# Patient Record
Sex: FEMALE | Race: WHITE | NOT HISPANIC OR LATINO | Employment: OTHER | URBAN - METROPOLITAN AREA
[De-identification: names, ages, dates, MRNs, and addresses within clinical notes are randomized per-mention and may not be internally consistent; named-entity substitution may affect disease eponyms.]

---

## 2017-02-17 ENCOUNTER — PATIENT MESSAGE (OUTPATIENT)
Dept: FAMILY MEDICINE | Facility: CLINIC | Age: 67
End: 2017-02-17

## 2017-02-18 ENCOUNTER — TELEPHONE (OUTPATIENT)
Dept: FAMILY MEDICINE | Facility: CLINIC | Age: 67
End: 2017-02-18

## 2017-02-18 RX ORDER — SPIRONOLACTONE 50 MG/1
50 TABLET, FILM COATED ORAL 2 TIMES DAILY
Qty: 180 TABLET | Refills: 3 | Status: SHIPPED | OUTPATIENT
Start: 2017-02-18 | End: 2017-04-05 | Stop reason: SDUPTHER

## 2017-04-05 ENCOUNTER — OFFICE VISIT (OUTPATIENT)
Dept: FAMILY MEDICINE | Facility: CLINIC | Age: 67
End: 2017-04-05
Payer: MEDICARE

## 2017-04-05 VITALS
HEART RATE: 56 BPM | SYSTOLIC BLOOD PRESSURE: 126 MMHG | WEIGHT: 211.44 LBS | OXYGEN SATURATION: 96 % | HEIGHT: 65 IN | TEMPERATURE: 98 F | BODY MASS INDEX: 35.23 KG/M2 | DIASTOLIC BLOOD PRESSURE: 80 MMHG

## 2017-04-05 DIAGNOSIS — I47.10 PSVT (PAROXYSMAL SUPRAVENTRICULAR TACHYCARDIA): ICD-10-CM

## 2017-04-05 DIAGNOSIS — Q72.33: ICD-10-CM

## 2017-04-05 DIAGNOSIS — G89.29 CHRONIC BILATERAL THORACIC BACK PAIN: ICD-10-CM

## 2017-04-05 DIAGNOSIS — F41.9 ANXIETY: ICD-10-CM

## 2017-04-05 DIAGNOSIS — M41.9 SCOLIOSIS, UNSPECIFIED SCOLIOSIS TYPE, UNSPECIFIED SPINAL REGION: ICD-10-CM

## 2017-04-05 DIAGNOSIS — M54.6 CHRONIC BILATERAL THORACIC BACK PAIN: ICD-10-CM

## 2017-04-05 DIAGNOSIS — E03.4 HYPOTHYROIDISM DUE TO ACQUIRED ATROPHY OF THYROID: ICD-10-CM

## 2017-04-05 DIAGNOSIS — G47.00 INSOMNIA, UNSPECIFIED TYPE: ICD-10-CM

## 2017-04-05 DIAGNOSIS — Z00.00 WELLNESS EXAMINATION: Primary | ICD-10-CM

## 2017-04-05 PROCEDURE — 99499 UNLISTED E&M SERVICE: CPT | Mod: S$GLB,,, | Performed by: FAMILY MEDICINE

## 2017-04-05 PROCEDURE — 1157F ADVNC CARE PLAN IN RCRD: CPT | Mod: S$GLB,,, | Performed by: FAMILY MEDICINE

## 2017-04-05 PROCEDURE — 1160F RVW MEDS BY RX/DR IN RCRD: CPT | Mod: S$GLB,,, | Performed by: FAMILY MEDICINE

## 2017-04-05 PROCEDURE — 99213 OFFICE O/P EST LOW 20 MIN: CPT | Mod: S$GLB,,, | Performed by: FAMILY MEDICINE

## 2017-04-05 PROCEDURE — 1159F MED LIST DOCD IN RCRD: CPT | Mod: S$GLB,,, | Performed by: FAMILY MEDICINE

## 2017-04-05 PROCEDURE — 1126F AMNT PAIN NOTED NONE PRSNT: CPT | Mod: S$GLB,,, | Performed by: FAMILY MEDICINE

## 2017-04-05 RX ORDER — ATENOLOL 50 MG/1
75 TABLET ORAL DAILY
Qty: 135 TABLET | Refills: 3 | Status: SHIPPED | OUTPATIENT
Start: 2017-04-05 | End: 2017-05-23 | Stop reason: SDUPTHER

## 2017-04-05 RX ORDER — ALPRAZOLAM 0.25 MG/1
0.25 TABLET ORAL DAILY
Qty: 90 TABLET | Refills: 1 | Status: SHIPPED | OUTPATIENT
Start: 2017-04-05 | End: 2018-03-16 | Stop reason: SDUPTHER

## 2017-04-05 RX ORDER — LEVOTHYROXINE SODIUM 150 UG/1
150 TABLET ORAL DAILY
Qty: 90 TABLET | Refills: 3 | Status: SHIPPED | OUTPATIENT
Start: 2017-04-05 | End: 2017-05-23 | Stop reason: SDUPTHER

## 2017-04-05 RX ORDER — SPIRONOLACTONE 50 MG/1
50 TABLET, FILM COATED ORAL 2 TIMES DAILY
Qty: 180 TABLET | Refills: 3 | Status: SHIPPED | OUTPATIENT
Start: 2017-04-05 | End: 2017-12-28 | Stop reason: SDUPTHER

## 2017-04-05 RX ORDER — ESTRADIOL 2 MG/1
2 TABLET ORAL DAILY
Qty: 90 TABLET | Refills: 3 | Status: SHIPPED | OUTPATIENT
Start: 2017-04-05 | End: 2018-05-02

## 2017-04-05 RX ORDER — ZOLPIDEM TARTRATE 10 MG/1
10 TABLET ORAL NIGHTLY PRN
Qty: 30 TABLET | Refills: 2 | Status: SHIPPED | OUTPATIENT
Start: 2017-04-05 | End: 2018-03-16 | Stop reason: SDUPTHER

## 2017-04-05 RX ORDER — GABAPENTIN 300 MG/1
300 CAPSULE ORAL 2 TIMES DAILY
Qty: 180 CAPSULE | Refills: 3 | Status: SHIPPED | OUTPATIENT
Start: 2017-04-05 | End: 2017-05-23 | Stop reason: SDUPTHER

## 2017-04-05 RX ORDER — THYROID 120 MG/1
1 TABLET ORAL DAILY
Qty: 90 TABLET | Refills: 3 | Status: SHIPPED | OUTPATIENT
Start: 2017-04-05 | End: 2017-05-23 | Stop reason: SDUPTHER

## 2017-04-05 RX ORDER — SERTRALINE HYDROCHLORIDE 100 MG/1
150 TABLET, FILM COATED ORAL DAILY
Qty: 135 TABLET | Refills: 3 | Status: SHIPPED | OUTPATIENT
Start: 2017-04-05 | End: 2017-05-23 | Stop reason: SDUPTHER

## 2017-04-05 RX ORDER — AMITRIPTYLINE HYDROCHLORIDE 10 MG/1
10 TABLET, FILM COATED ORAL NIGHTLY PRN
Qty: 90 TABLET | Refills: 3 | Status: SHIPPED | OUTPATIENT
Start: 2017-04-05 | End: 2017-05-23 | Stop reason: SDUPTHER

## 2017-04-05 NOTE — MR AVS SNAPSHOT
[address] [address]  Phone: [phone]  Fax: [phone]                  Talia Dillon   2017 8:20 AM   Office Visit    Description:  Female : 1950   Provider:  James aVllecillo MD   Department:  [facility]           Reason for Visit     Annual Exam     Medication Refill           Diagnoses this Visit        Comments    Wellness examination    -  Primary     PSVT (paroxysmal supraventricular tachycardia)         Anxiety         Scoliosis, unspecified scoliosis type, unspecified spinal region         Congenital absence of foot, bilateral         Hypothyroidism due to acquired atrophy of thyroid         Insomnia, unspecified type         Chronic bilateral thoracic back pain                To Do List           Future Appointments        Provider Department Dept Phone    4/10/2017 3:00 PM [facility] XR2 [facility] [phone]    2017 3:00 PM [facility] BMD1 [facility] [phone]      Goals (5 Years of Data)     None       These Medications        Disp Refills Start End    alprazolam (XANAX) 0.25 MG tablet 90 tablet 1 2017     Take 1 tablet (0.25 mg total) by mouth once daily. - Oral    Pharmacy: [facility]/pharmacy #[id number]  [place] - [address] AT [facility] Ph #: [phone]       amitriptyline (ELAVIL) 10 MG tablet 90 tablet 3 2017     Take 1 tablet (10 mg total) by mouth nightly as needed. - Oral    Pharmacy: [facility]/pharmacy #[id number] [place] - [address] AT [facility] Ph #: [phone]       atenolol (TENORMIN) 50 MG tablet 135 tablet 3 2017     Take 1.5 tablets (75 mg total) by mouth once daily. - Oral    Pharmacy: [facility]/pharmacy #[id number]  [place] - [address] AT [facility] Ph #: [phone]       Notes to Pharmacy: **Patient requests 90 days supply**    estradiol (ESTRACE) 2 MG tablet 90  tablet 3 4/5/2017     Take 1 tablet (2 mg total) by mouth once daily. - Oral    Pharmacy: [facility]/pharmacy #[facility] [place] - [address] [facility] #: [phone]       gabapentin (NEURONTIN) 300 MG capsule 180 capsule 3 4/5/2017 4/5/2018    Take 1 capsule (300 mg total) by mouth 2 (two) times daily. - Oral    Pharmacy: [facility]/pharmacy #[facility] [place] [address] AT [facility] Ph #: [phone]       levothyroxine (SYNTHROID) 150 MCG tablet 90 tablet 3 4/5/2017     Take 1 tablet (150 mcg total) by mouth once daily. - Oral    Pharmacy: [facility]/pharmacy #[facility] [place] [address] AT [facility] Ph #: [phone]       Notes to Pharmacy: **Patient requests 90 days supply**    sertraline (ZOLOFT) 100 MG tablet 135 tablet 3 4/5/2017     Take 1.5 tablets (150 mg total) by mouth once daily. - Oral    Pharmacy: [facility]/pharmacy #[facility] [place] - [address] [place] [address] AT [facility] Ph #: [phone]       Notes to Pharmacy: **Patient requests 90 days supply**    spironolactone (ALDACTONE) 50 MG tablet 180 tablet 3 4/5/2017     Take 1 tablet (50 mg total) by mouth 2 (two) times daily. - Oral    Pharmacy: [facility]/pharmacy #[facility] [place] - [address] [facility] Ph #: [phone]       Notes to Pharmacy: **Patient requests 90 days supply**    thyroid, pork, (ARMOUR THYROID) 120 mg Tab 90 tablet 3 4/5/2017     Take 1 tablet by mouth once daily. - Oral    Pharmacy: [facility]/pharmacy #[facility] [place] - [address] AT [facility] Ph #: [phone]       Notes to Pharmacy: **Patient requests 90 days supply**    zolpidem (AMBIEN) 10 mg Tab 30 tablet 2 4/5/2017     Take 1 tablet (10 mg total) by mouth nightly as needed. - Oral    Pharmacy: [facility]/pharmacy #[id number] - [place] - [address] AT [facility] Ph #:  [phone]         [facility]     [facility] Nurse Care [facility] - 24/7 Assistance  Unless otherwise directed by your provider, please contact [facility], our nurse care line that is available for 24/7 assistance.     Registered nurses in the [facility] provide: appointment scheduling, clinical advisement, health education, and other advisory services.  Call: [phone] (toll free)               Medications           Message regarding Medications     Verify the changes and/or additions to your medication regime listed below are the same as discussed with your clinician today.  If any of these changes or additions are incorrect, please notify your healthcare provider.        STOP taking these medications     terbinafine HCl (LAMISIL) 250 mg tablet Take 1 tablet (250 mg total) by mouth once daily.           Verify that the below list of medications is an accurate representation of the medications you are currently taking.  If none reported, the list may be blank. If incorrect, please contact your healthcare provider. Carry this list with you in case of emergency.           Current Medications     alprazolam (XANAX) 0.25 MG tablet Take 1 tablet (0.25 mg total) by mouth once daily.    amitriptyline (ELAVIL) 10 MG tablet Take 1 tablet (10 mg total) by mouth nightly as needed.    atenolol (TENORMIN) 50 MG tablet Take 1.5 tablets (75 mg total) by mouth once daily.    estradiol (ESTRACE) 2 MG tablet Take 1 tablet (2 mg total) by mouth once daily.    gabapentin (NEURONTIN) 300 MG capsule Take 1 capsule (300 mg total) by mouth 2 (two) times daily.    levothyroxine (SYNTHROID) 150 MCG tablet Take 1 tablet (150 mcg total) by mouth once daily.    sertraline (ZOLOFT) 100 MG tablet Take 1.5 tablets (150 mg total) by mouth once daily.    spironolactone (ALDACTONE) 50 MG tablet Take 1 tablet (50 mg total) by mouth 2 (two) times daily.    thyroid, pork, (ARMOUR THYROID) 120 mg Tab Take 1 tablet by mouth  "once daily.    zolpidem (AMBIEN) 10 mg Tab Take 1 tablet (10 mg total) by mouth nightly as needed.           Clinical Reference Information           Your Vitals Were     BP Pulse Temp Height Weight SpO2    126/80 56 98.3 °F (36.8 °C) (Oral) 5' 5" (1.651 m) 95.9 kg (211 lb 6.7 oz) 96%    BMI                35.18 kg/m2          Blood Pressure          Most Recent Value    BP  126/80      Allergies as of 4/5/2017     Dexamethasone      Immunizations Administered on Date of Encounter - 4/5/2017     None      Orders Placed During Today's Visit     Future Labs/Procedures Expected by Expires    CBC auto differential  4/5/2017 6/4/2018    Comprehensive metabolic panel  4/5/2017 6/4/2018    DXA Bone Density Spine And Hip_Axial Skeleton  4/5/2017 4/5/2018    Hepatitis C antibody  4/5/2017 6/4/2018    X-Ray Thoracic Spine AP Lateral  4/5/2017 4/5/2018    Lipid panel  As directed 4/5/2018    TSH  As directed 4/5/2018      Smoking Cessation     If you would like to quit smoking:   You may be eligible for free services if you are a [place] resident and started smoking cigarettes before September 1, 1988.  Call the Smoking Cessation [employer] ([facility]) toll free at [phone] or [phone].   Call 1-800-QUIT-NOW if you do not meet the above criteria.   Contact us via email: [email or web address]   View our website for more information: [email or web address]        Language Assistance Services     ATTENTION: Language assistance services are available, free of charge. Please call [phone].      ATENCIÓN: Si angeles meraz, tiene a stokes disposición servicios gratuitos de asistencia lingüística. Llame al [phone].     CHÚ Ý: N?u b?n nói Ti?ng Vi?t, có các d?ch v? h? tr? ngôn ng? mi?n phí dành cho b?n. G?i s? [phone].         [facility] complies with applicable Federal civil rights laws and does not discriminate on the basis of race, color, national origin, age, disability, or sex.        "

## 2017-04-05 NOTE — PROGRESS NOTES
Subjective:      Patient ID: Talia Dillon is a 66 y.o. female.    Chief Complaint: Annual Exam and Medication Refill    HPI Comments: Check  Up, rx, midthoracic back pain both sides for 3 months; no trauma; naprosyn helps;sleeps ok; voids ok;     Medication Refill       Review of Systems   Constitutional: Negative.    HENT: Negative.    Respiratory: Negative.    Cardiovascular: Negative.    Gastrointestinal: Negative.    Endocrine: Negative.    Genitourinary: Negative.    Musculoskeletal: Positive for back pain.   Psychiatric/Behavioral: Negative.    All other systems reviewed and are negative.    Objective:     Physical Exam   Constitutional: She is oriented to person, place, and time. She appears well-developed and well-nourished.   HENT:   Head: Normocephalic.   Eyes: Conjunctivae and EOM are normal. Pupils are equal, round, and reactive to light.   Neck: Normal range of motion. Neck supple.   Cardiovascular: Normal rate, regular rhythm and normal heart sounds.    Pulmonary/Chest: Effort normal and breath sounds normal.   Musculoskeletal: Normal range of motion.   Neurological: She is alert and oriented to person, place, and time. She has normal reflexes.   Skin: Skin is warm and dry.   Psychiatric: She has a normal mood and affect. Her behavior is normal. Judgment and thought content normal.   Nursing note and vitals reviewed.    Assessment:     1. Wellness examination    2. PSVT (paroxysmal supraventricular tachycardia)    3. Anxiety    4. Scoliosis, unspecified scoliosis type, unspecified spinal region    5. Congenital absence of foot, bilateral    6. Hypothyroidism due to acquired atrophy of thyroid    7. Insomnia, unspecified type    8. Chronic bilateral thoracic back pain      Plan:     New Prescriptions    No medications on file     Discontinued Medications    TERBINAFINE HCL (LAMISIL) 250 MG TABLET    Take 1 tablet (250 mg total) by mouth once daily.     Modified Medications    Modified Medication  Previous Medication    ALPRAZOLAM (XANAX) 0.25 MG TABLET alprazolam (XANAX) 0.25 MG tablet       Take 1 tablet (0.25 mg total) by mouth once daily.    Take 1 tablet (0.25 mg total) by mouth once daily.    AMITRIPTYLINE (ELAVIL) 10 MG TABLET amitriptyline (ELAVIL) 10 MG tablet       Take 1 tablet (10 mg total) by mouth nightly as needed.    Take 1 tablet (10 mg total) by mouth nightly as needed.    ATENOLOL (TENORMIN) 50 MG TABLET atenolol (TENORMIN) 50 MG tablet       Take 1.5 tablets (75 mg total) by mouth once daily.    Take 1.5 tablets (75 mg total) by mouth once daily.    ESTRADIOL (ESTRACE) 2 MG TABLET estradiol (ESTRACE) 2 MG tablet       Take 1 tablet (2 mg total) by mouth once daily.    Take 1 tablet (2 mg total) by mouth once daily.    GABAPENTIN (NEURONTIN) 300 MG CAPSULE gabapentin (NEURONTIN) 300 MG capsule       Take 1 capsule (300 mg total) by mouth 2 (two) times daily.    Take 1 capsule (300 mg total) by mouth 2 (two) times daily.    LEVOTHYROXINE (SYNTHROID) 150 MCG TABLET levothyroxine (SYNTHROID) 150 MCG tablet       Take 1 tablet (150 mcg total) by mouth once daily.    Take 1 tablet (150 mcg total) by mouth once daily.    SERTRALINE (ZOLOFT) 100 MG TABLET sertraline (ZOLOFT) 100 MG tablet       Take 1.5 tablets (150 mg total) by mouth once daily.    Take 1.5 tablets (150 mg total) by mouth once daily.    SPIRONOLACTONE (ALDACTONE) 50 MG TABLET spironolactone (ALDACTONE) 50 MG tablet       Take 1 tablet (50 mg total) by mouth 2 (two) times daily.    Take 1 tablet (50 mg total) by mouth 2 (two) times daily.    THYROID, PORK, (ARMOUR THYROID) 120 MG TAB thyroid, pork, (ARMOUR THYROID) 120 mg Tab       Take 1 tablet by mouth once daily.    Take 1 tablet by mouth once daily.    ZOLPIDEM (AMBIEN) 10 MG TAB zolpidem (AMBIEN) 10 mg Tab       Take 1 tablet (10 mg total) by mouth nightly as needed.    Take 1 tablet (10 mg total) by mouth nightly as needed.       Wellness examination  -     CBC auto  differential; Future; Expected date: 4/5/17  -     Comprehensive metabolic panel; Future; Expected date: 4/5/17  -     Lipid panel; Future  -     TSH; Future  -     Hepatitis C antibody; Future; Expected date: 4/5/17  -     DXA Bone Density Spine And Hip_Axial Skeleton; Future; Expected date: 4/5/17    PSVT (paroxysmal supraventricular tachycardia)  -     CBC auto differential; Future; Expected date: 4/5/17  -     Comprehensive metabolic panel; Future; Expected date: 4/5/17  -     Lipid panel; Future  -     TSH; Future  -     Hepatitis C antibody; Future; Expected date: 4/5/17  -     DXA Bone Density Spine And Hip_Axial Skeleton; Future; Expected date: 4/5/17    Anxiety  -     alprazolam (XANAX) 0.25 MG tablet; Take 1 tablet (0.25 mg total) by mouth once daily.  Dispense: 90 tablet; Refill: 1  -     estradiol (ESTRACE) 2 MG tablet; Take 1 tablet (2 mg total) by mouth once daily.  Dispense: 90 tablet; Refill: 3  -     zolpidem (AMBIEN) 10 mg Tab; Take 1 tablet (10 mg total) by mouth nightly as needed.  Dispense: 30 tablet; Refill: 2  -     CBC auto differential; Future; Expected date: 4/5/17  -     Comprehensive metabolic panel; Future; Expected date: 4/5/17  -     Lipid panel; Future  -     TSH; Future  -     Hepatitis C antibody; Future; Expected date: 4/5/17  -     DXA Bone Density Spine And Hip_Axial Skeleton; Future; Expected date: 4/5/17    Scoliosis, unspecified scoliosis type, unspecified spinal region  -     CBC auto differential; Future; Expected date: 4/5/17  -     Comprehensive metabolic panel; Future; Expected date: 4/5/17  -     Lipid panel; Future  -     TSH; Future  -     Hepatitis C antibody; Future; Expected date: 4/5/17  -     DXA Bone Density Spine And Hip_Axial Skeleton; Future; Expected date: 4/5/17    Congenital absence of foot, bilateral  -     CBC auto differential; Future; Expected date: 4/5/17  -     Comprehensive metabolic panel; Future; Expected date: 4/5/17  -     Lipid panel; Future  -      TSH; Future  -     Hepatitis C antibody; Future; Expected date: 4/5/17  -     DXA Bone Density Spine And Hip_Axial Skeleton; Future; Expected date: 4/5/17    Hypothyroidism due to acquired atrophy of thyroid  -     CBC auto differential; Future; Expected date: 4/5/17  -     Comprehensive metabolic panel; Future; Expected date: 4/5/17  -     Lipid panel; Future  -     TSH; Future  -     Hepatitis C antibody; Future; Expected date: 4/5/17  -     DXA Bone Density Spine And Hip_Axial Skeleton; Future; Expected date: 4/5/17    Insomnia, unspecified type  -     CBC auto differential; Future; Expected date: 4/5/17  -     Comprehensive metabolic panel; Future; Expected date: 4/5/17  -     Lipid panel; Future  -     TSH; Future  -     Hepatitis C antibody; Future; Expected date: 4/5/17  -     DXA Bone Density Spine And Hip_Axial Skeleton; Future; Expected date: 4/5/17    Chronic bilateral thoracic back pain  -     X-Ray Thoracic Spine AP Lateral; Future; Expected date: 4/5/17  -     CBC auto differential; Future; Expected date: 4/5/17  -     Comprehensive metabolic panel; Future; Expected date: 4/5/17  -     Lipid panel; Future  -     TSH; Future  -     Hepatitis C antibody; Future; Expected date: 4/5/17  -     DXA Bone Density Spine And Hip_Axial Skeleton; Future; Expected date: 4/5/17    Other orders  -     amitriptyline (ELAVIL) 10 MG tablet; Take 1 tablet (10 mg total) by mouth nightly as needed.  Dispense: 90 tablet; Refill: 3  -     atenolol (TENORMIN) 50 MG tablet; Take 1.5 tablets (75 mg total) by mouth once daily.  Dispense: 135 tablet; Refill: 3  -     gabapentin (NEURONTIN) 300 MG capsule; Take 1 capsule (300 mg total) by mouth 2 (two) times daily.  Dispense: 180 capsule; Refill: 3  -     levothyroxine (SYNTHROID) 150 MCG tablet; Take 1 tablet (150 mcg total) by mouth once daily.  Dispense: 90 tablet; Refill: 3  -     sertraline (ZOLOFT) 100 MG tablet; Take 1.5 tablets (150 mg total) by mouth once daily.   Dispense: 135 tablet; Refill: 3  -     spironolactone (ALDACTONE) 50 MG tablet; Take 1 tablet (50 mg total) by mouth 2 (two) times daily.  Dispense: 180 tablet; Refill: 3  -     thyroid, pork, (ARMOUR THYROID) 120 mg Tab; Take 1 tablet by mouth once daily.  Dispense: 90 tablet; Refill: 3

## 2017-04-10 ENCOUNTER — HOSPITAL ENCOUNTER (OUTPATIENT)
Dept: RADIOLOGY | Facility: HOSPITAL | Age: 67
Discharge: HOME | End: 2017-04-10
Attending: FAMILY MEDICINE
Payer: MEDICARE

## 2017-04-10 DIAGNOSIS — M54.6 CHRONIC BILATERAL THORACIC BACK PAIN: ICD-10-CM

## 2017-04-10 DIAGNOSIS — G89.29 CHRONIC BILATERAL THORACIC BACK PAIN: ICD-10-CM

## 2017-04-10 PROCEDURE — 72070 X-RAY EXAM THORAC SPINE 2VWS: CPT | Mod: 26,,, | Performed by: RADIOLOGY

## 2017-04-10 PROCEDURE — 72070 X-RAY EXAM THORAC SPINE 2VWS: CPT | Mod: TC,PO

## 2017-05-08 ENCOUNTER — APPOINTMENT (OUTPATIENT)
Dept: RADIOLOGY | Facility: CLINIC | Age: 67
End: 2017-05-08
Attending: FAMILY MEDICINE
Payer: MEDICARE

## 2017-05-08 DIAGNOSIS — G89.29 CHRONIC BILATERAL THORACIC BACK PAIN: ICD-10-CM

## 2017-05-08 DIAGNOSIS — Z00.00 WELLNESS EXAMINATION: ICD-10-CM

## 2017-05-08 DIAGNOSIS — G47.00 INSOMNIA, UNSPECIFIED TYPE: ICD-10-CM

## 2017-05-08 DIAGNOSIS — I47.10 PSVT (PAROXYSMAL SUPRAVENTRICULAR TACHYCARDIA): ICD-10-CM

## 2017-05-08 DIAGNOSIS — M41.9 SCOLIOSIS, UNSPECIFIED SCOLIOSIS TYPE, UNSPECIFIED SPINAL REGION: ICD-10-CM

## 2017-05-08 DIAGNOSIS — M54.6 CHRONIC BILATERAL THORACIC BACK PAIN: ICD-10-CM

## 2017-05-08 DIAGNOSIS — Q72.33: ICD-10-CM

## 2017-05-08 DIAGNOSIS — E03.4 HYPOTHYROIDISM DUE TO ACQUIRED ATROPHY OF THYROID: ICD-10-CM

## 2017-05-08 DIAGNOSIS — F41.9 ANXIETY: ICD-10-CM

## 2017-05-08 PROCEDURE — 77080 DXA BONE DENSITY AXIAL: CPT | Mod: 26,,, | Performed by: INTERNAL MEDICINE

## 2017-05-08 PROCEDURE — 77080 DXA BONE DENSITY AXIAL: CPT | Mod: TC,PO

## 2017-05-23 RX ORDER — ATENOLOL 50 MG/1
TABLET ORAL
Qty: 135 TABLET | Refills: 3 | Status: SHIPPED | OUTPATIENT
Start: 2017-05-23 | End: 2018-03-16 | Stop reason: SDUPTHER

## 2017-05-23 RX ORDER — GABAPENTIN 300 MG/1
CAPSULE ORAL
Qty: 180 CAPSULE | Refills: 3 | Status: SHIPPED | OUTPATIENT
Start: 2017-05-23 | End: 2017-12-28 | Stop reason: SDUPTHER

## 2017-05-23 RX ORDER — SERTRALINE HYDROCHLORIDE 100 MG/1
TABLET, FILM COATED ORAL
Qty: 135 TABLET | Refills: 3 | Status: SHIPPED | OUTPATIENT
Start: 2017-05-23 | End: 2018-03-16 | Stop reason: SDUPTHER

## 2017-05-23 RX ORDER — AMITRIPTYLINE HYDROCHLORIDE 10 MG/1
TABLET, FILM COATED ORAL
Qty: 90 TABLET | Refills: 3 | Status: SHIPPED | OUTPATIENT
Start: 2017-05-23 | End: 2017-08-09

## 2017-05-23 RX ORDER — LEVOTHYROXINE SODIUM 150 UG/1
TABLET ORAL
Qty: 90 TABLET | Refills: 3 | Status: SHIPPED | OUTPATIENT
Start: 2017-05-23 | End: 2017-12-31 | Stop reason: SDUPTHER

## 2017-05-23 RX ORDER — THYROID, PORCINE 120 MG/1
TABLET ORAL
Qty: 90 TABLET | Refills: 3 | Status: SHIPPED | OUTPATIENT
Start: 2017-05-23 | End: 2018-03-16 | Stop reason: SDUPTHER

## 2017-05-26 ENCOUNTER — TELEPHONE (OUTPATIENT)
Dept: FAMILY MEDICINE | Facility: CLINIC | Age: 67
End: 2017-05-26

## 2017-05-26 NOTE — TELEPHONE ENCOUNTER
----- Message from James Vallecillo MD sent at 5/20/2017  7:40 AM CDT -----  CALL PT TESTS ARE NORMAL

## 2017-06-07 ENCOUNTER — TELEPHONE (OUTPATIENT)
Dept: FAMILY MEDICINE | Facility: CLINIC | Age: 67
End: 2017-06-07

## 2017-06-07 DIAGNOSIS — E03.4 HYPOTHYROIDISM DUE TO ACQUIRED ATROPHY OF THYROID: Primary | ICD-10-CM

## 2017-06-07 DIAGNOSIS — R60.0 LOCALIZED EDEMA: ICD-10-CM

## 2017-06-07 LAB
ALBUMIN SERPL-MCNC: 4.1 G/DL (ref 3.6–5.1)
ALBUMIN/GLOB SERPL: 1.4 (CALC) (ref 1–2.5)
ALP SERPL-CCNC: 71 U/L (ref 33–130)
ALT SERPL-CCNC: 10 U/L (ref 6–29)
AST SERPL-CCNC: 15 U/L (ref 10–35)
BASOPHILS # BLD AUTO: 25 CELLS/UL (ref 0–200)
BASOPHILS NFR BLD AUTO: 0.3 %
BILIRUB SERPL-MCNC: 0.5 MG/DL (ref 0.2–1.2)
BUN SERPL-MCNC: 30 MG/DL (ref 7–25)
BUN/CREAT SERPL: 18 (CALC) (ref 6–22)
CALCIUM SERPL-MCNC: 9.2 MG/DL (ref 8.6–10.4)
CHLORIDE SERPL-SCNC: 105 MMOL/L (ref 98–110)
CHOLEST SERPL-MCNC: 180 MG/DL (ref 125–200)
CHOLEST/HDLC SERPL: 4.4 (CALC)
CO2 SERPL-SCNC: 26 MMOL/L (ref 20–31)
CREAT SERPL-MCNC: 1.63 MG/DL (ref 0.5–0.99)
EOSINOPHIL # BLD AUTO: 191 CELLS/UL (ref 15–500)
EOSINOPHIL NFR BLD AUTO: 2.3 %
ERYTHROCYTE [DISTWIDTH] IN BLOOD BY AUTOMATED COUNT: 13.6 % (ref 11–15)
GFR SERPL CREATININE-BSD FRML MDRD: 32 ML/MIN/1.73M2
GLOBULIN SER CALC-MCNC: 2.9 G/DL (CALC) (ref 1.9–3.7)
GLUCOSE SERPL-MCNC: 98 MG/DL (ref 65–99)
HCT VFR BLD AUTO: 38.8 % (ref 35–45)
HCV AB S/CO SERPL IA: 0.14
HCV AB SERPL QL IA: NORMAL
HDLC SERPL-MCNC: 41 MG/DL
HGB BLD-MCNC: 12.9 G/DL (ref 11.7–15.5)
LDLC SERPL CALC-MCNC: 111 MG/DL (CALC)
LYMPHOCYTES # BLD AUTO: 1311 CELLS/UL (ref 850–3900)
LYMPHOCYTES NFR BLD AUTO: 15.8 %
MCH RBC QN AUTO: 28.7 PG (ref 27–33)
MCHC RBC AUTO-ENTMCNC: 33.3 G/DL (ref 32–36)
MCV RBC AUTO: 86.2 FL (ref 80–100)
MONOCYTES # BLD AUTO: 473 CELLS/UL (ref 200–950)
MONOCYTES NFR BLD AUTO: 5.7 %
NEUTROPHILS # BLD AUTO: 6300 CELLS/UL (ref 1500–7800)
NEUTROPHILS NFR BLD AUTO: 75.9 %
NONHDLC SERPL-MCNC: 139 MG/DL (CALC)
PLATELET # BLD AUTO: 133 THOUSAND/UL (ref 140–400)
PMV BLD REES-ECKER: 11.8 FL (ref 7.5–12.5)
POTASSIUM SERPL-SCNC: 4.7 MMOL/L (ref 3.5–5.3)
PROT SERPL-MCNC: 7 G/DL (ref 6.1–8.1)
RBC # BLD AUTO: 4.5 MILLION/UL (ref 3.8–5.1)
SODIUM SERPL-SCNC: 139 MMOL/L (ref 135–146)
TRIGL SERPL-MCNC: 138 MG/DL
TSH SERPL-ACNC: 0.02 MIU/L (ref 0.4–4.5)
WBC # BLD AUTO: 8.3 THOUSAND/UL (ref 3.8–10.8)

## 2017-08-01 ENCOUNTER — TELEPHONE (OUTPATIENT)
Dept: FAMILY MEDICINE | Facility: CLINIC | Age: 67
End: 2017-08-01

## 2017-08-01 LAB
BUN SERPL-MCNC: 33 MG/DL (ref 7–25)
BUN/CREAT SERPL: 19 (CALC) (ref 6–22)
CALCIUM SERPL-MCNC: 9.2 MG/DL (ref 8.6–10.4)
CHLORIDE SERPL-SCNC: 102 MMOL/L (ref 98–110)
CO2 SERPL-SCNC: 29 MMOL/L (ref 20–31)
CREAT SERPL-MCNC: 1.7 MG/DL (ref 0.5–0.99)
GFR SERPL CREATININE-BSD FRML MDRD: 31 ML/MIN/1.73M2
GLUCOSE SERPL-MCNC: 90 MG/DL (ref 65–99)
POTASSIUM SERPL-SCNC: 4.3 MMOL/L (ref 3.5–5.3)
SODIUM SERPL-SCNC: 139 MMOL/L (ref 135–146)
T3 SERPL-MCNC: 77 NG/DL (ref 76–181)
T4 SERPL-MCNC: 7.1 MCG/DL (ref 4.5–12)

## 2017-08-01 NOTE — TELEPHONE ENCOUNTER
----- Message from James Vallecillo MD sent at 8/1/2017  6:50 AM CDT -----  Call to RTC WITH medicines for edema, diuretics and arthritis; kidney function remains elevated

## 2017-08-09 ENCOUNTER — OFFICE VISIT (OUTPATIENT)
Dept: FAMILY MEDICINE | Facility: CLINIC | Age: 67
End: 2017-08-09
Payer: MEDICARE

## 2017-08-09 VITALS
HEART RATE: 73 BPM | TEMPERATURE: 98 F | DIASTOLIC BLOOD PRESSURE: 88 MMHG | WEIGHT: 218.06 LBS | HEIGHT: 65 IN | SYSTOLIC BLOOD PRESSURE: 130 MMHG | OXYGEN SATURATION: 98 % | BODY MASS INDEX: 36.33 KG/M2

## 2017-08-09 DIAGNOSIS — R60.9 EDEMA, UNSPECIFIED TYPE: ICD-10-CM

## 2017-08-09 DIAGNOSIS — M77.8 SHOULDER TENDONITIS, LEFT: ICD-10-CM

## 2017-08-09 DIAGNOSIS — R79.89 CREATININE ELEVATION: ICD-10-CM

## 2017-08-09 DIAGNOSIS — M79.606 LEG PAIN, ANTERIOR, UNSPECIFIED LATERALITY: ICD-10-CM

## 2017-08-09 DIAGNOSIS — Q66.90: Primary | ICD-10-CM

## 2017-08-09 LAB
BACTERIA #/AREA URNS AUTO: NORMAL /HPF
BILIRUB UR QL STRIP: NEGATIVE
CLARITY UR REFRACT.AUTO: CLEAR
COLOR UR AUTO: YELLOW
GLUCOSE UR QL STRIP: NEGATIVE
HGB UR QL STRIP: NEGATIVE
HYALINE CASTS UR QL AUTO: 1 /LPF
KETONES UR QL STRIP: NEGATIVE
LEUKOCYTE ESTERASE UR QL STRIP: NEGATIVE
MICROSCOPIC COMMENT: NORMAL
NITRITE UR QL STRIP: NEGATIVE
PH UR STRIP: 5 [PH] (ref 5–8)
PROT UR QL STRIP: ABNORMAL
RBC #/AREA URNS AUTO: 1 /HPF (ref 0–4)
SP GR UR STRIP: 1.01 (ref 1–1.03)
SQUAMOUS #/AREA URNS AUTO: 0 /HPF
URN SPEC COLLECT METH UR: ABNORMAL
UROBILINOGEN UR STRIP-ACNC: NEGATIVE EU/DL
WBC #/AREA URNS AUTO: 1 /HPF (ref 0–5)

## 2017-08-09 PROCEDURE — 1125F AMNT PAIN NOTED PAIN PRSNT: CPT | Mod: S$GLB,,, | Performed by: FAMILY MEDICINE

## 2017-08-09 PROCEDURE — 1159F MED LIST DOCD IN RCRD: CPT | Mod: S$GLB,,, | Performed by: FAMILY MEDICINE

## 2017-08-09 PROCEDURE — 99213 OFFICE O/P EST LOW 20 MIN: CPT | Mod: 25,S$GLB,, | Performed by: FAMILY MEDICINE

## 2017-08-09 PROCEDURE — 20610 DRAIN/INJ JOINT/BURSA W/O US: CPT | Mod: LT,S$GLB,, | Performed by: FAMILY MEDICINE

## 2017-08-09 PROCEDURE — 3008F BODY MASS INDEX DOCD: CPT | Mod: S$GLB,,, | Performed by: FAMILY MEDICINE

## 2017-08-09 PROCEDURE — 81001 URINALYSIS AUTO W/SCOPE: CPT

## 2017-08-09 RX ORDER — AMITRIPTYLINE HYDROCHLORIDE 10 MG/1
10 TABLET, FILM COATED ORAL NIGHTLY PRN
Qty: 90 TABLET | Refills: 3 | Status: SHIPPED | OUTPATIENT
Start: 2017-08-09 | End: 2018-09-07 | Stop reason: SDUPTHER

## 2017-08-09 NOTE — PROCEDURES
Large Joint Aspiration/Injection  Date/Time: 8/9/2017 2:41 PM  Performed by: LUIZ MONTGOMERY  Authorized by: LUIZ MONTGOMERY     Consent Done?:  Yes (Verbal)  Indications:  Pain  Procedure site marked: Yes    Timeout: Prior to procedure the correct patient, procedure, and site was verified      Location:  Shoulder  Site:  L subacromial bursa  Prep: Patient was prepped and draped in usual sterile fashion    Needle size:  22 G  Approach:  Lateral  Medications:  10 mg triamcinolone acetonide 10 mg/mL  Aspirate amount (ml):  0  Patient tolerance:  Patient tolerated the procedure well with no immediate complications

## 2017-08-09 NOTE — PROGRESS NOTES
Subjective:      Patient ID: Talia Dillon is a 66 y.o. female.    Chief Complaint: Follow-up    Follow up abnormal labs, elevated creatinine, decreased spironolcatone to one a day from two; she takes this for generalized edema; always; c/o always sweats too much, all of her life.  Also, low TSH; ankles swelled after decreasing diuretic it, then went down; still swell on occasion; breathing ok; elavil helps, even though it doesn't help with sweating; left shoulder pain;       Review of Systems   Constitutional: Negative.    HENT: Negative.    Respiratory: Negative.    Cardiovascular: Negative.    Gastrointestinal: Negative.    Endocrine: Negative.    Genitourinary: Negative.    Musculoskeletal: Negative.    Psychiatric/Behavioral: Negative.    All other systems reviewed and are negative.    Objective:     Physical Exam   Constitutional: She is oriented to person, place, and time. She appears well-developed and well-nourished.   HENT:   Head: Normocephalic.   Eyes: Conjunctivae and EOM are normal. Pupils are equal, round, and reactive to light.   Neck: Normal range of motion. Neck supple.   Cardiovascular: Normal rate, regular rhythm and normal heart sounds.    Pulmonary/Chest: Effort normal and breath sounds normal.   Musculoskeletal: She exhibits tenderness. She exhibits no edema or deformity.   Neurological: She is alert and oriented to person, place, and time. She has normal reflexes.   Skin: Skin is warm and dry.   Psychiatric: She has a normal mood and affect. Her behavior is normal. Judgment and thought content normal.   Nursing note and vitals reviewed.    Assessment:     1. Congenital deformities of feet    2. Leg pain, anterior, unspecified laterality    3. Shoulder tendonitis, left    4. Creatinine elevation    5. Edema, unspecified type      Plan:     New Prescriptions    No medications on file     Discontinued Medications    No medications on file     Modified Medications    Modified Medication Previous  Medication    AMITRIPTYLINE (ELAVIL) 10 MG TABLET amitriptyline (ELAVIL) 10 MG tablet       Take 1 tablet (10 mg total) by mouth nightly as needed.    TAKE 1 TABLET BY MOUTH NIGHTLY AS NEEDED       Congenital deformities of feet  -     Cancel: Urinalysis; Future  -     US Retroperitoneal Complete (Kidney and; Future; Expected date: 08/09/2017  -     Basic metabolic panel; Future; Expected date: 11/09/2017  -     Urinalysis    Leg pain, anterior, unspecified laterality  -     Cancel: Urinalysis; Future  -     US Retroperitoneal Complete (Kidney and; Future; Expected date: 08/09/2017  -     Basic metabolic panel; Future; Expected date: 11/09/2017  -     Urinalysis    Shoulder tendonitis, left  -     Basic metabolic panel; Future; Expected date: 11/09/2017  -     Large Joint Aspiration/Injection  -     triamcinolone acetonide injection 10 mg; Inject 10 mg into the articular space.  -     Urinalysis    Creatinine elevation  -     Urinalysis    Edema, unspecified type  -     Urinalysis    Other orders  -     amitriptyline (ELAVIL) 10 MG tablet; Take 1 tablet (10 mg total) by mouth nightly as needed.  Dispense: 90 tablet; Refill: 3  -     Urinalysis Microscopic      Bmp 3 months; avoid aleve, if possible; u/a , renal ultrasound to check kidney with william creatinine

## 2017-08-10 ENCOUNTER — TELEPHONE (OUTPATIENT)
Dept: FAMILY MEDICINE | Facility: CLINIC | Age: 67
End: 2017-08-10

## 2017-08-10 DIAGNOSIS — R80.9 PROTEINURIA, UNSPECIFIED TYPE: Primary | ICD-10-CM

## 2017-08-11 ENCOUNTER — TELEPHONE (OUTPATIENT)
Dept: FAMILY MEDICINE | Facility: CLINIC | Age: 67
End: 2017-08-11

## 2017-08-11 NOTE — TELEPHONE ENCOUNTER
----- Message from Kerry Zambrano sent at 8/11/2017  2:24 PM CDT -----  Patient returned Theresa's phone call. Patient available now for returned call

## 2017-08-11 NOTE — TELEPHONE ENCOUNTER
Pt notified, states it will be a couple weeks before she can get back because she's in the process of moving.

## 2017-08-11 NOTE — TELEPHONE ENCOUNTER
----- Message from James Vallecillo MD sent at 8/10/2017  7:22 AM CDT -----  Protein in urine; needs 24 hour urine for protein

## 2017-08-22 ENCOUNTER — TELEPHONE (OUTPATIENT)
Dept: RADIOLOGY | Facility: HOSPITAL | Age: 67
End: 2017-08-22

## 2017-08-23 ENCOUNTER — HOSPITAL ENCOUNTER (OUTPATIENT)
Dept: RADIOLOGY | Facility: HOSPITAL | Age: 67
Discharge: HOME | End: 2017-08-23
Attending: FAMILY MEDICINE
Payer: MEDICARE

## 2017-08-23 ENCOUNTER — TELEPHONE (OUTPATIENT)
Dept: FAMILY MEDICINE | Facility: CLINIC | Age: 67
End: 2017-08-23

## 2017-08-23 DIAGNOSIS — Q66.90: ICD-10-CM

## 2017-08-23 DIAGNOSIS — M79.606 LEG PAIN, ANTERIOR, UNSPECIFIED LATERALITY: ICD-10-CM

## 2017-08-23 PROCEDURE — 76770 US EXAM ABDO BACK WALL COMP: CPT | Mod: TC,PO

## 2017-08-23 PROCEDURE — 76770 US EXAM ABDO BACK WALL COMP: CPT | Mod: 26,,, | Performed by: RADIOLOGY

## 2017-08-23 NOTE — TELEPHONE ENCOUNTER
----- Message from Kerry Zambrano sent at 8/23/2017 10:51 AM CDT -----  Please would like returned call from nurse to explain & do the 24 hour urine

## 2017-08-23 NOTE — TELEPHONE ENCOUNTER
----- Message from James Vallecillo MD sent at 8/23/2017  1:38 PM CDT -----  CALL PT TESTS ARE NORMAL

## 2017-09-15 ENCOUNTER — CLINICAL SUPPORT (OUTPATIENT)
Dept: FAMILY MEDICINE | Facility: CLINIC | Age: 67
End: 2017-09-15
Payer: MEDICARE

## 2017-09-15 DIAGNOSIS — R80.9 PROTEINURIA, UNSPECIFIED TYPE: ICD-10-CM

## 2017-09-15 LAB
PROT 24H UR-MRATE: 230 MG/SPEC
PROT UR-MCNC: 27 MG/DL
URINE COLLECTION DURATION: 24 HR
URINE VOLUME: 850 ML

## 2017-09-15 PROCEDURE — 84156 ASSAY OF PROTEIN URINE: CPT

## 2017-11-23 ENCOUNTER — PATIENT MESSAGE (OUTPATIENT)
Dept: FAMILY MEDICINE | Facility: CLINIC | Age: 67
End: 2017-11-23

## 2017-12-28 ENCOUNTER — OFFICE VISIT (OUTPATIENT)
Dept: FAMILY MEDICINE | Facility: CLINIC | Age: 67
End: 2017-12-28
Payer: MEDICARE

## 2017-12-28 VITALS
BODY MASS INDEX: 35.99 KG/M2 | OXYGEN SATURATION: 97 % | SYSTOLIC BLOOD PRESSURE: 130 MMHG | TEMPERATURE: 98 F | HEART RATE: 65 BPM | WEIGHT: 216.25 LBS | DIASTOLIC BLOOD PRESSURE: 80 MMHG

## 2017-12-28 DIAGNOSIS — R79.89 AZOTEMIA: Primary | ICD-10-CM

## 2017-12-28 DIAGNOSIS — E03.4 HYPOTHYROIDISM DUE TO ACQUIRED ATROPHY OF THYROID: ICD-10-CM

## 2017-12-28 DIAGNOSIS — M54.12 CERVICAL NEURALGIA: ICD-10-CM

## 2017-12-28 PROCEDURE — 99499 UNLISTED E&M SERVICE: CPT | Mod: S$GLB,,, | Performed by: FAMILY MEDICINE

## 2017-12-28 PROCEDURE — 99213 OFFICE O/P EST LOW 20 MIN: CPT | Mod: S$GLB,,, | Performed by: FAMILY MEDICINE

## 2017-12-28 PROCEDURE — G0008 ADMIN INFLUENZA VIRUS VAC: HCPCS | Mod: S$GLB,,, | Performed by: FAMILY MEDICINE

## 2017-12-28 PROCEDURE — 90662 IIV NO PRSV INCREASED AG IM: CPT | Mod: S$GLB,,, | Performed by: FAMILY MEDICINE

## 2017-12-28 RX ORDER — METHYLPREDNISOLONE 4 MG/1
TABLET ORAL
Qty: 1 PACKAGE | Refills: 0 | Status: SHIPPED | OUTPATIENT
Start: 2017-12-28 | End: 2017-12-28

## 2017-12-28 RX ORDER — SPIRONOLACTONE 50 MG/1
50 TABLET, FILM COATED ORAL DAILY
Qty: 90 TABLET | Refills: 3
Start: 2017-12-28 | End: 2018-04-23 | Stop reason: SINTOL

## 2017-12-28 RX ORDER — PREDNISONE 20 MG/1
20 TABLET ORAL DAILY
Qty: 10 TABLET | Refills: 0 | Status: SHIPPED | OUTPATIENT
Start: 2017-12-28 | End: 2018-03-06

## 2017-12-28 RX ORDER — GABAPENTIN 300 MG/1
600 CAPSULE ORAL 2 TIMES DAILY
Qty: 180 CAPSULE | Refills: 3 | Status: SHIPPED | OUTPATIENT
Start: 2017-12-28 | End: 2018-05-31 | Stop reason: SDUPTHER

## 2017-12-28 NOTE — PROGRESS NOTES
Subjective:      Patient ID: Talia Dillon is a 67 y.o. female.    Chief Complaint: Shoulder Pain (left shoulder pain since August ); Neck Pain; and Flu Vaccine    Follow up; still in pain shoulder, neck back, had protein in urine, too much thryoid meds, injection no help in shoulder; on less spironoolactone; had creatinine 1.7; will recheck BMP now on only one a day' TSH low repeatedly; on 2 diff replacements; will repeat TSH, if still low, will decrease dose of one of them;     Lower T spine pain, left shoulder pain, rqdiates down back left arm, neck hurts and stiff; no h x neck surgery; taking no Rx;       Review of Systems   Constitutional: Negative.  Negative for activity change and unexpected weight change.   HENT: Negative.  Negative for hearing loss, rhinorrhea and trouble swallowing.    Eyes: Negative for discharge and visual disturbance.   Respiratory: Positive for wheezing. Negative for chest tightness.    Cardiovascular: Negative.  Negative for chest pain and palpitations.   Gastrointestinal: Negative.  Negative for blood in stool, constipation, diarrhea and vomiting.   Endocrine: Negative.  Negative for polydipsia and polyuria.   Genitourinary: Negative.  Negative for difficulty urinating, dysuria, hematuria and menstrual problem.   Musculoskeletal: Positive for arthralgias and neck pain.   Neurological: Negative for weakness and headaches.   Psychiatric/Behavioral: Negative.  Negative for confusion and dysphoric mood.   All other systems reviewed and are negative.    Objective:     Physical Exam   Constitutional: She is oriented to person, place, and time. She appears well-developed and well-nourished.   HENT:   Head: Normocephalic.   Eyes: Conjunctivae and EOM are normal. Pupils are equal, round, and reactive to light.   Neck: Normal range of motion. Neck supple.   Cardiovascular: Normal rate, regular rhythm and normal heart sounds.    Pulmonary/Chest: Effort normal and breath sounds normal.    Musculoskeletal: Normal range of motion.   Neurological: She is alert and oriented to person, place, and time. She has normal reflexes.   Skin: Skin is warm and dry.   Psychiatric: She has a normal mood and affect. Her behavior is normal. Judgment and thought content normal.   Nursing note and vitals reviewed.    Assessment:     1. Azotemia    2. Hypothyroidism due to acquired atrophy of thyroid    3. Cervical neuralgia      Plan:     New Prescriptions    PREDNISONE (DELTASONE) 20 MG TABLET    Take 1 tablet (20 mg total) by mouth once daily. For arthritis pain     Discontinued Medications    No medications on file     Modified Medications    Modified Medication Previous Medication    GABAPENTIN (NEURONTIN) 300 MG CAPSULE gabapentin (NEURONTIN) 300 MG capsule       Take 2 capsules (600 mg total) by mouth 2 (two) times daily.    TAKE ONE CAPSULE BY MOUTH TWICE A DAY    SPIRONOLACTONE (ALDACTONE) 50 MG TABLET spironolactone (ALDACTONE) 50 MG tablet       Take 1 tablet (50 mg total) by mouth once daily.    Take 1 tablet (50 mg total) by mouth 2 (two) times daily.       Azotemia  -     Basic metabolic panel; Future; Expected date: 12/28/2017  -     MRI Cervical Spine Without Contrast; Future; Expected date: 12/28/2017    Hypothyroidism due to acquired atrophy of thyroid  -     TSH; Future  -     MRI Cervical Spine Without Contrast; Future; Expected date: 12/28/2017    Cervical neuralgia    Other orders  -     spironolactone (ALDACTONE) 50 MG tablet; Take 1 tablet (50 mg total) by mouth once daily.  Dispense: 90 tablet; Refill: 3  -     gabapentin (NEURONTIN) 300 MG capsule; Take 2 capsules (600 mg total) by mouth 2 (two) times daily.  Dispense: 180 capsule; Refill: 3  -     Discontinue: methylPREDNISolone (MEDROL DOSEPACK) 4 mg tablet; use as directed  Dispense: 1 Package; Refill: 0  -     predniSONE (DELTASONE) 20 MG tablet; Take 1 tablet (20 mg total) by mouth once daily. For arthritis pain  Dispense: 10 tablet;  Refill: 0  -     Influenza - High Dose (65+) (PF) (IM)

## 2017-12-29 ENCOUNTER — HOSPITAL ENCOUNTER (OUTPATIENT)
Dept: RADIOLOGY | Facility: HOSPITAL | Age: 67
Discharge: HOME | End: 2017-12-29
Attending: FAMILY MEDICINE
Payer: MEDICARE

## 2017-12-29 DIAGNOSIS — E03.4 HYPOTHYROIDISM DUE TO ACQUIRED ATROPHY OF THYROID: ICD-10-CM

## 2017-12-29 DIAGNOSIS — R79.89 AZOTEMIA: ICD-10-CM

## 2017-12-29 PROCEDURE — 72141 MRI NECK SPINE W/O DYE: CPT | Mod: TC,PO

## 2017-12-30 NOTE — PROGRESS NOTES
Patient, Talia Dillon (MRN #[id number]), presented with a recent Platelet count less than 150 K/uL consistent with the definition of thrombocytopenia (ICD10 - D69.6).    Platelets   Date Value Ref Range Status   06/06/2017 133 (L) 140 - 400 Thousand/uL Final     The patient's thrombocytopenia was monitored, evaluated, addressed and/or treated. This addendum to the medical record is made on 12/30/2017.

## 2017-12-31 ENCOUNTER — TELEPHONE (OUTPATIENT)
Dept: FAMILY MEDICINE | Facility: CLINIC | Age: 67
End: 2017-12-31

## 2017-12-31 RX ORDER — LEVOTHYROXINE SODIUM 150 UG/1
150 TABLET ORAL EVERY OTHER DAY
Qty: 45 TABLET | Refills: 3
Start: 2017-12-31 | End: 2018-09-07

## 2018-01-03 ENCOUNTER — TELEPHONE (OUTPATIENT)
Dept: FAMILY MEDICINE | Facility: CLINIC | Age: 68
End: 2018-01-03

## 2018-01-03 DIAGNOSIS — E03.4 HYPOTHYROIDISM DUE TO ACQUIRED ATROPHY OF THYROID: Primary | ICD-10-CM

## 2018-01-03 NOTE — TELEPHONE ENCOUNTER
----- Message from James Vallecillo MD sent at 12/31/2017  8:31 AM CST -----  Too much thyroid medicine; decrease levothyroxin to every other day and repeat TSH 2 months

## 2018-01-03 NOTE — TELEPHONE ENCOUNTER
----- Message from James Vallecillo MD sent at 12/31/2017  8:33 AM CST -----  Neck has arthritis, worn out discs and pinched nerves

## 2018-02-23 ENCOUNTER — TELEPHONE (OUTPATIENT)
Dept: FAMILY MEDICINE | Facility: CLINIC | Age: 68
End: 2018-02-23

## 2018-03-01 ENCOUNTER — LAB VISIT (OUTPATIENT)
Dept: LAB | Facility: HOSPITAL | Age: 68
End: 2018-03-01
Attending: FAMILY MEDICINE
Payer: MEDICARE

## 2018-03-01 DIAGNOSIS — E03.4 HYPOTHYROIDISM DUE TO ACQUIRED ATROPHY OF THYROID: ICD-10-CM

## 2018-03-01 LAB — TSH SERPL DL<=0.005 MIU/L-ACNC: 0.69 UIU/ML

## 2018-03-01 PROCEDURE — 84443 ASSAY THYROID STIM HORMONE: CPT | Mod: PO

## 2018-03-01 PROCEDURE — 36415 COLL VENOUS BLD VENIPUNCTURE: CPT | Mod: PO

## 2018-03-06 ENCOUNTER — OFFICE VISIT (OUTPATIENT)
Dept: FAMILY MEDICINE | Facility: CLINIC | Age: 68
End: 2018-03-06
Payer: MEDICARE

## 2018-03-06 ENCOUNTER — LAB VISIT (OUTPATIENT)
Dept: LAB | Facility: HOSPITAL | Age: 68
End: 2018-03-06
Attending: FAMILY MEDICINE
Payer: MEDICARE

## 2018-03-06 VITALS
SYSTOLIC BLOOD PRESSURE: 122 MMHG | DIASTOLIC BLOOD PRESSURE: 84 MMHG | WEIGHT: 222.19 LBS | BODY MASS INDEX: 37.02 KG/M2 | HEIGHT: 65 IN | TEMPERATURE: 98 F | OXYGEN SATURATION: 98 % | HEART RATE: 72 BPM

## 2018-03-06 DIAGNOSIS — R70.0 ELEVATED SED RATE: ICD-10-CM

## 2018-03-06 DIAGNOSIS — E03.4 HYPOTHYROIDISM DUE TO ACQUIRED ATROPHY OF THYROID: ICD-10-CM

## 2018-03-06 DIAGNOSIS — Z86.79 HISTORY OF PSVT (PAROXYSMAL SUPRAVENTRICULAR TACHYCARDIA): ICD-10-CM

## 2018-03-06 DIAGNOSIS — Q89.9 CONGENITAL BIRTH DEFECT: Primary | ICD-10-CM

## 2018-03-06 DIAGNOSIS — R79.89 CREATININE ELEVATION: ICD-10-CM

## 2018-03-06 LAB
ALBUMIN SERPL BCP-MCNC: 4.3 G/DL
ALP SERPL-CCNC: 86 U/L
ALT SERPL W/O P-5'-P-CCNC: 23 U/L
ANION GAP SERPL CALC-SCNC: 12 MMOL/L
AST SERPL-CCNC: 21 U/L
BASOPHILS # BLD AUTO: 0.02 K/UL
BASOPHILS NFR BLD: 0.2 %
BILIRUB SERPL-MCNC: 0.3 MG/DL
BUN SERPL-MCNC: 33 MG/DL
CALCIUM SERPL-MCNC: 9.5 MG/DL
CHLORIDE SERPL-SCNC: 104 MMOL/L
CO2 SERPL-SCNC: 29 MMOL/L
CREAT SERPL-MCNC: 1.81 MG/DL
CRP SERPL-MCNC: 1.41 MG/DL
DIFFERENTIAL METHOD: ABNORMAL
EOSINOPHIL # BLD AUTO: 0.3 K/UL
EOSINOPHIL NFR BLD: 3.4 %
ERYTHROCYTE [DISTWIDTH] IN BLOOD BY AUTOMATED COUNT: 13.9 %
ERYTHROCYTE [SEDIMENTATION RATE] IN BLOOD BY WESTERGREN METHOD: 38 MM/HR
EST. GFR  (AFRICAN AMERICAN): 32.6 ML/MIN/1.73 M^2
EST. GFR  (NON AFRICAN AMERICAN): 28.3 ML/MIN/1.73 M^2
GLUCOSE SERPL-MCNC: 107 MG/DL
HCT VFR BLD AUTO: 41.3 %
HGB BLD-MCNC: 13.3 G/DL
LYMPHOCYTES # BLD AUTO: 1.8 K/UL
LYMPHOCYTES NFR BLD: 19.2 %
MCH RBC QN AUTO: 29.5 PG
MCHC RBC AUTO-ENTMCNC: 32.2 G/DL
MCV RBC AUTO: 92 FL
MONOCYTES # BLD AUTO: 0.7 K/UL
MONOCYTES NFR BLD: 7.4 %
NEUTROPHILS # BLD AUTO: 6.3 K/UL
NEUTROPHILS NFR BLD: 69.4 %
PLATELET # BLD AUTO: 149 K/UL
PMV BLD AUTO: 13 FL
POTASSIUM SERPL-SCNC: 4.5 MMOL/L
PROT SERPL-MCNC: 7.7 G/DL
RBC # BLD AUTO: 4.51 M/UL
RHEUMATOID FACT SERPL-ACNC: <10 IU/ML
SODIUM SERPL-SCNC: 145 MMOL/L
WBC # BLD AUTO: 9.1 K/UL

## 2018-03-06 PROCEDURE — 86038 ANTINUCLEAR ANTIBODIES: CPT | Mod: PO

## 2018-03-06 PROCEDURE — 85652 RBC SED RATE AUTOMATED: CPT

## 2018-03-06 PROCEDURE — 86140 C-REACTIVE PROTEIN: CPT | Mod: PO

## 2018-03-06 PROCEDURE — 80053 COMPREHEN METABOLIC PANEL: CPT | Mod: PO

## 2018-03-06 PROCEDURE — 99214 OFFICE O/P EST MOD 30 MIN: CPT | Mod: S$GLB,,, | Performed by: FAMILY MEDICINE

## 2018-03-06 PROCEDURE — 99499 UNLISTED E&M SERVICE: CPT | Mod: S$GLB,,, | Performed by: FAMILY MEDICINE

## 2018-03-06 PROCEDURE — 86431 RHEUMATOID FACTOR QUANT: CPT | Mod: PO

## 2018-03-06 PROCEDURE — 85025 COMPLETE CBC W/AUTO DIFF WBC: CPT | Mod: PO

## 2018-03-06 PROCEDURE — 36415 COLL VENOUS BLD VENIPUNCTURE: CPT | Mod: PO

## 2018-03-06 NOTE — PROGRESS NOTES
Subjective:      Patient ID: Talia Dillon is a 67 y.o. female.    Chief Complaint: Results; Arthritis; and Follow-up (Blood work will be done on 3/1/2018.   Discuss with doctor management of thyroid medication ( taking synthroid every other day instead of every day ).   Also, reduced fluid pill from 2 a day to  1 a day.   Have a swollen gland under my armpit.   Ankles swell very easily and almost on a daily basis.  Need medication to manage pain from  neck and back.  After  Cat Scan in December, I have been taking  neuronton 2 in am and 2 in pm.  I had a booster shot at [facility] for pneumonia last week.  My arm swelled up a)    Had labs done; creat still a little high; still on spironolactone for swollen ankles, face, body; william sed rate 2 years ago; shoulders, neck hurts, back hurts; had ultrasound of kidney; had MRI of c spine recently abnd L spine 2 years ago; if sed rate  Still high, treat shoudlers for polymyalgia rheumatica; if sed rate normal, to rheumatologist for all joint pain and shouldbn't be on NSAID due to william creat; needs shoe orthotics for congenital foot abnormality      Arthritis       Review of Systems   Constitutional: Positive for unexpected weight change.   HENT: Negative.    Respiratory: Negative.    Cardiovascular: Positive for leg swelling.   Gastrointestinal: Negative.    Endocrine: Negative.    Genitourinary: Negative.    Musculoskeletal: Positive for arthralgias, arthritis, back pain, gait problem and neck pain.   Psychiatric/Behavioral: Negative.    All other systems reviewed and are negative.    Objective:     Physical Exam   Constitutional: She is oriented to person, place, and time. She appears well-developed and well-nourished.   HENT:   Head: Normocephalic.   Eyes: Conjunctivae and EOM are normal. Pupils are equal, round, and reactive to light.   Neck: Normal range of motion. Neck supple.   Cardiovascular: Normal rate, regular rhythm and normal heart sounds.    Pulmonary/Chest:  Effort normal and breath sounds normal.   Musculoskeletal: Normal range of motion. She exhibits deformity.   See attached photos   Neurological: She is alert and oriented to person, place, and time. She has normal reflexes.   Skin: Skin is warm and dry.   Psychiatric: She has a normal mood and affect. Her behavior is normal. Judgment and thought content normal.   Nursing note and vitals reviewed.    Assessment:     1. Congenital birth defect    2. Hypothyroidism due to acquired atrophy of thyroid    3. Creatinine elevation    4. Elevated sed rate      Plan:     New Prescriptions    No medications on file     Discontinued Medications    PREDNISONE (DELTASONE) 20 MG TABLET    Take 1 tablet (20 mg total) by mouth once daily. For arthritis pain     Modified Medications    No medications on file       Congenital birth defect  -     Orthotic fitting; Future    Hypothyroidism due to acquired atrophy of thyroid  -     Ambulatory referral to Endocrinology    Creatinine elevation  -     Ambulatory referral to Nephrology  -     CBC auto differential; Future; Expected date: 03/06/2018  -     Comprehensive metabolic panel; Future; Expected date: 03/06/2018    Elevated sed rate  -     Sedimentation rate, manual; Future  -     C-reactive protein; Future; Expected date: 03/06/2018  -     BUFFY; Future; Expected date: 03/06/2018  -     Rheumatoid factor; Future; Expected date: 03/06/2018  -     CBC auto differential; Future; Expected date: 03/06/2018  -     Comprehensive metabolic panel; Future; Expected date: 03/06/2018

## 2018-03-07 ENCOUNTER — TELEPHONE (OUTPATIENT)
Dept: FAMILY MEDICINE | Facility: CLINIC | Age: 68
End: 2018-03-07

## 2018-03-07 DIAGNOSIS — R70.0 ELEVATED SED RATE: Primary | ICD-10-CM

## 2018-03-07 LAB — ANA SER QL IF: NORMAL

## 2018-03-07 RX ORDER — PREDNISONE 5 MG/1
5 TABLET ORAL DAILY
Qty: 14 TABLET | Refills: 0 | Status: SHIPPED | OUTPATIENT
Start: 2018-03-07 | End: 2018-03-07

## 2018-03-07 NOTE — TELEPHONE ENCOUNTER
-  Patient was advised the message form Dr Vallecillo     ---- Message from James Vallecillo MD sent at 3/7/2018  6:21 AM CST -----  Sed rate slightly high; not as bad as before; crp sl high; giving her 2 week trial of prednisone 5 mg one a dya for her shoulders; she is to give feedback after 2 weeks to me about her shoulders; meds sent to pharm; call pt

## 2018-03-07 NOTE — TELEPHONE ENCOUNTER
I advised pt not to take the prednisone  I also gave her the # to rheumatology   She will call to self schedule

## 2018-03-07 NOTE — TELEPHONE ENCOUNTER
Never mind, I cancelled prednisone, she is allergic to dexamethasone, a cousine, so do not go get this medicine.    Needs to see rheumatologist; referral placed; call pt

## 2018-03-11 PROBLEM — I47.10 PSVT (PAROXYSMAL SUPRAVENTRICULAR TACHYCARDIA): Status: RESOLVED | Noted: 2017-04-05 | Resolved: 2018-03-11

## 2018-03-11 PROBLEM — Z86.79 HISTORY OF PSVT (PAROXYSMAL SUPRAVENTRICULAR TACHYCARDIA): Status: ACTIVE | Noted: 2018-03-11

## 2018-03-12 NOTE — PROGRESS NOTES
Patient, Talia Dillon (MRN #[id number]), presented with a recent Platelet count less than 150 K/uL consistent with the definition of thrombocytopenia (ICD10 - D69.6).    Platelets   Date Value Ref Range Status   03/06/2018 149 (L) 150 - 350 K/uL Final     The patient's thrombocytopenia was monitored, evaluated, addressed and/or treated. This addendum to the medical record is made on 03/11/2018.

## 2018-03-12 NOTE — PROGRESS NOTES
Patient, Talia Dillon (MRN #[id number]), presented with a recent Estimated Glumerular Filtration Rate (EGFR) between 15 and 29 consistent with the definition of chronic kidney disease stage 4 (ICD10 - N18.4).    eGFR if non    Date Value Ref Range Status   03/06/2018 28.3 (A) >60 mL/min/1.73 m^2 Final     Comment:     Calculation used to obtain the estimated glomerular filtration  rate (eGFR) is the CKD-EPI equation.          The patient's chronic kidney disease stage 4 was monitored, evaluated, addressed and/or treated. This addendum to the medical record is made on 03/11/2018.

## 2018-03-16 ENCOUNTER — PATIENT MESSAGE (OUTPATIENT)
Dept: FAMILY MEDICINE | Facility: CLINIC | Age: 68
End: 2018-03-16

## 2018-03-16 DIAGNOSIS — F41.9 ANXIETY: ICD-10-CM

## 2018-03-18 RX ORDER — THYROID 120 MG/1
1 TABLET ORAL DAILY
Qty: 90 TABLET | Refills: 1 | Status: SHIPPED | OUTPATIENT
Start: 2018-03-18 | End: 2018-05-02

## 2018-03-18 RX ORDER — ALPRAZOLAM 0.25 MG/1
0.25 TABLET ORAL DAILY
Qty: 90 TABLET | Refills: 1 | Status: SHIPPED | OUTPATIENT
Start: 2018-03-18 | End: 2018-09-07 | Stop reason: SDUPTHER

## 2018-03-18 RX ORDER — ATENOLOL 50 MG/1
TABLET ORAL
Qty: 135 TABLET | Refills: 1 | Status: SHIPPED | OUTPATIENT
Start: 2018-03-18 | End: 2018-09-07 | Stop reason: SDUPTHER

## 2018-03-18 RX ORDER — ZOLPIDEM TARTRATE 10 MG/1
10 TABLET ORAL NIGHTLY PRN
Qty: 30 TABLET | Refills: 2 | Status: SHIPPED | OUTPATIENT
Start: 2018-03-18 | End: 2018-09-07 | Stop reason: SDUPTHER

## 2018-03-18 RX ORDER — SERTRALINE HYDROCHLORIDE 100 MG/1
150 TABLET, FILM COATED ORAL DAILY
Qty: 135 TABLET | Refills: 1 | Status: SHIPPED | OUTPATIENT
Start: 2018-03-18 | End: 2018-09-07

## 2018-04-02 ENCOUNTER — HOSPITAL ENCOUNTER (EMERGENCY)
Facility: HOSPITAL | Age: 68
Discharge: HOME | End: 2018-04-02
Attending: FAMILY MEDICINE
Payer: MEDICARE

## 2018-04-02 VITALS
BODY MASS INDEX: 37.56 KG/M2 | WEIGHT: 220 LBS | HEIGHT: 64 IN | SYSTOLIC BLOOD PRESSURE: 121 MMHG | HEART RATE: 67 BPM | OXYGEN SATURATION: 97 % | RESPIRATION RATE: 18 BRPM | DIASTOLIC BLOOD PRESSURE: 65 MMHG | TEMPERATURE: 98 F

## 2018-04-02 DIAGNOSIS — K52.9 GASTROENTERITIS: Primary | ICD-10-CM

## 2018-04-02 LAB
ALBUMIN SERPL BCP-MCNC: 4.3 G/DL
ALP SERPL-CCNC: 67 U/L
ALT SERPL W/O P-5'-P-CCNC: 19 U/L
ANION GAP SERPL CALC-SCNC: 14 MMOL/L
AST SERPL-CCNC: 20 U/L
BACTERIA #/AREA URNS AUTO: ABNORMAL /HPF
BASOPHILS # BLD AUTO: 0.03 K/UL
BASOPHILS NFR BLD: 0.3 %
BILIRUB SERPL-MCNC: 0.5 MG/DL
BILIRUB UR QL STRIP: NEGATIVE
BUN SERPL-MCNC: 28 MG/DL
CALCIUM SERPL-MCNC: 9.1 MG/DL
CHLORIDE SERPL-SCNC: 104 MMOL/L
CLARITY UR REFRACT.AUTO: CLEAR
CO2 SERPL-SCNC: 24 MMOL/L
COLOR UR AUTO: YELLOW
CREAT SERPL-MCNC: 1.95 MG/DL
DIFFERENTIAL METHOD: ABNORMAL
EOSINOPHIL # BLD AUTO: 0.2 K/UL
EOSINOPHIL NFR BLD: 2.2 %
ERYTHROCYTE [DISTWIDTH] IN BLOOD BY AUTOMATED COUNT: 13.8 %
EST. GFR  (AFRICAN AMERICAN): 29.8 ML/MIN/1.73 M^2
EST. GFR  (NON AFRICAN AMERICAN): 25.9 ML/MIN/1.73 M^2
GLUCOSE SERPL-MCNC: 84 MG/DL
GLUCOSE UR QL STRIP: NEGATIVE
HCT VFR BLD AUTO: 42.6 %
HGB BLD-MCNC: 13.9 G/DL
HGB UR QL STRIP: NEGATIVE
HYALINE CASTS UR QL AUTO: 2 /LPF
KETONES UR QL STRIP: NEGATIVE
LEUKOCYTE ESTERASE UR QL STRIP: NEGATIVE
LIPASE SERPL-CCNC: 117 U/L
LYMPHOCYTES # BLD AUTO: 1.7 K/UL
LYMPHOCYTES NFR BLD: 16.9 %
MCH RBC QN AUTO: 29.4 PG
MCHC RBC AUTO-ENTMCNC: 32.6 G/DL
MCV RBC AUTO: 90 FL
MICROSCOPIC COMMENT: ABNORMAL
MONOCYTES # BLD AUTO: 0.9 K/UL
MONOCYTES NFR BLD: 8.9 %
NEUTROPHILS # BLD AUTO: 7.1 K/UL
NEUTROPHILS NFR BLD: 71.3 %
NITRITE UR QL STRIP: NEGATIVE
PH UR STRIP: 5 [PH] (ref 5–8)
PLATELET # BLD AUTO: 172 K/UL
PMV BLD AUTO: 12.1 FL
POTASSIUM SERPL-SCNC: 3.8 MMOL/L
PROT SERPL-MCNC: 7.7 G/DL
PROT UR QL STRIP: ABNORMAL
RBC # BLD AUTO: 4.72 M/UL
RBC #/AREA URNS AUTO: 3 /HPF (ref 0–4)
SODIUM SERPL-SCNC: 142 MMOL/L
SP GR UR STRIP: 1.01 (ref 1–1.03)
SQUAMOUS #/AREA URNS AUTO: ABNORMAL /HPF
URN SPEC COLLECT METH UR: ABNORMAL
UROBILINOGEN UR STRIP-ACNC: NEGATIVE EU/DL
WBC # BLD AUTO: 9.99 K/UL
WBC #/AREA URNS AUTO: 3 /HPF (ref 0–5)

## 2018-04-02 PROCEDURE — 81000 URINALYSIS NONAUTO W/SCOPE: CPT

## 2018-04-02 PROCEDURE — 80053 COMPREHEN METABOLIC PANEL: CPT

## 2018-04-02 PROCEDURE — 25000003 PHARM REV CODE 250: Performed by: FAMILY MEDICINE

## 2018-04-02 PROCEDURE — 99283 EMERGENCY DEPT VISIT LOW MDM: CPT | Mod: 25

## 2018-04-02 PROCEDURE — 85025 COMPLETE CBC W/AUTO DIFF WBC: CPT

## 2018-04-02 PROCEDURE — 96360 HYDRATION IV INFUSION INIT: CPT

## 2018-04-02 PROCEDURE — 83690 ASSAY OF LIPASE: CPT

## 2018-04-02 RX ADMIN — SODIUM CHLORIDE 1000 ML: 0.9 INJECTION, SOLUTION INTRAVENOUS at 08:04

## 2018-04-03 NOTE — ED NOTES
Patient provided D/C instructions; dietary recommendations proved and follow up care recommend with PCP in one day if symptoms do not improve.  Patient states if there is any medications she care have before she goes home.  Patient explained that no ABx will assist her treatment and needs to increase PO fluid intake.  Patient acknowledge D/C teachings; ready for D/C home.

## 2018-04-03 NOTE — ED PROVIDER NOTES
Encounter Date: 4/2/2018       History     Chief Complaint   Patient presents with    Diarrhea     Diarrrhea x 1 week; denies N/V or abd pain; denies recent antibiotic usage; reports OTC meds ineffective     67-year-old female patient comes in with main complaint of diarrhea with no abdominal pain otherwise patient has no nausea no vomiting does appear to be mildly dehydrated.  Patient otherwise states that some anxiety but on the first case ever since patient has not had any resolution of her diarrhea even with over-the-counter Imodium and Pepto-Bismol.          Review of patient's allergies indicates:   Allergen Reactions    Dexamethasone Rash     Past Medical History:   Diagnosis Date    Allergy     Anemia     Anxiety     Cancer     skin    Cataract     Depression     Edema     Hypothyroidism     PSVT (paroxysmal supraventricular tachycardia)     Thyroid disease      Past Surgical History:   Procedure Laterality Date    ADENOIDECTOMY      APPENDECTOMY      COLONOSCOPY  2009    repeat in 10 years     EYE SURGERY      HYSTERECTOMY      TONSILLECTOMY       Family History   Problem Relation Age of Onset    Stroke Mother     Stroke Father     Diabetes Father     Sleep apnea Daughter     Mental illness Daughter      Social History   Substance Use Topics    Smoking status: Current Some Day Smoker     Packs/day: 1.00     Years: 50.00     Types: Cigarettes     Last attempt to quit: 11/22/2010    Smokeless tobacco: Never Used    Alcohol use Yes     Review of Systems   Constitutional: Negative for fever.   HENT: Negative for sore throat.    Respiratory: Negative for shortness of breath.    Cardiovascular: Negative for chest pain.   Gastrointestinal: Positive for diarrhea. Negative for nausea.   Genitourinary: Negative for dysuria.   Musculoskeletal: Negative for back pain.   Skin: Negative for rash.   Neurological: Negative for weakness.   Hematological: Does not bruise/bleed easily.   All other  systems reviewed and are negative.      Physical Exam     Initial Vitals [04/02/18 1919]   BP Pulse Resp Temp SpO2   121/65 67 18 98.4 °F (36.9 °C) 97 %      MAP       83.67         Physical Exam    Nursing note and vitals reviewed.  Constitutional: She appears well-developed.   HENT:   Head: Normocephalic and atraumatic.   Right Ear: External ear normal.   Left Ear: External ear normal.   Nose: Nose normal.   Mouth/Throat: Oropharynx is clear and moist.   Eyes: Conjunctivae and EOM are normal. Pupils are equal, round, and reactive to light. Right eye exhibits no discharge. Left eye exhibits no discharge.   Neck: Normal range of motion. Neck supple. No tracheal deviation present.   Cardiovascular: Normal rate, regular rhythm and normal heart sounds.   No murmur heard.  Pulmonary/Chest: Breath sounds normal. No respiratory distress. She has no wheezes.   Abdominal: Soft. Bowel sounds are normal.   Neurological: She is alert and oriented to person, place, and time.   Skin: Skin is warm and dry.         ED Course   Procedures  Labs Reviewed   CBC W/ AUTO DIFFERENTIAL - Abnormal; Notable for the following:        Result Value    Lymph% 16.9 (*)     All other components within normal limits   COMPREHENSIVE METABOLIC PANEL - Abnormal; Notable for the following:     BUN, Bld 28 (*)     Creatinine 1.95 (*)     eGFR if  29.8 (*)     eGFR if non  25.9 (*)     All other components within normal limits   URINALYSIS - Abnormal; Notable for the following:     Protein, UA 1+ (*)     All other components within normal limits   LIPASE   URINALYSIS MICROSCOPIC             Medical Decision Making:   Initial Assessment:   Patient in moderate distress and no other complains    Differential Diagnosis:   Appendicitis , constipation, diverticulitis, colitis, gastroenteritis                        Clinical Impression:   The encounter diagnosis was Gastroenteritis.                           Edd Carney,  MD  04/02/18 [id number]

## 2018-04-11 ENCOUNTER — OFFICE VISIT (OUTPATIENT)
Dept: FAMILY MEDICINE | Facility: CLINIC | Age: 68
End: 2018-04-11
Payer: MEDICARE

## 2018-04-11 VITALS
TEMPERATURE: 98 F | OXYGEN SATURATION: 98 % | HEART RATE: 87 BPM | HEIGHT: 64 IN | WEIGHT: 219.38 LBS | BODY MASS INDEX: 37.45 KG/M2 | DIASTOLIC BLOOD PRESSURE: 80 MMHG | SYSTOLIC BLOOD PRESSURE: 122 MMHG

## 2018-04-11 DIAGNOSIS — Q66.90: ICD-10-CM

## 2018-04-11 DIAGNOSIS — R19.7 DIARRHEA, UNSPECIFIED TYPE: Primary | ICD-10-CM

## 2018-04-11 PROCEDURE — 99213 OFFICE O/P EST LOW 20 MIN: CPT | Mod: S$GLB,,, | Performed by: FAMILY MEDICINE

## 2018-04-11 RX ORDER — METRONIDAZOLE 500 MG/1
TABLET ORAL
COMMUNITY
Start: 2018-04-07 | End: 2018-05-07

## 2018-04-11 RX ORDER — CIPROFLOXACIN 250 MG/1
TABLET, FILM COATED ORAL
COMMUNITY
Start: 2018-04-07 | End: 2018-05-02

## 2018-04-11 NOTE — PROGRESS NOTES
Subjective:      Patient ID: Talia Dillon is a 67 y.o. female.    Chief Complaint: Follow-up ([facility] Diarrhea for sixteen days  still having diarrhea but not daily)    Was at [facility] overnight Aprl with UTI, diarrhea 16 days and dehydration; went home cipro and flagyl, still taking; still has a little diarrhea; drinking fluids, voiding, no fever; told to stop spironolactone, ambien and multivitamin; diarrhea is better than when went to ER; no blood; no fever; ate sandwich yesterday      Review of Systems   Constitutional: Negative.    HENT: Negative.    Respiratory: Negative.    Cardiovascular: Negative.    Gastrointestinal: Negative.    Endocrine: Negative.    Genitourinary: Negative.    Musculoskeletal: Negative.         Feet deformity since birth; legs get tired and swell   Psychiatric/Behavioral: Negative.    All other systems reviewed and are negative.    Objective:     Physical Exam   Constitutional: She is oriented to person, place, and time. She appears well-developed and well-nourished.   HENT:   Head: Normocephalic.   Eyes: Conjunctivae and EOM are normal. Pupils are equal, round, and reactive to light.   Neck: Normal range of motion. Neck supple.   Cardiovascular: Normal rate, regular rhythm and normal heart sounds.    Pulmonary/Chest: Effort normal and breath sounds normal.   Musculoskeletal: Normal range of motion.   Neurological: She is alert and oriented to person, place, and time. She has normal reflexes.   Skin: Skin is warm and dry.   Psychiatric: She has a normal mood and affect. Her behavior is normal. Judgment and thought content normal.   Nursing note and vitals reviewed.    Assessment:     1. Diarrhea, unspecified type    2. Congenital deformities of feet      Plan:     New Prescriptions    CYANOCOBALAMIN (VITAMIN B-12) 1,000 MCG/ML INJECTION    Inject 1 mL (1,000 mcg total) into the muscle every 30 days.     Discontinued Medications    SPIRONOLACTONE (ALDACTONE) 50 MG TABLET     Take 1 tablet (50 mg total) by mouth once daily.     Modified Medications    No medications on file       Diarrhea, unspecified type  -     CBC auto differential; Future; Expected date: 04/11/2018  -     Comprehensive metabolic panel; Future; Expected date: 04/11/2018    Congenital deformities of feet  -     Ambulatory referral to Podiatry    get culture results from [facility];

## 2018-04-18 ENCOUNTER — TELEPHONE (OUTPATIENT)
Dept: FAMILY MEDICINE | Facility: CLINIC | Age: 68
End: 2018-04-18

## 2018-04-18 RX ORDER — CYANOCOBALAMIN 1000 UG/ML
1000 INJECTION, SOLUTION INTRAMUSCULAR; SUBCUTANEOUS
Qty: 10 ML | Refills: 0 | Status: SHIPPED | OUTPATIENT
Start: 2018-04-18 | End: 2018-05-31

## 2018-04-18 NOTE — TELEPHONE ENCOUNTER
----- Message from Kerry Zambrano sent at 4/18/2018  3:03 PM CDT -----  Patient had diarrhea for 16 days. Says she needs to get back her stamina. Patient wants to start back on gettng B12.    [facility]

## 2018-04-19 ENCOUNTER — LAB VISIT (OUTPATIENT)
Dept: LAB | Facility: HOSPITAL | Age: 68
End: 2018-04-19
Attending: FAMILY MEDICINE
Payer: MEDICARE

## 2018-04-19 DIAGNOSIS — R19.7 DIARRHEA, UNSPECIFIED TYPE: ICD-10-CM

## 2018-04-19 LAB
ALBUMIN SERPL BCP-MCNC: 4.1 G/DL
ALP SERPL-CCNC: 79 U/L
ALT SERPL W/O P-5'-P-CCNC: 31 U/L
ANION GAP SERPL CALC-SCNC: 11 MMOL/L
AST SERPL-CCNC: 33 U/L
BASOPHILS # BLD AUTO: 0.03 K/UL
BASOPHILS NFR BLD: 0.3 %
BILIRUB SERPL-MCNC: 0.5 MG/DL
BUN SERPL-MCNC: 29 MG/DL
CALCIUM SERPL-MCNC: 9.2 MG/DL
CHLORIDE SERPL-SCNC: 101 MMOL/L
CO2 SERPL-SCNC: 32 MMOL/L
CREAT SERPL-MCNC: 2.39 MG/DL
DIFFERENTIAL METHOD: ABNORMAL
EOSINOPHIL # BLD AUTO: 0.4 K/UL
EOSINOPHIL NFR BLD: 3.6 %
ERYTHROCYTE [DISTWIDTH] IN BLOOD BY AUTOMATED COUNT: 13.9 %
EST. GFR  (AFRICAN AMERICAN): 23.3 ML/MIN/1.73 M^2
EST. GFR  (NON AFRICAN AMERICAN): 20.2 ML/MIN/1.73 M^2
GLUCOSE SERPL-MCNC: 94 MG/DL
HCT VFR BLD AUTO: 42 %
HGB BLD-MCNC: 13.6 G/DL
LYMPHOCYTES # BLD AUTO: 2 K/UL
LYMPHOCYTES NFR BLD: 19.8 %
MCH RBC QN AUTO: 29.7 PG
MCHC RBC AUTO-ENTMCNC: 32.4 G/DL
MCV RBC AUTO: 92 FL
MONOCYTES # BLD AUTO: 0.7 K/UL
MONOCYTES NFR BLD: 6.7 %
NEUTROPHILS # BLD AUTO: 7.2 K/UL
NEUTROPHILS NFR BLD: 69.4 %
PLATELET # BLD AUTO: 168 K/UL
PMV BLD AUTO: 13 FL
POTASSIUM SERPL-SCNC: 4.9 MMOL/L
PROT SERPL-MCNC: 7.4 G/DL
RBC # BLD AUTO: 4.58 M/UL
SODIUM SERPL-SCNC: 144 MMOL/L
WBC # BLD AUTO: 10.3 K/UL

## 2018-04-19 PROCEDURE — 80053 COMPREHEN METABOLIC PANEL: CPT | Mod: PO

## 2018-04-19 PROCEDURE — 36415 COLL VENOUS BLD VENIPUNCTURE: CPT | Mod: PO

## 2018-04-19 PROCEDURE — 85025 COMPLETE CBC W/AUTO DIFF WBC: CPT | Mod: PO

## 2018-04-20 ENCOUNTER — CLINICAL SUPPORT (OUTPATIENT)
Dept: FAMILY MEDICINE | Facility: CLINIC | Age: 68
End: 2018-04-20
Payer: MEDICARE

## 2018-04-20 DIAGNOSIS — D64.9 ANEMIA, UNSPECIFIED TYPE: Primary | ICD-10-CM

## 2018-04-20 PROCEDURE — 96372 THER/PROPH/DIAG INJ SC/IM: CPT | Mod: S$GLB,,, | Performed by: FAMILY MEDICINE

## 2018-04-20 RX ORDER — CYANOCOBALAMIN 1000 UG/ML
1000 INJECTION, SOLUTION INTRAMUSCULAR; SUBCUTANEOUS
Status: COMPLETED | OUTPATIENT
Start: 2018-04-20 | End: 2018-04-20

## 2018-04-20 RX ADMIN — CYANOCOBALAMIN 1000 MCG: 1000 INJECTION, SOLUTION INTRAMUSCULAR; SUBCUTANEOUS at 11:04

## 2018-04-23 ENCOUNTER — TELEPHONE (OUTPATIENT)
Dept: FAMILY MEDICINE | Facility: CLINIC | Age: 68
End: 2018-04-23

## 2018-04-23 DIAGNOSIS — R79.89 AZOTEMIA: Primary | ICD-10-CM

## 2018-04-23 NOTE — TELEPHONE ENCOUNTER
----- Message from James Vallecillo MD sent at 4/23/2018  5:52 AM CDT -----  Kidney function worse; stop spironolactone, and see neprologist; referral placed; help pt get appt

## 2018-04-23 NOTE — TELEPHONE ENCOUNTER
Left detailed message advising patient lab results are available via [facility]; also advised that Dr. Vallecillo placed a referral to see a nephrologist and referral will be faxed. Patient advised to contact office to discuss

## 2018-05-02 ENCOUNTER — TELEPHONE (OUTPATIENT)
Dept: NEPHROLOGY | Facility: CLINIC | Age: 68
End: 2018-05-02

## 2018-05-02 ENCOUNTER — LAB VISIT (OUTPATIENT)
Dept: LAB | Facility: HOSPITAL | Age: 68
End: 2018-05-02
Attending: HOSPITALIST
Payer: MEDICARE

## 2018-05-02 ENCOUNTER — OFFICE VISIT (OUTPATIENT)
Dept: NEPHROLOGY | Facility: CLINIC | Age: 68
End: 2018-05-02
Payer: MEDICARE

## 2018-05-02 VITALS
SYSTOLIC BLOOD PRESSURE: 130 MMHG | DIASTOLIC BLOOD PRESSURE: 68 MMHG | HEART RATE: 66 BPM | OXYGEN SATURATION: 99 % | WEIGHT: 216.69 LBS | HEIGHT: 64 IN | BODY MASS INDEX: 36.99 KG/M2

## 2018-05-02 DIAGNOSIS — N18.30 CKD (CHRONIC KIDNEY DISEASE) STAGE 3, GFR 30-59 ML/MIN: Chronic | ICD-10-CM

## 2018-05-02 DIAGNOSIS — N17.9 AKI (ACUTE KIDNEY INJURY): ICD-10-CM

## 2018-05-02 DIAGNOSIS — N17.9 AKI (ACUTE KIDNEY INJURY): Primary | ICD-10-CM

## 2018-05-02 LAB
ALBUMIN SERPL BCP-MCNC: 4.2 G/DL
ANION GAP SERPL CALC-SCNC: 15 MMOL/L
BUN SERPL-MCNC: 22 MG/DL
CALCIUM SERPL-MCNC: 9.2 MG/DL
CHLORIDE SERPL-SCNC: 104 MMOL/L
CO2 SERPL-SCNC: 26 MMOL/L
CREAT SERPL-MCNC: 1.85 MG/DL
EST. GFR  (AFRICAN AMERICAN): 31.8 ML/MIN/1.73 M^2
EST. GFR  (NON AFRICAN AMERICAN): 27.6 ML/MIN/1.73 M^2
GLUCOSE SERPL-MCNC: 123 MG/DL
PHOSPHATE SERPL-MCNC: 2.6 MG/DL
POTASSIUM SERPL-SCNC: 3.7 MMOL/L
SODIUM SERPL-SCNC: 145 MMOL/L

## 2018-05-02 PROCEDURE — 80069 RENAL FUNCTION PANEL: CPT | Mod: PO

## 2018-05-02 PROCEDURE — 99204 OFFICE O/P NEW MOD 45 MIN: CPT | Mod: GC,S$GLB,, | Performed by: HOSPITALIST

## 2018-05-02 PROCEDURE — 99999 PR PBB SHADOW E&M-EST. PATIENT-LVL III: CPT | Mod: PBBFAC,GC,, | Performed by: HOSPITALIST

## 2018-05-02 PROCEDURE — 36415 COLL VENOUS BLD VENIPUNCTURE: CPT | Mod: PO

## 2018-05-02 NOTE — PROGRESS NOTES
Subjective:       Patient ID: Talia Dillon 67 y.o. White female who presents for new evaluation of Acute Renal Failure      HPI  Talia Dillon is a 67 y.o. White female with a PMHx relevant for Hypothyroidism, PSVT currently on atenolol, anxiety, arthralgias multiple joints schedule to see Rheumatologist, tobacco abuse and morbid obesity she present to Nephrology consulted for TINA. She was referred by her PCP James Vallecillo MD after a rapid rise in sCr suspected to be secondary to volume depletion from diarrhea. She has a sCr of 1.5 mg/dL back in 2015 and during 2017 she had a sCr of 1.5-1.7 mg/dL s baseline. She was recently admitted to [facility] secondary to Diarrhea, dehydration and UTI as per Hospital Records. Records from her admission reflect sCr of 1.98 mg/dL then upon f/u with her PCP her sCr had increase to 2.39 mg/dL. She was dc with Flagyl and Cipro course and Cipro was stopped per PCP upon follow up post D/C. UCx efelcted pansensitve E.Coli. She was on Spironolactone for LE swelling when admitted for her diarrhea and UTI. It was stopped 4/16/2018 after visit with her PCP. She continues to have diarrhea on and off she tells me she had 3 BM yesterday all watery non visible blood but Hospital records reflect occult blood positive. She tries to keep hydrated but she thinks she is not keeping up with fluid losses from her diarrrhea. She endorses taking Naproxen (aleve) for a long period of time but she stop taking it early this year for chronic neck and joint pains per PCP recommendation. Diarrhea has been ongoing for about one month, it stopped for one week post D/C but has return in less severity.     Review of Systems    Review of Systems   Constitutional: Positive for fatigue. Negative for appetite change, fever and unexpected weight change.   HENT: Negative for facial swelling, hearing loss and tinnitus.    Eyes: Negative for visual disturbance.   Respiratory: Negative for chest tightness and  shortness of breath.    Cardiovascular: Negative for chest pain and leg swelling.   Gastrointestinal: Positive for diarrhea. Negative for abdominal distention, abdominal pain and nausea.   Genitourinary: Negative for difficulty urinating, dysuria and flank pain.   Musculoskeletal: Negative for arthralgias and myalgias.   Skin: Negative for color change.   Neurological: Positive for weakness. Negative for dizziness, syncope, light-headedness and headaches.   Psychiatric/Behavioral: Negative for behavioral problems and confusion.       Objective:      Physical Exam    Physical Exam   Constitutional: She is oriented to person, place, and time. She appears well-developed and well-nourished. No distress.   HENT:   Head: Normocephalic and atraumatic.   Eyes: Conjunctivae are normal. Pupils are equal, round, and reactive to light.   Neck: Normal range of motion. Neck supple.   Cardiovascular: Normal rate, regular rhythm and intact distal pulses.  Exam reveals no gallop and no friction rub.    No murmur heard.  Pulmonary/Chest: Effort normal and breath sounds normal. She has no wheezes. She has no rales.   Abdominal: Soft. Bowel sounds are normal. She exhibits no distension. There is no tenderness.   Musculoskeletal: Normal range of motion. She exhibits no edema or deformity.   Neurological: She is alert and oriented to person, place, and time. She has normal reflexes.   Skin: Skin is warm and dry. She is not diaphoretic.   Psychiatric: She has a normal mood and affect. Her behavior is normal.       Assessment:        ICD-10-CM ICD-9-CM   1. TINA (acute kidney injury) N17.9 584.9   2. CKD (chronic kidney disease) stage 3, GFR 30-59 ml/min N18.3 585.3        Plan:   1. TINA on CKD stage 3: Suspect this is a pre-renal component in light of volume depletion from diarrhea and concomitant use of diuretic (spironolactone). Her sCr has improved to 1.85 mg/dL today. CKD is unclear could be potentially prolong use of NSAID's.. No  significant of Proteinuria by UPCr. She has no Dx of HTN and her BP per our records have been SBP < 130's but she takes Atenolol for PSVT which can be controlling her BP. US renal shows some evidence of chronic Kidney disease. She has a PMHx for significant Tobacco abuse and she is morbidly obese which are risk factors for CKD.   · Encourage hydration  · Agree to stop diuretics   · Low salt Diet   · Avoid all NSAID's  · If Diarrhea continues would recommend GI consult  · Consider Sleep study     Follow up in Clinic 6-8 weeks with labs with RFP, UA, UPCR, CBC  Forrest Amos MD  Nephrology Fellow   [phone]

## 2018-05-04 ENCOUNTER — PATIENT MESSAGE (OUTPATIENT)
Dept: NEPHROLOGY | Facility: CLINIC | Age: 68
End: 2018-05-04

## 2018-05-04 NOTE — PATIENT INSTRUCTIONS
Low-Salt Diet  This diet removes foods that are high in salt. It also limits the amount of salt you use when cooking. It is most often used for people with high blood pressure, edema (fluid retention), and kidney, liver, or heart disease.  Table salt contains the mineral sodium. Your body needs sodium to work normally. But too much sodium can make your health problems worse. Your healthcare provider is recommending a low-salt (also called low-sodium) diet for you. Your total daily allowance of salt is 1,500 to 2,300 milligrams (mg). It is less than 1 teaspoon of table salt. This means you can have only about 500 to 700 mg of sodium at each meal. People with certain health problems should limit salt intake to the lower end of the recommended range.    When you cook, dont add much salt. If you can cook without using salt, even better. Dont add salt to your food at the table.  When shopping, read food labels. Salt is often called sodium on the label. Choose foods that are salt-free, low salt, or very low salt. Note that foods with reduced salt may not lower your salt intake enough.    Beans, potatoes, and pasta  Ok: Dry beans, split peas, lentils, potatoes, rice, macaroni, pasta, spaghetti without added salt  Avoid: Potato chips, tortilla chips, and similar products  Breads and cereals  Ok: Low-sodium breads, rolls, cereals, and cakes; low-salt crackers, matzo crackers  Avoid: Salted crackers, pretzels, popcorn, Nepali toast, pancakes, muffins  Dairy  Ok: Milk, chocolate milk, hot chocolate mix, low-salt cheeses, and yogurt  Avoid: Processed cheese and cheese spreads; Roquefort, Camembert, and cottage cheese; buttermilk, instant breakfast drink  Desserts  Ok: Ice cream, frozen yogurt, juice bars, gelatin, cookies and pies, sugar, honey, jelly, hard candy  Avoid: Most pies, cakes and cookies prepared or processed with salt; instant pudding  Drinks  Ok: Tea, coffee, fizzy (carbonated) drinks, juices  Avoid: Flavored  coffees, electrolyte replacement drinks, sports drinks  Meats  Ok: All fresh meat, fish, poultry, low-salt tuna, eggs, egg substitute  Avoid: Smoked, pickled, brine-cured, or salted meats and fish. This includes kurtz, chipped beef, corned beef, hot dogs, deli meats, ham, kosher meats, salt pork, sausage, canned tuna, salted codfish, smoked salmon, herring, sardines, or anchovies.  Seasonings and spices  Ok: Most seasonings are okay. Good substitutes for salt include: fresh herb blends, hot sauce, lemon, garlic, christianson, vinegar, dry mustard, parsley, cilantro, horseradish, tomato paste, regular margarine, mayonnaise, unsalted butter, cream cheese, vegetable oil, cream, low-salt salad dressing and gravy.  Avoid: Regular ketchup, relishes, pickles, soy sauce, teriyaki sauce, Worcestershire sauce, BBQ sauce, tartar sauce, meat tenderizer, chili sauce, regular gravy, regular salad dressing, salted butter  Soups  Ok: Low-salt soups and broths made with allowed foods  Avoid: Bouillon cubes, soups with smoked or salted meats, regular soup and broth  Vegetables  Ok: Most vegetables are okay; also low-salt tomato and vegetable juices  Avoid: Sauerkraut and other brine-soaked vegetables; pickles and other pickled vegetables; tomato juice, olives  Date Last Reviewed: 8/1/2016 © 2000-2017 [employer]. [address] [place] [address]. All rights reserved. This information is not intended as a substitute for professional medical care. Always follow your healthcare professional's instructions.        Chronic Kidney Disease (CKD)     The role of the kidneys is to remove waste products and extra water from the blood.  When the kidneys do not work as they should, waste products begin to build up in the blood. This is called chronic kidney disease (CKD). CKD means that you have kidney damage or a decrease in kidney function lasting at least 3 months. CKD allows extra water, waste, and toxins to build up in the  body. This can eventually become life-threatening. You might need dialysis or a kidney transplant to stay alive. This most severe form is called end stage renal disease.  Diabetes is the leading causes of chronic renal failure. Other causes include high blood pressure, hardening of the arteries (atherosclerosis), lupus, inflammation of the blood vessels (vasculitis), and past viral or bacterial infections. Certain over-the-counter pain medicines can cause renal failure when taken often over a long period of time. These include aspirin, ibuprofen, and related anti-inflammatory medicines called NSAIDs (nonsteroidal anti-inflammatory drugs).  Home care  The following guidelines will help you care for yourself at home:  · If you have diabetes, talk with your healthcare provider about keeping your blood sugar under control. Ask if you need to make and changes to your diet, lifestyle, or medicines.  · If you have high blood pressure:  ¨ Take prescribed medicine to lower your blood pressure to the recommended goal of less than 130/80.  ¨ Start a regular exercise program that you enjoy. Check with your healthcare provider to be sure your planned exercise program is right for you.  ¨ Eat less salt (sodium). Your healthcare provider can tell you how much salt per day is safe for you.  · If you are overweight, talk with your healthcare provider about a weight loss plan.  · If you smoke, you must quit. Smoking makes kidney disease worse. Talk with your healthcare provider about ways to help you quit.  For more information, visit the following links:  ¨ www.smokefree.gov/sites/default/files/pdf/clearing-the-air-accessible.pdf  ¨ www.smokefree.gov  ¨ www.cancer.org/healthy/stayawayfromtobacco/guidetoquittingsmoking/  · Most people with CKD need to follow a special diet.  Be sure you understand yours. In general, you will need to limit protein, salt, potassium, and phosphorus. You also need to limit how much fluid you  drink.   · CKD is a risk factor for heart disease. Talk with your healthcare provider about any other risk factors you might have and what you can do to lessen them.  · Talk with your healthcare provider about any medicines you are taking to find out if they need to be reduced or stopped.  · Don't use the following over-the-counter medicines, or consult your healthcare provider before using:  ¨ Aspirin and NSAIDs such as ibuprofen or naproxen. Using acetaminophen for fever or pain is OK.  ¨ Laxatives and antacids containing magnesium or aluminum  ¨ Fleet or phospho soda enemas containing phosphorus  ¨ Certain stomach acid-blocking medicine such as cimetidine or ranitidine   ¨ Decongestants containing pseudoephedrine   ¨ Herbal supplements  Follow-up care  Follow up with your healthcare provider, or as advised. Contact one of the following for more information:  · American Association of Kidney Patients 797-297-8566 www.aakp.org  · National Kidney Foundation 900-339-8038 www.kidney.org  · American Kidney Fund 376-186-9403 www.kidneyfund.org  · National Kidney Disease Education Program 866-4KIDNEY www.nkdep.nih.gov  If an X-ray, ECG (cardiogram), or other diagnostic test was taken, you will be told of any new findings that may affect your care.  Call 911  Call 911 if you have any of the following:  · Severe weakness, dizziness, fainting, drowsiness, or confusion  · Chest pain or shortness of breath  · Heart beating fast, slow, or irregularly  When to seek medical advice  Call your healthcare provider right away if any of these occur:  · Nausea or vomiting  · Fever of 100.4°F (38°C) or higher, or as directed by your healthcare provider  · Unexpected weight gain or swelling in the legs, ankles, or around the eyes  · Decrease or absent urine output  Date Last Reviewed: 9/1/2016 © 2000-2017 [employer]. [address] [place] [address], [place] [address]. All rights reserved. This information is not intended as a  substitute for professional medical care. Always follow your healthcare professional's instructions.    Avoid Aleve, Motrin, Ibuprofen and other products containing NSAIDs (non-steroidal anti-inflammatories).    Encourage hydration.

## 2018-05-07 ENCOUNTER — OFFICE VISIT (OUTPATIENT)
Dept: PODIATRY | Facility: CLINIC | Age: 68
End: 2018-05-07
Payer: MEDICARE

## 2018-05-07 ENCOUNTER — PATIENT MESSAGE (OUTPATIENT)
Dept: NEPHROLOGY | Facility: CLINIC | Age: 68
End: 2018-05-07

## 2018-05-07 ENCOUNTER — TELEPHONE (OUTPATIENT)
Dept: FAMILY MEDICINE | Facility: CLINIC | Age: 68
End: 2018-05-07

## 2018-05-07 ENCOUNTER — PATIENT MESSAGE (OUTPATIENT)
Dept: FAMILY MEDICINE | Facility: CLINIC | Age: 68
End: 2018-05-07

## 2018-05-07 VITALS
SYSTOLIC BLOOD PRESSURE: 129 MMHG | HEIGHT: 64 IN | BODY MASS INDEX: 36.88 KG/M2 | HEART RATE: 72 BPM | DIASTOLIC BLOOD PRESSURE: 62 MMHG | WEIGHT: 216 LBS

## 2018-05-07 DIAGNOSIS — M79.671 FOOT PAIN, BILATERAL: ICD-10-CM

## 2018-05-07 DIAGNOSIS — Q66.90 CONGENITAL TOE DEFORMITY: Primary | ICD-10-CM

## 2018-05-07 DIAGNOSIS — M79.672 FOOT PAIN, BILATERAL: ICD-10-CM

## 2018-05-07 DIAGNOSIS — R19.7 DIARRHEA, UNSPECIFIED TYPE: Primary | ICD-10-CM

## 2018-05-07 DIAGNOSIS — Q66.70 CONGENITAL CAVUS FOOT: ICD-10-CM

## 2018-05-07 DIAGNOSIS — M62.469 GASTROCNEMIUS EQUINUS, UNSPECIFIED LATERALITY: ICD-10-CM

## 2018-05-07 PROCEDURE — 99999 PR PBB SHADOW E&M-EST. PATIENT-LVL III: CPT | Mod: PBBFAC,,, | Performed by: PODIATRIST

## 2018-05-07 PROCEDURE — 99203 OFFICE O/P NEW LOW 30 MIN: CPT | Mod: S$GLB,,, | Performed by: PODIATRIST

## 2018-05-07 NOTE — LETTER
May 9, 2018      James Vallecillo MD  [address] [facility] [address]           [facility]  [address] [facility] [address]  Phone: [phone]          Patient: Talia Dillon   MR Number: [id number]   YOB: 1950   Date of Visit: 5/7/2018       Dear Dr. James Vallecillo:    Thank you for referring Talia Dillon to me for evaluation. Attached you will find relevant portions of my assessment and plan of care.    If you have questions, please do not hesitate to call me. I look forward to following Talia Dillon along with you.    Sincerely,    Jermaine [place], JOSE ALBERTO    Enclosure  CC:  No Recipients    If you would like to receive this communication electronically, please contact [email or web address] or [phone] to request more information on [employer] Link access.    For providers and/or their staff who would like to refer a patient to [facility], please contact us through our one-stop-shop provider referral line, [facility], at [phone].    If you feel you have received this communication in error or would no longer like to receive these types of communications, please e-mail [email or web address]

## 2018-05-09 NOTE — PROGRESS NOTES
"Subjective:      Patient ID: Talia Dillon is a 67 y.o. female.    Chief Complaint: Ankle Pain (bilateral) and Foot Pain (bilateral)    Talia is a 67 y.o. female who presents to the podiatry clinic  with complaint of  bilateral foot pain. Onset of the symptoms was several years ago. Precipitating event: none known. Current symptoms include: ability to bear weight, but with some pain and worsening symptoms after a period of activity. Aggravating factors: any weight bearing. Symptoms have waxed and waned. Patient has had prior foot problems. Evaluation to date: none. Treatment to date: changing her shoes. Patients rates pain 0/10 on pain scale. 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     Vitals:    05/07/18 1411   BP: 129/62   Pulse: 72   Weight: 98 kg (216 lb)   Height: 5' 4" (1.626 m)   PainSc: 0-No pain      Past Medical History:   Diagnosis Date    Allergy     Anemia     Anxiety     Cancer     skin    Cataract     Depression     Edema     Hypothyroidism     PSVT (paroxysmal supraventricular tachycardia)     Thyroid disease        Past Surgical History:   Procedure Laterality Date    ADENOIDECTOMY      APPENDECTOMY      COLONOSCOPY  2009    repeat in 10 years     EYE SURGERY      HYSTERECTOMY      TONSILLECTOMY         Family History   Problem Relation Age of Onset    Stroke Mother     Stroke Father     Diabetes Father     Sleep apnea Daughter     Mental illness Daughter        Social History     Social History    Marital status: Single     Spouse name: N/A    Number of children: N/A    Years of education: N/A     Social History Main Topics    Smoking status: Current Some Day Smoker     Packs/day: 1.00     Years: 50.00     Types: Cigarettes     Last attempt to quit: 11/22/2010    Smokeless tobacco: Never Used    Alcohol use Yes    Drug use: Unknown    Sexual activity: Not on file     Other Topics Concern    Not on file     Social History Narrative    No " narrative on file       Current Outpatient Prescriptions   Medication Sig Dispense Refill    ALPRAZolam (XANAX) 0.25 MG tablet Take 1 tablet (0.25 mg total) by mouth once daily. 90 tablet 1    amitriptyline (ELAVIL) 10 MG tablet Take 1 tablet (10 mg total) by mouth nightly as needed. 90 tablet 3    atenolol (TENORMIN) 50 MG tablet TAKE 1 AND 1/2 TALBLET BY MOUTH EVERY  tablet 1    cyanocobalamin (VITAMIN B-12) 1,000 mcg/mL injection Inject 1 mL (1,000 mcg total) into the muscle every 30 days. 10 mL 0    gabapentin (NEURONTIN) 300 MG capsule Take 2 capsules (600 mg total) by mouth 2 (two) times daily. 180 capsule 3    levothyroxine (SYNTHROID) 150 MCG tablet Take 1 tablet (150 mcg total) by mouth every other day. 45 tablet 3    sertraline (ZOLOFT) 100 MG tablet Take 1.5 tablets (150 mg total) by mouth once daily. 135 tablet 1    zolpidem (AMBIEN) 10 mg Tab Take 1 tablet (10 mg total) by mouth nightly as needed. 30 tablet 2     No current facility-administered medications for this visit.        Review of patient's allergies indicates:   Allergen Reactions    Dexamethasone Rash         Review of Systems   Constitution: Negative for chills, fever, weakness and malaise/fatigue.   Cardiovascular: Negative for chest pain, claudication and leg swelling.   Respiratory: Negative for cough and shortness of breath.    Skin: Negative for color change, itching, poor wound healing and rash.   Musculoskeletal: Positive for back pain. Negative for joint pain, muscle cramps and muscle weakness.   Gastrointestinal: Negative for nausea and vomiting.   Neurological: Negative for numbness and paresthesias.   Psychiatric/Behavioral: Negative for altered mental status.           Objective:      Physical Exam   Constitutional: She is oriented to person, place, and time. She appears well-developed and well-nourished. No distress.   Cardiovascular: Intact distal pulses.    Pulses:       Dorsalis pedis pulses are 2+ on the  "right side, and 2+ on the left side.        Posterior tibial pulses are 2+ on the right side, and 2+ on the left side.   CFT< 3 secs all toes bilateral foot, skin temp warm bilateral foot, diminished digital hair growth bilateral foot, no lower extremity edema bilateral.     Musculoskeletal:   Rigid cavus foot deformity bilateral foot.    + equinus that reduces with knee bent bilateral.    Congenitally short and small toes 1-5 bilateral foot.    No pain with palpation, ROM or MMT bilateral foot.   Neurological: She is alert and oriented to person, place, and time. She has normal strength. No sensory deficit. Gait normal.   - Tinel's sign bilateral hindfoot and ankle regions.   Skin: Skin is warm, dry and intact. Capillary refill takes less than 2 seconds. No ecchymosis and no rash noted. She is not diaphoretic. No cyanosis or erythema. No pallor. Nails show no clubbing.   No open lesions or macerations bilateral lower extremity.    Skin turgor and elasticity WNLs bilateral lower extremity               Assessment:       Encounter Diagnoses   Name Primary?    Congenital toe deformity Yes    Congenital cavus foot     Foot pain, bilateral     Gastrocnemius equinus, unspecified laterality          Plan:       Talia was seen today for ankle pain and foot pain.    Diagnoses and all orders for this visit:    Congenital toe deformity    Congenital cavus foot    Foot pain, bilateral    Gastrocnemius equinus, unspecified laterality      I counseled the patient on her conditions, their implications and medical management.    Discussed importance of wearing shoes with inherent arch support and avoiding flat shoes recommend 1/2-1" heeled shoes.    Referred to [employer] to look at [employer] and [employer] shoes.    Consider topical or systemic NSAID therapy if pain persists.    RTC prn.    .         "

## 2018-05-10 ENCOUNTER — TELEPHONE (OUTPATIENT)
Dept: GASTROENTEROLOGY | Facility: CLINIC | Age: 68
End: 2018-05-10

## 2018-05-10 ENCOUNTER — INITIAL CONSULT (OUTPATIENT)
Dept: GASTROENTEROLOGY | Facility: CLINIC | Age: 68
End: 2018-05-10
Payer: MEDICARE

## 2018-05-10 VITALS
WEIGHT: 218 LBS | BODY MASS INDEX: 37.22 KG/M2 | SYSTOLIC BLOOD PRESSURE: 136 MMHG | DIASTOLIC BLOOD PRESSURE: 72 MMHG | HEART RATE: 63 BPM | HEIGHT: 64 IN

## 2018-05-10 DIAGNOSIS — N18.30 CKD (CHRONIC KIDNEY DISEASE) STAGE 3, GFR 30-59 ML/MIN: ICD-10-CM

## 2018-05-10 DIAGNOSIS — K59.1 FUNCTIONAL DIARRHEA: Primary | ICD-10-CM

## 2018-05-10 PROCEDURE — 99204 OFFICE O/P NEW MOD 45 MIN: CPT | Mod: S$GLB,,, | Performed by: INTERNAL MEDICINE

## 2018-05-10 PROCEDURE — 99999 PR PBB SHADOW E&M-EST. PATIENT-LVL III: CPT | Mod: PBBFAC,,, | Performed by: INTERNAL MEDICINE

## 2018-05-10 NOTE — PROGRESS NOTES
Subjective:      Patient ID: Talia Dillon is a 67 y.o. female.    Chief Complaint: Diarrhea    HPI:   Patient 59-year-old female referred for GI evaluation.  Has a 6 week history of diarrhea.  Seen in emergency room April 2  Follow-up in [place] emergency room prompted an overnight admission for rehydration.  Stool studies reportedly negative at that time  She was empirically treated with Cipro and Flagyl  An increase in her creatinine was noted  Patient's has little in the way of GI complaints otherwise.  No abdominal pain, rectal bleeding.  No vomiting.  Diarrhea is watery and typically about 6 times per day.  At times diarrhea prompts incontinence.  No prior similar illness.  Last colonoscopy greater than 10 years ago.  Prior appendectomy.  Hysterectomy.  Smokes cigarettes.  Nondrinker.  Family history negative for GI neoplasm.      Review of patient's allergies indicates:   Allergen Reactions    Dexamethasone Rash     Past Medical History:   Diagnosis Date    Allergy     Anemia     Anxiety     Cancer     skin    Cataract     Depression     Edema     Hypothyroidism     PSVT (paroxysmal supraventricular tachycardia)     Thyroid disease      Past Surgical History:   Procedure Laterality Date    ADENOIDECTOMY      APPENDECTOMY      COLONOSCOPY  2009    repeat in 10 years     EYE SURGERY      HYSTERECTOMY      TONSILLECTOMY       Family History   Problem Relation Age of Onset    Stroke Mother     Stroke Father     Diabetes Father     Sleep apnea Daughter     Mental illness Daughter      Social History     Social History    Marital status: Single     Spouse name: N/A    Number of children: N/A    Years of education: N/A     Occupational History    Not on file.     Social History Main Topics    Smoking status: Current Some Day Smoker     Packs/day: 1.00     Years: 50.00     Types: Cigarettes     Last attempt to quit: 11/22/2010    Smokeless tobacco: Never Used    Alcohol use Yes    Drug  "use: Unknown    Sexual activity: Not on file     Other Topics Concern    Not on file     Social History Narrative    No narrative on file       Review of Systems:  Constitutional: Negative for appetite change.   HENT: Negative for trouble swallowing.   Eyes: Negative for photophobia.   Respiratory: Negative for cough and shortness of breath.   Cardiovascular: Negative for palpitations.   Gastrointestinal: See HPI for details.  Genitourinary: Negative for frequency and hematuria.   Skin: Negative for rash.   Neurological: Negative for weakness and headaches.   Hematological: Negative.   Psychiatric/Behavioral: Negative for suicidal ideas and behavioral problems.     Objective:     /72 (BP Location: Right arm, Patient Position: Sitting)   Pulse 63   Ht 5' 4" (1.626 m)   Wt 98.9 kg (218 lb)   BMI 37.42 kg/m²     Physical Exam:  Alert no distress.  Eyes: Pupils are equal, round, and reactive to light.   Neck: Supple. No mass  Cardiovascular: Regular rhythm . No murmur   Pulmonary/Chest: Lungs clear   Abdominal: Soft. No mass palpated. Nontender, no guarding. Positive bowel sounds   Musculoskeletal: No deformity. No edema.   Psychiatric: Alert and oriented    Assessment:     1. Functional diarrhea    2. CKD (chronic kidney disease) stage 3, GFR 30-59 ml/min      Plan:     Talia was seen today for diarrhea.    Diagnoses and all orders for this visit:    Functional diarrhea    CKD (chronic kidney disease) stage 3, GFR 30-59 ml/min      Impression  Clinical picture suggestive of microscopic colitis    Plan: Colonoscopy with random biopsies  In the interim Pepto-Bismol when necessary or Imodium sparingly    Patient requests procedure be performed locally.  She has diarrhea prompted fecal incontinence.    "

## 2018-05-10 NOTE — LETTER
May 10, 2018      James Vallecillo MD  [address] [facility] [address]           [facility]  [address], [address],  [facility] [address]  Phone: [phone]  Fax: [phone]          Patient: Talia Dillon   MR Number: [id number]   YOB: 1950   Date of Visit: 5/10/2018       Dear Dr. James Vallecillo:    Thank you for referring Talia Dillon to me for evaluation. Attached you will find relevant portions of my assessment and plan of care.    If you have questions, please do not hesitate to call me. I look forward to following Talia Dillon along with you.    Sincerely,    Grabiel Link Jr., MD    Enclosure  CC:  No Recipients    If you would like to receive this communication electronically, please contact [email or web address] or [phone] to request more information on [employer] Link access.    For providers and/or their staff who would like to refer a patient to [facility], please contact us through our one-stop-shop provider referral line, [facility], at [phone].    If you feel you have received this communication in error or would no longer like to receive these types of communications, please e-mail [email or web address]

## 2018-05-10 NOTE — TELEPHONE ENCOUNTER
Patient is scheduled for an Colonoscopy at \[facility]\"" on 5/24/18,prep instructions was given and explained at office visit. Paperwork was faxed over.

## 2018-05-10 NOTE — PATIENT INSTRUCTIONS
Colonoscopy     A camera attached to a flexible tube with a viewing lens is used to take video pictures.     Colonoscopy is a test to view the inside of your lower digestive tract (colon and rectum). Sometimes it can show the last part of the small intestine (ileum). During the test, small pieces of tissue may be removed for testing. This is called a biopsy. Small growths, such as polyps, may also be removed.   Why is colonoscopy done?  The test is done to help look for colon cancer. And it can help find the source of abdominal pain, bleeding, and changes in bowel habits. It may be needed once a year, depending on factors such as your:  · Age  · Health history  · Family health history  · Symptoms  · Results from any prior colonoscopy  Risks and possible complications  These include:  · Bleeding               · A puncture or tear in the colon   · Risks of anesthesia  · A cancer lesion not being seen  Getting ready   To prepare for the test:  · Talk with your healthcare provider about the risks of the test (see below). Also ask your healthcare provider about alternatives to the test.  · Tell your healthcare provider about any medicines you take. Also tell him or her about any health conditions you may have.  · Make sure your rectum and colon are empty for the test. Follow the diet and bowel prep instructions exactly. If you dont, the test may need to be rescheduled.  · Plan for a friend or family member to drive you home after the test.     Colonoscopy provides an inside view of the entire colon.     You may discuss the results with your doctor right away or at a future visit.  During the test   The test is usually done in the hospital on an outpatient basis. This means you go home the same day. The procedure takes about 30 minutes. During that time:  · You are given relaxing (sedating) medicine through an IV line. You may be drowsy, or fully asleep.  · The healthcare provider will first give you a physical exam to  check for anal and rectal problems.  · Then the anus is lubricated and the scope inserted.  · If you are awake, you may have a feeling similar to needing to have a bowel movement. You may also feel pressure as air is pumped into the colon. Its OK to pass gas during the procedure.  · Biopsy, polyp removal, or other treatments may be done during the test.  After the test   You may have gas right after the test. It can help to try to pass it to help prevent later bloating. Your healthcare provider may discuss the results with you right away. Or you may need to schedule a follow-up visit to talk about the results. After the test, you can go back to your normal eating and other activities. You may be tired from the sedation and need to rest for a few hours.  Date Last Reviewed: 11/1/2016 © 2000-2017 "[employer]. [address] [place] [address]. All rights reserved. This information is not intended as a substitute for professional medical care. Always follow your healthcare professional's instructions.        Treating Diarrhea    Diarrhea happens when you have loose, watery, or frequent bowel movements. It is a common problem with many causes. Most cases of diarrhea clear up on their own. But certain cases may need treatment. Be sure to see your healthcare provider if your symptoms do not improve within a few days.  Getting relief  Treatment of diarrhea depends on its cause. Diarrhea caused by bacterial or parasite infection is often treated with antibiotics. Diarrhea caused by other factors, such as a stomach virus, often improves with simple home treatment. The tips below may also help relieve your symptoms.  · Drink plenty of fluids. This helps prevent too much fluid loss (dehydration). Water, clear soups, and electrolyte solutions are good choices. Avoid alcohol, coffee, tea, and milk. These can irritate your intestines and make symptoms worse.  · Suck on ice chips if drinking makes you  queasy.  · Return to your normal diet slowly. You may want to eat bland foods at first, such as rice and toast. Also, you may need to avoid certain foods for a while, such as dairy products. These can make symptoms worse. Ask your healthcare provider if there are any other foods you should avoid.  · If you were prescribed antibiotics, take them as directed.  · Do not take anti-diarrhea medicines without asking your healthcare provider first.  Call your healthcare provider   Call your healthcare provider if you have any of the following:   · A fever of 100.4°F (38.0°C) or higher, or as directed by your healthcare provider  · Severe pain  · Worsening diarrhea or diarrhea for more than 2 days  · Bloody vomit or stool  · Signs of dehydration (dizziness, dry mouth and tongue, rapid pulse, dark urine)  Date Last Reviewed: 7/1/2016  © 8880-8433 [employer]. [address] [place] [address], [place] [address]. All rights reserved. This information is not intended as a substitute for professional medical care. Always follow your healthcare professional's instructions.

## 2018-05-12 NOTE — PROGRESS NOTES
ATTENDING PHYSICIAN ATTESTATION  I have personally interviewed and examined the patient. I thoroughly reviewed the demographic, clinical, laboratorial and imaging information available in medical records. I agree with the assessment and recommendations provided by the subspecialty resident. Dr. Amos was under my supervision.

## 2018-05-24 ENCOUNTER — TELEPHONE (OUTPATIENT)
Dept: GASTROENTEROLOGY | Facility: CLINIC | Age: 68
End: 2018-05-24

## 2018-05-24 NOTE — TELEPHONE ENCOUNTER
Patient had Colonoscopy on 5/24/18 at [facility], due to repeat in 10 years for screening purposes. Patient is aware to continue present medications and return to primary care physician. Gigi Lopez report was scan in under media.

## 2018-05-31 ENCOUNTER — OFFICE VISIT (OUTPATIENT)
Dept: RHEUMATOLOGY | Facility: CLINIC | Age: 68
End: 2018-05-31
Payer: MEDICARE

## 2018-05-31 VITALS
WEIGHT: 218.63 LBS | BODY MASS INDEX: 37.33 KG/M2 | HEIGHT: 64 IN | SYSTOLIC BLOOD PRESSURE: 125 MMHG | DIASTOLIC BLOOD PRESSURE: 73 MMHG | HEART RATE: 45 BPM

## 2018-05-31 DIAGNOSIS — M47.812 OSTEOARTHRITIS OF CERVICAL SPINE, UNSPECIFIED SPINAL OSTEOARTHRITIS COMPLICATION STATUS: ICD-10-CM

## 2018-05-31 DIAGNOSIS — F41.9 ANXIETY: ICD-10-CM

## 2018-05-31 DIAGNOSIS — M47.816 OSTEOARTHRITIS OF LUMBAR SPINE, UNSPECIFIED SPINAL OSTEOARTHRITIS COMPLICATION STATUS: Primary | ICD-10-CM

## 2018-05-31 PROCEDURE — 99499 UNLISTED E&M SERVICE: CPT | Mod: S$GLB,,, | Performed by: INTERNAL MEDICINE

## 2018-05-31 PROCEDURE — 99999 PR PBB SHADOW E&M-EST. PATIENT-LVL III: CPT | Mod: PBBFAC,,, | Performed by: INTERNAL MEDICINE

## 2018-05-31 PROCEDURE — 99204 OFFICE O/P NEW MOD 45 MIN: CPT | Mod: S$GLB,,, | Performed by: INTERNAL MEDICINE

## 2018-05-31 RX ORDER — GABAPENTIN 300 MG/1
600 CAPSULE ORAL 4 TIMES DAILY
Qty: 240 CAPSULE | Refills: 3 | Status: SHIPPED | OUTPATIENT
Start: 2018-05-31 | End: 2018-05-31

## 2018-05-31 RX ORDER — BACLOFEN 10 MG/1
5 TABLET ORAL 3 TIMES DAILY
Qty: 45 TABLET | Refills: 3 | Status: SHIPPED | OUTPATIENT
Start: 2018-05-31 | End: 2018-05-31 | Stop reason: SDUPTHER

## 2018-05-31 RX ORDER — BACLOFEN 10 MG/1
5 TABLET ORAL 2 TIMES DAILY
Qty: 15 TABLET | Refills: 3 | Status: SHIPPED | OUTPATIENT
Start: 2018-05-31 | End: 2018-07-23

## 2018-05-31 RX ORDER — GABAPENTIN 400 MG/1
400 CAPSULE ORAL 2 TIMES DAILY
Qty: 60 CAPSULE | Refills: 3 | Status: SHIPPED | OUTPATIENT
Start: 2018-05-31 | End: 2018-09-07 | Stop reason: SDUPTHER

## 2018-05-31 ASSESSMENT — ROUTINE ASSESSMENT OF PATIENT INDEX DATA (RAPID3)
WHEN YOU AWAKENED IN THE MORNING OVER THE LAST WEEK, PLEASE INDICATE THE AMOUNT OF TIME IT TAKES UNTIL YOU ARE AS LIMBER AS YOU WILL BE FOR THE DAY: 3 HOURS
PAIN SCORE: 4
MDHAQ FUNCTION SCORE: .4
PATIENT GLOBAL ASSESSMENT SCORE: 4
FATIGUE SCORE: 4
AM STIFFNESS SCORE: 1, YES
TOTAL RAPID3 SCORE: 3.11
PSYCHOLOGICAL DISTRESS SCORE: 3.3

## 2018-05-31 NOTE — LETTER
May 31, 2018      James Vallecillo MD  [address] [address]           [facility] - Rheumatology  [address]  [place] [address]  Phone: [phone]  Fax: [phone]          Patient: Talia Dillon   MR Number: [id number]   YOB: 1950   Date of Visit: 5/31/2018       Dear Dr. James Vallecillo:    Thank you for referring Talia Dillon to me for evaluation. Attached you will find relevant portions of my assessment and plan of care.    If you have questions, please do not hesitate to call me. I look forward to following Talia Dillon along with you.    Sincerely,    Nirmal Monroy MD    Enclosure  CC:  No Recipients    If you would like to receive this communication electronically, please contact [email or web address] or [phone] to request more information on [employer] access.    For providers and/or their staff who would like to refer a patient to [facility], please contact us through our one-stop-shop provider referral line, [facility] [place], at [phone].    If you feel you have received this communication in error or would no longer like to receive these types of communications, please e-mail [email or web address]

## 2018-05-31 NOTE — PROGRESS NOTES
Rapid3 Question Responses and Scores 5/30/2018   MDHAQ Score 0.4   Psychologic Score 3.3   Pain Score 4   When you awakened in the morning OVER THE LAST WEEK, did you feel stiff? Yes   If Yes, please indicate the number of hours until you are as limber as you will be for the day 3   Fatigue Score 4   Global Health Score 4   RAPID3 Score 3.11

## 2018-05-31 NOTE — PROGRESS NOTES
Chief Complaint   Patient presents with    Disease Management       Patient was referred by     History of presenting illness    67 year old white female comes in with abnormal labs     ESR 38 but ESR 2 years ago : 50  CRP 1.41    BUFFY,RF negative    Diarrhea for 2 months  Was in the hospital  W/u not revealing  Diarrhea resolved   She took antibiotics but she doesn't know if that helped her  She had no triggers,no travel,no sick contacts,no new drugs    Joints     -January : pain only in the left shoulder  Left arm and hand would go numb  Laying on the left side will hurt   Right shoulder aches  No limitations moving shoulders  But she has pain raising the arms    EMS shoulders,neck and upper back for 3 hours     MRI C spine done   Reversal of the normal cervical lordosis with disc space narrowing seen most significantly at the C4-C5, C5-C6 and C6-C7 levels.  Bilateral uncovertebral joint disease at the C4-C5 level with moderate to severe neural foraminal narrowing greater on right.  Broad-based disc bulge with asymmetric disc protrusion paramedian to the left side at the C5-C6 level with decrease CSF spaces anterior and posterior cord, uncovertebral joint disease and moderate to severe bilateral bony neural foraminal narrowing.  Disc space narrowing at the C6-C7 level with broad based disc bulg, e uncovertebral joint disease and moderate bilateral bony neural foraminal narrowings.  No signal abnormality in the cord at any level.    Both hands,both legs go numb a lot  Neurology appointment pending  Feet and ankles retain water by the end of the day  Pain in both legs,burning in the legs    She is on gabapentin 600 mg bid     She has pain across the midback  She has scoliosis since she was a child     MRI LS spine     Disk space narrowing at the L2-L3 level with degenerative changes of the vertebral endplates.  Bilateral degenerative changes of the facets with facet hypertrophy.  Disk space narrowing at the  L3-L4 level with bilateral degenerative changes of the   facets.  Bilateral bony neural foraminal narrowing greater on the right side with possible right-sided L3 nerve root impingement.  Disk space narrowing at the L4-L5 level with circumferential disk bulge , bilateral degenerative changes of the facets,   bony neural foraminal narrowing on the left side and possible left-sided L4 nerve root impingement.  Bilateral degenerative changes at the L5-S1 level with desiccation of disk and disk bulge.    T spine xray : spondylosis    She has done physical therapies     She also has depression,she takes zoloft,she takes xanax for anxiety  She takes amitryptilline 10 mg for sweating   She takes ambien 10 mg for sleep    Past history : scoliosis,anxiety,hypothyroidism,insomnia,CKD,HTN,PSVT    Family history : none she is aware of     Social history : smoker   No alcohol use  No drug use    Review of Systems   Constitutional: Negative for activity change, appetite change, chills, diaphoresis, fatigue, fever and unexpected weight change.   HENT: Negative for congestion, dental problem, drooling, ear discharge, ear pain, facial swelling, hearing loss, mouth sores, nosebleeds, postnasal drip, rhinorrhea, sinus pain, sinus pressure, sneezing, sore throat, tinnitus, trouble swallowing and voice change.    Eyes: Negative for photophobia, pain, discharge, redness, itching and visual disturbance.   Respiratory: Negative for apnea, cough, choking, chest tightness, shortness of breath, wheezing and stridor.    Cardiovascular: Negative for chest pain, palpitations and leg swelling.   Gastrointestinal: Negative for abdominal distention, abdominal pain, anal bleeding, blood in stool, constipation, diarrhea, nausea, rectal pain and vomiting.   Endocrine: Negative for cold intolerance, heat intolerance, polydipsia, polyphagia and polyuria.   Genitourinary: Negative for decreased urine volume, difficulty urinating, dysuria, enuresis, flank  pain, frequency, genital sores, hematuria and urgency.   Musculoskeletal: Positive for arthralgias. Negative for back pain, gait problem, joint swelling, myalgias, neck pain and neck stiffness.   Skin: Negative for color change, pallor, rash and wound.   Allergic/Immunologic: Negative for environmental allergies, food allergies and immunocompromised state.   Neurological: Positive for headaches. Negative for dizziness, tremors, seizures, syncope, facial asymmetry, speech difficulty, weakness, light-headedness and numbness.   Hematological: Negative for adenopathy. Does not bruise/bleed easily.   Psychiatric/Behavioral: Negative for agitation, behavioral problems, confusion, decreased concentration, dysphoric mood, hallucinations, self-injury, sleep disturbance and suicidal ideas. The patient is not nervous/anxious and is not hyperactive.      Physical Exam     LEZAMA-28 tender joint count: 0  LEZAMA-28 swollen joint count: 0    She has good ROM in the shoulders    Tender C spine/LS spine    Physical Exam   Constitutional: She is oriented to person, place, and time and well-developed, well-nourished, and in no distress. No distress.   HENT:   Head: Normocephalic.   Mouth/Throat: Oropharynx is clear and moist.   Eyes: Conjunctivae are normal. Pupils are equal, round, and reactive to light. Right eye exhibits no discharge. Left eye exhibits no discharge. No scleral icterus.   Neck: Normal range of motion. No thyromegaly present.   Cardiovascular: Normal rate, regular rhythm, normal heart sounds and intact distal pulses.    Pulmonary/Chest: Effort normal and breath sounds normal. No stridor.   Abdominal: Soft. Bowel sounds are normal.   Lymphadenopathy:     She has no cervical adenopathy.   Neurological: She is alert and oriented to person, place, and time.   Skin: Skin is warm. No rash noted. She is not diaphoretic.     Psychiatric: Affect and judgment normal.   Musculoskeletal: Normal range of motion.         Assessment      67 year old white female with scoliosis,anxiety,hypothyroidism,insomnia,CKD,HTN,PSVT     is here for evaluation of her elevated inflammatory markers  She also reports pain in her neck,shoulders,upper back and lower back    She mentions neck,upper back and shoulder pain,the pain in the shoulders is an ache and she is not sure if it originates in the neck vs shoulders  On exam,her shoulder ROM is excellent  I would think given the C spine changes,her main concern in the C spine  She does have b/l arm and hand numbness and hand pain  Joint exam in the hands and wrists are unremarkable  I suspect she has neuropathy due to C spine issues    Similarly she reports LS spine issues,prior MRI does show degenerative arthritis  She mentions burning and numbness in the LE  This might be due to LS spine issues    During the period she had high ESR,she had a bad diarrhea which just resolved with antibiotics  She has high ESR for 2 years now  I donot think this is PMR    1. Osteoarthritis of lumbar spine, unspecified spinal osteoarthritis complication status    2. Paresthesias/numbness    3. Osteoarthritis of cervical spine, unspecified spinal osteoarthritis complication status    4. Anxiety        Reviewed imaging    New problem     Plan    Will MRI the LS spine    EMG/NCS b/l LE and UE    Dose adjustment the gabapentin,she cant take > 700 to 800 mg daily  Changed it to 400 mg bid    Baclofen 5 mg bid only    Home PT  Lose weight    Referral to neurosurgery      Talia was seen today for disease management.    Diagnoses and all orders for this visit:    Osteoarthritis of lumbar spine, unspecified spinal osteoarthritis complication status  -     MRI Lumbar Spine Without Contrast; Future  -     EMG W/ ULTRASOUND AND NERVE CONDUCTION TEST 4 Extremities; Future  -     Ambulatory referral to Neurosurgery    Paresthesias/numbness  -     EMG W/ ULTRASOUND AND NERVE CONDUCTION TEST 4 Extremities; Future  -     Ambulatory referral to  Neurosurgery    Osteoarthritis of cervical spine, unspecified spinal osteoarthritis complication status  -     EMG W/ ULTRASOUND AND NERVE CONDUCTION TEST 4 Extremities; Future  -     Ambulatory referral to Neurosurgery    Anxiety    Other orders  -     Discontinue: gabapentin (NEURONTIN) 300 MG capsule; Take 2 capsules (600 mg total) by mouth 4 (four) times daily.  -     Discontinue: baclofen (LIORESAL) 10 MG tablet; Take 0.5 tablets (5 mg total) by mouth 3 (three) times daily.  -     gabapentin (NEURONTIN) 400 MG capsule; Take 1 capsule (400 mg total) by mouth 2 (two) times daily.  -     baclofen (LIORESAL) 10 MG tablet; Take 0.5 tablets (5 mg total) by mouth 2 (two) times daily.        D/c and rtn prn

## 2018-06-04 ENCOUNTER — LAB VISIT (OUTPATIENT)
Dept: LAB | Facility: HOSPITAL | Age: 68
End: 2018-06-04
Attending: HOSPITALIST
Payer: MEDICARE

## 2018-06-04 DIAGNOSIS — N17.9 AKI (ACUTE KIDNEY INJURY): ICD-10-CM

## 2018-06-04 LAB
ALBUMIN SERPL BCP-MCNC: 4.3 G/DL
ANION GAP SERPL CALC-SCNC: 13 MMOL/L
BASOPHILS # BLD AUTO: 0.02 K/UL
BASOPHILS NFR BLD: 0.3 %
BUN SERPL-MCNC: 27 MG/DL
CALCIUM SERPL-MCNC: 9.5 MG/DL
CHLORIDE SERPL-SCNC: 102 MMOL/L
CO2 SERPL-SCNC: 29 MMOL/L
CREAT SERPL-MCNC: 1.77 MG/DL
DIFFERENTIAL METHOD: ABNORMAL
EOSINOPHIL # BLD AUTO: 0.2 K/UL
EOSINOPHIL NFR BLD: 2.7 %
ERYTHROCYTE [DISTWIDTH] IN BLOOD BY AUTOMATED COUNT: 13.9 %
EST. GFR  (AFRICAN AMERICAN): 33.5 ML/MIN/1.73 M^2
EST. GFR  (NON AFRICAN AMERICAN): 29.1 ML/MIN/1.73 M^2
GLUCOSE SERPL-MCNC: 92 MG/DL
HCT VFR BLD AUTO: 43.6 %
HGB BLD-MCNC: 14.1 G/DL
LYMPHOCYTES # BLD AUTO: 1.7 K/UL
LYMPHOCYTES NFR BLD: 21.8 %
MCH RBC QN AUTO: 29.4 PG
MCHC RBC AUTO-ENTMCNC: 32.3 G/DL
MCV RBC AUTO: 91 FL
MONOCYTES # BLD AUTO: 0.5 K/UL
MONOCYTES NFR BLD: 7 %
NEUTROPHILS # BLD AUTO: 5.2 K/UL
NEUTROPHILS NFR BLD: 67.9 %
PHOSPHATE SERPL-MCNC: 3 MG/DL
PLATELET # BLD AUTO: 144 K/UL
PMV BLD AUTO: 13.4 FL
POTASSIUM SERPL-SCNC: 4.5 MMOL/L
RBC # BLD AUTO: 4.79 M/UL
SODIUM SERPL-SCNC: 144 MMOL/L
WBC # BLD AUTO: 7.66 K/UL

## 2018-06-04 PROCEDURE — 85025 COMPLETE CBC W/AUTO DIFF WBC: CPT | Mod: PO

## 2018-06-04 PROCEDURE — 80069 RENAL FUNCTION PANEL: CPT | Mod: PO

## 2018-06-04 PROCEDURE — 36415 COLL VENOUS BLD VENIPUNCTURE: CPT | Mod: PO

## 2018-06-07 ENCOUNTER — HOSPITAL ENCOUNTER (OUTPATIENT)
Dept: RADIOLOGY | Facility: HOSPITAL | Age: 68
Discharge: HOME | End: 2018-06-07
Attending: INTERNAL MEDICINE
Payer: MEDICARE

## 2018-06-07 DIAGNOSIS — M47.816 OSTEOARTHRITIS OF LUMBAR SPINE, UNSPECIFIED SPINAL OSTEOARTHRITIS COMPLICATION STATUS: ICD-10-CM

## 2018-06-07 PROCEDURE — 72148 MRI LUMBAR SPINE W/O DYE: CPT | Mod: TC,PO

## 2018-06-13 ENCOUNTER — OFFICE VISIT (OUTPATIENT)
Dept: NEPHROLOGY | Facility: CLINIC | Age: 68
End: 2018-06-13
Payer: MEDICARE

## 2018-06-13 VITALS
HEART RATE: 49 BPM | SYSTOLIC BLOOD PRESSURE: 100 MMHG | DIASTOLIC BLOOD PRESSURE: 72 MMHG | OXYGEN SATURATION: 98 % | WEIGHT: 216.25 LBS | HEIGHT: 64 IN | BODY MASS INDEX: 36.92 KG/M2

## 2018-06-13 DIAGNOSIS — N17.9 AKI (ACUTE KIDNEY INJURY): Primary | ICD-10-CM

## 2018-06-13 DIAGNOSIS — N18.30 CKD (CHRONIC KIDNEY DISEASE) STAGE 3, GFR 30-59 ML/MIN: Chronic | ICD-10-CM

## 2018-06-13 DIAGNOSIS — Z72.0 TOBACCO ABUSE: ICD-10-CM

## 2018-06-13 PROCEDURE — 99213 OFFICE O/P EST LOW 20 MIN: CPT | Mod: GC,S$GLB,, | Performed by: HOSPITALIST

## 2018-06-13 PROCEDURE — 99999 PR PBB SHADOW E&M-EST. PATIENT-LVL III: CPT | Mod: PBBFAC,GC,, | Performed by: HOSPITALIST

## 2018-06-13 NOTE — PROGRESS NOTES
Subjective:       Patient ID: Talia Dillon 67 y.o. White female who presents for new evaluation of Chronic Renal Failure and Acute Renal Failure    Interval Hx:   Ms Talia Dillon is a here for follow up for his TINA and CKD stage 3. She feels well and denies an diarrhea. sCr has improved from peak 2.39 mg/dL to 1.77 mg/dL. She has stopped taking NSAIDs for pain. She saw Rheumatology and was started on Baclofen recenlty ~ 1 week ago. She was told she needed to see a spine specialist fro her back problems. She still smoking but has made her mind to quit and she will call the smoking cessation. sCr has improved from 2.93  To 1.77 mg/dL which is close to her baseline  Of 1.5 - 1.7 mg/dL. Her pulse is 49 today in clinic but this is abnormal for her despite she taking Atenolol for her SVT which she attributes to starting baclofen. Fatigue and weakness has improved. She tells me she drinks about 6 bottles of the 16 oz of water a day.      HPI  Talia Dillon is a 67 y.o. White female with a PMHx relevant for Hypothyroidism, PSVT currently on atenolol, anxiety, arthralgias multiple joints schedule to see Rheumatologist, tobacco abuse and morbid obesity she present to Nephrology consulted for TINA. She was referred by her PCP James Vallecillo MD after a rapid rise in sCr suspected to be secondary to volume depletion from diarrhea. She has a sCr of 1.5 mg/dL back in 2015 and during 2017 she had a sCr of 1.5-1.7 mg/dL s baseline. She was recently admitted to [facility] secondary to Diarrhea, dehydration and UTI as per Hospital Records. Records from her admission reflect sCr of 1.98 mg/dL then upon f/u with her PCP her sCr had increase to 2.39 mg/dL. She was dc with Flagyl and Cipro course and Cipro was stopped per PCP upon follow up post D/C. UCx efelcted pansensitve E.Coli. She was on Spironolactone for LE swelling when admitted for her diarrhea and UTI. It was stopped 4/16/2018 after visit with her PCP. She continues to  have diarrhea on and off she tells me she had 3 BM yesterday all watery non visible blood but Hospital records reflect occult blood positive. She tries to keep hydrated but she thinks she is not keeping up with fluid losses from her diarrrhea. She endorses taking Naproxen (aleve) for a long period of time but she stop taking it early this year for chronic neck and joint pains per PCP recommendation. Diarrhea has been ongoing for about one month, it stopped for one week post D/C but has return in less severity.     Review of Systems    Review of Systems   Constitutional: Positive for fatigue. Negative for appetite change, fever and unexpected weight change.   HENT: Negative for facial swelling, hearing loss and tinnitus.    Eyes: Negative for visual disturbance.   Respiratory: Negative for chest tightness and shortness of breath.    Cardiovascular: Negative for chest pain and leg swelling.   Gastrointestinal: Negative for abdominal distention, abdominal pain, diarrhea and nausea.   Genitourinary: Negative for difficulty urinating, dysuria and flank pain.   Musculoskeletal: Negative for arthralgias and myalgias.   Skin: Negative for color change.   Neurological: Positive for weakness. Negative for dizziness, syncope, light-headedness and headaches.   Psychiatric/Behavioral: Negative for behavioral problems and confusion.       Objective:      Physical Exam    Physical Exam   Constitutional: She is oriented to person, place, and time. She appears well-developed and well-nourished. No distress.   HENT:   Head: Normocephalic and atraumatic.   Eyes: Conjunctivae are normal. Pupils are equal, round, and reactive to light.   Neck: Normal range of motion. Neck supple.   Cardiovascular: Normal rate, regular rhythm and intact distal pulses.  Exam reveals no gallop and no friction rub.    No murmur heard.  Pulmonary/Chest: Effort normal and breath sounds normal. She has no wheezes. She has no rales.   Abdominal: Soft. Bowel  sounds are normal. She exhibits no distension. There is no tenderness.   Musculoskeletal: Normal range of motion. She exhibits no edema or deformity.   Neurological: She is alert and oriented to person, place, and time. She has normal reflexes.   Skin: Skin is warm and dry. She is not diaphoretic.   Psychiatric: She has a normal mood and affect. Her behavior is normal.       Assessment:        ICD-10-CM ICD-9-CM   1. TINA (acute kidney injury) N17.9 584.9   2. CKD (chronic kidney disease) stage 3, GFR 30-59 ml/min N18.3 585.3        Plan:   1. CKD stage 3b: TINA on CKD stage 3:  Much improved; Suspected this was a pre-renal component in light of volume depletion from diarrhea and concomitant use of diuretic (spironolactone). Her Diarrhea has resolved but occasionally flares up, she was educated on proper hydration. Her sCr has improved to 1.77 mg/dL. CKD is unclear but suspect prolong use of NSAID's, Tobacco abuse. She has no Dx of HTN and her BP per our records have been SBP < 130's but she takes Atenolol for PSVT which can be controlling her BP. US renal shows some evidence of chronic Kidney disease. She has a PMHx for significant Tobacco abuse and she is morbidly obese which are risk factors for CKD.  Lab Results   Component Value Date    CREATININE 1.77 (H) 06/04/2018   · Encourage hydration  · Off diuretics for now and BP well control  · Low salt Diet   · Avoid all NSAID's  · If Diarrhea continues would recommend GI consult and encourage appropriate hydration  · Consider Sleep study   · Amb referral for tobacco cessation clinic  · BP very good in clinic    Protein Creatinine Ratios:  Prot/Creat Ratio, Ur   Date Value Ref Range Status   06/04/2018 0.69 (H) 0.00 - 0.20 Final   05/02/2018 0.29 (H) 0.00 - 0.20 Final   Last UPCr had mild elevation     Acid-Base: ar goal  Lab Results   Component Value Date     06/04/2018    K 4.5 06/04/2018    CO2 29 06/04/2018     2. Elevated BP: Blood pressures at goal  Unclear  dx of HTN as she is on Atenolol but BP at goal  Juan today with recent Rx of Baclofen advise to stop Baclofen in combination with Atenolol could be causing her Bradycardia.     3. Renal osteodystrophy: last PTH   Lab Results   Component Value Date    CALCIUM 9.5 06/04/2018    PHOS 3.0 06/04/2018   1. Will check for PTH and Vit D    4. Lipid management: not on Statin  Lab Results   Component Value Date    LDLCALC 111 06/06/2017   1. as per PCP        Follow up in Clinic 4 months with labs with RFP, UA, UPCR, CBC  Forrest Amos MD  Nephrology Fellow   [phone]

## 2018-06-13 NOTE — PATIENT INSTRUCTIONS
Low-Salt Diet  This diet removes foods that are high in salt. It also limits the amount of salt you use when cooking. It is most often used for people with high blood pressure, edema (fluid retention), and kidney, liver, or heart disease.  Table salt contains the mineral sodium. Your body needs sodium to work normally. But too much sodium can make your health problems worse. Your healthcare provider is recommending a low-salt (also called low-sodium) diet for you. Your total daily allowance of salt is 1,500 to 2,300 milligrams (mg). It is less than 1 teaspoon of table salt. This means you can have only about 500 to 700 mg of sodium at each meal. People with certain health problems should limit salt intake to the lower end of the recommended range.    When you cook, dont add much salt. If you can cook without using salt, even better. Dont add salt to your food at the table.  When shopping, read food labels. Salt is often called sodium on the label. Choose foods that are salt-free, low salt, or very low salt. Note that foods with reduced salt may not lower your salt intake enough.    Beans, potatoes, and pasta  Ok: Dry beans, split peas, lentils, potatoes, rice, macaroni, pasta, spaghetti without added salt  Avoid: Potato chips, tortilla chips, and similar products  Breads and cereals  Ok: Low-sodium breads, rolls, cereals, and cakes; low-salt crackers, matzo crackers  Avoid: Salted crackers, pretzels, popcorn, Romanian toast, pancakes, muffins  Dairy  Ok: Milk, chocolate milk, hot chocolate mix, low-salt cheeses, and yogurt  Avoid: Processed cheese and cheese spreads; Roquefort, Camembert, and cottage cheese; buttermilk, instant breakfast drink  Desserts  Ok: Ice cream, frozen yogurt, juice bars, gelatin, cookies and pies, sugar, honey, jelly, hard candy  Avoid: Most pies, cakes and cookies prepared or processed with salt; instant pudding  Drinks  Ok: Tea, coffee, fizzy (carbonated) drinks, juices  Avoid: Flavored  coffees, electrolyte replacement drinks, sports drinks  Meats  Ok: All fresh meat, fish, poultry, low-salt tuna, eggs, egg substitute  Avoid: Smoked, pickled, brine-cured, or salted meats and fish. This includes kurtz, chipped beef, corned beef, hot dogs, deli meats, ham, kosher meats, salt pork, sausage, canned tuna, salted codfish, smoked salmon, herring, sardines, or anchovies.  Seasonings and spices  Ok: Most seasonings are okay. Good substitutes for salt include: fresh herb blends, hot sauce, lemon, garlic, christianson, vinegar, dry mustard, parsley, cilantro, horseradish, tomato paste, regular margarine, mayonnaise, unsalted butter, cream cheese, vegetable oil, cream, low-salt salad dressing and gravy.  Avoid: Regular ketchup, relishes, pickles, soy sauce, teriyaki sauce, Worcestershire sauce, BBQ sauce, tartar sauce, meat tenderizer, chili sauce, regular gravy, regular salad dressing, salted butter  Soups  Ok: Low-salt soups and broths made with allowed foods  Avoid: Bouillon cubes, soups with smoked or salted meats, regular soup and broth  Vegetables  Ok: Most vegetables are okay; also low-salt tomato and vegetable juices  Avoid: Sauerkraut and other brine-soaked vegetables; pickles and other pickled vegetables; tomato juice, olives  Date Last Reviewed: 8/1/2016 © 2000-2017 [employer]. [address] [place] [id number]. All rights reserved. This information is not intended as a substitute for professional medical care. Always follow your healthcare professional's instructions.        Chronic Kidney Disease (CKD)     The role of the kidneys is to remove waste products and extra water from the blood.  When the kidneys do not work as they should, waste products begin to build up in the blood. This is called chronic kidney disease (CKD). CKD means that you have kidney damage or a decrease in kidney function lasting at least 3 months. CKD allows extra water, waste, and toxins to build up in the  body. This can eventually become life-threatening. You might need dialysis or a kidney transplant to stay alive. This most severe form is called end stage renal disease.  Diabetes is the leading causes of chronic renal failure. Other causes include high blood pressure, hardening of the arteries (atherosclerosis), lupus, inflammation of the blood vessels (vasculitis), and past viral or bacterial infections. Certain over-the-counter pain medicines can cause renal failure when taken often over a long period of time. These include aspirin, ibuprofen, and related anti-inflammatory medicines called NSAIDs (nonsteroidal anti-inflammatory drugs).  Home care  The following guidelines will help you care for yourself at home:  · If you have diabetes, talk with your healthcare provider about keeping your blood sugar under control. Ask if you need to make and changes to your diet, lifestyle, or medicines.  · If you have high blood pressure:  ¨ Take prescribed medicine to lower your blood pressure to the recommended goal of less than 130/80.  ¨ Start a regular exercise program that you enjoy. Check with your healthcare provider to be sure your planned exercise program is right for you.  ¨ Eat less salt (sodium). Your healthcare provider can tell you how much salt per day is safe for you.  · If you are overweight, talk with your healthcare provider about a weight loss plan.  · If you smoke, you must quit. Smoking makes kidney disease worse. Talk with your healthcare provider about ways to help you quit.  For more information, visit the following links:  ¨ www.smokefree.gov/sites/default/files/pdf/clearing-the-air-accessible.pdf  ¨ www.smokefree.gov  ¨ www.cancer.org/healthy/stayawayfromtobacco/guidetoquittingsmoking/  · Most people with CKD need to follow a special diet.  Be sure you understand yours. In general, you will need to limit protein, salt, potassium, and phosphorus. You also need to limit how much fluid you  drink.   · CKD is a risk factor for heart disease. Talk with your healthcare provider about any other risk factors you might have and what you can do to lessen them.  · Talk with your healthcare provider about any medicines you are taking to find out if they need to be reduced or stopped.  · Don't use the following over-the-counter medicines, or consult your healthcare provider before using:  ¨ Aspirin and NSAIDs such as ibuprofen or naproxen. Using acetaminophen for fever or pain is OK.  ¨ Laxatives and antacids containing magnesium or aluminum  ¨ Fleet or phospho soda enemas containing phosphorus  ¨ Certain stomach acid-blocking medicine such as cimetidine or ranitidine   ¨ Decongestants containing pseudoephedrine   ¨ Herbal supplements  Follow-up care  Follow up with your healthcare provider, or as advised. Contact one of the following for more information:  · American Association of Kidney Patients 883-744-9179 www.aakp.org  · National Kidney Foundation 542-838-5800 www.kidney.org  · American Kidney Fund 350-774-7094 www.kidneyfund.org  · National Kidney Disease Education Program 866-4KIDNEY www.nkdep.nih.gov  If an X-ray, ECG (cardiogram), or other diagnostic test was taken, you will be told of any new findings that may affect your care.  Call 911  Call 911 if you have any of the following:  · Severe weakness, dizziness, fainting, drowsiness, or confusion  · Chest pain or shortness of breath  · Heart beating fast, slow, or irregularly  When to seek medical advice  Call your healthcare provider right away if any of these occur:  · Nausea or vomiting  · Fever of 100.4°F (38°C) or higher, or as directed by your healthcare provider  · Unexpected weight gain or swelling in the legs, ankles, or around the eyes  · Decrease or absent urine output  Date Last Reviewed: 9/1/2016 © 2000-2017 [employer]. [address] [place] [address], [place] [address]. All rights reserved. This information is not intended as a  substitute for professional medical care. Always follow your healthcare professional's instructions.    Avoid Aleve, Motrin, Ibuprofen and other products containing NSAIDs (non-steroidal anti-inflammatories).

## 2018-06-21 NOTE — PROGRESS NOTES
[facility]    The note from the fellow/resident was reviewed. I have personally interviewed and examined the patient. There were no additional findings with regards to the history or physical exam.    I agree with the assessment and plan of Dr. Amos.

## 2018-06-25 ENCOUNTER — OFFICE VISIT (OUTPATIENT)
Dept: NEUROSURGERY | Facility: CLINIC | Age: 68
End: 2018-06-25
Payer: MEDICARE

## 2018-06-25 ENCOUNTER — PROCEDURE VISIT (OUTPATIENT)
Dept: NEUROLOGY | Facility: CLINIC | Age: 68
End: 2018-06-25
Payer: MEDICARE

## 2018-06-25 VITALS
DIASTOLIC BLOOD PRESSURE: 95 MMHG | SYSTOLIC BLOOD PRESSURE: 161 MMHG | BODY MASS INDEX: 37.56 KG/M2 | HEART RATE: 56 BPM | WEIGHT: 218.81 LBS | TEMPERATURE: 98 F

## 2018-06-25 DIAGNOSIS — Q89.9 CONGENITAL BIRTH DEFECT: ICD-10-CM

## 2018-06-25 DIAGNOSIS — M48.061 FORAMINAL STENOSIS OF LUMBAR REGION: ICD-10-CM

## 2018-06-25 DIAGNOSIS — M47.812 OSTEOARTHRITIS OF CERVICAL SPINE, UNSPECIFIED SPINAL OSTEOARTHRITIS COMPLICATION STATUS: ICD-10-CM

## 2018-06-25 DIAGNOSIS — M47.816 OSTEOARTHRITIS OF LUMBAR SPINE, UNSPECIFIED SPINAL OSTEOARTHRITIS COMPLICATION STATUS: ICD-10-CM

## 2018-06-25 DIAGNOSIS — M41.9 SCOLIOSIS, UNSPECIFIED SCOLIOSIS TYPE, UNSPECIFIED SPINAL REGION: Primary | ICD-10-CM

## 2018-06-25 DIAGNOSIS — M48.02 CERVICAL SPINAL STENOSIS: ICD-10-CM

## 2018-06-25 DIAGNOSIS — M54.12 CERVICAL NEURALGIA: ICD-10-CM

## 2018-06-25 DIAGNOSIS — Z72.0 TOBACCO ABUSE: ICD-10-CM

## 2018-06-25 PROCEDURE — 95886 MUSC TEST DONE W/N TEST COMP: CPT | Mod: S$GLB,,, | Performed by: PSYCHIATRY & NEUROLOGY

## 2018-06-25 PROCEDURE — 99999 PR PBB SHADOW E&M-EST. PATIENT-LVL III: CPT | Mod: PBBFAC,,, | Performed by: NEUROLOGICAL SURGERY

## 2018-06-25 PROCEDURE — 99204 OFFICE O/P NEW MOD 45 MIN: CPT | Mod: S$GLB,,, | Performed by: NEUROLOGICAL SURGERY

## 2018-06-25 PROCEDURE — 95913 NRV CNDJ TEST 13/> STUDIES: CPT | Mod: S$GLB,,, | Performed by: PSYCHIATRY & NEUROLOGY

## 2018-06-25 NOTE — PROGRESS NOTES
History & Physical    I, Nick Maya, attest that this documentation has been prepared under the direction and in the presence of Fransico Gallagher MD.    06/26/2018    No chief complaint on file.      History of Present Illness:  Talia Dillon is a 67 y.o. patient who was referred to me by Dr. Nirmal Monroy MD for evaluation of her  Cervical and lumbar spine. Patient complains of neck and bilateral shoulder pain, left shoulder numbness, bilateral hand numbness, back pain and bilateral leg pain, bilateral foot numbness. Patient's greatest complaint is her back and leg pain.     Patient states  That her back pain started  At age 9, when she  Was diagnosed of lumbar scoliosis.  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 She has done physical therapy throughout her life for chronic back and bilateral leg pain.  Patient reports that about 8-10 months ago, she started to have numbness in her hands and feet bilaterally as well as her left shoulder pain.     Patient reports her back symptoms as aching pain to her lower back, associated with aching thigh pain anteriorly, and pins and needles sensation to her feet bilaterally, in a stocking like distribution. She denies any pain to her calves. Patient rates her back pain as 8/10 in intensity and her leg pain as 7/10 in intensity. Her back pain is constant, worsens at night and with walking and standing for a long time. Pain improves when she lays down. She denies any bowel/bladder changes. Past treatments include physical therapy, chiropractor. Patient denies history of lumbar ESIs or lumbar surgery.     Patient reports her neck pain as aching pain, associated with bilateral arm pain. She reports numbness in her bilateral hands in all fingers in a glove like distribution. Patient states that this numbness is new within the last 10 months. She rates her neck pain as 5/10 in intensity and her arm pain as 6/10 in intensity.  She states that her neck pain is constant, worsens with standing (denies exacerbation with neck movement), and improves with laying down. She reports worst neck pain at the end of the day. Associated signs and symptoms are negative for dropping objects from her hands, changes in handwriting,  Or loss of fine motor skills.  Patient denies any falls within the last 6 months. Past treatments include physical therapy. Patient jimbo any previous cervical ESIs or neck surgery.     Patient mentions recently diagnosis of CKD stage III, from which she was told to control her diet and to quit smoking (which she claims to have completely discontinue 9 days ago). At this point, patient is taking Gabapentin 400 mg BID.    Review of patient's allergies indicates:   Allergen Reactions    Dexamethasone Rash       Current Outpatient Prescriptions   Medication Sig Dispense Refill    ALPRAZolam (XANAX) 0.25 MG tablet Take 1 tablet (0.25 mg total) by mouth once daily. 90 tablet 1    amitriptyline (ELAVIL) 10 MG tablet Take 1 tablet (10 mg total) by mouth nightly as needed. 90 tablet 3    atenolol (TENORMIN) 50 MG tablet TAKE 1 AND 1/2 TALBLET BY MOUTH EVERY  tablet 1    baclofen (LIORESAL) 10 MG tablet Take 0.5 tablets (5 mg total) by mouth 2 (two) times daily. 15 tablet 3    gabapentin (NEURONTIN) 400 MG capsule Take 1 capsule (400 mg total) by mouth 2 (two) times daily. 60 capsule 3    levothyroxine (SYNTHROID) 150 MCG tablet Take 1 tablet (150 mcg total) by mouth every other day. (Patient taking differently: Take 150 mcg by mouth once daily. ) 45 tablet 3    sertraline (ZOLOFT) 100 MG tablet Take 1.5 tablets (150 mg total) by mouth once daily. 135 tablet 1    zolpidem (AMBIEN) 10 mg Tab Take 1 tablet (10 mg total) by mouth nightly as needed. 30 tablet 2     No current facility-administered medications for this visit.        Past Medical History:   Diagnosis Date    Allergy     Anemia     Anxiety     Cancer      skin    Cataract     Depression     Edema     Hypothyroidism     PSVT (paroxysmal supraventricular tachycardia)     Thyroid disease        Past Surgical History:   Procedure Laterality Date    ADENOIDECTOMY      APPENDECTOMY      COLONOSCOPY  2009    repeat in 10 years     EYE SURGERY      HYSTERECTOMY      TONSILLECTOMY         Family History   Problem Relation Age of Onset    Stroke Mother     Stroke Father     Diabetes Father     Sleep apnea Daughter     Mental illness Daughter        Social History   Substance Use Topics    Smoking status: Current Some Day Smoker     Packs/day: 1.00     Years: 50.00     Types: Cigarettes     Last attempt to quit: 11/22/2010    Smokeless tobacco: Former User    Alcohol use Yes        Review of Systems:  Review of Systems   Constitutional: Negative for activity change.   HENT: Negative for congestion.    Eyes: Negative for discharge.   Respiratory: Negative for apnea.    Cardiovascular: Negative for chest pain.   Gastrointestinal: Negative for abdominal distention.   Endocrine: Negative for cold intolerance.   Genitourinary: Negative for difficulty urinating.   Musculoskeletal: Positive for back pain and myalgias. Negative for arthralgias.   Neurological: Positive for numbness. Negative for dizziness, weakness and headaches.   Psychiatric/Behavioral: Negative for agitation.       Vital Signs (Most Recent)  Temp: 98 °F (36.7 °C) (06/25/18 0857)  Pulse: (!) 56 (06/25/18 0857)  BP: (!) 161/95 (06/25/18 0857)     99.2 kg (218 lb 12.8 oz)       Physical Exam:  Physical Exam:    Constitutional: She appears well-developed.     Eyes: Pupils are equal, round, and reactive to light. EOM are normal. Right eye exhibits no discharge. Left eye exhibits no discharge.     Abdominal: Soft.     Skin: Skin displays no rash on trunk and no rash on extremities.     Psych/Behavior: She is alert. She is oriented to person, place, and time.     Musculoskeletal:        Neck: There is  no tenderness.        Back: Range of motion is full.        Right Upper Extremities: Range of motion is full. Muscle strength is 5/5. Tone is normal.        Left Upper Extremities: Range of motion is full. Muscle strength is 5/5. Tone is normal.       Right Lower Extremities: Range of motion is full. Muscle strength is 5/5. Tone is normal.        Left Lower Extremities: Range of motion is full. Muscle strength is 5/5. Tone is normal.     Neurological:        Coordination: She has abnormal tandem walking coordination. She has a normal Romberg Test.        Sensory: There is no sensory deficit in the trunk. There is no sensory deficit in the extremities.        DTRs: DTRs are DTRS NORMAL AND SYMMETRICnormal and symmetric. Tricep reflexes are 4+ on the right side and 4+ on the left side. Bicep reflexes are 4+ on the right side and 4+ on the left side. Brachioradialis reflexes are 4+ on the right side and 4+ on the left side. Patellar reflexes are 4+ on the right side and 4+ on the left side. Achilles reflexes are 4+ on the right side and 4+ on the left side. She displays no Babinski's sign on the right side. She displays no Babinski's sign on the left side.        Cranial nerves: Cranial nerve(s) II, III, IV, V, VI, VII, VIII, IX, X, XI and XII are intact.     Abnormal tandem gait.   Positive bilateral Roblero's.   2 beats of clonus bilaterally.   Negative Babinski's bilaterally.    negative clonus,   Bilaterally.     negative straight leg raising test bilaterally.    Negative SI joint pain bilaterally.    Laboratory  CBC: Reviewed  CMP: Reviewed    Diagnostic Results:  MRI: Reviewed   MRI Lumbar Spine Without Contrast, dated 6/07/2018: Reviewed   Personally, and discussed the findings with the patient..   No acute findings.    Multilevel degenerative disc disease change.  Multiple levels of posterior disc bulging.  Multiple levels of bone foraminal narrowing, as above, similar to before.  No developing acute focal  herniations.    All CT scans at this facility use dose modulation, iterative reconstruction, and/or weight based dosing when appropriate to reduce radiation dose to as low as reasonably achievable.    MRI C spine: 12/29/2017.  reviewed Personally, and discussed the findings with the patient..    Reversal of the normal cervical lordosis with disc space narrowing seen most significantly at the C4-C5, C5-C6 and C6-C7 levels.  Bilateral uncovertebral joint disease at the C4-C5 level with moderate to severe neural foraminal narrowing greater on right.  Broad-based disc bulge with asymmetric disc protrusion paramedian to the left side at the C5-C6 level with decrease CSF spaces anterior and posterior cord, uncovertebral joint disease and moderate to severe bilateral bony neural foraminal narrowing.  Disc space narrowing at the C6-C7 level with broad based disc bulg, e uncovertebral joint disease and moderate bilateral bony neural foraminal narrowings.  No signal abnormality in the cord at any level.      ASSESSMENT/PLAN:       ICD-10-CM ICD-9-CM   1. Scoliosis, unspecified scoliosis type, unspecified spinal region M41.9 737.30   2. Congenital birth defect Q89.9 759.9   3. Cervical spinal stenosis M48.02 723.0   4. Foraminal stenosis of lumbar region M99.83 724.02   5. Cervical neuralgia M54.12 723.4   6. Tobacco abuse Z72.0 305.1       PLAN:    1. Congenital lumbar scoliosis with chronic low back pain and recent bilaterally stalking/globe distribution of neuropathic pain, suggesting polyneuropathy. Patient has stage III renal disease, which could be causing the polyneuropathy.     2. Scoliosis with left-sided L4-5, L5-S1 severe foraminal stenosis. Patient does not present any symptoms of acute  Lumbar radiculopathy. We will continue to manage her conservatively and refer her for physical therapy (pool therapy). Will refer her to the pain clinic at [facility]. I do not think that the patient will benefit from any acute  neurosurgical interventions,  since most of her pain is neuropathic in nature, but we will also get baseline scoliosis films.     3. Cervical spondylosis without myelopathy with multilevel herniated discs (C4-5, C5-6, C6-7, worst at C5-6 without any signs of acute radiculopathy or myelopathy). I have explained the natural history of cervical stenosis, the stepwise decline in neurological function, progression to myelopathy over time, and the role of surgery to prevent further neurological decline but not to improve the already lost neurological functions.  Patient understands this natural history, and is aware of signs and symptoms of progression, including worsening gait and balance, loss of bb function, weakness, loss of fine motor skills.       At this point, I will recommend her to continue conservative management with physical therapy and management in the Pain Clinic. Because of her kidney condition, patient cannot get steroid  injections. I think that the pain clinic will be able to manage her pain with oral medications.     4. I spent 35 minutes with the patient, 50% of time in counseling.     5.  I have advised against smoking.  Patient has quit after her diagnosis of kidney failure.       Diagnoses and all orders for this visit:    Scoliosis, unspecified scoliosis type, unspecified spinal region    Congenital birth defect    Cervical spinal stenosis    Foraminal stenosis of lumbar region    Cervical neuralgia    Tobacco abuse      I, Dr. Fransico Gallagher, personally performed the services described in this documentation. All medical record entries made by the scribe were at my direction and in my presence.  I have reviewed the chart and agree that the record reflects my personal performance and is accurate and complete. Fransico Gallagher MD.  5:24 PM 06/26/2018

## 2018-06-25 NOTE — LETTER
June 26, 2018      Nirmal Monroy MD  [address] [facility] [address]           [facility] - Neurosurgery 7th Fl  [address]  [place] [address]  Phone: [phone]          Patient: Talia Dillon   MR Number: [id number]   YOB: 1950   Date of Visit: 6/25/2018       Dear Dr. Nirmal Monroy:    Thank you for referring Talia Dillon to me for evaluation. Attached you will find relevant portions of my assessment and plan of care.    If you have questions, please do not hesitate to call me. I look forward to following Talia Dillon along with you.    Sincerely,    Fransico Gallagher MD    Enclosure  CC:  No Recipients    If you would like to receive this communication electronically, please contact [email or web address] or [phone] to request more information on [facility] access.    For providers and/or their staff who would like to refer a patient to [facility], please contact us through our one-stop-shop provider referral line, [facility] [place] operated by [facility], at [phone].    If you feel you have received this communication in error or would no longer like to receive these types of communications, please e-mail [email or web address]

## 2018-06-26 ENCOUNTER — TELEPHONE (OUTPATIENT)
Dept: NEUROSURGERY | Facility: CLINIC | Age: 68
End: 2018-06-26

## 2018-06-26 DIAGNOSIS — M47.812 OSTEOARTHRITIS OF CERVICAL SPINE, UNSPECIFIED SPINAL OSTEOARTHRITIS COMPLICATION STATUS: ICD-10-CM

## 2018-06-26 DIAGNOSIS — Q67.5 CONGENITAL SCOLIOSIS: Primary | ICD-10-CM

## 2018-06-26 PROBLEM — M48.02 CERVICAL SPINAL STENOSIS: Status: ACTIVE | Noted: 2018-06-26

## 2018-06-26 PROBLEM — M48.061 FORAMINAL STENOSIS OF LUMBAR REGION: Status: ACTIVE | Noted: 2018-06-26

## 2018-06-27 ENCOUNTER — CLINICAL SUPPORT (OUTPATIENT)
Dept: SMOKING CESSATION | Facility: CLINIC | Age: 68
End: 2018-06-27
Payer: COMMERCIAL

## 2018-06-27 DIAGNOSIS — F17.200 NICOTINE DEPENDENCE: Primary | ICD-10-CM

## 2018-06-27 PROCEDURE — 99999 PR PBB SHADOW E&M-EST. PATIENT-LVL I: CPT | Mod: PBBFAC,,,

## 2018-06-27 PROCEDURE — 99404 PREV MED CNSL INDIV APPRX 60: CPT | Mod: S$GLB,,, | Performed by: GENERAL PRACTICE

## 2018-06-27 RX ORDER — NICOTINE 7MG/24HR
1 PATCH, TRANSDERMAL 24 HOURS TRANSDERMAL DAILY
Qty: 21 PATCH | Refills: 0 | Status: SHIPPED | OUTPATIENT
Start: 2018-06-27 | End: 2018-09-07

## 2018-06-27 RX ORDER — DM/P-EPHED/ACETAMINOPH/DOXYLAM 30-7.5/3
2 LIQUID (ML) ORAL
Qty: 81 LOZENGE | Refills: 0 | Status: SHIPPED | OUTPATIENT
Start: 2018-06-27 | End: 2018-09-07

## 2018-06-27 NOTE — Clinical Note
Patient seen today as a new intake for tobacco cessation. Patient states that she smoked 1 ppd, but stopped 2 weeks ago. Patient started on 7 mg nicotine patch QD and 2 mg nicotine lozenge as needed in place of cigarettes.

## 2018-07-05 ENCOUNTER — CLINICAL SUPPORT (OUTPATIENT)
Dept: SMOKING CESSATION | Facility: CLINIC | Age: 68
End: 2018-07-05
Payer: COMMERCIAL

## 2018-07-05 DIAGNOSIS — F17.200 NICOTINE DEPENDENCE: Primary | ICD-10-CM

## 2018-07-05 PROCEDURE — 90853 GROUP PSYCHOTHERAPY: CPT | Mod: S$GLB,,, | Performed by: GENERAL PRACTICE

## 2018-07-05 PROCEDURE — 99999 PR PBB SHADOW E&M-EST. PATIENT-LVL I: CPT | Mod: PBBFAC,,,

## 2018-07-05 NOTE — Clinical Note
Patient states that she stopped smoking June 1, 2018 before starting program, but smoked 4-5 cigarettes one day last week when she was at a friends house.Patient ordered on 7 mg nicotine patch QD and 2 mg nicotine lozenge as needed in place of cigarettes, but has not picked it up from the pharmacy yet. CO= 4 ppm.

## 2018-07-06 NOTE — PROGRESS NOTES
Site: [facility]  Date:  7/5/2018  Clinical Status of Patient: Outpatient   Length of Service and Code: 90 minutes - 15994   Number in Attendance: 4  Group Activities/Focus of Group:  Orientation, client introductions, completion of TCRS (Tobacco Cessation Rating Scale) learned addiction model, cues/triggers, personal reasons for quitting, medications, goals, quit date    Target symptoms:  withdrawal and medication side effects             The following were rated moderate (3) to severe (4) on TCRS:       Moderate 3: Insomnia; anxious, nervous (Patient has history of and is being treated by a physician)     Severe 4:   Angry, irritable, frustrated; Depressed mood, sad (Patient states because of recent news about her health)  Patient's Response to Intervention: Patient states that she stopped smoking June 1, 2018 before starting program, but smoked 4-5 cigarettes one day last week when she was at a friends house.Patient ordered on 7 mg nicotine patch QD and 2 mg nicotine lozenge as needed in place of cigarettes, but has not picked it up from the pharmacy yet. CO= 4 ppm.    Progress Toward Goals and Other Mental Status Changes: Patient denies any behavioral or mental status changes.       Diagnosis: Z72.0  Plan: The patient will continue with group therapy sessions and medication regimen prescribed with management by physician or Cessation Clinic Provider. Patient will inform Smoking Clinic Cessation Counselor of symptoms as rated high on TCRS.    Return to Clinic: 2 weeks

## 2018-07-09 ENCOUNTER — HOSPITAL ENCOUNTER (OUTPATIENT)
Dept: RADIOLOGY | Facility: HOSPITAL | Age: 68
Discharge: HOME | End: 2018-07-09
Attending: NEUROLOGICAL SURGERY
Payer: MEDICARE

## 2018-07-09 DIAGNOSIS — M47.812 OSTEOARTHRITIS OF CERVICAL SPINE, UNSPECIFIED SPINAL OSTEOARTHRITIS COMPLICATION STATUS: ICD-10-CM

## 2018-07-09 DIAGNOSIS — Q67.5 CONGENITAL SCOLIOSIS: ICD-10-CM

## 2018-07-09 PROCEDURE — 72082 X-RAY EXAM ENTIRE SPI 2/3 VW: CPT | Mod: TC,FY,PO

## 2018-07-11 ENCOUNTER — CLINICAL SUPPORT (OUTPATIENT)
Dept: REHABILITATION | Facility: HOSPITAL | Age: 68
End: 2018-07-11
Payer: MEDICARE

## 2018-07-11 DIAGNOSIS — M53.86 DECREASED RANGE OF MOTION OF LUMBAR SPINE: ICD-10-CM

## 2018-07-11 DIAGNOSIS — R26.89 DECREASED FUNCTIONAL MOBILITY: ICD-10-CM

## 2018-07-11 DIAGNOSIS — R29.898 WEAKNESS OF BOTH UPPER EXTREMITIES: ICD-10-CM

## 2018-07-11 DIAGNOSIS — M62.9 HAMSTRING TIGHTNESS OF BOTH LOWER EXTREMITIES: ICD-10-CM

## 2018-07-11 PROCEDURE — G8981 BODY POS CURRENT STATUS: HCPCS | Mod: CL,PO

## 2018-07-11 PROCEDURE — 97163 PT EVAL HIGH COMPLEX 45 MIN: CPT | Mod: PO

## 2018-07-11 PROCEDURE — G8982 BODY POS GOAL STATUS: HCPCS | Mod: CK,PO

## 2018-07-11 NOTE — PATIENT INSTRUCTIONS
Cervical Towel for Sleeping Posture    Place a rolled up towel inside of pillowcase to maintain neck support at night.    Use a larger roll if side sleeping.        Seated Posture with Lumbar Roll    Place the lumbar roll as shown in the picture on the left.  Slide your bottom to the back of the chair and sit up straight so the roll provides support in the natural curve of your lower back.  Keep your shoulders back and head in a neutral position.  Maintain this position at all time when sitting.        TENNIS BALL OS RELEASE    Lying on your back take the rolled up towel and place behind your neck, then place the tennis balls at the base of your skull. Set a timer for no more than 5 minutes.    A. rest on tennis balls   B. retract neck into the tennis balls and tilt head forward and backward as if nodding your head yes (while doing this motion you may turn your head left and right slowly to release the entire occipital ridge)

## 2018-07-11 NOTE — PLAN OF CARE
[facility]  Physical Therapy Initial Evaluation    Name: Talia Dillon  Clinic Number: [id number]    Therapy Diagnosis:   Encounter Diagnoses   Name Primary?    Decreased range of motion of lumbar spine     Weakness of both upper extremities     Hamstring tightness of both lower extremities     Decreased functional mobility      Physician: Fransico Gallagher,*    Physician Orders: PT Eval and Treat   Medical Diagnosis from Referral: Congenital scoliosis, Osteoarthritis of cervical spine, unspecified spinal osteoarthritis complication status  Evaluation Date: 7/11/2018  Authorization Period Expiration: 06/26/2019  Plan of Care Expiration: 07/1/2018 to 09/11/2018  Visit # / Visits authorized: 1/ 1    Time In: 3:00  Time Out: 4:00  Total Billable Time: 60 minutes    Precautions: Standard    Subjective   Date of onset: chronic  History of current condition - Talia reports: having trouble with neck and back since birth. She has had therapy multiple times in the past but does not do her HEP on a regular basis. Currently she has difficulty with prolonged walking as she gets very tight and a throbbing pain in her back and legs. She denies any falls at this time but feels unsteady when she is walking. She also has increased pain with doing her usual household duties, performing activities above shoulder level, and difficulty getting comfortable to sleep on her side. She denies any difficulty with driving or vehicle transfers. She has a dull headachy feeling all the time.        Past Medical History:   Diagnosis Date    Allergy     Anemia     Anxiety     Cancer     skin    Cataract     Depression     Edema     Hypothyroidism     PSVT (paroxysmal supraventricular tachycardia)     Thyroid disease      Talia Dillon  has a past surgical history that includes Appendectomy; Tonsillectomy; Adenoidectomy; Colonoscopy (2009); Eye surgery; and Hysterectomy.    Talia has a current medication  list which includes the following prescription(s): alprazolam, amitriptyline, atenolol, baclofen, gabapentin, levothyroxine, nicotine, nicotine polacrilex, sertraline, and zolpidem.    Review of patient's allergies indicates:   Allergen Reactions    Dexamethasone Rash        Imaging, X-ray: 07/09/18   EXAMINATION:  XR SCOLIOSIS COMPLETE    CLINICAL HISTORY:  Congenital deformity of spine.  Chronic back pain.    COMPARISON:  None    FINDINGS:  There is scoliosis of the thoracolumbar spine, which is convex to the left with the apex centered at the L1 level.  The Sequeira angle measures approximately 26.2°.  All visualized thoracic and lumbar vertebral bodies normal in height.  Low-to-moderate grade degenerative changes scattered throughout the mid and inferior thoracic spine with small scattered marginal osteophytes.  Severe degenerative change of the lumbar spine identified at the L2-3, L3-4 and L4-5 levels with large marginal osteophytes.  Paravertebral soft tissues are normal.   Impression       1. Thoracolumbar scoliosis as detailed above.         MRI Lumbar Spine 06/07/18  EXAMINATION:  MRI LUMBAR SPINE WITHOUT CONTRAST    CLINICAL HISTORY:  Spondylosis without myelopathy or radiculopathy, lumbar regionchronic degenerative arthritis of the lumbar spine;    TECHNIQUE:  Non-contrast axial images of the lumbar spine were obtained.  Multiplanar reconstruction images were obtained.    COMPARISON:  No comparison studies are available.    FINDINGS:  Levoscoliosis of the spine, similar to before.  No abnormal bone marrow signal changes are evident to indicate acute fracture or bone marrow replacement process.  Sub endplate marrow signal changes about the L4-5 disc space, consistent with degenerative type change, mostly similar to before.    Visualized spinal cord and conus medullaris appears unremarkable.  No abnormal intramedullary spinal cord signal change.  No intradural, extramedullary masses are identified.    T11-12:  Mild posterior disc bulging, greater to the right of midline, similar to before.    T12-L1:  Mild loss of disc signal indicating some degenerative change.  No significant disc bulge or focal herniation.  Mild loss of disc space height.  No detrimental changes.    L1-2:  Degenerative disc disease change.  Loss of disc signal.  No significant disc bulge or focal herniation.  Mild posterior disc bulging, similar to before.    L2-3: Degenerative disc disease.  Narrowing of the disc space.  Loss of normal disc signal.  Diffuse posterior mild disc bulging, similar to before.  Facet arthropathy changes, appearing greater on the right.    L3-4:  Degenerative disc disease.  Significant narrowing of the disc space.  Posterior marginal osteophytes.  Diffuse posterior disc bulging.  Bilateral foraminal narrowing right greater than left.  Facet arthropathy changes right greater than left.  Mild narrowing of the thecal sac as a result of the chronic findings, similar to before.    L4-5:  Degenerative disc disease.  Significant narrowing of the disc space.  Posterior osteophytes.  Diffuse posterior disc bulging, appearing greater to the left of midline.  Indication 0 some bilateral facet arthropathy change.  Bilateral bone foraminal narrowing, greater on the left.  Findings appear similar to before.    L5-S1:  Degenerative disc disease.  No significant disc bulge or focal herniation.  Mild posterior disc bulging present.  Facet arthropathy changes left greater than right.   Impression       No acute findings.    Multilevel degenerative disc disease change.  Multiple levels of posterior disc bulging.  Multiple levels of bone foraminal narrowing, as above, similar to before.  No developing acute focal herniations     Prior Therapy: PT previously last time 6-10 years  Social History: single story home, 5 steps into the home with 1 hand rail, lives alone  Occupation: retired  Prior Level of Function: independent  Current Level of  Function: independent    Pain:  Current 5/10, worst 8/10, best 0/10   Location: L>R back , neck  and B LE and UE  Description: Aching, Dull, Burning, Numb, Sharp and movement, uncomfortableness  Aggravating Factors: Sitting, Standing, Bending, Walking and Lifting  Easing Factors: pain medication, lying down, hot bath and rest    Pts goals: to get my body more in shape, loosen my muscles, walk better, move better, rest better    Objective       Observation: Patient is a 67 year old female, who presents to the clinic in no apparent distress.    Posture: forward head and shoulders, decreased lumbar lordosis in standing    Cervical Range of Motion:    Degrees Pain   Flexion 50      Extension 40 Strain in neck   Right Rotation 70      Left Rotation 70      Right Side Bending 30    Left Side Bending 30 uncomfortable UT left      Shoulder Range of Motion:   Shoulder Left Right   Flexion 175 170   Abduction 175 180   ER 80 85   IR 90 90     Strength:  Cervical MMT   Flexion 3/5   Extension 3/5   Right Side Bend 3+/5   Left Side Bend 3+/5     Upper Extremity Strength  (R) UE  (L) UE    Shoulder flexion: 3/5 Shoulder flexion: 3/5   Shoulder Abduction: 5/5 Shoulder abduction: 5/5   Shoulder ER 3+/5 Shoulder ER 4-/5   Shoulder IR 5/5 Shoulder IR 5/5   Elbow flexion: 5/5 Elbow flexion: 5/5   Elbow extension: 5/5 Elbow extension: 5/5   Lower Trap 3-/5 Lower Trap 3-/5   Middle Trap 3-/5 Middle Trap 3-/5   Rhomboids 3-/5 Rhomboids 3-/5         Special Tests:  Distraction No change   Compression No change   Spurlings negative   Sharp-Dandy Negative   VA test negative   Lateral Flexion Alar Ligament negative     Joint Mobility: Transverse glides WNL,        Lumbar Range of Motion:    Degrees Pain   Flexion 55           Extension 5   Across LS junction      Left Side Bending 20         Right Side Bending 10 Pain L side             Lower Extremity Strength  Right LE  Left LE    Knee extension: 5/5 Knee extension: 5/5   Knee flexion:  5/5 Knee flexion: 5/5   Hip flexion: 4/5 Hip flexion: 4/5   Hip extension:  NT Hip extension: NT   Hip abduction: 3+/5 Hip abduction: 4-/5   Ankle dorsiflexion: 5/5 Ankle dorsiflexion: 5/5   Ankle plantarflexion: 5/5 Ankle plantarflexion: 5/5     Special Tests:  -Repeated Flexion: no change  -Repeated Ext: no change    DTR:   Right Left Comment   Patellar (L3-4) 1+ 1+    Achilles (S1) 1+ 1+        Neuro Dynamic Testing:    Sciatic nerve:      SLR: R = 55 degrees     L = 65 degrees       Femoral Nerve:    Femoral nerve test: negative      Joint Mobility: lumbar PIVM limited on L     Palpation: TTP over B UT, B lumbar paraspinals up to T10, L QL    Sensation: light touch intact    Flexibility:   Popliteal Angle: R = 40 degrees ; L = 30 degrees   Tracy's test: R = positive ; L = positive   B pec major tightness    CMS Impairment/Limitation/Restriction for FOTO Neck Survey    Therapist reviewed FOTO scores for Talia Dillon on 7/11/2018.   FOTO documents entered into EPIC - see Media section.    Limitation Score: 47%  Category: Body Position    Current : CK = at least 40% but < 60% impaired, limited or restricted  Goal: CJ = at least 20% but < 40% impaired, limited or restricted  Discharge: N/A at this time    CMS Impairment/Limitation/Restriction for FOTO Lumbar Spine Survey    Therapist reviewed FOTO scores for Talia Dillon on 7/11/2018.   FOTO documents entered into EPIC - see Media section.    Limitation Score: 60%  Category: Body Position    Current : CL = least 60% but < 80% impaired, limited or restricted  Goal: CK = at least 40% but < 60% impaired, limited or restricted  Discharge: N/A at this time         TREATMENT   Treatment Time In: 3:50  Treatment Time Out: 4:00  Total Treatment time separate from Evaluation: 10 minutes    Home Exercises and Patient Education Provided    Education provided re: sitting posture with lumbar roll, cervical roll for sleeping posture, and occipital release with tennis balls.  Compliance with HEP to maintain the gains made during her therapy sessions.     Written Home Exercises Provided: sitting and sleeping posture, tennis balls for occipital release.  Exercises were reviewed and Talia was able to demonstrate them prior to the end of the session.   Pt received a written copy of exercises to perform at home. Talia demonstrated good  understanding of the education provided.     See EMR under patient instructions for exercises given.   Assessment   Talia is a 67 y.o. female referred to outpatient Physical Therapy with a medical diagnosis of congenital scoliosis, osteoarthritis of cervical spine, unspecified spinal osteoarthritis complication status. Pt presents with decreased AROM of her lumbar spine, pain at end range of her cervical spine, hamstring and hip tightness, and pec tightness that limits her ability to use correct posture and body mechanics with ADLs. Her B UE and LE weakness also limit her ability to perform her usual ADLs, stand or ambulate for a prolonged period of time. Her current score on FOTO Neck places her in the 40%<60% impaired, limited, or restricted category. Her current score on FOTO Lumbar Spine places her in the 60%<80% impaired, limited, or restricted category.     Pt prognosis is Fair.   Pt will benefit from skilled outpatient Physical Therapy to address the deficits stated above and in the chart below, provide pt/family education, and to maximize pt's level of independence.     Plan of care discussed with patient: Yes  Pt's spiritual, cultural and educational needs considered and patient is agreeable to the plan of care and goals as stated below:     Anticipated Barriers for therapy: patient admitted non compliance with her previous HEP.    Medical Necessity is demonstrated by the following  History  Co-morbidities and personal factors that may impact the plan of care Co-morbidities:   anxiety and chronic low back pain, chronic neck pain    Personal Factors:    no deficits     high   Examination  Body Structures and Functions, activity limitations and participation restrictions that may impact the plan of care Body Regions:   neck  back  lower extremities  upper extremities    Body Systems:    ROM  strength  flexibility    Participation Restrictions:   Difficulty with prolonged standing, prolonged walking, performing usual household duties, reaching overhead, sleeping comfortably    Activity limitations:   Learning and applying knowledge  no deficits    General Tasks and Commands  no deficits    Communication  no deficits    Mobility  lifting and carrying objects  walking    Self care  no deficits    Domestic Life  shopping  doing house work (cleaning house, washing dishes, laundry)    Interactions/Relationships  no deficits    Life Areas  no deficits    Community and Social Life  no deficits         high   Clinical Presentation evolving clinical presentation with changing clinical characteristics high   Decision Making/ Complexity Score: high     Goals:  Short Term Goals: 4 weeks   1. This patient will be independent with a basic HEP.  2. This patient will have lumbar AROM WNL and pain free in order to use correct body mechanics with ADLs.  3. This patient will increase B UE and LE strength by 1 grade in order to be able to grocery shop with no increase in symptoms.   4. This patient will have a pain rating of 4/10 at worst with ADLs.   5. Patient able to score greater than or equal to 60 on the FOTO Neck placing patient in 40%<60% impaired, limited, or restricted category demonstrating overall decreased neck pain with functional activities  6. Patient able to score greater than or equal to 50 on the FOTO Lumbar Spine placing patient in 40%<60% impaired, limited, or restricted category demonstrating overall decreased low back pain with functional activities.   Long Term Goals: 8 weeks   1. This patient will be independent with an updated HEP.  2. This patient will  increase B SLR by 20 degrees in order to use correct body mechanics with lifting light objects from the floor with no increase in symptoms.  3. This patient will increase B UE and LE strength to 5/5 in order to be able to perform her usual household duties with no increase in symptoms.   4. This patient will have a pain rating of 2/10 at worst with ADLs.   5. Patient able to score greater than or equal to 70 on the FOTO Neck placing patient in 20%<40% impaired, limited, or restricted category demonstrating overall decreased neck pain with functional activities  6. Patient able to score greater than or equal to 60 on the FOTO Lumbar Spine placing patient in 40%<60% impaired, limited, or restricted category demonstrating overall decreased low back pain with functional activities.     Plan   Plan of care Certification: 7/11/2018 to 09/11/2018.    Outpatient Physical Therapy 2 times weekly for 8 weeks to include the following interventions: Manual Therapy, Moist Heat/ Ice, Neuromuscular Re-ed, Patient Education, Therapeutic Exercise and IASTM. Dry needling with manual therapy techniques to decrease pain, inflammation and swelling, increase circulation and promote healing process      Angeline Aparicio, PT

## 2018-07-12 ENCOUNTER — CLINICAL SUPPORT (OUTPATIENT)
Dept: REHABILITATION | Facility: HOSPITAL | Age: 68
End: 2018-07-12
Payer: MEDICARE

## 2018-07-12 DIAGNOSIS — R29.898 WEAKNESS OF BOTH UPPER EXTREMITIES: ICD-10-CM

## 2018-07-12 DIAGNOSIS — M62.9 HAMSTRING TIGHTNESS OF BOTH LOWER EXTREMITIES: ICD-10-CM

## 2018-07-12 DIAGNOSIS — M53.86 DECREASED RANGE OF MOTION OF LUMBAR SPINE: ICD-10-CM

## 2018-07-12 PROCEDURE — 97110 THERAPEUTIC EXERCISES: CPT | Mod: PO

## 2018-07-12 NOTE — PROGRESS NOTES
"  Physical Therapy Daily Treatment Note     Name: Talia Dillon  Clinic Number: [id number]    Therapy Diagnosis:   Encounter Diagnoses   Name Primary?    Decreased range of motion of lumbar spine     Weakness of both upper extremities     Hamstring tightness of both lower extremities      Physician: Fransico Gallagher,*    Visit Date: 7/12/2018  Physician Orders: PT Eval and Treat   Medical Diagnosis from Referral: Congenital scoliosis, Osteoarthritis of cervical spine, unspecified spinal osteoarthritis complication status  Evaluation Date: 7/11/2018  Authorization Period Expiration: 06/26/2019  Plan of Care Expiration: 07/1/2018 to 09/11/2018  Visit # / Visits authorized: 1/ 12    Time In: 2:00  Time Out: 2:45  TIMED  Procedure Min.   TE 45                     UNTIMED  Procedure Min.             Total Billable Time: 45 minutes    Precautions: Standard and Fall    Subjective     Pt reports: Doing alright this afternoon. She was compliant with home exercise program.  Response to previous treatment: No increase in symptoms after the evaluation.   Functional change: N/A    Pain: 4/10  Location: bilateral back  and neck      Objective     Talia received therapeutic exercises to develop strength, ROM, flexibility, posture and core stabilization for 45 minutes including:    Date 07/12/18   Visit 2/   Gcode 2/10  08/11/18   FOTO 2/5   Cap Visit  Cap Total 90.96   FTF 08/12/18   POC exp 09/12/18           MHP --   MT --   UT Str. 5 x 5"   LV Str. 5 x 5"   Pec Str. --   Chin Tuck 1 x 10   HS stretch 10 x 10"   Piriformis stretch 10 x 10"   TAs 1 x 10   LTR 1 x 10   Marching 1 x 10           Lats --   Rows 1 x 10 RTB   Horizontal Abd --   Scaption --   Wall Slides --   Initials DG     Home Exercises Provided and Patient Education Provided     Education provided:   - Compliance with HEP    Written Home Exercises Provided: yes.  Exercises were reviewed and Talia was able to demonstrate them prior to the end of the session.  " Talia demonstrated good  understanding of the education provided.     See EMR under Patient Instructions for exercises provided 7/12/2018.    Assessment     Patient required frequent cues to keep her exercises in a pain free range and to maintain her core engagement.   Talia is progressing well towards her goals.   Pt prognosis is Fair.     Pt will continue to benefit from skilled outpatient physical therapy to address the deficits listed in the problem list box on initial evaluation, provide pt/family education and to maximize pt's level of independence in the home and community environment.     Pt's spiritual, cultural and educational needs considered and pt agreeable to plan of care and goals.    Anticipated barriers to physical therapy: patient's previous non-compliance with HEP    Goals:   Short Term Goals: 4 weeks   1. This patient will be independent with a basic HEP.  2. This patient will have lumbar AROM WNL and pain free in order to use correct body mechanics with ADLs.  3. This patient will increase B UE and LE strength by 1 grade in order to be able to grocery shop with no increase in symptoms.   4. This patient will have a pain rating of 4/10 at worst with ADLs.   5. Patient able to score greater than or equal to 60 on the FOTO Neck placing patient in 40%<60% impaired, limited, or restricted category demonstrating overall decreased neck pain with functional activities  6. Patient able to score greater than or equal to 50 on the FOTO Lumbar Spine placing patient in 40%<60% impaired, limited, or restricted category demonstrating overall decreased low back pain with functional activities.   Long Term Goals: 8 weeks   1. This patient will be independent with an updated HEP.  2. This patient will increase B SLR by 20 degrees in order to use correct body mechanics with lifting light objects from the floor with no increase in symptoms.  3. This patient will increase B UE and LE strength to 5/5 in order to be  able to perform her usual household duties with no increase in symptoms.   4. This patient will have a pain rating of 2/10 at worst with ADLs.   5. Patient able to score greater than or equal to 70 on the FOTO Neck placing patient in 20%<40% impaired, limited, or restricted category demonstrating overall decreased neck pain with functional activities  6. Patient able to score greater than or equal to 60 on the FOTO Lumbar Spine placing patient in 40%<60% impaired, limited, or restricted category demonstrating overall decreased low back pain with functional activities.     Plan     Continue with the plan of care and progress as the patient tolerates.     Angeline Aparicio, PT

## 2018-07-15 PROBLEM — M62.9 HAMSTRING TIGHTNESS OF BOTH LOWER EXTREMITIES: Status: ACTIVE | Noted: 2018-07-15

## 2018-07-15 PROBLEM — M53.86 DECREASED RANGE OF MOTION OF LUMBAR SPINE: Status: ACTIVE | Noted: 2018-07-15

## 2018-07-15 PROBLEM — R26.89 DECREASED FUNCTIONAL MOBILITY: Status: ACTIVE | Noted: 2018-07-15

## 2018-07-15 PROBLEM — R29.898 WEAKNESS OF BOTH UPPER EXTREMITIES: Status: ACTIVE | Noted: 2018-07-15

## 2018-07-17 ENCOUNTER — CLINICAL SUPPORT (OUTPATIENT)
Dept: REHABILITATION | Facility: HOSPITAL | Age: 68
End: 2018-07-17
Payer: MEDICARE

## 2018-07-17 DIAGNOSIS — M62.9 HAMSTRING TIGHTNESS OF BOTH LOWER EXTREMITIES: ICD-10-CM

## 2018-07-17 DIAGNOSIS — M53.86 DECREASED RANGE OF MOTION OF LUMBAR SPINE: ICD-10-CM

## 2018-07-17 DIAGNOSIS — R29.898 WEAKNESS OF BOTH UPPER EXTREMITIES: ICD-10-CM

## 2018-07-17 PROCEDURE — 97110 THERAPEUTIC EXERCISES: CPT | Mod: PO

## 2018-07-17 NOTE — PROGRESS NOTES
"  Physical Therapy Daily Treatment Note     Name: Talia Dillon  Clinic Number: [id number]    Therapy Diagnosis:   Encounter Diagnoses   Name Primary?    Decreased range of motion of lumbar spine     Weakness of both upper extremities     Hamstring tightness of both lower extremities      Physician: Fransico Gallagher,*    Visit Date: 7/17/2018  Physician Orders: PT Eval and Treat   Medical Diagnosis from Referral: Congenital scoliosis, Osteoarthritis of cervical spine, unspecified spinal osteoarthritis complication status  Evaluation Date: 7/11/2018  Authorization Period Expiration: 06/26/2019  Plan of Care Expiration: 07/1/2018 to 09/11/2018  Visit # / Visits authorized: 2/ 12    Time In: 2:14  Time Out: 3:00  TIMED  Procedure Min.   TE 46                     UNTIMED  Procedure Min.             Total Billable Time: 46 minutes    Precautions: Standard and Fall    Subjective     Pt reports: She was compliant with home exercise program.  Patient reports she is sore and achy all over from doing the exercises.  Patient reports most of her pain today is in her low back and left leg.  Response to previous treatment: No increase in symptoms after the evaluation.   Functional change: N/A    Pain: 5/10  Location: bilateral back  and neck      Objective     Talia received therapeutic exercises to develop strength, ROM, flexibility, posture and core stabilization for 46 minutes including:    Date 07/17/18 07/12/18   Visit 3/ 2/   Gcode 3/10  08/11/18 2/10  08/11/18   FOTO 3/5 2/5   Cap Visit  Cap Total 90.96  264.80 90.96  173.84   FTF 08/12/18 08/12/18   POC exp 09/12/18 09/12/18        MHP -- --   MT -- --   Seated:     UT Str. 5 x 5" 5 x 5"   LV Str. 5 x 5" 5 x 5"   Cervical SB 1 x 10    Chin Tuck 1 x 10 1 x 10   Scapula retractions  10 x 3"    Table:     HS stretch 10 x 10" 10 x 10"   Piriformis stretch 10 x 10" 10 x 10"   TAs 10 x 5" 1 x 10   LTR 1 x 15 1 x 10   Marching 1 x 15 1 x 10   SLR 1 x 10    Hip abduction 1 " "x 10    Standing:     Pec Str. 5 x 5" --   Lats -- --   Rows 2 x 10 RTB 1 x 10 RTB   Horizontal Abd -- --   Scaption -- --   Wall Slides -- --        Initials GWA 1/6 DG     Home Exercises Provided and Patient Education Provided     Education provided:   - Compliance with HEP    Written Home Exercises Provided: yes.  Exercises were reviewed and Talia was able to demonstrate them prior to the end of the session.  Talia demonstrated good  understanding of the education provided.     See EMR under Patient Instructions for exercises provided 07/17/18    Assessment     Patient required frequent cues to keep her exercises in a pain free range and to maintain her core engagement.  Patient completed her therapy along with new exercises added and today's progressions with no increase in symptoms prior to leaving the clinic.   Talia is progressing well towards her goals.   Pt prognosis is Fair.     Pt will continue to benefit from skilled outpatient physical therapy to address the deficits listed in the problem list box on initial evaluation, provide pt/family education and to maximize pt's level of independence in the home and community environment.     Pt's spiritual, cultural and educational needs considered and pt agreeable to plan of care and goals.    Anticipated barriers to physical therapy: patient's previous non-compliance with HEP    Goals:   Short Term Goals: 4 weeks   1. This patient will be independent with a basic HEP.  2. This patient will have lumbar AROM WNL and pain free in order to use correct body mechanics with ADLs.  3. This patient will increase B UE and LE strength by 1 grade in order to be able to grocery shop with no increase in symptoms.   4. This patient will have a pain rating of 4/10 at worst with ADLs.   5. Patient able to score greater than or equal to 60 on the FOTO Neck placing patient in 40%<60% impaired, limited, or restricted category demonstrating overall decreased neck pain with " functional activities  6. Patient able to score greater than or equal to 50 on the FOTO Lumbar Spine placing patient in 40%<60% impaired, limited, or restricted category demonstrating overall decreased low back pain with functional activities.     Long Term Goals: 8 weeks   1. This patient will be independent with an updated HEP.  2. This patient will increase B SLR by 20 degrees in order to use correct body mechanics with lifting light objects from the floor with no increase in symptoms.  3. This patient will increase B UE and LE strength to 5/5 in order to be able to perform her usual household duties with no increase in symptoms.   4. This patient will have a pain rating of 2/10 at worst with ADLs.   5. Patient able to score greater than or equal to 70 on the FOTO Neck placing patient in 20%<40% impaired, limited, or restricted category demonstrating overall decreased neck pain with functional activities  6. Patient able to score greater than or equal to 60 on the FOTO Lumbar Spine placing patient in 40%<60% impaired, limited, or restricted category demonstrating overall decreased low back pain with functional activities.     Plan     Continue with the plan of care and progress as the patient tolerates.     Santo Denson, PTA

## 2018-07-23 ENCOUNTER — OFFICE VISIT (OUTPATIENT)
Dept: PAIN MEDICINE | Facility: CLINIC | Age: 68
End: 2018-07-23
Attending: ANESTHESIOLOGY
Payer: MEDICARE

## 2018-07-23 VITALS
BODY MASS INDEX: 37.63 KG/M2 | HEART RATE: 57 BPM | TEMPERATURE: 98 F | HEIGHT: 64 IN | DIASTOLIC BLOOD PRESSURE: 70 MMHG | WEIGHT: 220.44 LBS | SYSTOLIC BLOOD PRESSURE: 132 MMHG | RESPIRATION RATE: 18 BRPM

## 2018-07-23 DIAGNOSIS — G89.4 CHRONIC PAIN DISORDER: ICD-10-CM

## 2018-07-23 DIAGNOSIS — M41.26 OTHER IDIOPATHIC SCOLIOSIS, LUMBAR REGION: Primary | ICD-10-CM

## 2018-07-23 DIAGNOSIS — M54.16 LUMBAR RADICULOPATHY: ICD-10-CM

## 2018-07-23 DIAGNOSIS — G60.9 IDIOPATHIC PERIPHERAL NEUROPATHY: ICD-10-CM

## 2018-07-23 DIAGNOSIS — M51.36 DDD (DEGENERATIVE DISC DISEASE), LUMBAR: ICD-10-CM

## 2018-07-23 PROCEDURE — 99204 OFFICE O/P NEW MOD 45 MIN: CPT | Mod: GC,S$GLB,, | Performed by: ANESTHESIOLOGY

## 2018-07-23 PROCEDURE — 99999 PR PBB SHADOW E&M-EST. PATIENT-LVL III: CPT | Mod: PBBFAC,,, | Performed by: ANESTHESIOLOGY

## 2018-07-23 PROCEDURE — 99499 UNLISTED E&M SERVICE: CPT | Mod: S$GLB,,, | Performed by: ANESTHESIOLOGY

## 2018-07-23 NOTE — LETTER
July 24, 2018      Fransico Gallagher MD  [address]  [facility] [address]           [facility]  [address]  [facility] [address]  Phone: [phone]  Fax: [phone]          Patient: Talia Dillon   MR Number: [id number]   YOB: 1950   Date of Visit: 7/23/2018       Dear Dr. Fransico Gallagher:    Thank you for referring Talia Dillon to me for evaluation. Attached you will find relevant portions of my assessment and plan of care.    If you have questions, please do not hesitate to call me. I look forward to following Talia Dillon along with you.    Sincerely,    Julieth Christopher MD    Enclosure  CC:  No Recipients    If you would like to receive this communication electronically, please contact [email or web address] or [phone] to request more information on [employer] Link access.    For providers and/or their staff who would like to refer a patient to [facility], please contact us through our one-stop-shop provider referral line, [facility], at [phone].    If you feel you have received this communication in error or would no longer like to receive these types of communications, please e-mail [email or web address]

## 2018-07-23 NOTE — PATIENT INSTRUCTIONS
Recommend restarting amitriptyline 10 mg at bedtime.  This is for pain.  It may make you sleepy, so do not drive until you know how it will affect you.    I have also provided you with a topical compounded cream to use as needed.  You may use this up to 4-5 times daily on your hands and feet.     Continue taking gabapentin 800 mg daily

## 2018-07-24 ENCOUNTER — CLINICAL SUPPORT (OUTPATIENT)
Dept: REHABILITATION | Facility: HOSPITAL | Age: 68
End: 2018-07-24
Payer: MEDICARE

## 2018-07-24 DIAGNOSIS — R29.898 WEAKNESS OF BOTH UPPER EXTREMITIES: ICD-10-CM

## 2018-07-24 DIAGNOSIS — M62.9 HAMSTRING TIGHTNESS OF BOTH LOWER EXTREMITIES: ICD-10-CM

## 2018-07-24 DIAGNOSIS — M53.86 DECREASED RANGE OF MOTION OF LUMBAR SPINE: ICD-10-CM

## 2018-07-24 PROCEDURE — 97110 THERAPEUTIC EXERCISES: CPT | Mod: PO

## 2018-07-24 NOTE — PROGRESS NOTES
"  Physical Therapy Daily Treatment Note     Name: Talia Dillon  Clinic Number: [id number]    Therapy Diagnosis:   Encounter Diagnoses   Name Primary?    Decreased range of motion of lumbar spine     Weakness of both upper extremities     Hamstring tightness of both lower extremities      Physician: Fransico Gallagher,*    Visit Date: 7/24/2018  Physician Orders: PT Eval and Treat   Medical Diagnosis from Referral: Congenital scoliosis, Osteoarthritis of cervical spine, unspecified spinal osteoarthritis complication status  Evaluation Date: 7/11/2018  Authorization Period Expiration: 06/26/2019  Plan of Care Expiration: 07/1/2018 to 09/11/2018  Visit # / Visits authorized: 4/ 12    Time In: 2:00  Time Out: 2:45  TIMED  Procedure Min.   TE 25   TE sup 20 NC                 UNTIMED  Procedure Min.             Total Billable Time: 25 minutes    Precautions: Standard and Fall    Subjective     Pt reports: She was compliant with home exercise program.  Patient reports she continues with pain and stiffness in her low back and left leg.  Response to previous treatment: No increase in symptoms after the evaluation.   Functional change: N/A    Pain: 8/10  Location: bilateral back  and neck      Objective     Talia received therapeutic exercises to develop strength, ROM, flexibility, posture and core stabilization for 25 minutes including:    Date 07/24/18 07/17/18 07/12/18   Visit  Ins. Auth. Ends 4/12  09/17/18 3/ 2/   Gcode 4/10  08/11/18 3/10  08/11/18 2/10  08/11/18   FOTO 4/5 3/5 2/5   Cap Visit  Cap Total 60.64  325.44 90.96  264.80 90.96  173.84   FTF 08/12/18 08/12/18 08/12/18   POC exp 09/12/18 09/12/18 09/12/18         MHP -- -- --   MT -- -- --   Seated:      UT Str. 5 x 5" 5 x 5" 5 x 5"   LV Str. 5 x 5" 5 x 5" 5 x 5"   Cervical SB 1 x 10 1 x 10    Chin Tuck 1 x 10 1 x 10 1 x 10   Scapula retractions  15 x 3" 10 x 3"    Table:      HS stretch 10 x 10" 10 x 10" 10 x 10"   Piriformis stretch 10 x 10" 10 x 10" 10 " "x 10"   TAs 10 x 5" 10 x 5" 1 x 10   LTR 2 x 10 1 x 15 1 x 10   Marching 2 x 10 1 x 15 1 x 10   SLR 2 x 10 1 x 10    Hip abduction 2 x 10 1 x 10    Standing:      Pec Str. 5 x 5" 5 x 5" --   Lats  -- --   Rows 2 x 10 RTB 2 x 10 RTB 1 x 10 RTB   Horizontal Abd -- -- --   Scaption -- -- --   Wall Slides -- -- --         Initials GWA 2/6 GWA 1/6 DG     Home Exercises Provided and Patient Education Provided     Education provided:   - Compliance with HEP    Written Home Exercises Provided: yes.  Exercises were reviewed and Talia was able to demonstrate them prior to the end of the session.  Talia demonstrated good  understanding of the education provided.     See EMR under Patient Instructions for exercises provided 07/17/18    Assessment     Patient required frequent cues to keep her exercises in a pain free range and to maintain her core engagement.  Patient completed her therapy along with today's progressions with no increase in symptoms prior to leaving the clinic.   Talia is progressing well towards her goals.   Pt prognosis is Fair.     Pt will continue to benefit from skilled outpatient physical therapy to address the deficits listed in the problem list box on initial evaluation, provide pt/family education and to maximize pt's level of independence in the home and community environment.     Pt's spiritual, cultural and educational needs considered and pt agreeable to plan of care and goals.    Anticipated barriers to physical therapy: patient's previous non-compliance with HEP    Goals:   Short Term Goals: 4 weeks   1. This patient will be independent with a basic HEP.  2. This patient will have lumbar AROM WNL and pain free in order to use correct body mechanics with ADLs.  3. This patient will increase B UE and LE strength by 1 grade in order to be able to grocery shop with no increase in symptoms.   4. This patient will have a pain rating of 4/10 at worst with ADLs.   5. Patient able to score greater than or " equal to 60 on the FOTO Neck placing patient in 40%<60% impaired, limited, or restricted category demonstrating overall decreased neck pain with functional activities  6. Patient able to score greater than or equal to 50 on the FOTO Lumbar Spine placing patient in 40%<60% impaired, limited, or restricted category demonstrating overall decreased low back pain with functional activities.     Long Term Goals: 8 weeks   1. This patient will be independent with an updated HEP.  2. This patient will increase B SLR by 20 degrees in order to use correct body mechanics with lifting light objects from the floor with no increase in symptoms.  3. This patient will increase B UE and LE strength to 5/5 in order to be able to perform her usual household duties with no increase in symptoms.   4. This patient will have a pain rating of 2/10 at worst with ADLs.   5. Patient able to score greater than or equal to 70 on the FOTO Neck placing patient in 20%<40% impaired, limited, or restricted category demonstrating overall decreased neck pain with functional activities  6. Patient able to score greater than or equal to 60 on the FOTO Lumbar Spine placing patient in 40%<60% impaired, limited, or restricted category demonstrating overall decreased low back pain with functional activities.     Plan     Continue with the plan of care and progress as the patient tolerates.     Santo Denson, PTA

## 2018-07-25 ENCOUNTER — TELEPHONE (OUTPATIENT)
Dept: SMOKING CESSATION | Facility: CLINIC | Age: 68
End: 2018-07-25

## 2018-07-26 ENCOUNTER — CLINICAL SUPPORT (OUTPATIENT)
Dept: REHABILITATION | Facility: HOSPITAL | Age: 68
End: 2018-07-26
Payer: MEDICARE

## 2018-07-26 DIAGNOSIS — R29.898 WEAKNESS OF BOTH UPPER EXTREMITIES: ICD-10-CM

## 2018-07-26 DIAGNOSIS — M53.86 DECREASED RANGE OF MOTION OF LUMBAR SPINE: ICD-10-CM

## 2018-07-26 DIAGNOSIS — M62.9 HAMSTRING TIGHTNESS OF BOTH LOWER EXTREMITIES: ICD-10-CM

## 2018-07-26 PROCEDURE — 97110 THERAPEUTIC EXERCISES: CPT | Mod: PO

## 2018-07-26 NOTE — PROGRESS NOTES
"  Physical Therapy Daily Treatment Note     Name: Talia Dillon  Clinic Number: [id number]    Therapy Diagnosis:   Encounter Diagnoses   Name Primary?    Decreased range of motion of lumbar spine     Weakness of both upper extremities     Hamstring tightness of both lower extremities      Physician: Fransico Gallagher,*    Visit Date: 7/26/2018  Physician Orders: PT Eval and Treat   Medical Diagnosis from Referral: Congenital scoliosis, Osteoarthritis of cervical spine, unspecified spinal osteoarthritis complication status  Evaluation Date: 7/11/2018  Authorization Period Expiration: 06/26/2019  Plan of Care Expiration: 07/1/2018 to 09/11/2018  Visit # / Visits authorized: 5/ 12    Time In: 2:00  Time Out: 2:55  TIMED  Procedure Min.   TE 25   TE sup 30 NC                 UNTIMED  Procedure Min.             Total Billable Time: 25 minutes    Precautions: Standard and Fall    Subjective     Pt reports: She was compliant with home exercise program.  Patient reports her pain is a little less today.  Response to previous treatment: No increase in symptoms after the evaluation.   Functional change: N/A    Pain: 5/10  Location: bilateral back  and neck      Objective     Talia received therapeutic exercises to develop strength, ROM, flexibility, posture and core stabilization for 25 minutes including:    Date 07/26/18 07/24/18 07/17/18 07/12/18   Visit  Ins. Auth. Ends 5/12  09/17/18 4/12  09/17/18 3/ 2/   Gcode 5/10  08/11/18 4/10  08/11/18 3/10  08/11/18 2/10  08/11/18   FOTO 5/5 4/5 3/5 2/5   Cap Visit  Cap Total 60.64  386.08 60.64  325.44 90.96  264.80 90.96  173.84   FTF 08/12/18 08/12/18 08/12/18 08/12/18   POC exp 09/12/18 09/12/18 09/12/18 09/12/18          MHP -- -- -- --   MT -- -- -- --   Seated:       UT Str. 5 x 5" 5 x 5" 5 x 5" 5 x 5"   LV Str. 5 x 5" 5 x 5" 5 x 5" 5 x 5"   Cervical SB 1 x 15 1 x 10 1 x 10    Chin Tuck 1 x 15 1 x 10 1 x 10 1 x 10   Scapula retractions  15 x 3" 15 x 3" 10 x 3"    Table:  " "     HS stretch 10 x 10" 10 x 10" 10 x 10" 10 x 10"   Piriformis stretch 10 x 10" 10 x 10" 10 x 10" 10 x 10"   TAs 10 x 5" 10 x 5" 10 x 5" 1 x 10   LTR 2 x 10 2 x 10 1 x 15 1 x 10   Marching 2 x 10 2 x 10 1 x 15 1 x 10   SLR 2 x 10 2 x 10 1 x 10    Hip abduction 2 x 10 2 x 10 1 x 10    Standing:       Pec Str. 5 x 5" 5 x 5" 5 x 5" --   Lats --  -- --   Rows 2 x 10 RTB 2 x 10 RTB 2 x 10 RTB 1 x 10 RTB   Horizontal Abd -- -- -- --   Scaption -- -- -- --   Wall Slides -- -- -- --          Initials GWA 3/6 GWA 2/6 GWA 1/6 DG       Functional limitation reporting disability percentage was obtained through use of FOTO neck Survey indicating 53% disability     Home Exercises Provided and Patient Education Provided     Education provided:   - Compliance with HEP    Written Home Exercises Provided: yes.  Exercises were reviewed and Talia was able to demonstrate them prior to the end of the session.  Talia demonstrated good  understanding of the education provided.     See EMR under Patient Instructions for exercises provided 07/17/18    Assessment     Patient required frequent cues to keep her exercises in a pain free range and to maintain her core engagement.  Patient completed her therapy along with today's progressions with no increase in symptoms prior to leaving the clinic.   Talia is progressing well towards her goals.   Pt prognosis is Fair.     Pt will continue to benefit from skilled outpatient physical therapy to address the deficits listed in the problem list box on initial evaluation, provide pt/family education and to maximize pt's level of independence in the home and community environment.     Pt's spiritual, cultural and educational needs considered and pt agreeable to plan of care and goals.    Anticipated barriers to physical therapy: patient's previous non-compliance with HEP    Goals:   Short Term Goals: 4 weeks   1. This patient will be independent with a basic HEP.  2. This patient will have lumbar AROM " WNL and pain free in order to use correct body mechanics with ADLs.  3. This patient will increase B UE and LE strength by 1 grade in order to be able to grocery shop with no increase in symptoms.   4. This patient will have a pain rating of 4/10 at worst with ADLs.   5. Patient able to score greater than or equal to 60 on the FOTO Neck placing patient in 40%<60% impaired, limited, or restricted category demonstrating overall decreased neck pain with functional activities  6. Patient able to score greater than or equal to 50 on the FOTO Lumbar Spine placing patient in 40%<60% impaired, limited, or restricted category demonstrating overall decreased low back pain with functional activities.     Long Term Goals: 8 weeks   1. This patient will be independent with an updated HEP.  2. This patient will increase B SLR by 20 degrees in order to use correct body mechanics with lifting light objects from the floor with no increase in symptoms.  3. This patient will increase B UE and LE strength to 5/5 in order to be able to perform her usual household duties with no increase in symptoms.   4. This patient will have a pain rating of 2/10 at worst with ADLs.   5. Patient able to score greater than or equal to 70 on the FOTO Neck placing patient in 20%<40% impaired, limited, or restricted category demonstrating overall decreased neck pain with functional activities  6. Patient able to score greater than or equal to 60 on the FOTO Lumbar Spine placing patient in 40%<60% impaired, limited, or restricted category demonstrating overall decreased low back pain with functional activities.     Plan     Continue with the plan of care and progress as the patient tolerates.     Santo Denson, PTA

## 2018-07-31 ENCOUNTER — CLINICAL SUPPORT (OUTPATIENT)
Dept: REHABILITATION | Facility: HOSPITAL | Age: 68
End: 2018-07-31
Payer: MEDICARE

## 2018-07-31 DIAGNOSIS — M53.86 DECREASED RANGE OF MOTION OF LUMBAR SPINE: ICD-10-CM

## 2018-07-31 DIAGNOSIS — R29.898 WEAKNESS OF BOTH UPPER EXTREMITIES: ICD-10-CM

## 2018-07-31 DIAGNOSIS — M62.9 HAMSTRING TIGHTNESS OF BOTH LOWER EXTREMITIES: ICD-10-CM

## 2018-07-31 PROCEDURE — 97110 THERAPEUTIC EXERCISES: CPT | Mod: PO

## 2018-07-31 NOTE — PROGRESS NOTES
"  Physical Therapy Daily Treatment Note     Name: Talia Dillon  Clinic Number: [id number]    Therapy Diagnosis:   Encounter Diagnoses   Name Primary?    Decreased range of motion of lumbar spine     Weakness of both upper extremities     Hamstring tightness of both lower extremities      Physician: Fransico Gallagher,*    Visit Date: 7/31/2018  Physician Orders: PT Eval and Treat   Medical Diagnosis from Referral: Congenital scoliosis, Osteoarthritis of cervical spine, unspecified spinal osteoarthritis complication status  Evaluation Date: 7/11/2018  Authorization Period Expiration: 06/26/2019  Plan of Care Expiration: 07/1/2018 to 09/11/2018  Visit # / Visits authorized: 6/ 12    Time In: 2:00  Time Out: 2:40  TIMED  Procedure Min.   TE 40                     UNTIMED  Procedure Min.             Total Billable Time: 40 minutes    Precautions: Standard and Fall    Subjective     Pt reports: She was compliant with home exercise program.  Patient reports her pain in her back, legs and neck is a little higher today.  Response to previous treatment: No increase in symptoms after the evaluation.   Functional change: N/A    Pain: 6/10  Location: bilateral back  and neck      Objective     Talia received therapeutic exercises to develop strength, ROM, flexibility, posture and core stabilization for 40 minutes including:    Date 07/31/18 07/26/18 07/24/18 07/17/18 07/12/18   Visit  Ins. Auth. Ends 6/12  09/17/18 5/12  09/17/18 4/12  09/17/18 3/ 2/   Gcode 6/10  08/11/18 5/10  08/11/18 4/10  08/11/18 3/10  08/11/18 2/10  08/11/18   FOTO -- 5/5 4/5 3/5 2/5   Cap Visit  Cap Total 90.96  477.04 60.64  386.08 60.64  325.44 90.96  264.80 90.96  173.84   FTF 08/12/18 08/12/18 08/12/18 08/12/18 08/12/18   POC exp 09/12/18 09/12/18 09/12/18 09/12/18 09/12/18           MHP -- -- -- -- --   MT -- -- -- -- --   Seated:        UT Str. 5 x 5" 5 x 5" 5 x 5" 5 x 5" 5 x 5"   LV Str. 5 x 5" 5 x 5" 5 x 5" 5 x 5" 5 x 5"   Cervical SB 1 x " "15 1 x 15 1 x 10 1 x 10    Chin Tuck 1 x 15 1 x 15 1 x 10 1 x 10 1 x 10   Scapula retractions  15 x 3" 15 x 3" 15 x 3" 10 x 3"    Table:        HS stretch 10 x 10" 10 x 10" 10 x 10" 10 x 10" 10 x 10"   Piriformis stretch 10 x 10" 10 x 10" 10 x 10" 10 x 10" 10 x 10"   TAs 10 x 5" 10 x 5" 10 x 5" 10 x 5" 1 x 10   LTR 2 x 10 2 x 10 2 x 10 1 x 15 1 x 10   Marching 2 x 10 2 x 10 2 x 10 1 x 15 1 x 10   SLR 3 x 10 2 x 10 2 x 10 1 x 10    Hip abduction 2 x 10 2 x 10 2 x 10 1 x 10    Standing:        Pec Str. 5 x 5" 5 x 5" 5 x 5" 5 x 5" --   Lats -- --  -- --   Rows 3 x 10 RTB 2 x 10 RTB 2 x 10 RTB 2 x 10 RTB 1 x 10 RTB   Horizontal Abd 1 x 10 YTB -- -- -- --   Scaption -- -- -- -- --   Wall Slides -- -- -- -- --           Initials GWA 4/6 GWA 3/6 GWA 2/6 GWA 1/6 DG         Home Exercises Provided and Patient Education Provided     Education provided:   - Compliance with HEP    Written Home Exercises Provided: yes.  Exercises were reviewed and Talia was able to demonstrate them prior to the end of the session.  Talia demonstrated good  understanding of the education provided.     See EMR under Patient Instructions for exercises provided 07/17/18    Assessment     Patient required frequent cues to keep her exercises in a pain free range and to maintain her core engagement.  Patient performed above exercise program with verbal cueing for proper technique and tolerated new exercise added along with today's progressions with no increase in symptoms prior to leaving the clinic.   Talia is progressing well towards her goals.   Pt prognosis is Fair.     Pt will continue to benefit from skilled outpatient physical therapy to address the deficits listed in the problem list box on initial evaluation, provide pt/family education and to maximize pt's level of independence in the home and community environment.     Pt's spiritual, cultural and educational needs considered and pt agreeable to plan of care and goals.    Anticipated " barriers to physical therapy: patient's previous non-compliance with HEP    Goals:   Short Term Goals: 4 weeks   1. This patient will be independent with a basic HEP.  2. This patient will have lumbar AROM WNL and pain free in order to use correct body mechanics with ADLs.  3. This patient will increase B UE and LE strength by 1 grade in order to be able to grocery shop with no increase in symptoms.   4. This patient will have a pain rating of 4/10 at worst with ADLs.   5. Patient able to score greater than or equal to 60 on the FOTO Neck placing patient in 40%<60% impaired, limited, or restricted category demonstrating overall decreased neck pain with functional activities  6. Patient able to score greater than or equal to 50 on the FOTO Lumbar Spine placing patient in 40%<60% impaired, limited, or restricted category demonstrating overall decreased low back pain with functional activities.     Long Term Goals: 8 weeks   1. This patient will be independent with an updated HEP.  2. This patient will increase B SLR by 20 degrees in order to use correct body mechanics with lifting light objects from the floor with no increase in symptoms.  3. This patient will increase B UE and LE strength to 5/5 in order to be able to perform her usual household duties with no increase in symptoms.   4. This patient will have a pain rating of 2/10 at worst with ADLs.   5. Patient able to score greater than or equal to 70 on the FOTO Neck placing patient in 20%<40% impaired, limited, or restricted category demonstrating overall decreased neck pain with functional activities  6. Patient able to score greater than or equal to 60 on the FOTO Lumbar Spine placing patient in 40%<60% impaired, limited, or restricted category demonstrating overall decreased low back pain with functional activities.     Plan     Continue with the plan of care and progress as the patient tolerates.     Santo Denson, PTA

## 2018-08-02 ENCOUNTER — CLINICAL SUPPORT (OUTPATIENT)
Dept: SMOKING CESSATION | Facility: CLINIC | Age: 68
End: 2018-08-02
Payer: COMMERCIAL

## 2018-08-02 DIAGNOSIS — F17.200 NICOTINE DEPENDENCE: Primary | ICD-10-CM

## 2018-08-02 PROCEDURE — 99999 PR PBB SHADOW E&M-EST. PATIENT-LVL I: CPT | Mod: PBBFAC,,,

## 2018-08-02 PROCEDURE — 99407 BEHAV CHNG SMOKING > 10 MIN: CPT | Mod: S$GLB,,, | Performed by: GENERAL PRACTICE

## 2018-08-03 ENCOUNTER — SURGERY (OUTPATIENT)
Age: 68
End: 2018-08-03

## 2018-08-03 ENCOUNTER — HOSPITAL ENCOUNTER (OUTPATIENT)
Facility: OTHER | Age: 68
Discharge: HOME | End: 2018-08-03
Attending: ANESTHESIOLOGY | Admitting: ANESTHESIOLOGY
Payer: MEDICARE

## 2018-08-03 VITALS
TEMPERATURE: 98 F | BODY MASS INDEX: 37.56 KG/M2 | SYSTOLIC BLOOD PRESSURE: 154 MMHG | DIASTOLIC BLOOD PRESSURE: 67 MMHG | RESPIRATION RATE: 18 BRPM | WEIGHT: 220 LBS | HEIGHT: 64 IN | OXYGEN SATURATION: 98 % | HEART RATE: 61 BPM

## 2018-08-03 DIAGNOSIS — M51.36 DDD (DEGENERATIVE DISC DISEASE), LUMBAR: Primary | ICD-10-CM

## 2018-08-03 PROBLEM — M51.369 DDD (DEGENERATIVE DISC DISEASE), LUMBAR: Status: ACTIVE | Noted: 2018-08-03

## 2018-08-03 PROCEDURE — 25500020 PHARM REV CODE 255: Performed by: ANESTHESIOLOGY

## 2018-08-03 PROCEDURE — 62323 NJX INTERLAMINAR LMBR/SAC: CPT | Performed by: ANESTHESIOLOGY

## 2018-08-03 PROCEDURE — 25000003 PHARM REV CODE 250: Performed by: ANESTHESIOLOGY

## 2018-08-03 PROCEDURE — 63600175 PHARM REV CODE 636 W HCPCS: Performed by: ANESTHESIOLOGY

## 2018-08-03 PROCEDURE — 62323 NJX INTERLAMINAR LMBR/SAC: CPT | Mod: ,,, | Performed by: ANESTHESIOLOGY

## 2018-08-03 PROCEDURE — 62322 NJX INTERLAMINAR LMBR/SAC: CPT | Performed by: ANESTHESIOLOGY

## 2018-08-03 RX ORDER — METHYLPREDNISOLONE ACETATE 80 MG/ML
INJECTION, SUSPENSION INTRA-ARTICULAR; INTRALESIONAL; INTRAMUSCULAR; SOFT TISSUE
Status: DISCONTINUED | OUTPATIENT
Start: 2018-08-03 | End: 2018-08-03 | Stop reason: HOSPADM

## 2018-08-03 RX ORDER — BUPIVACAINE HYDROCHLORIDE 2.5 MG/ML
INJECTION, SOLUTION EPIDURAL; INFILTRATION; INTRACAUDAL
Status: DISCONTINUED | OUTPATIENT
Start: 2018-08-03 | End: 2018-08-03 | Stop reason: HOSPADM

## 2018-08-03 RX ORDER — ALPRAZOLAM 0.5 MG/1
0.5 TABLET, ORALLY DISINTEGRATING ORAL ONCE
Status: COMPLETED | OUTPATIENT
Start: 2018-08-03 | End: 2018-08-03

## 2018-08-03 RX ADMIN — IOHEXOL 3 ML: 300 INJECTION, SOLUTION INTRAVENOUS at 02:08

## 2018-08-03 RX ADMIN — SODIUM BICARBONATE 5 ML: 0.2 INJECTION, SOLUTION INTRAVENOUS at 02:08

## 2018-08-03 RX ADMIN — ALPRAZOLAM 0.5 MG: 0.5 TABLET, ORALLY DISINTEGRATING ORAL at 01:08

## 2018-08-03 RX ADMIN — METHYLPREDNISOLONE ACETATE 80 MG: 80 INJECTION, SUSPENSION INTRA-ARTICULAR; INTRALESIONAL; INTRAMUSCULAR; SOFT TISSUE at 02:08

## 2018-08-03 RX ADMIN — BUPIVACAINE HYDROCHLORIDE 10 ML: 2.5 INJECTION, SOLUTION EPIDURAL; INFILTRATION; INTRACAUDAL; PERINEURAL at 02:08

## 2018-08-03 NOTE — DISCHARGE INSTRUCTIONS
Thank you for allowing us to care for you today. You may receive a survey about the care we provided. Your feedback is valuable and helps us provide excellent care throughout the community.     Home Care Instructions for Pain Management:    1. DIET:   You may resume your normal diet today.   2. BATHING:   You may shower with luke warm water. No tub baths or anything that will soak injection sites under water for the next 24 hours.  3. DRESSING:   You may remove your bandage today.   4. ACTIVITY LEVEL:   You may resume your normal activities 24 hrs after your procedure. Nothing strenuous today.  5. MEDICATIONS:   You may resume your normal medications today. To restart blood thinners, ask your doctor.  6. DRIVING    If you have received any sedatives by mouth today, you may not drive for 12 hours.    If you have received any sedation through your IV, you may not drive for 24 hrs.   7. SPECIAL INSTRUCTIONS:   No heat to the injection site for 24 hrs including, hot bath or shower, heating pad, moist heat, or hot tubs.    Use ice pack to injection site for any pain or discomfort.  Apply ice packs for 20 minute intervals as needed.    IF you have diabetes, be sure to monitor your blood sugar more closely. IF your injection contained steroids your blood sugar levels may become higher than normal.    If you are still having pain upon discharge:  Your pain may improve over the next 48 hours. The anesthetic (numbing medication) works immediately to 48 hours. IF your injection contained a steroid (anti-inflammatory medication), it takes approximately 3 days to start feeling relief and 7-10 days to see your greatest results from the medication. It is possible you may need subsequent injections. This would be discussed at your follow up appointment with pain management or your referring doctor.      PLEASE CALL YOUR DOCTOR IF:  1. Redness or swelling around the injection site.  2. Fever of 101 degrees or more  3. Drainage  (pus) from the injection site.  4. For any continuous bleeding (some dried blood over the incision is normal.)    FOR EMERGENCIES:   If any unusual problems or difficulties occur during clinic hours, call [phone] or [phone].

## 2018-08-03 NOTE — PLAN OF CARE
"Assisted to stand at bedside; legs buckled, patient c/o being dizzy.  Assisted back to sitting position; /67 P61, 98% sat.  After sitting upright, dizziness resolved.  Requesting to go to bathroom.  Ambulated with max asst to bathroom  "My legs feel funny".  After voiding, patient states "I can't feel my bottom".  Ambulated back to room; instructed not to get up and encouraged to move feet.   made aware.  Will continue to monitor.  "

## 2018-08-03 NOTE — OP NOTE
Date of Procedure: 08/03/2018    Procedure: L4/5 Interlaminar Epidural Steroid Injection    Referring Provider: None    Pre-op diagnosis: Lumbar radiculopathy [M54.16]  DDD (degenerative disc disease), lumbar [M51.36]    Post-op diagnosis: Lumbar radiculopathy [M54.16]  DDD (degenerative disc disease), lumbar [M51.36]    Physician: Dr. Julieth Christopher     Assistant: Dr. Stone    Anesthestia: local    EBL: None    Specimens: None    All medications, allergies, and relevant histories were reviewed. No recent antibiotics or infections.  A time-out was taken to verify the correct patient, procedure, laterality, and appropriate medications/allergies.    Fluoroscopically-Guided, Contrast-Controlled Lumbar Interlaminar Epidural Steroid Injection:     Following denial of allergy and review of potential side effects and complications including but not necessarily limited to infection, allergic reaction, local tissue breakdown, nerve injury, paresis, paralysis, spinal cord injury, and seizure, the patient indicated they understood and agreed to proceed.     Patient was placed in prone position. Lumbar area was prepped and draped in sterile manner. Local Xylocaine 1% was given subcutaneously at the level L4/5. An 18-gauge Tuohy needle was introduced atraumatically in the interspinous space and advanced up to the epidural space using fluoroscopic guidance in the AP position. Loss of resistance to air was used to identify the epidural space using fluoroscopic guidance in the lateral position. Following negative aspiration for blood and CSF and confirming the absence of paresthesias, injection of approximately 1 cc of Omnipaque 300 demonstrated excellent epidural spread without vascular or intrathecal uptake. At this point, 2cc of 0.25% marcaine with 80 mg of Depo-Medrol was injected without complication. The needle was flushed with 1% lidocaine and withdrawn.     Patient tolerated the procedure well. She did experience some  transient dizziness and loss of balance that resolved with hydration.  VSS.  Neuro exam intact.     The patient was followed post procedure and discharged under their own power in excellent condition in the company of a responsible adult.     Future Management:   If helpful, can repeat as needed.    Follow up with my clinic in 3 weeks or sooner if needed    I certify that I provided the above services.  I was present for the entire procedure, which was performed by myself with the assistance of the resident physician.  There were no parts of the procedure that were performed not by myself or without my direct supervision.

## 2018-08-07 ENCOUNTER — CLINICAL SUPPORT (OUTPATIENT)
Dept: REHABILITATION | Facility: HOSPITAL | Age: 68
End: 2018-08-07
Payer: MEDICARE

## 2018-08-07 DIAGNOSIS — M62.9 HAMSTRING TIGHTNESS OF BOTH LOWER EXTREMITIES: ICD-10-CM

## 2018-08-07 DIAGNOSIS — M53.86 DECREASED RANGE OF MOTION OF LUMBAR SPINE: ICD-10-CM

## 2018-08-07 DIAGNOSIS — R29.898 WEAKNESS OF BOTH UPPER EXTREMITIES: ICD-10-CM

## 2018-08-07 PROCEDURE — 97110 THERAPEUTIC EXERCISES: CPT | Mod: PO

## 2018-08-07 NOTE — PROGRESS NOTES
Physical Therapy Daily Treatment Note     Name: Talia Dillon  Clinic Number: [id number]    Therapy Diagnosis:   Encounter Diagnoses   Name Primary?    Decreased range of motion of lumbar spine     Weakness of both upper extremities     Hamstring tightness of both lower extremities      Physician: Fransico Gallagher,*    Visit Date: 8/7/2018  Physician Orders: PT Eval and Treat   Medical Diagnosis from Referral: Congenital scoliosis, Osteoarthritis of cervical spine, unspecified spinal osteoarthritis complication status  Evaluation Date: 7/11/2018  Authorization Period Expiration: 06/26/2019  Plan of Care Expiration: 07/1/2018 to 09/11/2018  Visit # / Visits authorized: 7/ 12    Time In: 2:10  Time Out: 2:50  TIMED  Procedure Min.   TE 40                     UNTIMED  Procedure Min.             Total Billable Time: 40 minutes    Precautions: Standard and Fall    Subjective     Pt reports: She was compliant with home exercise program.  Patient reports she had a back injection on Friday and her pain in her back pain is a little higher today.  Response to previous treatment: No increase in symptoms after the evaluation.   Functional change: N/A    Pain: 7/10  Location: bilateral back  and neck      Objective     Talia received therapeutic exercises to develop strength, ROM, flexibility, posture and core stabilization for 40 minutes including:    Date 08/07/18 07/31/18 07/26/18 07/24/18 07/17/18 07/12/18   Visit  Ins. Auth. Ends 7/12 09/17/18 6/12 09/17/18 5/12 09/17/18 4/12 09/17/18 3/ 2/   Gcode 7/10  08/11/18 6/10  08/11/18 5/10  08/11/18 4/10  08/11/18 3/10  08/11/18 2/10  08/11/18   FOTO -- -- 5/5 4/5 3/5 2/5   Cap Visit  Cap Total 90.96  568.00 90.96  477.04 60.64  386.08 60.64  325.44 90.96  264.80 90.96  173.84   FTF 08/12/18 08/12/18 08/12/18 08/12/18 08/12/18 08/12/18   POC exp 09/12/18 09/12/18 09/12/18 09/12/18 09/12/18 09/12/18            [facility] -- -- -- -- -- --   [place] -- -- -- -- -- --   Seated:        "  UT Str. 5 x 5" 5 x 5" 5 x 5" 5 x 5" 5 x 5" 5 x 5"   LV Str. 5 x 5" 5 x 5" 5 x 5" 5 x 5" 5 x 5" 5 x 5"   Cervical SB 1 x 15 1 x 15 1 x 15 1 x 10 1 x 10    Chin Tuck 1 x 15 1 x 15 1 x 15 1 x 10 1 x 10 1 x 10   Scapula retractions  15 x 3" 15 x 3" 15 x 3" 15 x 3" 10 x 3"    Table:         HS stretch 10 x 10" 10 x 10" 10 x 10" 10 x 10" 10 x 10" 10 x 10"   Piriformis stretch w/strap 10 x 10" 10 x 10" 10 x 10" 10 x 10" 10 x 10" 10 x 10"   TAs 10 x 5" 10 x 5" 10 x 5" 10 x 5" 10 x 5" 1 x 10   LTR 2 x 10 2 x 10 2 x 10 2 x 10 1 x 15 1 x 10   Marching 2 x 10 2 x 10 2 x 10 2 x 10 1 x 15 1 x 10   SLR 3 x 10 3 x 10 2 x 10 2 x 10 1 x 10    Hip abduction 3 x 10 2 x 10 2 x 10 2 x 10 1 x 10    Standing:         Pec Str. 5 x 5" 5 x 5" 5 x 5" 5 x 5" 5 x 5" --   Lats -- -- --  -- --   Rows 3 x 10 RTB 3 x 10 RTB 2 x 10 RTB 2 x 10 RTB 2 x 10 RTB 1 x 10 RTB   Horizontal Abd  1 x 10 YTB -- -- -- --   Scaption -- -- -- -- -- --   Wall Slides -- -- -- -- -- --            Initials GWA 5/6 GWA 4/6 GWA 3/6 GWA 2/6 GWA 1/6 DG       Home Exercises Provided and Patient Education Provided     Education provided:   - Compliance with HEP    Written Home Exercises Provided: yes.  Exercises were reviewed and Talia was able to demonstrate them prior to the end of the session.  Talia demonstrated good  understanding of the education provided.     See EMR under Patient Instructions for exercises provided 07/17/18    Assessment     Patient performed above exercise program with verbal cueing for proper technique, cues to keep her exercises in a pain free range, cues to maintain her core engagement and tolerated today's progressions with no increase in symptoms prior to leaving the clinic.   Talia is progressing well towards her goals.   Pt prognosis is Fair.     Pt will continue to benefit from skilled outpatient physical therapy to address the deficits listed in the problem list box on initial evaluation, provide pt/family education and to maximize pt's " level of independence in the home and community environment.     Pt's spiritual, cultural and educational needs considered and pt agreeable to plan of care and goals.    Anticipated barriers to physical therapy: patient's previous non-compliance with HEP    Goals:   Short Term Goals: 4 weeks   1. This patient will be independent with a basic HEP.  2. This patient will have lumbar AROM WNL and pain free in order to use correct body mechanics with ADLs.  3. This patient will increase B UE and LE strength by 1 grade in order to be able to grocery shop with no increase in symptoms.   4. This patient will have a pain rating of 4/10 at worst with ADLs.   5. Patient able to score greater than or equal to 60 on the FOTO Neck placing patient in 40%<60% impaired, limited, or restricted category demonstrating overall decreased neck pain with functional activities  6. Patient able to score greater than or equal to 50 on the FOTO Lumbar Spine placing patient in 40%<60% impaired, limited, or restricted category demonstrating overall decreased low back pain with functional activities.     Long Term Goals: 8 weeks   1. This patient will be independent with an updated HEP.  2. This patient will increase B SLR by 20 degrees in order to use correct body mechanics with lifting light objects from the floor with no increase in symptoms.  3. This patient will increase B UE and LE strength to 5/5 in order to be able to perform her usual household duties with no increase in symptoms.   4. This patient will have a pain rating of 2/10 at worst with ADLs.   5. Patient able to score greater than or equal to 70 on the FOTO Neck placing patient in 20%<40% impaired, limited, or restricted category demonstrating overall decreased neck pain with functional activities  6. Patient able to score greater than or equal to 60 on the FOTO Lumbar Spine placing patient in 40%<60% impaired, limited, or restricted category demonstrating overall decreased low  back pain with functional activities.     Plan     Continue with the plan of care and progress as the patient tolerates.     Santo Denson, PTA

## 2018-08-09 ENCOUNTER — DOCUMENTATION ONLY (OUTPATIENT)
Dept: REHABILITATION | Facility: HOSPITAL | Age: 68
End: 2018-08-09

## 2018-08-09 ENCOUNTER — CLINICAL SUPPORT (OUTPATIENT)
Dept: REHABILITATION | Facility: HOSPITAL | Age: 68
End: 2018-08-09
Payer: MEDICARE

## 2018-08-09 DIAGNOSIS — R29.898 WEAKNESS OF BOTH UPPER EXTREMITIES: ICD-10-CM

## 2018-08-09 DIAGNOSIS — M53.86 DECREASED RANGE OF MOTION OF LUMBAR SPINE: ICD-10-CM

## 2018-08-09 DIAGNOSIS — M62.9 HAMSTRING TIGHTNESS OF BOTH LOWER EXTREMITIES: ICD-10-CM

## 2018-08-09 PROCEDURE — 97110 THERAPEUTIC EXERCISES: CPT | Mod: PO

## 2018-08-09 NOTE — PROGRESS NOTES
Face to Face PTA Conference performed with Santo Denson, PTA regarding patient's current status, overall progress, and plan of care    Face to Face PTA Conference performed with Angeline Aparicio, PT regarding patient's current status, overall progress, and plan of care    Santo Denson  8/10/2018

## 2018-08-09 NOTE — PROGRESS NOTES
Physical Therapy Daily Treatment Note     Name: Talia Dillon  Clinic Number: [id number]    Therapy Diagnosis:   Encounter Diagnoses   Name Primary?    Decreased range of motion of lumbar spine     Weakness of both upper extremities     Hamstring tightness of both lower extremities      Physician: Fransico Gallagher,*    Visit Date: 8/9/2018  Physician Orders: PT Eval and Treat   Medical Diagnosis from Referral: Congenital scoliosis, Osteoarthritis of cervical spine, unspecified spinal osteoarthritis complication status  Evaluation Date: 7/11/2018  Authorization Period Expiration: 06/26/2019  Plan of Care Expiration: 07/1/2018 to 09/11/2018  Visit # / Visits authorized: 8/ 12    Time In: 2:10  Time Out: 2:57  TIMED  Procedure Min.   TE 47                     UNTIMED  Procedure Min.             Total Billable Time: 47 minutes    Precautions: Standard and Fall    Subjective     Pt reports: She was compliant with home exercise program.  Patient reports overall she feels a bit better, but still as stiff as a board.   Response to previous treatment: No increase in symptoms after the evaluation.   Functional change: N/A    Pain: 7/10  Location: bilateral back  and neck      Objective     Talia received therapeutic exercises to develop strength, ROM, flexibility, posture and core stabilization for 47 minutes including:    Date 08/09/18 08/07/18 07/31/18 07/26/18 07/24/18 07/17/18 07/12/18   Visit  Ins. Auth. Ends 8/12 09/17/18 7/12 09/17/18 6/12 09/17/18 5/12 09/17/18 4/12 09/17/18 3/ 2/   Gcode 8/10  08/11/18 7/10  08/11/18 6/10  08/11/18 5/10  08/11/18 4/10  08/11/18 3/10  08/11/18 2/10  08/11/18   FOTO -- -- -- 5/5 4/5 3/5 2/5   Cap Visit  Cap Total 90.96  658.96 90.96  568.00 90.96  477.04 60.64  386.08 60.64  325.44 90.96  264.80 90.96  173.84   FTF 09/09/18 08/12/18 08/12/18 08/12/18 08/12/18 08/12/18 08/12/18   POC exp 09/12/18 09/12/18 09/12/18 09/12/18 09/12/18 09/12/18 09/12/18             [facility] -- -- --  "-- -- -- --   [place] -- -- -- -- -- -- --   Seated:          UT Str. 5 x 5" 5 x 5" 5 x 5" 5 x 5" 5 x 5" 5 x 5" 5 x 5"   LV Str. 5 x 5" 5 x 5" 5 x 5" 5 x 5" 5 x 5" 5 x 5" 5 x 5"   Cervical SB 1 x 15 1 x 15 1 x 15 1 x 15 1 x 10 1 x 10    Chin Tuck 1 x 15 1 x 15 1 x 15 1 x 15 1 x 10 1 x 10 1 x 10   Scapula retractions  15 x 3" 15 x 3" 15 x 3" 15 x 3" 15 x 3" 10 x 3"    Table:          HS stretch 10 x 10" 10 x 10" 10 x 10" 10 x 10" 10 x 10" 10 x 10" 10 x 10"   Piriformis stretch w/strap 10 x 10" 10 x 10" 10 x 10" 10 x 10" 10 x 10" 10 x 10" 10 x 10"   TAs 10 x 5" 10 x 5" 10 x 5" 10 x 5" 10 x 5" 10 x 5" 1 x 10   LTR 2 x 10 2 x 10 2 x 10 2 x 10 2 x 10 1 x 15 1 x 10   Marching 1 x 25 2 x 10 2 x 10 2 x 10 2 x 10 1 x 15 1 x 10   SLR 2 x 10 x 1# 3 x 10 3 x 10 2 x 10 2 x 10 1 x 10    Hip abduction 3 x 10 3 x 10 2 x 10 2 x 10 2 x 10 1 x 10    Standing:          Pec Str. 5 x 5" 5 x 5" 5 x 5" 5 x 5" 5 x 5" 5 x 5" --   Lats  -- -- --  -- --   Rows 3 x 10 RTB 3 x 10 RTB 3 x 10 RTB 2 x 10 RTB 2 x 10 RTB 2 x 10 RTB 1 x 10 RTB   Horizontal Abd --  1 x 10 YTB -- -- -- --   Scaption -- -- -- -- -- -- --   Wall Slides -- -- -- -- -- -- --             Initials DG GWA 5/6 GWA 4/6 GWA 3/6 GWA 2/6 GWA 1/6 DG       Home Exercises Provided and Patient Education Provided     Education provided:   - Compliance with HEP    Written Home Exercises Provided: yes.  Exercises were reviewed and Talia was able to demonstrate them prior to the end of the session.  Talia demonstrated good  understanding of the education provided.     See EMR under Patient Instructions for exercises provided 07/17/18    Assessment     Patient required occasional cues to maintain her core engagement with all exercises and to keep her exercises in a pain free range. She performed today's progressions with no increase in symptoms prior to leaving the clinic.   Talia is progressing well towards her goals.   Pt prognosis is Fair.     Pt will continue to benefit from skilled " outpatient physical therapy to address the deficits listed in the problem list box on initial evaluation, provide pt/family education and to maximize pt's level of independence in the home and community environment.     Pt's spiritual, cultural and educational needs considered and pt agreeable to plan of care and goals.    Anticipated barriers to physical therapy: patient's previous non-compliance with HEP    Goals:   Short Term Goals: 4 weeks   1. This patient will be independent with a basic HEP.  2. This patient will have lumbar AROM WNL and pain free in order to use correct body mechanics with ADLs.  3. This patient will increase B UE and LE strength by 1 grade in order to be able to grocery shop with no increase in symptoms.   4. This patient will have a pain rating of 4/10 at worst with ADLs.   5. Patient able to score greater than or equal to 60 on the FOTO Neck placing patient in 40%<60% impaired, limited, or restricted category demonstrating overall decreased neck pain with functional activities  6. Patient able to score greater than or equal to 50 on the FOTO Lumbar Spine placing patient in 40%<60% impaired, limited, or restricted category demonstrating overall decreased low back pain with functional activities.     Long Term Goals: 8 weeks   1. This patient will be independent with an updated HEP.  2. This patient will increase B SLR by 20 degrees in order to use correct body mechanics with lifting light objects from the floor with no increase in symptoms.  3. This patient will increase B UE and LE strength to 5/5 in order to be able to perform her usual household duties with no increase in symptoms.   4. This patient will have a pain rating of 2/10 at worst with ADLs.   5. Patient able to score greater than or equal to 70 on the FOTO Neck placing patient in 20%<40% impaired, limited, or restricted category demonstrating overall decreased neck pain with functional activities  6. Patient able to score  greater than or equal to 60 on the FOTO Lumbar Spine placing patient in 40%<60% impaired, limited, or restricted category demonstrating overall decreased low back pain with functional activities.     Plan     Continue with the plan of care and progress as the patient tolerates.     Angeline Aparicio, PT

## 2018-08-14 ENCOUNTER — CLINICAL SUPPORT (OUTPATIENT)
Dept: REHABILITATION | Facility: HOSPITAL | Age: 68
End: 2018-08-14
Payer: MEDICARE

## 2018-08-14 DIAGNOSIS — R29.898 WEAKNESS OF BOTH UPPER EXTREMITIES: ICD-10-CM

## 2018-08-14 DIAGNOSIS — M62.9 HAMSTRING TIGHTNESS OF BOTH LOWER EXTREMITIES: ICD-10-CM

## 2018-08-14 DIAGNOSIS — M53.86 DECREASED RANGE OF MOTION OF LUMBAR SPINE: ICD-10-CM

## 2018-08-14 PROCEDURE — 97110 THERAPEUTIC EXERCISES: CPT | Mod: PO

## 2018-08-14 PROCEDURE — G8979 MOBILITY GOAL STATUS: HCPCS | Mod: CJ,PO

## 2018-08-14 PROCEDURE — G8978 MOBILITY CURRENT STATUS: HCPCS | Mod: CK,PO

## 2018-08-14 NOTE — PROGRESS NOTES
Physical Therapy Daily Treatment Note     Name: Talia Dillon  Clinic Number: [id number]    Therapy Diagnosis:   Encounter Diagnoses   Name Primary?    Decreased range of motion of lumbar spine     Weakness of both upper extremities     Hamstring tightness of both lower extremities      Physician: Fransico Gallagher,*    Visit Date: 8/14/2018  Physician Orders: PT Eval and Treat   Medical Diagnosis from Referral: Congenital scoliosis, Osteoarthritis of cervical spine, unspecified spinal osteoarthritis complication status  Evaluation Date: 7/11/2018  Authorization Period Expiration: 06/26/2019  Plan of Care Expiration: 07/1/2018 to 09/11/2018  Visit # / Visits authorized: 9/ 12    Time In: 2:00  Time Out: 2:55   TIMED  Procedure Min.   TE 40   TE sup 15 NC                 UNTIMED  Procedure Min.             Total Billable Time: 40 minutes    Precautions: Standard and Fall    Subjective     Pt reports: She was compliant with home exercise program.  Patient reports overall she feels a bit better, but still as stiff as a board.   Response to previous treatment: No increase in symptoms after the evaluation.   Functional change: N/A    Pain: 7/10  Location: bilateral back  and neck      Objective     Talia received therapeutic exercises to develop strength, ROM, flexibility, posture and core stabilization for 40 minutes 1:1 with PTA and supervised by PTA x 15 minutes including:    Date 08/14/18 08/09/18 08/07/18 07/31/18 07/26/18 07/24/18 07/17/18 07/12/18   Visit  Ins. Auth. Ends 9/12 09/17/18 8/12 09/17/18 7/12 09/17/18 6/12 09/17/18 5/12 09/17/18 4/12 09/17/18 3/ 2/   Gcode 9/10  09/14/18 8/10  08/11/18 7/10  08/11/18 6/10  08/11/18 5/10  08/11/18 4/10  08/11/18 3/10  08/11/18 2/10  08/11/18   FOTO -- -- -- -- 5/5 4/5 3/5 2/5   Cap Visit  Cap Total 90.96  749.92 90.96  658.96 90.96  568.00 90.96  477.04 60.64  386.08 60.64  325.44 90.96  264.80 90.96  173.84   FTF 09/09/18 09/09/18 08/12/18 08/12/18  "08/12/18 08/12/18 08/12/18 08/12/18   POC exp 09/12/18 09/12/18 09/12/18 09/12/18 09/12/18 09/12/18 09/12/18 09/12/18              [facility] -- -- -- -- -- -- -- --   MT -- -- -- -- -- -- -- --   Seated:           UT Str. 5 x 5" 5 x 5" 5 x 5" 5 x 5" 5 x 5" 5 x 5" 5 x 5" 5 x 5"   LV Str. 5 x 5" 5 x 5" 5 x 5" 5 x 5" 5 x 5" 5 x 5" 5 x 5" 5 x 5"   Cervical SB 1 x 15 1 x 15 1 x 15 1 x 15 1 x 15 1 x 10 1 x 10    Chin Tuck 1 x 15 1 x 15 1 x 15 1 x 15 1 x 15 1 x 10 1 x 10 1 x 10   Scapula retractions  15 x 3" 15 x 3" 15 x 3" 15 x 3" 15 x 3" 15 x 3" 10 x 3"    Table:           HS stretch 10 x 10" 10 x 10" 10 x 10" 10 x 10" 10 x 10" 10 x 10" 10 x 10" 10 x 10"   Piriformis stretch w/strap 10 x 10" 10 x 10" 10 x 10" 10 x 10" 10 x 10" 10 x 10" 10 x 10" 10 x 10"   TAs 10 x 5" 10 x 5" 10 x 5" 10 x 5" 10 x 5" 10 x 5" 10 x 5" 1 x 10   LTR 2 x 10 2 x 10 2 x 10 2 x 10 2 x 10 2 x 10 1 x 15 1 x 10   Marching 1 x 25 1 x 25 2 x 10 2 x 10 2 x 10 2 x 10 1 x 15 1 x 10   SLR 3 x 10 x 1# 2 x 10 x 1# 3 x 10 3 x 10 2 x 10 2 x 10 1 x 10    Hip abduction 2 x 10 x 1# 3 x 10 3 x 10 2 x 10 2 x 10 2 x 10 1 x 10    Standing:           Pec Str. 5 x 5" 5 x 5" 5 x 5" 5 x 5" 5 x 5" 5 x 5" 5 x 5" --   Rows 3 x 10 RTB 3 x 10 RTB 3 x 10 RTB 3 x 10 RTB 2 x 10 RTB 2 x 10 RTB 2 x 10 RTB 1 x 10 RTB   ER 1 x 10 RTB          Horizontal Abd -- --  1 x 10 YTB -- -- -- --   Scaption -- -- -- -- -- -- -- --   Wall Slides -- -- -- -- -- -- -- --              Initials GWA 1/6 DG GWA 5/6 GWA 4/6 GWA 3/6 GWA 2/6 GWA 1/6 DG       Home Exercises Provided and Patient Education Provided     Education provided:   - Compliance with HEP    Written Home Exercises Provided: yes.  Exercises were reviewed and Talia was able to demonstrate them prior to the end of the session.  Talia demonstrated good  understanding of the education provided.     See EMR under Patient Instructions for exercises provided 07/17/18    Assessment     Patient required occasional cues to maintain her core " engagement with all exercises and to keep her exercises in a pain free range.  Patient completed her therapy along with new exercise added and today's progressions with no increase in symptoms prior to leaving the clinic.   Talia is progressing well towards her goals.   Pt prognosis is Fair.     Pt will continue to benefit from skilled outpatient physical therapy to address the deficits listed in the problem list box on initial evaluation, provide pt/family education and to maximize pt's level of independence in the home and community environment.     Pt's spiritual, cultural and educational needs considered and pt agreeable to plan of care and goals.    Anticipated barriers to physical therapy: patient's previous non-compliance with HEP    Goals:   Short Term Goals: 4 weeks   1. This patient will be independent with a basic HEP.  2. This patient will have lumbar AROM WNL and pain free in order to use correct body mechanics with ADLs.  3. This patient will increase B UE and LE strength by 1 grade in order to be able to grocery shop with no increase in symptoms.   4. This patient will have a pain rating of 4/10 at worst with ADLs.   5. Patient able to score greater than or equal to 60 on the FOTO Neck placing patient in 40%<60% impaired, limited, or restricted category demonstrating overall decreased neck pain with functional activities  6. Patient able to score greater than or equal to 50 on the FOTO Lumbar Spine placing patient in 40%<60% impaired, limited, or restricted category demonstrating overall decreased low back pain with functional activities.     Long Term Goals: 8 weeks   1. This patient will be independent with an updated HEP.  2. This patient will increase B SLR by 20 degrees in order to use correct body mechanics with lifting light objects from the floor with no increase in symptoms.  3. This patient will increase B UE and LE strength to 5/5 in order to be able to perform her usual household duties  with no increase in symptoms.   4. This patient will have a pain rating of 2/10 at worst with ADLs.   5. Patient able to score greater than or equal to 70 on the FOTO Neck placing patient in 20%<40% impaired, limited, or restricted category demonstrating overall decreased neck pain with functional activities  6. Patient able to score greater than or equal to 60 on the FOTO Lumbar Spine placing patient in 40%<60% impaired, limited, or restricted category demonstrating overall decreased low back pain with functional activities.     Plan     Continue with the plan of care and progress as the patient tolerates.     Santo Denson, PTA

## 2018-08-15 ENCOUNTER — CLINICAL SUPPORT (OUTPATIENT)
Dept: SMOKING CESSATION | Facility: CLINIC | Age: 68
End: 2018-08-15
Payer: COMMERCIAL

## 2018-08-15 DIAGNOSIS — F17.200 NICOTINE DEPENDENCE: Primary | ICD-10-CM

## 2018-08-15 PROCEDURE — 99999 PR PBB SHADOW E&M-EST. PATIENT-LVL I: CPT | Mod: PBBFAC,,,

## 2018-08-15 PROCEDURE — 90853 GROUP PSYCHOTHERAPY: CPT | Mod: S$GLB,,, | Performed by: GENERAL PRACTICE

## 2018-08-15 NOTE — Clinical Note
Patient states that she remains tobacco free and still has not used any NRT's. Patient states has been tobacco free since 6/1/18, however; has been struggling with urges to smoke. Patient states that she is thinking about smoking a cigarette everyday. Advised patient to start using destractions, remember her triggers, and think about the reasons she should not smoke.

## 2018-08-15 NOTE — PROGRESS NOTES
Site: [facility]  Date:  8/15/2018  Clinical Status of Patient: Outpatient   Length of Service and Code: 60 minutes - 40019   Number in Attendance: 3  Group Activities/Focus of Group: Completion of TCRS (Tobacco Cessation Rating Scale) learned addiction model, cues/triggers, personal reasons for quitting, medications, goals.    Target symptoms:  withdrawal and medication side effects             The following were rated moderate (3) to severe (4) on TCRS:       Moderate 3: Increased Appetite; Anxious, Nervous; Back Pain      Severe 4:  Desire or crave tobacco  Patient's Response to Intervention: Patient states that she remains tobacco free and still has not used any NRT's. Patient states has been tobacco free since 6/1/18, however; has been struggling with urges to smoke. Patient states that she is thinking about smoking a cigarette everyday. Advised patient to start using destractions, remember her triggers, and think about the reasons she should not smoke. CO= 2 ppm.     Progress Toward Goals and Other Mental Status Changes: Patient denies any behavioral or mental status changes.       Diagnosis: Z72.0  Plan: The patient will continue with group therapy sessions and medication regimen prescribed with management by physician or Cessation Clinic Provider. Patient will inform Smoking Clinic Cessation Counselor of symptoms as rated high on TCRS.    Return to Clinic: 2 weeks

## 2018-08-15 NOTE — PROGRESS NOTES
Physical Therapy Daily Treatment Note     Name: Talia Dillon  Clinic Number: [id number]    Therapy Diagnosis:   Encounter Diagnoses   Name Primary?    Decreased range of motion of lumbar spine     Weakness of both upper extremities     Hamstring tightness of both lower extremities      Physician: Fransico Gallagher,*    Visit Date: 8/14/2018  Physician Orders: PT Eval and Treat   Medical Diagnosis from Referral: Congenital scoliosis, Osteoarthritis of cervical spine, unspecified spinal osteoarthritis complication status  Evaluation Date: 7/11/2018  Authorization Period Expiration: 06/26/2019  Plan of Care Expiration: 07/1/2018 to 09/11/2018  Visit # / Visits authorized: 8/ 12    Time In: 2:10  Time Out: 2:57  TIMED  Procedure Min.   TE 47                     UNTIMED  Procedure Min.             Total Billable Time: 47 minutes    Precautions: Standard and Fall    Subjective     Pt reports: She was compliant with home exercise program.  Patient reports overall she feels a bit better, but still as stiff as a board.   Response to previous treatment: No increase in symptoms after the evaluation.   Functional change: N/A    Pain: 7/10  Location: bilateral back  and neck      Objective     Cervical Range of Motion:     Degrees Pain   Flexion 50        Extension 40    Right Rotation 70        Left Rotation 70        Right Side Bending 35     Left Side Bending 35        Upper Extremity Strength  (R) UE   (L) UE     Shoulder flexion: 4/5 Shoulder flexion: 4/5   Shoulder Abduction: 5/5 Shoulder abduction: 5/5   Shoulder ER 4+/5 Shoulder ER 4+/5   Shoulder IR 5/5 Shoulder IR 5/5   Elbow flexion: 5/5 Elbow flexion: 5/5   Elbow extension: 5/5 Elbow extension: 5/5   Lower Trap 3+/5 Lower Trap 3+/5   Middle Trap 3+/5 Middle Trap 3+/5   Rhomboids 3+/5 Rhomboids 3+/5     Lumbar Range of Motion:     Degrees Pain   Flexion 65              Extension 10          Left Side Bending 20           Right Side Bending 15           Lower  Extremity Strength  Right LE   Left LE     Knee extension: 5/5 Knee extension: 5/5   Knee flexion: 5/5 Knee flexion: 5/5   Hip flexion: 5/5 Hip flexion: 5/5   Hip extension:  NT Hip extension: NT   Hip abduction: 4+/5 Hip abduction: 4+/5   Ankle dorsiflexion: 5/5 Ankle dorsiflexion: 5/5   Ankle plantarflexion: 5/5 Ankle plantarflexion: 5/5     SLR 65 R, 55 L    See daily progress note by Santo Denson PTA for additional objective measures.     Home Exercises Provided and Patient Education Provided     Education provided:   - Compliance with HEP    Written Home Exercises Provided: yes.  Exercises were reviewed and Talia was able to demonstrate them prior to the end of the session.  Talia demonstrated good  understanding of the education provided.     See EMR under Patient Instructions for exercises provided 07/17/18    Assessment     Patient is able to demonstrate an increase in pain free ROM of both her cervical and lumbar spine. Her B LE and UE strength has increased but she continues to have complaints of pain with ADLs. Base on her improved ROM and strength her current G code for her lumbar and cervical spine is CK, 40%<60% impaired, limited, or restricted category. See daily progress note by Santo Denson PTA for additional assessments for today.  Talia is progressing well towards her goals.   Pt prognosis is Fair.     Pt will continue to benefit from skilled outpatient physical therapy to address the deficits listed in the problem list box on initial evaluation, provide pt/family education and to maximize pt's level of independence in the home and community environment.     Pt's spiritual, cultural and educational needs considered and pt agreeable to plan of care and goals.    Anticipated barriers to physical therapy: patient's previous non-compliance with HEP    Goals:   Short Term Goals: 4 weeks   1. This patient will be independent with a basic HEP. PARTIALLY MET  2. This patient will have lumbar  AROM WNL and pain free in order to use correct body mechanics with ADLs. MET  3. This patient will increase B UE and LE strength by 1 grade in order to be able to grocery shop with no increase in symptoms. MET  4. This patient will have a pain rating of 4/10 at worst with ADLs. NOT MET  5. Patient able to score greater than or equal to 60 on the FOTO Neck placing patient in 40%<60% impaired, limited, or restricted category demonstrating overall decreased neck pain with functional activities  6. Patient able to score greater than or equal to 50 on the FOTO Lumbar Spine placing patient in 40%<60% impaired, limited, or restricted category demonstrating overall decreased low back pain with functional activities.     Long Term Goals: 8 weeks   1. This patient will be independent with an updated HEP.  2. This patient will increase B SLR by 20 degrees in order to use correct body mechanics with lifting light objects from the floor with no increase in symptoms.  3. This patient will increase B UE and LE strength to 5/5 in order to be able to perform her usual household duties with no increase in symptoms.   4. This patient will have a pain rating of 2/10 at worst with ADLs.   5. Patient able to score greater than or equal to 70 on the FOTO Neck placing patient in 20%<40% impaired, limited, or restricted category demonstrating overall decreased neck pain with functional activities  6. Patient able to score greater than or equal to 60 on the FOTO Lumbar Spine placing patient in 40%<60% impaired, limited, or restricted category demonstrating overall decreased low back pain with functional activities.     Plan     Continue with the plan of care and progress as the patient tolerates.     Angeline Aparicio, PT

## 2018-08-16 ENCOUNTER — CLINICAL SUPPORT (OUTPATIENT)
Dept: SMOKING CESSATION | Facility: CLINIC | Age: 68
End: 2018-08-16

## 2018-08-16 DIAGNOSIS — F17.200 NICOTINE DEPENDENCE: Primary | ICD-10-CM

## 2018-08-16 PROCEDURE — 90853 GROUP PSYCHOTHERAPY: CPT | Mod: S$GLB,,, | Performed by: GENERAL PRACTICE

## 2018-08-16 PROCEDURE — 99999 PR PBB SHADOW E&M-EST. PATIENT-LVL I: CPT | Mod: PBBFAC,,,

## 2018-08-21 ENCOUNTER — CLINICAL SUPPORT (OUTPATIENT)
Dept: REHABILITATION | Facility: HOSPITAL | Age: 68
End: 2018-08-21
Payer: MEDICARE

## 2018-08-21 DIAGNOSIS — M62.9 HAMSTRING TIGHTNESS OF BOTH LOWER EXTREMITIES: ICD-10-CM

## 2018-08-21 DIAGNOSIS — R29.898 WEAKNESS OF BOTH UPPER EXTREMITIES: ICD-10-CM

## 2018-08-21 DIAGNOSIS — M53.86 DECREASED RANGE OF MOTION OF LUMBAR SPINE: ICD-10-CM

## 2018-08-21 PROCEDURE — 97110 THERAPEUTIC EXERCISES: CPT | Mod: PO

## 2018-08-21 NOTE — PROGRESS NOTES
"  Physical Therapy Daily Treatment Note     Name: Talia Dillon  Clinic Number: [id number]    Therapy Diagnosis:   Encounter Diagnoses   Name Primary?    Decreased range of motion of lumbar spine     Weakness of both upper extremities     Hamstring tightness of both lower extremities      Physician: Fransico Gallagher,*    Visit Date: 8/21/2018  Physician Orders: PT Eval and Treat   Medical Diagnosis from Referral: Congenital scoliosis, Osteoarthritis of cervical spine, unspecified spinal osteoarthritis complication status  Evaluation Date: 7/11/2018  Authorization Period Expiration: 06/26/2019  Plan of Care Expiration: 07/1/2018 to 09/11/2018  Visit # / Visits authorized: 10/ 12    Time In: 2:00  Time Out: 2:50  TIMED  Procedure Min.   TE 50                     UNTIMED  Procedure Min.             Total Billable Time: 50 minutes    Precautions: Standard and Fall    Subjective     Pt reports: She was compliant with home exercise program.  Patient reports having a lot of muscle cramps in her right calf, occasionally her left.  Patient reports she "feels stiff as a board".  Response to previous treatment: No increase in symptoms after the evaluation.   Functional change: N/A    Pain: 7/10  Location: bilateral back  and neck      Objective     Talia received therapeutic exercises to develop strength, ROM, flexibility, posture and core stabilization for 50 minutes 1:1 with PTA including:    Date 08/21/18 08/14/18 08/09/18 08/07/18 07/31/18 07/26/18 07/24/18 07/17/18 07/12/18   Visit  Ins. Auth. Ends 10/12  09/17/18 9/12  09/17/18 8/12  09/17/18 7/12  09/17/18 6/12  09/17/18 5/12  09/17/18 4/12  09/17/18 3/ 2/   Gcode 2/10  09/14/18 9/10  09/14/18 8/10  08/11/18 7/10  08/11/18 6/10  08/11/18 5/10  08/11/18 4/10  08/11/18 3/10  08/11/18 2/10  08/11/18   FOTO -- -- -- -- -- 5/5 4/5 3/5 2/5   Cap Visit  Cap Total 90.96  840.88 90.96  749.92 90.96  658.96 90.96  568.00 90.96  477.04 60.64  386.08 60.64  325.44 " "90.96  264.80 90.96  173.84   FTF 09/09/18 09/09/18 09/09/18 08/12/18 08/12/18 08/12/18 08/12/18 08/12/18 08/12/18   POC exp 09/112/18 09/12/18 09/12/18 09/12/18 09/12/18 09/12/18 09/12/18 09/12/18 09/12/18               [facility] -- -- -- -- -- -- -- -- --   MT -- -- -- -- -- -- -- -- --   Seated:            UT Str. 5 x 5" 5 x 5" 5 x 5" 5 x 5" 5 x 5" 5 x 5" 5 x 5" 5 x 5" 5 x 5"   LV Str. 5 x 5" 5 x 5" 5 x 5" 5 x 5" 5 x 5" 5 x 5" 5 x 5" 5 x 5" 5 x 5"   Cervical SB 1 x 15 1 x 15 1 x 15 1 x 15 1 x 15 1 x 15 1 x 10 1 x 10    Chin Tuck 1 x 15 1 x 15 1 x 15 1 x 15 1 x 15 1 x 15 1 x 10 1 x 10 1 x 10   Scapula retractions  15 x 3" 15 x 3" 15 x 3" 15 x 3" 15 x 3" 15 x 3" 15 x 3" 10 x 3"    Table:            HS stretch 10 x 10" 10 x 10" 10 x 10" 10 x 10" 10 x 10" 10 x 10" 10 x 10" 10 x 10" 10 x 10"   Piriformis stretch w/strap 10 x 10" 10 x 10" 10 x 10" 10 x 10" 10 x 10" 10 x 10" 10 x 10" 10 x 10" 10 x 10"   TAs 10 x 5" 10 x 5" 10 x 5" 10 x 5" 10 x 5" 10 x 5" 10 x 5" 10 x 5" 1 x 10   LTR 2 x 10 2 x 10 2 x 10 2 x 10 2 x 10 2 x 10 2 x 10 1 x 15 1 x 10   Marching 1 x 25 1 x 25 1 x 25 2 x 10 2 x 10 2 x 10 2 x 10 1 x 15 1 x 10   SLR 3 x 10 x 1# 3 x 10 x 1# 2 x 10 x 1# 3 x 10 3 x 10 2 x 10 2 x 10 1 x 10    Hip abduction 2 x 10 x 1# 2 x 10 x 1# 3 x 10 3 x 10 2 x 10 2 x 10 2 x 10 1 x 10    Standing:            Pec Str. 5 x 5" 5 x 5" 5 x 5" 5 x 5" 5 x 5" 5 x 5" 5 x 5" 5 x 5" --   Rows 3 x 10 RTB 3 x 10 RTB 3 x 10 RTB 3 x 10 RTB 3 x 10 RTB 2 x 10 RTB 2 x 10 RTB 2 x 10 RTB 1 x 10 RTB   ER 1 x 10 RTB 1 x 10 RTB          Horizontal Abd -- -- --  1 x 10 YTB -- -- -- --   Scaption -- -- -- -- -- -- -- -- --   Wall Slides -- -- -- -- -- -- -- -- --               Initials GWA 2/6 GWA 1/6 DG GWA 5/6 GWA 4/6 GWA 3/6 GWA 2/6 GWA 1/6 DG       Home Exercises Provided and Patient Education Provided     Education provided:   - Compliance with HEP    Written Home Exercises Provided: yes.  Exercises were reviewed and Talia was able to demonstrate them " prior to the end of the session.  Talia demonstrated good  understanding of the education provided.     See EMR under Patient Instructions for exercises provided 07/17/18    Assessment     Patient required occasional cues to maintain her core engagement with all exercises and to keep her exercises in a pain free range.  Patient completed her therapy although no progressions were made due to patient's increased pain level and complaints of feeling extremely stiff.  Talia is progressing well towards her goals.   Pt prognosis is Fair.     Pt will continue to benefit from skilled outpatient physical therapy to address the deficits listed in the problem list box on initial evaluation, provide pt/family education and to maximize pt's level of independence in the home and community environment.     Pt's spiritual, cultural and educational needs considered and pt agreeable to plan of care and goals.    Anticipated barriers to physical therapy: patient's previous non-compliance with HEP    Goals:   Short Term Goals: 4 weeks   1. This patient will be independent with a basic HEP.  2. This patient will have lumbar AROM WNL and pain free in order to use correct body mechanics with ADLs.  3. This patient will increase B UE and LE strength by 1 grade in order to be able to grocery shop with no increase in symptoms.   4. This patient will have a pain rating of 4/10 at worst with ADLs.   5. Patient able to score greater than or equal to 60 on the FOTO Neck placing patient in 40%<60% impaired, limited, or restricted category demonstrating overall decreased neck pain with functional activities  6. Patient able to score greater than or equal to 50 on the FOTO Lumbar Spine placing patient in 40%<60% impaired, limited, or restricted category demonstrating overall decreased low back pain with functional activities.     Long Term Goals: 8 weeks   1. This patient will be independent with an updated HEP.  2. This patient will increase B SLR  by 20 degrees in order to use correct body mechanics with lifting light objects from the floor with no increase in symptoms.  3. This patient will increase B UE and LE strength to 5/5 in order to be able to perform her usual household duties with no increase in symptoms.   4. This patient will have a pain rating of 2/10 at worst with ADLs.   5. Patient able to score greater than or equal to 70 on the FOTO Neck placing patient in 20%<40% impaired, limited, or restricted category demonstrating overall decreased neck pain with functional activities  6. Patient able to score greater than or equal to 60 on the FOTO Lumbar Spine placing patient in 40%<60% impaired, limited, or restricted category demonstrating overall decreased low back pain with functional activities.     Plan     Continue with the plan of care and progress as the patient tolerates.     Santo Denson, PTA

## 2018-08-22 NOTE — PROGRESS NOTES
Smoking Cessation Group Session #4    Site: [facility]  Date:  8/16/2018  Clinical Status of Patient: Outpatient   Length of Service and Code: 90 minutes - 47375   Number in Attendance: 4  Group Activities/Focus of Group:  Completion of TCRS (Tobacco Cessation Rating Scale) reviewed strategies, habitual behavior, stress, and high risk situations. Introduced stress with addition interventions, relaxation with interventions, nutrition, exercise, weight gain, and the importance of rewarding oneself for accomplishments toward becoming tobacco free. Open discussion of all items with interventions.     Specific session focus: Problems/Stress/Weight    Target symptoms:  withdrawal and medication side effects             The following were rated moderate (3) to severe (4) on TCRS:       Moderate 3: Increased Appetite; Anxious, Nervous; Back Pain       Severe 4:  Desire or Crave Tobacco    Patient's Response to Intervention: Patient states that she remains tobacco free and still has not used any NRT's. Patient states she has been tobacco free since 6/1/18, but is still struggling with urges to smoke. Patient states that she thinks about a cigarette everyday. Patient advised to deflect those thoughts and find distractions. CO= 2 ppm.     Progress Toward Goals and Other Mental Status Changes: Patient denies any behavioral or mental status changes.      Diagnosis: Z72.0  Plan: The patient will continue with group therapy sessions and medication regimen prescribed with management by physician or by the Cessation Clinic Provider. Patient will inform Smoking Cessation Counselor of symptoms as rated high on TCRS.    Return to Clinic: 2 weeks    Quit Date: 6/1/18  Planned Quit Date:

## 2018-08-28 ENCOUNTER — CLINICAL SUPPORT (OUTPATIENT)
Dept: REHABILITATION | Facility: HOSPITAL | Age: 68
End: 2018-08-28
Payer: MEDICARE

## 2018-08-28 DIAGNOSIS — M62.9 HAMSTRING TIGHTNESS OF BOTH LOWER EXTREMITIES: ICD-10-CM

## 2018-08-28 DIAGNOSIS — R29.898 WEAKNESS OF BOTH UPPER EXTREMITIES: ICD-10-CM

## 2018-08-28 DIAGNOSIS — M53.86 DECREASED RANGE OF MOTION OF LUMBAR SPINE: ICD-10-CM

## 2018-08-28 PROCEDURE — G8980 MOBILITY D/C STATUS: HCPCS | Mod: CL,PO

## 2018-08-28 PROCEDURE — 97110 THERAPEUTIC EXERCISES: CPT | Mod: PO

## 2018-08-28 PROCEDURE — G8979 MOBILITY GOAL STATUS: HCPCS | Mod: CK,PO

## 2018-08-28 NOTE — PROGRESS NOTES
Physical Therapy Daily Treatment Note     Name: Talia Dillon  Clinic Number: [id number]    Therapy Diagnosis:   Encounter Diagnoses   Name Primary?    Decreased range of motion of lumbar spine     Weakness of both upper extremities     Hamstring tightness of both lower extremities      Physician: Fransico Gallagher,*    Visit Date: 8/28/2018  Physician Orders: PT Eval and Treat   Medical Diagnosis from Referral: Congenital scoliosis, Osteoarthritis of cervical spine, unspecified spinal osteoarthritis complication status  Evaluation Date: 7/11/2018  Authorization Period Expiration: 06/26/2019  Plan of Care Expiration: 07/1/2018 to 09/11/2018  Visit # / Visits authorized: 11/ 12    Time In: 2:00  Time Out: 3:00  TIMED  Procedure Min.   TE 45                     UNTIMED  Procedure Min.             Total Billable Time: 45 minutes    Precautions: Standard and Fall    Subjective     Pt reports: She was compliant with home exercise program.  Patient reports alsways having pain, no problem with her exercises, and she is ready for discharge to [facility].  Response to previous treatment: No increase in symptoms after the evaluation.   Functional change: N/A    Pain: 7/10  Location: bilateral back  and neck      Objective     Talia received therapeutic exercises to develop strength, ROM, flexibility, posture and core stabilization for 45 minutes 1:1 with PT including:    Date 08/28/18 08/21/18 08/14/18 08/09/18 08/07/18 07/31/18 07/26/18 07/24/18 07/17/18 07/12/18   Visit  Ins. Auth. Ends 11/12  09/17/18 10/12  09/17/18 9/12  09/17/18 8/12  09/17/18 7/12  09/17/18 6/12  09/17/18 5/12  09/17/18 4/12  09/17/18 3/ 2/   Gcode 3/10  09/14/18 2/10  09/14/18 9/10  09/14/18 8/10  08/11/18 7/10  08/11/18 6/10  08/11/18 5/10  08/11/18 4/10  08/11/18 3/10  08/11/18 2/10  08/11/18   FOTO -- -- -- -- -- -- 5/5 4/5 3/5 2/5   Cap Visit  Cap Total 90.96  931.84 90.96  840.88 90.96  749.92 90.96  658.96 90.96  568.00 90.96  477.04  "60.64  386.08 60.64  325.44 90.96  264.80 90.96  173.84   FTF 09/09/18 09/09/18 09/09/18 09/09/18 08/12/18 08/12/18 08/12/18 08/12/18 08/12/18 08/12/18   POC exp 09/12/18 09/12/18 09/12/18 09/12/18 09/12/18 09/12/18 09/12/18 09/12/18 09/12/18 09/12/18                MHP -- -- -- -- -- -- -- -- -- --   MT -- -- -- -- -- -- -- -- -- --   Seated:             UT Str. 5 x 5" 5 x 5" 5 x 5" 5 x 5" 5 x 5" 5 x 5" 5 x 5" 5 x 5" 5 x 5" 5 x 5"   LV Str. 5 x 5" 5 x 5" 5 x 5" 5 x 5" 5 x 5" 5 x 5" 5 x 5" 5 x 5" 5 x 5" 5 x 5"   Cervical SB 1 x 15 1 x 15 1 x 15 1 x 15 1 x 15 1 x 15 1 x 15 1 x 10 1 x 10    Chin Tuck 1 x 10 1 x 15 1 x 15 1 x 15 1 x 15 1 x 15 1 x 15 1 x 10 1 x 10 1 x 10   Scapula retractions  15 x 3" 15 x 3" 15 x 3" 15 x 3" 15 x 3" 15 x 3" 15 x 3" 15 x 3" 10 x 3"    Table:             HS stretch 10 x 10" 10 x 10" 10 x 10" 10 x 10" 10 x 10" 10 x 10" 10 x 10" 10 x 10" 10 x 10" 10 x 10"   Piriformis stretch w/strap 10 x 10" 10 x 10" 10 x 10" 10 x 10" 10 x 10" 10 x 10" 10 x 10" 10 x 10" 10 x 10" 10 x 10"   TAs @ home 10 x 5" 10 x 5" 10 x 5" 10 x 5" 10 x 5" 10 x 5" 10 x 5" 10 x 5" 1 x 10   LTR 1 x 10 2 x 10 2 x 10 2 x 10 2 x 10 2 x 10 2 x 10 2 x 10 1 x 15 1 x 10   Marching @ home 1 x 25 1 x 25 1 x 25 2 x 10 2 x 10 2 x 10 2 x 10 1 x 15 1 x 10   SLR @ home 3 x 10 x 1# 3 x 10 x 1# 2 x 10 x 1# 3 x 10 3 x 10 2 x 10 2 x 10 1 x 10    Hip abduction @ home 2 x 10 x 1# 2 x 10 x 1# 3 x 10 3 x 10 2 x 10 2 x 10 2 x 10 1 x 10    Standing:             Pec Str. 5 x 5" 5 x 5" 5 x 5" 5 x 5" 5 x 5" 5 x 5" 5 x 5" 5 x 5" 5 x 5" --   Rows @ home 3 x 10 RTB 3 x 10 RTB 3 x 10 RTB 3 x 10 RTB 3 x 10 RTB 2 x 10 RTB 2 x 10 RTB 2 x 10 RTB 1 x 10 RTB   ER @ home 1 x 10 RTB 1 x 10 RTB          Horizontal Abd -- -- -- --  1 x 10 YTB -- -- -- --   Scaption -- -- -- -- -- -- -- -- -- --   Wall Slides -- -- -- -- -- -- -- -- -- --                Initials DG GWA 2/6 GWA 1/6 DG GWA 5/6 GWA 4/6 GWA 3/6 GWA 2/6 GWA 1/6 DG     Cervical Range of Motion:     " Degrees Pain   Flexion 60        Extension 40    Right Rotation 65        Left Rotation 70        Right Side Bending 40     Left Side Bending 40 uncomfortable UT left      Upper Extremity Strength  (R) UE   (L) UE     Shoulder flexion: 5/5 Shoulder flexion: 5/5   Shoulder Abduction: 5/5 Shoulder abduction: 5/5   Shoulder ER 5/5 Shoulder ER 5/5   Shoulder IR 5/5 Shoulder IR 5/5   Elbow flexion: 5/5 Elbow flexion: 5/5   Elbow extension: 5/5 Elbow extension: 5/5   Lower Trap 4/5 Lower Trap 4/5   Middle Trap 5/5 Middle Trap 5/5   Rhomboids 4/5 Rhomboids 4/5       Lumbar Range of Motion:     Degrees Pain   Flexion 65              Extension 15    Across LS junction      Left Side Bending 20           Right Side Bending 20 Pain L            Lower Extremity Strength  Right LE   Left LE     Knee extension: 5/5 Knee extension: 5/5   Knee flexion: 5/5 Knee flexion: 5/5   Hip flexion: 5/5 Hip flexion: 5/5   Hip extension:  NT Hip extension: NT   Hip abduction: 5/5 Hip abduction: 5/5   Ankle dorsiflexion: 5/5 Ankle dorsiflexion: 5/5   Ankle plantarflexion: 5/5 Ankle plantarflexion: 5/5      FOTO Neck 44, G code CK, 40%<60%  FOTO Lumbar Spine 33, G code CL, 60%<80%    Home Exercises Provided and Patient Education Provided     Education provided:   - Compliance with HEP    Written Home Exercises Provided: yes.  Exercises were reviewed and Talia was able to demonstrate them prior to the end of the session.  Talia demonstrated good  understanding of the education provided.     See EMR under Patient Instructions for exercises provided 07/17/18    Assessment     Patient is able to demonstrate AROM of her lumbar and cervical spine WNL and pain free. Her B UE and LE strength has improved to 5/5, but she continues to have complaints of pain with all ADLs. She is independent with her HEP. Her current score on FOTO Neck places her in the 40%<60% impaired, limited, or restricted category. Her current score on FOTO Lumbar Spine places her in  the 60%<80% impaired, limited, or restricted category. She has met all goals set for her to her max rehab potential at this time. She is ready for discharge to her HEP.  Talia is progressing well towards her goals.   Pt prognosis is Fair.     Pt's spiritual, cultural and educational needs considered and pt agreeable to plan of care and goals.    Anticipated barriers to physical therapy: patient's previous non-compliance with HEP    Goals:   Short Term Goals: 4 weeks   1. This patient will be independent with a basic HEP. MET  2. This patient will have lumbar AROM WNL and pain free in order to use correct body mechanics with ADLs. MET  3. This patient will increase B UE and LE strength by 1 grade in order to be able to grocery shop with no increase in symptoms. MET  4. This patient will have a pain rating of 4/10 at worst with ADLs. NOT MET  5. Patient able to score greater than or equal to 60 on the FOTO Neck placing patient in 40%<60% impaired, limited, or restricted category demonstrating overall decreased neck pain with functional activities. PARTIALLY MET  6. Patient able to score greater than or equal to 50 on the FOTO Lumbar Spine placing patient in 40%<60% impaired, limited, or restricted category demonstrating overall decreased low back pain with functional activities. PARTIALLY MET    Long Term Goals: 8 weeks   1. This patient will be independent with an updated HEP. MET  2. This patient will increase B SLR by 20 degrees in order to use correct body mechanics with lifting light objects from the floor with no increase in symptoms. PARTIALLY MET  3. This patient will increase B UE and LE strength to 5/5 in order to be able to perform her usual household duties with no increase in symptoms. MET  4. This patient will have a pain rating of 2/10 at worst with ADLs. NOT MET  5. Patient able to score greater than or equal to 70 on the FOTO Neck placing patient in 20%<40% impaired, limited, or restricted category  demonstrating overall decreased neck pain with functional activities. NOT MET  6. Patient able to score greater than or equal to 60 on the FOTO Lumbar Spine placing patient in 40%<60% impaired, limited, or restricted category demonstrating overall decreased low back pain with functional activities. NOT MET    Plan     Discharge to [facility]     Angeline Aparicio, PT

## 2018-08-30 ENCOUNTER — OFFICE VISIT (OUTPATIENT)
Dept: PAIN MEDICINE | Facility: CLINIC | Age: 68
End: 2018-08-30
Attending: ANESTHESIOLOGY
Payer: MEDICARE

## 2018-08-30 VITALS
DIASTOLIC BLOOD PRESSURE: 79 MMHG | TEMPERATURE: 98 F | BODY MASS INDEX: 38.24 KG/M2 | HEIGHT: 64 IN | HEART RATE: 65 BPM | WEIGHT: 224 LBS | SYSTOLIC BLOOD PRESSURE: 125 MMHG | RESPIRATION RATE: 18 BRPM

## 2018-08-30 DIAGNOSIS — M51.36 DDD (DEGENERATIVE DISC DISEASE), LUMBAR: ICD-10-CM

## 2018-08-30 DIAGNOSIS — M47.816 LUMBAR SPONDYLOSIS: Primary | ICD-10-CM

## 2018-08-30 DIAGNOSIS — M54.16 LUMBAR RADICULOPATHY: ICD-10-CM

## 2018-08-30 DIAGNOSIS — G60.9 IDIOPATHIC PERIPHERAL NEUROPATHY: ICD-10-CM

## 2018-08-30 DIAGNOSIS — M47.816 LUMBAR SPONDYLOSIS: ICD-10-CM

## 2018-08-30 DIAGNOSIS — M41.26 OTHER IDIOPATHIC SCOLIOSIS, LUMBAR REGION: ICD-10-CM

## 2018-08-30 DIAGNOSIS — G89.4 CHRONIC PAIN DISORDER: Primary | ICD-10-CM

## 2018-08-30 PROCEDURE — 99499 UNLISTED E&M SERVICE: CPT | Mod: S$GLB,,, | Performed by: ANESTHESIOLOGY

## 2018-08-30 PROCEDURE — 99214 OFFICE O/P EST MOD 30 MIN: CPT | Mod: S$GLB,,, | Performed by: ANESTHESIOLOGY

## 2018-08-30 PROCEDURE — 99999 PR PBB SHADOW E&M-EST. PATIENT-LVL III: CPT | Mod: PBBFAC,,, | Performed by: ANESTHESIOLOGY

## 2018-08-30 RX ORDER — DULOXETIN HYDROCHLORIDE 30 MG/1
30 CAPSULE, DELAYED RELEASE ORAL DAILY
Qty: 30 CAPSULE | Refills: 11 | Status: SHIPPED | OUTPATIENT
Start: 2018-08-30 | End: 2018-12-07

## 2018-08-30 NOTE — PATIENT INSTRUCTIONS
Start Cymbalta 30 mg daily for pain.    After taking this for 2 weeks, decrease the sertraline to 100 mg (1 tablet daily).        Medial Branch Neurotomy    Back or neck pain may be due to problems with certain nerves near your spine. If so, a medial branch neurotomy can help relieve your pain.  In some cases, your doctor may give you a nerve block to predict your response to neurotomy. The treatment uses heat, cold, radiofrequency, or chemicals to destroy the nerves near a problem joint. This keeps some pain messages from traveling to your brain, and helps relieve your symptoms.  Medial branch nerves  Each vertebra in your spine has facets (flat surfaces). They touch where the vertebrae fit together. This forms a facet joint. Each facet joint has at least two medial branch nerves. They are part of the nerve pathway to and from each facet joint. A facet joint in your back or neck can become inflamed (swollen and irritated). Pain messages may then travel along the nerve pathway from the facet joint to your brain.  Blocking pain messages  Medial branch nerves in each facet joint send and carry messages about back or neck pain. Destroying a few of these nerves can keep certain pain messages from reaching your brain. This can help bring you relief. The relief typically lasts for months to years.  Risks and complications  Risks and complications are rare, but can include:  · Infection  · Increased pain, numbness, or weakness  · Nerve damage  · Bleeding  · Failure to relieve pain   Date Last Reviewed: 10/11/2015  © 9164-1488 [employer]. [address] [place] [address], [place] [address]. All rights reserved. This information is not intended as a substitute for professional medical care. Always follow your healthcare professional's instructions.

## 2018-08-30 NOTE — PROGRESS NOTES
"Subjective:     Patient ID: Talia Dillon is a 67 y.o. female.    Chief Complaint: Pain    Consulted by: Dr. Gallagher    Disclaimer: This note was generated using voice recognition software.  There may be a typographical errors that were missed during proofreading.      HPI:    Talia Dillon is a 67 y.o. female who presents today with neck, shoulder, low back and leg pain. This pain is described in detail below.    Ms. Dillon presents to pain clinic today complaining of pain in a variety of areas, including her neck, shoulders, low back and leg pain. Her low back and leg pain is the most bothersome. 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 She has had long term PT her whole life because of this. She was recently evaluated by Dr. Gallagher who referred her to PT and pain clinic for management options.    Ms. Dillon states her low back pain is constant with radiation down both legs. She rates her pain as a 7/10 today. The pain is made worse with movement and walking. The pain is a dull ache in her low back, but feels like a sharp burning in her legs "like an electric wing." This pain does not travel in to her feet, but she has numbness and paraesthesias in her feet separately.     She has just recently restarted with physical therapy; she has completed 4 sessions at this time and feels this is starting to help. She feels it is painful but ultimately improving her situation.     Interval History (8/30/2018):  She returns today for follow up.  She reports that PT has been helpful for the pain.  She finished her last in office session yesterday.  She is now going to start her HEP.  She reports that the epidural didn't help. She wishes she could take Aleve as this was very helpful    Physical Therapy: Yes; finished 8/28/2018, doing HEP " (8/20/3018)    Non-pharmacologic Treatment:     · Ice/Heat: Not tried  · TENS: Tried; not helpful  · Massage: Helpful  · Chiropractic care: Yes, not helpful  · Acupuncture: Yes, not helpful  · Other: None         Pain Medications:         · Currently taking:   · Amitriptyline 10 mg; only takes this every now and again due to her kidneys- she doesn't know if she should be taking them or not   · Gabapentin 800 mg in am  · Sertraline 100 mg every day   · OTC topical creams   · Acetaminophen as needed     · Has tried in the past:    · NSAIDs- Aleve and Ibuprofen: had to stop due to CKD   · Baclofen- had to stop due to CKD    · Has not tried: Opioids,  Rx topical creams    Blood thinners: No    Interventional Therapies:  No    Relevant Surgeries:   No    Affecting sleep? Not usually     Affecting daily activities? Yes    Depressive symptoms? Yes          · SI/HI? No    Work status: Retired; used to work as a      Prescription Monitoring Program database:  Reviewed and consistent with medication use as prescribed.    Pain Scales  Best: 4/10  Worst: 9/10  Usually: 8/10  Today: 8/10        Last 3 PDI Scores 8/30/2018 7/23/2018   Pain Disability Index (PDI) 47 40   ]    Review of Systems   Constitution: Negative for chills, decreased appetite, diaphoresis, fever and weakness.   HENT: Negative for congestion, ear discharge and ear pain.    Eyes: Negative for blurred vision, discharge and double vision.   Cardiovascular: Negative for chest pain, claudication and cyanosis.   Respiratory: Negative for cough, hemoptysis and shortness of breath.    Endocrine: Negative for cold intolerance, heat intolerance and polydipsia.   Skin: Negative for color change, dry skin and nail changes.   Musculoskeletal: Positive for arthritis, back pain, myalgias and neck pain.   Gastrointestinal: Negative for bloating, abdominal pain, change in bowel habit and bowel incontinence.   Genitourinary: Negative for bladder incontinence,  decreased libido and dysuria.   Neurological: Positive for disturbances in coordination, numbness and paresthesias.   Psychiatric/Behavioral: Positive for depression. Negative for altered mental status, hallucinations and hypervigilance.             Past Medical History:   Diagnosis Date    Allergy     Anemia     Anxiety     Cancer     skin    Cataract     Depression     Edema     Hypothyroidism     PSVT (paroxysmal supraventricular tachycardia)     Thyroid disease        Past Surgical History:   Procedure Laterality Date    ADENOIDECTOMY      APPENDECTOMY      COLONOSCOPY  2009    repeat in 10 years     EYE SURGERY      HYSTERECTOMY      TONSILLECTOMY         Review of patient's allergies indicates:   Allergen Reactions    Dexamethasone Rash       Current Outpatient Medications   Medication Sig Dispense Refill    ALPRAZolam (XANAX) 0.25 MG tablet Take 1 tablet (0.25 mg total) by mouth once daily. 90 tablet 1    amitriptyline (ELAVIL) 10 MG tablet Take 1 tablet (10 mg total) by mouth nightly as needed. 90 tablet 3    atenolol (TENORMIN) 50 MG tablet TAKE 1 AND 1/2 TALBLET BY MOUTH EVERY  tablet 1    gabapentin (NEURONTIN) 400 MG capsule Take 1 capsule (400 mg total) by mouth 2 (two) times daily. 60 capsule 3    levothyroxine (SYNTHROID) 150 MCG tablet Take 1 tablet (150 mcg total) by mouth every other day. (Patient taking differently: Take 150 mcg by mouth once daily. ) 45 tablet 3    nicotine (NICODERM CQ) 7 mg/24 hr Place 1 patch onto the skin once daily. 21 patch 0    nicotine polacrilex 2 MG Lozg Take 1 lozenge (2 mg total) by mouth as needed. Patient request minis 81 lozenge 0    sertraline (ZOLOFT) 100 MG tablet Take 1.5 tablets (150 mg total) by mouth once daily. 135 tablet 1    zolpidem (AMBIEN) 10 mg Tab Take 1 tablet (10 mg total) by mouth nightly as needed. 30 tablet 2     No current facility-administered medications for this visit.        Family History   Problem Relation  "Age of Onset    Stroke Mother     Stroke Father     Diabetes Father     Sleep apnea Daughter     Mental illness Daughter        Social History     Socioeconomic History    Marital status: Single     Spouse name: Not on file    Number of children: Not on file    Years of education: Not on file    Highest education level: Not on file   Social Needs    Financial resource strain: Not on file    Food insecurity - worry: Not on file    Food insecurity - inability: Not on file    Transportation needs - medical: Not on file    Transportation needs - non-medical: Not on file   Occupational History    Not on file   Tobacco Use    Smoking status: Smoker, Current Status Unknown     Packs/day: 1.00     Years: 50.00     Pack years: 50.00     Types: Cigarettes    Smokeless tobacco: Never Used   Substance and Sexual Activity    Alcohol use: Yes    Drug use: No    Sexual activity: Not Currently   Other Topics Concern    Not on file   Social History Narrative    Not on file       Objective:     Vitals:    08/30/18 1025   BP: 125/79   Pulse: 65   Resp: 18   Temp: 97.7 °F (36.5 °C)   TempSrc: Oral   Weight: 101.6 kg (224 lb)   Height: 5' 4" (1.626 m)   PainSc:   8       GEN:  Well developed, well nourished.  No acute distress.   HEENT:  No trauma.  Mucous membranes moist.  Nares patent bilaterally.  PSYCH: Normal affect. Thought content appropriate.  CHEST:  Breathing symmetric.  No audible wheezing.  ABD: Soft, non-distended.  SKIN:  Warm, pink, dry.  No rash on exposed areas.    EXT:  No cyanosis, clubbing, or edema.  No color change or changes in nail or hair growth.  NEURO/MUSCULOSKELETAL:  Fully alert, oriented, and appropriate. Speech normal bartolome. No cranial nerve deficits.   Gait: Antalgic  negative trendelenburg sign bilaterally.   Motor Strength: 5/5 motor strength throughout lower extremities.   Sensory: decreased 2 point discrimination bilaterally LEs; decreased pin prick bilateral LEs   Reflexes:  " 2+ and symmetric throughout.  Downgoing Babinski's bilaterally.  No clonus or spasticity.  L-Spine:  limited ROM with extension. Positive facet loading bilaterally. Pain with hip flexion against resistance bilaterally   SI Joint/Hip:-  JOANIE bilaterally. - FADIR bilaterally.  No TTP over lumbar paraspinals       Imaging:     The imaging studies listed below were independently reviewed by me, and I agree with the findings as documented below.     MRI Lumbar Spine 06/2018  FINDINGS:  Levoscoliosis of the spine, similar to before.  No abnormal bone marrow signal changes are evident to indicate acute fracture or bone marrow replacement process.  Sub endplate marrow signal changes about the L4-5 disc space, consistent with degenerative type change, mostly similar to before.    Visualized spinal cord and conus medullaris appears unremarkable.  No abnormal intramedullary spinal cord signal change.  No intradural, extramedullary masses are identified.    T11-12: Mild posterior disc bulging, greater to the right of midline, similar to before.    T12-L1:  Mild loss of disc signal indicating some degenerative change.  No significant disc bulge or focal herniation.  Mild loss of disc space height.  No detrimental changes.    L1-2:  Degenerative disc disease change.  Loss of disc signal.  No significant disc bulge or focal herniation.  Mild posterior disc bulging, similar to before.    L2-3: Degenerative disc disease.  Narrowing of the disc space.  Loss of normal disc signal.  Diffuse posterior mild disc bulging, similar to before.  Facet arthropathy changes, appearing greater on the right.    L3-4:  Degenerative disc disease.  Significant narrowing of the disc space.  Posterior marginal osteophytes.  Diffuse posterior disc bulging.  Bilateral foraminal narrowing right greater than left.  Facet arthropathy changes right greater than left.  Mild narrowing of the thecal sac as a result of the chronic findings, similar to  before.    L4-5:  Degenerative disc disease.  Significant narrowing of the disc space.  Posterior osteophytes.  Diffuse posterior disc bulging, appearing greater to the left of midline.  Indication 0 some bilateral facet arthropathy change.  Bilateral bone foraminal narrowing, greater on the left.  Findings appear similar to before.    L5-S1:  Degenerative disc disease.  No significant disc bulge or focal herniation.  Mild posterior disc bulging present.  Facet arthropathy changes left greater than right.    X ray Scoliosis Complete 06/2018  There is scoliosis of the thoracolumbar spine, which is convex to the left with the apex centered at the L1 level.  The Sequeira angle measures approximately 26.2°.  All visualized thoracic and lumbar vertebral bodies normal in height.  Low-to-moderate grade degenerative changes scattered throughout the mid and inferior thoracic spine with small scattered marginal osteophytes.  Severe degenerative change of the lumbar spine identified at the L2-3, L3-4 and L4-5 levels with large marginal osteophytes.  Paravertebral soft tissues are normal.    MRI Cervical Spine 2017  Reversal of the normal cervical lordosis.  Small spinal canal on a congenital basis.  No definite abnormal signal in the cord.  The craniocervical junction is normal.    C2-3: Minimal disc bulge.  Facet hypertrophy on the left.    C3-4: Broad-based osteophyte and disc bulge.  Uncovertebral joint disease with moderate to severe left-sided neural foraminal narrowing.  Decreased CSF spaces anterior and posterior to the cord.    C4-5: Marked disc space narrowing.  Broad-based osteophyte and disc bulge.  Decreased CSF spaces anterior and posterior to the cord.  Uncovertebral joint disease with mild neuroforaminal narrowing.    C5-6: Disc space narrowing.  Broad-based osteophyte and disc bulge.  Asymmetric disc protrusion paramedian to the left side.  Uncovertebral joint disease and degenerative changes of the facets with  moderate to severe bilateral neural foraminal narrowing.  No signal abnormality in the cord.    C6-7: Disc space narrowing.  Broad-based osteophyte and disc bulge.  Bilateral uncovertebral joint disease and degenerative changes of the facets.  Moderate central spinal stenosis.    C7-T1: Minimal disc bulge.  Uncovertebral joint disease.    Assessment:     Encounter Diagnoses   Name Primary?    Chronic pain disorder Yes    Lumbar spondylosis     DDD (degenerative disc disease), lumbar     Other idiopathic scoliosis, lumbar region     Lumbar radiculopathy     Idiopathic peripheral neuropathy        Plan:     Talia was seen today for follow-up.    Diagnoses and all orders for this visit:    Chronic pain disorder  -     DULoxetine (CYMBALTA) 30 MG capsule; Take 1 capsule (30 mg total) by mouth once daily.    Lumbar spondylosis  -     DULoxetine (CYMBALTA) 30 MG capsule; Take 1 capsule (30 mg total) by mouth once daily.    DDD (degenerative disc disease), lumbar    Other idiopathic scoliosis, lumbar region    Lumbar radiculopathy    Idiopathic peripheral neuropathy        Her pain is consistent with the above.    We discussed the assessment and recommendations.  All available images were reviewed. We discussed the disease process, prognosis, treatment plan, and risks and benefits. The patient is aware of the risks and benefits of the medications being prescribed, common side effects, and proper usage. The following is the plan we agreed on:     1. Start Cymbalta 30 mg daily.  Decrease sertraline to 100 mg daily.  We will plan to continue to further decrease sertraline based on response.  Her kidney function is borderline of where we would no longer be recommended to use these, so we will monitor this closely.  BMP at next visit.  2. Continue gabapentin at currently dose given Cr clearance   3. Begin taking amitriptyline regularly, 10 mg nightly, and can titrate up as needed  4. Compounding cream for topical use on  hands and feet  5. Continue physical therapy HEP  6. I gave her information on medial branch RFA.  We scheduled the Bilateral L2-5 MBB x 2 with office visit after.  7. Pending response to above, may be a candidate for FRP in future.  She is interested, but would like to try her HEP first.  8. RTC in after MBB to assess benefit or sooner if needed.  Will plan to taper sertraline to off at that visit (50 mg for 1 week then 50 mg QOD for one week then stop).      Julieth Christopher MD  08/30/2018     The above plan and management options were discussed at length with patient. Patient is in agreement with the above and verbalized understanding. It will be communicated with the referring physician via electronic record, fax, or mail.

## 2018-09-02 PROBLEM — M62.9 HAMSTRING TIGHTNESS OF BOTH LOWER EXTREMITIES: Status: RESOLVED | Noted: 2018-07-15 | Resolved: 2018-09-02

## 2018-09-02 PROBLEM — M53.86 DECREASED RANGE OF MOTION OF LUMBAR SPINE: Status: RESOLVED | Noted: 2018-07-15 | Resolved: 2018-09-02

## 2018-09-02 PROBLEM — R29.898 WEAKNESS OF BOTH UPPER EXTREMITIES: Status: RESOLVED | Noted: 2018-07-15 | Resolved: 2018-09-02

## 2018-09-02 NOTE — PLAN OF CARE
Outpatient Therapy Discharge Summary     Name: Talia Dillon  Clinic Number: [id number]    Therapy Diagnosis:   Encounter Diagnoses   Name Primary?    Decreased range of motion of lumbar spine     Weakness of both upper extremities     Hamstring tightness of both lower extremities      Physician: Fransico Gallagher,*    Physician Orders: PT Eval and Treat   Medical Diagnosis from Referral: Congenital scoliosis, Osteoarthritis of cervical spine, unspecified spinal osteoarthritis complication status  Evaluation Date: 7/11/2018    Date of Last visit: 08/28/18  Total Visits Received: 11  Cancelled Visits: 6  No Show Visits: 0    Assessment    Patient is able to demonstrate AROM of her lumbar and cervical spine WNL and pain free. Her B UE and LE strength has improved to 5/5, but she continues to have complaints of pain with all ADLs. She is independent with her HEP. Her current score on FOTO Neck places her in the 40%<60% impaired, limited, or restricted category. Her current score on FOTO Lumbar Spine places her in the 60%<80% impaired, limited, or restricted category. She has met all goals set for her to her max rehab potential at this time. She is ready for discharge to her HEP.  Goals: Short Term Goals: 4 weeks   1. This patient will be independent with a basic HEP. MET  2. This patient will have lumbar AROM WNL and pain free in order to use correct body mechanics with ADLs. MET  3. This patient will increase B UE and LE strength by 1 grade in order to be able to grocery shop with no increase in symptoms. MET  4. This patient will have a pain rating of 4/10 at worst with ADLs. NOT MET  5. Patient able to score greater than or equal to 60 on the FOTO Neck placing patient in 40%<60% impaired, limited, or restricted category demonstrating overall decreased neck pain with functional activities. PARTIALLY MET  6. Patient able to score greater than or equal to 50 on the FOTO Lumbar Spine placing patient in 40%<60%  impaired, limited, or restricted category demonstrating overall decreased low back pain with functional activities. PARTIALLY MET  Long Term Goals: 8 weeks   1. This patient will be independent with an updated HEP. MET  2. This patient will increase B SLR by 20 degrees in order to use correct body mechanics with lifting light objects from the floor with no increase in symptoms. PARTIALLY MET  3. This patient will increase B UE and LE strength to 5/5 in order to be able to perform her usual household duties with no increase in symptoms. MET  4. This patient will have a pain rating of 2/10 at worst with ADLs. NOT MET  5. Patient able to score greater than or equal to 70 on the FOTO Neck placing patient in 20%<40% impaired, limited, or restricted category demonstrating overall decreased neck pain with functional activities. NOT MET  6. Patient able to score greater than or equal to 60 on the FOTO Lumbar Spine placing patient in 40%<60% impaired, limited, or restricted category demonstrating overall decreased low back pain with functional activities. NOT MET    Discharge reason: Patient has reached the maximum rehab potential for the present time    Plan   This patient is discharged from Physical Therapy

## 2018-09-07 ENCOUNTER — OFFICE VISIT (OUTPATIENT)
Dept: FAMILY MEDICINE | Facility: CLINIC | Age: 68
End: 2018-09-07
Payer: MEDICARE

## 2018-09-07 VITALS
TEMPERATURE: 98 F | HEIGHT: 64 IN | DIASTOLIC BLOOD PRESSURE: 72 MMHG | BODY MASS INDEX: 38.41 KG/M2 | OXYGEN SATURATION: 98 % | SYSTOLIC BLOOD PRESSURE: 122 MMHG | HEART RATE: 70 BPM | WEIGHT: 225 LBS

## 2018-09-07 DIAGNOSIS — F41.9 ANXIETY: ICD-10-CM

## 2018-09-07 DIAGNOSIS — Z86.79 HISTORY OF PSVT (PAROXYSMAL SUPRAVENTRICULAR TACHYCARDIA): ICD-10-CM

## 2018-09-07 DIAGNOSIS — M51.36 DDD (DEGENERATIVE DISC DISEASE), LUMBAR: Primary | ICD-10-CM

## 2018-09-07 DIAGNOSIS — M48.061 FORAMINAL STENOSIS OF LUMBAR REGION: ICD-10-CM

## 2018-09-07 DIAGNOSIS — G47.00 INSOMNIA, UNSPECIFIED TYPE: ICD-10-CM

## 2018-09-07 DIAGNOSIS — N18.9 CHRONIC KIDNEY DISEASE: ICD-10-CM

## 2018-09-07 DIAGNOSIS — R26.89 DECREASED FUNCTIONAL MOBILITY: ICD-10-CM

## 2018-09-07 DIAGNOSIS — Q66.90: ICD-10-CM

## 2018-09-07 DIAGNOSIS — R60.0 LOCALIZED EDEMA: ICD-10-CM

## 2018-09-07 DIAGNOSIS — M54.12 CERVICAL NEURALGIA: ICD-10-CM

## 2018-09-07 DIAGNOSIS — M48.02 CERVICAL SPINAL STENOSIS: ICD-10-CM

## 2018-09-07 DIAGNOSIS — N18.30 CKD (CHRONIC KIDNEY DISEASE) STAGE 3, GFR 30-59 ML/MIN: Chronic | ICD-10-CM

## 2018-09-07 DIAGNOSIS — Z86.39 HISTORY OF HYPERLIPIDEMIA: ICD-10-CM

## 2018-09-07 PROBLEM — Q89.9 CONGENITAL BIRTH DEFECT: Status: RESOLVED | Noted: 2018-03-06 | Resolved: 2018-09-07

## 2018-09-07 PROBLEM — R79.89 AZOTEMIA: Status: RESOLVED | Noted: 2017-12-28 | Resolved: 2018-09-07

## 2018-09-07 PROBLEM — N17.9 AKI (ACUTE KIDNEY INJURY): Status: RESOLVED | Noted: 2018-05-02 | Resolved: 2018-09-07

## 2018-09-07 PROBLEM — Z72.0 TOBACCO ABUSE: Status: RESOLVED | Noted: 2018-06-13 | Resolved: 2018-09-07

## 2018-09-07 PROCEDURE — 99499 UNLISTED E&M SERVICE: CPT | Mod: S$GLB,,, | Performed by: FAMILY MEDICINE

## 2018-09-07 PROCEDURE — 1101F PT FALLS ASSESS-DOCD LE1/YR: CPT | Mod: CPTII,S$GLB,, | Performed by: FAMILY MEDICINE

## 2018-09-07 PROCEDURE — 99214 OFFICE O/P EST MOD 30 MIN: CPT | Mod: S$GLB,,, | Performed by: FAMILY MEDICINE

## 2018-09-07 RX ORDER — ZOLPIDEM TARTRATE 10 MG/1
10 TABLET ORAL NIGHTLY PRN
Qty: 30 TABLET | Refills: 5 | Status: SHIPPED | OUTPATIENT
Start: 2018-09-07 | End: 2019-06-06 | Stop reason: SDUPTHER

## 2018-09-07 RX ORDER — ALPRAZOLAM 0.25 MG/1
0.25 TABLET ORAL DAILY
Qty: 90 TABLET | Refills: 1 | Status: SHIPPED | OUTPATIENT
Start: 2018-09-07 | End: 2019-06-06 | Stop reason: SDUPTHER

## 2018-09-07 RX ORDER — LEVOTHYROXINE SODIUM 150 UG/1
150 TABLET ORAL DAILY
Start: 2018-09-07 | End: 2018-09-07 | Stop reason: SDUPTHER

## 2018-09-07 RX ORDER — SERTRALINE HYDROCHLORIDE 100 MG/1
100 TABLET, FILM COATED ORAL DAILY
COMMUNITY
End: 2018-09-07 | Stop reason: SDUPTHER

## 2018-09-07 RX ORDER — LEVOTHYROXINE SODIUM 150 UG/1
150 TABLET ORAL DAILY
Start: 2018-09-07 | End: 2018-09-15 | Stop reason: SDUPTHER

## 2018-09-07 RX ORDER — AMITRIPTYLINE HYDROCHLORIDE 10 MG/1
10 TABLET, FILM COATED ORAL NIGHTLY PRN
Qty: 90 TABLET | Refills: 3 | Status: SHIPPED | OUTPATIENT
Start: 2018-09-07 | End: 2019-02-28

## 2018-09-07 RX ORDER — GABAPENTIN 400 MG/1
400 CAPSULE ORAL 2 TIMES DAILY
Qty: 60 CAPSULE | Refills: 3 | Status: SHIPPED | OUTPATIENT
Start: 2018-09-07 | End: 2019-05-30 | Stop reason: SDUPTHER

## 2018-09-07 RX ORDER — SERTRALINE HYDROCHLORIDE 100 MG/1
100 TABLET, FILM COATED ORAL DAILY
Qty: 90 TABLET | Refills: 3 | Status: SHIPPED | OUTPATIENT
Start: 2018-09-07 | End: 2018-12-07

## 2018-09-07 RX ORDER — ATENOLOL 50 MG/1
TABLET ORAL
Qty: 135 TABLET | Refills: 1 | Status: SHIPPED | OUTPATIENT
Start: 2018-09-07 | End: 2019-01-31 | Stop reason: ALTCHOICE

## 2018-09-07 NOTE — PROGRESS NOTES
Subjective:      Patient ID: Talia Dillon is a 67 y.o. female.    Chief Complaint: medication review      HPI   Follow up from previous visit; went to nephrologist and was admitted  Prior to last  for the diarrhea; no reason found; diarrhea resolved  Saw podiatrist  Saw neprhologist  Saw neurosurgeon, sent to PT and pain management;  Finished PT  Had spine injections, no help for the pain, but was painful procedure  Getting 2 tests to see about getting a nerve block    Off diuretics, last Creat 1.7  Off estrogen  Off armour thyroid  Pain management added cymbalta and decreased zoloft to 100 mg one a day  Declines mammogram, no family history;    Review of Systems   Constitutional: Negative.    HENT: Negative.    Respiratory: Negative.    Cardiovascular: Positive for leg swelling.   Gastrointestinal: Negative.    Endocrine: Negative.    Genitourinary: Negative.    Musculoskeletal: Positive for arthralgias, back pain, gait problem, joint swelling, neck pain and neck stiffness.   Neurological: Positive for numbness.   Psychiatric/Behavioral: Positive for dysphoric mood and sleep disturbance. The patient is nervous/anxious.    All other systems reviewed and are negative.           Objective:     Physical Exam   Constitutional: She is oriented to person, place, and time. She appears well-developed and well-nourished.   HENT:   Head: Normocephalic.   Eyes: Conjunctivae and EOM are normal. Pupils are equal, round, and reactive to light.   Neck: Normal range of motion. Neck supple.   Cardiovascular: Normal rate and regular rhythm.   Murmur heard.  Pulmonary/Chest: Effort normal and breath sounds normal.   Musculoskeletal: Normal range of motion.   Neurological: She is alert and oriented to person, place, and time. She has normal reflexes.   Skin: Skin is warm and dry.   Psychiatric: She has a normal mood and affect. Her behavior is normal. Judgment and thought content normal.   Nursing note and vitals  reviewed.    Assessment:     1. DDD (degenerative disc disease), lumbar    2. Cervical spinal stenosis    3. Cervical neuralgia    4. Anxiety    5. History of PSVT (paroxysmal supraventricular tachycardia)    6. CKD (chronic kidney disease) stage 3, GFR 30-59 ml/min    7. Congenital deformities of feet    8. Foraminal stenosis of lumbar region    9. Localized edema    10. Insomnia, unspecified type    11. Decreased functional mobility    12. History of hyperlipidemia    13. Chronic kidney disease       Plan:   This SmartLink is deprecated. Use [employer] instead to display the medication list for a patient.  This SmartLink is deprecated. Use [employer] instead to display the medication list for a patient.  This SmartLink is deprecated. Use [employer] instead to display the medication list for a patient.    DDD (degenerative disc disease), lumbar  -     TSH; Future  -     Lipid panel; Future  -     CBC auto differential; Future; Expected date: 09/07/2018  -     Comprehensive metabolic panel; Future; Expected date: 09/07/2018  -     Vitamin D; Future  -     Sedimentation rate; Future  -     C-reactive protein; Future; Expected date: 09/07/2018    Cervical spinal stenosis  -     TSH; Future  -     Lipid panel; Future  -     CBC auto differential; Future; Expected date: 09/07/2018  -     Comprehensive metabolic panel; Future; Expected date: 09/07/2018  -     Vitamin D; Future  -     Sedimentation rate; Future  -     C-reactive protein; Future; Expected date: 09/07/2018    Cervical neuralgia  -     TSH; Future  -     Lipid panel; Future  -     CBC auto differential; Future; Expected date: 09/07/2018  -     Comprehensive metabolic panel; Future; Expected date: 09/07/2018  -     Vitamin D; Future  -     Sedimentation rate; Future  -     C-reactive protein; Future; Expected date: 09/07/2018    Anxiety  -     TSH; Future  -     Lipid panel; Future  -     CBC auto differential; Future; Expected date: 09/07/2018  -      Comprehensive metabolic panel; Future; Expected date: 09/07/2018  -     Vitamin D; Future  -     Sedimentation rate; Future  -     C-reactive protein; Future; Expected date: 09/07/2018  -     ALPRAZolam (XANAX) 0.25 MG tablet; Take 1 tablet (0.25 mg total) by mouth once daily.  Dispense: 90 tablet; Refill: 1  -     zolpidem (AMBIEN) 10 mg Tab; Take 1 tablet (10 mg total) by mouth nightly as needed.  Dispense: 30 tablet; Refill: 5    History of PSVT (paroxysmal supraventricular tachycardia)  -     TSH; Future  -     Lipid panel; Future  -     CBC auto differential; Future; Expected date: 09/07/2018  -     Comprehensive metabolic panel; Future; Expected date: 09/07/2018  -     Vitamin D; Future  -     Sedimentation rate; Future  -     C-reactive protein; Future; Expected date: 09/07/2018    CKD (chronic kidney disease) stage 3, GFR 30-59 ml/min  -     TSH; Future  -     Lipid panel; Future  -     CBC auto differential; Future; Expected date: 09/07/2018  -     Comprehensive metabolic panel; Future; Expected date: 09/07/2018  -     Vitamin D; Future  -     Sedimentation rate; Future  -     C-reactive protein; Future; Expected date: 09/07/2018    Congenital deformities of feet  -     TSH; Future  -     Lipid panel; Future  -     CBC auto differential; Future; Expected date: 09/07/2018  -     Comprehensive metabolic panel; Future; Expected date: 09/07/2018  -     Vitamin D; Future  -     Sedimentation rate; Future  -     C-reactive protein; Future; Expected date: 09/07/2018    Foraminal stenosis of lumbar region  -     TSH; Future  -     Lipid panel; Future  -     CBC auto differential; Future; Expected date: 09/07/2018  -     Comprehensive metabolic panel; Future; Expected date: 09/07/2018  -     Vitamin D; Future  -     Sedimentation rate; Future  -     C-reactive protein; Future; Expected date: 09/07/2018    Localized edema  -     TSH; Future  -     Lipid panel; Future  -     CBC auto differential; Future; Expected  date: 09/07/2018  -     Comprehensive metabolic panel; Future; Expected date: 09/07/2018  -     Vitamin D; Future  -     Sedimentation rate; Future  -     C-reactive protein; Future; Expected date: 09/07/2018    Insomnia, unspecified type  -     TSH; Future  -     Lipid panel; Future  -     CBC auto differential; Future; Expected date: 09/07/2018  -     Comprehensive metabolic panel; Future; Expected date: 09/07/2018  -     Vitamin D; Future  -     Sedimentation rate; Future  -     C-reactive protein; Future; Expected date: 09/07/2018    Decreased functional mobility  -     TSH; Future  -     Lipid panel; Future  -     CBC auto differential; Future; Expected date: 09/07/2018  -     Comprehensive metabolic panel; Future; Expected date: 09/07/2018  -     Vitamin D; Future  -     Sedimentation rate; Future  -     C-reactive protein; Future; Expected date: 09/07/2018    History of hyperlipidemia  -     TSH; Future  -     Lipid panel; Future  -     CBC auto differential; Future; Expected date: 09/07/2018  -     Comprehensive metabolic panel; Future; Expected date: 09/07/2018  -     Vitamin D; Future  -     Sedimentation rate; Future  -     C-reactive protein; Future; Expected date: 09/07/2018    Chronic kidney disease   -     Lipid panel; Future    Other orders  -     Discontinue: levothyroxine (SYNTHROID) 150 MCG tablet; Take 1 tablet (150 mcg total) by mouth once daily.  -     amitriptyline (ELAVIL) 10 MG tablet; Take 1 tablet (10 mg total) by mouth nightly as needed.  Dispense: 90 tablet; Refill: 3  -     atenolol (TENORMIN) 50 MG tablet; TAKE 1 AND 1/2 TALBLET BY MOUTH EVERY DAY  Dispense: 135 tablet; Refill: 1  -     gabapentin (NEURONTIN) 400 MG capsule; Take 1 capsule (400 mg total) by mouth 2 (two) times daily.  Dispense: 60 capsule; Refill: 3  -     levothyroxine (SYNTHROID) 150 MCG tablet; Take 1 tablet (150 mcg total) by mouth once daily.  -     sertraline (ZOLOFT) 100 MG tablet; Take 1 tablet (100 mg total)  by mouth once daily.  Dispense: 90 tablet; Refill: 3

## 2018-09-10 ENCOUNTER — TELEPHONE (OUTPATIENT)
Dept: PAIN MEDICINE | Facility: CLINIC | Age: 68
End: 2018-09-10

## 2018-09-10 NOTE — PROGRESS NOTES
Patient, Talia Dillon (MRN #[id number]), presented with a recent Platelet count less than 150 K/uL consistent with the definition of thrombocytopenia (ICD10 - D69.6).    Platelets   Date Value Ref Range Status   06/04/2018 144 (L) 150 - 350 K/uL Final     The patient's thrombocytopenia was monitored, evaluated, addressed and/or treated. This addendum to the medical record is made on 09/10/2018.

## 2018-09-10 NOTE — TELEPHONE ENCOUNTER
Staff received call from tamra with pre service, she informed us that the procedure for 09/28/2018 will not approved until the results from the procedure 09/21/2018 is documented.     She is asking if the procedure on 09/28/2018 can be cancelled.

## 2018-09-12 ENCOUNTER — LAB VISIT (OUTPATIENT)
Dept: LAB | Facility: HOSPITAL | Age: 68
End: 2018-09-12
Attending: FAMILY MEDICINE
Payer: MEDICARE

## 2018-09-12 ENCOUNTER — CLINICAL SUPPORT (OUTPATIENT)
Dept: SMOKING CESSATION | Facility: CLINIC | Age: 68
End: 2018-09-12
Payer: COMMERCIAL

## 2018-09-12 DIAGNOSIS — N18.9 CHRONIC KIDNEY DISEASE: ICD-10-CM

## 2018-09-12 DIAGNOSIS — M51.36 DDD (DEGENERATIVE DISC DISEASE), LUMBAR: ICD-10-CM

## 2018-09-12 DIAGNOSIS — G47.00 INSOMNIA, UNSPECIFIED TYPE: ICD-10-CM

## 2018-09-12 DIAGNOSIS — Z86.39 HISTORY OF HYPERLIPIDEMIA: ICD-10-CM

## 2018-09-12 DIAGNOSIS — F41.9 ANXIETY: ICD-10-CM

## 2018-09-12 DIAGNOSIS — N18.30 CKD (CHRONIC KIDNEY DISEASE) STAGE 3, GFR 30-59 ML/MIN: Chronic | ICD-10-CM

## 2018-09-12 DIAGNOSIS — M54.12 CERVICAL NEURALGIA: ICD-10-CM

## 2018-09-12 DIAGNOSIS — R26.89 DECREASED FUNCTIONAL MOBILITY: ICD-10-CM

## 2018-09-12 DIAGNOSIS — M48.02 CERVICAL SPINAL STENOSIS: ICD-10-CM

## 2018-09-12 DIAGNOSIS — R60.0 LOCALIZED EDEMA: ICD-10-CM

## 2018-09-12 DIAGNOSIS — Z86.79 HISTORY OF PSVT (PAROXYSMAL SUPRAVENTRICULAR TACHYCARDIA): ICD-10-CM

## 2018-09-12 DIAGNOSIS — M48.061 FORAMINAL STENOSIS OF LUMBAR REGION: ICD-10-CM

## 2018-09-12 DIAGNOSIS — Q66.90: ICD-10-CM

## 2018-09-12 DIAGNOSIS — F17.200 NICOTINE DEPENDENCE: Primary | ICD-10-CM

## 2018-09-12 LAB
25(OH)D3+25(OH)D2 SERPL-MCNC: 25 NG/ML
ALBUMIN SERPL BCP-MCNC: 4 G/DL
ALP SERPL-CCNC: 75 U/L
ALT SERPL W/O P-5'-P-CCNC: 21 U/L
ANION GAP SERPL CALC-SCNC: 6 MMOL/L
AST SERPL-CCNC: 31 U/L
BASOPHILS # BLD AUTO: 0.02 K/UL
BASOPHILS NFR BLD: 0.3 %
BILIRUB SERPL-MCNC: 0.6 MG/DL
BUN SERPL-MCNC: 33 MG/DL
CALCIUM SERPL-MCNC: 9.3 MG/DL
CHLORIDE SERPL-SCNC: 100 MMOL/L
CHOLEST SERPL-MCNC: 239 MG/DL
CHOLEST/HDLC SERPL: 4.7 {RATIO}
CO2 SERPL-SCNC: 32 MMOL/L
CREAT SERPL-MCNC: 2.12 MG/DL
CRP SERPL-MCNC: 1.35 MG/DL
DIFFERENTIAL METHOD: ABNORMAL
EOSINOPHIL # BLD AUTO: 0.2 K/UL
EOSINOPHIL NFR BLD: 2.9 %
ERYTHROCYTE [DISTWIDTH] IN BLOOD BY AUTOMATED COUNT: 14.1 %
ERYTHROCYTE [SEDIMENTATION RATE] IN BLOOD BY WESTERGREN METHOD: 45 MM/HR
EST. GFR  (AFRICAN AMERICAN): 27 ML/MIN/1.73 M^2
EST. GFR  (NON AFRICAN AMERICAN): 23.4 ML/MIN/1.73 M^2
GLUCOSE SERPL-MCNC: 108 MG/DL
HCT VFR BLD AUTO: 37.1 %
HDLC SERPL-MCNC: 51 MG/DL
HDLC SERPL: 21.3 %
HGB BLD-MCNC: 12.1 G/DL
LDLC SERPL CALC-MCNC: 151.6 MG/DL
LYMPHOCYTES # BLD AUTO: 1.2 K/UL
LYMPHOCYTES NFR BLD: 19.6 %
MCH RBC QN AUTO: 30.5 PG
MCHC RBC AUTO-ENTMCNC: 32.6 G/DL
MCV RBC AUTO: 94 FL
MONOCYTES # BLD AUTO: 0.4 K/UL
MONOCYTES NFR BLD: 7.2 %
NEUTROPHILS # BLD AUTO: 4.1 K/UL
NEUTROPHILS NFR BLD: 69.7 %
NONHDLC SERPL-MCNC: 188 MG/DL
PLATELET # BLD AUTO: 131 K/UL
PMV BLD AUTO: 12.5 FL
POTASSIUM SERPL-SCNC: 4.6 MMOL/L
PROT SERPL-MCNC: 7.5 G/DL
RBC # BLD AUTO: 3.97 M/UL
SODIUM SERPL-SCNC: 138 MMOL/L
T4 FREE SERPL-MCNC: 0.66 NG/DL
TRIGL SERPL-MCNC: 182 MG/DL
TSH SERPL DL<=0.005 MIU/L-ACNC: 24.7 UIU/ML
WBC # BLD AUTO: 5.87 K/UL

## 2018-09-12 PROCEDURE — 36415 COLL VENOUS BLD VENIPUNCTURE: CPT | Mod: PO

## 2018-09-12 PROCEDURE — 86140 C-REACTIVE PROTEIN: CPT | Mod: PO

## 2018-09-12 PROCEDURE — 84443 ASSAY THYROID STIM HORMONE: CPT | Mod: PO

## 2018-09-12 PROCEDURE — 84439 ASSAY OF FREE THYROXINE: CPT

## 2018-09-12 PROCEDURE — 85652 RBC SED RATE AUTOMATED: CPT

## 2018-09-12 PROCEDURE — 82306 VITAMIN D 25 HYDROXY: CPT | Mod: PO

## 2018-09-12 PROCEDURE — 90853 GROUP PSYCHOTHERAPY: CPT | Mod: S$GLB,,, | Performed by: GENERAL PRACTICE

## 2018-09-12 PROCEDURE — 85025 COMPLETE CBC W/AUTO DIFF WBC: CPT | Mod: PO

## 2018-09-12 PROCEDURE — 80053 COMPREHEN METABOLIC PANEL: CPT | Mod: PO

## 2018-09-12 PROCEDURE — 80061 LIPID PANEL: CPT

## 2018-09-12 NOTE — Clinical Note
Patient states that she lapsed 1 week ago and had one cigarette when a friend came to stay with her who is a smoker. Patient states that she has not had any since. Patient states that she remains tobacco free and not using any NRT's or prescribed medications. CO= 7 ppm.

## 2018-09-13 ENCOUNTER — TELEPHONE (OUTPATIENT)
Dept: FAMILY MEDICINE | Facility: CLINIC | Age: 68
End: 2018-09-13

## 2018-09-13 NOTE — TELEPHONE ENCOUNTER
----- Message from Carmine Lo sent at 9/13/2018  8:29 AM CDT -----  Contact: [place]/Kimberly/[phone]  She received a PA on the amitriptyline (ELAVIL) 10 MG tablet; she has a few questions.

## 2018-09-13 NOTE — PROGRESS NOTES
Site: [facility]  Date:  9/12/2018  Clinical Status of Patient: Outpatient   Length of Service and Code: 90 minutes - 29414   Number in Attendance: 4  Group Activities/Focus of Group:  Completion of TCRS (Tobacco Cessation Rating Scale) reviewed addiction model, cues/triggers, personal reasons for quitting, medications, goals. Discussed lapses.     Target symptoms:  withdrawal and medication side effects             The following were rated moderate (3) to severe (4) on TCRS:       Moderate 3: Anxious, nervous; insomnia      Severe 4:   Increased appetite/Hunger  Patient's Response to Intervention: Patient states that she lapsed 1 week ago and had one cigarette when a friend came to stay with her who is a smoker. Patient states that she has not had any since. Patient states that she remains tobacco free and not using any NRT's or prescribed medications. CO= 7 ppm.   Progress Toward Goals and Other Mental Status Changes: Patient denies any behavioral or mental status changes.       Diagnosis: Z72.0  Plan: The patient will continue with group therapy sessions and medication regimen prescribed with management by physician or Cessation Clinic Provider. Patient will inform Smoking Clinic Cessation Counselor of symptoms as rated high on TCRS.    Return to Clinic: 2 weeks

## 2018-09-15 ENCOUNTER — TELEPHONE (OUTPATIENT)
Dept: FAMILY MEDICINE | Facility: CLINIC | Age: 68
End: 2018-09-15

## 2018-09-15 DIAGNOSIS — E78.5 HYPERLIPIDEMIA, UNSPECIFIED HYPERLIPIDEMIA TYPE: ICD-10-CM

## 2018-09-15 DIAGNOSIS — R70.0 ELEVATED SED RATE: ICD-10-CM

## 2018-09-15 DIAGNOSIS — N18.30 CKD (CHRONIC KIDNEY DISEASE) STAGE 3, GFR 30-59 ML/MIN: Primary | Chronic | ICD-10-CM

## 2018-09-15 DIAGNOSIS — E03.4 HYPOTHYROIDISM DUE TO ACQUIRED ATROPHY OF THYROID: ICD-10-CM

## 2018-09-15 DIAGNOSIS — E55.9 VITAMIN D DEFICIENCY: ICD-10-CM

## 2018-09-15 RX ORDER — ACETAMINOPHEN 500 MG
5000 TABLET ORAL DAILY
Qty: 90 TABLET | Refills: 3 | Status: SHIPPED | OUTPATIENT
Start: 2018-09-15 | End: 2022-06-10 | Stop reason: SDUPTHER

## 2018-09-15 RX ORDER — ATORVASTATIN CALCIUM 10 MG/1
10 TABLET, FILM COATED ORAL DAILY
Qty: 90 TABLET | Refills: 3 | Status: SHIPPED | OUTPATIENT
Start: 2018-09-15 | End: 2019-10-28 | Stop reason: SDUPTHER

## 2018-09-15 RX ORDER — LEVOTHYROXINE SODIUM 175 UG/1
175 TABLET ORAL DAILY
Qty: 90 TABLET | Refills: 3
Start: 2018-09-15 | End: 2018-10-15 | Stop reason: SDUPTHER

## 2018-09-21 ENCOUNTER — HOSPITAL ENCOUNTER (OUTPATIENT)
Facility: OTHER | Age: 68
Discharge: HOME | End: 2018-09-21
Attending: ANESTHESIOLOGY | Admitting: ANESTHESIOLOGY
Payer: MEDICARE

## 2018-09-21 VITALS
TEMPERATURE: 98 F | WEIGHT: 220 LBS | HEART RATE: 62 BPM | RESPIRATION RATE: 18 BRPM | OXYGEN SATURATION: 96 % | HEIGHT: 64 IN | DIASTOLIC BLOOD PRESSURE: 71 MMHG | BODY MASS INDEX: 37.56 KG/M2 | SYSTOLIC BLOOD PRESSURE: 151 MMHG

## 2018-09-21 DIAGNOSIS — M47.816 LUMBAR SPONDYLOSIS: Primary | ICD-10-CM

## 2018-09-21 PROCEDURE — 64494 INJ PARAVERT F JNT L/S 2 LEV: CPT | Mod: 50,,, | Performed by: ANESTHESIOLOGY

## 2018-09-21 PROCEDURE — S0020 INJECTION, BUPIVICAINE HYDRO: HCPCS | Performed by: ANESTHESIOLOGY

## 2018-09-21 PROCEDURE — 25000003 PHARM REV CODE 250: Performed by: ANESTHESIOLOGY

## 2018-09-21 PROCEDURE — 64493 INJ PARAVERT F JNT L/S 1 LEV: CPT | Mod: 50,,, | Performed by: ANESTHESIOLOGY

## 2018-09-21 PROCEDURE — 64494 INJ PARAVERT F JNT L/S 2 LEV: CPT | Mod: 50 | Performed by: ANESTHESIOLOGY

## 2018-09-21 PROCEDURE — 64493 INJ PARAVERT F JNT L/S 1 LEV: CPT | Mod: 50 | Performed by: ANESTHESIOLOGY

## 2018-09-21 PROCEDURE — 64495 INJ PARAVERT F JNT L/S 3 LEV: CPT | Mod: 50,,, | Performed by: ANESTHESIOLOGY

## 2018-09-21 PROCEDURE — 64495 INJ PARAVERT F JNT L/S 3 LEV: CPT | Mod: 50 | Performed by: ANESTHESIOLOGY

## 2018-09-21 RX ORDER — BUPIVACAINE HYDROCHLORIDE 5 MG/ML
INJECTION, SOLUTION EPIDURAL; INTRACAUDAL
Status: DISCONTINUED | OUTPATIENT
Start: 2018-09-21 | End: 2018-09-21 | Stop reason: HOSPADM

## 2018-09-21 RX ORDER — LIDOCAINE HYDROCHLORIDE 10 MG/ML
INJECTION INFILTRATION; PERINEURAL
Status: DISCONTINUED | OUTPATIENT
Start: 2018-09-21 | End: 2018-09-21 | Stop reason: HOSPADM

## 2018-09-21 NOTE — PLAN OF CARE
Pt AAOx3, pt able to tolerate PO intake at this time. Pt able to stand and ambulate independently. Pt denies pain at this time, 8 bandaids CDI. Pt awaiting transport. Will continue to monitor.

## 2018-09-21 NOTE — OP NOTE
Date of Procedure: 09/21/2018    Procedure: Bilateral L2-5 Lumbar medial branch blocks    Pre-op diagnosis: Lumbar Spondylosis [M47.816]    Post-op diagnosis: Lumbar Spondylosis [M47.816]     Physician: Dr. Julieth Christopher     Assistant: Dr. Coates    Anesthestia: local    EBL: None    Specimens: None    All medications, allergies, and relevant histories were reviewed. No recent antibiotics or infections.  A time-out was taken to verify the correct patient, procedure, laterality, and appropriate medications/allergies.    Lumbar Medial Branch Block:   The procedure risks, benefits, and possible complications were discussed with the patient including nerve damage, spinal headache, bleeding, infection, and failure of pain relief.   Patient was placed in the prone position with the midriff elevated. Oblique view of the spine was obtained with fluoroscopy. Entry sites marked over the skin. The skin was prepped with chlorhexidine x3 and draped. Sterile precautions observed throughout the procedure. Xylocaine 1% was infiltrated locally over the entry site. A 22-gauge spinal needle was introduced at an angle, and the needle was placed onto the junction of the superior articular process and the most supermedial point on the transverse process. Placement was confirmed with a fluoroscopic view. After negative aspiration, 1cc of bupivacaine 0.5% was injected at each level. Needle was restyletted and removed. Procedure performed at the levels indicated: Right L2-5, Left:L2-5.     Patient tolerated the procedure well and there were no complications.   The patient was discharged home with a responsible adult.      Future Management:   If good results, can proceed to RFA.  If no relief, can follow up to discuss options.    I certify that I provided the above services.  I was present for the entire procedure, which was performed by the fellow physician under my supervision.  There were no parts of the procedure that were performed not  by myself or without my direct supervision.

## 2018-09-21 NOTE — DISCHARGE INSTRUCTIONS
Thank you for allowing us to care for you today. You may receive a survey about the care we provided. Your feedback is valuable and helps us provide excellent care throughout the community.     Home Care Instructions for Pain Management:    1. DIET:   You may resume your normal diet today.   2. BATHING:   You may shower with luke warm water. No tub baths or anything that will soak injection sites under water for the next 24 hours.  3. DRESSING:   You may remove your bandage today.   4. ACTIVITY LEVEL:   You may resume your normal activities 24 hrs after your procedure. Nothing strenuous today.  5. MEDICATIONS:   You may resume your normal medications today. To restart blood thinners, ask your doctor.  6. DRIVING    If you have received any sedatives by mouth today, you may not drive for 12 hours.    If you have received any sedation through your IV, you may not drive for 24 hrs.   7. SPECIAL INSTRUCTIONS:   No heat to the injection site for 24 hrs including, hot bath or shower, heating pad, moist heat, or hot tubs.    Use ice pack to injection site for any pain or discomfort.  Apply ice packs for 20 minute intervals as needed.    IF you have diabetes, be sure to monitor your blood sugar more closely. IF your injection contained steroids your blood sugar levels may become higher than normal.    If you are still having pain upon discharge:  Your pain may improve over the next 48 hours. The anesthetic (numbing medication) works immediately to 48 hours. IF your injection contained a steroid (anti-inflammatory medication), it takes approximately 3 days to start feeling relief and 7-10 days to see your greatest results from the medication. It is possible you may need subsequent injections. This would be discussed at your follow up appointment with pain management or your referring doctor.      PLEASE CALL YOUR DOCTOR IF:  1. Redness or swelling around the injection site.  2. Fever of 101 degrees or more  3. Drainage  (pus) from the injection site.  4. For any continuous bleeding (some dried blood over the incision is normal.)    FOR EMERGENCIES:   If any unusual problems or difficulties occur during clinic hours, call [phone] or [phone].

## 2018-09-24 ENCOUNTER — TELEPHONE (OUTPATIENT)
Dept: PAIN MEDICINE | Facility: CLINIC | Age: 68
End: 2018-09-24

## 2018-09-24 NOTE — TELEPHONE ENCOUNTER
Hello, this is staff I've received your request I'm returning your call from the  clinic at [facility]. I just wanted to let you know that I'm working on getting an answer for you by . ( Please add all other notes here if needed.)    Spoke with patient regarding percentage of pain relief from 09/21/2018 block, patient stated she got 50% pain relief from the  procedure on 09/21/2018

## 2018-09-24 NOTE — TELEPHONE ENCOUNTER
----- Message from Esperanza Morocho sent at 9/24/2018  3:19 PM CDT -----  Good afternoon,    Please be advised that this authorization was denied for CPT codes 00884 and 16213 due to the insurance company is requesting the pain relief percentage from surgery on 9/21/18. Upload this information into the pt chart as an encounter so that Pre-Service can re-submit authorization request.     Thanks,    Esperanza Morocho  Phone #:[phone]  Fax #:[phone]

## 2018-09-26 ENCOUNTER — CLINICAL SUPPORT (OUTPATIENT)
Dept: SMOKING CESSATION | Facility: CLINIC | Age: 68
End: 2018-09-26

## 2018-09-26 DIAGNOSIS — F17.200 NICOTINE DEPENDENCE: Primary | ICD-10-CM

## 2018-09-26 PROCEDURE — 99999 PR PBB SHADOW E&M-EST. PATIENT-LVL I: CPT | Mod: PBBFAC,,,

## 2018-09-26 PROCEDURE — 90853 GROUP PSYCHOTHERAPY: CPT | Mod: S$GLB,,, | Performed by: GENERAL PRACTICE

## 2018-09-26 NOTE — Clinical Note
Patient remains tobacco free since 6/1/2018. Patient had a slip a month ago, but has not had a slip since. CO= 5 ppm.

## 2018-09-28 ENCOUNTER — HOSPITAL ENCOUNTER (OUTPATIENT)
Facility: OTHER | Age: 68
Discharge: HOME | End: 2018-09-28
Attending: ANESTHESIOLOGY | Admitting: ANESTHESIOLOGY
Payer: MEDICARE

## 2018-09-28 VITALS
BODY MASS INDEX: 37.56 KG/M2 | DIASTOLIC BLOOD PRESSURE: 75 MMHG | WEIGHT: 220 LBS | OXYGEN SATURATION: 95 % | HEART RATE: 62 BPM | TEMPERATURE: 98 F | SYSTOLIC BLOOD PRESSURE: 137 MMHG | RESPIRATION RATE: 18 BRPM | HEIGHT: 64 IN

## 2018-09-28 DIAGNOSIS — M47.816 LUMBAR SPONDYLOSIS: Primary | ICD-10-CM

## 2018-09-28 PROCEDURE — 64493 INJ PARAVERT F JNT L/S 1 LEV: CPT | Mod: 50,,, | Performed by: ANESTHESIOLOGY

## 2018-09-28 PROCEDURE — 64495 INJ PARAVERT F JNT L/S 3 LEV: CPT | Mod: 50 | Performed by: ANESTHESIOLOGY

## 2018-09-28 PROCEDURE — 64494 INJ PARAVERT F JNT L/S 2 LEV: CPT | Mod: 50,,, | Performed by: ANESTHESIOLOGY

## 2018-09-28 PROCEDURE — 64493 INJ PARAVERT F JNT L/S 1 LEV: CPT | Mod: 50 | Performed by: ANESTHESIOLOGY

## 2018-09-28 PROCEDURE — 64494 INJ PARAVERT F JNT L/S 2 LEV: CPT | Mod: 50 | Performed by: ANESTHESIOLOGY

## 2018-09-28 PROCEDURE — S0020 INJECTION, BUPIVICAINE HYDRO: HCPCS | Performed by: ANESTHESIOLOGY

## 2018-09-28 PROCEDURE — 25000003 PHARM REV CODE 250: Performed by: ANESTHESIOLOGY

## 2018-09-28 RX ORDER — ALPRAZOLAM 0.5 MG/1
0.5 TABLET, ORALLY DISINTEGRATING ORAL NIGHTLY PRN
Status: DISCONTINUED | OUTPATIENT
Start: 2018-09-28 | End: 2018-09-28 | Stop reason: HOSPADM

## 2018-09-28 RX ORDER — BUPIVACAINE HYDROCHLORIDE 5 MG/ML
INJECTION, SOLUTION EPIDURAL; INTRACAUDAL
Status: DISCONTINUED | OUTPATIENT
Start: 2018-09-28 | End: 2018-09-28 | Stop reason: HOSPADM

## 2018-09-28 RX ORDER — LIDOCAINE HYDROCHLORIDE 10 MG/ML
INJECTION INFILTRATION; PERINEURAL
Status: DISCONTINUED | OUTPATIENT
Start: 2018-09-28 | End: 2018-09-28 | Stop reason: HOSPADM

## 2018-09-28 RX ADMIN — ALPRAZOLAM 0.5 MG: 0.5 TABLET, ORALLY DISINTEGRATING ORAL at 12:09

## 2018-09-28 NOTE — OP NOTE
Date of Procedure: 09/28/2018    Procedure: Bilateral L2-5 Lumbar medial branch blocks    Pre-op diagnosis: Lumbar Spondylosis [M47.816]    Post-op diagnosis: Lumbar Spondylosis [M47.816]     Physician: Dr. Julieth Christopher     Assistant: Dr. Coates    Anesthestia: local/niravam 0.5 mg    EBL: None    Specimens: None    All medications, allergies, and relevant histories were reviewed. No recent antibiotics or infections.  A time-out was taken to verify the correct patient, procedure, laterality, and appropriate medications/allergies.    Lumbar Medial Branch Block:   The procedure risks, benefits, and possible complications were discussed with the patient including nerve damage, spinal headache, bleeding, infection, and failure of pain relief.   Patient was placed in the prone position with the midriff elevated. Oblique view of the spine was obtained with fluoroscopy. Entry sites marked over the skin. The skin was prepped with chlorhexidine x3 and draped. Sterile precautions observed throughout the procedure. Xylocaine 1% was infiltrated locally over the entry site. A 22-gauge spinal needle was introduced at an angle, and the needle was placed onto the junction of the superior articular process and the most supermedial point on the transverse process. Placement was confirmed with a fluoroscopic view. After negative aspiration, 1cc of bupivacaine 0.5% was injected at each level. Needle was restyletted and removed. Procedure performed at the levels indicated: Right L2-5, Left:L2-5.     Patient tolerated the procedure well and there were no complications.   The patient was discharged home with a responsible adult.      Future Management:   If good results, can proceed to RFA.  If no relief, can follow up to discuss options.    I certify that I provided the above services.  I was present for the entire procedure, which was performed by the fellow physician under my supervision.  There were no parts of the procedure that were  performed not by myself or without my direct supervision.

## 2018-09-28 NOTE — DISCHARGE INSTRUCTIONS
Thank you for allowing us to care for you today. You may receive a survey about the care we provided. Your feedback is valuable and helps us provide excellent care throughout the community.     Home Care Instructions for Pain Management:    1. DIET:   You may resume your normal diet today.   2. BATHING:   You may shower with luke warm water. No tub baths or anything that will soak injection sites under water for the next 24 hours.  3. DRESSING:   You may remove your bandage today.   4. ACTIVITY LEVEL:   You may resume your normal activities IMMEDIATELY after your procedure. Nothing strenuous today.  5. MEDICATIONS:   You may resume your normal medications today. To restart blood thinners, ask your doctor.  6. DRIVING    If you have received any sedatives by mouth today, you may not drive for 12 hours.    If you have received any sedation through your IV, you may not drive for 24 hrs.   7. SPECIAL INSTRUCTIONS:   No heat to the injection site for 24 hrs including, hot bath or shower, heating pad, moist heat, or hot tubs.    Use ice pack to injection site for any pain or discomfort.  Apply ice packs for 20 minute intervals as needed.    IF you have diabetes, be sure to monitor your blood sugar more closely. IF your injection contained steroids your blood sugar levels may become higher than normal.    If you are still having pain upon discharge:  Your pain may improve over the next 48 hours. The anesthetic (numbing medication) works immediately to 48 hours. IF your injection contained a steroid (anti-inflammatory medication), it takes approximately 3 days to start feeling relief and 7-10 days to see your greatest results from the medication. It is possible you may need subsequent injections. This would be discussed at your follow up appointment with pain management or your referring doctor.      PLEASE CALL YOUR DOCTOR IF:  1. Redness or swelling around the injection site.  2. Fever of 101 degrees or more  3.  Drainage (pus) from the injection site.  4. For any continuous bleeding (some dried blood over the incision is normal.)    FOR EMERGENCIES:   If any unusual problems or difficulties occur during clinic hours, call [phone] or [phone].

## 2018-10-01 NOTE — PROGRESS NOTES
Site: [facility]  Date:  9/26/2018  Clinical Status of Patient: Outpatient   Length of Service and Code: 60 minutes - 49203   Number in Attendance: 3  Group Activities/Focus of Group: Sharing last weeks challenges, triggers, and coping activities to remain quit and/ or keep making progress toward cessation, completion of TCRS (Tobacco Cessation Rating Scale) learned addiction model, personal reasons for quitting, medications, goals.    Target symptoms:  withdrawal and medication side effects             The following were rated moderate (3) to severe (4) on TCRS:       Moderate 3: Increased Appetite/Hunger, Anxious, Nervous     Severe 4:   None  Patient's Response to Intervention: Patient remains tobacco free since 6/1/2018. Patient had a slip a month ago, but has not had a slip since. Patient states that urges are getting better, but has not encountered being around her friend that smokes. CO= 5 ppm.   Progress Toward Goals and Other Mental Status Changes: The patient denies any abnormal behavioral or mental changes at this time.       Diagnosis: Z72.0  Plan: The patient will continue with group therapy sessions and medication regimen prescribed with management by physician or Cessation Clinic Provider. Patient will inform Smoking Clinic Cessation Counselor of symptoms as rated high on TCRS.    Return to Clinic: 2 weeks

## 2018-10-03 ENCOUNTER — CLINICAL SUPPORT (OUTPATIENT)
Dept: SMOKING CESSATION | Facility: CLINIC | Age: 68
End: 2018-10-03
Payer: COMMERCIAL

## 2018-10-03 DIAGNOSIS — F17.200 NICOTINE DEPENDENCE: Primary | ICD-10-CM

## 2018-10-03 PROCEDURE — 99407 BEHAV CHNG SMOKING > 10 MIN: CPT | Mod: S$GLB,,,

## 2018-10-03 NOTE — PROGRESS NOTES
Called pt to f/u on her 3 month smoking cessation quit status. Pt stated she remains tobacco free. Congratulated pt on her hard work and success. Informed her of benefit period, future phone follow ups, and contact information. Will complete 3 month smoking cessation smart form.

## 2018-10-11 ENCOUNTER — OFFICE VISIT (OUTPATIENT)
Dept: PAIN MEDICINE | Facility: CLINIC | Age: 68
End: 2018-10-11
Payer: MEDICARE

## 2018-10-11 VITALS
DIASTOLIC BLOOD PRESSURE: 79 MMHG | BODY MASS INDEX: 38.02 KG/M2 | HEART RATE: 61 BPM | SYSTOLIC BLOOD PRESSURE: 114 MMHG | TEMPERATURE: 97 F | RESPIRATION RATE: 18 BRPM | HEIGHT: 64 IN | WEIGHT: 222.69 LBS

## 2018-10-11 DIAGNOSIS — M51.36 DDD (DEGENERATIVE DISC DISEASE), LUMBAR: ICD-10-CM

## 2018-10-11 DIAGNOSIS — M47.816 LUMBAR SPONDYLOSIS: Primary | ICD-10-CM

## 2018-10-11 DIAGNOSIS — G89.4 CHRONIC PAIN DISORDER: ICD-10-CM

## 2018-10-11 DIAGNOSIS — M54.16 LUMBAR RADICULOPATHY: ICD-10-CM

## 2018-10-11 PROCEDURE — 99214 OFFICE O/P EST MOD 30 MIN: CPT | Mod: S$PBB,,, | Performed by: NURSE PRACTITIONER

## 2018-10-11 PROCEDURE — 99499 UNLISTED E&M SERVICE: CPT | Mod: S$GLB,,, | Performed by: NURSE PRACTITIONER

## 2018-10-11 PROCEDURE — 99999 PR PBB SHADOW E&M-EST. PATIENT-LVL III: CPT | Mod: PBBFAC,,, | Performed by: NURSE PRACTITIONER

## 2018-10-11 PROCEDURE — 99213 OFFICE O/P EST LOW 20 MIN: CPT | Mod: PBBFAC | Performed by: NURSE PRACTITIONER

## 2018-10-11 PROCEDURE — 1101F PT FALLS ASSESS-DOCD LE1/YR: CPT | Mod: CPTII,,, | Performed by: NURSE PRACTITIONER

## 2018-10-11 NOTE — H&P (VIEW-ONLY)
"Subjective:     Patient ID: Talia Dillon is a 67 y.o. female.    Chief Complaint: Pain    Consulted by: Dr. Gallagher    Disclaimer: This note was generated using voice recognition software.  There may be a typographical errors that were missed during proofreading.      HPI:    Talia Dillon is a 67 y.o. female who presents today with neck, shoulder, low back and leg pain. This pain is described in detail below.    Ms. Dillon presents to pain clinic today complaining of pain in a variety of areas, including her neck, shoulders, low back and leg pain. Her low back and leg pain is the most bothersome. 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 She has had long term PT her whole life because of this. She was recently evaluated by Dr. Gallagher who referred her to PT and pain clinic for management options.    Ms. Dillon states her low back pain is constant with radiation down both legs. She rates her pain as a 7/10 today. The pain is made worse with movement and walking. The pain is a dull ache in her low back, but feels like a sharp burning in her legs "like an electric wing." This pain does not travel in to her feet, but she has numbness and paraesthesias in her feet separately.     She has just recently restarted with physical therapy; she has completed 4 sessions at this time and feels this is starting to help. She feels it is painful but ultimately improving her situation.     Interval History (8/30/2018):  She returns today for follow up.  She reports that PT has been helpful for the pain.  She finished her last in office session yesterday.  She is now going to start her HEP.  She reports that the epidural didn't help. She wishes she could take Aleve as this was very helpful    Interval History (10/11/2018):  The patient returns for " follow up of back pain.  Since her last OV, she had L2-5 MBB x2.  She is reporting 80% relief from both procedures for about one day.  She is here today to discuss RFA procedures.  She continues with come exercise regimen.  She did have recent labs as ordered by her PCP which again showed elevated BUN.  Her pain today is 8/10.     Physical Therapy: Yes; finished 8/28/2018, doing HEP (8/20/3018)    Non-pharmacologic Treatment:     · Ice/Heat: Not tried  · TENS: Tried; not helpful  · Massage: Helpful  · Chiropractic care: Yes, not helpful  · Acupuncture: Yes, not helpful  · Other: None         Pain Medications:         · Currently taking:   · Amitriptyline 10 mg; only takes this every now and again due to her kidneys- she doesn't know if she should be taking them or not   · Gabapentin 800 mg in am  · Sertraline 100 mg every day   · OTC topical creams   · Acetaminophen as needed     · Has tried in the past:    · NSAIDs- Aleve and Ibuprofen: had to stop due to CKD   · Baclofen- had to stop due to CKD    · Has not tried: Opioids,  Rx topical creams    Blood thinners: No    Interventional Therapies:  9/21/18 Bilateral L2-5 MBB- 80% relief  9/28/18 Bilateral L2-5 MBB- 80% relief    Relevant Surgeries:   No    Affecting sleep? Not usually     Affecting daily activities? Yes    Depressive symptoms? Yes          · SI/HI? No    Work status: Retired; used to work as a      Prescription Monitoring Program database:  Reviewed and consistent with medication use as prescribed.    Pain Scales  Best: 4/10  Worst: 9/10  Usually: 8/10  Today: 8/10    Low-back Pain   Associated symptoms include numbness and paresthesias. Pertinent negatives include no abdominal pain, bladder incontinence, bowel incontinence, chest pain, dysuria, fever or weakness.       Last 3 PDI Scores 10/11/2018 8/30/2018 7/23/2018   Pain Disability Index (PDI) [phone]   ]    Review of Systems   Constitution: Negative for chills, decreased appetite,  diaphoresis, fever and weakness.   HENT: Negative for congestion, ear discharge and ear pain.    Eyes: Negative for blurred vision, discharge and double vision.   Cardiovascular: Negative for chest pain, claudication and cyanosis.   Respiratory: Negative for cough, hemoptysis and shortness of breath.    Endocrine: Negative for cold intolerance, heat intolerance and polydipsia.   Skin: Negative for color change, dry skin and nail changes.   Musculoskeletal: Positive for arthritis, back pain, myalgias and neck pain.   Gastrointestinal: Negative for bloating, abdominal pain, change in bowel habit and bowel incontinence.   Genitourinary: Negative for bladder incontinence, decreased libido and dysuria.   Neurological: Positive for disturbances in coordination, numbness and paresthesias.   Psychiatric/Behavioral: Positive for depression. Negative for altered mental status, hallucinations and hypervigilance.             Past Medical History:   Diagnosis Date    Allergy     Anemia     Anxiety     Cancer     skin    Cataract     Depression     Edema     Hypothyroidism     PSVT (paroxysmal supraventricular tachycardia)     Thyroid disease        Past Surgical History:   Procedure Laterality Date    ADENOIDECTOMY      APPENDECTOMY      BLOCK, NERVE, MEDIAL BRANCH BLOCK BILATERAL LUMBAR L2-5 Bilateral 9/28/2018    Per[facility] at [facility]    BLOCK, NERVE, MEDIAL BRANCH BLOCK BILATERAL LUMBAR L2-5 Bilateral 9/21/2018    Per[facility] at [facility]    COLONOSCOPY  2009    repeat in 10 years     EYE SURGERY      HYSTERECTOMY      INJECTION OF ANESTHETIC AGENT AROUND NERVE Bilateral 9/21/2018    Procedure: BLOCK, NERVE, MEDIAL BRANCH BLOCK BILATERAL LUMBAR L2-5;  Surgeon: Julieth Christopher MD;  Location: [facility];  Service: Pain Management;  Laterality: Bilateral;  1 of 2    INJECTION OF ANESTHETIC AGENT AROUND NERVE Bilateral 9/28/2018    Procedure: BLOCK,  NERVE, MEDIAL BRANCH BLOCK BILATERAL LUMBAR L2-5;  Surgeon: Julieth Christopher MD;  Location: [facility];  Service: Pain Management;  Laterality: Bilateral;  2 of 2  PLEASE GIVE NIRAVAM PER MD    INJECTION,STEROID,EPIDURAL N/A 8/3/2018    Performed by [facility] at [facility]    TONSILLECTOMY         Review of patient's allergies indicates:   Allergen Reactions    Dexamethasone Rash       Current Outpatient Medications   Medication Sig Dispense Refill    ALPRAZolam (XANAX) 0.25 MG tablet Take 1 tablet (0.25 mg total) by mouth once daily. 90 tablet 1    amitriptyline (ELAVIL) 10 MG tablet Take 1 tablet (10 mg total) by mouth nightly as needed. 90 tablet 3    atenolol (TENORMIN) 50 MG tablet TAKE 1 AND 1/2 TALBLET BY MOUTH EVERY  tablet 1    atorvastatin (LIPITOR) 10 MG tablet Take 1 tablet (10 mg total) by mouth once daily. 90 tablet 3    cholecalciferol, vitamin D3, (VITAMIN D3) 5,000 unit Tab Take 5,000 Units by mouth once daily. 90 tablet 3    DULoxetine (CYMBALTA) 30 MG capsule Take 1 capsule (30 mg total) by mouth once daily. 30 capsule 11    levothyroxine (SYNTHROID, LEVOTHROID) 175 MCG tablet Take 1 tablet (175 mcg total) by mouth once daily. 90 tablet 3    sertraline (ZOLOFT) 100 MG tablet Take 1 tablet (100 mg total) by mouth once daily. 90 tablet 3    zolpidem (AMBIEN) 10 mg Tab Take 1 tablet (10 mg total) by mouth nightly as needed. 30 tablet 5    gabapentin (NEURONTIN) 400 MG capsule Take 1 capsule (400 mg total) by mouth 2 (two) times daily. 60 capsule 3     No current facility-administered medications for this visit.        Family History   Problem Relation Age of Onset    Stroke Mother     Stroke Father     Diabetes Father     Sleep apnea Daughter     Mental illness Daughter        Social History     Socioeconomic History    Marital status: Single     Spouse name: Not on file    Number of children: Not on file    Years of education: Not on file    Highest  "education level: Not on file   Social Needs    Financial resource strain: Not on file    Food insecurity - worry: Not on file    Food insecurity - inability: Not on file    Transportation needs - medical: Not on file    Transportation needs - non-medical: Not on file   Occupational History    Not on file   Tobacco Use    Smoking status: Smoker, Current Status Unknown     Packs/day: 1.00     Years: 50.00     Pack years: 50.00     Types: Cigarettes    Smokeless tobacco: Never Used   Substance and Sexual Activity    Alcohol use: Yes    Drug use: No    Sexual activity: Not Currently   Other Topics Concern    Not on file   Social History Narrative    Not on file       Objective:     Vitals:    10/11/18 0951   Resp: 18   Weight: 101 kg (222 lb 11.2 oz)   Height: 5' 4" (1.626 m)   PainSc:   8   PainLoc: Back       GEN:  Well developed, well nourished.  No acute distress.   HEENT:  No trauma.  Mucous membranes moist.  Nares patent bilaterally.  PSYCH: Normal affect. Thought content appropriate.  CHEST:  Breathing symmetric.  No audible wheezing.  ABD: Soft, non-distended.  SKIN:  Warm, pink, dry.  No rash on exposed areas.    EXT:  No cyanosis, clubbing, or edema.  No color change or changes in nail or hair growth.  NEURO/MUSCULOSKELETAL:  Fully alert, oriented, and appropriate. Speech normal bartolome. No cranial nerve deficits.   Gait: Antalgic  negative trendelenburg sign bilaterally.   Motor Strength: 5/5 motor strength throughout lower extremities.   Sensory: decreased 2 point discrimination bilaterally LEs; decreased pin prick bilateral LEs   Reflexes:  2+ and symmetric throughout.  Downgoing Babinski's bilaterally.  No clonus or spasticity.  L-Spine:  limited ROM with pain on extension. Positive facet loading bilaterally, R>L.  SI Joint/Hip:-  JOANIE bilaterally. - FADIR bilaterally.  No TTP over lumbar paraspinals       Imaging:     The imaging studies listed below were independently reviewed by me, and I " agree with the findings as documented below.     MRI Lumbar Spine 06/2018  FINDINGS:  Levoscoliosis of the spine, similar to before.  No abnormal bone marrow signal changes are evident to indicate acute fracture or bone marrow replacement process.  Sub endplate marrow signal changes about the L4-5 disc space, consistent with degenerative type change, mostly similar to before.    Visualized spinal cord and conus medullaris appears unremarkable.  No abnormal intramedullary spinal cord signal change.  No intradural, extramedullary masses are identified.    T11-12: Mild posterior disc bulging, greater to the right of midline, similar to before.    T12-L1:  Mild loss of disc signal indicating some degenerative change.  No significant disc bulge or focal herniation.  Mild loss of disc space height.  No detrimental changes.    L1-2:  Degenerative disc disease change.  Loss of disc signal.  No significant disc bulge or focal herniation.  Mild posterior disc bulging, similar to before.    L2-3: Degenerative disc disease.  Narrowing of the disc space.  Loss of normal disc signal.  Diffuse posterior mild disc bulging, similar to before.  Facet arthropathy changes, appearing greater on the right.    L3-4:  Degenerative disc disease.  Significant narrowing of the disc space.  Posterior marginal osteophytes.  Diffuse posterior disc bulging.  Bilateral foraminal narrowing right greater than left.  Facet arthropathy changes right greater than left.  Mild narrowing of the thecal sac as a result of the chronic findings, similar to before.    L4-5:  Degenerative disc disease.  Significant narrowing of the disc space.  Posterior osteophytes.  Diffuse posterior disc bulging, appearing greater to the left of midline.  Indication 0 some bilateral facet arthropathy change.  Bilateral bone foraminal narrowing, greater on the left.  Findings appear similar to before.    L5-S1:  Degenerative disc disease.  No significant disc bulge or focal  herniation.  Mild posterior disc bulging present.  Facet arthropathy changes left greater than right.    X ray Scoliosis Complete 06/2018  There is scoliosis of the thoracolumbar spine, which is convex to the left with the apex centered at the L1 level.  The Sequeira angle measures approximately 26.2°.  All visualized thoracic and lumbar vertebral bodies normal in height.  Low-to-moderate grade degenerative changes scattered throughout the mid and inferior thoracic spine with small scattered marginal osteophytes.  Severe degenerative change of the lumbar spine identified at the L2-3, L3-4 and L4-5 levels with large marginal osteophytes.  Paravertebral soft tissues are normal.    MRI Cervical Spine 2017  Reversal of the normal cervical lordosis.  Small spinal canal on a congenital basis.  No definite abnormal signal in the cord.  The craniocervical junction is normal.    C2-3: Minimal disc bulge.  Facet hypertrophy on the left.    C3-4: Broad-based osteophyte and disc bulge.  Uncovertebral joint disease with moderate to severe left-sided neural foraminal narrowing.  Decreased CSF spaces anterior and posterior to the cord.    C4-5: Marked disc space narrowing.  Broad-based osteophyte and disc bulge.  Decreased CSF spaces anterior and posterior to the cord.  Uncovertebral joint disease with mild neuroforaminal narrowing.    C5-6: Disc space narrowing.  Broad-based osteophyte and disc bulge.  Asymmetric disc protrusion paramedian to the left side.  Uncovertebral joint disease and degenerative changes of the facets with moderate to severe bilateral neural foraminal narrowing.  No signal abnormality in the cord.    C6-7: Disc space narrowing.  Broad-based osteophyte and disc bulge.  Bilateral uncovertebral joint disease and degenerative changes of the facets.  Moderate central spinal stenosis.    C7-T1: Minimal disc bulge.  Uncovertebral joint disease.    Assessment:     Encounter Diagnoses   Name Primary?    Lumbar  spondylosis Yes    Chronic pain disorder     DDD (degenerative disc disease), lumbar     Lumbar radiculopathy        Plan:     Talia was seen today for low-back pain.    Diagnoses and all orders for this visit:    Lumbar spondylosis    Chronic pain disorder    DDD (degenerative disc disease), lumbar    Lumbar radiculopathy        Her pain is consistent with the above.    We discussed the assessment and recommendations.  All available images were reviewed. We discussed the disease process, prognosis, treatment plan, and risks and benefits. The patient is aware of the risks and benefits of the medications being prescribed, common side effects, and proper usage. The following is the plan we agreed on:     1. Continue Cymbalta 30 mg daily.  Decrease sertraline to 50 mg daily, then 50 mg QOD, then off.  She will break this in half as she has some at home.  Her kidney function is borderline of where we would no longer be recommended to use these, so we will monitor this closely.  Recent labs reviewed today.  2. Continue gabapentin at currently dose given Cr clearance.  3. Continue amitriptyline 10 mg nightly.  4. Will reorder compounding cream for topical use.  5. Continue physical therapy HEP.  Consider FRP.  6. Schedule for right then left L2,3,4,5 RFA.  She had 80% relief from both MBBs.  7. RTC 4 weeks after completion of procedures.      LETY Marx  10/11/2018     The above plan and management options were discussed at length with patient. Patient is in agreement with the above and verbalized understanding.

## 2018-10-11 NOTE — H&P (VIEW-ONLY)
"Subjective:     Patient ID: Talia Dillon is a 67 y.o. female.    Chief Complaint: Pain    Consulted by: Dr. Gallagher    Disclaimer: This note was generated using voice recognition software.  There may be a typographical errors that were missed during proofreading.      HPI:    Talia Dillon is a 67 y.o. female who presents today with neck, shoulder, low back and leg pain. This pain is described in detail below.    Ms. Dillon presents to pain clinic today complaining of pain in a variety of areas, including her neck, shoulders, low back and leg pain. Her low back and leg pain is the most bothersome. 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 She has had long term PT her whole life because of this. She was recently evaluated by Dr. Gallagher who referred her to PT and pain clinic for management options.    Ms. Dillon states her low back pain is constant with radiation down both legs. She rates her pain as a 7/10 today. The pain is made worse with movement and walking. The pain is a dull ache in her low back, but feels like a sharp burning in her legs "like an electric wing." This pain does not travel in to her feet, but she has numbness and paraesthesias in her feet separately.     She has just recently restarted with physical therapy; she has completed 4 sessions at this time and feels this is starting to help. She feels it is painful but ultimately improving her situation.     Interval History (8/30/2018):  She returns today for follow up.  She reports that PT has been helpful for the pain.  She finished her last in office session yesterday.  She is now going to start her HEP.  She reports that the epidural didn't help. She wishes she could take Aleve as this was very helpful    Interval History (10/11/2018):  The patient returns for " follow up of back pain.  Since her last OV, she had L2-5 MBB x2.  She is reporting 80% relief from both procedures for about one day.  She is here today to discuss RFA procedures.  She continues with come exercise regimen.  She did have recent labs as ordered by her PCP which again showed elevated BUN.  Her pain today is 8/10.     Physical Therapy: Yes; finished 8/28/2018, doing HEP (8/20/3018)    Non-pharmacologic Treatment:     · Ice/Heat: Not tried  · TENS: Tried; not helpful  · Massage: Helpful  · Chiropractic care: Yes, not helpful  · Acupuncture: Yes, not helpful  · Other: None         Pain Medications:         · Currently taking:   · Amitriptyline 10 mg; only takes this every now and again due to her kidneys- she doesn't know if she should be taking them or not   · Gabapentin 800 mg in am  · Sertraline 100 mg every day   · OTC topical creams   · Acetaminophen as needed     · Has tried in the past:    · NSAIDs- Aleve and Ibuprofen: had to stop due to CKD   · Baclofen- had to stop due to CKD    · Has not tried: Opioids,  Rx topical creams    Blood thinners: No    Interventional Therapies:  9/21/18 Bilateral L2-5 MBB- 80% relief  9/28/18 Bilateral L2-5 MBB- 80% relief    Relevant Surgeries:   No    Affecting sleep? Not usually     Affecting daily activities? Yes    Depressive symptoms? Yes          · SI/HI? No    Work status: Retired; used to work as a      Prescription Monitoring Program database:  Reviewed and consistent with medication use as prescribed.    Pain Scales  Best: 4/10  Worst: 9/10  Usually: 8/10  Today: 8/10    Low-back Pain   Associated symptoms include numbness and paresthesias. Pertinent negatives include no abdominal pain, bladder incontinence, bowel incontinence, chest pain, dysuria, fever or weakness.       Last 3 PDI Scores 10/11/2018 8/30/2018 7/23/2018   Pain Disability Index (PDI) [phone]   ]    Review of Systems   Constitution: Negative for chills, decreased appetite,  diaphoresis, fever and weakness.   HENT: Negative for congestion, ear discharge and ear pain.    Eyes: Negative for blurred vision, discharge and double vision.   Cardiovascular: Negative for chest pain, claudication and cyanosis.   Respiratory: Negative for cough, hemoptysis and shortness of breath.    Endocrine: Negative for cold intolerance, heat intolerance and polydipsia.   Skin: Negative for color change, dry skin and nail changes.   Musculoskeletal: Positive for arthritis, back pain, myalgias and neck pain.   Gastrointestinal: Negative for bloating, abdominal pain, change in bowel habit and bowel incontinence.   Genitourinary: Negative for bladder incontinence, decreased libido and dysuria.   Neurological: Positive for disturbances in coordination, numbness and paresthesias.   Psychiatric/Behavioral: Positive for depression. Negative for altered mental status, hallucinations and hypervigilance.             Past Medical History:   Diagnosis Date    Allergy     Anemia     Anxiety     Cancer     skin    Cataract     Depression     Edema     Hypothyroidism     PSVT (paroxysmal supraventricular tachycardia)     Thyroid disease        Past Surgical History:   Procedure Laterality Date    ADENOIDECTOMY      APPENDECTOMY      BLOCK, NERVE, MEDIAL BRANCH BLOCK BILATERAL LUMBAR L2-5 Bilateral 9/28/2018    Per[facility] at [facility]    BLOCK, NERVE, MEDIAL BRANCH BLOCK BILATERAL LUMBAR L2-5 Bilateral 9/21/2018    [facility] at [facility]    COLONOSCOPY  2009    repeat in 10 years     EYE SURGERY      HYSTERECTOMY      INJECTION OF ANESTHETIC AGENT AROUND NERVE Bilateral 9/21/2018    Procedure: BLOCK, NERVE, MEDIAL BRANCH BLOCK BILATERAL LUMBAR L2-5;  Surgeon: Julieth Christopher MD;  Location: [facility];  Service: Pain Management;  Laterality: Bilateral;  1 of 2    INJECTION OF ANESTHETIC AGENT AROUND NERVE Bilateral 9/28/2018    Procedure: BLOCK,  NERVE, MEDIAL BRANCH BLOCK BILATERAL LUMBAR L2-5;  Surgeon: Julieth Christopher MD;  Location: [facility];  Service: Pain Management;  Laterality: Bilateral;  2 of 2  PLEASE GIVE NIRAVAM PER MD    INJECTION,STEROID,EPIDURAL N/A 8/3/2018    Performed by [facility] at [facility]    TONSILLECTOMY         Review of patient's allergies indicates:   Allergen Reactions    Dexamethasone Rash       Current Outpatient Medications   Medication Sig Dispense Refill    ALPRAZolam (XANAX) 0.25 MG tablet Take 1 tablet (0.25 mg total) by mouth once daily. 90 tablet 1    amitriptyline (ELAVIL) 10 MG tablet Take 1 tablet (10 mg total) by mouth nightly as needed. 90 tablet 3    atenolol (TENORMIN) 50 MG tablet TAKE 1 AND 1/2 TALBLET BY MOUTH EVERY  tablet 1    atorvastatin (LIPITOR) 10 MG tablet Take 1 tablet (10 mg total) by mouth once daily. 90 tablet 3    cholecalciferol, vitamin D3, (VITAMIN D3) 5,000 unit Tab Take 5,000 Units by mouth once daily. 90 tablet 3    DULoxetine (CYMBALTA) 30 MG capsule Take 1 capsule (30 mg total) by mouth once daily. 30 capsule 11    levothyroxine (SYNTHROID, LEVOTHROID) 175 MCG tablet Take 1 tablet (175 mcg total) by mouth once daily. 90 tablet 3    sertraline (ZOLOFT) 100 MG tablet Take 1 tablet (100 mg total) by mouth once daily. 90 tablet 3    zolpidem (AMBIEN) 10 mg Tab Take 1 tablet (10 mg total) by mouth nightly as needed. 30 tablet 5    gabapentin (NEURONTIN) 400 MG capsule Take 1 capsule (400 mg total) by mouth 2 (two) times daily. 60 capsule 3     No current facility-administered medications for this visit.        Family History   Problem Relation Age of Onset    Stroke Mother     Stroke Father     Diabetes Father     Sleep apnea Daughter     Mental illness Daughter        Social History     Socioeconomic History    Marital status: Single     Spouse name: Not on file    Number of children: Not on file    Years of education: Not on file    Highest  "education level: Not on file   Social Needs    Financial resource strain: Not on file    Food insecurity - worry: Not on file    Food insecurity - inability: Not on file    Transportation needs - medical: Not on file    Transportation needs - non-medical: Not on file   Occupational History    Not on file   Tobacco Use    Smoking status: Smoker, Current Status Unknown     Packs/day: 1.00     Years: 50.00     Pack years: 50.00     Types: Cigarettes    Smokeless tobacco: Never Used   Substance and Sexual Activity    Alcohol use: Yes    Drug use: No    Sexual activity: Not Currently   Other Topics Concern    Not on file   Social History Narrative    Not on file       Objective:     Vitals:    10/11/18 0951   Resp: 18   Weight: 101 kg (222 lb 11.2 oz)   Height: 5' 4" (1.626 m)   PainSc:   8   PainLoc: Back       GEN:  Well developed, well nourished.  No acute distress.   HEENT:  No trauma.  Mucous membranes moist.  Nares patent bilaterally.  PSYCH: Normal affect. Thought content appropriate.  CHEST:  Breathing symmetric.  No audible wheezing.  ABD: Soft, non-distended.  SKIN:  Warm, pink, dry.  No rash on exposed areas.    EXT:  No cyanosis, clubbing, or edema.  No color change or changes in nail or hair growth.  NEURO/MUSCULOSKELETAL:  Fully alert, oriented, and appropriate. Speech normal bartolome. No cranial nerve deficits.   Gait: Antalgic  negative trendelenburg sign bilaterally.   Motor Strength: 5/5 motor strength throughout lower extremities.   Sensory: decreased 2 point discrimination bilaterally LEs; decreased pin prick bilateral LEs   Reflexes:  2+ and symmetric throughout.  Downgoing Babinski's bilaterally.  No clonus or spasticity.  L-Spine:  limited ROM with pain on extension. Positive facet loading bilaterally, R>L.  SI Joint/Hip:-  JOANIE bilaterally. - FADIR bilaterally.  No TTP over lumbar paraspinals       Imaging:     The imaging studies listed below were independently reviewed by me, and I " agree with the findings as documented below.     MRI Lumbar Spine 06/2018  FINDINGS:  Levoscoliosis of the spine, similar to before.  No abnormal bone marrow signal changes are evident to indicate acute fracture or bone marrow replacement process.  Sub endplate marrow signal changes about the L4-5 disc space, consistent with degenerative type change, mostly similar to before.    Visualized spinal cord and conus medullaris appears unremarkable.  No abnormal intramedullary spinal cord signal change.  No intradural, extramedullary masses are identified.    T11-12: Mild posterior disc bulging, greater to the right of midline, similar to before.    T12-L1:  Mild loss of disc signal indicating some degenerative change.  No significant disc bulge or focal herniation.  Mild loss of disc space height.  No detrimental changes.    L1-2:  Degenerative disc disease change.  Loss of disc signal.  No significant disc bulge or focal herniation.  Mild posterior disc bulging, similar to before.    L2-3: Degenerative disc disease.  Narrowing of the disc space.  Loss of normal disc signal.  Diffuse posterior mild disc bulging, similar to before.  Facet arthropathy changes, appearing greater on the right.    L3-4:  Degenerative disc disease.  Significant narrowing of the disc space.  Posterior marginal osteophytes.  Diffuse posterior disc bulging.  Bilateral foraminal narrowing right greater than left.  Facet arthropathy changes right greater than left.  Mild narrowing of the thecal sac as a result of the chronic findings, similar to before.    L4-5:  Degenerative disc disease.  Significant narrowing of the disc space.  Posterior osteophytes.  Diffuse posterior disc bulging, appearing greater to the left of midline.  Indication 0 some bilateral facet arthropathy change.  Bilateral bone foraminal narrowing, greater on the left.  Findings appear similar to before.    L5-S1:  Degenerative disc disease.  No significant disc bulge or focal  herniation.  Mild posterior disc bulging present.  Facet arthropathy changes left greater than right.    X ray Scoliosis Complete 06/2018  There is scoliosis of the thoracolumbar spine, which is convex to the left with the apex centered at the L1 level.  The Sequeira angle measures approximately 26.2°.  All visualized thoracic and lumbar vertebral bodies normal in height.  Low-to-moderate grade degenerative changes scattered throughout the mid and inferior thoracic spine with small scattered marginal osteophytes.  Severe degenerative change of the lumbar spine identified at the L2-3, L3-4 and L4-5 levels with large marginal osteophytes.  Paravertebral soft tissues are normal.    MRI Cervical Spine 2017  Reversal of the normal cervical lordosis.  Small spinal canal on a congenital basis.  No definite abnormal signal in the cord.  The craniocervical junction is normal.    C2-3: Minimal disc bulge.  Facet hypertrophy on the left.    C3-4: Broad-based osteophyte and disc bulge.  Uncovertebral joint disease with moderate to severe left-sided neural foraminal narrowing.  Decreased CSF spaces anterior and posterior to the cord.    C4-5: Marked disc space narrowing.  Broad-based osteophyte and disc bulge.  Decreased CSF spaces anterior and posterior to the cord.  Uncovertebral joint disease with mild neuroforaminal narrowing.    C5-6: Disc space narrowing.  Broad-based osteophyte and disc bulge.  Asymmetric disc protrusion paramedian to the left side.  Uncovertebral joint disease and degenerative changes of the facets with moderate to severe bilateral neural foraminal narrowing.  No signal abnormality in the cord.    C6-7: Disc space narrowing.  Broad-based osteophyte and disc bulge.  Bilateral uncovertebral joint disease and degenerative changes of the facets.  Moderate central spinal stenosis.    C7-T1: Minimal disc bulge.  Uncovertebral joint disease.    Assessment:     Encounter Diagnoses   Name Primary?    Lumbar  spondylosis Yes    Chronic pain disorder     DDD (degenerative disc disease), lumbar     Lumbar radiculopathy        Plan:     Talia was seen today for low-back pain.    Diagnoses and all orders for this visit:    Lumbar spondylosis    Chronic pain disorder    DDD (degenerative disc disease), lumbar    Lumbar radiculopathy        Her pain is consistent with the above.    We discussed the assessment and recommendations.  All available images were reviewed. We discussed the disease process, prognosis, treatment plan, and risks and benefits. The patient is aware of the risks and benefits of the medications being prescribed, common side effects, and proper usage. The following is the plan we agreed on:     1. Continue Cymbalta 30 mg daily.  Decrease sertraline to 50 mg daily, then 50 mg QOD, then off.  She will break this in half as she has some at home.  Her kidney function is borderline of where we would no longer be recommended to use these, so we will monitor this closely.  Recent labs reviewed today.  2. Continue gabapentin at currently dose given Cr clearance.  3. Continue amitriptyline 10 mg nightly.  4. Will reorder compounding cream for topical use.  5. Continue physical therapy HEP.  Consider FRP.  6. Schedule for right then left L2,3,4,5 RFA.  She had 80% relief from both MBBs.  7. RTC 4 weeks after completion of procedures.      LETY Marx  10/11/2018     The above plan and management options were discussed at length with patient. Patient is in agreement with the above and verbalized understanding.

## 2018-10-11 NOTE — PROGRESS NOTES
"Subjective:     Patient ID: Talia Dillon is a 67 y.o. female.    Chief Complaint: Pain    Consulted by: Dr. Gallagher    Disclaimer: This note was generated using voice recognition software.  There may be a typographical errors that were missed during proofreading.      HPI:    Talia Dillon is a 67 y.o. female who presents today with neck, shoulder, low back and leg pain. This pain is described in detail below.    Ms. Dillon presents to pain clinic today complaining of pain in a variety of areas, including her neck, shoulders, low back and leg pain. Her low back and leg pain is the most bothersome. 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 She has had long term PT her whole life because of this. She was recently evaluated by Dr. Gallagher who referred her to PT and pain clinic for management options.    Ms. Dillon states her low back pain is constant with radiation down both legs. She rates her pain as a 7/10 today. The pain is made worse with movement and walking. The pain is a dull ache in her low back, but feels like a sharp burning in her legs "like an electric wing." This pain does not travel in to her feet, but she has numbness and paraesthesias in her feet separately.     She has just recently restarted with physical therapy; she has completed 4 sessions at this time and feels this is starting to help. She feels it is painful but ultimately improving her situation.     Interval History (8/30/2018):  She returns today for follow up.  She reports that PT has been helpful for the pain.  She finished her last in office session yesterday.  She is now going to start her HEP.  She reports that the epidural didn't help. She wishes she could take Aleve as this was very helpful    Interval History (10/11/2018):  The patient returns for " follow up of back pain.  Since her last OV, she had L2-5 MBB x2.  She is reporting 80% relief from both procedures for about one day.  She is here today to discuss RFA procedures.  She continues with come exercise regimen.  She did have recent labs as ordered by her PCP which again showed elevated BUN.  Her pain today is 8/10.     Physical Therapy: Yes; finished 8/28/2018, doing HEP (8/20/3018)    Non-pharmacologic Treatment:     · Ice/Heat: Not tried  · TENS: Tried; not helpful  · Massage: Helpful  · Chiropractic care: Yes, not helpful  · Acupuncture: Yes, not helpful  · Other: None         Pain Medications:         · Currently taking:   · Amitriptyline 10 mg; only takes this every now and again due to her kidneys- she doesn't know if she should be taking them or not   · Gabapentin 800 mg in am  · Sertraline 100 mg every day   · OTC topical creams   · Acetaminophen as needed     · Has tried in the past:    · NSAIDs- Aleve and Ibuprofen: had to stop due to CKD   · Baclofen- had to stop due to CKD    · Has not tried: Opioids,  Rx topical creams    Blood thinners: No    Interventional Therapies:  9/21/18 Bilateral L2-5 MBB- 80% relief  9/28/18 Bilateral L2-5 MBB- 80% relief    Relevant Surgeries:   No    Affecting sleep? Not usually     Affecting daily activities? Yes    Depressive symptoms? Yes          · SI/HI? No    Work status: Retired; used to work as a      Prescription Monitoring Program database:  Reviewed and consistent with medication use as prescribed.    Pain Scales  Best: 4/10  Worst: 9/10  Usually: 8/10  Today: 8/10    Low-back Pain   Associated symptoms include numbness and paresthesias. Pertinent negatives include no abdominal pain, bladder incontinence, bowel incontinence, chest pain, dysuria, fever or weakness.       Last 3 PDI Scores 10/11/2018 8/30/2018 7/23/2018   Pain Disability Index (PDI) [phone]   ]    Review of Systems   Constitution: Negative for chills, decreased appetite,  diaphoresis, fever and weakness.   HENT: Negative for congestion, ear discharge and ear pain.    Eyes: Negative for blurred vision, discharge and double vision.   Cardiovascular: Negative for chest pain, claudication and cyanosis.   Respiratory: Negative for cough, hemoptysis and shortness of breath.    Endocrine: Negative for cold intolerance, heat intolerance and polydipsia.   Skin: Negative for color change, dry skin and nail changes.   Musculoskeletal: Positive for arthritis, back pain, myalgias and neck pain.   Gastrointestinal: Negative for bloating, abdominal pain, change in bowel habit and bowel incontinence.   Genitourinary: Negative for bladder incontinence, decreased libido and dysuria.   Neurological: Positive for disturbances in coordination, numbness and paresthesias.   Psychiatric/Behavioral: Positive for depression. Negative for altered mental status, hallucinations and hypervigilance.             Past Medical History:   Diagnosis Date    Allergy     Anemia     Anxiety     Cancer     skin    Cataract     Depression     Edema     Hypothyroidism     PSVT (paroxysmal supraventricular tachycardia)     Thyroid disease        Past Surgical History:   Procedure Laterality Date    ADENOIDECTOMY      APPENDECTOMY      BLOCK, NERVE, MEDIAL BRANCH BLOCK BILATERAL LUMBAR L2-5 Bilateral 9/28/2018    [facility]formed [facility]    BLOCK, NERVE, MEDIAL BRANCH BLOCK BILATERAL LUMBAR L2-5 Bilateral 9/21/2018    [facility]    COLONOSCOPY  2009    repeat in 10 years     EYE SURGERY      HYSTERECTOMY      INJECTION OF ANESTHETIC AGENT AROUND NERVE Bilateral 9/21/2018    Procedure: BLOCK, NERVE, MEDIAL BRANCH BLOCK BILATERAL LUMBAR L2-5;  Surgeon: Julieth Christopher MD;  Location: [facility];  Service: Pain Management;  Laterality: Bilateral;  1 of 2    INJECTION OF ANESTHETIC AGENT AROUND NERVE Bilateral 9/28/2018    Procedure: BLOCK,  NERVE, MEDIAL BRANCH BLOCK BILATERAL LUMBAR L2-5;  Surgeon: Julieth Christopher MD;  Location: [facility];  Service: Pain Management;  Laterality: Bilateral;  2 of 2  PLEASE GIVE NIRAVAM PER MD    INJECTION,STEROID,EPIDURAL N/A 8/3/2018    Per[facility] by [facility] at [facility]    TONSILLECTOMY         Review of patient's allergies indicates:   Allergen Reactions    Dexamethasone Rash       Current Outpatient Medications   Medication Sig Dispense Refill    ALPRAZolam (XANAX) 0.25 MG tablet Take 1 tablet (0.25 mg total) by mouth once daily. 90 tablet 1    amitriptyline (ELAVIL) 10 MG tablet Take 1 tablet (10 mg total) by mouth nightly as needed. 90 tablet 3    atenolol (TENORMIN) 50 MG tablet TAKE 1 AND 1/2 TALBLET BY MOUTH EVERY  tablet 1    atorvastatin (LIPITOR) 10 MG tablet Take 1 tablet (10 mg total) by mouth once daily. 90 tablet 3    cholecalciferol, vitamin D3, (VITAMIN D3) 5,000 unit Tab Take 5,000 Units by mouth once daily. 90 tablet 3    DULoxetine (CYMBALTA) 30 MG capsule Take 1 capsule (30 mg total) by mouth once daily. 30 capsule 11    levothyroxine (SYNTHROID, LEVOTHROID) 175 MCG tablet Take 1 tablet (175 mcg total) by mouth once daily. 90 tablet 3    sertraline (ZOLOFT) 100 MG tablet Take 1 tablet (100 mg total) by mouth once daily. 90 tablet 3    zolpidem (AMBIEN) 10 mg Tab Take 1 tablet (10 mg total) by mouth nightly as needed. 30 tablet 5    gabapentin (NEURONTIN) 400 MG capsule Take 1 capsule (400 mg total) by mouth 2 (two) times daily. 60 capsule 3     No current facility-administered medications for this visit.        Family History   Problem Relation Age of Onset    Stroke Mother     Stroke Father     Diabetes Father     Sleep apnea Daughter     Mental illness Daughter        Social History     Socioeconomic History    Marital status: Single     Spouse name: Not on file    Number of children: Not on file    Years of education: Not on file    Highest  "education level: Not on file   Social Needs    Financial resource strain: Not on file    Food insecurity - worry: Not on file    Food insecurity - inability: Not on file    Transportation needs - medical: Not on file    Transportation needs - non-medical: Not on file   Occupational History    Not on file   Tobacco Use    Smoking status: Smoker, Current Status Unknown     Packs/day: 1.00     Years: 50.00     Pack years: 50.00     Types: Cigarettes    Smokeless tobacco: Never Used   Substance and Sexual Activity    Alcohol use: Yes    Drug use: No    Sexual activity: Not Currently   Other Topics Concern    Not on file   Social History Narrative    Not on file       Objective:     Vitals:    10/11/18 0951   Resp: 18   Weight: 101 kg (222 lb 11.2 oz)   Height: 5' 4" (1.626 m)   PainSc:   8   PainLoc: Back       GEN:  Well developed, well nourished.  No acute distress.   HEENT:  No trauma.  Mucous membranes moist.  Nares patent bilaterally.  PSYCH: Normal affect. Thought content appropriate.  CHEST:  Breathing symmetric.  No audible wheezing.  ABD: Soft, non-distended.  SKIN:  Warm, pink, dry.  No rash on exposed areas.    EXT:  No cyanosis, clubbing, or edema.  No color change or changes in nail or hair growth.  NEURO/MUSCULOSKELETAL:  Fully alert, oriented, and appropriate. Speech normal bartolome. No cranial nerve deficits.   Gait: Antalgic  negative trendelenburg sign bilaterally.   Motor Strength: 5/5 motor strength throughout lower extremities.   Sensory: decreased 2 point discrimination bilaterally LEs; decreased pin prick bilateral LEs   Reflexes:  2+ and symmetric throughout.  Downgoing Babinski's bilaterally.  No clonus or spasticity.  L-Spine:  limited ROM with pain on extension. Positive facet loading bilaterally, R>L.  SI Joint/Hip:-  JOANIE bilaterally. - FADIR bilaterally.  No TTP over lumbar paraspinals       Imaging:     The imaging studies listed below were independently reviewed by me, and I " agree with the findings as documented below.     MRI Lumbar Spine 06/2018  FINDINGS:  Levoscoliosis of the spine, similar to before.  No abnormal bone marrow signal changes are evident to indicate acute fracture or bone marrow replacement process.  Sub endplate marrow signal changes about the L4-5 disc space, consistent with degenerative type change, mostly similar to before.    Visualized spinal cord and conus medullaris appears unremarkable.  No abnormal intramedullary spinal cord signal change.  No intradural, extramedullary masses are identified.    T11-12: Mild posterior disc bulging, greater to the right of midline, similar to before.    T12-L1:  Mild loss of disc signal indicating some degenerative change.  No significant disc bulge or focal herniation.  Mild loss of disc space height.  No detrimental changes.    L1-2:  Degenerative disc disease change.  Loss of disc signal.  No significant disc bulge or focal herniation.  Mild posterior disc bulging, similar to before.    L2-3: Degenerative disc disease.  Narrowing of the disc space.  Loss of normal disc signal.  Diffuse posterior mild disc bulging, similar to before.  Facet arthropathy changes, appearing greater on the right.    L3-4:  Degenerative disc disease.  Significant narrowing of the disc space.  Posterior marginal osteophytes.  Diffuse posterior disc bulging.  Bilateral foraminal narrowing right greater than left.  Facet arthropathy changes right greater than left.  Mild narrowing of the thecal sac as a result of the chronic findings, similar to before.    L4-5:  Degenerative disc disease.  Significant narrowing of the disc space.  Posterior osteophytes.  Diffuse posterior disc bulging, appearing greater to the left of midline.  Indication 0 some bilateral facet arthropathy change.  Bilateral bone foraminal narrowing, greater on the left.  Findings appear similar to before.    L5-S1:  Degenerative disc disease.  No significant disc bulge or focal  herniation.  Mild posterior disc bulging present.  Facet arthropathy changes left greater than right.    X ray Scoliosis Complete 06/2018  There is scoliosis of the thoracolumbar spine, which is convex to the left with the apex centered at the L1 level.  The Sequeira angle measures approximately 26.2°.  All visualized thoracic and lumbar vertebral bodies normal in height.  Low-to-moderate grade degenerative changes scattered throughout the mid and inferior thoracic spine with small scattered marginal osteophytes.  Severe degenerative change of the lumbar spine identified at the L2-3, L3-4 and L4-5 levels with large marginal osteophytes.  Paravertebral soft tissues are normal.    MRI Cervical Spine 2017  Reversal of the normal cervical lordosis.  Small spinal canal on a congenital basis.  No definite abnormal signal in the cord.  The craniocervical junction is normal.    C2-3: Minimal disc bulge.  Facet hypertrophy on the left.    C3-4: Broad-based osteophyte and disc bulge.  Uncovertebral joint disease with moderate to severe left-sided neural foraminal narrowing.  Decreased CSF spaces anterior and posterior to the cord.    C4-5: Marked disc space narrowing.  Broad-based osteophyte and disc bulge.  Decreased CSF spaces anterior and posterior to the cord.  Uncovertebral joint disease with mild neuroforaminal narrowing.    C5-6: Disc space narrowing.  Broad-based osteophyte and disc bulge.  Asymmetric disc protrusion paramedian to the left side.  Uncovertebral joint disease and degenerative changes of the facets with moderate to severe bilateral neural foraminal narrowing.  No signal abnormality in the cord.    C6-7: Disc space narrowing.  Broad-based osteophyte and disc bulge.  Bilateral uncovertebral joint disease and degenerative changes of the facets.  Moderate central spinal stenosis.    C7-T1: Minimal disc bulge.  Uncovertebral joint disease.    Assessment:     Encounter Diagnoses   Name Primary?    Lumbar  spondylosis Yes    Chronic pain disorder     DDD (degenerative disc disease), lumbar     Lumbar radiculopathy        Plan:     Talia was seen today for low-back pain.    Diagnoses and all orders for this visit:    Lumbar spondylosis    Chronic pain disorder    DDD (degenerative disc disease), lumbar    Lumbar radiculopathy        Her pain is consistent with the above.    We discussed the assessment and recommendations.  All available images were reviewed. We discussed the disease process, prognosis, treatment plan, and risks and benefits. The patient is aware of the risks and benefits of the medications being prescribed, common side effects, and proper usage. The following is the plan we agreed on:     1. Continue Cymbalta 30 mg daily.  Decrease sertraline to 50 mg daily, then 50 mg QOD, then off.  She will break this in half as she has some at home.  Her kidney function is borderline of where we would no longer be recommended to use these, so we will monitor this closely.  Recent labs reviewed today.  2. Continue gabapentin at currently dose given Cr clearance.  3. Continue amitriptyline 10 mg nightly.  4. Will reorder compounding cream for topical use.  5. Continue physical therapy HEP.  Consider FRP.  6. Schedule for right then left L2,3,4,5 RFA.  She had 80% relief from both MBBs.  7. RTC 4 weeks after completion of procedures.      LETY Marx  10/11/2018     The above plan and management options were discussed at length with patient. Patient is in agreement with the above and verbalized understanding.

## 2018-10-15 RX ORDER — LEVOTHYROXINE SODIUM 175 UG/1
175 TABLET ORAL DAILY
Qty: 90 TABLET | Refills: 3 | Status: SHIPPED | OUTPATIENT
Start: 2018-10-15 | End: 2019-06-06 | Stop reason: SDUPTHER

## 2018-10-15 NOTE — TELEPHONE ENCOUNTER
----- Message from Laurie Metzger sent at 10/15/2018 10:03 AM CDT -----  No. [phone]    Patient is now taking Levothyroxine 175mcg.   Patient needs a refill.  She is not sure if the mcg has changed.   [facility] in [place]

## 2018-10-24 ENCOUNTER — LAB VISIT (OUTPATIENT)
Dept: LAB | Facility: HOSPITAL | Age: 68
End: 2018-10-24
Attending: HOSPITALIST
Payer: MEDICARE

## 2018-10-24 DIAGNOSIS — N17.9 AKI (ACUTE KIDNEY INJURY): ICD-10-CM

## 2018-10-24 DIAGNOSIS — N18.30 CKD (CHRONIC KIDNEY DISEASE) STAGE 3, GFR 30-59 ML/MIN: Chronic | ICD-10-CM

## 2018-10-24 LAB
ALBUMIN SERPL BCP-MCNC: 4.2 G/DL
ANION GAP SERPL CALC-SCNC: 10 MMOL/L
BASOPHILS # BLD AUTO: 0.02 K/UL
BASOPHILS NFR BLD: 0.3 %
BUN SERPL-MCNC: 34 MG/DL
CALCIUM SERPL-MCNC: 9.3 MG/DL
CHLORIDE SERPL-SCNC: 101 MMOL/L
CO2 SERPL-SCNC: 29 MMOL/L
CREAT SERPL-MCNC: 1.9 MG/DL
DIFFERENTIAL METHOD: ABNORMAL
EOSINOPHIL # BLD AUTO: 0.2 K/UL
EOSINOPHIL NFR BLD: 3.5 %
ERYTHROCYTE [DISTWIDTH] IN BLOOD BY AUTOMATED COUNT: 14 %
EST. GFR  (AFRICAN AMERICAN): 30.6 ML/MIN/1.73 M^2
EST. GFR  (NON AFRICAN AMERICAN): 26.5 ML/MIN/1.73 M^2
GLUCOSE SERPL-MCNC: 134 MG/DL
HCT VFR BLD AUTO: 36.8 %
HGB BLD-MCNC: 12 G/DL
LYMPHOCYTES # BLD AUTO: 1.7 K/UL
LYMPHOCYTES NFR BLD: 24.5 %
MCH RBC QN AUTO: 30.9 PG
MCHC RBC AUTO-ENTMCNC: 32.6 G/DL
MCV RBC AUTO: 95 FL
MONOCYTES # BLD AUTO: 0.5 K/UL
MONOCYTES NFR BLD: 7.5 %
NEUTROPHILS # BLD AUTO: 4.4 K/UL
NEUTROPHILS NFR BLD: 63.9 %
PHOSPHATE SERPL-MCNC: 4.1 MG/DL
PLATELET # BLD AUTO: 138 K/UL
PMV BLD AUTO: 12.3 FL
POTASSIUM SERPL-SCNC: 4.6 MMOL/L
RBC # BLD AUTO: 3.88 M/UL
SODIUM SERPL-SCNC: 140 MMOL/L
WBC # BLD AUTO: 6.83 K/UL

## 2018-10-24 PROCEDURE — 85025 COMPLETE CBC W/AUTO DIFF WBC: CPT | Mod: PO

## 2018-10-24 PROCEDURE — 84165 PROTEIN E-PHORESIS SERUM: CPT | Mod: PO

## 2018-10-24 PROCEDURE — 80069 RENAL FUNCTION PANEL: CPT | Mod: PO

## 2018-10-24 PROCEDURE — 84165 PROTEIN E-PHORESIS SERUM: CPT | Mod: 26,,, | Performed by: PATHOLOGY

## 2018-10-24 PROCEDURE — 86334 IMMUNOFIX E-PHORESIS SERUM: CPT | Mod: PO

## 2018-10-24 PROCEDURE — 83520 IMMUNOASSAY QUANT NOS NONAB: CPT | Mod: PO

## 2018-10-24 PROCEDURE — 86334 IMMUNOFIX E-PHORESIS SERUM: CPT | Mod: 26,,, | Performed by: PATHOLOGY

## 2018-10-24 PROCEDURE — 36415 COLL VENOUS BLD VENIPUNCTURE: CPT | Mod: PO

## 2018-10-25 LAB
ALBUMIN SERPL ELPH-MCNC: 3.84 G/DL
ALPHA1 GLOB SERPL ELPH-MCNC: 0.35 G/DL
ALPHA2 GLOB SERPL ELPH-MCNC: 0.81 G/DL
B-GLOBULIN SERPL ELPH-MCNC: 0.88 G/DL
GAMMA GLOB SERPL ELPH-MCNC: 1.03 G/DL
INTERPRETATION SERPL IFE-IMP: NORMAL
KAPPA LC SER QL IA: 4.4 MG/DL
KAPPA LC/LAMBDA SER IA: 1.82
LAMBDA LC SER QL IA: 2.42 MG/DL
PROT SERPL-MCNC: 6.9 G/DL

## 2018-10-26 ENCOUNTER — HOSPITAL ENCOUNTER (OUTPATIENT)
Facility: OTHER | Age: 68
Discharge: HOME | End: 2018-10-26
Attending: ANESTHESIOLOGY | Admitting: ANESTHESIOLOGY
Payer: MEDICARE

## 2018-10-26 VITALS
HEIGHT: 64 IN | WEIGHT: 220 LBS | TEMPERATURE: 98 F | SYSTOLIC BLOOD PRESSURE: 138 MMHG | HEART RATE: 72 BPM | OXYGEN SATURATION: 90 % | BODY MASS INDEX: 37.56 KG/M2 | DIASTOLIC BLOOD PRESSURE: 84 MMHG | RESPIRATION RATE: 18 BRPM

## 2018-10-26 DIAGNOSIS — M47.816 LUMBAR SPONDYLOSIS: Primary | ICD-10-CM

## 2018-10-26 LAB
PATHOLOGIST INTERPRETATION IFE: NORMAL
PATHOLOGIST INTERPRETATION SPE: NORMAL

## 2018-10-26 PROCEDURE — 64636 DESTROY L/S FACET JNT ADDL: CPT | Mod: RT,,, | Performed by: ANESTHESIOLOGY

## 2018-10-26 PROCEDURE — 25000003 PHARM REV CODE 250: Performed by: ANESTHESIOLOGY

## 2018-10-26 PROCEDURE — 64635 DESTROY LUMB/SAC FACET JNT: CPT | Mod: RT,,, | Performed by: ANESTHESIOLOGY

## 2018-10-26 PROCEDURE — 64636 DESTROY L/S FACET JNT ADDL: CPT | Performed by: ANESTHESIOLOGY

## 2018-10-26 PROCEDURE — 64635 DESTROY LUMB/SAC FACET JNT: CPT | Performed by: ANESTHESIOLOGY

## 2018-10-26 PROCEDURE — 99152 MOD SED SAME PHYS/QHP 5/>YRS: CPT | Mod: ,,, | Performed by: ANESTHESIOLOGY

## 2018-10-26 PROCEDURE — S0020 INJECTION, BUPIVICAINE HYDRO: HCPCS | Performed by: ANESTHESIOLOGY

## 2018-10-26 PROCEDURE — 63600175 PHARM REV CODE 636 W HCPCS: Performed by: ANESTHESIOLOGY

## 2018-10-26 RX ORDER — MIDAZOLAM HYDROCHLORIDE 1 MG/ML
INJECTION INTRAMUSCULAR; INTRAVENOUS
Status: DISCONTINUED | OUTPATIENT
Start: 2018-10-26 | End: 2018-10-26 | Stop reason: HOSPADM

## 2018-10-26 RX ORDER — FENTANYL CITRATE 50 UG/ML
INJECTION, SOLUTION INTRAMUSCULAR; INTRAVENOUS
Status: DISCONTINUED | OUTPATIENT
Start: 2018-10-26 | End: 2018-10-26 | Stop reason: HOSPADM

## 2018-10-26 RX ORDER — METHYLPREDNISOLONE ACETATE 40 MG/ML
INJECTION, SUSPENSION INTRA-ARTICULAR; INTRALESIONAL; INTRAMUSCULAR; SOFT TISSUE
Status: DISCONTINUED | OUTPATIENT
Start: 2018-10-26 | End: 2018-10-26 | Stop reason: HOSPADM

## 2018-10-26 RX ORDER — BUPIVACAINE HYDROCHLORIDE 5 MG/ML
INJECTION, SOLUTION EPIDURAL; INTRACAUDAL
Status: DISCONTINUED | OUTPATIENT
Start: 2018-10-26 | End: 2018-10-26 | Stop reason: HOSPADM

## 2018-10-26 RX ORDER — SODIUM CHLORIDE 9 MG/ML
INJECTION, SOLUTION INTRAVENOUS CONTINUOUS
Status: DISCONTINUED | OUTPATIENT
Start: 2018-10-26 | End: 2018-10-26 | Stop reason: HOSPADM

## 2018-10-26 RX ORDER — LIDOCAINE HYDROCHLORIDE 20 MG/ML
INJECTION, SOLUTION INFILTRATION; PERINEURAL
Status: DISCONTINUED | OUTPATIENT
Start: 2018-10-26 | End: 2018-10-26 | Stop reason: HOSPADM

## 2018-10-26 RX ADMIN — SODIUM CHLORIDE: 0.9 INJECTION, SOLUTION INTRAVENOUS at 10:10

## 2018-10-26 NOTE — DISCHARGE INSTRUCTIONS
Adult Procedural Sedation Instructions    Recovery After Procedural Sedation (Adult)  You have been given medicine by vein to make you sleep during your surgery. This may have included both a pain medicine and sleeping medicine. Most of the effects have worn off. But you may still have some drowsiness for the next 6 to 8 hours.  Home care  Follow these guidelines when you get home:  · For the next 8 hours, you should be watched by a responsible adult. This person should make sure your condition is not getting worse.  · Don't drink any alcohol for the next 24 hours.  · Don't drive, operate dangerous machinery, or make important business or personal decisions during the next 24 hours.  Note: Your healthcare provider may tell you not to take any medicine by mouth for pain or sleep in the next 4 hours. These medicines may react with the medicines you were given in the hospital. This could cause a much stronger response than usual.  Follow-up care  Follow up with your healthcare provider if you are not alert and back to your usual level of activity within 12 hours.  When to seek medical advice  Call your healthcare provider right away if any of these occur:  · Drowsiness gets worse  · Weakness or dizziness gets worse  · Repeated vomiting  · You can't be awakened   Date Last Reviewed: 10/18/2016  © 2704-5654 [employer]. [address] [place] [address], [place] [address]. All rights reserved. This information is not intended as a substitute for professional medical care. Always follow your healthcare professional's instructions.       Thank you for allowing us to care for you today. You may receive a survey about the care we provided. Your feedback is valuable and helps us provide excellent care throughout the community.     Home Care Instructions for Pain Management:    1. DIET:   You may resume your normal diet today.   2. BATHING:   You may shower with luke warm water. No tub baths or anything that will soak  injection sites under water for the next 24 hours.  3. DRESSING:   You may remove your bandage today.   4. ACTIVITY LEVEL:   You may resume your normal activities 24 hrs after your procedure. Nothing strenuous today.  5. MEDICATIONS:   You may resume your normal medications today. To restart blood thinners, ask your doctor.  6. DRIVING    If you have received any sedatives by mouth today, you may not drive for 12 hours.    If you have received any sedation through your IV, you may not drive for 24 hrs.   7. SPECIAL INSTRUCTIONS:   No heat to the injection site for 24 hrs including, hot bath or shower, heating pad, moist heat, or hot tubs.    Use ice pack to injection site for any pain or discomfort.  Apply ice packs for 20 minute intervals as needed.    IF you have diabetes, be sure to monitor your blood sugar more closely. IF your injection contained steroids your blood sugar levels may become higher than normal.    If you are still having pain upon discharge:  Your pain may improve over the next 48 hours. The anesthetic (numbing medication) works immediately to 48 hours. IF your injection contained a steroid (anti-inflammatory medication), it takes approximately 3 days to start feeling relief and 7-10 days to see your greatest results from the medication. It is possible you may need subsequent injections. This would be discussed at your follow up appointment with pain management or your referring doctor.      PLEASE CALL YOUR DOCTOR IF:  1. Redness or swelling around the injection site.  2. Fever of 101 degrees or more  3. Drainage (pus) from the injection site.  4. For any continuous bleeding (some dried blood over the incision is normal.)    FOR EMERGENCIES:   If any unusual problems or difficulties occur during clinic hours, call [phone] or [phone].

## 2018-10-26 NOTE — INTERVAL H&P NOTE
The patient has been examined and the H&P has been reviewed:    I concur with the findings and no changes have occurred since H&P was written.    Anesthesia/Surgery risks, benefits and alternative options discussed and understood by patient/family.        Active Hospital Problems    Diagnosis  POA    Lumbar spondylosis [M47.816]  Yes      Resolved Hospital Problems   No resolved problems to display.     I have seen the patient with the resident physician.  We have come up with the above plan.  The patient is in agreement with our plan.

## 2018-10-26 NOTE — DISCHARGE SUMMARY
Discharge Note  Short Stay      SUMMARY     Admit Date: 10/26/2018    Attending Physician: Julieth Christopher    Procedure: Right L2-5 Lumbar Medial Branch Nerve Thermal Radiofrequency Ablation    Discharge Physician: Julieth Christopher    Discharge Date: 10/26/2018 11:32 AM    Final Diagnosis: Lumbar spondylosis [M47.816]    Disposition: Home or self care    Patient Instructions:   Current Discharge Medication List      CONTINUE these medications which have NOT CHANGED    Details   ALPRAZolam (XANAX) 0.25 MG tablet Take 1 tablet (0.25 mg total) by mouth once daily.  Qty: 90 tablet, Refills: 1    Associated Diagnoses: Anxiety      amitriptyline (ELAVIL) 10 MG tablet Take 1 tablet (10 mg total) by mouth nightly as needed.  Qty: 90 tablet, Refills: 3      atenolol (TENORMIN) 50 MG tablet TAKE 1 AND 1/2 TALBLET BY MOUTH EVERY DAY  Qty: 135 tablet, Refills: 1      atorvastatin (LIPITOR) 10 MG tablet Take 1 tablet (10 mg total) by mouth once daily.  Qty: 90 tablet, Refills: 3      cholecalciferol, vitamin D3, (VITAMIN D3) 5,000 unit Tab Take 5,000 Units by mouth once daily.  Qty: 90 tablet, Refills: 3      DULoxetine (CYMBALTA) 30 MG capsule Take 1 capsule (30 mg total) by mouth once daily.  Qty: 30 capsule, Refills: 11    Associated Diagnoses: Chronic pain disorder; Lumbar spondylosis      gabapentin (NEURONTIN) 400 MG capsule Take 1 capsule (400 mg total) by mouth 2 (two) times daily.  Qty: 60 capsule, Refills: 3      levothyroxine (SYNTHROID, LEVOTHROID) 175 MCG tablet Take 1 tablet (175 mcg total) by mouth once daily.  Qty: 90 tablet, Refills: 3      sertraline (ZOLOFT) 100 MG tablet Take 1 tablet (100 mg total) by mouth once daily.  Qty: 90 tablet, Refills: 3      zolpidem (AMBIEN) 10 mg Tab Take 1 tablet (10 mg total) by mouth nightly as needed.  Qty: 30 tablet, Refills: 5    Associated Diagnoses: Anxiety             Resume home diet and activity

## 2018-11-01 ENCOUNTER — OFFICE VISIT (OUTPATIENT)
Dept: NEPHROLOGY | Facility: CLINIC | Age: 68
End: 2018-11-01
Payer: MEDICARE

## 2018-11-01 VITALS
HEART RATE: 48 BPM | OXYGEN SATURATION: 97 % | BODY MASS INDEX: 38.54 KG/M2 | WEIGHT: 225.75 LBS | SYSTOLIC BLOOD PRESSURE: 130 MMHG | DIASTOLIC BLOOD PRESSURE: 68 MMHG | HEIGHT: 64 IN

## 2018-11-01 DIAGNOSIS — M89.9 CHRONIC KIDNEY DISEASE-MINERAL AND BONE DISORDER: Chronic | ICD-10-CM

## 2018-11-01 DIAGNOSIS — R03.0 BLOOD PRESSURE ELEVATED WITHOUT HISTORY OF HTN: ICD-10-CM

## 2018-11-01 DIAGNOSIS — N18.9 CHRONIC KIDNEY DISEASE-MINERAL AND BONE DISORDER: Chronic | ICD-10-CM

## 2018-11-01 DIAGNOSIS — E83.9 CHRONIC KIDNEY DISEASE-MINERAL AND BONE DISORDER: Chronic | ICD-10-CM

## 2018-11-01 DIAGNOSIS — E78.5 HYPERLIPIDEMIA, UNSPECIFIED HYPERLIPIDEMIA TYPE: ICD-10-CM

## 2018-11-01 DIAGNOSIS — N18.4 CKD STAGE G4/A2, GFR 15-29 AND ALBUMIN CREATININE RATIO 30-299 MG/G: Primary | ICD-10-CM

## 2018-11-01 PROCEDURE — 99213 OFFICE O/P EST LOW 20 MIN: CPT | Mod: S$GLB,,, | Performed by: HOSPITALIST

## 2018-11-01 PROCEDURE — 1101F PT FALLS ASSESS-DOCD LE1/YR: CPT | Mod: CPTII,S$GLB,, | Performed by: HOSPITALIST

## 2018-11-01 PROCEDURE — 99999 PR PBB SHADOW E&M-EST. PATIENT-LVL III: CPT | Mod: PBBFAC,GC,, | Performed by: HOSPITALIST

## 2018-11-01 RX ORDER — GABAPENTIN 400 MG/1
400 CAPSULE ORAL
COMMUNITY
Start: 2018-10-16 | End: 2019-09-30

## 2018-11-01 NOTE — PATIENT INSTRUCTIONS
Chronic Kidney Disease (CKD)     The role of the kidneys is to remove waste products and extra water from the blood.  When the kidneys do not work as they should, waste products begin to build up in the blood. This is called chronic kidney disease (CKD). CKD means that you have kidney damage or a decrease in kidney function lasting at least 3 months. CKD allows extra water, waste, and toxins to build up in the body. This can eventually become life-threatening. You might need dialysis or a kidney transplant to stay alive. This most severe form is called end stage renal disease.  Diabetes is the leading causes of chronic renal failure. Other causes include high blood pressure, hardening of the arteries (atherosclerosis), lupus, inflammation of the blood vessels (vasculitis), and past viral or bacterial infections. Certain over-the-counter pain medicines can cause renal failure when taken often over a long period of time. These include aspirin, ibuprofen, and related anti-inflammatory medicines called NSAIDs (nonsteroidal anti-inflammatory drugs).  Home care  The following guidelines will help you care for yourself at home:  · If you have diabetes, talk with your healthcare provider about keeping your blood sugar under control. Ask if you need to make and changes to your diet, lifestyle, or medicines.  · If you have high blood pressure:  ¨ Take prescribed medicine to lower your blood pressure to the recommended goal of less than 130/80.  ¨ Start a regular exercise program that you enjoy. Check with your healthcare provider to be sure your planned exercise program is right for you.  ¨ Eat less salt (sodium). Your healthcare provider can tell you how much salt per day is safe for you.  · If you are overweight, talk with your healthcare provider about a weight loss plan.  · If you smoke, you must quit. Smoking makes kidney disease worse. Talk with your healthcare provider about ways to help you quit.  For more  information, visit the following links:  ¨ www.smokefree.gov/sites/default/files/pdf/clearing-the-air-accessible.pdf  ¨ www.smokefree.gov  ¨ www.cancer.org/healthy/stayawayfromtobacco/guidetoquittingsmoking/  · Most people with CKD need to follow a special diet.  Be sure you understand yours. In general, you will need to limit protein, salt, potassium, and phosphorus. You also need to limit how much fluid you drink.   · CKD is a risk factor for heart disease. Talk with your healthcare provider about any other risk factors you might have and what you can do to lessen them.  · Talk with your healthcare provider about any medicines you are taking to find out if they need to be reduced or stopped.  · Don't use the following over-the-counter medicines, or consult your healthcare provider before using:  ¨ Aspirin and NSAIDs such as ibuprofen or naproxen. Using acetaminophen for fever or pain is OK.  ¨ Laxatives and antacids containing magnesium or aluminum  ¨ Fleet or phospho soda enemas containing phosphorus  ¨ Certain stomach acid-blocking medicine such as cimetidine or ranitidine   ¨ Decongestants containing pseudoephedrine   ¨ Herbal supplements  Follow-up care  Follow up with your healthcare provider, or as advised. Contact one of the following for more information:  · American Association of Kidney Patients 477-019-7698 www.aakp.org  · National Kidney Foundation 851-233-0621 www.kidney.org  · American Kidney Fund 163-891-9564 www.kidneyfund.org  · National Kidney Disease Education Program 866-4KIDNEY www.nkdep.nih.gov  If an X-ray, ECG (cardiogram), or other diagnostic test was taken, you will be told of any new findings that may affect your care.  Call 911  Call 911 if you have any of the following:  · Severe weakness, dizziness, fainting, drowsiness, or confusion  · Chest pain or shortness of breath  · Heart beating fast, slow, or irregularly  When to seek medical advice  Call your healthcare provider right away  if any of these occur:  · Nausea or vomiting  · Fever of 100.4°F (38°C) or higher, or as directed by your healthcare provider  · Unexpected weight gain or swelling in the legs, ankles, or around the eyes  · Decrease or absent urine output  Date Last Reviewed: 9/1/2016  © 7654-3917 [employer]. [address] [place] [address]. All rights reserved. This information is not intended as a substitute for professional medical care. Always follow your healthcare professional's instructions.          Low-Salt Diet  This diet removes foods that are high in salt. It also limits the amount of salt you use when cooking. It is most often used for people with high blood pressure, edema (fluid retention), and kidney, liver, or heart disease.  Table salt contains the mineral sodium. Your body needs sodium to work normally. But too much sodium can make your health problems worse. Your healthcare provider is recommending a low-salt (also called low-sodium) diet for you. Your total daily allowance of salt is 1,500 to 2,300 milligrams (mg). It is less than 1 teaspoon of table salt. This means you can have only about 500 to 700 mg of sodium at each meal. People with certain health problems should limit salt intake to the lower end of the recommended range.    When you cook, dont add much salt. If you can cook without using salt, even better. Dont add salt to your food at the table.  When shopping, read food labels. Salt is often called sodium on the label. Choose foods that are salt-free, low salt, or very low salt. Note that foods with reduced salt may not lower your salt intake enough.    Beans, potatoes, and pasta  Ok: Dry beans, split peas, lentils, potatoes, rice, macaroni, pasta, spaghetti without added salt  Avoid: Potato chips, tortilla chips, and similar products  Breads and cereals  Ok: Low-sodium breads, rolls, cereals, and cakes; low-salt crackers, matzo crackers  Avoid: Salted crackers, pretzels,  popcorn, Malay toast, pancakes, muffins  Dairy  Ok: Milk, chocolate milk, hot chocolate mix, low-salt cheeses, and yogurt  Avoid: Processed cheese and cheese spreads; Roquefort, Camembert, and cottage cheese; buttermilk, instant breakfast drink  Desserts  Ok: Ice cream, frozen yogurt, juice bars, gelatin, cookies and pies, sugar, honey, jelly, hard candy  Avoid: Most pies, cakes and cookies prepared or processed with salt; instant pudding  Drinks  Ok: Tea, coffee, fizzy (carbonated) drinks, juices  Avoid: Flavored coffees, electrolyte replacement drinks, sports drinks  Meats  Ok: All fresh meat, fish, poultry, low-salt tuna, eggs, egg substitute  Avoid: Smoked, pickled, brine-cured, or salted meats and fish. This includes kurtz, chipped beef, corned beef, hot dogs, deli meats, ham, kosher meats, salt pork, sausage, canned tuna, salted codfish, smoked salmon, herring, sardines, or anchovies.  Seasonings and spices  Ok: Most seasonings are okay. Good substitutes for salt include: fresh herb blends, hot sauce, lemon, garlic, christianson, vinegar, dry mustard, parsley, cilantro, horseradish, tomato paste, regular margarine, mayonnaise, unsalted butter, cream cheese, vegetable oil, cream, low-salt salad dressing and gravy.  Avoid: Regular ketchup, relishes, pickles, soy sauce, teriyaki sauce, Worcestershire sauce, BBQ sauce, tartar sauce, meat tenderizer, chili sauce, regular gravy, regular salad dressing, salted butter  Soups  Ok: Low-salt soups and broths made with allowed foods  Avoid: Bouillon cubes, soups with smoked or salted meats, regular soup and broth  Vegetables  Ok: Most vegetables are okay; also low-salt tomato and vegetable juices  Avoid: Sauerkraut and other brine-soaked vegetables; pickles and other pickled vegetables; tomato juice, olives  Date Last Reviewed: 8/1/2016  © 6570-4468 [employer]. [address] [place] [address], [place] [address]. All rights reserved. This information is not intended as a  substitute for professional medical care. Always follow your healthcare professional's instructions.    Avoid Aleve, Motrin, Ibuprofen and other products containing NSAIDs (non-steroidal anti-inflammatories).

## 2018-11-01 NOTE — PROGRESS NOTES
Subjective:       Patient ID: Talia Dillon 68 y.o. White female who presents for new evaluation of Chronic Kidney Disease and Hypertension    Interval Hx:   Ms Talia Dillon is a here for follow up for his TINA and CKD stage 3. She feels well and denies an symptoms. She endorses quitting smoking, and increasing water intake. Her sCr has increased about a month ago from 1.77 to 2.12 but today is down again to 1.9 mg/dL. She has stopped taking NSAIDs for pain. She saw Rheumatology she has now started steroid injections with pain management. She tells me Baclofen was stopped and never took it. Her sCr waxes and wanes hitsorocally her baseline has been 1.5 - 1.7 mg/dL but she has not return to her baseline closes she has been was 1.77. Her BP run -140's and DBP has been running 65-90's without Dx of HTN, she has a Hx of paroxysmal SVT and takes atenolol and her pulse is always high 40's today 48 today in clinic.    HPI  Talia Dillon is a 68 y.o. White female with a PMHx relevant for Hypothyroidism, PSVT currently on atenolol, anxiety, arthralgias multiple joints schedule to see Rheumatologist, tobacco abuse and morbid obesity she present to Nephrology consulted for TINA. She was referred by her PCP James Vallecillo MD after a rapid rise in sCr suspected to be secondary to volume depletion from diarrhea. She has a sCr of 1.5 mg/dL back in 2015 and during 2017 she had a sCr of 1.5-1.7 mg/dL s baseline. She was recently admitted to [facility] secondary to Diarrhea, dehydration and UTI as per Hospital Records. Records from her admission reflect sCr of 1.98 mg/dL then upon f/u with her PCP her sCr had increase to 2.39 mg/dL. She was dc with Flagyl and Cipro course and Cipro was stopped per PCP upon follow up post D/C. UCx efelcted pansensitve E.Coli. She was on Spironolactone for LE swelling when admitted for her diarrhea and UTI. It was stopped 4/16/2018 after visit with her PCP. She continues to have diarrhea  on and off she tells me she had 3 BM yesterday all watery non visible blood but Hospital records reflect occult blood positive. She tries to keep hydrated but she thinks she is not keeping up with fluid losses from her diarrrhea. She endorses taking Naproxen (aleve) for a long period of time but she stop taking it early this year for chronic neck and joint pains per PCP recommendation. Diarrhea has been ongoing for about one month, it stopped for one week post D/C but has return in less severity.     Review of Systems    Review of Systems   Constitutional: Positive for fatigue. Negative for appetite change, fever and unexpected weight change.   HENT: Negative for facial swelling, hearing loss and tinnitus.    Eyes: Negative for visual disturbance.   Respiratory: Negative for chest tightness and shortness of breath.    Cardiovascular: Negative for chest pain and leg swelling.   Gastrointestinal: Negative for abdominal distention, abdominal pain, diarrhea and nausea.   Genitourinary: Negative for difficulty urinating, dysuria and flank pain.   Musculoskeletal: Negative for arthralgias and myalgias.   Skin: Negative for color change.   Neurological: Positive for weakness. Negative for dizziness, syncope, light-headedness and headaches.   Psychiatric/Behavioral: Negative for behavioral problems and confusion.       Objective:      Physical Exam    Physical Exam   Constitutional: She is oriented to person, place, and time. She appears well-developed and well-nourished. No distress.   HENT:   Head: Normocephalic and atraumatic.   Eyes: Conjunctivae are normal. Pupils are equal, round, and reactive to light.   Neck: Normal range of motion. Neck supple.   Cardiovascular: Normal rate, regular rhythm and intact distal pulses. Exam reveals no gallop and no friction rub.   No murmur heard.  Pulmonary/Chest: Effort normal and breath sounds normal. She has no wheezes. She has no rales.   Abdominal: Soft. Bowel sounds are normal.  She exhibits no distension. There is no tenderness.   Musculoskeletal: Normal range of motion. She exhibits no edema or deformity.   Neurological: She is alert and oriented to person, place, and time. She has normal reflexes.   Skin: Skin is warm and dry. She is not diaphoretic.   Psychiatric: She has a normal mood and affect. Her behavior is normal.       Assessment:        ICD-10-CM ICD-9-CM   1. CKD stage G4/A2, GFR 15-29 and albumin creatinine ratio  mg/g N18.4 585.4   2. Chronic kidney disease-mineral and bone disorder N18.9 585.9    E83.9 275.9    M89.9 733.90   3. Blood pressure elevated without history of HTN R03.0 796.2   4. Hyperlipidemia, unspecified hyperlipidemia type E78.5 272.4        Plan:   1. CKD stage G4,A2 eGFR sCr baseline 1.5-1.7 mg/dL up until 12/2017 since then she has had couple of bouts of TINA with ot complete retunr to milana. TINA on CKD stage 3b-4:  Much improved; Suspected this was a pre-renal component in light of volume depletion from diarrhea and concomitant use of diuretic (spironolactone) during first pat of the year . Her Diarrhea has resolved but occasionally flares up, she was educated on proper hydration. Her sCr has improved to 1.77 mg/dL. CKD is unclear but suspect prolong use of NSAID's, Tobacco abuse. She has no Dx of HTN and her BP per our records have been SBP < 130's but she takes Atenolol for PSVT which can be controlling her BP. US renal shows some evidence of chronic Kidney disease. She has a PMHx for significant Tobacco abuse and she is morbidly obese which are risk factors for CKD.  Lab Results   Component Value Date    CREATININE 1.90 (H) 10/24/2018   · Encourage hydration she is doing better no edema noted  · Off diuretics BP is up and down, diarrhea now resolved had held diuretics while diarrhea improved  · Consider restarting her diuretics Spironolactone if BP running high > 130/80 mmHg will also help with proteinuria  · Low salt Diet   · Avoid all  NSAID's  · Consider Sleep study     · Amb referral for tobacco cessation clinic  · BP very good in clinic  · Please use caution with Gabapentin and renal dose 400 mg BID for her GFR is a very high dose consider reducing to max 600 mg daily    Protein Creatinine Ratios:  Prot/Creat Ratio, Ur   Date Value Ref Range Status   10/24/2018 0.48 (H) 0.00 - 0.20 Final   06/04/2018 0.69 (H) 0.00 - 0.20 Final   05/02/2018 0.29 (H) 0.00 - 0.20 Final   Last UPCr had mild elevation   Not on CATIE Spironolactone had been held secondary to diarrhea, now resolved, BP up and down will restart if BP > 130/80 mmHg    Acid-Base: at goal  Lab Results   Component Value Date     10/24/2018    K 4.6 10/24/2018    CO2 29 10/24/2018     Progression to ESRD: we discussed possible progression to ESRD but she is doing everything to slow her GKD progression. She quit smoking and has avoid NSAIDs and is drinking more water. It last 9 mo GFR has decline will continue to trend CKD    2. Elevated BP with out diagnosis of HTN: Blood pressur is high normal despite therapy tor pSVT and documented > 2 reads above 140/85 mmHg which qualifies for dx of HTN  - Low salt diet  - Continue Atenolol  - Discussed no CATIE at this time proteinuria of 0.29-0.69 g/g but Spironolactone has been held due to diarrhea low threshold to restart if BP continue to be > 130/80 mmHg.  - she remains Juan and heart rate has been lower in 2018 compare to 2017 this may also be affecting kidney fx dont see in her records that Cardiology has seen her. She may need a Holter evaluation for possible SSS in setting of hx pSVT. Consider cardiology consult.    3. 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 Will check for PTH  2. Vit D low @ 25 cont cholecalciferol OTC    4. Lipid management: not on Statin  Lab Results   Component Value Date    LDLCALC 151.6 09/12/2018   1. as per PCP  2. In CKD stage 4 with HTN goal LDL  is lower specially with tobacco use. Consider Statin for LDL target < 100.    Follow up in Clinic 3 months with labs with RFP, UA, UPCR, CBC  Forrest Amos MD  Nephrology Fellow   [phone]

## 2018-11-06 PROBLEM — M89.9 CHRONIC KIDNEY DISEASE-MINERAL AND BONE DISORDER: Chronic | Status: ACTIVE | Noted: 2018-11-06

## 2018-11-06 PROBLEM — N18.9 CHRONIC KIDNEY DISEASE-MINERAL AND BONE DISORDER: Chronic | Status: ACTIVE | Noted: 2018-11-06

## 2018-11-06 PROBLEM — E83.9 CHRONIC KIDNEY DISEASE-MINERAL AND BONE DISORDER: Chronic | Status: ACTIVE | Noted: 2018-11-06

## 2018-11-06 PROBLEM — N18.32 CKD STAGE G3B/A3, GFR 30-44 AND ALBUMIN CREATININE RATIO >300 MG/G: Status: ACTIVE | Noted: 2018-05-02

## 2018-11-06 PROBLEM — N18.4: Status: ACTIVE | Noted: 2018-05-02

## 2018-11-09 ENCOUNTER — HOSPITAL ENCOUNTER (OUTPATIENT)
Facility: OTHER | Age: 68
Discharge: HOME | End: 2018-11-09
Attending: ANESTHESIOLOGY | Admitting: ANESTHESIOLOGY
Payer: MEDICARE

## 2018-11-09 VITALS
RESPIRATION RATE: 18 BRPM | DIASTOLIC BLOOD PRESSURE: 73 MMHG | TEMPERATURE: 98 F | WEIGHT: 220 LBS | BODY MASS INDEX: 37.56 KG/M2 | HEART RATE: 54 BPM | HEIGHT: 64 IN | OXYGEN SATURATION: 98 % | SYSTOLIC BLOOD PRESSURE: 156 MMHG

## 2018-11-09 DIAGNOSIS — M47.816 LUMBAR SPONDYLOSIS: Primary | ICD-10-CM

## 2018-11-09 PROCEDURE — 25000003 PHARM REV CODE 250: Performed by: ANESTHESIOLOGY

## 2018-11-09 PROCEDURE — 25500020 PHARM REV CODE 255: Performed by: ANESTHESIOLOGY

## 2018-11-09 PROCEDURE — 99152 MOD SED SAME PHYS/QHP 5/>YRS: CPT | Mod: ,,, | Performed by: ANESTHESIOLOGY

## 2018-11-09 PROCEDURE — 64635 DESTROY LUMB/SAC FACET JNT: CPT | Mod: LT,,, | Performed by: ANESTHESIOLOGY

## 2018-11-09 PROCEDURE — S0020 INJECTION, BUPIVICAINE HYDRO: HCPCS | Performed by: ANESTHESIOLOGY

## 2018-11-09 PROCEDURE — 64636 DESTROY L/S FACET JNT ADDL: CPT | Mod: LT,,, | Performed by: ANESTHESIOLOGY

## 2018-11-09 PROCEDURE — 64636 DESTROY L/S FACET JNT ADDL: CPT | Performed by: ANESTHESIOLOGY

## 2018-11-09 PROCEDURE — 64635 DESTROY LUMB/SAC FACET JNT: CPT | Performed by: ANESTHESIOLOGY

## 2018-11-09 PROCEDURE — 63600175 PHARM REV CODE 636 W HCPCS: Performed by: ANESTHESIOLOGY

## 2018-11-09 RX ORDER — MIDAZOLAM HYDROCHLORIDE 1 MG/ML
INJECTION INTRAMUSCULAR; INTRAVENOUS
Status: DISCONTINUED | OUTPATIENT
Start: 2018-11-09 | End: 2018-11-09 | Stop reason: HOSPADM

## 2018-11-09 RX ORDER — LIDOCAINE HYDROCHLORIDE 20 MG/ML
INJECTION, SOLUTION INFILTRATION; PERINEURAL
Status: DISCONTINUED | OUTPATIENT
Start: 2018-11-09 | End: 2018-11-09 | Stop reason: HOSPADM

## 2018-11-09 RX ORDER — FENTANYL CITRATE 50 UG/ML
INJECTION, SOLUTION INTRAMUSCULAR; INTRAVENOUS
Status: DISCONTINUED | OUTPATIENT
Start: 2018-11-09 | End: 2018-11-09 | Stop reason: HOSPADM

## 2018-11-09 RX ORDER — SODIUM CHLORIDE 9 MG/ML
INJECTION, SOLUTION INTRAVENOUS CONTINUOUS
Status: DISCONTINUED | OUTPATIENT
Start: 2018-11-09 | End: 2018-11-09 | Stop reason: HOSPADM

## 2018-11-09 RX ORDER — BUPIVACAINE HYDROCHLORIDE 5 MG/ML
INJECTION, SOLUTION EPIDURAL; INTRACAUDAL
Status: DISCONTINUED | OUTPATIENT
Start: 2018-11-09 | End: 2018-11-09 | Stop reason: HOSPADM

## 2018-11-09 RX ORDER — METHYLPREDNISOLONE ACETATE 40 MG/ML
INJECTION, SUSPENSION INTRA-ARTICULAR; INTRALESIONAL; INTRAMUSCULAR; SOFT TISSUE
Status: DISCONTINUED | OUTPATIENT
Start: 2018-11-09 | End: 2018-11-09 | Stop reason: HOSPADM

## 2018-11-09 NOTE — DISCHARGE INSTRUCTIONS
Adult Procedural Sedation Instructions    Recovery After Procedural Sedation (Adult)  You have been given medicine by vein to make you sleep during your surgery. This may have included both a pain medicine and sleeping medicine. Most of the effects have worn off. But you may still have some drowsiness for the next 6 to 8 hours.  Home care  Follow these guidelines when you get home:  · For the next 8 hours, you should be watched by a responsible adult. This person should make sure your condition is not getting worse.  · Don't drink any alcohol for the next 24 hours.  · Don't drive, operate dangerous machinery, or make important business or personal decisions during the next 24 hours.  Note: Your healthcare provider may tell you not to take any medicine by mouth for pain or sleep in the next 4 hours. These medicines may react with the medicines you were given in the hospital. This could cause a much stronger response than usual.  Follow-up care  Follow up with your healthcare provider if you are not alert and back to your usual level of activity within 12 hours.  When to seek medical advice  Call your healthcare provider right away if any of these occur:  · Drowsiness gets worse  · Weakness or dizziness gets worse  · Repeated vomiting  · You can't be awakened   Date Last Reviewed: 10/18/2016  © 9866-6362 [employer]. [address] [place] [address], [place] [address]. All rights reserved. This information is not intended as a substitute for professional medical care. Always follow your healthcare professional's instructions.       Thank you for allowing us to care for you today. You may receive a survey about the care we provided. Your feedback is valuable and helps us provide excellent care throughout the community.     Home Care Instructions for Pain Management:    1. DIET:   You may resume your normal diet today.   2. BATHING:   You may shower with luke warm water. No tub baths or anything that will soak  injection sites under water for the next 24 hours.  3. DRESSING:   You may remove your bandage today.   4. ACTIVITY LEVEL:   You may resume your normal activities 24 hrs after your procedure. Nothing strenuous today.  5. MEDICATIONS:   You may resume your normal medications today. To restart blood thinners, ask your doctor.  6. DRIVING    If you have received any sedatives by mouth today, you may not drive for 12 hours.    If you have received any sedation through your IV, you may not drive for 24 hrs.   7. SPECIAL INSTRUCTIONS:   No heat to the injection site for 24 hrs including, hot bath or shower, heating pad, moist heat, or hot tubs.    Use ice pack to injection site for any pain or discomfort.  Apply ice packs for 20 minute intervals as needed.    IF you have diabetes, be sure to monitor your blood sugar more closely. IF your injection contained steroids your blood sugar levels may become higher than normal.    If you are still having pain upon discharge:  Your pain may improve over the next 48 hours. The anesthetic (numbing medication) works immediately to 48 hours. IF your injection contained a steroid (anti-inflammatory medication), it takes approximately 3 days to start feeling relief and 7-10 days to see your greatest results from the medication. It is possible you may need subsequent injections. This would be discussed at your follow up appointment with pain management or your referring doctor.      PLEASE CALL YOUR DOCTOR IF:  1. Redness or swelling around the injection site.  2. Fever of 101 degrees or more  3. Drainage (pus) from the injection site.  4. For any continuous bleeding (some dried blood over the incision is normal.)    FOR EMERGENCIES:   If any unusual problems or difficulties occur during clinic hours, call [phone] or [phone].

## 2018-11-09 NOTE — INTERVAL H&P NOTE
The patient has been examined and the H&P has been reviewed:    I concur with the findings and no changes have occurred since H&P was written.    No change in the location or quality of the pain since the most recent clinic visit.  No new symptoms.  She wishes to proceed with the procedure today.    PE, unchanged from previous:  CV:  RRR  Resp: unlabored, no wheezing.    NPO since MN.    Anesthesia/Surgery risks, benefits and alternative options discussed and understood by patient/family.          Active Hospital Problems    Diagnosis  POA    Lumbar spondylosis [M47.816]  Yes      Resolved Hospital Problems   No resolved problems to display.     I have seen the patient with the resident physician.  We have come up with the above plan.  The patient is in agreement with our plan.

## 2018-11-09 NOTE — OP NOTE
"Date of Procedure: 11/09/2018    Procedure:  Left L2-5 Lumbar Medial Branch Nerve Thermal Radiofrequency Ablation    Pre-op diagnosis: Lumbar Spondylosis [M47.816]    Post-op diagnosis: Lumbar Spondylosis [M47.816]     Physician: Dr. Julieth Christopher     Assistant: Dr. Marrufo    Anesthestia: local/IV sedation:  Versed 2 mg and fentanyl 100 mcg IV.  Conscious sedation provided by MD and monitored by RN.  Total sedation time was less than 45 minutes. (See nurse documentation and case log for sedation time)    EBL: None    Specimens: None    All medications, allergies, and relevant histories were reviewed. No recent antibiotics or infections.  A time-out was taken to verify the correct patient, procedure, laterality, and appropriate medications/allergies.    Procedure: Lumbar RFA    Lumbar Medial Branch Block with radiofrequency ablation, levels L2-5     The procedure risks, benefits, and possible complications were discussed with the patient including nerve damage, infection, spinal headache, and paresis.   Patient was placed in the prone position with the midriff elevated. Skin was prepped with CHG and draped. Oblique view of the spine was obtained with fluoroscopy. Entry sites were marked over the skin and Xylocaine 1% was used to anesthetize the skin and subcutaneous tissues.     A 3.5 " [place] Venom needle was introduced at an angle to parallel the medial branches in the groove between superior articular process and transverse process, and L5 primary dorsal ramus at the junction of the S1 superior articular process and sacral ala.    Multifidus stim elicited at each level.  No distal motor stimulation was elicited at any level at 2V with a frequency of 2Hz.  All impedances were within the acceptable range.    1cc of 2% lidocaine was injected at each level.  Thermal RF was then conducted at each level at 80 degrees, for 2:30 minutes   1 cc of a mixture of 0.5% bupivacaine with dexamethasone 5 mg was injected at each " level.    Patient tolerated the procedure well and there were no complications.      Future Management:   If helpful, can repeat as needed.  Follow up with me in 4-6 weeks.      I certify that I provided the above services.  I was present for the entire procedure, which was performed by myself with the assistance of the resident physician.  There were no parts of the procedure that were performed not by myself or without my direct supervision.

## 2018-11-09 NOTE — DISCHARGE SUMMARY
Discharge Note  Short Stay      SUMMARY     Admit Date: 11/9/2018    Attending Physician: Julieth Christopher    Procedure: Left L2-5 Lumbar Medial Branch Nerve Thermal Radiofrequency Ablation    Discharge Physician: Julieth Christopher    Discharge Date: 11/9/2018 4:15 PM    Final Diagnosis: Lumbar spondylosis [M47.816]    Disposition: Home or self care    Patient Instructions:   Discharge Medication List as of 11/9/2018  2:03 PM      CONTINUE these medications which have NOT CHANGED    Details   ALPRAZolam (XANAX) 0.25 MG tablet Take 1 tablet (0.25 mg total) by mouth once daily., Starting Fri 9/7/2018, Normal      amitriptyline (ELAVIL) 10 MG tablet Take 1 tablet (10 mg total) by mouth nightly as needed., Starting Fri 9/7/2018, Normal      atenolol (TENORMIN) 50 MG tablet TAKE 1 AND 1/2 TALBLET BY MOUTH EVERY DAY, Normal      atorvastatin (LIPITOR) 10 MG tablet Take 1 tablet (10 mg total) by mouth once daily., Starting Sat 9/15/2018, Until Sun 9/15/2019, Normal      cholecalciferol, vitamin D3, (VITAMIN D3) 5,000 unit Tab Take 5,000 Units by mouth once daily., Starting Sat 9/15/2018, Normal      DULoxetine (CYMBALTA) 30 MG capsule Take 1 capsule (30 mg total) by mouth once daily., Starting Thu 8/30/2018, Until Fri 8/30/2019, Normal      gabapentin (NEURONTIN) 400 MG capsule Starting Tue 10/16/2018, Historical Med      levothyroxine (SYNTHROID, LEVOTHROID) 175 MCG tablet Take 1 tablet (175 mcg total) by mouth once daily., Starting Mon 10/15/2018, Normal      sertraline (ZOLOFT) 100 MG tablet Take 1 tablet (100 mg total) by mouth once daily., Starting Fri 9/7/2018, Normal      zolpidem (AMBIEN) 10 mg Tab Take 1 tablet (10 mg total) by mouth nightly as needed., Starting Fri 9/7/2018, Normal             Resume home diet and activity

## 2018-11-19 ENCOUNTER — TELEPHONE (OUTPATIENT)
Dept: FAMILY MEDICINE | Facility: CLINIC | Age: 68
End: 2018-11-19

## 2018-11-19 DIAGNOSIS — E66.09 OBESITY DUE TO EXCESS CALORIES, UNSPECIFIED CLASSIFICATION, UNSPECIFIED WHETHER SERIOUS COMORBIDITY PRESENT: Primary | ICD-10-CM

## 2018-11-19 NOTE — TELEPHONE ENCOUNTER
----- Message from Kamille Pulido sent at 11/19/2018 11:37 AM CST -----  Contact: [phone]   Patient is requesting a referral for a weight management doctor. Her kidney doctor suggested that she goes. Please call to discuss. Thanks

## 2018-11-26 ENCOUNTER — INITIAL CONSULT (OUTPATIENT)
Dept: BARIATRICS | Facility: CLINIC | Age: 68
End: 2018-11-26
Payer: MEDICARE

## 2018-11-26 VITALS
SYSTOLIC BLOOD PRESSURE: 112 MMHG | HEART RATE: 70 BPM | DIASTOLIC BLOOD PRESSURE: 60 MMHG | HEIGHT: 64 IN | BODY MASS INDEX: 39.26 KG/M2 | WEIGHT: 229.94 LBS

## 2018-11-26 DIAGNOSIS — N18.4 CKD STAGE G4/A2, GFR 15-29 AND ALBUMIN CREATININE RATIO 30-299 MG/G: ICD-10-CM

## 2018-11-26 DIAGNOSIS — E78.5 HYPERLIPIDEMIA, UNSPECIFIED HYPERLIPIDEMIA TYPE: ICD-10-CM

## 2018-11-26 DIAGNOSIS — Z86.79 HISTORY OF PSVT (PAROXYSMAL SUPRAVENTRICULAR TACHYCARDIA): ICD-10-CM

## 2018-11-26 DIAGNOSIS — E66.01 CLASS 2 SEVERE OBESITY WITH BODY MASS INDEX (BMI) OF 35 TO 39.9 WITH SERIOUS COMORBIDITY: Primary | ICD-10-CM

## 2018-11-26 DIAGNOSIS — M51.36 DDD (DEGENERATIVE DISC DISEASE), LUMBAR: ICD-10-CM

## 2018-11-26 PROCEDURE — 99999 PR PBB SHADOW E&M-EST. PATIENT-LVL III: CPT | Mod: PBBFAC,,, | Performed by: INTERNAL MEDICINE

## 2018-11-26 PROCEDURE — 99215 OFFICE O/P EST HI 40 MIN: CPT | Mod: S$GLB,,, | Performed by: INTERNAL MEDICINE

## 2018-11-26 PROCEDURE — 99499 UNLISTED E&M SERVICE: CPT | Mod: S$GLB,,, | Performed by: INTERNAL MEDICINE

## 2018-11-26 PROCEDURE — 1101F PT FALLS ASSESS-DOCD LE1/YR: CPT | Mod: CPTII,S$GLB,, | Performed by: INTERNAL MEDICINE

## 2018-11-26 RX ORDER — TOPIRAMATE 25 MG/1
25 TABLET ORAL 2 TIMES DAILY
Qty: 60 TABLET | Refills: 3 | Status: SHIPPED | OUTPATIENT
Start: 2018-11-26 | End: 2019-03-06 | Stop reason: SDUPTHER

## 2018-11-26 NOTE — LETTER
November 28, 2018      James Vallecillo MD  [address] [address]           [facility]  [address]  [place] [address]  Phone: [phone]  Fax: [phone]          Patient: Talia Dillon   MR Number: [id number]   YOB: 1950   Date of Visit: 11/26/2018       Dear Dr. James Vallecillo:    Thank you for referring Talia Dillon to me for evaluation. Attached you will find relevant portions of my assessment and plan of care.    If you have questions, please do not hesitate to call me. I look forward to following Talia Dillon along with you.    Sincerely,    Quita Duron MD    Enclosure  CC:  No Recipients    If you would like to receive this communication electronically, please contact [email or web address] or [phone] to request more information on [employer] access.    For providers and/or their staff who would like to refer a patient to [facility], please contact us through our one-stop-shop provider referral line, [facility], at [phone].    If you feel you have received this communication in error or would no longer like to receive these types of communications, please e-mail [email or web address]

## 2018-11-26 NOTE — PROGRESS NOTES
Subjective:       Patient ID: Talia Dillon is a 68 y.o. female.    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, GFR 15-29 and albumin creatinine ratio  mg/g     Cervical spinal stenosis     Foraminal stenosis of lumbar region     Decreased functional mobility     DDD (degenerative disc disease), lumbar     Vitamin D deficiency     Elevated sed rate     Hyperlipidemia     Lumbar spondylosis     Chronic kidney disease-mineral and bone disorder      Lab Results       Component                Value               Date                       CREATININE               1.90 (H)            10/24/2018                 BUN                      34 (H)              10/24/2018                 NA                       140                 10/24/2018                 K                        4.6                 10/24/2018                 CL                       101                 10/24/2018                 CO2                      29                  10/24/2018                  Has made diet changes, low Na diet. Quit smoking Lavern first and gained 10 lbs.       Previous diet attempts: Sugar busters,     History of medication for loss: OTC products. Had palps.   checked today     Heaviest weight: 229#    Lightest weight: 150#    Goal weight: Would like to improve kidney function. Less foot pain. Under 200 to start. 150 in longer term.       Last eye exam:   Yearly. No glaucoma per pt.    Provider:    Typical eating patterns:  Retired. Lives alone. Pt does not cook much. Sometimes lester sister cooks. She is supportive.  "  Breakfast: 2 eggs and 2 pieces toast. Sometimes with grits or cereal.     Lunch: soup and salad. Vegetables- squash and Zucchini. Baked chicken. Spaghetti.     Dinner: Similar to lunch or cereal and milk.     Snacks: likes sweets. Cookies, cakes, ice cream    Beverages: diet coke, water, unsweetened iced tea. Rare beer.     Willingness to change:  10/10    EKG:     BMR: 1553      Review of Systems   Constitutional: Negative for chills and fever.   Respiratory: Positive for shortness of breath.         Thinks she snores   Cardiovascular: Positive for leg swelling. Negative for chest pain and palpitations.   Gastrointestinal: Negative for constipation and diarrhea.        Denies GERD   Genitourinary: Negative for difficulty urinating and dysuria.   Musculoskeletal: Positive for back pain. Negative for arthralgias.   Neurological: Negative for dizziness and light-headedness.   Psychiatric/Behavioral: Negative for dysphoric mood. The patient is not nervous/anxious.         OK on meds       Objective:     /60   Pulse 70   Ht 5' 4" (1.626 m)   Wt 104.3 kg (229 lb 15 oz)   BMI 39.47 kg/m²     Physical Exam   Constitutional: She is oriented to person, place, and time. She appears well-developed and well-nourished. No distress.   HENT:   Head: Normocephalic and atraumatic.   Eyes: EOM are normal. Pupils are equal, round, and reactive to light. No scleral icterus.   Neck: Normal range of motion. Neck supple. No thyromegaly present.   Cardiovascular: Normal rate and normal heart sounds. Exam reveals no gallop and no friction rub.   No murmur heard.  Pulmonary/Chest: Breath sounds normal. No respiratory distress. She has no wheezes.   Increased effort after moving to exam table and back   Abdominal: Soft. Bowel sounds are normal. She exhibits no distension. There is no tenderness.   Musculoskeletal: Normal range of motion. She exhibits edema and deformity.   1+ pretibial edema b/l.    Neurological: She is alert and " oriented to person, place, and time. No cranial nerve deficit.   Skin: Skin is warm and dry. No erythema.   Psychiatric: She has a normal mood and affect. Her behavior is normal. Judgment normal.   Vitals reviewed.      Assessment:       1. Class 2 severe obesity with body mass index (BMI) of 35 to 39.9 with serious comorbidity    2. CKD stage G4/A2, GFR 15-29 and albumin creatinine ratio  mg/g    3. Hyperlipidemia, unspecified hyperlipidemia type    4. History of PSVT (paroxysmal supraventricular tachycardia)    5. DDD (degenerative disc disease), lumbar        Plan:         1. Class 2 severe obesity with body mass index (BMI) of 35 to 39.9 with serious comorbidity  Patient was informed that topiramate is used for migraine prevention and seizures. Weight loss is a common side effect that is well documented. She understands this. She was informed of the potential side effects such as serious and possibly fatal rash in which case the medication should be discontinued immediately. Paresthesias, forgetfulness, fatigue, kidney stones, GI symptoms, and changes in lab values such as electrolytes, blood counts and kidney function.    Start topiramate  in the evening for 1 week, then morning and evening.     Cut the sweets.     [employer] Aj (code [id number])    800-1000 jeremy menu and meal ideas and holiday tips given.     2. CKD stage G4/A2, GFR 15-29 and albumin creatinine ratio  mg/g  Optimize weight to help preserve GFR    3. Hyperlipidemia, unspecified hyperlipidemia type  Expect improvement with weight loss. Recheck after 10% TBW lost.      4. History of PSVT (paroxysmal supraventricular tachycardia)  Avoid stimulant anorectics      5. DDD (degenerative disc disease), lumbar  Increase low impact activity as tolerated.  Avoid high impact activity, very heavy lifting or other exercise regimens that may cause discomfort.

## 2018-11-26 NOTE — PATIENT INSTRUCTIONS
Patient was informed that topiramate is used for migraine prevention and seizures. Weight loss is a common side effect that is well documented. She understands this. She was informed of the potential side effects such as serious and possibly fatal rash in which case the medication should be discontinued immediately. Paresthesias, forgetfulness, fatigue, kidney stones, GI symptoms, and changes in lab values such as electrolytes, blood counts and kidney function.    Start topiramate  in the evening for 1 week, then morning and evening.     Cut the sweets.     youmag (code [id number])      5-Day Menu Plan: 800-1000 Calories, no more than 70 g protein a day.     DAY 1     Breakfast  3 slices deli turkey  Small apple    Snack  ¼ cup almonds    Lunch  3 oz Lean hamburger saul  1 cup green beans    Dinner  2 skinless chicken thighs  1 cup cooked carrots    Snack  SF jello    DAY 2    Breakfast  2 eggs with 2 tbsp salsa    Snack  2 slices deli turkey  ½ cup Peaches canned (in its own juice)    Lunch  [place]: Deli chicken and mustard   Pear cup (no sugar added)    Dinner  3 oz Baked fish  1 cup cooked yellow squash    Snack  SF popsicle            DAY 3    Breakfast   Protein drink: 1 scoop whey powder + ice water and ice    Snack  ¼ cup almonds    Lunch  Tuna Salad: 3oz canned tuna in water, 1 egg, and 1 tsp light mccann)  Pineapple cup (no sugar added)    Dinner  1/2 Baked chicken breast  1 cup grilled eggplant    Snack  4 oz Chocolate Soy Milk      DAY 4    Breakfast  1 eggs, turkey sausage saul    Snack  3 slices deli turkey    Lunch    Snack  1/4 cup almonds  Peach cup (no sugar added)    Dinner  3oz baked pork chop  1 cup cooked green beans                    DAY 5    Breakfast  Protein drink: 1 scoop whey powder + ice and water    Snack   ¼ cup almonds  1 apple    Lunch  1 cup Chicken salad: (3oz canned chicken in water, chopped celery, 1 tsp light mccann)    Snack  3 slices deli turkey  Fruit cup (no sugar  added)    Dinner  Omelet: 2 eggs, grilled shrimp, bell pepper and onion        Meal Ideas for Regular Bariatric Diet  *Recipes and products available at [email or web address]      Breakfast: (15-20g protein)    - Egg white omelet: 2 egg whites or ½ cup Egg Beaters. (Optional proteins: cheese, shrimp, black beans, chicken, sliced turkey) (Optional veggies: tomatoes, salsa, spinach, mushrooms, onions, green peppers, or small slice avocado)     - Egg and sausage: 1 egg or ¼ cup Egg Beaters (any variety), with 1 mk or 2 links of Turkey sausage or Veggie breakfast sausage (Shelfie or Payward)    - Crust-less breakfast quiche: To make a glass pie dish, mix 4oz part skim Ricotta, 1 cup skim milk, and 2 eggs as your base. Add protein: shredded cheese, sliced lean ham or turkey, turkey kurtz/sausage. Add veggies: tomato, onion, green onion, mushroom, green pepper, spinach, etc.    - Yogurt parfait: Mix 1 - 6oz container Dannon Light N Fit vanilla yogurt, with ¼ cup Kashi Go Lean cereal    - Cottage cheese and fruit: ½ cup part-skim cottage cheese or ricotta cheese topped with fresh fruit or sugar free preserves     - Chary Humphreys's Vanilla Egg custard* (add 2 Tbsp instant coffee granules to make Cappuccino Custard*)    - Hi-Protein café latte (skim milk, decaf coffee, 1 scoop protein powder). Optional to add Sugar free syrup or extract flavoring.    Lunch: (20-30g protein)    - ½ cup Black bean soup (Homemade or Progresso), with ¼ cup shredded low-fat cheese. Top with chopped tomato or fresh salsa.     - Lean deli turkey breast and low-fat sliced cheese, mustard or light mccann to moisten, rolled up together, or wrapped in a [place] lettuce leaf    - Chicken salad made from dinner leftovers, moisten with low-fat salad dressing or light mccann. Also try leftover salmon, shrimp, tuna or boiled eggs. Serve ½ cup over dark green salad    - Fat-free canned refried beans, topped with ¼ cup shredded low-fat cheese. Top with  chopped tomato or fresh salsa.     - Greek salad: Top mixed greens with 1-2oz grilled chicken, tomatoes, red onions, 2-3 kalamata olives, and sprinkle lightly with feta cheese. Spritz with Balsamic vinegar to taste.     - Crust-less lunch quiche: To make a glass pie dish, mix 4oz part skim Ricotta, 1 cup skim milk, and 2 eggs as your base. Add protein: shredded cheese, sliced lean ham or turkey, shrimp, chicken. Add veggies: tomato, onion, green onion, mushroom, green pepper, spinach, artichoke, broccoli, etc.    - Pizza bake: tomato sauce, low-fat shredded mozzarella and turkey pepperoni or Macanese kurtz. Add any veggies.    - Cucumber crab bites: Spread ¼ cup crab dip (lump crabmeat + light cream cheese and green onions) over sliced cucumber.     - Chicken with light spinach and artichoke dip*: Puree in : 6oz cooked and drained spinach, 2 cloves garlic, 1 can cannelloni beans, ½ cup chopped green onions, 1 can drained artichoke hearts (not marinated in oil), lemon juice and basil. Mix in 2oz chopped up chicken.    Supper: (20-30g protein)    - Serve grilled fish over dark green salad tossed with low-fat dressing, served with grilled asparagus becerril     - Rotisserie chicken salad: served with sliced strawberries, walnuts, fat-free feta cheese crumbles and 1 tbsp Booths Own Light Raspberry [place] Vinaigrette    - Shrimp cocktail: Dip cold boiled shrimp in homemade low-sugar cocktail sauce (1/2 cup Azeb One Carb ketchup, 2 tbsp horseradish, 1/4 tsp hot sauce, 1 tsp Worcestershire sauce, 1 tbsp freshly-squeezed lemon juice). Serve with dark green salad, walnuts, and crumbled blue cheese drizzled with olive oil and Balsamic vinegar    - Tuna Melt: Spread tuna salad onto 2 thick slices of tomato. Top with low-fat cheese and broil until cheese is melted. May also be made with chicken salad of shrimp salad. [place] with different types of cheeses.    - Homemade low-fat Chili using extra lean ground  beef or ground turkey. Top with shredded cheese and salsa as desired. May add dollop fat-free sour cream if desired    - Dinner Omelet with shrimp or chicken and onion, green peppers and chives.    - No noodle lasagna: Use sliced zucchini or eggplant in place of noodles.  Layer with part skim ricotta cheese and low sugar meat sauce (use very lean ground beef or ground turkey).    - Mexican chicken bake: Bake chunks of chicken breast or thigh with taco seasoning, Pace brand enchilada sauce, green onions and low-fat cheese. Serve with ¼ cup black beans or fat free refried beans topped with chopped tomatoes or salsa.    - Blaise frozen meatballs, simmered in Classico Marinara sauce. Different flavors of salsa or spaghetti sauce create different dishes! Sprinkle with parmesan cheese. Serve with grilled or steamed veggies, or a dark green salad.    - Simmer boneless skinless chicken thigh chunks in Classico Marinara sauce or roasted salsa until tender with chopped onion, bell pepper, garlic, mushrooms, spinach, etc.     - Hamburger, without the bun, dressed the way you like. Served with grilled or steamed veggies.    - Eggplant parmesan: Bake slices of eggplant at 350 degrees for 15 minutes. Layer tomato sauce, sliced eggplant and low-fat mozzarella cheese in a baking dish and cover with foil. Bake 30-40 more minutes or until bubbly. Uncover and bake at 400 degrees for about 15 more minutes, or until top is slightly crisp.    - Fish tacos: grilled/baked white fish, wrapped in [place] lettuce leaf, topped with salsa, shredded low-fat cheese, and light coleslaw.    Snacks: (100-200 calories; >5g protein)    - 1 low-fat cheese stick with 8 cherry tomatoes or 1 serving fresh fruit  - 4 thin slices fat-free turkey breast and 1 slice low-fat cheese  - 4 thin slices fat-free honey ham with wedge of melon  - 1/4 cup unsalted nuts with ½ cup fruit  - 6-oz container Dannon Light n Fit vanilla yogurt, topped with 1oz unsalted  nuts         - apple, celery or baby carrots spread with 2 Tbsp natural peanut butter or almond butter   - apple slices with 1 oz slice low-fat cheese  - celery, cucumber, bell pepper or baby carrots dipped in ¼ cup hummus bean spread or light spinach and artichoke dip (*recipe in lunch section)  - 100 calorie bag microwave light popcorn with 3 tbsp grated parmesan cheese  - Dmitry Links Beef Steak - 14g protein! (similar to beef jerky)  - 2 wedges [place] - Light Herb & Garlic Cheese with sliced cucumber or green bell pepper  - 1/2 cup low-fat cottage cheese with ¼ cup fruit or ¼ cup salsa  - RTD Protein drinks: Atkins, Low Carb Slim Fast, EAS light, Muscle Milk Light, etc.  - Homemade Protein drinks: GNC Soy95, Isopure, Nectar, UNJURY, Whey Gourmet, etc. Mix 1 scoop powder with 8oz skim/1% milk or light soymilk.  - Protein bars: Atkins, EAS, Pure Protein, Think Thin, Detour, etc. Must have 0-4 grams sugar - Read the label.    Takeout Options: No more than twice/week  Deli - Salads (no pasta or rice), meats, cheeses. Roasted chicken. Lox (salmon)    Mexican - Platters which don't include tortillas, chips, or rice. Go easy on the beans. Example: Fajitas without the tortillas. Ask the  not to bring chips to the table if they are too tempting.    Greek - Meat or fish and vegetable, but no bread or rice. Including hummus, baba ganoush, etc, is OK. Most sit-down Greek restaurants can provide you with cucumber slices for dipping instead of kenyatta bread.    Fast Food (Avoid as much as possible) - Salads (no croutons and limit salad dressing to 2 tbsp), grilled chicken sandwich without the bun and ask for no mccann. Kadies low fat chili or Taco Bell pintos and cheese.    BBQ - The meats are fine if you ask for sauces on the side, but most of the traditional side dishes are loaded with carbs. [place] slaw, baked beans and BBQ sauce are typically made with sugar.    Chinese - Nothing deep-fried, no rice or noodles. Many  Chinese sauces have starch and sugar in them, so you'll have to use your judgement. If you find that these sauces trigger cravings, or cause Dumping, you can ask for the sauce to be made without sugar or just use soy sauce.      Helpful tips to survive the holidays:  - Dont save yourself for the meal: Make sure you continue to eat high protein small meals every 3-4 hours to ensure to do not become over-hungry. Avoid temptation by not showing up to a holiday party or gathering hungry.   - Plan ahead. Bring a dish to a party if you know there may not be an appropriate option.   - Choose sugar-free drinks: Stick to water or other sugar-free beverages and make sure you are getting 6-8 cups of fluid each day (but not with meals!). Avoid alcohol, carbonated beverages, and high-fat/high-sugar beverages like hot chocolate and eggnog. Try sugar-free hot cocoa made with skim milk or water, or sugar-free spiced tea to add some holiday flair to your beverage (see sugar-free mulled cider recipe below)  - Take your time: Eat mindfully. Dont graze on food throughout the day. Sit down to enjoy your small meals. Chew slowly and thoroughly. Cut your food into small pieces before eating.  - Listen to your body: Stop eating as soon as you feel full. Do not feel pressured to try certain (or all) foods or to eat all of the food on your plate. Listen to your hunger cues.   - REMEMBER: Make your holidays about spending time with family and friends instead of focusing gatherings around food.  - Keep up your exercise routine: Make sure you continue to get regular exercise throughout the holiday season. Encourage friends and family to be active by taking a walk together after a meal, to look at holiday lights, or to window-shop.    Good Holiday Meal Options:  - Roasted Turkey, NO skin. Use low sodium broth instead of gravy.   - Stuffed Bell Peppers made WITHOUT bread crumbs or Rice. Try using parmesan cheese instead  - Gumbo, NO rice. Try  picking out mostly the meat/seafood and vegetables with little broth.   - Green Bean Casserole made with 98% fat free cream of mushroom soup and crushed almonds/pecans instead of fried onions  - Side salad w/ low fat dressing. Try a different kind of salad maybe use Kale or spinach.   - Roasted non-starchy vegetables like brussel sprouts, broccoli, green beans, zucchini, butternut squash, cauliflower  - Cauliflower Mash (steam or roast cauliflower, puree w/ low fat cheese, dash of fat free milk and 2-3 sprays of I cant believe its not butter spray. Add garlic powder and black pepper to season). Use Low sodium broth instead of gravy.   - Try Loaded Cauliflower Mash (Make cauliflower like above cauliflower mash. Top with diced turkey kurtz, ¼ cup low fat cheddar cheese and bake @ 350* F for 5-10 minutes, until cheese is melted. Top with minced chives, black pepper and garlic to taste).   - Homemade cranberry sauce using Splenda or another alternative sweetener. Boil fresh cranberries and add splenda to taste. Boil until cranberries break open and then simmer until it reaches the consistency you want (less time for more watery sauce and simmer for longer to create a thicker sauce).   - Deviled eggs: make using low fat mccann, mustard, DILL relish (not sweet relish).   - Vegetable tray w/ Greek yogurt Ranch Dip. Mix 1 packet of [place] ranch dip mix w/ 16 oz low fat plain greek yogurt.     Good Holiday Dessert Options:  - High protein Pumpkin Cheesecake (see recipe below)  - Pumpkin Whip (see recipe below)  - Quest Apple Pie or Cinnamon Roll flavored protein bar (warm in microwave for 10-15 seconds)  - Eggnog Protein shake (see recipe below)  - Fresh fruit w/ low fat cheese  - Sugar-free Jello Parfaits. Layer Red and Green sugar-free jello in cups and top w/ 2 tbsp Sugar-free cool-whip    Pumpkin Cheesecake    8 ounces fat free cream cheese, softened   2 scoops of vanilla protein powder (<4 g sugar per serving)    ¼ tsp Fine salt   2 eggs, at room temperature   1/3 cup fat free sour cream  1/3 cup fat free half and half  1 15 -ounce can pure pumpkin puree   1 tablespoon pumpkin pie spice, plus more for dusting   Unsalted nuts, crushed  *Add splenda to taste    Directions     1. Preheat the oven to 300 degrees F. Line 18 muffin cups with paper liners. Sprinkle 1 tsp crushed unsalted nuts at the bottom of each of muffin cup liner.     2. In a large bowl, beat the cream cheese, vanilla protein powder and 1/4 teaspoon fine salt on medium-high speed until smooth and creamy, 2 to 3 minutes. Scrape down the sides, reduce speed to low and beat in the eggs, 1 at a time, until combined. Beat in 1/3 cup fat free sour cream and fat free half and half. Stir in the pumpkin puree and pumpkin pie spice until smooth. Divide evenly among cookie-lined paper cups, filling almost all the way to the top.     3. Bake until the filling is just set, 40 to 45 minutes. A sharp knife inserted into the center will come out moist, but clean. Cool completely in tins on a wire rack. Refrigerate until cold, 4 hours, or overnight. Top with a dusting of pumpkin pie spice.    Recipe altered from the following recipe: [email or web address]-[email or web address]-recipe.[email or web address]    Pumpkin Whip    Box of sugar-free vanilla pudding  Can of pumpkin puree  Pumpkin Pie spice (sprinkle to taste)  ½ cup of sugar-free Cool Whip    Directions:  Make sugar-free pudding according to package directions using fat free or 1% milk. Stir in pumpkin and cool whip. Add pumpkin pie spice to taste.     Egg Nog Protein shake    8 oz skim or 1% milk  1 scoop vanilla protein powder  1 tbsp sugar-free vanilla pudding mix  ½ tsp butter flavor extract  ½ tsp rum extract  ½ tsp cinnamon     Shake together or blend with ice and serve.     Sugar-Free Mulled Cider    3 oz diet cran-apple juice  6 oz water  1 packet sugar-free apple cider  mix  ½ tsp apple pie spice  ½ tsp butter flavor extract  1 tbsp Sugar-free Syrup    Mix together. Warm if needed and serve w/ orange wedge and cinnamon stick.

## 2018-12-07 ENCOUNTER — OFFICE VISIT (OUTPATIENT)
Dept: PAIN MEDICINE | Facility: CLINIC | Age: 68
End: 2018-12-07
Payer: MEDICARE

## 2018-12-07 VITALS
HEIGHT: 64 IN | OXYGEN SATURATION: 100 % | BODY MASS INDEX: 37.56 KG/M2 | HEART RATE: 79 BPM | DIASTOLIC BLOOD PRESSURE: 77 MMHG | WEIGHT: 220 LBS | SYSTOLIC BLOOD PRESSURE: 144 MMHG

## 2018-12-07 DIAGNOSIS — M47.816 LUMBAR SPONDYLOSIS: Primary | ICD-10-CM

## 2018-12-07 DIAGNOSIS — M51.36 DDD (DEGENERATIVE DISC DISEASE), LUMBAR: ICD-10-CM

## 2018-12-07 DIAGNOSIS — G89.4 CHRONIC PAIN DISORDER: ICD-10-CM

## 2018-12-07 DIAGNOSIS — M54.16 LUMBAR RADICULOPATHY: ICD-10-CM

## 2018-12-07 PROCEDURE — 99499 UNLISTED E&M SERVICE: CPT | Mod: S$GLB,,, | Performed by: NURSE PRACTITIONER

## 2018-12-07 PROCEDURE — 1101F PT FALLS ASSESS-DOCD LE1/YR: CPT | Mod: CPTII,S$GLB,, | Performed by: NURSE PRACTITIONER

## 2018-12-07 PROCEDURE — 99214 OFFICE O/P EST MOD 30 MIN: CPT | Mod: S$GLB,,, | Performed by: NURSE PRACTITIONER

## 2018-12-07 PROCEDURE — 99999 PR PBB SHADOW E&M-EST. PATIENT-LVL III: CPT | Mod: PBBFAC,,, | Performed by: NURSE PRACTITIONER

## 2018-12-07 RX ORDER — DULOXETIN HYDROCHLORIDE 60 MG/1
60 CAPSULE, DELAYED RELEASE ORAL DAILY
Qty: 30 CAPSULE | Refills: 2 | Status: SHIPPED | OUTPATIENT
Start: 2018-12-07 | End: 2019-03-12 | Stop reason: SDUPTHER

## 2018-12-07 NOTE — PROGRESS NOTES
"Subjective:     Patient ID: Talia Dillon is a 68 y.o. female.    Chief Complaint: Pain    Consulted by: Dr. Gallagher    Disclaimer: This note was generated using voice recognition software.  There may be a typographical errors that were missed during proofreading.      HPI:    Talia Dillon is a 68 y.o. female who presents today with neck, shoulder, low back and leg pain. This pain is described in detail below.    Ms. Dillon presents to pain clinic today complaining of pain in a variety of areas, including her neck, shoulders, low back and leg pain. Her low back and leg pain is the most bothersome. 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 She has had long term PT her whole life because of this. She was recently evaluated by Dr. Gallagher who referred her to PT and pain clinic for management options.    Ms. Dillon states her low back pain is constant with radiation down both legs. She rates her pain as a 7/10 today. The pain is made worse with movement and walking. The pain is a dull ache in her low back, but feels like a sharp burning in her legs "like an electric wing." This pain does not travel in to her feet, but she has numbness and paraesthesias in her feet separately.     She has just recently restarted with physical therapy; she has completed 4 sessions at this time and feels this is starting to help. She feels it is painful but ultimately improving her situation.     Interval History (8/30/2018):  She returns today for follow up.  She reports that PT has been helpful for the pain.  She finished her last in office session yesterday.  She is now going to start her HEP.  She reports that the epidural didn't help. She wishes she could take Aleve as this was very helpful    Interval History (10/11/2018):  The patient returns for " follow up of back pain.  Since her last OV, she had L2-5 MBB x2.  She is reporting 80% relief from both procedures for about one day.  She is here today to discuss RFA procedures.  She continues with come exercise regimen.  She did have recent labs as ordered by her PCP which again showed elevated BUN.  Her pain today is 8/10.     Interval History (12/7/2018):  The patient presents for procedure follow up.  She is s/p L2,3,4,5 RFAs with about 70% pain relief.  Her pain is not as constant.  She is able to walk further.  Her pain is not as intense when it comes.  She is still somewhat limited by her pain, particularly regarding activity level.  She also feels short winded sometimes.  She is seeing cardiology next month.  She is also seeing bariatric medicine and has been losing weight.  Her pain today is 5/10.    Physical Therapy: Yes; finished 8/28/2018, doing HEP (8/20/3018)    Non-pharmacologic Treatment:     · Ice/Heat: Not tried  · TENS: Tried; not helpful  · Massage: Helpful  · Chiropractic care: Yes, not helpful  · Acupuncture: Yes, not helpful  · Other: None         Pain Medications:         · Currently taking:   · Amitriptyline 10 mg; only takes this every now and again due to her kidneys- she doesn't know if she should be taking them or not   · Gabapentin 800 mg in am  · Sertraline 100 mg every day   · OTC topical creams   · Acetaminophen as needed     · Has tried in the past:    · NSAIDs- Aleve and Ibuprofen: had to stop due to CKD   · Baclofen- had to stop due to CKD    · Has not tried: Opioids,  Rx topical creams    Blood thinners: No    Interventional Therapies:  9/21/18 Bilateral L2-5 MBB- 80% relief  9/28/18 Bilateral L2-5 MBB- 80% relief  10/26/18 Right L2,3,4,5 RFA- 70% relief  11/9/18 Left L2,3,4,5 RFA- 70% relief    Relevant Surgeries:   No    Affecting sleep? Not usually     Affecting daily activities? Yes    Depressive symptoms? Yes          · SI/HI? No    Work status: Retired; used to work as a  [employer]     Prescription Monitoring Program database:  Reviewed and consistent with medication use as prescribed.    Pain Scales  Best: 4/10  Worst: 9/10  Usually: 5/10  Today: 5/10    Low-back Pain   Associated symptoms include numbness and paresthesias. Pertinent negatives include no abdominal pain, bladder incontinence, bowel incontinence, chest pain, dysuria, fever or weakness.       Last 3 PDI Scores 10/11/2018 8/30/2018 7/23/2018   Pain Disability Index (PDI) [phone]   ]    Review of Systems   Constitution: Negative for chills, decreased appetite, diaphoresis, fever and weakness.   HENT: Negative for congestion, ear discharge and ear pain.    Eyes: Negative for blurred vision, discharge and double vision.   Cardiovascular: Negative for chest pain, claudication and cyanosis.   Respiratory: Negative for cough, hemoptysis and shortness of breath.    Endocrine: Negative for cold intolerance, heat intolerance and polydipsia.   Skin: Negative for color change, dry skin and nail changes.   Musculoskeletal: Positive for arthritis, back pain, myalgias and neck pain.   Gastrointestinal: Negative for bloating, abdominal pain, change in bowel habit and bowel incontinence.   Genitourinary: Negative for bladder incontinence, decreased libido and dysuria.   Neurological: Positive for disturbances in coordination, numbness and paresthesias.   Psychiatric/Behavioral: Positive for depression. Negative for altered mental status, hallucinations and hypervigilance.             Past Medical History:   Diagnosis Date    Allergy     Anemia     Anxiety     Cancer     skin    Cataract     Depression     Edema     Hypothyroidism     PSVT (paroxysmal supraventricular tachycardia)     Thyroid disease        Past Surgical History:   Procedure Laterality Date    ADENOIDECTOMY      APPENDECTOMY      BLOCK, NERVE, MEDIAL BRANCH BLOCK BILATERAL LUMBAR L2-5 Bilateral 9/28/2018    [facility]     BLOCK, NERVE, MEDIAL BRANCH BLOCK BILATERAL LUMBAR L2-5 Bilateral 9/21/2018    [facility]    COLONOSCOPY  2009    repeat in 10 years     EYE SURGERY      HYSTERECTOMY      INJECTION OF ANESTHETIC AGENT AROUND NERVE Bilateral 9/21/2018    Procedure: BLOCK, NERVE, MEDIAL BRANCH BLOCK BILATERAL LUMBAR L2-5;  Surgeon: Julieth Christopher MD;  Location[facility];  Service: Pain Management;  Laterality: Bilateral;  1 of 2    INJECTION OF ANESTHETIC AGENT AROUND NERVE Bilateral 9/28/2018    Procedure: BLOCK, NERVE, MEDIAL BRANCH BLOCK BILATERAL LUMBAR L2-5;  Surgeon: Julieth Christopher MD;  Location: [facility];  Service: Pain Management;  Laterality: Bilateral;  2 of 2  PLEASE GIVE NIRAVAM PER MD    INJECTION,STEROID,EPIDURAL N/A 8/3/2018    [facility]    RADIOFREQUENCY ABLATION LEFT LUMBAR L2,3,4,5 RFA Left 11/9/2018    [facility]    RADIOFREQUENCY ABLATION RIGHT LUMBAR L2,3,4,5 RFA Right 10/26/2018    [facility]    TONSILLECTOMY         Review of patient's allergies indicates:   Allergen Reactions    Dexamethasone Rash       Current Outpatient Medications   Medication Sig Dispense Refill    ALPRAZolam (XANAX) 0.25 MG tablet Take 1 tablet (0.25 mg total) by mouth once daily. 90 tablet 1    amitriptyline (ELAVIL) 10 MG tablet Take 1 tablet (10 mg total) by mouth nightly as needed. 90 tablet 3    atenolol (TENORMIN) 50 MG tablet TAKE 1 AND 1/2 TALBLET BY MOUTH EVERY  tablet 1    atorvastatin (LIPITOR) 10 MG tablet Take 1 tablet (10 mg total) by mouth once daily. 90 tablet 3    cholecalciferol, vitamin D3, (VITAMIN D3) 5,000 unit Tab Take 5,000 Units by mouth once daily. 90 tablet 3    DULoxetine (CYMBALTA) 30 MG capsule Take 1 capsule (30 mg total) by mouth once daily. 30 capsule 11    gabapentin (NEURONTIN) 400 MG capsule        "levothyroxine (SYNTHROID, LEVOTHROID) 175 MCG tablet Take 1 tablet (175 mcg total) by mouth once daily. 90 tablet 3    sertraline (ZOLOFT) 100 MG tablet Take 1 tablet (100 mg total) by mouth once daily. (Patient taking differently: Take 50 mg by mouth once daily. ) 90 tablet 3    topiramate (TOPAMAX) 25 MG tablet Take 1 tablet (25 mg total) by mouth 2 (two) times daily. 60 tablet 3    zolpidem (AMBIEN) 10 mg Tab Take 1 tablet (10 mg total) by mouth nightly as needed. 30 tablet 5     No current facility-administered medications for this visit.        Family History   Problem Relation Age of Onset    Stroke Mother     Stroke Father     Diabetes Father     Sleep apnea Daughter     Mental illness Daughter        Social History     Socioeconomic History    Marital status: Single     Spouse name: Not on file    Number of children: Not on file    Years of education: Not on file    Highest education level: Not on file   Social Needs    Financial resource strain: Not on file    Food insecurity - worry: Not on file    Food insecurity - inability: Not on file    Transportation needs - medical: Not on file    Transportation needs - non-medical: Not on file   Occupational History    Not on file   Tobacco Use    Smoking status: Smoker, Current Status Unknown     Packs/day: 1.00     Years: 50.00     Pack years: 50.00     Types: Cigarettes    Smokeless tobacco: Never Used   Substance and Sexual Activity    Alcohol use: Yes    Drug use: No    Sexual activity: Not Currently   Other Topics Concern    Not on file   Social History Narrative    Not on file       Objective:     Vitals:    12/07/18 0950   BP: (!) 144/77   Pulse: 79   SpO2: 100%   Weight: 99.8 kg (220 lb)   Height: 5' 4" (1.626 m)   PainSc:   5   PainLoc: Back       GEN:  Well developed, well nourished.  No acute distress.   HEENT:  No trauma.  Mucous membranes moist.  Nares patent bilaterally.  PSYCH: Normal affect. Thought content " appropriate.  CHEST:  Breathing symmetric.  No audible wheezing.  ABD: Soft, non-distended.  SKIN:  Warm, pink, dry.  No rash on exposed areas.    EXT:  No cyanosis, clubbing, or edema.  No color change or changes in nail or hair growth.  NEURO/MUSCULOSKELETAL:  Fully alert, oriented, and appropriate. Speech normal bartolome. No cranial nerve deficits.   Gait: Antalgic  negative trendelenburg sign bilaterally.   Motor Strength: 5/5 motor strength throughout lower extremities.   Sensory:  decreased pin prick bilateral LEs   Reflexes:  2+ and symmetric throughout.  Downgoing Babinski's bilaterally.  No clonus or spasticity.  L-Spine:  Limited ROM with pain on extension greater than flexion. Mildfacet loading bilaterally.  SI Joint/Hip:-  JOANIE bilaterally. - FADIR bilaterally.  No TTP over lumbar paraspinals or GT bursa.      Imaging:     The imaging studies listed below were independently reviewed by me, and I agree with the findings as documented below.     MRI Lumbar Spine 06/2018  FINDINGS:  Levoscoliosis of the spine, similar to before.  No abnormal bone marrow signal changes are evident to indicate acute fracture or bone marrow replacement process.  Sub endplate marrow signal changes about the L4-5 disc space, consistent with degenerative type change, mostly similar to before.    Visualized spinal cord and conus medullaris appears unremarkable.  No abnormal intramedullary spinal cord signal change.  No intradural, extramedullary masses are identified.    T11-12: Mild posterior disc bulging, greater to the right of midline, similar to before.    T12-L1:  Mild loss of disc signal indicating some degenerative change.  No significant disc bulge or focal herniation.  Mild loss of disc space height.  No detrimental changes.    L1-2:  Degenerative disc disease change.  Loss of disc signal.  No significant disc bulge or focal herniation.  Mild posterior disc bulging, similar to before.    L2-3: Degenerative disc disease.   Narrowing of the disc space.  Loss of normal disc signal.  Diffuse posterior mild disc bulging, similar to before.  Facet arthropathy changes, appearing greater on the right.    L3-4:  Degenerative disc disease.  Significant narrowing of the disc space.  Posterior marginal osteophytes.  Diffuse posterior disc bulging.  Bilateral foraminal narrowing right greater than left.  Facet arthropathy changes right greater than left.  Mild narrowing of the thecal sac as a result of the chronic findings, similar to before.    L4-5:  Degenerative disc disease.  Significant narrowing of the disc space.  Posterior osteophytes.  Diffuse posterior disc bulging, appearing greater to the left of midline.  Indication 0 some bilateral facet arthropathy change.  Bilateral bone foraminal narrowing, greater on the left.  Findings appear similar to before.    L5-S1:  Degenerative disc disease.  No significant disc bulge or focal herniation.  Mild posterior disc bulging present.  Facet arthropathy changes left greater than right.    X ray Scoliosis Complete 06/2018  There is scoliosis of the thoracolumbar spine, which is convex to the left with the apex centered at the L1 level.  The Sequeira angle measures approximately 26.2°.  All visualized thoracic and lumbar vertebral bodies normal in height.  Low-to-moderate grade degenerative changes scattered throughout the mid and inferior thoracic spine with small scattered marginal osteophytes.  Severe degenerative change of the lumbar spine identified at the L2-3, L3-4 and L4-5 levels with large marginal osteophytes.  Paravertebral soft tissues are normal.    MRI Cervical Spine 2017  Reversal of the normal cervical lordosis.  Small spinal canal on a congenital basis.  No definite abnormal signal in the cord.  The craniocervical junction is normal.    C2-3: Minimal disc bulge.  Facet hypertrophy on the left.    C3-4: Broad-based osteophyte and disc bulge.  Uncovertebral joint disease with moderate  to severe left-sided neural foraminal narrowing.  Decreased CSF spaces anterior and posterior to the cord.    C4-5: Marked disc space narrowing.  Broad-based osteophyte and disc bulge.  Decreased CSF spaces anterior and posterior to the cord.  Uncovertebral joint disease with mild neuroforaminal narrowing.    C5-6: Disc space narrowing.  Broad-based osteophyte and disc bulge.  Asymmetric disc protrusion paramedian to the left side.  Uncovertebral joint disease and degenerative changes of the facets with moderate to severe bilateral neural foraminal narrowing.  No signal abnormality in the cord.    C6-7: Disc space narrowing.  Broad-based osteophyte and disc bulge.  Bilateral uncovertebral joint disease and degenerative changes of the facets.  Moderate central spinal stenosis.    C7-T1: Minimal disc bulge.  Uncovertebral joint disease.    Assessment:     Encounter Diagnoses   Name Primary?    Lumbar spondylosis Yes    Chronic pain disorder     DDD (degenerative disc disease), lumbar     Lumbar radiculopathy        Plan:     Talia was seen today for follow-up.    Diagnoses and all orders for this visit:    Lumbar spondylosis    Chronic pain disorder    DDD (degenerative disc disease), lumbar    Lumbar radiculopathy        Her pain is consistent with the above.    We discussed the assessment and recommendations.  All available images were reviewed. We discussed the disease process, prognosis, treatment plan, and risks and benefits. The patient is aware of the risks and benefits of the medications being prescribed, common side effects, and proper usage. The following is the plan we agreed on:     1. Increase Cymbalta to 60 mg daily.    2. Continue gabapentin at currently dose given Cr clearance.  3. Continue amitriptyline 10 mg nightly.  4. Continue physical therapy HEP.  We discussed this today.  5. RTC in 3 months or sooner if needed.      LETY Marx  12/07/2018     The above plan and management  options were discussed at length with patient. Patient is in agreement with the above and verbalized understanding.

## 2018-12-12 ENCOUNTER — IMMUNIZATION (OUTPATIENT)
Dept: PHARMACY | Facility: CLINIC | Age: 68
End: 2018-12-12
Payer: MEDICARE

## 2018-12-26 DIAGNOSIS — F41.9 ANXIETY: ICD-10-CM

## 2018-12-27 RX ORDER — ALPRAZOLAM 0.25 MG/1
0.25 TABLET ORAL DAILY
Qty: 90 TABLET | Refills: 0 | Status: SHIPPED | OUTPATIENT
Start: 2018-12-27 | End: 2019-01-10 | Stop reason: SDUPTHER

## 2019-01-10 ENCOUNTER — OFFICE VISIT (OUTPATIENT)
Dept: CARDIOLOGY | Facility: CLINIC | Age: 69
End: 2019-01-10
Payer: MEDICARE

## 2019-01-10 ENCOUNTER — TELEPHONE (OUTPATIENT)
Dept: SMOKING CESSATION | Facility: CLINIC | Age: 69
End: 2019-01-10

## 2019-01-10 VITALS
SYSTOLIC BLOOD PRESSURE: 100 MMHG | DIASTOLIC BLOOD PRESSURE: 78 MMHG | HEIGHT: 64 IN | OXYGEN SATURATION: 89 % | WEIGHT: 213.19 LBS | BODY MASS INDEX: 36.4 KG/M2 | HEART RATE: 99 BPM

## 2019-01-10 DIAGNOSIS — Z86.79 HISTORY OF PSVT (PAROXYSMAL SUPRAVENTRICULAR TACHYCARDIA): ICD-10-CM

## 2019-01-10 DIAGNOSIS — E03.4 HYPOTHYROIDISM DUE TO ACQUIRED ATROPHY OF THYROID: ICD-10-CM

## 2019-01-10 DIAGNOSIS — R06.02 SHORTNESS OF BREATH: ICD-10-CM

## 2019-01-10 DIAGNOSIS — R94.31 NONSPECIFIC ABNORMAL ELECTROCARDIOGRAM (ECG) (EKG): ICD-10-CM

## 2019-01-10 DIAGNOSIS — R06.09 DYSPNEA ON EXERTION: ICD-10-CM

## 2019-01-10 DIAGNOSIS — N18.4 CKD STAGE G4/A2, GFR 15-29 AND ALBUMIN CREATININE RATIO 30-299 MG/G: ICD-10-CM

## 2019-01-10 DIAGNOSIS — I47.10 SVT (SUPRAVENTRICULAR TACHYCARDIA): ICD-10-CM

## 2019-01-10 DIAGNOSIS — E78.00 PURE HYPERCHOLESTEROLEMIA: ICD-10-CM

## 2019-01-10 DIAGNOSIS — I48.91 ATRIAL FIBRILLATION, NEW ONSET: Primary | ICD-10-CM

## 2019-01-10 PROCEDURE — 93000 EKG 12-LEAD: ICD-10-PCS | Mod: S$GLB,,, | Performed by: INTERNAL MEDICINE

## 2019-01-10 PROCEDURE — 99999 PR PBB SHADOW E&M-EST. PATIENT-LVL III: CPT | Mod: PBBFAC,,, | Performed by: INTERNAL MEDICINE

## 2019-01-10 PROCEDURE — 1101F PT FALLS ASSESS-DOCD LE1/YR: CPT | Mod: CPTII,S$GLB,, | Performed by: INTERNAL MEDICINE

## 2019-01-10 PROCEDURE — 99205 PR OFFICE/OUTPT VISIT, NEW, LEVL V, 60-74 MIN: ICD-10-PCS | Mod: 25,S$GLB,, | Performed by: INTERNAL MEDICINE

## 2019-01-10 PROCEDURE — 93000 ELECTROCARDIOGRAM COMPLETE: CPT | Mod: S$GLB,,, | Performed by: INTERNAL MEDICINE

## 2019-01-10 PROCEDURE — 99499 UNLISTED E&M SERVICE: CPT | Mod: S$GLB,,, | Performed by: INTERNAL MEDICINE

## 2019-01-10 PROCEDURE — 99205 OFFICE O/P NEW HI 60 MIN: CPT | Mod: 25,S$GLB,, | Performed by: INTERNAL MEDICINE

## 2019-01-10 PROCEDURE — 1101F PR PT FALLS ASSESS DOC 0-1 FALLS W/OUT INJ PAST YR: ICD-10-PCS | Mod: CPTII,S$GLB,, | Performed by: INTERNAL MEDICINE

## 2019-01-10 PROCEDURE — 99499 RISK ADDL DX/OHS AUDIT: ICD-10-PCS | Mod: S$GLB,,, | Performed by: INTERNAL MEDICINE

## 2019-01-10 PROCEDURE — 99999 PR PBB SHADOW E&M-EST. PATIENT-LVL III: ICD-10-PCS | Mod: PBBFAC,,, | Performed by: INTERNAL MEDICINE

## 2019-01-10 NOTE — LETTER
January 10, 2019      James Vallecillo MD  [address] [facility] [address]           [facility]  [address], [address]  [facility] [address]  Phone: [phone]          Patient: Talia Dillon   MR Number: [id number]   YOB: 1950   Date of Visit: 1/10/2019       Dear Dr. James Vallecillo:    Thank you for referring Talia Dillon to me for evaluation. Attached you will find relevant portions of my assessment and plan of care.    If you have questions, please do not hesitate to call me. I look forward to following Talia Dillon along with you.    Sincerely,    Laci Barker MD    Enclosure  CC:  No Recipients    If you would like to receive this communication electronically, please contact [email or web address] or [phone] to request more information on [email or web address] Link access.    For providers and/or their staff who would like to refer a patient to [facility], please contact us through our one-stop-shop provider referral line, Amber Cosme, at [phone].    If you feel you have received this communication in error or would no longer like to receive these types of communications, please e-mail [email or web address]

## 2019-01-10 NOTE — PROGRESS NOTES
"  Subjective:      Patient ID: Talia Dillon is a 68 y.o. female.    Chief Complaint: Shortness of Breath (Saw you about 5 yrs ago); Chest Pain (Occasional pain); and Dizziness    HPI:  Pt referred by Dr Vallecillo.    Pt saw me 4 years ago and had a stress test.     Pt has a hx of bronchial difficulties and PSVT.    Pt also dx with CKD stage IV.  Pt has been seeing Dr Forrest Salgado.    Pt quit smoking in June    Pt has occasional palpitations    Pt c/o episodic dizziness.    Pt has chronic shortness of breath and wheezing.  "It is hard to catch my breath."    Pt has occasional slight left anterior chest pain during palpitations.    The palpitations are infrequent and are sometimes associated with dizziness.     The palpitations last about 20 seconds typically.  Sometimes pt awakens from sleep with palpitations.    Pt stops the palpitations by putting her face in ice water or by bearing down like having a bowel movement.    Pt has chronic low back pain associated with scoliosis and has undergone epidural steroid injections.    Review of Systems   Cardiovascular: Positive for chest pain, dyspnea on exertion, irregular heartbeat and palpitations. Negative for claudication, leg swelling, near-syncope, orthopnea and syncope.        Past Medical History:   Diagnosis Date    Allergy     Anemia     Anxiety     Cancer     skin    Cataract     Depression     Edema     Hypothyroidism     PSVT (paroxysmal supraventricular tachycardia)     Thyroid disease         Past Surgical History:   Procedure Laterality Date    ADENOIDECTOMY      APPENDECTOMY      BLOCK, NERVE, MEDIAL BRANCH BLOCK BILATERAL LUMBAR L2-5 Bilateral 9/28/2018    [facility] at [facility]    BLOCK, NERVE, MEDIAL BRANCH BLOCK BILATERAL LUMBAR L2-5 Bilateral 9/21/2018    [facility] at [facility]    COLONOSCOPY  2009    repeat in 10 years     EYE SURGERY      HYSTERECTOMY      " INJECTION,STEROID,EPIDURAL N/A 8/3/2018    [facility]    RADIOFREQUENCY ABLATION LEFT LUMBAR L2,3,4,5 RFA Left 2018    [facility]    RADIOFREQUENCY ABLATION RIGHT LUMBAR L2,3,4,5 RFA Right 10/26/2018    [facility]    TONSILLECTOMY         Family History   Problem Relation Age of Onset    Stroke Mother     Stroke Father     Diabetes Father     Sleep apnea Daughter     Mental illness Daughter        Social History     Socioeconomic History    Marital status: Single     Spouse name: None    Number of children: None    Years of education: None    Highest education level: None   Social Needs    Financial resource strain: None    Food insecurity - worry: None    Food insecurity - inability: None    Transportation needs - medical: None    Transportation needs - non-medical: None   Occupational History    None   Tobacco Use    Smoking status: Former Smoker     Packs/day: 1.00     Years: 50.00     Pack years: 50.00     Types: Cigarettes     Last attempt to quit: 2018     Years since quittin.6    Smokeless tobacco: Never Used   Substance and Sexual Activity    Alcohol use: Yes    Drug use: No    Sexual activity: Not Currently   Other Topics Concern    None   Social History Narrative    None       Current Outpatient Medications on File Prior to Visit   Medication Sig Dispense Refill    ALPRAZolam (XANAX) 0.25 MG tablet Take 1 tablet (0.25 mg total) by mouth once daily. 90 tablet 1    amitriptyline (ELAVIL) 10 MG tablet Take 1 tablet (10 mg total) by mouth nightly as needed. 90 tablet 3    atenolol (TENORMIN) 50 MG tablet TAKE 1 AND 1/2 TALBLET BY MOUTH EVERY  tablet 1    atorvastatin (LIPITOR) 10 MG tablet Take 1 tablet (10 mg total) by mouth once daily. 90 tablet 3    cholecalciferol, vitamin D3, (VITAMIN D3) 5,000 unit Tab Take 5,000 Units by mouth once daily.  "90 tablet 3    DULoxetine (CYMBALTA) 60 MG capsule Take 1 capsule (60 mg total) by mouth once daily. 30 capsule 2    gabapentin (NEURONTIN) 400 MG capsule Take 400 mg by mouth. Take 2 tablets once daily      levothyroxine (SYNTHROID, LEVOTHROID) 175 MCG tablet Take 1 tablet (175 mcg total) by mouth once daily. 90 tablet 3    topiramate (TOPAMAX) 25 MG tablet Take 1 tablet (25 mg total) by mouth 2 (two) times daily. 60 tablet 3    zolpidem (AMBIEN) 10 mg Tab Take 1 tablet (10 mg total) by mouth nightly as needed. 30 tablet 5    [DISCONTINUED] ALPRAZolam (XANAX) 0.25 MG tablet TAKE 1 TABLET (0.25 MG TOTAL) BY MOUTH ONCE DAILY. 90 tablet 0     No current facility-administered medications on file prior to visit.        Review of patient's allergies indicates:   Allergen Reactions    Dexamethasone Rash     Objective:     Vitals:    01/10/19 0909   BP: 100/78   BP Location: Left arm   Patient Position: Sitting   BP Method: Large (Manual)   Pulse: 99   SpO2: (!) 89%   Weight: 96.7 kg (213 lb 3.2 oz)   Height: 5' 4" (1.626 m)        Physical Exam   Constitutional: She is oriented to person, place, and time. She appears well-developed and well-nourished.   Eyes: No scleral icterus.   Neck: No JVD present. Carotid bruit is not present.   Cardiovascular: Normal rate. An irregularly irregular rhythm present. Exam reveals no gallop.   Murmur (I/VI systolic) heard.  Pulmonary/Chest: She has wheezes.   Musculoskeletal: She exhibits no edema.   Neurological: She is alert and oriented to person, place, and time.   Skin: Skin is warm and dry.   Psychiatric: She has a normal mood and affect. Her behavior is normal. Judgment and thought content normal.   Vitals reviewed.     ECG: atrial fibrillation with ventricular rate 99 bpm, abnormal R wave progression in precordial leads    October lab  hgb 12, Creat 1.9  Assessment:     1. Atrial fibrillation, new onset    2. History of PSVT (paroxysmal supraventricular tachycardia)    3. " Pure hypercholesterolemia    4. CKD stage G4/A2, GFR 15-29 and albumin creatinine ratio  mg/g    5. Hypothyroidism due to acquired atrophy of thyroid    6. SVT (supraventricular tachycardia)    7. Shortness of breath    8. Dyspnea on exertion    9. Nonspecific abnormal electrocardiogram (ECG) (EKG)      Plan:   Talia was seen today for shortness of breath, chest pain and dizziness.    Diagnoses and all orders for this visit:    Atrial fibrillation, new onset  -     CBC auto differential; Future  -     Comprehensive metabolic panel; Future  -     TSH; Future  -     Brain natriuretic peptide; Future  -     X-Ray Chest PA And Lateral; Future  -     Transthoracic echo (TTE) complete (Cupid Only); Future  -     Ambulatory consult to Electrophysiology  -     Holter monitor - 24 hour (Cupid Only); Future  -     NM Myocardial Perfusion Spect Multi Exer; Future  -     Stress test (Cupid Only); Future    History of PSVT (paroxysmal supraventricular tachycardia)  -     IN OFFICE EKG 12-LEAD (to Muse)    Pure hypercholesterolemia  -     Lipid panel; Future    CKD stage G4/A2, GFR 15-29 and albumin creatinine ratio  mg/g    Hypothyroidism due to acquired atrophy of thyroid    SVT (supraventricular tachycardia)  -     IN OFFICE EKG 12-LEAD (to Muse)    Shortness of breath  -     Ambulatory consult to Pulmonology    Dyspnea on exertion  -     NM Myocardial Perfusion Spect Multi Exer; Future  -     Stress test (Cupid Only); Future    Nonspecific abnormal electrocardiogram (ECG) (EKG)  -     NM Myocardial Perfusion Spect Multi Exer; Future  -     Stress test (Cupid Only); Future    Other orders  -     apixaban (ELIQUIS) 5 mg Tab; Take 1 tablet (5 mg total) by mouth 2 (two) times daily.       Echocardiogram with doppler    CXR    Treadmill Cardiolite stress test    Low carb wt reducing diet    Consult Dr Devin You    Consult Dr Ana Stephens    Avoid ASA and NSAID's    24 hour holter--may need to double the dose of  atenolol or add diltiazem given pt's hx of wheezing    Same meds plus Eliquis 5 mg bid to prevent thromboembolic stroke due to new onset atrial fibrillation.    Pt wishes for tests to be scheduled in [place]    Follow-up in about 4 weeks (around 2/7/2019).

## 2019-01-10 NOTE — PROGRESS NOTES
Patient, Talia Dillon (MRN #[id number]), presented with a recorded BMI of 36.6 kg/m^2 and a documented comorbidity(s):  - Hyperlipidemia  - Atrial Fibrillation  to which the severe obesity is a contributing factor. This is consistent with the definition of severe obesity (BMI 35.0-39.9) with comorbidity (ICD-10 E66.01, Z68.35). The patient's severe obesity was monitored, evaluated, addressed and/or treated. This addendum to the medical record is made on 01/10/2019.

## 2019-01-14 ENCOUNTER — TELEPHONE (OUTPATIENT)
Dept: CARDIOLOGY | Facility: CLINIC | Age: 69
End: 2019-01-14

## 2019-01-14 ENCOUNTER — HOSPITAL ENCOUNTER (OUTPATIENT)
Dept: CARDIOLOGY | Facility: HOSPITAL | Age: 69
Discharge: HOME | End: 2019-01-14
Attending: INTERNAL MEDICINE
Payer: MEDICARE

## 2019-01-14 ENCOUNTER — HOSPITAL ENCOUNTER (OUTPATIENT)
Dept: RADIOLOGY | Facility: HOSPITAL | Age: 69
Discharge: HOME | End: 2019-01-14
Attending: INTERNAL MEDICINE
Payer: MEDICARE

## 2019-01-14 DIAGNOSIS — I48.91 ATRIAL FIBRILLATION, NEW ONSET: ICD-10-CM

## 2019-01-14 LAB
AORTIC ROOT ANNULUS: 2.62 CM
AORTIC VALVE CUSP SEPERATION: 1.73 CM
AV INDEX (PROSTH): 1.03
AV MEAN GRADIENT: 3.32 MMHG
AV PEAK GRADIENT: 5.29 MMHG
AV VALVE AREA: 3.21 CM2
CV ECHO LV RWT: 0.56 CM
DOP CALC AO PEAK VEL: 1.15 M/S
DOP CALC AO VTI: 20.03 CM
DOP CALC LVOT AREA: 3.11 CM2
DOP CALC LVOT DIAMETER: 1.99 CM
DOP CALC LVOT STROKE VOLUME: 64.23 CM3
DOP CALCLVOT PEAK VEL VTI: 20.66 CM
E/E' RATIO: 8.37
ECHO LV POSTERIOR WALL: 1.13 CM (ref 0.6–1.1)
FRACTIONAL SHORTENING: 23 % (ref 28–44)
INTERVENTRICULAR SEPTUM: 1.03 CM (ref 0.6–1.1)
IVC PROX: 1.9 CM
LA MAJOR: 5.83 CM
LA MINOR: 6.48 CM
LA WIDTH: 3.7 CM
LEFT ATRIUM SIZE: 3.8 CM
LEFT ATRIUM VOLUME: 73.35 CM3
LEFT INTERNAL DIMENSION IN SYSTOLE: 3.1 CM (ref 2.1–4)
LEFT VENTRICLE DIASTOLIC VOLUME: 71.31 ML
LEFT VENTRICLE SYSTOLIC VOLUME: 37.97 ML
LEFT VENTRICULAR INTERNAL DIMENSION IN DIASTOLE: 4.03 CM (ref 3.5–6)
LEFT VENTRICULAR MASS: 143.48 G
LV LATERAL E/E' RATIO: 8.07
LV SEPTAL E/E' RATIO: 8.69
MV PEAK E VEL: 1.13 M/S
PISA TR MAX VEL: 2.67 M/S
PULM VEIN S/D RATIO: 0.55
PV PEAK D VEL: 0.74 M/S
PV PEAK S VEL: 0.41 M/S
RA MAJOR: 5.15 CM
RA PRESSURE: 3 MMHG
RA WIDTH: 3.71 CM
RIGHT VENTRICULAR END-DIASTOLIC DIMENSION: 2.89 CM
SINUS: 3.02 CM
TDI LATERAL: 0.14
TDI SEPTAL: 0.13
TDI: 0.14
TR MAX PG: 28.52 MMHG
TV REST PULMONARY ARTERY PRESSURE: 32 MMHG

## 2019-01-14 PROCEDURE — 93306 TTE W/DOPPLER COMPLETE: CPT | Mod: 26,,, | Performed by: INTERNAL MEDICINE

## 2019-01-14 PROCEDURE — 93227 HOLTER MONITOR - 24 HOUR (CUPID ONLY): ICD-10-PCS | Mod: ,,, | Performed by: INTERNAL MEDICINE

## 2019-01-14 PROCEDURE — 93306 TRANSTHORACIC ECHO (TTE) COMPLETE (CUPID ONLY): ICD-10-PCS | Mod: 26,,, | Performed by: INTERNAL MEDICINE

## 2019-01-14 PROCEDURE — 93225 XTRNL ECG REC<48 HRS REC: CPT | Mod: PO,ER

## 2019-01-14 PROCEDURE — 93306 TTE W/DOPPLER COMPLETE: CPT | Mod: PO,ER

## 2019-01-14 PROCEDURE — 71046 X-RAY EXAM CHEST 2 VIEWS: CPT | Mod: TC,FY,PO,ER

## 2019-01-14 PROCEDURE — 93227 XTRNL ECG REC<48 HR R&I: CPT | Mod: ,,, | Performed by: INTERNAL MEDICINE

## 2019-01-14 NOTE — TELEPHONE ENCOUNTER
Reviewed results with pt:  CKD stable  Lipids OK  TSH now normal  Hgb 11 c/ CKD  Echo shows normal LVEF  (also shows LVH, dilated LA, a fib)  Lungs clear on CXR (mild cardiomegaly also noted)  Holter pending  Consult with Dr Devin You pending

## 2019-01-17 ENCOUNTER — CLINICAL SUPPORT (OUTPATIENT)
Dept: SMOKING CESSATION | Facility: CLINIC | Age: 69
End: 2019-01-17
Payer: COMMERCIAL

## 2019-01-17 DIAGNOSIS — F17.200 NICOTINE DEPENDENCE: Primary | ICD-10-CM

## 2019-01-17 LAB
OHS CV EVENT MONITOR DAY: 23
OHS CV HOLTER LENGTH DECIMAL HOURS: 611
OHS CV HOLTER LENGTH HOURS: 59
OHS CV HOLTER LENGTH MINUTES: 0

## 2019-01-17 PROCEDURE — 99407 PR TOBACCO USE CESSATION INTENSIVE >10 MINUTES: ICD-10-PCS | Mod: S$GLB,,,

## 2019-01-17 PROCEDURE — 99407 BEHAV CHNG SMOKING > 10 MIN: CPT | Mod: S$GLB,,,

## 2019-01-17 NOTE — PROGRESS NOTES
Called pt to f/u on her 6 month smoking cessation quit status. Pt stated she is still tobacco free. Congratulated her on her hard work and success and informed her of benefit period, furture phone follow ups, and contact information if she was to ever need extra help and support remaining tobacco free. Will complete smart form and follow up in 6 months.

## 2019-01-18 ENCOUNTER — TELEPHONE (OUTPATIENT)
Dept: CARDIOLOGY | Facility: CLINIC | Age: 69
End: 2019-01-18

## 2019-01-18 NOTE — TELEPHONE ENCOUNTER
Reviewed holter monitor results with pt.  Average heart rate 100 bpm.  Will postppone increasing the atenolol to 100 mg daily until after pt sees Dr You 1/31/18 to see if he concurs.

## 2019-01-28 ENCOUNTER — HOSPITAL ENCOUNTER (OUTPATIENT)
Dept: RADIOLOGY | Facility: HOSPITAL | Age: 69
Discharge: HOME | End: 2019-01-28
Attending: INTERNAL MEDICINE
Payer: MEDICARE

## 2019-01-28 ENCOUNTER — HOSPITAL ENCOUNTER (OUTPATIENT)
Dept: CARDIOLOGY | Facility: HOSPITAL | Age: 69
Discharge: HOME | End: 2019-01-28
Attending: INTERNAL MEDICINE
Payer: MEDICARE

## 2019-01-28 DIAGNOSIS — R06.09 DYSPNEA ON EXERTION: ICD-10-CM

## 2019-01-28 DIAGNOSIS — I48.91 ATRIAL FIBRILLATION, NEW ONSET: ICD-10-CM

## 2019-01-28 DIAGNOSIS — R94.31 NONSPECIFIC ABNORMAL ELECTROCARDIOGRAM (ECG) (EKG): ICD-10-CM

## 2019-01-28 LAB
CV STRESS BASE HR: 108
DIASTOLIC BLOOD PRESSURE: 70
OHS CV CPX 1 MINUTE RECOVERY HEART RATE: 121 BPM
OHS CV CPX 85 PERCENT MAX PREDICTED HEART RATE MALE: 123
OHS CV CPX MAX PREDICTED HEART RATE: 145
OHS CV CPX PATIENT IS FEMALE: 1
OHS CV CPX PATIENT IS MALE: 0
OHS CV CPX PEAK DIASTOLIC BLOOD PRESSURE: 81 MMHG
OHS CV CPX PEAK HEAR RATE: 155
OHS CV CPX PEAK RATE PRESSURE PRODUCT: NORMAL
OHS CV CPX PEAK SYSTOLIC BLOOD PRESSURE: 117
OHS CV CPX PERCENT MAX PREDICTED HEART RATE ACHIEVED: 107
OHS CV CPX PERCENT TARGET HEART RATE ACHIEVED: 121.09
OHS CV CPX RATE PRESSURE PRODUCT PRESENTING: NORMAL
OHS CV CPX TARGET HEART RATE: 128
SYSTOLIC BLOOD PRESSURE: 110

## 2019-01-28 PROCEDURE — 93018 STRESS TEST ONLY (CUPID ONLY): ICD-10-PCS | Mod: ,,, | Performed by: INTERNAL MEDICINE

## 2019-01-28 PROCEDURE — 93016 STRESS TEST ONLY (CUPID ONLY): ICD-10-PCS | Mod: ,,, | Performed by: INTERNAL MEDICINE

## 2019-01-28 PROCEDURE — 93016 CV STRESS TEST SUPVJ ONLY: CPT | Mod: ,,, | Performed by: INTERNAL MEDICINE

## 2019-01-28 PROCEDURE — A9502 TC99M TETROFOSMIN: HCPCS | Mod: PO

## 2019-01-28 PROCEDURE — 93017 CV STRESS TEST TRACING ONLY: CPT | Mod: PO

## 2019-01-28 PROCEDURE — 93018 CV STRESS TEST I&R ONLY: CPT | Mod: ,,, | Performed by: INTERNAL MEDICINE

## 2019-01-30 ENCOUNTER — TELEPHONE (OUTPATIENT)
Dept: CARDIOLOGY | Facility: CLINIC | Age: 69
End: 2019-01-30

## 2019-01-30 DIAGNOSIS — I48.91 ATRIAL FIBRILLATION, NEW ONSET: Primary | ICD-10-CM

## 2019-01-31 ENCOUNTER — HOSPITAL ENCOUNTER (OUTPATIENT)
Dept: CARDIOLOGY | Facility: CLINIC | Age: 69
Discharge: HOME | End: 2019-01-31
Payer: MEDICARE

## 2019-01-31 ENCOUNTER — INITIAL CONSULT (OUTPATIENT)
Dept: ELECTROPHYSIOLOGY | Facility: CLINIC | Age: 69
End: 2019-01-31
Payer: MEDICARE

## 2019-01-31 VITALS
HEIGHT: 64 IN | BODY MASS INDEX: 35.83 KG/M2 | WEIGHT: 209.88 LBS | HEART RATE: 99 BPM | SYSTOLIC BLOOD PRESSURE: 108 MMHG | DIASTOLIC BLOOD PRESSURE: 62 MMHG

## 2019-01-31 DIAGNOSIS — I48.91 ATRIAL FIBRILLATION, NEW ONSET: Primary | ICD-10-CM

## 2019-01-31 DIAGNOSIS — I48.91 ATRIAL FIBRILLATION, NEW ONSET: ICD-10-CM

## 2019-01-31 DIAGNOSIS — N18.4 CKD STAGE G4/A2, GFR 15-29 AND ALBUMIN CREATININE RATIO 30-299 MG/G: ICD-10-CM

## 2019-01-31 DIAGNOSIS — I10 ESSENTIAL HYPERTENSION: Chronic | ICD-10-CM

## 2019-01-31 DIAGNOSIS — Z86.79 HISTORY OF PSVT (PAROXYSMAL SUPRAVENTRICULAR TACHYCARDIA): ICD-10-CM

## 2019-01-31 PROCEDURE — 3074F PR MOST RECENT SYSTOLIC BLOOD PRESSURE < 130 MM HG: ICD-10-PCS | Mod: CPTII,S$GLB,, | Performed by: INTERNAL MEDICINE

## 2019-01-31 PROCEDURE — 93000 RHYTHM STRIP: ICD-10-PCS | Mod: S$GLB,,, | Performed by: INTERNAL MEDICINE

## 2019-01-31 PROCEDURE — 1101F PT FALLS ASSESS-DOCD LE1/YR: CPT | Mod: CPTII,S$GLB,, | Performed by: INTERNAL MEDICINE

## 2019-01-31 PROCEDURE — 1101F PR PT FALLS ASSESS DOC 0-1 FALLS W/OUT INJ PAST YR: ICD-10-PCS | Mod: CPTII,S$GLB,, | Performed by: INTERNAL MEDICINE

## 2019-01-31 PROCEDURE — 93005 ELECTROCARDIOGRAM TRACING: CPT | Mod: S$GLB,,, | Performed by: INTERNAL MEDICINE

## 2019-01-31 PROCEDURE — 3078F DIAST BP <80 MM HG: CPT | Mod: CPTII,S$GLB,, | Performed by: INTERNAL MEDICINE

## 2019-01-31 PROCEDURE — 99204 OFFICE O/P NEW MOD 45 MIN: CPT | Mod: S$GLB,,, | Performed by: INTERNAL MEDICINE

## 2019-01-31 PROCEDURE — 93005 RHYTHM STRIP: ICD-10-PCS | Mod: S$GLB,,, | Performed by: INTERNAL MEDICINE

## 2019-01-31 PROCEDURE — 99999 PR PBB SHADOW E&M-EST. PATIENT-LVL III: CPT | Mod: PBBFAC,,, | Performed by: INTERNAL MEDICINE

## 2019-01-31 PROCEDURE — 3078F PR MOST RECENT DIASTOLIC BLOOD PRESSURE < 80 MM HG: ICD-10-PCS | Mod: CPTII,S$GLB,, | Performed by: INTERNAL MEDICINE

## 2019-01-31 PROCEDURE — 99204 PR OFFICE/OUTPT VISIT, NEW, LEVL IV, 45-59 MIN: ICD-10-PCS | Mod: S$GLB,,, | Performed by: INTERNAL MEDICINE

## 2019-01-31 PROCEDURE — 93000 ELECTROCARDIOGRAM COMPLETE: CPT | Mod: S$GLB,,, | Performed by: INTERNAL MEDICINE

## 2019-01-31 PROCEDURE — 3074F SYST BP LT 130 MM HG: CPT | Mod: CPTII,S$GLB,, | Performed by: INTERNAL MEDICINE

## 2019-01-31 PROCEDURE — 99999 PR PBB SHADOW E&M-EST. PATIENT-LVL III: ICD-10-PCS | Mod: PBBFAC,,, | Performed by: INTERNAL MEDICINE

## 2019-01-31 RX ORDER — METOPROLOL TARTRATE 25 MG/1
75 TABLET, FILM COATED ORAL 2 TIMES DAILY
Qty: 180 TABLET | Refills: 11 | Status: SHIPPED | OUTPATIENT
Start: 2019-01-31 | End: 2019-10-22 | Stop reason: SDUPTHER

## 2019-01-31 NOTE — PATIENT INSTRUCTIONS
Electrical Cardioversion     Cut away image of heart showing SA node, right atrium, AV node, and left atrium   You had a cardioversion today. This is an electrical shock applied to the chest. The shock reset your heart rhythm back to normal. Your chest wall and chest muscles may feel sore for a few days. Some redness may appear on the skin on your chest where the cardioversion patches were applied. That will go away within a week.  To get ready for this procedure, you may have been given medicine to help you relax and to reduce pain. Depending on the medicine used, it could take up to 8 hours for the effect to wear off.  Home care  Follow these guidelines when caring for yourself at home:  · You should be watched by a responsible adult for the next 8 hours. This is in case your condition gets worse.  · Dont take any oral medicine for pain or sleep during the next 4 hours. These medicines might react with the medicines you were given in the hospital. This can cause a much stronger response than usual.  · Dont drink any alcohol for the next 24 hours.  · Dont drive or operate dangerous machinery during the next 24 hours.  · Your healthcare provider will have prescribed medicines to stop the abnormal heart rhythm from coming back. Take these medicines as directed. Expect to take blood thinners for at least 4 weeks. This course of blood thinners should not be interrupted. Do not schedule other invasive procedures during this time. Interrupting this medicine could increase your risk for a stroke after a cardioversion.  · You may use acetaminophen or ibuprofen to control pain, unless another pain medicine was prescribed. If you have chronic liver or kidney disease, talk with your provider before taking these medicines. Also talk with your provider if youve had a stomach ulcer or gastrointestinal bleeding.  Follow-up care  Follow up with your healthcare provider, or as advised, if you arent alert and back to your  usual level of activity within 12 hours.   Call 911  Call 911 or seek emergency medical attention if you have:   · Pain in your chest, arm, shoulder, neck or upper back  · You have problems speaking or seeing  · Weakness in an arm or leg  · You are unable to move your arm or leg on one side of your body  · You have uncrontrolled bleeding from blood thinning medicines   When to seek medical advice  Call your healthcare provider right away if any of these occur:  · Weakness, dizziness, lightheadedness, or fainting  · Shortness of breath  · You feel like your heart is fluttering or beating fast, hard, or irregularly (palpitations)  · More than minor skin discomfort or redness where the cardioversion pads were placed   Date Last Reviewed: 1/1/2017  © 0259-8845 [employer]. [address] [place] [address], [place] [address]. All rights reserved. This information is not intended as a substitute for professional medical care. Always follow your healthcare professional's instructions.

## 2019-01-31 NOTE — LETTER
January 31, 2019      Laci Barker MD  [address]  [address]  [facility] [address]           [facility]  [address]  [facility] [address]  Phone: [phone]  Fax: [phone]          Patient: Talia Dillon   MR Number: [id number]   YOB: 1950   Date of Visit: 1/31/2019       Dear Dr. Laci Barker:    Thank you for referring Talia Dillon to me for evaluation. Attached you will find relevant portions of my assessment and plan of care.    If you have questions, please do not hesitate to call me. I look forward to following Talia Dillon along with you.    Sincerely,    Devin You MD    Enclosure  CC:  No Recipients    If you would like to receive this communication electronically, please contact [email or web address] or [phone] to request more information on [employer] Link access.    For providers and/or their staff who would like to refer a patient to [facility], please contact us through our one-stop-shop provider referral line, [facility], at [phone].    If you feel you have received this communication in error or would no longer like to receive these types of communications, please e-mail [email or web address]

## 2019-01-31 NOTE — H&P (VIEW-ONLY)
Subjective:    Patient ID:  Talia Dillon is a 68 y.o. female who presents for evaluation of Atrial Fibrillation    Referring Cardiologist: Favian Barker MD  Primary Care Physician: James Vallecillo MD    HPI  I had the pleasure of seeing Ms. Dillon today in consultation for her newly diagnosed atrial arrhythmia. As you are aware she is a pleasant 68 year-old woman with hypertension, stage 4 chronic kidney disease, and reported supraventricular tachycardia (last significant event requiring IV therapy for termination was 10 years ago). She has chronic runs of palpitations that terminate with bearing down or ice water on her face. Sometimes these are associated with light-headedness and chest discomfort. She also has chronic dyspnea on exertion. She saw Dr. Barker 4-5 years ago and wanted to reestablish care with him. At that visit was discovered to be in atrial fibrillation with RVR. An echocardiogram noted a normal LVEF with moderate left atrial enlargement. Holter monitor noted persistent atrial fibrillation with an average heart rate of 100 bpm and range of  bpm. An exercise nuclear stress test noted no ischemia/infarction. She presents for evaluation. She is on     I reviewed the only available prior electrocardiogram in Epic from 1/10/2019 which shows atrial fibrillation.    My interpretation of today's in clinic ECG is AF with an average ventricular rate of 99 bpm.    Review of Systems   Constitution: Negative for weakness and malaise/fatigue.   HENT: Negative for congestion and sore throat.    Eyes: Negative for blurred vision and visual disturbance.   Cardiovascular: Positive for dyspnea on exertion (chronic) and palpitations (chronic). Negative for chest pain, irregular heartbeat, leg swelling, near-syncope, orthopnea, paroxysmal nocturnal dyspnea and syncope.   Respiratory: Negative for cough and shortness of breath.    Hematologic/Lymphatic: Negative for bleeding problem. Does not bruise/bleed easily.    Skin: Negative.    Musculoskeletal: Negative.    Gastrointestinal: Negative for hematochezia and melena.   Neurological: Negative for dizziness and focal weakness.        Objective:    Physical Exam   Constitutional: She is oriented to person, place, and time. She appears well-developed and well-nourished. No distress.   HENT:   Head: Normocephalic and atraumatic.   Eyes: Conjunctivae are normal. Right eye exhibits no discharge. Left eye exhibits no discharge.   Neck: Neck supple. No JVD present.   Cardiovascular: An irregularly irregular rhythm present. Tachycardia present. Exam reveals no gallop and no friction rub.   No murmur heard.  Pulmonary/Chest: Effort normal and breath sounds normal. No respiratory distress. She has no wheezes. She has no rales.   Abdominal: Soft. Bowel sounds are normal. She exhibits no distension. There is no tenderness.   Musculoskeletal: She exhibits no edema.   Neurological: She is alert and oriented to person, place, and time.   Skin: Skin is warm and dry. She is not diaphoretic.   Psychiatric: She has a normal mood and affect. Her behavior is normal. Judgment and thought content normal.         Assessment:       1. Atrial fibrillation, new onset    2. History of PSVT (paroxysmal supraventricular tachycardia)    3. CKD stage G4/A2, GFR 15-29 and albumin creatinine ratio  mg/g    4. Essential hypertension         Plan:       In summary, Ms. Dillon is a pleasant 68 year-old woman with hypertension, stage 4 chronic kidney disease, and reported supraventricular tachycardia presenting for evaluation of new onset symptomatic persistent atrial fibrillation. I had a long discussion with the patient about the pathophysiology and risks of atrial fibrillation and its basic pathophysiology, including its health implications and treatment options. Specifically, I addressed the need for CVA (stroke) prophylaxis with aspirin versus oral anticoagulation (warfarin vs DOACs, discussed bleeding  risks, and need to come to the ER for any head trauma for CT scanning even if asymptomatic).  Her [id number] score is 3 and she is appropriately anticoagulated. I also discussed the goal to reduce symptomatic arrhythmic episodes by pharmacologic and/or procedural methods and utilizing a rhythm versus a rate control strategy. Since she is symptomatic and this is her first documented episode of AF I recommend a trial of restoration of sinus rhythm. Due to her CKD the safest options of anti-arrhythmic drug therapy for maintenance of sinus rhythm are either multaq or amiodarone. Recommend KAROL guided DCCV. Would not start an anti-arrhythmic drug unless she has early conversion back to AF.     She is undecided. Request she contact me to arrange if she chooses to proceed.    Would avoid atenolol in setting of severe chronic kidney disease. Stop atenolol and start 75mg bid lopressor (on 75mg daily). RTC in 4 weeks unless she elects to proceed with cardioversion. Likely will eventually uptitrate lopressor to 100mg bid.      Thank you for allowing me to participate in the care of this patient. Please do not hesitate to call me with any questions or concerns.    Devin You MD, PhD  Cardiac Electrophysiology

## 2019-01-31 NOTE — PROGRESS NOTES
Subjective:    Patient ID:  Talia Dillon is a 68 y.o. female who presents for evaluation of Atrial Fibrillation    Referring Cardiologist: Favian Barker MD  Primary Care Physician: James Vallecillo MD    HPI  I had the pleasure of seeing Ms. Dillon today in consultation for her newly diagnosed atrial arrhythmia. As you are aware she is a pleasant 68 year-old woman with hypertension, stage 4 chronic kidney disease, and reported supraventricular tachycardia (last significant event requiring IV therapy for termination was 10 years ago). She has chronic runs of palpitations that terminate with bearing down or ice water on her face. Sometimes these are associated with light-headedness and chest discomfort. She also has chronic dyspnea on exertion. She saw Dr. Barker 4-5 years ago and wanted to reestablish care with him. At that visit was discovered to be in atrial fibrillation with RVR. An echocardiogram noted a normal LVEF with moderate left atrial enlargement. Holter monitor noted persistent atrial fibrillation with an average heart rate of 100 bpm and range of  bpm. An exercise nuclear stress test noted no ischemia/infarction. She presents for evaluation. She is on     I reviewed the only available prior electrocardiogram in Epic from 1/10/2019 which shows atrial fibrillation.    My interpretation of today's in clinic ECG is AF with an average ventricular rate of 99 bpm.    Review of Systems   Constitution: Negative for weakness and malaise/fatigue.   HENT: Negative for congestion and sore throat.    Eyes: Negative for blurred vision and visual disturbance.   Cardiovascular: Positive for dyspnea on exertion (chronic) and palpitations (chronic). Negative for chest pain, irregular heartbeat, leg swelling, near-syncope, orthopnea, paroxysmal nocturnal dyspnea and syncope.   Respiratory: Negative for cough and shortness of breath.    Hematologic/Lymphatic: Negative for bleeding problem. Does not bruise/bleed easily.    Skin: Negative.    Musculoskeletal: Negative.    Gastrointestinal: Negative for hematochezia and melena.   Neurological: Negative for dizziness and focal weakness.        Objective:    Physical Exam   Constitutional: She is oriented to person, place, and time. She appears well-developed and well-nourished. No distress.   HENT:   Head: Normocephalic and atraumatic.   Eyes: Conjunctivae are normal. Right eye exhibits no discharge. Left eye exhibits no discharge.   Neck: Neck supple. No JVD present.   Cardiovascular: An irregularly irregular rhythm present. Tachycardia present. Exam reveals no gallop and no friction rub.   No murmur heard.  Pulmonary/Chest: Effort normal and breath sounds normal. No respiratory distress. She has no wheezes. She has no rales.   Abdominal: Soft. Bowel sounds are normal. She exhibits no distension. There is no tenderness.   Musculoskeletal: She exhibits no edema.   Neurological: She is alert and oriented to person, place, and time.   Skin: Skin is warm and dry. She is not diaphoretic.   Psychiatric: She has a normal mood and affect. Her behavior is normal. Judgment and thought content normal.         Assessment:       1. Atrial fibrillation, new onset    2. History of PSVT (paroxysmal supraventricular tachycardia)    3. CKD stage G4/A2, GFR 15-29 and albumin creatinine ratio  mg/g    4. Essential hypertension         Plan:       In summary, Ms. Dillon is a pleasant 68 year-old woman with hypertension, stage 4 chronic kidney disease, and reported supraventricular tachycardia presenting for evaluation of new onset symptomatic persistent atrial fibrillation. I had a long discussion with the patient about the pathophysiology and risks of atrial fibrillation and its basic pathophysiology, including its health implications and treatment options. Specifically, I addressed the need for CVA (stroke) prophylaxis with aspirin versus oral anticoagulation (warfarin vs DOACs, discussed bleeding  risks, and need to come to the ER for any head trauma for CT scanning even if asymptomatic).  Her [id number] score is 3 and she is appropriately anticoagulated. I also discussed the goal to reduce symptomatic arrhythmic episodes by pharmacologic and/or procedural methods and utilizing a rhythm versus a rate control strategy. Since she is symptomatic and this is her first documented episode of AF I recommend a trial of restoration of sinus rhythm. Due to her CKD the safest options of anti-arrhythmic drug therapy for maintenance of sinus rhythm are either multaq or amiodarone. Recommend KAROL guided DCCV. Would not start an anti-arrhythmic drug unless she has early conversion back to AF.     She is undecided. Request she contact me to arrange if she chooses to proceed.    Would avoid atenolol in setting of severe chronic kidney disease. Stop atenolol and start 75mg bid lopressor (on 75mg daily). RTC in 4 weeks unless she elects to proceed with cardioversion. Likely will eventually uptitrate lopressor to 100mg bid.      Thank you for allowing me to participate in the care of this patient. Please do not hesitate to call me with any questions or concerns.    Devin You MD, PhD  Cardiac Electrophysiology

## 2019-02-04 ENCOUNTER — TELEPHONE (OUTPATIENT)
Dept: ELECTROPHYSIOLOGY | Facility: CLINIC | Age: 69
End: 2019-02-04

## 2019-02-04 NOTE — TELEPHONE ENCOUNTER
Spoke to Ms. Melgar.  Let her know we are happy to set up KAROL/DCCV for her.  Let her know the team I need to speak with for KAROL is not here past 5.  Let her know I will call her back tomorrow and we will set date/time for procedure.     Ms. Melgar verbalizes understanding and appreciates call.

## 2019-02-05 ENCOUNTER — TELEPHONE (OUTPATIENT)
Dept: ELECTROPHYSIOLOGY | Facility: CLINIC | Age: 69
End: 2019-02-05

## 2019-02-05 ENCOUNTER — PATIENT MESSAGE (OUTPATIENT)
Dept: ELECTROPHYSIOLOGY | Facility: CLINIC | Age: 69
End: 2019-02-05

## 2019-02-05 ENCOUNTER — TELEPHONE (OUTPATIENT)
Dept: CARDIOLOGY | Facility: CLINIC | Age: 69
End: 2019-02-05

## 2019-02-05 DIAGNOSIS — I48.91 ATRIAL FIBRILLATION, NEW ONSET: Primary | ICD-10-CM

## 2019-02-05 NOTE — TELEPHONE ENCOUNTER
Spoke to Ms. Dillon.  KAROL/DCCV scheduled for 2/8/19 with 0900 arrival time.  Instructions and directions reviewed.  Will also be sent through patient portal.    All questions answered.  Ms. Dillon verbalizes understanding and appreciates call.

## 2019-02-05 NOTE — PROGRESS NOTES
CARDIOVERSION EDUCATION CHECK LIST    PRE PROCEDURE TESTING    February 6, 2019 at 11:00 am  Pre - procedure labs have been ordered for you at:  [facility]  · Be sure to arrive at your scheduled time. YOU DO NOT HAVE TO FAST FOR THIS LAB WORK.      DAY OF PROCEDURE    February 8, 2019 at 9:00 am   Report to Cardiology Waiting Room on 3rd floor of the hospital  · Do not eat or drink anything after 12 mn on the night before your procedure.  · Please do not wear makeup (especially mascara) when arriving for your procedure.    Medications:  · You may take your usual morning medications with a sip of water    FOLLOW-UP EKG TO BE DONE 1 WEEK AFTER CARDIOVERSION ON February 15, 2019 at 10:45 am AT [facility].    Directions to the Cardiology Waiting Room  If you park in the Parking Garage:  Take elevators to the 2nd floor  Walk up ramp and turn right by Gold Elevators  Take elevator to the 3rd floor  Upon exiting the elevator, turn away from the clinic areas  Walk long henley around to front of hospital to area with windows overlooking [facility]  Check in at Reception Desk  OR  If family is dropping you off:  Have them drop you off at the front of the Hospital  (Near the ER, where all the flags are hung).  Take the E elevators to the 3rd floor.  Check in at the Reception Desk in the waiting room.      · YOU WILL BE GOING HOME THE SAME DAY AS YOUR PROCEDURE  · YOU WILL NEED SOMEONE TO DRIVE YOU HOME AFTER YOUR PROCEDURE   · YOUR PAIN DURING YOUR PROCEDURE WILL BE MANAGED BY THE ANESTHESIA TEAM      Any need to reschedule or cancel procedures, or any questions regarding your procedure should be addressed directly with the Arrhythmia Department Nurses at the following phone number: [phone]

## 2019-02-05 NOTE — TELEPHONE ENCOUNTER
Phoned patient regarding appointment on Thursday.  Patient wants to reschedule appointment until after cardioversion with Dr You.  Rescheduled appointment to Feb 19 @ 2:40pm    ----- Message from Melissa Noe sent at 2/5/2019  4:10 PM CST -----  Contact: [phone]/self  Patient requesting to speak with you regarding her appt on Thursday. Please advise.

## 2019-02-06 ENCOUNTER — LAB VISIT (OUTPATIENT)
Dept: LAB | Facility: HOSPITAL | Age: 69
End: 2019-02-06
Attending: INTERNAL MEDICINE
Payer: MEDICARE

## 2019-02-06 DIAGNOSIS — I48.91 ATRIAL FIBRILLATION, NEW ONSET: ICD-10-CM

## 2019-02-06 LAB
ANION GAP SERPL CALC-SCNC: 11 MMOL/L
APTT BLDCRRT: 42.9 SEC
BASOPHILS # BLD AUTO: 0.03 K/UL
BASOPHILS NFR BLD: 0.4 %
BUN SERPL-MCNC: 61 MG/DL
CALCIUM SERPL-MCNC: 9.9 MG/DL
CHLORIDE SERPL-SCNC: 104 MMOL/L
CO2 SERPL-SCNC: 25 MMOL/L
CREAT SERPL-MCNC: 2.04 MG/DL
DIFFERENTIAL METHOD: NORMAL
EOSINOPHIL # BLD AUTO: 0.5 K/UL
EOSINOPHIL NFR BLD: 6.3 %
ERYTHROCYTE [DISTWIDTH] IN BLOOD BY AUTOMATED COUNT: 13 %
EST. GFR  (AFRICAN AMERICAN): 28.1 ML/MIN/1.73 M^2
EST. GFR  (NON AFRICAN AMERICAN): 24.3 ML/MIN/1.73 M^2
GLUCOSE SERPL-MCNC: 96 MG/DL
HCT VFR BLD AUTO: 37.8 %
HGB BLD-MCNC: 12.2 G/DL
INR PPP: 1.4
LYMPHOCYTES # BLD AUTO: 1.6 K/UL
LYMPHOCYTES NFR BLD: 20.7 %
MCH RBC QN AUTO: 29.1 PG
MCHC RBC AUTO-ENTMCNC: 32.3 G/DL
MCV RBC AUTO: 90 FL
MONOCYTES # BLD AUTO: 0.6 K/UL
MONOCYTES NFR BLD: 7.6 %
NEUTROPHILS # BLD AUTO: 5 K/UL
NEUTROPHILS NFR BLD: 64.7 %
PLATELET # BLD AUTO: 175 K/UL
PMV BLD AUTO: 12.7 FL
POTASSIUM SERPL-SCNC: 4.1 MMOL/L
PROTHROMBIN TIME: 14.7 SEC
RBC # BLD AUTO: 4.19 M/UL
SODIUM SERPL-SCNC: 140 MMOL/L
WBC # BLD AUTO: 7.68 K/UL

## 2019-02-06 PROCEDURE — 80048 BASIC METABOLIC PNL TOTAL CA: CPT | Mod: PO

## 2019-02-06 PROCEDURE — 85610 PROTHROMBIN TIME: CPT | Mod: PO

## 2019-02-06 PROCEDURE — 36415 COLL VENOUS BLD VENIPUNCTURE: CPT | Mod: PO

## 2019-02-06 PROCEDURE — 85025 COMPLETE CBC W/AUTO DIFF WBC: CPT | Mod: PO

## 2019-02-06 PROCEDURE — 85730 THROMBOPLASTIN TIME PARTIAL: CPT | Mod: PO

## 2019-02-07 ENCOUNTER — TELEPHONE (OUTPATIENT)
Dept: ELECTROPHYSIOLOGY | Facility: CLINIC | Age: 69
End: 2019-02-07

## 2019-02-08 ENCOUNTER — ANESTHESIA (OUTPATIENT)
Dept: MEDSURG UNIT | Facility: HOSPITAL | Age: 69
End: 2019-02-08
Payer: MEDICARE

## 2019-02-08 ENCOUNTER — ANESTHESIA EVENT (OUTPATIENT)
Dept: MEDSURG UNIT | Facility: HOSPITAL | Age: 69
End: 2019-02-08
Payer: MEDICARE

## 2019-02-08 ENCOUNTER — HOSPITAL ENCOUNTER (OUTPATIENT)
Dept: CARDIOLOGY | Facility: CLINIC | Age: 69
Discharge: HOME | End: 2019-02-08
Payer: MEDICARE

## 2019-02-08 ENCOUNTER — HOSPITAL ENCOUNTER (OUTPATIENT)
Facility: HOSPITAL | Age: 69
Discharge: HOME | End: 2019-02-08
Attending: INTERNAL MEDICINE | Admitting: INTERNAL MEDICINE
Payer: MEDICARE

## 2019-02-08 VITALS
OXYGEN SATURATION: 98 % | WEIGHT: 206 LBS | HEIGHT: 62 IN | HEART RATE: 59 BPM | DIASTOLIC BLOOD PRESSURE: 64 MMHG | RESPIRATION RATE: 16 BRPM | SYSTOLIC BLOOD PRESSURE: 116 MMHG | BODY MASS INDEX: 37.91 KG/M2 | TEMPERATURE: 97 F

## 2019-02-08 DIAGNOSIS — I48.91 A-FIB: ICD-10-CM

## 2019-02-08 DIAGNOSIS — I48.91 ATRIAL FIBRILLATION, NEW ONSET: Primary | ICD-10-CM

## 2019-02-08 LAB
BSA FOR ECHO PROCEDURE: 2.02 M2
PROX AORTA: 3.8 CM
SINUS: 2.8 CM
STJ: 2.4 CM

## 2019-02-08 PROCEDURE — 93320 TRANSESOPHAGEAL ECHO (TEE) (CUPID ONLY): ICD-10-PCS | Mod: S$GLB,,, | Performed by: INTERNAL MEDICINE

## 2019-02-08 PROCEDURE — 92960 PR CARDIOVERSION, ELECTIVE;EXTERN: ICD-10-PCS | Mod: ,,, | Performed by: INTERNAL MEDICINE

## 2019-02-08 PROCEDURE — 93312 ECHO TRANSESOPHAGEAL: CPT | Mod: S$GLB,,, | Performed by: INTERNAL MEDICINE

## 2019-02-08 PROCEDURE — 25000003 PHARM REV CODE 250: Performed by: INTERNAL MEDICINE

## 2019-02-08 PROCEDURE — 92960 CARDIOVERSION ELECTRIC EXT: CPT | Performed by: INTERNAL MEDICINE

## 2019-02-08 PROCEDURE — D9220A PRA ANESTHESIA: Mod: CRNA,,, | Performed by: NURSE ANESTHETIST, CERTIFIED REGISTERED

## 2019-02-08 PROCEDURE — 93010 EKG 12-LEAD: ICD-10-PCS | Mod: ,,, | Performed by: INTERNAL MEDICINE

## 2019-02-08 PROCEDURE — 93312 TRANSESOPHAGEAL ECHO (TEE) (CUPID ONLY): ICD-10-PCS | Mod: S$GLB,,, | Performed by: INTERNAL MEDICINE

## 2019-02-08 PROCEDURE — 93005 ELECTROCARDIOGRAM TRACING: CPT | Mod: 59

## 2019-02-08 PROCEDURE — 25000003 PHARM REV CODE 250: Performed by: NURSE ANESTHETIST, CERTIFIED REGISTERED

## 2019-02-08 PROCEDURE — D9220A PRA ANESTHESIA: ICD-10-PCS | Mod: CRNA,,, | Performed by: NURSE ANESTHETIST, CERTIFIED REGISTERED

## 2019-02-08 PROCEDURE — 93010 ELECTROCARDIOGRAM REPORT: CPT | Mod: ,,, | Performed by: INTERNAL MEDICINE

## 2019-02-08 PROCEDURE — 37000008 HC ANESTHESIA 1ST 15 MINUTES: Performed by: INTERNAL MEDICINE

## 2019-02-08 PROCEDURE — D9220A PRA ANESTHESIA: ICD-10-PCS | Mod: ANES,,, | Performed by: ANESTHESIOLOGY

## 2019-02-08 PROCEDURE — 92960 CARDIOVERSION ELECTRIC EXT: CPT | Mod: ,,, | Performed by: INTERNAL MEDICINE

## 2019-02-08 PROCEDURE — 37000009 HC ANESTHESIA EA ADD 15 MINS: Performed by: INTERNAL MEDICINE

## 2019-02-08 PROCEDURE — 93010 ELECTROCARDIOGRAM REPORT: CPT | Mod: 76,,, | Performed by: INTERNAL MEDICINE

## 2019-02-08 PROCEDURE — 93320 DOPPLER ECHO COMPLETE: CPT | Mod: S$GLB,,, | Performed by: INTERNAL MEDICINE

## 2019-02-08 PROCEDURE — D9220A PRA ANESTHESIA: Mod: ANES,,, | Performed by: ANESTHESIOLOGY

## 2019-02-08 PROCEDURE — 63600175 PHARM REV CODE 636 W HCPCS: Performed by: NURSE ANESTHETIST, CERTIFIED REGISTERED

## 2019-02-08 PROCEDURE — 93005 ELECTROCARDIOGRAM TRACING: CPT

## 2019-02-08 RX ORDER — LIDOCAINE HCL/PF 100 MG/5ML
SYRINGE (ML) INTRAVENOUS
Status: DISCONTINUED | OUTPATIENT
Start: 2019-02-08 | End: 2019-02-08

## 2019-02-08 RX ORDER — SODIUM CHLORIDE 0.9 % (FLUSH) 0.9 %
3 SYRINGE (ML) INJECTION
Status: DISCONTINUED | OUTPATIENT
Start: 2019-02-08 | End: 2019-02-08 | Stop reason: HOSPADM

## 2019-02-08 RX ORDER — LIDOCAINE HYDROCHLORIDE 20 MG/ML
SOLUTION OROPHARYNGEAL
Status: DISCONTINUED | OUTPATIENT
Start: 2019-02-08 | End: 2019-02-08

## 2019-02-08 RX ORDER — SILVER SULFADIAZINE 10 G/1000G
CREAM TOPICAL
Status: DISCONTINUED | OUTPATIENT
Start: 2019-02-08 | End: 2019-02-08 | Stop reason: HOSPADM

## 2019-02-08 RX ORDER — PROPOFOL 10 MG/ML
VIAL (ML) INTRAVENOUS CONTINUOUS PRN
Status: DISCONTINUED | OUTPATIENT
Start: 2019-02-08 | End: 2019-02-08

## 2019-02-08 RX ORDER — PROPOFOL 10 MG/ML
VIAL (ML) INTRAVENOUS
Status: DISCONTINUED | OUTPATIENT
Start: 2019-02-08 | End: 2019-02-08

## 2019-02-08 RX ADMIN — LIDOCAINE HYDROCHLORIDE 100 MG: 20 INJECTION, SOLUTION INTRAVENOUS at 10:02

## 2019-02-08 RX ADMIN — PROPOFOL 150 MCG/KG/MIN: 10 INJECTION, EMULSION INTRAVENOUS at 10:02

## 2019-02-08 RX ADMIN — SODIUM CHLORIDE, SODIUM GLUCONATE, SODIUM ACETATE, POTASSIUM CHLORIDE, MAGNESIUM CHLORIDE, SODIUM PHOSPHATE, DIBASIC, AND POTASSIUM PHOSPHATE: .53; .5; .37; .037; .03; .012; .00082 INJECTION, SOLUTION INTRAVENOUS at 10:02

## 2019-02-08 RX ADMIN — LIDOCAINE HYDROCHLORIDE 15 ML: 20 SOLUTION OROPHARYNGEAL at 10:02

## 2019-02-08 RX ADMIN — PROPOFOL 70 MG: 10 INJECTION, EMULSION INTRAVENOUS at 10:02

## 2019-02-08 NOTE — PLAN OF CARE
Problem: Adult Inpatient Plan of Care  Goal: Plan of Care Review  Outcome: Ongoing (interventions implemented as appropriate)  Received report from PACU RN. Patient s/p DCCV, AAOx3. VSS, no c/o pain or discomfort at this time, resp even and unlabored. Post procedure protocol reviewed with patient and patient's family. Understanding verbalized. Family members at bedside. Nurse call bell within reach. Will continue to monitor per post procedure protocol.

## 2019-02-08 NOTE — NURSING TRANSFER
Nursing Transfer Note      2/8/2019     Transfer To: short stay 1    Transfer via stretcher    Transported by RN    Chart send with patient: Yes

## 2019-02-08 NOTE — DISCHARGE INSTRUCTIONS
Recovery After Procedural Sedation (Adult)  You have been given medicine by vein to make you sleep during your surgery. This may have included both a pain medicine and sleeping medicine. Most of the effects have worn off. But you may still have some drowsiness for the next 6 to 8 hours.  Home care  Follow these guidelines when you get home:  · For the next 8 hours, you should be watched by a responsible adult. This person should make sure your condition is not getting worse.  · Don't drink any alcohol for the next 24 hours.  · Don't drive, operate dangerous machinery, or make important business or personal decisions during the next 24 hours.  Note: Your healthcare provider may tell you not to take any medicine by mouth for pain or sleep in the next 4 hours. These medicines may react with the medicines you were given in the hospital. This could cause a much stronger response than usual.  Follow-up care  Follow up with your healthcare provider if you are not alert and back to your usual level of activity within 12 hours.  When to seek medical advice  Call your healthcare provider right away if any of these occur:  · Drowsiness gets worse  · Weakness or dizziness gets worse  · Repeated vomiting  · You can't be awakened   Date Last Reviewed: 10/18/2016  © 3687-5258 [employer]. [address] [place] [address], [place] [address]. All rights reserved. This information is not intended as a substitute for professional medical care. Always follow your healthcare professional's instructions.

## 2019-02-08 NOTE — TRANSFER OF CARE
"Anesthesia Transfer of Care Note    Patient: Talia Dillon    Procedure(s) Performed: Procedure(s) (LRB):  CARDIOVERSION (N/A)  Transesophageal echo (KAROL) intra-procedure log documentation (N/A)    Patient location: PACU    Anesthesia Type: general    Transport from OR: Transported from OR on 2-3 L/min O2 by NC with adequate spontaneous ventilation    Post pain: adequate analgesia    Post assessment: no apparent anesthetic complications and tolerated procedure well    Post vital signs: stable    Level of consciousness: sedated    Nausea/Vomiting: no nausea/vomiting    Complications: none    Transfer of care protocol was followed      Last vitals:   Visit Vitals  /71 (BP Location: Right arm, Patient Position: Lying)   Pulse 81   Temp 35.9 °C (96.7 °F) (Oral)   Resp 18   Ht 5' 2" (1.575 m)   Wt 93.4 kg (206 lb)   SpO2 97%   Breastfeeding? No   BMI 37.68 kg/m²     "

## 2019-02-08 NOTE — INTERVAL H&P NOTE
The patient has been examined and the H&P has been reviewed:    I concur with the findings and no changes have occurred since H&P was written.    Dysphagia or odynophagia:  No  Liver Disease, esophageal disease, or known varices:  No  Upper GI Bleeding: No  Snoring:  Yes  Sleep Apnea:  No  Prior neck surgery or radiation:  No  History of anesthetic difficulties:  No  Family history of anesthetic difficulties:  No  Last oral intake:  12 hours ago  Able to move neck in all directions:  Yes     Anesthesia/Surgery risks, benefits and alternative options discussed and understood by patient/family.    Active Hospital Problems    Diagnosis  POA    Atrial fibrillation, new onset [I48.91]  Yes      Resolved Hospital Problems   No resolved problems to display.

## 2019-02-08 NOTE — DISCHARGE SUMMARY
[facility]  Cardiac Electrophysiology  Discharge Summary      Patient Name: Talia Dillon  MRN: [id number]  Admission Date: 2/8/2019  Hospital Length of Stay: 0 days  Discharge Date and Time: 2/8/2019  1:06 PM  Attending Physician: Devin You MD  Discharging Provider: Rama Simon NP  Primary Care Physician: James Vallecillo MD    HPI: Ms. Dillon is a 68 year old female with a PMHx of newly diagnosed atrial fibrillation, hypertension, stage 4 chronic kidney disease, and reported supraventricular tachycardia (last significant event requiring IV therapy for termination was 10 years ago). She saw Dr. Barker 4-5 years ago and wanted to reestablish care with him. At that visit was discovered to be in atrial fibrillation with RVR. An echocardiogram noted a normal LVEF with moderate left atrial enlargement. Holter monitor noted persistent atrial fibrillation with an average heart rate of 100 bpm and range of  bpm. An exercise nuclear stress test noted no ischemia/infarction.  She presents today for KAROL/DCCV with Dr. You. She is currently on eliquis and lopressor.  I have personally reviewed the patient's EKG today, which shows AF at 86 bpm. QTc is 414.    Procedure(s) (LRB):  CARDIOVERSION (N/A)  Transesophageal echo (KAROL) intra-procedure log documentation (N/A)     Indwelling Lines/Drains at time of discharge:  Lines/Drains/Airways          None        Hospital Course: Patient presented in atrial fibrillation. Patient on Eliquis. KAROL completed, negative for R[place] thrombus. DCCV completed with restoration of normal sinus rhythm (see procedure report). Patient tolerated procedure well with no acute complications. Post procedure EKG showed sinus bradycardia with a ventricular rate of 59 bpm. Post EKG and discharge plans discussed with Dr. You. Plan to continue home medications including eliquis 5 mg BID and metoprolol 75 mg BID. Patient feeling well denies SOB or chest pain.  Follow up EKG  in 1 week and follow up with Dr. You in 4 weeks.  Discharge plans/instructions discussed with patient and  who verbalized understanding and agreement of plans of care. No further questions or concerns voiced at this time. Discharged home in stable condition.     Physical Exam   Constitutional: She is oriented to person, place, and time. She appears well-developed and well-nourished. No distress.   HENT:   Head: Normocephalic and atraumatic.   Eyes: Conjunctivae are normal. Right eye exhibits no discharge. Left eye exhibits no discharge.   Neck: Neck supple. No JVD present.   Cardiovascular: Regular rhythm noted. Bradycardia. Exam reveals no gallop and no friction rub.   No murmur heard.  Pulmonary/Chest: Effort normal and breath sounds normal. No respiratory distress. She has no wheezes. She has no rales.   Abdominal: Soft. Bowel sounds are normal. She exhibits no distension. There is no tenderness.   Musculoskeletal: She exhibits no edema.   Neurological: She is alert and oriented to person, place, and time.   Skin: Skin is warm and dry. She is not diaphoretic.   Psychiatric: She has a normal mood and affect. Her behavior is normal. Judgment and thought content normal.     Consults:   Anesthesia.    Significant Diagnostic Studies: Labs from 2/6/19 reviewed.  INR   Lab Results   Component Value Date    INR 1.4 (H) 02/06/2019     Cardiac Graphics: Echocardiogram:   2D echo with color flow doppler: Transthoracic echo (TTE) complete (Cupid Only):   Results for orders placed or performed during the hospital encounter of 01/14/19   Transthoracic echo (TTE) complete (Cupid Only)   Result Value Ref Range    AV mean gradient 3.32 mmHg    Ao peak stefani 1.15 m/s    Ao VTI 20.03 cm    IVS 1.03 0.6 - 1.1 cm    LA size 3.80 cm    Left Atrium Major Axis 5.83 cm    Left Atrium Minor Axis 6.48 cm    LVIDD 4.03 3.5 - 6.0 cm    LVIDS 3.10 2.1 - 4.0 cm    LVOT diameter 1.99 cm    LVOT peak VTI 20.66 cm    PW 1.13 (A) 0.6 - 1.1  cm    MV Peak E Khalif 1.13 m/s    PV Peak D Khalif 0.74 m/s    PV Peak S Khalif 0.41 m/s    RA Major Axis 5.15 cm    RA Width 3.71 cm    RVDD 2.89 cm    Sinus 3.02 cm    TR Max Khalif 2.67 m/s    Ao root annulus 2.62 cm    AORTIC VALVE CUSP SEPERATION 1.73 cm    LV Diastolic Volume 71.31 mL    LV Systolic Volume 37.97 mL    FS 23 %    LV mass 143.48 g    Left Ventricle Relative Wall Thickness 0.56 cm    AV valve area 3.21 cm2    AV index (prosthetic) 1.03     Pulm vein S/D ratio 0.55     LVOT area 3.11 cm2    LVOT stroke volume 64.23 cm3    AV peak gradient 5.29 mmHg    Triscuspid Valve Regurgitation Peak Gradient 28.52 mmHg    TDI SEPTAL 0.13     LV LATERAL E/E' RATIO 8.07     LV SEPTAL E/E' RATIO 8.69     LA WIDTH 3.70 cm    TDI LATERAL 0.14     IVC proximal 1.9 cm    LA volume 73.35 cm3    Mean e' 0.14     E/E' ratio 8.37     Right Atrial Pressure (from IVC) 3 mmHg    TV rest pulmonary artery pressure 32 mmHg     Final Active Diagnoses:    Diagnosis Date Noted POA    PRINCIPAL PROBLEM:  Atrial fibrillation, new onset [I48.91] 01/10/2019 Yes      Problems Resolved During this Admission:     No new Assessment & Plan notes have been filed under this hospital service since the last note was generated.  Service: Arrhythmia    Discharged Condition: stable    Disposition: Home or Self Care    Follow Up:  Follow-up Information     EKG 1 [facility] In 1 week.    Specialty:  Cardiology  Why:  s/p DCCV           Devin You MD In 1 month.    Specialties:  Cardiology, Electrophysiology  Why:  s/p DCCV  Contact information:  [facility] [id number] [phone]                 Patient Instructions:      Diet Cardiac     Other restrictions (specify):   Order Comments: Medications:  -Continue to take your home medications as listed on your medication list after you are discharged.  -If you have problems or side effects caused by your medications, call your Physician.    New Medications:  None.    Diet  -You may resume oral  intake after you are discharged, as long you have no swallowing difficulties.    Side effects:  -You may be drowsy for the remainder of the day because you received sedation.    Because you have received sedation for this procedure:  -Limit activity for the remainder of the day.  -Do not drive or operate any equipment for the remainder of the day.  -Do not smoke for at least 6 hours and until you are fully awake and alert.  -Do not drink alcoholic beverage for 24 hours.  -Defer important decision making until the following day.    Go to the Emergency Department if you develop:  -Bleeding  -Weakness or numbness  -Visual, gait or speech disturbance  -New chest pain, palpitations, shortness of breath, rapid heart beat, or fainting  -Fever    Follow up:  -EKG in 1 week.  -Dr. You in 1 month.     No driving until:   Order Comments: No driving or operating heavy machinery for 24-48 hours after your procedure because you received sedation.     Notify your health care provider if you experience any of the following:   Order Comments: For any concerning medical symptoms.     Notify your health care provider if you experience any of the following:  increased confusion or weakness     Notify your health care provider if you experience any of the following:  persistent dizziness, light-headedness, or visual disturbances     Notify your health care provider if you experience any of the following:  worsening rash     Notify your health care provider if you experience any of the following:  severe persistent headache     Notify your health care provider if you experience any of the following:  difficulty breathing or increased cough     Notify your health care provider if you experience any of the following:  redness, tenderness, or signs of infection (pain, swelling, redness, odor or green/yellow discharge around incision site)     Notify your health care provider if you experience any of the following:  severe uncontrolled  pain     Notify your health care provider if you experience any of the following:  persistent nausea and vomiting or diarrhea     Notify your health care provider if you experience any of the following:  temperature >100.4     Medications:  Reconciled Home Medications:      Medication List      CONTINUE taking these medications    ALPRAZolam 0.25 MG tablet  Commonly known as:  XANAX  Take 1 tablet (0.25 mg total) by mouth once daily.     amitriptyline 10 MG tablet  Commonly known as:  ELAVIL  Take 1 tablet (10 mg total) by mouth nightly as needed.     apixaban 5 mg Tab  Commonly known as:  ELIQUIS  Take 1 tablet (5 mg total) by mouth 2 (two) times daily.     atorvastatin 10 MG tablet  Commonly known as:  LIPITOR  Take 1 tablet (10 mg total) by mouth once daily.     cholecalciferol (vitamin D3) 5,000 unit Tab  Commonly known as:  VITAMIN D3  Take 5,000 Units by mouth once daily.     DULoxetine 60 MG capsule  Commonly known as:  CYMBALTA  Take 1 capsule (60 mg total) by mouth once daily.     gabapentin 400 MG capsule  Commonly known as:  NEURONTIN  Take 400 mg by mouth. Take 2 tablets once daily     levothyroxine 175 MCG tablet  Commonly known as:  SYNTHROID, LEVOTHROID  Take 1 tablet (175 mcg total) by mouth once daily.     metoprolol tartrate 25 MG tablet  Commonly known as:  LOPRESSOR  Take 3 tablets (75 mg total) by mouth 2 (two) times daily.     topiramate 25 MG tablet  Commonly known as:  TOPAMAX  Take 1 tablet (25 mg total) by mouth 2 (two) times daily.     zolpidem 10 mg Tab  Commonly known as:  AMBIEN  Take 1 tablet (10 mg total) by mouth nightly as needed.          Plan:  -Continue all home medications.  -Follow up EKG in 1 week.  -Follow up with Dr. You in 1 month.    Time spent on the discharge of patient: 16 minutes    Rama Simon NP  Cardiac Electrophysiology  [facility]    Attending: Devin You MD

## 2019-02-08 NOTE — PROGRESS NOTES
Patient admitted to recovery see [facility] for complete assessment pacu bcg' s maintained safety measures verified patient instructed on pain scale and also called for patient's ekg and it was done and placed in patient's chart. Also updated patient's s/o on patient location with the permission of patient.

## 2019-02-08 NOTE — ANESTHESIA POSTPROCEDURE EVALUATION
"Anesthesia Post Evaluation    Patient: Talia Dillon    Procedure(s) Performed: Procedure(s) (LRB):  CARDIOVERSION (N/A)  Transesophageal echo (KAROL) intra-procedure log documentation (N/A)    Final Anesthesia Type: general  Patient location during evaluation: PACU  Patient participation: Yes- Able to Participate  Level of consciousness: awake and alert  Post-procedure vital signs: reviewed and stable  Pain management: adequate  Airway patency: patent  PONV status at discharge: No PONV  Anesthetic complications: no      Cardiovascular status: blood pressure returned to baseline  Respiratory status: unassisted  Hydration status: euvolemic  Follow-up not needed.        Visit Vitals  /64   Pulse (!) 59   Temp 35.9 °C (96.7 °F) (Oral)   Resp 16   Ht 5' 2" (1.575 m)   Wt 93.4 kg (206 lb)   SpO2 98%   Breastfeeding? No   BMI 37.68 kg/m²       Pain/Fortunato Score: No Data Recorded      "

## 2019-02-08 NOTE — PROGRESS NOTES
Pt DC'd per MD order. Discharge instructions given including activity, future appointments, and when to call MD. Medications reviewed including when to take next dose. PIV DC'd, catheter tip intact. Pt left unit via wheelchair with family.

## 2019-02-08 NOTE — ANESTHESIA PREPROCEDURE EVALUATION
02/08/2019  Talia Dillon is a 68 y.o., female.    Anesthesia Evaluation    I have reviewed the Patient Summary Reports.     I have reviewed the Medications.     Review of Systems  Anesthesia Hx:  History of prior surgery of interest to airway management or planning:  Denies Personal Hx of Anesthesia complications.   Hematology/Oncology:         -- Anemia: --  Cancer in past history:    Cardiovascular:   Hypertension Dysrhythmias atrial fibrillation NL BiV Fxn   Renal/:   Chronic Renal Disease, CRI Cr 2.0   Musculoskeletal:   Arthritis   Spine Disorders: cervical    Endocrine:   Hypothyroidism    Psych:   Psychiatric History anxiety depression          Physical Exam  General:  Obesity    Airway/Jaw/Neck:  Airway Findings: Mouth Opening: Normal Tongue: Normal  General Airway Assessment: Adult  Mallampati: III  Improves to II with phonation.  TM Distance: Normal, at least 6 cm  Jaw/Neck Findings:  Neck ROM: Normal ROM       Chest/Lungs:  Chest/Lungs Findings: Normal Respiratory Rate     Heart/Vascular:  Heart Findings: Rate: Normal        Mental Status:  Mental Status Findings:  Alert and Oriented         Anesthesia Plan  Type of Anesthesia, risks & benefits discussed:  Anesthesia Type:  general  Patient's Preference: Natural airway general   Intra-op Monitoring Plan: standard ASA monitors  Intra-op Monitoring Plan Comments:   Post Op Pain Control Plan: IV/PO Opioids PRN  Post Op Pain Control Plan Comments:   Induction:   IV  Beta Blocker:  Patient is not currently on a Beta-Blocker (No further documentation required).       Informed Consent: Patient understands risks and agrees with Anesthesia plan.  Questions answered. Anesthesia consent signed with patient.  ASA Score: 3     Day of Surgery Review of History & Physical:    H&P update referred to the surgeon.     Anesthesia Plan Notes: NPO confirmed.   No  history of anesthesia problems.         Ready For Surgery From Anesthesia Perspective.

## 2019-02-15 ENCOUNTER — HOSPITAL ENCOUNTER (OUTPATIENT)
Dept: CARDIOLOGY | Facility: HOSPITAL | Age: 69
Discharge: HOME | End: 2019-02-15
Attending: INTERNAL MEDICINE
Payer: MEDICARE

## 2019-02-15 ENCOUNTER — TELEPHONE (OUTPATIENT)
Dept: ELECTROPHYSIOLOGY | Facility: CLINIC | Age: 69
End: 2019-02-15

## 2019-02-15 DIAGNOSIS — I48.91 ATRIAL FIBRILLATION, NEW ONSET: ICD-10-CM

## 2019-02-15 PROCEDURE — 93010 ELECTROCARDIOGRAM REPORT: CPT | Mod: ,,, | Performed by: INTERNAL MEDICINE

## 2019-02-15 PROCEDURE — 93010 RHYTHM STRIP: ICD-10-PCS | Mod: ,,, | Performed by: INTERNAL MEDICINE

## 2019-02-15 PROCEDURE — 93005 ELECTROCARDIOGRAM TRACING: CPT | Mod: PO

## 2019-02-15 NOTE — TELEPHONE ENCOUNTER
Notified patient that she is back in AF. She stated she feels well. Unsure how long she was in sinus rhythm. Discussed options are to continue long-term rate control or to repeat a DCCV while taking either amiodarone or multaq ($80 a month) to give her more time in sinus rhythm to see if she notices any differences. She will think about it and we will re-address in clinic in 2 weeks.

## 2019-02-19 ENCOUNTER — OFFICE VISIT (OUTPATIENT)
Dept: CARDIOLOGY | Facility: CLINIC | Age: 69
End: 2019-02-19
Payer: MEDICARE

## 2019-02-19 VITALS
WEIGHT: 214.5 LBS | SYSTOLIC BLOOD PRESSURE: 106 MMHG | HEART RATE: 85 BPM | DIASTOLIC BLOOD PRESSURE: 75 MMHG | BODY MASS INDEX: 39.47 KG/M2 | HEIGHT: 62 IN

## 2019-02-19 DIAGNOSIS — I48.20 CHRONIC ATRIAL FIBRILLATION: ICD-10-CM

## 2019-02-19 DIAGNOSIS — I10 ESSENTIAL HYPERTENSION: Chronic | ICD-10-CM

## 2019-02-19 DIAGNOSIS — I48.19 PERSISTENT ATRIAL FIBRILLATION: Primary | ICD-10-CM

## 2019-02-19 DIAGNOSIS — N18.4 CKD STAGE G4/A2, GFR 15-29 AND ALBUMIN CREATININE RATIO 30-299 MG/G: ICD-10-CM

## 2019-02-19 DIAGNOSIS — E78.3 HYPERCHYLOMICRONEMIA: ICD-10-CM

## 2019-02-19 PROCEDURE — 99999 PR PBB SHADOW E&M-EST. PATIENT-LVL III: CPT | Mod: PBBFAC,,, | Performed by: INTERNAL MEDICINE

## 2019-02-19 PROCEDURE — 3078F PR MOST RECENT DIASTOLIC BLOOD PRESSURE < 80 MM HG: ICD-10-PCS | Mod: CPTII,S$GLB,, | Performed by: INTERNAL MEDICINE

## 2019-02-19 PROCEDURE — 1101F PT FALLS ASSESS-DOCD LE1/YR: CPT | Mod: CPTII,S$GLB,, | Performed by: INTERNAL MEDICINE

## 2019-02-19 PROCEDURE — 3074F SYST BP LT 130 MM HG: CPT | Mod: CPTII,S$GLB,, | Performed by: INTERNAL MEDICINE

## 2019-02-19 PROCEDURE — 93000 ELECTROCARDIOGRAM COMPLETE: CPT | Mod: S$GLB,,, | Performed by: INTERNAL MEDICINE

## 2019-02-19 PROCEDURE — 1101F PR PT FALLS ASSESS DOC 0-1 FALLS W/OUT INJ PAST YR: ICD-10-PCS | Mod: CPTII,S$GLB,, | Performed by: INTERNAL MEDICINE

## 2019-02-19 PROCEDURE — 99214 PR OFFICE/OUTPT VISIT, EST, LEVL IV, 30-39 MIN: ICD-10-PCS | Mod: 25,S$GLB,, | Performed by: INTERNAL MEDICINE

## 2019-02-19 PROCEDURE — 99999 PR PBB SHADOW E&M-EST. PATIENT-LVL III: ICD-10-PCS | Mod: PBBFAC,,, | Performed by: INTERNAL MEDICINE

## 2019-02-19 PROCEDURE — 3074F PR MOST RECENT SYSTOLIC BLOOD PRESSURE < 130 MM HG: ICD-10-PCS | Mod: CPTII,S$GLB,, | Performed by: INTERNAL MEDICINE

## 2019-02-19 PROCEDURE — 3078F DIAST BP <80 MM HG: CPT | Mod: CPTII,S$GLB,, | Performed by: INTERNAL MEDICINE

## 2019-02-19 PROCEDURE — 99214 OFFICE O/P EST MOD 30 MIN: CPT | Mod: 25,S$GLB,, | Performed by: INTERNAL MEDICINE

## 2019-02-19 PROCEDURE — 93000 EKG 12-LEAD: ICD-10-PCS | Mod: S$GLB,,, | Performed by: INTERNAL MEDICINE

## 2019-02-19 NOTE — PROGRESS NOTES
"  Subjective:      Patient ID: Talia Dillon is a 68 y.o. female.    Chief Complaint: Follow-up (Afib - Cardioversion w/Dr Mcmahan. Still in Afib) and Shortness of Breath (On exertion. )    HPI:  Pt had electrical cardioversion after KAORL but pt a fib recurred.  Metoprolol was given in place of atenolol due to CKD  "My whole life is mixed up."  Pt stopped associating with her smoker friends.     Review of Systems   Cardiovascular: Positive for dyspnea on exertion (Chronic shortness of breath walking less than a block.). Negative for chest pain, claudication, irregular heartbeat, leg swelling, near-syncope, orthopnea, palpitations and syncope.      Quit smoking cold turkey 6/14/19 but smokes a little when around her smoking friends.    No bleeding on Eliquis  Past Medical History:   Diagnosis Date    Allergy     Anemia     Anxiety     Atrial fibrillation     Cancer     skin    Cataract     Depression     Edema     Hypothyroidism     Kidney disease     PSVT (paroxysmal supraventricular tachycardia)     Thyroid disease         Past Surgical History:   Procedure Laterality Date    ADENOIDECTOMY      APPENDECTOMY      BLOCK, NERVE, MEDIAL BRANCH BLOCK BILATERAL LUMBAR L2-5 Bilateral 9/28/2018    Performed by [facility] at [facility]    BLOCK, NERVE, MEDIAL BRANCH BLOCK BILATERAL LUMBAR L2-5 Bilateral 9/21/2018    Performed by [facility] at [facility]    CARDIOVERSION N/A 2/8/2019    [facility]    COLONOSCOPY  2009    repeat in 10 years     EYE SURGERY      HYSTERECTOMY      INJECTION,STEROID,EPIDURAL N/A 8/3/2018    Performed by [facility] at [facility]    RADIOFREQUENCY ABLATION LEFT LUMBAR L2,3,4,5 RFA Left 11/9/2018    Performed by [facility] at [facility]    RADIOFREQUENCY ABLATION RIGHT LUMBAR L2,3,4,5 RFA Right 10/26/2018    Performed by [facility] at [facility]    TONSILLECTOMY      Transesophageal " echo (KAROL) intra-procedure log documentation N/A 2019    Performed by [facility] Provider at [facility] EP [facility]       Family History   Problem Relation Age of Onset    Stroke Mother     Stroke Father     Diabetes Father     Sleep apnea Daughter     Mental illness Daughter        Social History     Socioeconomic History    Marital status: Single     Spouse name: None    Number of children: None    Years of education: None    Highest education level: None   Social Needs    Financial resource strain: None    Food insecurity - worry: None    Food insecurity - inability: None    Transportation needs - medical: None    Transportation needs - non-medical: None   Occupational History    None   Tobacco Use    Smoking status: Current Some Day Smoker     Packs/day: 1.00     Years: 50.00     Pack years: 50.00     Types: Cigarettes     Last attempt to quit: 2018     Years since quittin.7    Smokeless tobacco: Never Used   Substance and Sexual Activity    Alcohol use: Yes     Comment: rarely    Drug use: No    Sexual activity: Not Currently   Other Topics Concern    None   Social History Narrative    None       Current Outpatient Medications on File Prior to Visit   Medication Sig Dispense Refill    ALPRAZolam (XANAX) 0.25 MG tablet Take 1 tablet (0.25 mg total) by mouth once daily. 90 tablet 1    amitriptyline (ELAVIL) 10 MG tablet Take 1 tablet (10 mg total) by mouth nightly as needed. 90 tablet 3    apixaban (ELIQUIS) 5 mg Tab Take 1 tablet (5 mg total) by mouth 2 (two) times daily. 60 tablet 11    atorvastatin (LIPITOR) 10 MG tablet Take 1 tablet (10 mg total) by mouth once daily. 90 tablet 3    cholecalciferol, vitamin D3, (VITAMIN D3) 5,000 unit Tab Take 5,000 Units by mouth once daily. 90 tablet 3    DULoxetine (CYMBALTA) 60 MG capsule Take 1 capsule (60 mg total) by mouth once daily. 30 capsule 2    gabapentin (NEURONTIN) 400 MG capsule Take 400 mg by mouth. Take 2 tablets once daily   "    levothyroxine (SYNTHROID, LEVOTHROID) 175 MCG tablet Take 1 tablet (175 mcg total) by mouth once daily. 90 tablet 3    metoprolol tartrate (LOPRESSOR) 25 MG tablet Take 3 tablets (75 mg total) by mouth 2 (two) times daily. 180 tablet 11    topiramate (TOPAMAX) 25 MG tablet Take 1 tablet (25 mg total) by mouth 2 (two) times daily. 60 tablet 3    zolpidem (AMBIEN) 10 mg Tab Take 1 tablet (10 mg total) by mouth nightly as needed. 30 tablet 5     No current facility-administered medications on file prior to visit.        Review of patient's allergies indicates:   Allergen Reactions    Dexamethasone Rash     Objective:     Vitals:    02/19/19 1430   BP: 106/75   BP Location: Right arm   Patient Position: Sitting   BP Method: Large (Automatic)   Pulse: 85   Weight: 97.3 kg (214 lb 8.1 oz)   Height: 5' 2" (1.575 m)        Physical Exam   Constitutional: She is oriented to person, place, and time. She appears well-developed and well-nourished.   Eyes: No scleral icterus.   Neck: No JVD present. Carotid bruit is not present.   Cardiovascular: Normal rate. An irregularly irregular rhythm present. Exam reveals no gallop.   No murmur heard.  Pulmonary/Chest: Breath sounds normal.   Musculoskeletal: She exhibits no edema.   Neurological: She is alert and oriented to person, place, and time.   Skin: Skin is warm and dry.   Psychiatric: She has a normal mood and affect. Her behavior is normal. Judgment and thought content normal.   Vitals reviewed.     ECG: atrial fibrillation with ventricular response 81 bpm, possible septal scar.    Note recent Cardiolite stress test is normal    Note recent echocardiogram shows LVH, dilated LA, normal LVEF  Assessment:     1. Persistent atrial fibrillation    2. Chronic atrial fibrillation    3. Hyperchylomicronemia    4. Essential hypertension    5. 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     Discussed risk of side effects from amiodarone    Discussed adequacy of rate control and anticoagulation     Smoking cessation counseled    Compliance with Eliquis discussed.    F/u with Dr You    Follow-up in about 3 months (around 5/19/2019).

## 2019-02-28 ENCOUNTER — HOSPITAL ENCOUNTER (OUTPATIENT)
Dept: CARDIOLOGY | Facility: CLINIC | Age: 69
Discharge: HOME | End: 2019-02-28
Payer: MEDICARE

## 2019-02-28 ENCOUNTER — OFFICE VISIT (OUTPATIENT)
Dept: ELECTROPHYSIOLOGY | Facility: CLINIC | Age: 69
End: 2019-02-28
Payer: MEDICARE

## 2019-02-28 ENCOUNTER — PATIENT MESSAGE (OUTPATIENT)
Dept: ELECTROPHYSIOLOGY | Facility: CLINIC | Age: 69
End: 2019-02-28

## 2019-02-28 ENCOUNTER — LAB VISIT (OUTPATIENT)
Dept: LAB | Facility: HOSPITAL | Age: 69
End: 2019-02-28
Attending: HOSPITALIST
Payer: MEDICARE

## 2019-02-28 VITALS
BODY MASS INDEX: 38.64 KG/M2 | HEIGHT: 62 IN | DIASTOLIC BLOOD PRESSURE: 84 MMHG | WEIGHT: 210 LBS | SYSTOLIC BLOOD PRESSURE: 118 MMHG | HEART RATE: 89 BPM

## 2019-02-28 DIAGNOSIS — M89.9 CHRONIC KIDNEY DISEASE-MINERAL AND BONE DISORDER: Chronic | ICD-10-CM

## 2019-02-28 DIAGNOSIS — E83.9 CHRONIC KIDNEY DISEASE-MINERAL AND BONE DISORDER: Chronic | ICD-10-CM

## 2019-02-28 DIAGNOSIS — N18.9 CHRONIC KIDNEY DISEASE-MINERAL AND BONE DISORDER: Chronic | ICD-10-CM

## 2019-02-28 DIAGNOSIS — I48.19 PERSISTENT ATRIAL FIBRILLATION: Primary | ICD-10-CM

## 2019-02-28 DIAGNOSIS — N18.4 CKD STAGE G4/A2, GFR 15-29 AND ALBUMIN CREATININE RATIO 30-299 MG/G: ICD-10-CM

## 2019-02-28 DIAGNOSIS — I48.91 ATRIAL FIBRILLATION, NEW ONSET: ICD-10-CM

## 2019-02-28 DIAGNOSIS — I10 ESSENTIAL HYPERTENSION: Chronic | ICD-10-CM

## 2019-02-28 LAB
ALBUMIN SERPL BCP-MCNC: 4.4 G/DL
ANION GAP SERPL CALC-SCNC: 10 MMOL/L
BASOPHILS # BLD AUTO: 0.02 K/UL
BASOPHILS NFR BLD: 0.3 %
BUN SERPL-MCNC: 34 MG/DL
CALCIUM SERPL-MCNC: 10 MG/DL
CHLORIDE SERPL-SCNC: 103 MMOL/L
CO2 SERPL-SCNC: 27 MMOL/L
CREAT SERPL-MCNC: 2.09 MG/DL
DIFFERENTIAL METHOD: NORMAL
EOSINOPHIL # BLD AUTO: 0.3 K/UL
EOSINOPHIL NFR BLD: 3.3 %
ERYTHROCYTE [DISTWIDTH] IN BLOOD BY AUTOMATED COUNT: 12.8 %
EST. GFR  (AFRICAN AMERICAN): 27.2 ML/MIN/1.73 M^2
EST. GFR  (NON AFRICAN AMERICAN): 23.6 ML/MIN/1.73 M^2
GLUCOSE SERPL-MCNC: 101 MG/DL
HCT VFR BLD AUTO: 38.4 %
HGB BLD-MCNC: 12.3 G/DL
LYMPHOCYTES # BLD AUTO: 1.5 K/UL
LYMPHOCYTES NFR BLD: 18.8 %
MCH RBC QN AUTO: 28.4 PG
MCHC RBC AUTO-ENTMCNC: 32 G/DL
MCV RBC AUTO: 89 FL
MONOCYTES # BLD AUTO: 0.6 K/UL
MONOCYTES NFR BLD: 7.2 %
NEUTROPHILS # BLD AUTO: 5.6 K/UL
NEUTROPHILS NFR BLD: 70 %
PHOSPHATE SERPL-MCNC: 4.1 MG/DL
PLATELET # BLD AUTO: 174 K/UL
PMV BLD AUTO: 12 FL
POTASSIUM SERPL-SCNC: 4.3 MMOL/L
PTH-INTACT SERPL-MCNC: 60 PG/ML
RBC # BLD AUTO: 4.33 M/UL
SODIUM SERPL-SCNC: 140 MMOL/L
WBC # BLD AUTO: 7.96 K/UL

## 2019-02-28 PROCEDURE — 3079F DIAST BP 80-89 MM HG: CPT | Mod: CPTII,S$GLB,, | Performed by: INTERNAL MEDICINE

## 2019-02-28 PROCEDURE — 99214 OFFICE O/P EST MOD 30 MIN: CPT | Mod: S$GLB,,, | Performed by: INTERNAL MEDICINE

## 2019-02-28 PROCEDURE — 99999 PR PBB SHADOW E&M-EST. PATIENT-LVL IV: CPT | Mod: PBBFAC,,, | Performed by: INTERNAL MEDICINE

## 2019-02-28 PROCEDURE — 85025 COMPLETE CBC W/AUTO DIFF WBC: CPT | Mod: PO

## 2019-02-28 PROCEDURE — 83970 ASSAY OF PARATHORMONE: CPT | Mod: PO

## 2019-02-28 PROCEDURE — 36415 COLL VENOUS BLD VENIPUNCTURE: CPT | Mod: PO

## 2019-02-28 PROCEDURE — 3074F PR MOST RECENT SYSTOLIC BLOOD PRESSURE < 130 MM HG: ICD-10-PCS | Mod: CPTII,S$GLB,, | Performed by: INTERNAL MEDICINE

## 2019-02-28 PROCEDURE — 3079F PR MOST RECENT DIASTOLIC BLOOD PRESSURE 80-89 MM HG: ICD-10-PCS | Mod: CPTII,S$GLB,, | Performed by: INTERNAL MEDICINE

## 2019-02-28 PROCEDURE — 99999 PR PBB SHADOW E&M-EST. PATIENT-LVL IV: ICD-10-PCS | Mod: PBBFAC,,, | Performed by: INTERNAL MEDICINE

## 2019-02-28 PROCEDURE — 93000 ELECTROCARDIOGRAM COMPLETE: CPT | Mod: S$GLB,,, | Performed by: INTERNAL MEDICINE

## 2019-02-28 PROCEDURE — 99214 PR OFFICE/OUTPT VISIT, EST, LEVL IV, 30-39 MIN: ICD-10-PCS | Mod: S$GLB,,, | Performed by: INTERNAL MEDICINE

## 2019-02-28 PROCEDURE — 93000 RHYTHM STRIP: ICD-10-PCS | Mod: S$GLB,,, | Performed by: INTERNAL MEDICINE

## 2019-02-28 PROCEDURE — 1101F PT FALLS ASSESS-DOCD LE1/YR: CPT | Mod: CPTII,S$GLB,, | Performed by: INTERNAL MEDICINE

## 2019-02-28 PROCEDURE — 1101F PR PT FALLS ASSESS DOC 0-1 FALLS W/OUT INJ PAST YR: ICD-10-PCS | Mod: CPTII,S$GLB,, | Performed by: INTERNAL MEDICINE

## 2019-02-28 PROCEDURE — 80069 RENAL FUNCTION PANEL: CPT | Mod: PO

## 2019-02-28 PROCEDURE — 3074F SYST BP LT 130 MM HG: CPT | Mod: CPTII,S$GLB,, | Performed by: INTERNAL MEDICINE

## 2019-02-28 NOTE — PATIENT INSTRUCTIONS
1.) Take amitryptiline every other night for 1 week then stop  2.) Once off amitryptiline then start multaq 400mg twice daily.

## 2019-02-28 NOTE — PROGRESS NOTES
Subjective:    Patient ID:  Talia Dillon is a 68 y.o. female who presents for evaluation of Atrial Fibrillation    Referring Cardiologist: Favian Barker MD  Primary Care Physician: James Vallecillo MD    HPI  Prior Hx:  I had the pleasure of seeing Ms. Dillon today in follow-up for her newly diagnosed atrial fibrillation. As you are aware she is a pleasant 68 year-old woman with hypertension, stage 4 chronic kidney disease, and reported supraventricular tachycardia (last significant event requiring IV therapy for termination was 10 years ago). She has chronic runs of palpitations that terminate with bearing down or ice water on her face. Sometimes these are associated with light-headedness and chest discomfort. She also has chronic dyspnea on exertion. She saw Dr. Barker 4-5 years ago and wanted to reestablish care with him. At that visit was discovered to be in atrial fibrillation with RVR. An echocardiogram noted a normal LVEF with moderate left atrial enlargement. Holter monitor noted persistent atrial fibrillation with an average heart rate of 100 bpm and range of  bpm. An exercise nuclear stress test noted no ischemia/infarction. She underwent DCCV 2/8/2019. Follow-up ECG noted recurrence of AF. We discussed options on the phone including long-term rate control or repeat DCCV after initiating anti-arrhythmic therapy with either multaq or amiodarone. She wanted to think about it.    Interim Hx:  Ms. Dillon returns for discussion. She continues to note chronic dyspnea on exertion.    I reviewed the only available prior electrocardiogram in Epic from 1/10/2019 which shows atrial fibrillation.    My interpretation of today's in clinic ECG is AF with an average ventricular rate of 89 bpm.    Review of Systems   Constitution: Negative for weakness and malaise/fatigue.   HENT: Negative for congestion and sore throat.    Eyes: Negative for blurred vision and visual disturbance.   Cardiovascular: Positive for dyspnea  on exertion (chronic) and palpitations (chronic). Negative for chest pain, irregular heartbeat, leg swelling, near-syncope, orthopnea, paroxysmal nocturnal dyspnea and syncope.   Respiratory: Negative for cough and shortness of breath.    Hematologic/Lymphatic: Negative for bleeding problem. Does not bruise/bleed easily.   Skin: Negative.    Musculoskeletal: Negative.    Gastrointestinal: Negative for hematochezia and melena.   Neurological: Negative for dizziness and focal weakness.        Objective:    Physical Exam   Constitutional: She is oriented to person, place, and time. She appears well-developed and well-nourished. No distress.   HENT:   Head: Normocephalic and atraumatic.   Eyes: Conjunctivae are normal. Right eye exhibits no discharge. Left eye exhibits no discharge.   Neck: Neck supple. No JVD present.   Cardiovascular: An irregularly irregular rhythm present. Tachycardia present. Exam reveals no gallop and no friction rub.   No murmur heard.  Pulmonary/Chest: Effort normal and breath sounds normal. No respiratory distress. She has no wheezes. She has no rales.   Abdominal: Soft. Bowel sounds are normal. She exhibits no distension. There is no tenderness.   Musculoskeletal: She exhibits no edema.   Neurological: She is alert and oriented to person, place, and time.   Skin: Skin is warm and dry. She is not diaphoretic.   Psychiatric: She has a normal mood and affect. Her behavior is normal. Judgment and thought content normal.         Assessment:       1. Persistent atrial fibrillation    2. Essential hypertension         Plan:       In summary, Ms. Dillon is a pleasant 68 year-old woman with hypertension, stage 4 chronic kidney disease, and reported supraventricular tachycardia presenting for evaluation of new onset persistent atrial fibrillation.  Her [id number] score is 3 and she is appropriately anticoagulated. I also discussed the goal to reduce symptomatic arrhythmic episodes by pharmacologic and/or  procedural methods and utilizing a rhythm versus a rate control strategy. She is now unclear if the AF is causing symptoms. She already had a DCCV with early recurrence of AF. Due to her CKD the safest options of anti-arrhythmic drug therapy for maintenance of sinus rhythm are either multaq or amiodarone. She does not desire amiodarone. She is willing to try multaq. Discussed that she may still fail to maintain sinus rhythm on multaq. She understood.    Plan  Start multaq 400mg bid (will wean off amitryptiline, 10mg every other night for 1 week then stop. Once off amitryptiline will start multaq)  DCCV 1 week later, no KAROL needed if she has not missed any eliquis (she insists on compliance)    Thank you for allowing me to participate in the care of this patient. Please do not hesitate to call me with any questions or concerns.    Devin You MD, PhD  Cardiac Electrophysiology

## 2019-03-01 NOTE — PROGRESS NOTES
CARDIOVERSION EDUCATION CHECK LIST    PRE PROCEDURE TESTING    March 12, 2019 at 11:00 am  Pre - procedure labs have been ordered for you at:  [facility]  · Be sure to arrive at your scheduled time. YOU DO NOT HAVE TO FAST FOR THIS LAB WORK.      DAY OF PROCEDURE    March 19, 2019 10:00 am  Report to Cardiology Waiting Room on 3rd floor of the hospital  · Do not eat or drink anything after 12 mn on the night before your procedure.  · Please do not wear makeup (especially mascara) when arriving for your procedure.    Medications:  · It is very important to notify our office if you accidentally miss any doses of your Eliquis prior to your procedure.   · You may take your usual morning medications with a sip of water      March 26, 2019 at 11:00 am  FOLLOW-UP EKG TO BE DONE 1 WEEK AFTER CARDIOVERSION AT [facility].      Directions to the Cardiology Waiting Room  If you park in the Parking Garage:  Take elevators to the 2nd floor  Walk up ramp and turn right by [employer] Elevators  Take elevator to the 3rd floor  Upon exiting the elevator, turn away from the clinic areas  Walk long henley around to front of hospital to area with windows overlooking [facility]  Check in at Reception Desk  OR  If family is dropping you off:  Have them drop you off at the front of the Hospital  (Near the ER, where all the flags are hung).  Take the E elevators to the 3rd floor.  Check in at the Reception Desk in the waiting room.    · YOU WILL BE GOING HOME THE SAME DAY AS YOUR PROCEDURE  · YOU WILL NEED SOMEONE TO DRIVE YOU HOME AFTER YOUR PROCEDURE   · YOUR PAIN DURING YOUR PROCEDURE WILL BE MANAGED BY THE ANESTHESIA TEAM      Any need to reschedule or cancel procedures, or any questions regarding your procedure should be addressed directly with the Arrhythmia Department Nurses at the following phone number: [phone]

## 2019-03-04 ENCOUNTER — TELEPHONE (OUTPATIENT)
Dept: FAMILY MEDICINE | Facility: CLINIC | Age: 69
End: 2019-03-04

## 2019-03-04 NOTE — TELEPHONE ENCOUNTER
Pharmacy states they don't have any record of anyone calling.         ----- Message from Kamille Pulido sent at 3/4/2019  1:36 PM CST -----  Contact: [phone]/[facility]/  [facility] would like to speak with you about QT PROLONGATION. Please call [facility]. thanks

## 2019-03-06 ENCOUNTER — OFFICE VISIT (OUTPATIENT)
Dept: BARIATRICS | Facility: CLINIC | Age: 69
End: 2019-03-06
Payer: MEDICARE

## 2019-03-06 VITALS
DIASTOLIC BLOOD PRESSURE: 80 MMHG | WEIGHT: 210.13 LBS | BODY MASS INDEX: 38.67 KG/M2 | HEART RATE: 77 BPM | SYSTOLIC BLOOD PRESSURE: 124 MMHG | HEIGHT: 62 IN

## 2019-03-06 DIAGNOSIS — E66.01 CLASS 2 SEVERE OBESITY WITH BODY MASS INDEX (BMI) OF 35 TO 39.9 WITH SERIOUS COMORBIDITY: Primary | ICD-10-CM

## 2019-03-06 PROCEDURE — 99999 PR PBB SHADOW E&M-EST. PATIENT-LVL III: CPT | Mod: PBBFAC,,, | Performed by: INTERNAL MEDICINE

## 2019-03-06 PROCEDURE — 3074F SYST BP LT 130 MM HG: CPT | Mod: CPTII,S$GLB,, | Performed by: INTERNAL MEDICINE

## 2019-03-06 PROCEDURE — 3074F PR MOST RECENT SYSTOLIC BLOOD PRESSURE < 130 MM HG: ICD-10-PCS | Mod: CPTII,S$GLB,, | Performed by: INTERNAL MEDICINE

## 2019-03-06 PROCEDURE — 99999 PR PBB SHADOW E&M-EST. PATIENT-LVL III: ICD-10-PCS | Mod: PBBFAC,,, | Performed by: INTERNAL MEDICINE

## 2019-03-06 PROCEDURE — 1101F PR PT FALLS ASSESS DOC 0-1 FALLS W/OUT INJ PAST YR: ICD-10-PCS | Mod: CPTII,S$GLB,, | Performed by: INTERNAL MEDICINE

## 2019-03-06 PROCEDURE — 3079F PR MOST RECENT DIASTOLIC BLOOD PRESSURE 80-89 MM HG: ICD-10-PCS | Mod: CPTII,S$GLB,, | Performed by: INTERNAL MEDICINE

## 2019-03-06 PROCEDURE — 99213 OFFICE O/P EST LOW 20 MIN: CPT | Mod: S$GLB,,, | Performed by: INTERNAL MEDICINE

## 2019-03-06 PROCEDURE — 99213 PR OFFICE/OUTPT VISIT, EST, LEVL III, 20-29 MIN: ICD-10-PCS | Mod: S$GLB,,, | Performed by: INTERNAL MEDICINE

## 2019-03-06 PROCEDURE — 3079F DIAST BP 80-89 MM HG: CPT | Mod: CPTII,S$GLB,, | Performed by: INTERNAL MEDICINE

## 2019-03-06 PROCEDURE — 1101F PT FALLS ASSESS-DOCD LE1/YR: CPT | Mod: CPTII,S$GLB,, | Performed by: INTERNAL MEDICINE

## 2019-03-06 RX ORDER — TOPIRAMATE 50 MG/1
50 TABLET, FILM COATED ORAL 2 TIMES DAILY
Qty: 180 TABLET | Refills: 1 | Status: SHIPPED | OUTPATIENT
Start: 2019-03-06 | End: 2019-06-06 | Stop reason: SDUPTHER

## 2019-03-06 NOTE — PATIENT INSTRUCTIONS
5-Day Menu Plan: 800-1000 Calories    DAY 1     Breakfast  1 boiled egg  Small apple    Snack  ¼ cup almonds    Lunch  Morning star saul  1 cup green beans    Dinner  3 oz salmon  1 cup cooked carrots    Snack  SF jello    DAY 2    Breakfast  2 eggs with 2 tbsp salsa    Snack  6 oz greek yogurt  ½ cup Peaches canned (in its own juice)    Lunch  [place]:Tuna and mustard   Pear cup (no sugar added)    Dinner  Baked fish  1 cup cooked yellow squash    Snack  SF popsicle            DAY 3    Breakfast   Protein drink: 1 scoop whey powder + 6oz soy milk    Snack  ¼ cup almonds    Lunch  Tuna Salad: 3oz canned tuna in water, 1 egg, and 1 tsp light mccann)  Pineapple cup (no sugar added)    Dinner  Baked shrimp  1 cup grilled eggplant    Snack  8oz Chocolate Soy Milk      DAY 4    Breakfast  2 eggs, morning star saul    Snack  1 cheese stick    Lunch    Snack  1/4 cup almonds  Peach cup (no sugar added)    Dinner  3oz baked fish  1 cup cooked green beans          DAY 5    Breakfast  Protein drink: 1 scoop whey powder + 6oz soy milk    Snack   ¼ cup almonds  1 apple    Lunch  1 cup tuna salad: (3oz canned tuna in water, 1 egg, 1 tsp light mccann)    Snack  3 oz greek yogurt  Fruit cup (no sugar added)    Dinner  Omelet: 2 eggs, grilled shrimp, bell pepper and onion          Fruits and Vegetables       Include 1-2 servings of fruit daily.      1 serving of fruit includes ½ cup unsweetened applesauce, ½ medium banana, tennis ball size piece of fruit, 17 grapes, 1 cup melon, 1 cup strawberries, ¼ cup dried fruit     Include 2-3 servings of vegetables daily. 1 serving is 1 cup raw or ½ cup cooked.     Non-starchy vegetables include artichoke, asparagus, baby corn, bamboo shoots, beans: green/Italian/wax, bean sprouts, beets, broccoli, [place] sprouts, cabbage, carrots, cauliflower, celery, cucumber, eggplant, green onions or scallions, greens, jicama, leeks, mushrooms, okra, onions, pea pods, peppers, radishes, spinach,  summer squash, tomatoes and salsa, turnips, vegetable juice cocktail, water chestnuts, zucchini          Eating well to be healthy and lose weight does not have to be hard. It also does not have to be time consuming or expensive. There a lots of ways you can work in healthy choices into your day. Many of these are easy, quick and even family friendly!    Homemade hazelnut au lait  Brew your favorite brand of hazelnut flavored coffee ([employer] makes a good one). Microwave 1/2 cup of milk that fits your eating plan (whole, skim or sugar-free almond milk can all work). Add half to 1 oz sugar free hazelnut syrup.     Quick and easy breakfast  1-2 boiled eggs or mini-frittatas with a tangerine. The boiled eggs and mini-frittatas can both be made ahead and last for up to 4 days in the refrigerator. Bonus if you portion them out in ready to go containers or zipper bags.     Breakfast Egg Muffins with Kurtz and Spinach  Makes 12 muffins  Ingredients    6 eggs  ¼ cup milk  ¼ teaspoon salt  2 cups grated cheddar cheese  3/4 cup spinach, cooked and drained (about 8 oz fresh spinach)  6 kurtz slices, cooked, drained of fat, and chopped  1/2 cup grated Parmesan cheese (optional)    Instructions      Preheat oven to 350 degrees. Use a regular 12-cup muffin pan. Spray the muffin pan with non-stick cooking spray.  In a large bowl, beat eggs until smooth. Add milk, salt, Cheddar cheese and mix. Stir spinach, cooked kurtz into the egg mixture. Ladle the egg mixture into greased muffin cups ¾ full.  Top each muffin cup with grated Parmesan cheese.  Bake for 25 minutes. Remove from the oven, let the muffins cool for 30 minutes before removing them from the pan.      Be a brown bagger! When you make dinner, plan for an extra helping. When you serve your plate for dinner, serve an additional helping into a container that you can take with you the next day. If you don't have a refrigerator available during the day, an insulated lunch bag  and ice packs will help you safely store you lunch.     Cold Brewed Iced tea. Fill a pitcher with 64 oz filtered water. Add either 4 regular tea bags of your choice or a large iced tea bag. Refrigerate over night then remove the tea bags. The tea will not be bitter and is super flavorful. Get creative! Try combinations like green tea and hibiscus tea or black tea with lemon zinger. Add orange or lemon slices for even more flavor.     Snack wisely. Protein filled snacks will fill you up, allowing you to get by with fewer calories. String cheese, pork skins (chicharrones), turkey pepperoni, or celery with cream cheese will all fit the bill.       Ditch the take out. Turkey tacos (with or without a low carb tortilla), burgers (without the bun), or fun stir fries are all quick and easy. The whole family will be happy, and you can save calories and money.      Orange Chicken Stir martin with asparagus   Makes 6 servings  Ingredients:    1.5 lbs boneless skinless chicken breast/tenders, diced into 1-inch pieces  1 Tbsp extra virgin olive or avocado oil, divided  2 lb asparagus, end portions trimmed and remainder diced into 1 1/2-inch pieces  1 small yellow onion, sliced into thin strips  8 oz button mushrooms, sliced  1 Tbsp peeled and finely grated fresh sunitha  4 cloves garlic, minced  1/2 cup low-sodium chicken broth  Juice of 2 fresh oranges  2 Tbsp low sodium soy sauce  2 Tbsp cornstarch  Sea salt and freshly ground black pepper    Directions:    In a 12-inch non-stick wok, heat 1/2 oil over moderately high heat. Once oil is hot, add diced chicken and season lightly with salt and pepper. Sauté until cooked through, tossing occasionally, about 5-6 minutes.  Place chicken on a large plate and set aside. Return wok, reduce to medium-high heat, add remaining oil.  Once oil is hot, add asparagus, yellow onion and mushrooms, and sauté until tender-crisp, about 4 - 5 minutes, adding in garlic and sunitha during the last 1  minute of sautéing.  Meanwhile, in a mixing bowl whisk together chicken broth, orange juice, soy sauce and cornstarch until well blended.  Pour chicken broth mixture into skillet with veggies, season with salt and pepper to taste, and bring mixture to a light boil, stirring constantly. Allow mixture to gently boil, stirring constantly, until thickened, about 1 minute.  Toss chicken into mixture and serve immediately over cauliflower rice or Shirataki noodles.      Skinny Chicken Tortilla Soup  Makes 7 servings    2 teaspoons olive oil  1 cup onion, chopped (about 1 small)  2 cups celery, sliced (about 4 medium stalks)  4 garlic cloves, minced  4 medium tomatoes, chopped  2 cups water  4 cups low-sodium organic chicken broth  3 cups chopped and/or shredded rotisserie chicken, skinless  2 cups sliced carrots (about 3 medium)  1 teaspoon dried oregano leaves  2 teaspoons chili powder  1 teaspoon garlic powder  2 teaspoons cumin  ½ teaspoon cayenne pepper (add less or omit, if you don't want a spicy soup)  ½ teaspoon sea salt + more to taste  ½ teaspoon pepper + more to taste    Directions:   Put all ingredients into a large crock pot. Cook on low for 5-6 hours.     Optional garnish with chopped avocado, chopped fresh cilantro, crumbled Cotija cheese, sour cream, Greek yogurt, your favorite hot sauce.           Vegan Avocado Banana Chocolate Pudding  Makes 4 servings  Ingredients    1 1/2 ripe avocados  2 ripe bananas  6 tbsp raw cacao powder or unsweetened cocoa powder  2-3 tbsp maple syrup (or calorie free sweetener)  1/4 cup almond milk  Instructions    Blend everything together in a  until the consistency is smooth and velvety. Taste and see if more sweetener is needed and stir to make sure everything is evenly mixed. Blend a second time if needed.  Top with banana slices, raw cacao nibs, almond butter, or any other toppings and enjoy!

## 2019-03-06 NOTE — PROGRESS NOTES
"Subjective:       Patient ID: Talia Dillon is a 68 y.o. female.    Chief Complaint: Follow-up    Pt here today for follow-up. Has lost 19 lbs. Started 1000 jeremy diet and topiramate. She was a few lbs less, but she went to into afib and has had some procedures. She had no symptoms, and was just at cardiologist for a check up when it was discovered. She has been going back to eating some sweets because she has been stressed about her health.       Lab Results       Component                Value               Date                       CREATININE               2.09 (H)            02/28/2019                 BUN                      34 (H)              02/28/2019                 NA                       140                 02/28/2019                 K                        4.3                 02/28/2019                 CL                       103                 02/28/2019                 CO2                      27                  02/28/2019                  Review of Systems   Constitutional: Negative for chills and fever.   Respiratory: Positive for shortness of breath.         Thinks she snores   Cardiovascular: Positive for leg swelling. Negative for chest pain and palpitations.   Gastrointestinal: Negative for constipation and diarrhea.        Denies GERD   Genitourinary: Negative for difficulty urinating and dysuria.   Musculoskeletal: Positive for back pain. Negative for arthralgias.   Neurological: Negative for dizziness and light-headedness.   Psychiatric/Behavioral: Negative for dysphoric mood. The patient is not nervous/anxious.         OK on meds       Objective:     /80   Pulse 77   Ht 5' 2" (1.575 m)   Wt 95.3 kg (210 lb 1.6 oz)   BMI 38.43 kg/m²     Physical Exam   Constitutional: She is oriented to person, place, and time. She appears well-developed and well-nourished. No distress.   HENT:   Head: Normocephalic and atraumatic.   Eyes: EOM are normal. Pupils are equal, round, and reactive to " light. No scleral icterus.   Neck: Normal range of motion. Neck supple.   Cardiovascular: Normal rate.   Pulmonary/Chest: Effort normal.   Increased effort after moving to exam table and back   Musculoskeletal: Normal range of motion. She exhibits edema and deformity.   1+ pretibial edema b/l.    Neurological: She is alert and oriented to person, place, and time. No cranial nerve deficit.   Skin: Skin is warm and dry. No erythema.   Psychiatric: She has a normal mood and affect. Her behavior is normal. Judgment normal.   Vitals reviewed.      Assessment:       1. Class 2 severe obesity with body mass index (BMI) of 35 to 39.9 with serious comorbidity        Plan:         Talia was seen today for follow-up.    Diagnoses and all orders for this visit:    Class 2 severe obesity with body mass index (BMI) of 35 to 39.9 with serious comorbidity    Other orders  -     topiramate (TOPAMAX) 50 MG tablet; Take 1 tablet (50 mg total) by mouth 2 (two) times daily.        800-1000 jeremy diet and Nutrition materials provided today.  And healthy all day tips given.

## 2019-03-07 ENCOUNTER — TELEPHONE (OUTPATIENT)
Dept: ELECTROPHYSIOLOGY | Facility: CLINIC | Age: 69
End: 2019-03-07

## 2019-03-07 NOTE — TELEPHONE ENCOUNTER
Returned call to Pt. Pt called to let  roblero know she is going on a mini vacation and does not want to start a new medication when away from home. She is cancelling her procedure on the 19th and will call back to reschedule once she gets home.         ----- Message from Renata Sanz sent at 3/7/2019  3:02 PM CST -----  Contact: Pt called   Pt calling about procedure. Please call pt @ [phone]. Thank you.

## 2019-03-12 ENCOUNTER — OFFICE VISIT (OUTPATIENT)
Dept: PAIN MEDICINE | Facility: CLINIC | Age: 69
End: 2019-03-12
Payer: MEDICARE

## 2019-03-12 VITALS
BODY MASS INDEX: 38.38 KG/M2 | SYSTOLIC BLOOD PRESSURE: 150 MMHG | HEART RATE: 80 BPM | DIASTOLIC BLOOD PRESSURE: 90 MMHG | TEMPERATURE: 98 F | HEIGHT: 62 IN | WEIGHT: 208.56 LBS

## 2019-03-12 DIAGNOSIS — G89.4 CHRONIC PAIN DISORDER: ICD-10-CM

## 2019-03-12 DIAGNOSIS — M47.816 LUMBAR SPONDYLOSIS: Primary | ICD-10-CM

## 2019-03-12 DIAGNOSIS — G89.29 CHRONIC LEFT SHOULDER PAIN: ICD-10-CM

## 2019-03-12 DIAGNOSIS — M51.36 DDD (DEGENERATIVE DISC DISEASE), LUMBAR: ICD-10-CM

## 2019-03-12 DIAGNOSIS — G60.9 IDIOPATHIC PERIPHERAL NEUROPATHY: ICD-10-CM

## 2019-03-12 DIAGNOSIS — M54.16 LUMBAR RADICULOPATHY: ICD-10-CM

## 2019-03-12 DIAGNOSIS — M25.512 CHRONIC LEFT SHOULDER PAIN: ICD-10-CM

## 2019-03-12 PROCEDURE — 3080F DIAST BP >= 90 MM HG: CPT | Mod: CPTII,S$GLB,, | Performed by: NURSE PRACTITIONER

## 2019-03-12 PROCEDURE — 3077F PR MOST RECENT SYSTOLIC BLOOD PRESSURE >= 140 MM HG: ICD-10-PCS | Mod: CPTII,S$GLB,, | Performed by: NURSE PRACTITIONER

## 2019-03-12 PROCEDURE — 1101F PT FALLS ASSESS-DOCD LE1/YR: CPT | Mod: CPTII,S$GLB,, | Performed by: NURSE PRACTITIONER

## 2019-03-12 PROCEDURE — 3080F PR MOST RECENT DIASTOLIC BLOOD PRESSURE >= 90 MM HG: ICD-10-PCS | Mod: CPTII,S$GLB,, | Performed by: NURSE PRACTITIONER

## 2019-03-12 PROCEDURE — 1101F PR PT FALLS ASSESS DOC 0-1 FALLS W/OUT INJ PAST YR: ICD-10-PCS | Mod: CPTII,S$GLB,, | Performed by: NURSE PRACTITIONER

## 2019-03-12 PROCEDURE — 99999 PR PBB SHADOW E&M-EST. PATIENT-LVL III: ICD-10-PCS | Mod: PBBFAC,,, | Performed by: NURSE PRACTITIONER

## 2019-03-12 PROCEDURE — 3077F SYST BP >= 140 MM HG: CPT | Mod: CPTII,S$GLB,, | Performed by: NURSE PRACTITIONER

## 2019-03-12 PROCEDURE — 99213 OFFICE O/P EST LOW 20 MIN: CPT | Mod: S$GLB,,, | Performed by: NURSE PRACTITIONER

## 2019-03-12 PROCEDURE — 99499 UNLISTED E&M SERVICE: CPT | Mod: S$GLB,,, | Performed by: NURSE PRACTITIONER

## 2019-03-12 PROCEDURE — 99499 RISK ADDL DX/OHS AUDIT: ICD-10-PCS | Mod: S$GLB,,, | Performed by: NURSE PRACTITIONER

## 2019-03-12 PROCEDURE — 99213 PR OFFICE/OUTPT VISIT, EST, LEVL III, 20-29 MIN: ICD-10-PCS | Mod: S$GLB,,, | Performed by: NURSE PRACTITIONER

## 2019-03-12 PROCEDURE — 99999 PR PBB SHADOW E&M-EST. PATIENT-LVL III: CPT | Mod: PBBFAC,,, | Performed by: NURSE PRACTITIONER

## 2019-03-12 RX ORDER — DULOXETIN HYDROCHLORIDE 60 MG/1
60 CAPSULE, DELAYED RELEASE ORAL DAILY
Qty: 30 CAPSULE | Refills: 2 | Status: SHIPPED | OUTPATIENT
Start: 2019-03-12 | End: 2019-06-11 | Stop reason: SDUPTHER

## 2019-03-12 NOTE — PROGRESS NOTES
"Subjective:     Patient ID: Talia Dillon is a 68 y.o. female.    Chief Complaint: Pain    Consulted by: Dr. Gallagher    Disclaimer: This note was generated using voice recognition software.  There may be a typographical errors that were missed during proofreading.      HPI:    Talia Dillon is a 68 y.o. female who presents today with neck, shoulder, low back and leg pain. This pain is described in detail below.    Ms. Dillon presents to pain clinic today complaining of pain in a variety of areas, including her neck, shoulders, low back and leg pain. Her low back and leg pain is the most bothersome. 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 She has had long term PT her whole life because of this. She was recently evaluated by Dr. Gallagher who referred her to PT and pain clinic for management options.    Ms. Dillon states her low back pain is constant with radiation down both legs. She rates her pain as a 7/10 today. The pain is made worse with movement and walking. The pain is a dull ache in her low back, but feels like a sharp burning in her legs "like an electric wing." This pain does not travel in to her feet, but she has numbness and paraesthesias in her feet separately.     She has just recently restarted with physical therapy; she has completed 4 sessions at this time and feels this is starting to help. She feels it is painful but ultimately improving her situation.     Interval History (8/30/2018):  She returns today for follow up.  She reports that PT has been helpful for the pain.  She finished her last in office session yesterday.  She is now going to start her HEP.  She reports that the epidural didn't help. She wishes she could take Aleve as this was very helpful    Interval History (10/11/2018):  The patient returns for " follow up of back pain.  Since her last OV, she had L2-5 MBB x2.  She is reporting 80% relief from both procedures for about one day.  She is here today to discuss RFA procedures.  She continues with come exercise regimen.  She did have recent labs as ordered by her PCP which again showed elevated BUN.  Her pain today is 8/10.     Interval History (12/7/2018):  The patient presents for procedure follow up.  She is s/p L2,3,4,5 RFAs with about 70% pain relief.  Her pain is not as constant.  She is able to walk further.  Her pain is not as intense when it comes.  She is still somewhat limited by her pain, particularly regarding activity level.  She also feels short winded sometimes.  She is seeing cardiology next month.  She is also seeing bariatric medicine and has been losing weight.  Her pain today is 5/10.    Interval History (3/12/2019):  The patient presents for follow up.  Cymbalta was increased at last OV.  She reports that this has been helpful.  Her pain is stable and currently manageable.  She was recently found to be in A fib.  She had a cardioversion procedure on 2/8/19 and is scheduled for another on 3/19/19.  She is being weaned from Elavil by cardiology.  Her pain today is 4/10.    Physical Therapy: Yes; finished 8/28/2018, doing HEP (8/20/3018)    Non-pharmacologic Treatment:     · Ice/Heat: Not tried  · TENS: Tried; not helpful  · Massage: Helpful  · Chiropractic care: Yes, not helpful  · Acupuncture: Yes, not helpful  · Other: None         Pain Medications:         · Currently taking:   · Gabapentin 800 mg in am  · Sertraline 100 mg every day   · OTC topical creams   · Acetaminophen as needed   · Cymbalta 60 mg QD    · Has tried in the past:    · NSAIDs- Aleve and Ibuprofen: had to stop due to CKD   · Baclofen- had to stop due to CKD    · Has not tried: Opioids,  Rx topical creams    Blood thinners: No    Interventional Therapies:  9/21/18 Bilateral L2-5 MBB- 80% relief  9/28/18 Bilateral L2-5 MBB-  80% relief  10/26/18 Right L2,3,4,5 RFA- 70% relief  11/9/18 Left L2,3,4,5 RFA- 70% relief    Relevant Surgeries:   No    Affecting sleep? Not usually     Affecting daily activities? Yes    Depressive symptoms? Yes          · SI/HI? No    Work status: Retired; used to work as a      Prescription Monitoring Program database:  Reviewed and consistent with medication use as prescribed.    Pain Scales  Best: 3/10  Worst: 9/10  Usually: 5/10  Today: 4/10    Low-back Pain   Associated symptoms include numbness and paresthesias. Pertinent negatives include no abdominal pain, bladder incontinence, bowel incontinence, chest pain, dysuria, fever or weakness.       Last 3 PDI Scores 10/11/2018 8/30/2018 7/23/2018   Pain Disability Index (PDI) [phone]   ]    Review of Systems   Constitution: Negative for chills, decreased appetite, diaphoresis, fever and weakness.   HENT: Negative for congestion, ear discharge and ear pain.    Eyes: Negative for blurred vision, discharge and double vision.   Cardiovascular: Negative for chest pain, claudication and cyanosis.   Respiratory: Negative for cough, hemoptysis and shortness of breath.    Endocrine: Negative for cold intolerance, heat intolerance and polydipsia.   Skin: Negative for color change, dry skin and nail changes.   Musculoskeletal: Positive for arthritis, back pain, myalgias and neck pain.   Gastrointestinal: Negative for bloating, abdominal pain, change in bowel habit and bowel incontinence.   Genitourinary: Negative for bladder incontinence, decreased libido and dysuria.   Neurological: Positive for disturbances in coordination, numbness and paresthesias.   Psychiatric/Behavioral: Positive for depression. Negative for altered mental status, hallucinations and hypervigilance.             Past Medical History:   Diagnosis Date    Allergy     Anemia     Anxiety     Atrial fibrillation     Cancer     skin    Cataract     Depression     Edema      Hypothyroidism     Kidney disease     PSVT (paroxysmal supraventricular tachycardia)     Thyroid disease        Past Surgical History:   Procedure Laterality Date    ADENOIDECTOMY      APPENDECTOMY      BLOCK, NERVE, MEDIAL BRANCH BLOCK BILATERAL LUMBAR L2-5 Bilateral 9/28/2018    [facility] at [facility]    BLOCK, NERVE, MEDIAL BRANCH BLOCK BILATERAL LUMBAR L2-5 Bilateral 9/21/2018    [facility]    CARDIOVERSION N/A 2/8/2019    [facility]    COLONOSCOPY  2009    repeat in 10 years     EYE SURGERY      HYSTERECTOMY      INJECTION,STEROID,EPIDURAL N/A 8/3/2018    Per[facility]    RADIOFREQUENCY ABLATION LEFT LUMBAR L2,3,4,5 RFA Left 11/9/2018    Per[facility]    RADIOFREQUENCY ABLATION RIGHT LUMBAR L2,3,4,5 RFA Right 10/26/2018    Per[facility]    TONSILLECTOMY      Transesophageal echo (KAROL) intra-procedure log documentation N/A 2/8/2019    Performed by [facility]       Review of patient's allergies indicates:   Allergen Reactions    Dexamethasone Rash       Current Outpatient Medications   Medication Sig Dispense Refill    ALPRAZolam (XANAX) 0.25 MG tablet Take 1 tablet (0.25 mg total) by mouth once daily. 90 tablet 1    apixaban (ELIQUIS) 5 mg Tab Take 1 tablet (5 mg total) by mouth 2 (two) times daily. 60 tablet 11    atorvastatin (LIPITOR) 10 MG tablet Take 1 tablet (10 mg total) by mouth once daily. 90 tablet 3    cholecalciferol, vitamin D3, (VITAMIN D3) 5,000 unit Tab Take 5,000 Units by mouth once daily. 90 tablet 3    dronedarone (MULTAQ) 400 mg Tab Take 1 tablet (400 mg total) by mouth 2 (two) times daily with meals. 60 tablet 11    DULoxetine (CYMBALTA) 60 MG capsule Take 1 capsule (60 mg total) by mouth once daily. 30 capsule 2    gabapentin  "(NEURONTIN) 400 MG capsule Take 400 mg by mouth. Take 2 tablets once daily      levothyroxine (SYNTHROID, LEVOTHROID) 175 MCG tablet Take 1 tablet (175 mcg total) by mouth once daily. 90 tablet 3    metoprolol tartrate (LOPRESSOR) 25 MG tablet Take 3 tablets (75 mg total) by mouth 2 (two) times daily. 180 tablet 11    topiramate (TOPAMAX) 50 MG tablet Take 1 tablet (50 mg total) by mouth 2 (two) times daily. 180 tablet 1    zolpidem (AMBIEN) 10 mg Tab Take 1 tablet (10 mg total) by mouth nightly as needed. 30 tablet 5     No current facility-administered medications for this visit.        Family History   Problem Relation Age of Onset    Stroke Mother     Stroke Father     Diabetes Father     Sleep apnea Daughter     Mental illness Daughter        Social History     Socioeconomic History    Marital status: Single     Spouse name: Not on file    Number of children: Not on file    Years of education: Not on file    Highest education level: Not on file   Social Needs    Financial resource strain: Not on file    Food insecurity - worry: Not on file    Food insecurity - inability: Not on file    Transportation needs - medical: Not on file    Transportation needs - non-medical: Not on file   Occupational History    Not on file   Tobacco Use    Smoking status: Current Some Day Smoker     Packs/day: 1.00     Years: 50.00     Pack years: 50.00     Types: Cigarettes     Last attempt to quit: 2018     Years since quittin.7    Smokeless tobacco: Never Used   Substance and Sexual Activity    Alcohol use: No     Frequency: Never     Comment: rarely    Drug use: No    Sexual activity: Not Currently   Other Topics Concern    Not on file   Social History Narrative    Not on file       Objective:     Vitals:    19 1339   BP: (!) 150/90   Pulse: 80   Temp: 97.6 °F (36.4 °C)   Weight: 94.6 kg (208 lb 8.9 oz)   Height: 5' 2" (1.575 m)   PainSc:   4       GEN:  Well developed, well nourished.  No " acute distress.   HEENT:  No trauma.  Mucous membranes moist.  Nares patent bilaterally.  PSYCH: Normal affect. Thought content appropriate.  CHEST:  Breathing symmetric.  No audible wheezing.  ABD: Soft, non-distended.  SKIN:  Warm, pink, dry.  No rash on exposed areas.    EXT:  No cyanosis, clubbing, or edema.  No color change or changes in nail or hair growth.  NEURO/MUSCULOSKELETAL:  Fully alert, oriented, and appropriate. Speech normal bartolome. No cranial nerve deficits. TTP over left subacromial bursa.  Full ROM to left shoulder with pain on internal rotation.  NEER is negative.  Gait: Antalgic  negative trendelenburg sign bilaterally.   Motor Strength: 5/5 motor strength throughout lower extremities.   Sensory: Decreased pin prick bilateral LEs   Reflexes:  2+ and symmetric throughout.  Downgoing Babinski's bilaterally.  No clonus or spasticity.  L-Spine:  Limited ROM with pain on extension greater than flexion. Positive facet loading bilaterally.  SI Joint/Hip:-  JOANIE bilaterally. - FADIR bilaterally.  Mild TTP over lumbar paraspinals.      Imaging:     The imaging studies listed below were independently reviewed by me, and I agree with the findings as documented below.     MRI Lumbar Spine 06/2018  FINDINGS:  Levoscoliosis of the spine, similar to before.  No abnormal bone marrow signal changes are evident to indicate acute fracture or bone marrow replacement process.  Sub endplate marrow signal changes about the L4-5 disc space, consistent with degenerative type change, mostly similar to before.    Visualized spinal cord and conus medullaris appears unremarkable.  No abnormal intramedullary spinal cord signal change.  No intradural, extramedullary masses are identified.    T11-12: Mild posterior disc bulging, greater to the right of midline, similar to before.    T12-L1:  Mild loss of disc signal indicating some degenerative change.  No significant disc bulge or focal herniation.  Mild loss of disc space  height.  No detrimental changes.    L1-2:  Degenerative disc disease change.  Loss of disc signal.  No significant disc bulge or focal herniation.  Mild posterior disc bulging, similar to before.    L2-3: Degenerative disc disease.  Narrowing of the disc space.  Loss of normal disc signal.  Diffuse posterior mild disc bulging, similar to before.  Facet arthropathy changes, appearing greater on the right.    L3-4:  Degenerative disc disease.  Significant narrowing of the disc space.  Posterior marginal osteophytes.  Diffuse posterior disc bulging.  Bilateral foraminal narrowing right greater than left.  Facet arthropathy changes right greater than left.  Mild narrowing of the thecal sac as a result of the chronic findings, similar to before.    L4-5:  Degenerative disc disease.  Significant narrowing of the disc space.  Posterior osteophytes.  Diffuse posterior disc bulging, appearing greater to the left of midline.  Indication 0 some bilateral facet arthropathy change.  Bilateral bone foraminal narrowing, greater on the left.  Findings appear similar to before.    L5-S1:  Degenerative disc disease.  No significant disc bulge or focal herniation.  Mild posterior disc bulging present.  Facet arthropathy changes left greater than right.    X ray Scoliosis Complete 06/2018  There is scoliosis of the thoracolumbar spine, which is convex to the left with the apex centered at the L1 level.  The Sequeira angle measures approximately 26.2°.  All visualized thoracic and lumbar vertebral bodies normal in height.  Low-to-moderate grade degenerative changes scattered throughout the mid and inferior thoracic spine with small scattered marginal osteophytes.  Severe degenerative change of the lumbar spine identified at the L2-3, L3-4 and L4-5 levels with large marginal osteophytes.  Paravertebral soft tissues are normal.    MRI Cervical Spine 2017  Reversal of the normal cervical lordosis.  Small spinal canal on a congenital basis.  No  definite abnormal signal in the cord.  The craniocervical junction is normal.    C2-3: Minimal disc bulge.  Facet hypertrophy on the left.    C3-4: Broad-based osteophyte and disc bulge.  Uncovertebral joint disease with moderate to severe left-sided neural foraminal narrowing.  Decreased CSF spaces anterior and posterior to the cord.    C4-5: Marked disc space narrowing.  Broad-based osteophyte and disc bulge.  Decreased CSF spaces anterior and posterior to the cord.  Uncovertebral joint disease with mild neuroforaminal narrowing.    C5-6: Disc space narrowing.  Broad-based osteophyte and disc bulge.  Asymmetric disc protrusion paramedian to the left side.  Uncovertebral joint disease and degenerative changes of the facets with moderate to severe bilateral neural foraminal narrowing.  No signal abnormality in the cord.    C6-7: Disc space narrowing.  Broad-based osteophyte and disc bulge.  Bilateral uncovertebral joint disease and degenerative changes of the facets.  Moderate central spinal stenosis.    C7-T1: Minimal disc bulge.  Uncovertebral joint disease.    Assessment:     Encounter Diagnoses   Name Primary?    Lumbar spondylosis Yes    Chronic pain disorder     DDD (degenerative disc disease), lumbar     Lumbar radiculopathy     Idiopathic peripheral neuropathy        Plan:     Diagnoses and all orders for this visit:    Lumbar spondylosis    Chronic pain disorder    DDD (degenerative disc disease), lumbar    Lumbar radiculopathy    Idiopathic peripheral neuropathy    Other orders  -     DULoxetine (CYMBALTA) 60 MG capsule; Take 1 capsule (60 mg total) by mouth once daily.        Her pain is consistent with the above.    We discussed the assessment and recommendations.  All available images were reviewed. We discussed the disease process, prognosis, treatment plan, and risks and benefits. The patient is aware of the risks and benefits of the medications being prescribed, common side effects, and proper  usage. The following is the plan we agreed on:     1. Continue Cymbalta 60 mg daily.  Refilled today.  2. Continue gabapentin at currently dose given Cr clearance.  3. She is off of amitriptyline.  4. Keep appointment with cardiology.  5. Obtain left shoulder XRAY.  I will call with results.  6. RTC in 3 months or sooner if needed.      Latosha Humphries, LETY  03/12/2019     The above plan and management options were discussed at length with patient. Patient is in agreement with the above and verbalized understanding.

## 2019-03-14 ENCOUNTER — HOSPITAL ENCOUNTER (OUTPATIENT)
Dept: RADIOLOGY | Facility: HOSPITAL | Age: 69
Discharge: HOME | End: 2019-03-14
Attending: NURSE PRACTITIONER
Payer: MEDICARE

## 2019-03-14 DIAGNOSIS — G89.29 CHRONIC LEFT SHOULDER PAIN: ICD-10-CM

## 2019-03-14 DIAGNOSIS — M25.512 CHRONIC LEFT SHOULDER PAIN: ICD-10-CM

## 2019-03-14 PROCEDURE — 73030 X-RAY EXAM OF SHOULDER: CPT | Mod: TC,FY,PO,LT

## 2019-03-18 ENCOUNTER — TELEPHONE (OUTPATIENT)
Dept: PAIN MEDICINE | Facility: CLINIC | Age: 69
End: 2019-03-18

## 2019-03-18 DIAGNOSIS — M25.512 CHRONIC LEFT SHOULDER PAIN: Primary | ICD-10-CM

## 2019-03-18 DIAGNOSIS — G89.29 CHRONIC LEFT SHOULDER PAIN: Primary | ICD-10-CM

## 2019-03-22 ENCOUNTER — HOSPITAL ENCOUNTER (OUTPATIENT)
Dept: RADIOLOGY | Facility: HOSPITAL | Age: 69
Discharge: HOME | End: 2019-03-22
Attending: NURSE PRACTITIONER
Payer: MEDICARE

## 2019-03-22 DIAGNOSIS — G89.29 CHRONIC LEFT SHOULDER PAIN: ICD-10-CM

## 2019-03-22 DIAGNOSIS — M25.512 CHRONIC LEFT SHOULDER PAIN: ICD-10-CM

## 2019-03-22 PROCEDURE — 73221 MRI JOINT UPR EXTREM W/O DYE: CPT | Mod: TC,PO,LT

## 2019-03-26 ENCOUNTER — DOCUMENTATION ONLY (OUTPATIENT)
Dept: PAIN MEDICINE | Facility: CLINIC | Age: 69
End: 2019-03-26

## 2019-03-27 ENCOUNTER — TELEPHONE (OUTPATIENT)
Dept: PAIN MEDICINE | Facility: CLINIC | Age: 69
End: 2019-03-27

## 2019-03-27 DIAGNOSIS — M25.512 CHRONIC LEFT SHOULDER PAIN: Primary | ICD-10-CM

## 2019-03-27 DIAGNOSIS — G89.29 CHRONIC LEFT SHOULDER PAIN: Primary | ICD-10-CM

## 2019-03-27 DIAGNOSIS — M75.122 COMPLETE TEAR OF LEFT ROTATOR CUFF: ICD-10-CM

## 2019-03-27 NOTE — TELEPHONE ENCOUNTER
Staff left a detail message stating for patient to give office a call back to schedule her an appointment with sport medicine.

## 2019-03-27 NOTE — TELEPHONE ENCOUNTER
----- Message from Penelope Meza sent at 3/27/2019  2:39 PM CDT -----  Contact: pt  Name of Who is Calling: Talia      What is the request in detail: requesting a call back in reference to getting her MRI results. Please call and advise      Can the clinic reply by [facility]: no      What Number to [facility] Back [facility] not [facility]: [phone]

## 2019-03-27 NOTE — TELEPHONE ENCOUNTER
----- Message from LETY Dyson sent at 3/27/2019  4:10 PM CDT -----  Regarding: ortho  Can you call and make appointment with sports medicine orthopedics for left shoulder pain and rotator tear.  I spoke with her and placed referral.

## 2019-04-03 DIAGNOSIS — F41.9 ANXIETY: ICD-10-CM

## 2019-04-04 RX ORDER — ZOLPIDEM TARTRATE 10 MG/1
TABLET ORAL
Qty: 30 TABLET | OUTPATIENT
Start: 2019-04-04

## 2019-04-12 ENCOUNTER — TELEPHONE (OUTPATIENT)
Dept: NEPHROLOGY | Facility: CLINIC | Age: 69
End: 2019-04-12

## 2019-04-15 ENCOUNTER — TELEPHONE (OUTPATIENT)
Dept: NEPHROLOGY | Facility: CLINIC | Age: 69
End: 2019-04-15

## 2019-04-15 DIAGNOSIS — N18.4 CKD (CHRONIC KIDNEY DISEASE) STAGE 4, GFR 15-29 ML/MIN: Primary | ICD-10-CM

## 2019-04-16 ENCOUNTER — LAB VISIT (OUTPATIENT)
Dept: LAB | Facility: HOSPITAL | Age: 69
End: 2019-04-16
Attending: GENERAL PRACTICE
Payer: MEDICARE

## 2019-04-16 DIAGNOSIS — N18.4 CKD (CHRONIC KIDNEY DISEASE) STAGE 4, GFR 15-29 ML/MIN: ICD-10-CM

## 2019-04-16 LAB
ALBUMIN SERPL BCP-MCNC: 4.2 G/DL (ref 3.5–5.2)
ANION GAP SERPL CALC-SCNC: 7 MMOL/L (ref 8–16)
BUN SERPL-MCNC: 42 MG/DL (ref 7–17)
CALCIUM SERPL-MCNC: 10.1 MG/DL (ref 8.7–10.5)
CHLORIDE SERPL-SCNC: 107 MMOL/L (ref 95–110)
CO2 SERPL-SCNC: 27 MMOL/L (ref 23–29)
CREAT SERPL-MCNC: 1.94 MG/DL (ref 0.5–1.4)
EST. GFR  (AFRICAN AMERICAN): 29.8 ML/MIN/1.73 M^2
EST. GFR  (NON AFRICAN AMERICAN): 25.9 ML/MIN/1.73 M^2
GLUCOSE SERPL-MCNC: 130 MG/DL (ref 70–110)
PHOSPHATE SERPL-MCNC: 4.1 MG/DL (ref 2.7–4.5)
POTASSIUM SERPL-SCNC: 4.5 MMOL/L (ref 3.5–5.1)
SODIUM SERPL-SCNC: 141 MMOL/L (ref 136–145)

## 2019-04-16 PROCEDURE — 80069 RENAL FUNCTION PANEL: CPT | Mod: PO

## 2019-04-16 PROCEDURE — 36415 COLL VENOUS BLD VENIPUNCTURE: CPT | Mod: PO

## 2019-04-18 ENCOUNTER — OFFICE VISIT (OUTPATIENT)
Dept: NEPHROLOGY | Facility: CLINIC | Age: 69
End: 2019-04-18
Payer: MEDICARE

## 2019-04-18 VITALS
HEART RATE: 95 BPM | BODY MASS INDEX: 38.01 KG/M2 | OXYGEN SATURATION: 98 % | DIASTOLIC BLOOD PRESSURE: 70 MMHG | HEIGHT: 62 IN | WEIGHT: 206.56 LBS | SYSTOLIC BLOOD PRESSURE: 110 MMHG

## 2019-04-18 DIAGNOSIS — I10 ESSENTIAL HYPERTENSION: Chronic | ICD-10-CM

## 2019-04-18 DIAGNOSIS — E03.4 HYPOTHYROIDISM DUE TO ACQUIRED ATROPHY OF THYROID: ICD-10-CM

## 2019-04-18 DIAGNOSIS — N18.4 CKD STAGE G4/A2, GFR 15-29 AND ALBUMIN CREATININE RATIO 30-299 MG/G: Primary | ICD-10-CM

## 2019-04-18 DIAGNOSIS — I48.19 PERSISTENT ATRIAL FIBRILLATION: ICD-10-CM

## 2019-04-18 DIAGNOSIS — E78.2 MIXED HYPERLIPIDEMIA: ICD-10-CM

## 2019-04-18 PROCEDURE — 99999 PR PBB SHADOW E&M-EST. PATIENT-LVL IV: ICD-10-PCS | Mod: PBBFAC,GC,, | Performed by: GENERAL PRACTICE

## 2019-04-18 PROCEDURE — 99999 PR PBB SHADOW E&M-EST. PATIENT-LVL IV: CPT | Mod: PBBFAC,GC,, | Performed by: GENERAL PRACTICE

## 2019-04-18 NOTE — PROGRESS NOTES
Subjective:       Patient ID: Talia Dillon 68 y.o. White female who presents for new evaluation of Follow-up and Chronic Kidney Disease    Interval Hx:   Ms Talia Dillon is a here for follow up for his ITNA and CKD stage 4. She feels well and denies an symptoms. She endorses quitting smoking, and increasing water intake. Her sCr has remained ~1.9-2.1 for past 6 months.  She has stopped taking NSAIDs for pain.  She is currently being followed by rheumatology she has now started steroid injections with pain management.  Patient with Afib well controlled, on Eliquis.  Takes metoprolol tartrate for rate control.    HPI  Talia Dillon is a 68 y.o. White female with a PMHx relevant for CKD 4 (baseline sCr 1.9-2.1), Afib, Hypothyroidism, anxiety, arthralgias multiple joints followed by rheumatologist, history of smoking (quit June 2018), and morbid obesity she present to Nephrology consulted for elevated sCr. originally referred by her PCP James Vallecillo MD after a rapid rise in sCr suspected to be secondary to volume depletion from diarrhea.  She had a sCr of 1.5 mg/dL back in 2015 and during 2017 she had a sCr of 1.5-1.7 mg/dL s baseline.   In 2018 sCr stayed close to 1.8-1.9.       Review of Systems   Constitutional: Positive for fatigue. Negative for appetite change, fever and unexpected weight change.   HENT: Negative for facial swelling, hearing loss and tinnitus.    Eyes: Negative for visual disturbance.   Respiratory: Negative for chest tightness and shortness of breath.    Cardiovascular: Negative for chest pain and leg swelling.   Gastrointestinal: Negative for abdominal distention, abdominal pain, diarrhea and nausea.   Genitourinary: Negative for difficulty urinating, dysuria and flank pain.   Musculoskeletal: Negative for arthralgias and myalgias.   Skin: Negative for color change.   Neurological: Positive for weakness. Negative for dizziness, syncope, light-headedness and headaches.    Psychiatric/Behavioral: Negative for behavioral problems and confusion.   All other systems reviewed and are negative.      Objective:     Vitals:    04/18/19 1446   BP: 110/70   Pulse: 95         Physical Exam   Constitutional: She is oriented to person, place, and time. She appears well-developed and well-nourished. No distress.   HENT:   Head: Normocephalic and atraumatic.   Eyes: Pupils are equal, round, and reactive to light. Conjunctivae are normal.   Neck: Normal range of motion. Neck supple.   Cardiovascular: Normal rate and intact distal pulses. An irregular rhythm present. Exam reveals no gallop and no friction rub.   No murmur heard.  Pulmonary/Chest: Effort normal and breath sounds normal. She has no wheezes. She has no rales.   Abdominal: Soft. Bowel sounds are normal. She exhibits no distension. There is no tenderness.   Musculoskeletal: Normal range of motion. She exhibits no edema or deformity.   Neurological: She is alert and oriented to person, place, and time. She has normal reflexes.   Skin: Skin is warm and dry. She is not diaphoretic.   Psychiatric: She has a normal mood and affect. Her behavior is normal.       Assessment:        ICD-10-CM ICD-9-CM   1. CKD stage G4/A2, GFR 15-29 and albumin creatinine ratio  mg/g N18.4 585.4   2. Essential hypertension I10 401.9   3. Persistent atrial fibrillation I48.1 427.31   4. Mixed hyperlipidemia E78.2 272.2   5. Hypothyroidism due to acquired atrophy of thyroid E03.4 244.8     246.8        Plan:   1. CKD stage G4,A2 eGFR sCr baseline 1.9-2.1 mg/dL    TINA on CKD stage 4:  Stable.  CKD is unclear but suspect prolong use of NSAID's, Tobacco abuse. She has no Dx of HTN and her BP per our records have been SBP < 130's.  Renal US 2017 showed some evidence of chronic Kidney disease. She has a PMHx for significant Tobacco abuse and she is morbidly obese which are risk factors for CKD.    Lab Results   Component Value Date    CREATININE 1.94 (H)  04/16/2019   · Encourage hydration she is doing better no edema noted  · Off diuretics   · Low salt, renal Diet   · Avoid all NSAIDs  · BP very good in clinic  · Please use caution with Gabapentin and renal dose 400 mg BID for her GFR is a very high dose consider reducing to max 600 mg daily    Protein Creatinine Ratios:  Prot/Creat Ratio, Ur   Date Value Ref Range Status   04/16/2019 0.28 (H) 0.00 - 0.20 Final   10/24/2018 0.48 (H) 0.00 - 0.20 Final   06/04/2018 0.69 (H) 0.00 - 0.20 Final   Last UPCr had mild elevation but have been in a decreasing trend thus far    Acid-Base: at goal  Lab Results   Component Value Date     04/16/2019    K 4.5 04/16/2019    CO2 27 04/16/2019     Progression to ESRD: we discussed possible progression to ESRD but she is doing everything to slow her GKD progression.  For past 6 months has remained stable.  She quit smoking and continues to avoid NSAIDs and is drinking more water.    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 Stable thus far.  Continue with Vitamin D supplement OTC    3. Lipid management: not on Statin  Lab Results   Component Value Date    LDLCALC 52.8 (L) 01/14/2019   1. as per PCP  2. Stable    Follow up in Clinic 4-6months with labs with RFP, UA, UPCR, CBC    Max Ramos MD  Nephrology  [facility]

## 2019-04-22 ENCOUNTER — RESEARCH ENCOUNTER (OUTPATIENT)
Dept: RESEARCH | Facility: HOSPITAL | Age: 69
End: 2019-04-22

## 2019-04-22 NOTE — PROGRESS NOTES
Study Title / IRB #: [id number] / [id number]     Principle Investigator: Dr. Carlos Montilla      Date of visit: 4/18/2019     Prior to the Informed Consent (IC) being signed, or any study protocol required data collection, testing, procedure, or intervention being performed, the following was done and/or discussed:     · Patient was given a copy of the IC for review   · Purpose of the study and qualifications to participate   · Confidentiality and HIPAA Authorization for Release of Medical Records for the research trial/ subject's rights/research related injury  · Risk, Benefits, Alternatives, Compensation and Costs  · Participation in the research trial is voluntary and patient may withdraw at anytime  · Contact information for study related questions     Patient verbalized understanding of the above: Yes  Contact information for CRC and PI given to patient: Yes     Pt signed the informed consent form for the EMPA research study with an IRB approved consent, and was given a signed copy.  Each page of the consent form was reviewed with , Cyndi Ott, and all questions answered satisfactorily.

## 2019-04-23 ENCOUNTER — TELEPHONE (OUTPATIENT)
Dept: NEPHROLOGY | Facility: CLINIC | Age: 69
End: 2019-04-23

## 2019-04-25 DIAGNOSIS — Z12.31 ENCOUNTER FOR SCREENING MAMMOGRAM FOR MALIGNANT NEOPLASM OF BREAST: Primary | ICD-10-CM

## 2019-04-29 ENCOUNTER — OFFICE VISIT (OUTPATIENT)
Dept: ORTHOPEDICS | Facility: CLINIC | Age: 69
End: 2019-04-29
Payer: MEDICARE

## 2019-04-29 VITALS — BODY MASS INDEX: 37.91 KG/M2 | WEIGHT: 206 LBS | HEIGHT: 62 IN

## 2019-04-29 DIAGNOSIS — M75.122 COMPLETE TEAR OF LEFT ROTATOR CUFF: ICD-10-CM

## 2019-04-29 PROCEDURE — 99204 PR OFFICE/OUTPT VISIT, NEW, LEVL IV, 45-59 MIN: ICD-10-PCS | Mod: 25,S$GLB,, | Performed by: ORTHOPAEDIC SURGERY

## 2019-04-29 PROCEDURE — 1101F PT FALLS ASSESS-DOCD LE1/YR: CPT | Mod: CPTII,S$GLB,, | Performed by: ORTHOPAEDIC SURGERY

## 2019-04-29 PROCEDURE — 99999 PR PBB SHADOW E&M-EST. PATIENT-LVL III: CPT | Mod: PBBFAC,,, | Performed by: ORTHOPAEDIC SURGERY

## 2019-04-29 PROCEDURE — 99999 PR PBB SHADOW E&M-EST. PATIENT-LVL III: ICD-10-PCS | Mod: PBBFAC,,, | Performed by: ORTHOPAEDIC SURGERY

## 2019-04-29 PROCEDURE — 1101F PR PT FALLS ASSESS DOC 0-1 FALLS W/OUT INJ PAST YR: ICD-10-PCS | Mod: CPTII,S$GLB,, | Performed by: ORTHOPAEDIC SURGERY

## 2019-04-29 PROCEDURE — 20610 PR DRAIN/INJECT LARGE JOINT/BURSA: ICD-10-PCS | Mod: LT,S$GLB,, | Performed by: ORTHOPAEDIC SURGERY

## 2019-04-29 PROCEDURE — 20610 DRAIN/INJ JOINT/BURSA W/O US: CPT | Mod: LT,S$GLB,, | Performed by: ORTHOPAEDIC SURGERY

## 2019-04-29 PROCEDURE — 99204 OFFICE O/P NEW MOD 45 MIN: CPT | Mod: 25,S$GLB,, | Performed by: ORTHOPAEDIC SURGERY

## 2019-04-29 RX ORDER — TRIAMCINOLONE ACETONIDE 40 MG/ML
40 INJECTION, SUSPENSION INTRA-ARTICULAR; INTRAMUSCULAR
Status: COMPLETED | OUTPATIENT
Start: 2019-04-29 | End: 2019-04-29

## 2019-04-29 RX ADMIN — TRIAMCINOLONE ACETONIDE 40 MG: 40 INJECTION, SUSPENSION INTRA-ARTICULAR; INTRAMUSCULAR at 02:04

## 2019-04-29 NOTE — PROGRESS NOTES
CC:  Rotator cuff tear left shoulder        HPI:  Talia Dillon is a very pleasant 68 y.o. female with ongoing symptoms left shoulder for the past 6 months  No history of recent trauma  Symptoms worse with lifting  Occasional numbness in the hand which is probably related to old carpal tunnel syndrome         PAST MEDICAL HISTORY:   Past Medical History:   Diagnosis Date    Allergy     Anemia     Anxiety     Atrial fibrillation     Cancer     skin    Cataract     Depression     Edema     Hypothyroidism     Kidney disease     PSVT (paroxysmal supraventricular tachycardia)     Thyroid disease      PAST SURGICAL HISTORY:   Past Surgical History:   Procedure Laterality Date    ADENOIDECTOMY      APPENDECTOMY      BLOCK, NERVE, MEDIAL BRANCH BLOCK BILATERAL LUMBAR L2-5 Bilateral 9/28/2018    [facility]    BLOCK, NERVE, MEDIAL BRANCH BLOCK BILATERAL LUMBAR L2-5 Bilateral 9/21/2018    [facility]    CARDIOVERSION N/A 2/8/2019    Performed by [facility]in [facility] at [facility]    COLONOSCOPY  2009    repeat in 10 years     EYE SURGERY      HYSTERECTOMY      INJECTION,STEROID,EPIDURAL N/A 8/3/2018    [facility]    RADIOFREQUENCY ABLATION LEFT LUMBAR L2,3,4,5 RFA Left 11/9/2018    [facility]    RADIOFREQUENCY ABLATION RIGHT LUMBAR L2,3,4,5 RFA Right 10/26/2018    [facility]    TONSILLECTOMY      Transesophageal echo (KAROL) intra-procedure log documentation N/A 2/8/2019    Performed by [facility] at [facility]     FAMILY HISTORY:   Family History   Problem Relation Age of Onset    Stroke Mother     Stroke Father     Diabetes Father     Sleep apnea Daughter     Mental illness Daughter      SOCIAL HISTORY:   Social History     Socioeconomic History    Marital status: Single     Spouse name: Not  on file    Number of children: Not on file    Years of education: Not on file    Highest education level: Not on file   Occupational History    Not on file   Social Needs    Financial resource strain: Not on file    Food insecurity:     Worry: Not on file     Inability: Not on file    Transportation needs:     Medical: Not on file     Non-medical: Not on file   Tobacco Use    Smoking status: Current Some Day Smoker     Packs/day: 1.00     Years: 50.00     Pack years: 50.00     Types: Cigarettes     Last attempt to quit: 2018     Years since quittin.9    Smokeless tobacco: Never Used   Substance and Sexual Activity    Alcohol use: No     Frequency: Never     Comment: rarely    Drug use: No    Sexual activity: Not Currently   Lifestyle    Physical activity:     Days per week: Not on file     Minutes per session: Not on file    Stress: Not on file   Relationships    Social connections:     Talks on phone: Not on file     Gets together: Not on file     Attends Sikhism service: Not on file     Active member of club or organization: Not on file     Attends meetings of clubs or organizations: Not on file     Relationship status: Not on file   Other Topics Concern    Not on file   Social History Narrative    Not on file       MEDICATIONS:   Current Outpatient Medications:     ALPRAZolam (XANAX) 0.25 MG tablet, Take 1 tablet (0.25 mg total) by mouth once daily., Disp: 90 tablet, Rfl: 1    apixaban (ELIQUIS) 5 mg Tab, Take 1 tablet (5 mg total) by mouth 2 (two) times daily., Disp: 60 tablet, Rfl: 11    atorvastatin (LIPITOR) 10 MG tablet, Take 1 tablet (10 mg total) by mouth once daily., Disp: 90 tablet, Rfl: 3    cholecalciferol, vitamin D3, (VITAMIN D3) 5,000 unit Tab, Take 5,000 Units by mouth once daily., Disp: 90 tablet, Rfl: 3    DULoxetine (CYMBALTA) 60 MG capsule, Take 1 capsule (60 mg total) by mouth once daily., Disp: 30 capsule, Rfl: 2    gabapentin (NEURONTIN) 400 MG capsule, Take  "400 mg by mouth. Take 2 tablets once daily, Disp: , Rfl:     levothyroxine (SYNTHROID, LEVOTHROID) 175 MCG tablet, Take 1 tablet (175 mcg total) by mouth once daily., Disp: 90 tablet, Rfl: 3    metoprolol tartrate (LOPRESSOR) 25 MG tablet, Take 3 tablets (75 mg total) by mouth 2 (two) times daily., Disp: 180 tablet, Rfl: 11    topiramate (TOPAMAX) 50 MG tablet, Take 1 tablet (50 mg total) by mouth 2 (two) times daily., Disp: 180 tablet, Rfl: 1    zolpidem (AMBIEN) 10 mg Tab, Take 1 tablet (10 mg total) by mouth nightly as needed., Disp: 30 tablet, Rfl: 5    dronedarone (MULTAQ) 400 mg Tab, Take 1 tablet (400 mg total) by mouth 2 (two) times daily with meals., Disp: 60 tablet, Rfl: 11  ALLERGIES:   Review of patient's allergies indicates:   Allergen Reactions    Dexamethasone Rash       Review of Systems:  Constitutional: no fever or chills  ENT: no nasal congestion or sore throat  Respiratory: no cough or shortness of breath  Cardiovascular: no chest pain or palpitations  Gastrointestinal: no nausea or vomiting, PUD, GERD, NSAID intolerance  Genitourinary: no hematuria or dysuria  Integument/Breast: no rash or pruritis  Hematologic/Lymphatic: no easy bruising or lymphadenopathy  Musculoskeletal: see HPI  Neurological: no seizures or tremors  Behavioral/Psych: no auditory or visual hallucinations      Physical Exam   Vitals:    04/29/19 1355   Weight: 93.4 kg (206 lb)   Height: 5' 2" (1.575 m)   PainSc:   5       Constitutional: Oriented to person, place, and time. Appears well-developed and well-nourished.   HENT:   Head: Normocephalic and atraumatic.   Nose: Nose normal.   Eyes: No scleral icterus.   Neck: Normal range of motion.   Cardiovascular: Normal rate and regular rhythm.    Pulses:       Radial pulses are 2+ on the right side, and 2+ on the left side.   Pulmonary/Chest: Effort normal and breath sounds normal.   Abdominal: Soft.   Neurological: Alert and oriented to person, place, and time.   Skin: " "Skin is warm.   Psychiatric: Normal mood and affect.     MUSCULOSKELETAL UPPER EXTREMITY:  Examination left shoulder no tenderness no swelling no bruising  Range of motion slightly decreased secondary to pain  Positive impingement sign  Positive supraspinatus stress test  Positive drop-arm test.  Some weakness rotator cuff noted  Neurologic exam intact  Examination left hand demonstrates mildly positive Tinel's sign over the median nerve at the wrist            Diagnostic Studies:  MRI demonstrates full-thickness rotator cuff tear with slight retraction of 1 cm left shoulder        Assessment:  Rotator cuff tear left shoulder   Unrelated mild left carpal tunnel syndrome.    Plan:  I recommended surgical treatment for the rotator cuff tear and went over the risks and benefits with the patient today  She is in currently in the process of moving and I think we need to wait until she finishes moving to be in about 6-8 weeks  Will set up surgery after that point  Which she is having some pains we recommended an injection today  After pause for time-out identified the left shoulder injected with Kenalog 40 mg 2 cc xylocaine sterile technique  She tolerated the procedure well without complication.  Follow-up for the above surgery      The risks and benefits of surgery were discussed with the patient today and they understand.  The consent was signed in the office for surgery.      Héctor Davila MD (Jay)  [facility]  Orthopedic Upper Extremity Surgery    "

## 2019-04-29 NOTE — LETTER
April 29, 2019      Latosha Humphries, [facility]  [address]  [facility] [address]           [facility]  [address]  [facility] [address]  Phone: [phone]          Patient: Talia Dillon   MR Number: [id number]   YOB: 1950   Date of Visit: 4/29/2019       Dear Latosha Humphries:    Thank you for referring Talia Dillon to me for evaluation. Attached you will find relevant portions of my assessment and plan of care.    If you have questions, please do not hesitate to call me. I look forward to following Talia Dillon along with you.    Sincerely,    Héctor Davila Jr., MD    Enclosure  CC:  No Recipients    If you would like to receive this communication electronically, please contact [email or web address] or [phone] to request more information on [employer] Link access.    For providers and/or their staff who would like to refer a patient to [facility], please contact us through our one-stop-shop provider referral line, [facility], at [phone].    If you feel you have received this communication in error or would no longer like to receive these types of communications, please e-mail [email or web address]

## 2019-05-01 ENCOUNTER — HOSPITAL ENCOUNTER (OUTPATIENT)
Dept: RADIOLOGY | Facility: HOSPITAL | Age: 69
Discharge: HOME | End: 2019-05-01
Attending: GENERAL PRACTICE
Payer: MEDICARE

## 2019-05-01 DIAGNOSIS — N18.4 CKD STAGE G4/A2, GFR 15-29 AND ALBUMIN CREATININE RATIO 30-299 MG/G: ICD-10-CM

## 2019-05-01 PROCEDURE — 76770 US EXAM ABDO BACK WALL COMP: CPT | Mod: TC,PO

## 2019-05-28 ENCOUNTER — OFFICE VISIT (OUTPATIENT)
Dept: CARDIOLOGY | Facility: CLINIC | Age: 69
End: 2019-05-28
Payer: MEDICARE

## 2019-05-28 VITALS
HEIGHT: 62 IN | HEART RATE: 86 BPM | BODY MASS INDEX: 37.61 KG/M2 | WEIGHT: 204.38 LBS | SYSTOLIC BLOOD PRESSURE: 125 MMHG | DIASTOLIC BLOOD PRESSURE: 87 MMHG

## 2019-05-28 DIAGNOSIS — I48.19 PERSISTENT ATRIAL FIBRILLATION: ICD-10-CM

## 2019-05-28 DIAGNOSIS — I48.0 PAROXYSMAL ATRIAL FIBRILLATION: Primary | ICD-10-CM

## 2019-05-28 DIAGNOSIS — I10 ESSENTIAL HYPERTENSION: Chronic | ICD-10-CM

## 2019-05-28 DIAGNOSIS — E78.00 PURE HYPERCHOLESTEROLEMIA: ICD-10-CM

## 2019-05-28 DIAGNOSIS — Z86.79 HISTORY OF PSVT (PAROXYSMAL SUPRAVENTRICULAR TACHYCARDIA): ICD-10-CM

## 2019-05-28 DIAGNOSIS — E03.4 HYPOTHYROIDISM DUE TO ACQUIRED ATROPHY OF THYROID: ICD-10-CM

## 2019-05-28 PROCEDURE — 3074F SYST BP LT 130 MM HG: CPT | Mod: CPTII,S$GLB,, | Performed by: INTERNAL MEDICINE

## 2019-05-28 PROCEDURE — 1101F PT FALLS ASSESS-DOCD LE1/YR: CPT | Mod: CPTII,S$GLB,, | Performed by: INTERNAL MEDICINE

## 2019-05-28 PROCEDURE — 3079F PR MOST RECENT DIASTOLIC BLOOD PRESSURE 80-89 MM HG: ICD-10-PCS | Mod: CPTII,S$GLB,, | Performed by: INTERNAL MEDICINE

## 2019-05-28 PROCEDURE — 3079F DIAST BP 80-89 MM HG: CPT | Mod: CPTII,S$GLB,, | Performed by: INTERNAL MEDICINE

## 2019-05-28 PROCEDURE — 99214 PR OFFICE/OUTPT VISIT, EST, LEVL IV, 30-39 MIN: ICD-10-PCS | Mod: S$GLB,,, | Performed by: INTERNAL MEDICINE

## 2019-05-28 PROCEDURE — 99999 PR PBB SHADOW E&M-EST. PATIENT-LVL III: ICD-10-PCS | Mod: PBBFAC,,, | Performed by: INTERNAL MEDICINE

## 2019-05-28 PROCEDURE — 93000 EKG 12-LEAD: ICD-10-PCS | Mod: S$GLB,,, | Performed by: INTERNAL MEDICINE

## 2019-05-28 PROCEDURE — 1101F PR PT FALLS ASSESS DOC 0-1 FALLS W/OUT INJ PAST YR: ICD-10-PCS | Mod: CPTII,S$GLB,, | Performed by: INTERNAL MEDICINE

## 2019-05-28 PROCEDURE — 93000 ELECTROCARDIOGRAM COMPLETE: CPT | Mod: S$GLB,,, | Performed by: INTERNAL MEDICINE

## 2019-05-28 PROCEDURE — 99999 PR PBB SHADOW E&M-EST. PATIENT-LVL III: CPT | Mod: PBBFAC,,, | Performed by: INTERNAL MEDICINE

## 2019-05-28 PROCEDURE — 3074F PR MOST RECENT SYSTOLIC BLOOD PRESSURE < 130 MM HG: ICD-10-PCS | Mod: CPTII,S$GLB,, | Performed by: INTERNAL MEDICINE

## 2019-05-28 PROCEDURE — 99214 OFFICE O/P EST MOD 30 MIN: CPT | Mod: S$GLB,,, | Performed by: INTERNAL MEDICINE

## 2019-05-28 NOTE — PROGRESS NOTES
Subjective:      Patient ID: Talia Dillon is a 68 y.o. female.    Chief Complaint: Follow-up (Afib)    HPI:  Dr You had stopped the amitryptiline in anticipation of beginning Multaq 2 days before repeat cardioversion.  Pt cancelled cardioversion because of out of town guests.    Feeling good overall    Review of Systems   Cardiovascular: Negative for chest pain, claudication, dyspnea on exertion, irregular heartbeat, leg swelling, near-syncope, orthopnea, palpitations and syncope.      Pt awakens with leg cramps in calves    Past Medical History:   Diagnosis Date    Allergy     Anemia     Anxiety     Atrial fibrillation     Cancer     skin    Cataract     Depression     Edema     Hypothyroidism     Kidney disease     PSVT (paroxysmal supraventricular tachycardia)     Thyroid disease         Past Surgical History:   Procedure Laterality Date    ADENOIDECTOMY      APPENDECTOMY      BLOCK, NERVE, MEDIAL BRANCH BLOCK BILATERAL LUMBAR L2-5 Bilateral 9/28/2018    [facility]    BLOCK, NERVE, MEDIAL BRANCH BLOCK BILATERAL LUMBAR L2-5 Bilateral 9/21/2018    [facility] by [facility] at [facility]    CARDIOVERSION N/A 2/8/2019    [facility]    COLONOSCOPY  2009    repeat in 10 years     EYE SURGERY      HYSTERECTOMY      INJECTION,STEROID,EPIDURAL N/A 8/3/2018    [facility] by [facility] at [facility]    RADIOFREQUENCY ABLATION LEFT LUMBAR L2,3,4,5 RFA Left 11/9/2018    [facility]formed by [facility] at [facility]    RADIOFREQUENCY ABLATION RIGHT LUMBAR L2,3,4,5 RFA Right 10/26/2018    [facility]formed by [facility] at [facility]    TONSILLECTOMY      Transesophageal echo (KAROL) intra-procedure log documentation N/A 2/8/2019    Performed by [facility] Provider at [facility]       Family History   Problem Relation Age of Onset    Stroke Mother     Stroke Father     Diabetes Father      Sleep apnea Daughter     Mental illness Daughter        Social History     Socioeconomic History    Marital status: Single     Spouse name: Not on file    Number of children: Not on file    Years of education: Not on file    Highest education level: Not on file   Occupational History    Not on file   Social Needs    Financial resource strain: Not on file    Food insecurity:     Worry: Not on file     Inability: Not on file    Transportation needs:     Medical: Not on file     Non-medical: Not on file   Tobacco Use    Smoking status: Current Some Day Smoker     Packs/day: 1.00     Years: 50.00     Pack years: 50.00     Types: Cigarettes     Last attempt to quit: 2018     Years since quittin.9    Smokeless tobacco: Never Used   Substance and Sexual Activity    Alcohol use: No     Frequency: Never     Comment: rarely    Drug use: No    Sexual activity: Not Currently   Lifestyle    Physical activity:     Days per week: Not on file     Minutes per session: Not on file    Stress: Not on file   Relationships    Social connections:     Talks on phone: Not on file     Gets together: Not on file     Attends Anabaptist service: Not on file     Active member of club or organization: Not on file     Attends meetings of clubs or organizations: Not on file     Relationship status: Not on file   Other Topics Concern    Not on file   Social History Narrative    Not on file       Current Outpatient Medications on File Prior to Visit   Medication Sig Dispense Refill    ALPRAZolam (XANAX) 0.25 MG tablet Take 1 tablet (0.25 mg total) by mouth once daily. 90 tablet 1    apixaban (ELIQUIS) 5 mg Tab Take 1 tablet (5 mg total) by mouth 2 (two) times daily. 60 tablet 11    atorvastatin (LIPITOR) 10 MG tablet Take 1 tablet (10 mg total) by mouth once daily. 90 tablet 3    cholecalciferol, vitamin D3, (VITAMIN D3) 5,000 unit Tab Take 5,000 Units by mouth once daily. 90 tablet 3    DULoxetine (CYMBALTA) 60 MG  "capsule Take 1 capsule (60 mg total) by mouth once daily. 30 capsule 2    gabapentin (NEURONTIN) 400 MG capsule Take 400 mg by mouth. Take 2 tablets once daily      levothyroxine (SYNTHROID, LEVOTHROID) 175 MCG tablet Take 1 tablet (175 mcg total) by mouth once daily. 90 tablet 3    metoprolol tartrate (LOPRESSOR) 25 MG tablet Take 3 tablets (75 mg total) by mouth 2 (two) times daily. 180 tablet 11    topiramate (TOPAMAX) 50 MG tablet Take 1 tablet (50 mg total) by mouth 2 (two) times daily. 180 tablet 1    zolpidem (AMBIEN) 10 mg Tab Take 1 tablet (10 mg total) by mouth nightly as needed. 30 tablet 5    dronedarone (MULTAQ) 400 mg Tab Take 1 tablet (400 mg total) by mouth 2 (two) times daily with meals. 60 tablet 11     No current facility-administered medications on file prior to visit.        Review of patient's allergies indicates:   Allergen Reactions    Dexamethasone Rash     Objective:     Vitals:    05/28/19 1416   BP: 125/87   BP Location: Right arm   Patient Position: Sitting   BP Method: Large (Automatic)   Pulse: 86   Weight: 92.7 kg (204 lb 5.9 oz)   Height: 5' 2" (1.575 m)        Physical Exam   Constitutional: She is oriented to person, place, and time. She appears well-developed and well-nourished.   Eyes: No scleral icterus.   Neck: No JVD present. Carotid bruit is not present.   Cardiovascular: Normal rate. An irregularly irregular rhythm present. Exam reveals no gallop.   No murmur heard.  Pulmonary/Chest: Breath sounds normal.   Musculoskeletal: She exhibits no edema.   Neurological: She is alert and oriented to person, place, and time.   Skin: Skin is warm and dry.   Psychiatric: She has a normal mood and affect. Her behavior is normal. Judgment and thought content normal.   Vitals reviewed.     ECG: atrial fibrillation with controlled ventricular response, possible septal scar, unchanged    Recent creat 2.0  Assessment:     1. Paroxysmal atrial fibrillation    2. Persistent atrial " fibrillation    3. Pure hypercholesterolemia    4. Hypothyroidism due to acquired atrophy of thyroid    5. History of PSVT (paroxysmal supraventricular tachycardia)    6. Essential hypertension      Plan:   Talia was seen today for follow-up.    Diagnoses and all orders for this visit:    Paroxysmal atrial fibrillation  -     IN OFFICE EKG 12-LEAD (to Muse)    Persistent atrial fibrillation  -     CBC auto differential; Future  -     Comprehensive metabolic panel; Future  -     TSH; Future    Pure hypercholesterolemia    Hypothyroidism due to acquired atrophy of thyroid    History of PSVT (paroxysmal supraventricular tachycardia)    Essential hypertension    Discussed trial of Multaq and cardioversion.    Discussed risk of stroke if Eliquis is held for rotator cuff surgery     Discussed advantage of being in a normal rhythm such that holding Eliquis for surgery would not entail a risk of stroke.    Discussed importance of metoprolol in terms of controlling ventricular response.    Discussed role of Watchman device    Pt is still undecided regarding trial of Multaq and repeat cardioversion    Pt may want to get Prernaa device to see if she is always  In a fib    Follow up in about 6 months (around 11/28/2019). Lab on return    Importance of avoiding NSAID's emphasized

## 2019-06-02 RX ORDER — GABAPENTIN 400 MG/1
400 CAPSULE ORAL 2 TIMES DAILY
Qty: 60 CAPSULE | Refills: 3 | Status: SHIPPED | OUTPATIENT
Start: 2019-06-02 | End: 2019-06-06 | Stop reason: SDUPTHER

## 2019-06-06 ENCOUNTER — OFFICE VISIT (OUTPATIENT)
Dept: BARIATRICS | Facility: CLINIC | Age: 69
End: 2019-06-06
Payer: MEDICARE

## 2019-06-06 ENCOUNTER — OFFICE VISIT (OUTPATIENT)
Dept: FAMILY MEDICINE | Facility: CLINIC | Age: 69
End: 2019-06-06
Payer: MEDICARE

## 2019-06-06 VITALS
SYSTOLIC BLOOD PRESSURE: 112 MMHG | WEIGHT: 204.81 LBS | BODY MASS INDEX: 37.69 KG/M2 | HEIGHT: 62 IN | HEART RATE: 83 BPM | DIASTOLIC BLOOD PRESSURE: 83 MMHG

## 2019-06-06 VITALS
BODY MASS INDEX: 37.64 KG/M2 | WEIGHT: 204.56 LBS | TEMPERATURE: 98 F | SYSTOLIC BLOOD PRESSURE: 116 MMHG | HEIGHT: 62 IN | OXYGEN SATURATION: 95 % | HEART RATE: 78 BPM | DIASTOLIC BLOOD PRESSURE: 68 MMHG

## 2019-06-06 DIAGNOSIS — F41.9 ANXIETY: ICD-10-CM

## 2019-06-06 DIAGNOSIS — E66.01 CLASS 2 SEVERE OBESITY WITH BODY MASS INDEX (BMI) OF 35 TO 39.9 WITH SERIOUS COMORBIDITY: Primary | ICD-10-CM

## 2019-06-06 DIAGNOSIS — I48.19 PERSISTENT ATRIAL FIBRILLATION: Primary | ICD-10-CM

## 2019-06-06 PROCEDURE — 99999 PR PBB SHADOW E&M-EST. PATIENT-LVL III: ICD-10-PCS | Mod: PBBFAC,,, | Performed by: INTERNAL MEDICINE

## 2019-06-06 PROCEDURE — 99213 PR OFFICE/OUTPT VISIT, EST, LEVL III, 20-29 MIN: ICD-10-PCS | Mod: S$GLB,,, | Performed by: INTERNAL MEDICINE

## 2019-06-06 PROCEDURE — 3078F DIAST BP <80 MM HG: CPT | Mod: CPTII,S$GLB,, | Performed by: FAMILY MEDICINE

## 2019-06-06 PROCEDURE — 3078F PR MOST RECENT DIASTOLIC BLOOD PRESSURE < 80 MM HG: ICD-10-PCS | Mod: CPTII,S$GLB,, | Performed by: FAMILY MEDICINE

## 2019-06-06 PROCEDURE — 3074F PR MOST RECENT SYSTOLIC BLOOD PRESSURE < 130 MM HG: ICD-10-PCS | Mod: CPTII,S$GLB,, | Performed by: INTERNAL MEDICINE

## 2019-06-06 PROCEDURE — 99214 OFFICE O/P EST MOD 30 MIN: CPT | Mod: S$GLB,,, | Performed by: FAMILY MEDICINE

## 2019-06-06 PROCEDURE — 1101F PR PT FALLS ASSESS DOC 0-1 FALLS W/OUT INJ PAST YR: ICD-10-PCS | Mod: CPTII,S$GLB,, | Performed by: INTERNAL MEDICINE

## 2019-06-06 PROCEDURE — 3079F DIAST BP 80-89 MM HG: CPT | Mod: CPTII,S$GLB,, | Performed by: INTERNAL MEDICINE

## 2019-06-06 PROCEDURE — 1101F PT FALLS ASSESS-DOCD LE1/YR: CPT | Mod: CPTII,S$GLB,, | Performed by: FAMILY MEDICINE

## 2019-06-06 PROCEDURE — 1101F PR PT FALLS ASSESS DOC 0-1 FALLS W/OUT INJ PAST YR: ICD-10-PCS | Mod: CPTII,S$GLB,, | Performed by: FAMILY MEDICINE

## 2019-06-06 PROCEDURE — 3074F SYST BP LT 130 MM HG: CPT | Mod: CPTII,S$GLB,, | Performed by: INTERNAL MEDICINE

## 2019-06-06 PROCEDURE — 3074F SYST BP LT 130 MM HG: CPT | Mod: CPTII,S$GLB,, | Performed by: FAMILY MEDICINE

## 2019-06-06 PROCEDURE — 99213 OFFICE O/P EST LOW 20 MIN: CPT | Mod: S$GLB,,, | Performed by: INTERNAL MEDICINE

## 2019-06-06 PROCEDURE — 1101F PT FALLS ASSESS-DOCD LE1/YR: CPT | Mod: CPTII,S$GLB,, | Performed by: INTERNAL MEDICINE

## 2019-06-06 PROCEDURE — 3079F PR MOST RECENT DIASTOLIC BLOOD PRESSURE 80-89 MM HG: ICD-10-PCS | Mod: CPTII,S$GLB,, | Performed by: INTERNAL MEDICINE

## 2019-06-06 PROCEDURE — 99214 PR OFFICE/OUTPT VISIT, EST, LEVL IV, 30-39 MIN: ICD-10-PCS | Mod: S$GLB,,, | Performed by: FAMILY MEDICINE

## 2019-06-06 PROCEDURE — 3074F PR MOST RECENT SYSTOLIC BLOOD PRESSURE < 130 MM HG: ICD-10-PCS | Mod: CPTII,S$GLB,, | Performed by: FAMILY MEDICINE

## 2019-06-06 PROCEDURE — 99999 PR PBB SHADOW E&M-EST. PATIENT-LVL III: CPT | Mod: PBBFAC,,, | Performed by: INTERNAL MEDICINE

## 2019-06-06 RX ORDER — GABAPENTIN 400 MG/1
400 CAPSULE ORAL 2 TIMES DAILY
Qty: 180 CAPSULE | Refills: 3 | Status: SHIPPED | OUTPATIENT
Start: 2019-06-06 | End: 2019-07-06

## 2019-06-06 RX ORDER — TOPIRAMATE 100 MG/1
100 TABLET, FILM COATED ORAL 2 TIMES DAILY
Qty: 180 TABLET | Refills: 1 | Status: SHIPPED | OUTPATIENT
Start: 2019-06-06 | End: 2020-01-10

## 2019-06-06 RX ORDER — ALPRAZOLAM 0.25 MG/1
0.25 TABLET ORAL DAILY
Qty: 90 TABLET | Refills: 1 | Status: SHIPPED | OUTPATIENT
Start: 2019-06-06 | End: 2020-01-10 | Stop reason: SDUPTHER

## 2019-06-06 RX ORDER — ZOLPIDEM TARTRATE 10 MG/1
10 TABLET ORAL NIGHTLY PRN
Qty: 30 TABLET | Refills: 5 | Status: SHIPPED | OUTPATIENT
Start: 2019-06-06 | End: 2019-12-11 | Stop reason: SDUPTHER

## 2019-06-06 RX ORDER — LEVOTHYROXINE SODIUM 175 UG/1
175 TABLET ORAL DAILY
Qty: 90 TABLET | Refills: 3 | Status: SHIPPED | OUTPATIENT
Start: 2019-06-06 | End: 2020-01-10 | Stop reason: SDUPTHER

## 2019-06-06 NOTE — PATIENT INSTRUCTIONS
Patient was informed that topiramate is used for migraine prevention and seizures. Weight loss is a common side effect that is well documented. S/he understands this. S/he was informed of the potential side effects such as serious and possibly fatal rash in which case the medication should be discontinued immediately. Paresthesias, forgetfulness, fatigue, kidney stones, GI symptoms, and changes in lab values such as electrolytes, blood counts and kidney function.    5-Day Menu Plan: 800-1000 Calories    DAY 1     Breakfast  1 boiled egg  Small apple    Snack  ¼ cup almonds    Lunch  Morning star saul  1 cup green beans    Dinner  3 oz salmon  1 cup cooked carrots    Snack  SF jello    DAY 2    Breakfast  2 eggs with 2 tbsp salsa    Snack  6 oz greek yogurt  ½ cup Peaches canned (in its own juice)    Lunch  [place]:Tuna and mustard   Pear cup (no sugar added)    Dinner  Baked fish  1 cup cooked yellow squash    Snack  SF popsicle            DAY 3    Breakfast   Protein drink: 1 scoop whey powder + 6oz soy milk    Snack  ¼ cup almonds    Lunch  Tuna Salad: 3oz canned tuna in water, 1 egg, and 1 tsp light mccann)  Pineapple cup (no sugar added)    Dinner  Baked shrimp  1 cup grilled eggplant    Snack  8oz Chocolate Soy Milk      DAY 4    Breakfast  2 eggs, morning star saul    Snack  1 cheese stick    Lunch    Snack  1/4 cup almonds  Peach cup (no sugar added)    Dinner  3oz baked fish  1 cup cooked green beans          DAY 5    Breakfast  Protein drink: 1 scoop whey powder + 6oz soy milk    Snack   ¼ cup almonds  1 apple    Lunch  1 cup tuna salad: (3oz canned tuna in water, 1 egg, 1 tsp light mccann)    Snack  3 oz greek yogurt  Fruit cup (no sugar added)    Dinner  Omelet: 2 eggs, grilled shrimp, bell pepper and onion        January 28, 2019  Hot Spinach and Artichoke Dip (Recipe)  BY LUKE GRIFFIN RD, CSSD    This creamy spinach dip can double as a protein-rich, gluten-free side dish, game day appetizer or parade  route snack.  Calories 85 calories    Fat 3 grams saturated fat    Prep Time 5 minutes  Cook Time10 minutes  Yield Serves 5-10 people  Ingredients   1/2 cup onion, finely chopped   3 (10 ounce) packs of frozen chopped spinach, thawed and squeezed dry   1 (8 ounce) package reduced-fat cream cheese   1 (8 ounce) carton fat-free plain Greek yogurt   1/2 cup Parmesan cheese, grated   1 (14 ounce) can artichoke hearts   1/4 teaspoon salt (optional)   1/4 teaspoon black pepper   1/8 teaspoon crushed red pepper flakes  Instructions  1. Lightly coat a skillet with cooking spray.  2. Cook and stir onion over medium heat until transparent (about 5 minutes).  3. Add spinach. Cook until thoroughly heated (about 1-2 minutes).  4. Reduce heat and add cream cheese. Stir until melted and smooth.  5. Stir in Greek yogurt, Parmesan cheese and artichokes. Remove from heat.  6. Season with salt (optional) and black and red pepper.  7. Serve with raw vegetables.                          January 31, 2019  Pizza Cups (Recipe)    Trying to eat better but craving pizza? These protein-packed pizza cups will do the trick.    Calories 65 calories per cup  Fat 2.7 grams per cup  Prep Time5 minutes  Cook Time 25 minutes  Yield 12 pizza cups  Ingredients   1 ½ cups liquid egg whites   2 large eggs   1 cup fat free or low moisture shredded mozzarella cheese   37 turkey pepperonis (25 chopped and 12 sliced)   ¼ cup Parmesan cheese   ¼ tsp oregano   ¼ tsp black pepper  Instructions  1. Preheat oven to 350 degrees Fahrenheit.  2. Chop up 25 turkey pepperonis into small pieces. In a bowl, combine all ingredients except the remaining 12 sliced turkey pepperoni.  3. Spray a muffin quintero with nonstick spray.  4. Place a whole sliced turkey pepperoni in the bottom of each of the 12 muffin molds.  5. Pour or spoon in the mixture in your bowl into each muffin mold evenly ¾ full.  6. Bake in the oven for 20-25 minutes. Place a toothpick in the  center of the pizza cup to ensure all liquid egg has cooked. For a crispier pizza cup, leave in the oven a little longer.  7. Let cool for a few minutes before serving. Enjoy!

## 2019-06-06 NOTE — PROGRESS NOTES
Subjective:      Patient ID: Talia Dillon is a 68 y.o. female.    Chief Complaint: Medication Refill      HPI   Check up/ RX  Has atrial fib, had cardioversion once, was told to do again with Dr You  My last visit was Sept 2018  Reviewed her physicians, her meds  Pt is moving next door    Review of Systems   Constitutional: Negative.    HENT: Negative.    Respiratory: Negative.    Cardiovascular: Negative.    Gastrointestinal: Negative.    Endocrine: Negative.    Genitourinary: Negative.    Musculoskeletal: Negative.    Psychiatric/Behavioral: Negative.    All other systems reviewed and are negative.    Objective:     Physical Exam   Constitutional: She is oriented to person, place, and time. She appears well-developed and well-nourished.   HENT:   Head: Normocephalic.   Eyes: Pupils are equal, round, and reactive to light. Conjunctivae and EOM are normal.   Neck: Normal range of motion. Neck supple.   Cardiovascular: Normal rate, regular rhythm and normal heart sounds.   Pulmonary/Chest: Effort normal and breath sounds normal.   Musculoskeletal: Normal range of motion.   Neurological: She is alert and oriented to person, place, and time. She has normal reflexes.   Skin: Skin is warm and dry.   ecchymoses   Psychiatric: She has a normal mood and affect. Her behavior is normal. Judgment and thought content normal.   Nursing note and vitals reviewed.    Assessment:     1. Persistent atrial fibrillation    2. Anxiety      Plan:        Medication List           Accurate as of 6/6/19 11:30 AM. If you have any questions, ask your nurse or doctor.               CONTINUE taking these medications    ALPRAZolam 0.25 MG tablet  Commonly known as:  XANAX  Take 1 tablet (0.25 mg total) by mouth once daily.     apixaban 5 mg Tab  Commonly known as:  ELIQUIS  Take 1 tablet (5 mg total) by mouth 2 (two) times daily.     atorvastatin 10 MG tablet  Commonly known as:  LIPITOR  Take 1 tablet (10 mg total) by mouth once daily.      cholecalciferol (vitamin D3) 5,000 unit Tab  Commonly known as:  VITAMIN D3  Take 5,000 Units by mouth once daily.     DULoxetine 60 MG capsule  Commonly known as:  CYMBALTA  Take 1 capsule (60 mg total) by mouth once daily.     * gabapentin 400 MG capsule  Commonly known as:  NEURONTIN     * gabapentin 400 MG capsule  Commonly known as:  NEURONTIN  Take 1 capsule (400 mg total) by mouth 2 (two) times daily.     levothyroxine 175 MCG tablet  Commonly known as:  SYNTHROID, LEVOTHROID  Take 1 tablet (175 mcg total) by mouth once daily.     metoprolol tartrate 25 MG tablet  Commonly known as:  LOPRESSOR  Take 3 tablets (75 mg total) by mouth 2 (two) times daily.     topiramate 50 MG tablet  Commonly known as:  TOPAMAX  Take 1 tablet (50 mg total) by mouth 2 (two) times daily.     zolpidem 10 mg Tab  Commonly known as:  AMBIEN  Take 1 tablet (10 mg total) by mouth nightly as needed.         * This list has 2 medication(s) that are the same as other medications prescribed for you. Read the directions carefully, and ask your doctor or other care provider to review them with you.            STOP taking these medications    dronedarone 400 mg Tab  Commonly known as:  MULTAQ  Stopped by:  James Vallecillo MD           Where to Get Your Medications      These medications were sent to [facility]/pharmacy #[id number] - [address] AT CORNER OF [address] [address]    Phone:  [phone]   · ALPRAZolam 0.25 MG tablet  · gabapentin 400 MG capsule  · levothyroxine 175 MCG tablet  · zolpidem 10 mg Tab       Persistent atrial fibrillation    Anxiety  -     ALPRAZolam (XANAX) 0.25 MG tablet; Take 1 tablet (0.25 mg total) by mouth once daily.  Dispense: 90 tablet; Refill: 1  -     zolpidem (AMBIEN) 10 mg Tab; Take 1 tablet (10 mg total) by mouth nightly as needed.  Dispense: 30 tablet; Refill: 5    Other orders  -     gabapentin (NEURONTIN) 400 MG capsule; Take 1 capsule (400 mg  total) by mouth 2 (two) times daily.  Dispense: 180 capsule; Refill: 3  -     levothyroxine (SYNTHROID, LEVOTHROID) 175 MCG tablet; Take 1 tablet (175 mcg total) by mouth once daily.  Dispense: 90 tablet; Refill: 3    out of xanax and ambien  Discuss increasing cymbalta with pain management to helpwith depression  Niece left for [place] and moving into their house

## 2019-06-06 NOTE — PROGRESS NOTES
"Subjective:       Patient ID: Talia Dillon is a 68 y.o. female.    Chief Complaint: Follow-up    Pt here today for follow-up. Has lost 6 more lbs, net neg 25 lbs. Started 1000 jeremy diet and topiramate. States shehas been feeling fair. We did increase the topiramate at last OV. She does feel she is hungry at times. She is still having afib despite procedures.   Cr actually down a bit.   Lab Results       Component                Value               Date                       CREATININE               1.94 (H)            04/16/2019                 BUN                      42 (H)              04/16/2019                 NA                       141                 04/16/2019                 K                        4.5                 04/16/2019                 CL                       107                 04/16/2019                 CO2                      27                  04/16/2019               Review of Systems   Constitutional: Negative for chills and fever.   Respiratory: Positive for shortness of breath.         Thinks she snores   Cardiovascular: Positive for leg swelling. Negative for chest pain and palpitations.   Gastrointestinal: Negative for constipation and diarrhea.        Denies GERD   Genitourinary: Negative for difficulty urinating and dysuria.   Musculoskeletal: Positive for back pain. Negative for arthralgias.   Neurological: Negative for dizziness and light-headedness.   Psychiatric/Behavioral: Negative for dysphoric mood. The patient is not nervous/anxious.         OK on meds       Objective:     /83   Pulse 83   Ht 5' 2" (1.575 m)   Wt 92.9 kg (204 lb 12.9 oz)   BMI 37.46 kg/m²     Physical Exam   Constitutional: She is oriented to person, place, and time. She appears well-developed and well-nourished. No distress.   HENT:   Head: Normocephalic and atraumatic.   Eyes: Pupils are equal, round, and reactive to light. EOM are normal. No scleral icterus.   Neck: Normal range of motion. " Neck supple.   Cardiovascular: Normal rate.   Pulmonary/Chest: Effort normal.   Increased effort after moving to exam table and back   Musculoskeletal: Normal range of motion. She exhibits edema and deformity.   1+ pretibial edema b/l.    Neurological: She is alert and oriented to person, place, and time. No cranial nerve deficit.   Skin: Skin is warm and dry. No erythema.   Psychiatric: She has a normal mood and affect. Her behavior is normal. Judgment normal.   Vitals reviewed.      Assessment:       1. Class 2 severe obesity with body mass index (BMI) of 35 to 39.9 with serious comorbidity        Plan:         Talia was seen today for follow-up.    Diagnoses and all orders for this visit:    Class 2 severe obesity with body mass index (BMI) of 35 to 39.9 with serious comorbidity    Other orders  -     topiramate (TOPAMAX) 100 MG tablet; Take 1 tablet (100 mg total) by mouth 2 (two) times daily.    Doing very well.     800-1000 jeremy pescatarian menu diet and Carolann dianna recipes provided today.     Patient was informed that topiramate is used for migraine prevention and seizures. Weight loss is a common side effect that is well documented. S/he understands this. S/he was informed of the potential side effects such as serious and possibly fatal rash in which case the medication should be discontinued immediately. Paresthesias, forgetfulness, fatigue, kidney stones, GI symptoms, and changes in lab values such as electrolytes, blood counts and kidney function.

## 2019-06-10 ENCOUNTER — TELEPHONE (OUTPATIENT)
Dept: PAIN MEDICINE | Facility: CLINIC | Age: 69
End: 2019-06-10

## 2019-06-10 NOTE — TELEPHONE ENCOUNTER
My name is Staff, I am contacting you from [facility] regarding your appointment scheduled for 06.11.19, with EVELYN, just confirming you will be able to make it.    If you feel you need to reschedule or canceled please give our office a call so we can better assist you.      Staff requesting patient to arrive 15 mins ahead of schedule appointment time.    **Staff left a voicemail reminding patient of their appt.**

## 2019-06-11 ENCOUNTER — OFFICE VISIT (OUTPATIENT)
Dept: PAIN MEDICINE | Facility: CLINIC | Age: 69
End: 2019-06-11
Payer: MEDICARE

## 2019-06-11 VITALS
BODY MASS INDEX: 37.73 KG/M2 | SYSTOLIC BLOOD PRESSURE: 90 MMHG | HEART RATE: 100 BPM | WEIGHT: 205 LBS | TEMPERATURE: 98 F | DIASTOLIC BLOOD PRESSURE: 61 MMHG | HEIGHT: 62 IN

## 2019-06-11 DIAGNOSIS — M47.816 LUMBAR SPONDYLOSIS: Primary | ICD-10-CM

## 2019-06-11 DIAGNOSIS — M25.512 CHRONIC LEFT SHOULDER PAIN: ICD-10-CM

## 2019-06-11 DIAGNOSIS — G89.4 CHRONIC PAIN DISORDER: ICD-10-CM

## 2019-06-11 DIAGNOSIS — G89.29 CHRONIC LEFT SHOULDER PAIN: ICD-10-CM

## 2019-06-11 DIAGNOSIS — M75.122 COMPLETE TEAR OF LEFT ROTATOR CUFF: ICD-10-CM

## 2019-06-11 DIAGNOSIS — M51.36 DDD (DEGENERATIVE DISC DISEASE), LUMBAR: ICD-10-CM

## 2019-06-11 PROCEDURE — 99213 PR OFFICE/OUTPT VISIT, EST, LEVL III, 20-29 MIN: ICD-10-PCS | Mod: S$GLB,,, | Performed by: NURSE PRACTITIONER

## 2019-06-11 PROCEDURE — 99499 UNLISTED E&M SERVICE: CPT | Mod: S$GLB,,, | Performed by: NURSE PRACTITIONER

## 2019-06-11 PROCEDURE — 1101F PR PT FALLS ASSESS DOC 0-1 FALLS W/OUT INJ PAST YR: ICD-10-PCS | Mod: CPTII,S$GLB,, | Performed by: NURSE PRACTITIONER

## 2019-06-11 PROCEDURE — 3078F PR MOST RECENT DIASTOLIC BLOOD PRESSURE < 80 MM HG: ICD-10-PCS | Mod: CPTII,S$GLB,, | Performed by: NURSE PRACTITIONER

## 2019-06-11 PROCEDURE — 3074F SYST BP LT 130 MM HG: CPT | Mod: CPTII,S$GLB,, | Performed by: NURSE PRACTITIONER

## 2019-06-11 PROCEDURE — 1101F PT FALLS ASSESS-DOCD LE1/YR: CPT | Mod: CPTII,S$GLB,, | Performed by: NURSE PRACTITIONER

## 2019-06-11 PROCEDURE — 99213 OFFICE O/P EST LOW 20 MIN: CPT | Mod: S$GLB,,, | Performed by: NURSE PRACTITIONER

## 2019-06-11 PROCEDURE — 99999 PR PBB SHADOW E&M-EST. PATIENT-LVL III: CPT | Mod: PBBFAC,,, | Performed by: NURSE PRACTITIONER

## 2019-06-11 PROCEDURE — 3078F DIAST BP <80 MM HG: CPT | Mod: CPTII,S$GLB,, | Performed by: NURSE PRACTITIONER

## 2019-06-11 PROCEDURE — 99499 RISK ADDL DX/OHS AUDIT: ICD-10-PCS | Mod: S$GLB,,, | Performed by: NURSE PRACTITIONER

## 2019-06-11 PROCEDURE — 3074F PR MOST RECENT SYSTOLIC BLOOD PRESSURE < 130 MM HG: ICD-10-PCS | Mod: CPTII,S$GLB,, | Performed by: NURSE PRACTITIONER

## 2019-06-11 PROCEDURE — 99999 PR PBB SHADOW E&M-EST. PATIENT-LVL III: ICD-10-PCS | Mod: PBBFAC,,, | Performed by: NURSE PRACTITIONER

## 2019-06-11 RX ORDER — DULOXETIN HYDROCHLORIDE 60 MG/1
60 CAPSULE, DELAYED RELEASE ORAL 2 TIMES DAILY
Qty: 60 CAPSULE | Refills: 2 | Status: SHIPPED | OUTPATIENT
Start: 2019-06-11 | End: 2019-08-25 | Stop reason: SDUPTHER

## 2019-06-11 NOTE — PROGRESS NOTES
"Subjective:     Patient ID: Talia Dillon is a 68 y.o. female.    Chief Complaint: Pain    Consulted by: Dr. Gallagher    Disclaimer: This note was generated using voice recognition software.  There may be a typographical errors that were missed during proofreading.      HPI:    Talia Dillon is a 68 y.o. female who presents today with neck, shoulder, low back and leg pain. This pain is described in detail below.    Ms. Dillon presents to pain clinic today complaining of pain in a variety of areas, including her neck, shoulders, low back and leg pain. Her low back and leg pain is the most bothersome. 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 She has had long term PT her whole life because of this. She was recently evaluated by Dr. Gallagher who referred her to PT and pain clinic for management options.    Ms. Dillon states her low back pain is constant with radiation down both legs. She rates her pain as a 7/10 today. The pain is made worse with movement and walking. The pain is a dull ache in her low back, but feels like a sharp burning in her legs "like an electric wing." This pain does not travel in to her feet, but she has numbness and paraesthesias in her feet separately.     She has just recently restarted with physical therapy; she has completed 4 sessions at this time and feels this is starting to help. She feels it is painful but ultimately improving her situation.     Interval History (8/30/2018):  She returns today for follow up.  She reports that PT has been helpful for the pain.  She finished her last in office session yesterday.  She is now going to start her HEP.  She reports that the epidural didn't help. She wishes she could take Aleve as this was very helpful    Interval History (10/11/2018):  The patient returns for " follow up of back pain.  Since her last OV, she had L2-5 MBB x2.  She is reporting 80% relief from both procedures for about one day.  She is here today to discuss RFA procedures.  She continues with come exercise regimen.  She did have recent labs as ordered by her PCP which again showed elevated BUN.  Her pain today is 8/10.     Interval History (12/7/2018):  The patient presents for procedure follow up.  She is s/p L2,3,4,5 RFAs with about 70% pain relief.  Her pain is not as constant.  She is able to walk further.  Her pain is not as intense when it comes.  She is still somewhat limited by her pain, particularly regarding activity level.  She also feels short winded sometimes.  She is seeing cardiology next month.  She is also seeing bariatric medicine and has been losing weight.  Her pain today is 5/10.    Interval History (3/12/2019):  The patient presents for follow up.  Cymbalta was increased at last OV.  She reports that this has been helpful.  Her pain is stable and currently manageable.  She was recently found to be in A fib.  She had a cardioversion procedure on 2/8/19 and is scheduled for another on 3/19/19.  She is being weaned from Elavil by cardiology.  Her pain today is 4/10.    Interval History (6/11/2019):  The patient returns for follow up of chronic pain.  Since her last OV, I obtained a left shoulder MRI which showed rotator tear.  She had a visit with Dr. Davila and surgical repair was recommended.  She is waiting a bit because of moving.  She had a recent appointment with her PCP.  He recommended that we increase Cymbalta due to depression and pain.  She is currently taking 60 mg QD without adverse effects.  Her pain today is 8/10.    Physical Therapy: Yes; finished 8/28/2018, doing HEP (8/20/3018)    Non-pharmacologic Treatment:     · Ice/Heat: Not tried  · TENS: Tried; not helpful  · Massage: Helpful  · Chiropractic care: Yes, not helpful  · Acupuncture: Yes, not helpful  · Other: None          Pain Medications:         · Currently taking:   · Gabapentin 800 mg in am  · Sertraline 100 mg every day   · OTC topical creams   · Acetaminophen as needed   · Cymbalta 60 mg QD    · Has tried in the past:    · NSAIDs- Aleve and Ibuprofen: had to stop due to CKD   · Baclofen- had to stop due to CKD    · Has not tried: Opioids,  Rx topical creams    Blood thinners: No    Interventional Therapies:  9/21/18 Bilateral L2-5 MBB- 80% relief  9/28/18 Bilateral L2-5 MBB- 80% relief  10/26/18 Right L2,3,4,5 RFA- 70% relief  11/9/18 Left L2,3,4,5 RFA- 70% relief  4/29/19 Left shoulder injection- helpful (ortho)    Relevant Surgeries:   No    Affecting sleep? Not usually     Affecting daily activities? Yes    Depressive symptoms? Yes          · SI/HI? No    Work status: Retired; used to work as a      Prescription Monitoring Program database:  Reviewed and consistent with medication use as prescribed.    Pain Scales  Best: 3/10  Worst: 9/10  Usually: 5/10  Today: 8/10    Low-back Pain   Associated symptoms include numbness and paresthesias. Pertinent negatives include no abdominal pain, bladder incontinence, bowel incontinence, chest pain, dysuria, fever or weakness.       Last 3 PDI Scores 6/11/2019 3/12/2019 10/11/2018   Pain Disability Index (PDI) [phone]   ]    Review of Systems   Constitution: Negative for chills, decreased appetite, diaphoresis and fever.   HENT: Negative for congestion, ear discharge and ear pain.    Eyes: Negative for blurred vision, discharge and double vision.   Cardiovascular: Negative for chest pain, claudication and cyanosis.   Respiratory: Negative for cough, hemoptysis and shortness of breath.    Endocrine: Negative for cold intolerance, heat intolerance and polydipsia.   Skin: Negative for color change, dry skin and nail changes.   Musculoskeletal: Positive for arthritis, back pain, myalgias and neck pain.   Gastrointestinal: Negative for bloating, abdominal pain, change in bowel  habit and bowel incontinence.   Genitourinary: Negative for bladder incontinence, decreased libido and dysuria.   Neurological: Positive for disturbances in coordination, numbness and paresthesias. Negative for weakness.   Psychiatric/Behavioral: Positive for depression. Negative for altered mental status, hallucinations and hypervigilance.             Past Medical History:   Diagnosis Date    Allergy     Anemia     Anxiety     Atrial fibrillation     Cancer     skin    Cataract     Depression     Edema     Hypothyroidism     Kidney disease     PSVT (paroxysmal supraventricular tachycardia)     Thyroid disease        Past Surgical History:   Procedure Laterality Date    ADENOIDECTOMY      APPENDECTOMY      BLOCK, NERVE, MEDIAL BRANCH BLOCK BILATERAL LUMBAR L2-5 Bilateral 9/28/2018    [facility] at [facility]    BLOCK, NERVE, MEDIAL BRANCH BLOCK BILATERAL LUMBAR L2-5 Bilateral 9/21/2018    Per[facility] at [facility]    CARDIOVERSION N/A 2/8/2019    Performed by Devin You [facility] at [facility]    COLONOSCOPY  2009    repeat in 10 years     EYE SURGERY      HYSTERECTOMY      INJECTION,STEROID,EPIDURAL N/A 8/3/2018    Per[facility]    RADIOFREQUENCY ABLATION LEFT LUMBAR L2,3,4,5 RFA Left 11/9/2018    Performed [facility]    RADIOFREQUENCY ABLATION RIGHT LUMBAR L2,3,4,5 RFA Right 10/26/2018    Performed by [facility]    TONSILLECTOMY      Transesophageal echo (KAROL) intra-procedure log documentation N/A 2/8/2019    Performed by [facility] Provider at [facility]       Review of patient's allergies indicates:   Allergen Reactions    Dexamethasone Rash       Current Outpatient Medications   Medication Sig Dispense Refill    ALPRAZolam (XANAX) 0.25 MG tablet Take 1 tablet (0.25 mg total) by mouth once daily. 90 tablet 1    apixaban (ELIQUIS) 5 mg Tab Take  1 tablet (5 mg total) by mouth 2 (two) times daily. 60 tablet 11    atorvastatin (LIPITOR) 10 MG tablet Take 1 tablet (10 mg total) by mouth once daily. 90 tablet 3    cholecalciferol, vitamin D3, (VITAMIN D3) 5,000 unit Tab Take 5,000 Units by mouth once daily. 90 tablet 3    gabapentin (NEURONTIN) 400 MG capsule Take 400 mg by mouth. Take 2 tablets once daily      gabapentin (NEURONTIN) 400 MG capsule Take 1 capsule (400 mg total) by mouth 2 (two) times daily. 180 capsule 3    levothyroxine (SYNTHROID, LEVOTHROID) 175 MCG tablet Take 1 tablet (175 mcg total) by mouth once daily. 90 tablet 3    metoprolol tartrate (LOPRESSOR) 25 MG tablet Take 3 tablets (75 mg total) by mouth 2 (two) times daily. 180 tablet 11    topiramate (TOPAMAX) 100 MG tablet Take 1 tablet (100 mg total) by mouth 2 (two) times daily. 180 tablet 1    zolpidem (AMBIEN) 10 mg Tab Take 1 tablet (10 mg total) by mouth nightly as needed. 30 tablet 5    DULoxetine (CYMBALTA) 60 MG capsule Take 1 capsule (60 mg total) by mouth once daily. 30 capsule 2     No current facility-administered medications for this visit.        Family History   Problem Relation Age of Onset    Stroke Mother     Stroke Father     Diabetes Father     Sleep apnea Daughter     Mental illness Daughter        Social History     Socioeconomic History    Marital status: Single     Spouse name: Not on file    Number of children: Not on file    Years of education: Not on file    Highest education level: Not on file   Occupational History    Not on file   Social Needs    Financial resource strain: Not on file    Food insecurity:     Worry: Not on file     Inability: Not on file    Transportation needs:     Medical: Not on file     Non-medical: Not on file   Tobacco Use    Smoking status: Current Some Day Smoker     Packs/day: 1.00     Years: 50.00     Pack years: 50.00     Types: Cigarettes     Last attempt to quit: 2018     Years since quittin.0     "Smokeless tobacco: Never Used   Substance and Sexual Activity    Alcohol use: No     Frequency: Never     Comment: rarely    Drug use: No    Sexual activity: Not Currently   Lifestyle    Physical activity:     Days per week: Not on file     Minutes per session: Not on file    Stress: Not on file   Relationships    Social connections:     Talks on phone: Not on file     Gets together: Not on file     Attends Je[employer] witness service: Not on file     Active member of club or organization: Not on file     Attends meetings of clubs or organizations: Not on file     Relationship status: Not on file   Other Topics Concern    Not on file   Social History Narrative    Not on file       Objective:     Vitals:    06/11/19 1402   BP: 90/61   Pulse: 100   Temp: 98 °F (36.7 °C)   Weight: 93 kg (205 lb 0.4 oz)   Height: 5' 2" (1.575 m)   PainSc:   8   PainLoc: Back       GEN:  Well developed, well nourished.  No acute distress.   HEENT:  No trauma.  Mucous membranes moist.  Nares patent bilaterally.  PSYCH: Normal affect. Thought content appropriate.  CHEST:  Breathing symmetric.  No audible wheezing.  ABD: Soft, non-distended.  SKIN:  Warm, pink, dry.  No rash on exposed areas.    EXT:  No cyanosis, clubbing, or edema.  No color change or changes in nail or hair growth.  NEURO/MUSCULOSKELETAL:  Fully alert, oriented, and appropriate. Speech normal bartolome. No cranial nerve deficits. TTP over left subacromial bursa.  Full ROM to left shoulder with pain on internal rotation.  NEER is positive on the left.  Gait: Antalgic  negative trendelenburg sign bilaterally.   Motor Strength: 5/5 motor strength throughout lower extremities.   Sensory: Decreased pin prick bilateral LEs   Reflexes:  2+ and symmetric throughout.  Downgoing Babinski's bilaterally.  No clonus or spasticity.  L-Spine:  Limited ROM with pain on extension greater than flexion. Positive facet loading bilaterally, R>L.  SI Joint/Hip:-  JOANIE bilaterally. - FADIR " bilaterally.      Imaging:     Narrative     EXAMINATION:  XR SHOULDER COMPLETE 2 OR MORE VIEWS LEFT    CLINICAL HISTORY:  Pain in left shoulder    COMPARISON:  None    FINDINGS:  There is no fracture. There is no dislocation.      Impression       Normal study.     Narrative     EXAMINATION:  MRI SHOULDER WITHOUT CONTRAST LEFT    CLINICAL HISTORY:  r/o rotator cuff; Pain in left shoulder    TECHNIQUE:  Standard shoulder MRI protocol without IV contrast was performed.    COMPARISON:  A plain film examination of the left shoulder performed on 03/14/2019.    FINDINGS:  There is no fracture. There is no dislocation.  There is a focal full-thickness tear in the distal aspect of the supraspinatus tendon.  The tendon is retracted by 9 mm.  There are incomplete tears involving the articular surface of the distal aspect of the infraspinatus tendon.  There is a tiny amount of fluid within the glenohumeral joint.  There is a poorly visualized tear in the posterosuperior aspect of the glenoid labrum from the 10 to 12:00 o'clock positions.  The intra-articular portion of the long head of the biceps tendon is normal in appearance.  The acromioclavicular joint is normal in appearance.  There is a type 2 acromion process with a moderate amount of lateral downsloping.      Impression       1. There is a focal full-thickness tear in the distal aspect of the supraspinatus tendon. The tendon is retracted by 9 mm.  2. There are incomplete tears involving the articular surface of the distal aspect of the infraspinatus tendon.  3. There is a poorly visualized tear in the posterosuperior aspect of the glenoid labrum from the 10 to 12:00 o'clock positions.  4. There is a type 2 acromion process with a moderate amount of lateral downsloping.       The imaging studies listed below were independently reviewed by me, and I agree with the findings as documented below.     MRI Lumbar Spine 06/2018  FINDINGS:  Levoscoliosis of the spine, similar  to before.  No abnormal bone marrow signal changes are evident to indicate acute fracture or bone marrow replacement process.  Sub endplate marrow signal changes about the L4-5 disc space, consistent with degenerative type change, mostly similar to before.    Visualized spinal cord and conus medullaris appears unremarkable.  No abnormal intramedullary spinal cord signal change.  No intradural, extramedullary masses are identified.    T11-12: Mild posterior disc bulging, greater to the right of midline, similar to before.    T12-L1:  Mild loss of disc signal indicating some degenerative change.  No significant disc bulge or focal herniation.  Mild loss of disc space height.  No detrimental changes.    L1-2:  Degenerative disc disease change.  Loss of disc signal.  No significant disc bulge or focal herniation.  Mild posterior disc bulging, similar to before.    L2-3: Degenerative disc disease.  Narrowing of the disc space.  Loss of normal disc signal.  Diffuse posterior mild disc bulging, similar to before.  Facet arthropathy changes, appearing greater on the right.    L3-4:  Degenerative disc disease.  Significant narrowing of the disc space.  Posterior marginal osteophytes.  Diffuse posterior disc bulging.  Bilateral foraminal narrowing right greater than left.  Facet arthropathy changes right greater than left.  Mild narrowing of the thecal sac as a result of the chronic findings, similar to before.    L4-5:  Degenerative disc disease.  Significant narrowing of the disc space.  Posterior osteophytes.  Diffuse posterior disc bulging, appearing greater to the left of midline.  Indication 0 some bilateral facet arthropathy change.  Bilateral bone foraminal narrowing, greater on the left.  Findings appear similar to before.    L5-S1:  Degenerative disc disease.  No significant disc bulge or focal herniation.  Mild posterior disc bulging present.  Facet arthropathy changes left greater than right.    X ray Scoliosis  Complete 06/2018  There is scoliosis of the thoracolumbar spine, which is convex to the left with the apex centered at the L1 level.  The Sequeira angle measures approximately 26.2°.  All visualized thoracic and lumbar vertebral bodies normal in height.  Low-to-moderate grade degenerative changes scattered throughout the mid and inferior thoracic spine with small scattered marginal osteophytes.  Severe degenerative change of the lumbar spine identified at the L2-3, L3-4 and L4-5 levels with large marginal osteophytes.  Paravertebral soft tissues are normal.    MRI Cervical Spine 2017  Reversal of the normal cervical lordosis.  Small spinal canal on a congenital basis.  No definite abnormal signal in the cord.  The craniocervical junction is normal.    C2-3: Minimal disc bulge.  Facet hypertrophy on the left.    C3-4: Broad-based osteophyte and disc bulge.  Uncovertebral joint disease with moderate to severe left-sided neural foraminal narrowing.  Decreased CSF spaces anterior and posterior to the cord.    C4-5: Marked disc space narrowing.  Broad-based osteophyte and disc bulge.  Decreased CSF spaces anterior and posterior to the cord.  Uncovertebral joint disease with mild neuroforaminal narrowing.    C5-6: Disc space narrowing.  Broad-based osteophyte and disc bulge.  Asymmetric disc protrusion paramedian to the left side.  Uncovertebral joint disease and degenerative changes of the facets with moderate to severe bilateral neural foraminal narrowing.  No signal abnormality in the cord.    C6-7: Disc space narrowing.  Broad-based osteophyte and disc bulge.  Bilateral uncovertebral joint disease and degenerative changes of the facets.  Moderate central spinal stenosis.    C7-T1: Minimal disc bulge.  Uncovertebral joint disease.    Assessment:     Encounter Diagnoses   Name Primary?    Lumbar spondylosis Yes    Complete tear of left rotator cuff     Chronic pain disorder     DDD (degenerative disc disease), lumbar      Chronic left shoulder pain        Plan:     Talia was seen today for follow-up.    Diagnoses and all orders for this visit:    Lumbar spondylosis    Complete tear of left rotator cuff    Chronic pain disorder    DDD (degenerative disc disease), lumbar    Chronic left shoulder pain    Other orders  -     DULoxetine (CYMBALTA) 60 MG capsule; Take 1 capsule (60 mg total) by mouth 2 (two) times daily.        Her pain is consistent with the above.    We discussed the assessment and recommendations.  All available images were reviewed. We discussed the disease process, prognosis, treatment plan, and risks and benefits. The patient is aware of the risks and benefits of the medications being prescribed, common side effects, and proper usage. The following is the plan we agreed on:     1. Increase Cymbalta 60 mg to BID at recommendation of PCP.  Will monitor renal labs.  2. Continue gabapentin at currently dose given Cr clearance.  3. Continue f/u with ortho for possible left rotator tear surgery.  4. Can repeat lumbar RFAs when needed.  She declined at this time.  5. RTC in 3 months or sooner if needed.    The above plan and management options were discussed at length with patient. Patient is in agreement with the above and verbalized understanding.     LETY Marx  06/11/2019     The above plan and management options were discussed at length with patient. Patient is in agreement with the above and verbalized understanding.

## 2019-07-08 DIAGNOSIS — N18.4 STAGE 4 CHRONIC KIDNEY DISEASE: Primary | ICD-10-CM

## 2019-07-09 ENCOUNTER — PATIENT MESSAGE (OUTPATIENT)
Dept: NEPHROLOGY | Facility: CLINIC | Age: 69
End: 2019-07-09

## 2019-07-09 ENCOUNTER — PATIENT MESSAGE (OUTPATIENT)
Dept: CARDIOLOGY | Facility: CLINIC | Age: 69
End: 2019-07-09

## 2019-07-09 ENCOUNTER — TELEPHONE (OUTPATIENT)
Dept: CARDIOLOGY | Facility: CLINIC | Age: 69
End: 2019-07-09

## 2019-07-09 DIAGNOSIS — I48.0 PAROXYSMAL ATRIAL FIBRILLATION: Primary | ICD-10-CM

## 2019-07-09 NOTE — TELEPHONE ENCOUNTER
Pt states she cannot afford Eliquis and has discontinued it.  Will begin warfarin 5 mg daily and consult coumadin clinic.  I have explained to pt that she will need education about keeping the level of vitamin K in her diet constant.  I have explained that she will need frequent lab in the beginning.  Pt understands and wishes to begin warfarin.

## 2019-07-10 ENCOUNTER — ANTI-COAG VISIT (OUTPATIENT)
Dept: CARDIOLOGY | Facility: CLINIC | Age: 69
End: 2019-07-10

## 2019-07-10 ENCOUNTER — TELEPHONE (OUTPATIENT)
Dept: SMOKING CESSATION | Facility: CLINIC | Age: 69
End: 2019-07-10

## 2019-07-10 ENCOUNTER — TELEPHONE (OUTPATIENT)
Dept: NEPHROLOGY | Facility: CLINIC | Age: 69
End: 2019-07-10

## 2019-07-10 DIAGNOSIS — Z79.01 LONG TERM (CURRENT) USE OF ANTICOAGULANTS: Primary | ICD-10-CM

## 2019-07-10 DIAGNOSIS — I48.0 PAROXYSMAL ATRIAL FIBRILLATION: ICD-10-CM

## 2019-07-10 RX ORDER — WARFARIN SODIUM 5 MG/1
5 TABLET ORAL DAILY
Qty: 90 TABLET | Refills: 3 | Status: SHIPPED | OUTPATIENT
Start: 2019-07-10 | End: 2019-09-17 | Stop reason: SDUPTHER

## 2019-07-15 ENCOUNTER — ANTI-COAG VISIT (OUTPATIENT)
Dept: CARDIOLOGY | Facility: CLINIC | Age: 69
End: 2019-07-15
Payer: MEDICARE

## 2019-07-15 ENCOUNTER — LAB VISIT (OUTPATIENT)
Dept: LAB | Facility: HOSPITAL | Age: 69
End: 2019-07-15
Attending: INTERNAL MEDICINE
Payer: MEDICARE

## 2019-07-15 DIAGNOSIS — I48.0 PAROXYSMAL ATRIAL FIBRILLATION: ICD-10-CM

## 2019-07-15 DIAGNOSIS — Z79.01 LONG TERM (CURRENT) USE OF ANTICOAGULANTS: ICD-10-CM

## 2019-07-15 LAB
INR PPP: 1.5 (ref 0.8–1.2)
PROTHROMBIN TIME: 16.1 SEC (ref 9–12.5)

## 2019-07-15 PROCEDURE — 93793 PR ANTICOAGULANT MGMT FOR PT TAKING WARFARIN: ICD-10-PCS | Mod: S$GLB,,,

## 2019-07-15 PROCEDURE — 93793 ANTICOAG MGMT PT WARFARIN: CPT | Mod: S$GLB,,,

## 2019-07-15 PROCEDURE — 36415 COLL VENOUS BLD VENIPUNCTURE: CPT | Mod: PO

## 2019-07-15 PROCEDURE — 85610 PROTHROMBIN TIME: CPT | Mod: PO

## 2019-07-15 NOTE — PROGRESS NOTES
INR not at goal. Medications, chart, and patient findings reviewed. See calendar for adjustments to dose and follow up plan.

## 2019-07-17 ENCOUNTER — CLINICAL SUPPORT (OUTPATIENT)
Dept: SMOKING CESSATION | Facility: CLINIC | Age: 69
End: 2019-07-17
Payer: COMMERCIAL

## 2019-07-17 DIAGNOSIS — F17.200 NICOTINE DEPENDENCE: Primary | ICD-10-CM

## 2019-07-17 PROCEDURE — 99407 BEHAV CHNG SMOKING > 10 MIN: CPT | Mod: S$GLB,,,

## 2019-07-17 PROCEDURE — 99407 PR TOBACCO USE CESSATION INTENSIVE >10 MINUTES: ICD-10-PCS | Mod: S$GLB,,,

## 2019-07-17 NOTE — PROGRESS NOTES
Patient called to reschedule today's new patient appointment, it was rescheduled to tomorrow 7/18/19

## 2019-07-17 NOTE — PROGRESS NOTES
Called pt to f/u on her 12 month smoking cessation quit status. Pt stated she was quit, but relapsed about a month ago. Informed her she has benefits available and is able to rejoin. Pt not ready to make appointment. She will call back when ready. Stated she recently moved and is stressed and not ready to rejoin. Informed her of benefit period, phone follow ups, and contact information. Will complete smart form and resolve quit #1 episode.

## 2019-07-18 ENCOUNTER — ANTI-COAG VISIT (OUTPATIENT)
Dept: CARDIOLOGY | Facility: CLINIC | Age: 69
End: 2019-07-18
Payer: MEDICARE

## 2019-07-18 DIAGNOSIS — I48.0 PAROXYSMAL ATRIAL FIBRILLATION: ICD-10-CM

## 2019-07-18 DIAGNOSIS — Z79.01 LONG TERM (CURRENT) USE OF ANTICOAGULANTS: Primary | ICD-10-CM

## 2019-07-18 LAB — INR PPP: 1.6 (ref 2–3)

## 2019-07-18 PROCEDURE — 85610 POCT INR: ICD-10-PCS | Mod: QW,S$GLB,, | Performed by: INTERNAL MEDICINE

## 2019-07-18 PROCEDURE — 93793 PR ANTICOAGULANT MGMT FOR PT TAKING WARFARIN: ICD-10-PCS | Mod: S$GLB,,, | Performed by: PHARMACIST

## 2019-07-18 PROCEDURE — 93793 ANTICOAG MGMT PT WARFARIN: CPT | Mod: S$GLB,,, | Performed by: PHARMACIST

## 2019-07-18 PROCEDURE — 85610 PROTHROMBIN TIME: CPT | Mod: QW,S$GLB,, | Performed by: INTERNAL MEDICINE

## 2019-07-18 NOTE — PROGRESS NOTES
Patient was educated on the proper use of warfarin. Various aspects of warfarin therapy were discussed with the patient, including the indication for anticoagulation, the expected duration of therapy and the importance of being compliant. The counseling session also included directions for use, the importance of monitoring lab work and proper follow-up, how to handle missed doses, and side effects. Medication and dietary interactions were also discussed. Patient was instructed to avoid aspirin or NSAIDs while taking warfarin, unless approved by physician. Patient was also instructed to report any unusual bleeding issues. Patient verbalized understanding and had the opportunity to ask questions.

## 2019-07-22 ENCOUNTER — ANTI-COAG VISIT (OUTPATIENT)
Dept: CARDIOLOGY | Facility: CLINIC | Age: 69
End: 2019-07-22
Payer: MEDICARE

## 2019-07-22 ENCOUNTER — LAB VISIT (OUTPATIENT)
Dept: LAB | Facility: HOSPITAL | Age: 69
End: 2019-07-22
Attending: INTERNAL MEDICINE
Payer: MEDICARE

## 2019-07-22 DIAGNOSIS — Z79.01 LONG TERM (CURRENT) USE OF ANTICOAGULANTS: ICD-10-CM

## 2019-07-22 DIAGNOSIS — I48.0 PAROXYSMAL ATRIAL FIBRILLATION: ICD-10-CM

## 2019-07-22 LAB
INR PPP: 2.3 (ref 0.8–1.2)
PROTHROMBIN TIME: 24.6 SEC (ref 9–12.5)

## 2019-07-22 PROCEDURE — 93793 ANTICOAG MGMT PT WARFARIN: CPT | Mod: S$GLB,,,

## 2019-07-22 PROCEDURE — 85610 PROTHROMBIN TIME: CPT | Mod: PO

## 2019-07-22 PROCEDURE — 36415 COLL VENOUS BLD VENIPUNCTURE: CPT | Mod: PO

## 2019-07-22 PROCEDURE — 93793 PR ANTICOAGULANT MGMT FOR PT TAKING WARFARIN: ICD-10-PCS | Mod: S$GLB,,,

## 2019-07-23 ENCOUNTER — TELEPHONE (OUTPATIENT)
Dept: ELECTROPHYSIOLOGY | Facility: CLINIC | Age: 69
End: 2019-07-23

## 2019-07-29 ENCOUNTER — LAB VISIT (OUTPATIENT)
Dept: LAB | Facility: HOSPITAL | Age: 69
End: 2019-07-29
Attending: INTERNAL MEDICINE
Payer: MEDICARE

## 2019-07-29 ENCOUNTER — ANTI-COAG VISIT (OUTPATIENT)
Dept: CARDIOLOGY | Facility: CLINIC | Age: 69
End: 2019-07-29
Payer: MEDICARE

## 2019-07-29 DIAGNOSIS — Z79.01 LONG TERM (CURRENT) USE OF ANTICOAGULANTS: ICD-10-CM

## 2019-07-29 DIAGNOSIS — I48.0 PAROXYSMAL ATRIAL FIBRILLATION: ICD-10-CM

## 2019-07-29 LAB
INR PPP: 1.4 (ref 0.8–1.2)
PROTHROMBIN TIME: 15.2 SEC (ref 9–12.5)

## 2019-07-29 PROCEDURE — 36415 COLL VENOUS BLD VENIPUNCTURE: CPT | Mod: PO

## 2019-07-29 PROCEDURE — 93793 PR ANTICOAGULANT MGMT FOR PT TAKING WARFARIN: ICD-10-PCS | Mod: S$GLB,,,

## 2019-07-29 PROCEDURE — 93793 ANTICOAG MGMT PT WARFARIN: CPT | Mod: S$GLB,,,

## 2019-07-29 PROCEDURE — 85610 PROTHROMBIN TIME: CPT | Mod: PO

## 2019-07-31 NOTE — PROGRESS NOTES
I have reviewed the notes, assessments, and/or procedures performed by Dr. Amos, I CONCUR with his documentation of Talia Dillon.

## 2019-08-05 ENCOUNTER — ANTI-COAG VISIT (OUTPATIENT)
Dept: CARDIOLOGY | Facility: CLINIC | Age: 69
End: 2019-08-05
Payer: MEDICARE

## 2019-08-05 ENCOUNTER — LAB VISIT (OUTPATIENT)
Dept: LAB | Facility: HOSPITAL | Age: 69
End: 2019-08-05
Attending: INTERNAL MEDICINE
Payer: MEDICARE

## 2019-08-05 DIAGNOSIS — I48.0 PAROXYSMAL ATRIAL FIBRILLATION: ICD-10-CM

## 2019-08-05 DIAGNOSIS — Z79.01 LONG TERM (CURRENT) USE OF ANTICOAGULANTS: ICD-10-CM

## 2019-08-05 LAB
INR PPP: 1.9 (ref 0.8–1.2)
PROTHROMBIN TIME: 20 SEC (ref 9–12.5)

## 2019-08-05 PROCEDURE — 93793 PR ANTICOAGULANT MGMT FOR PT TAKING WARFARIN: ICD-10-PCS | Mod: S$GLB,,,

## 2019-08-05 PROCEDURE — 36415 COLL VENOUS BLD VENIPUNCTURE: CPT | Mod: PO

## 2019-08-05 PROCEDURE — 85610 PROTHROMBIN TIME: CPT | Mod: PO

## 2019-08-05 PROCEDURE — 93793 ANTICOAG MGMT PT WARFARIN: CPT | Mod: S$GLB,,,

## 2019-08-05 NOTE — PROGRESS NOTES
Patient was given lab result, verified coumadin dosage correctly, reports no changes, Patient was advised to take coumadin: 5mg daily except 7.5mg Monday and given next lab date, verbalized understanding, appointment booked

## 2019-08-05 NOTE — PROGRESS NOTES
INR just barely below goal. Medications, chart, and patient findings reviewed. See calendar for adjustments to dose and follow up plan.

## 2019-08-07 ENCOUNTER — TELEPHONE (OUTPATIENT)
Dept: NEPHROLOGY | Facility: CLINIC | Age: 69
End: 2019-08-07

## 2019-08-07 NOTE — TELEPHONE ENCOUNTER
----- Message from Judit Valentine sent at 8/7/2019  9:48 AM CDT -----  Contact: Talia    tel:   [phone]   Needs Advice    Reason for call:  Pt.says she can come in 8/19th thru the 8/24th.   Asking for a f/u appt. W/ Dr. Wild.       Received a letter from you asking her to call your office and request a f/u appt. W/ you.   Usually does her labs 1 wk prior at [facility].           Communication Preference: phone     Additional Information:   pls call to set this up.   appt scheduled

## 2019-08-12 ENCOUNTER — ANTI-COAG VISIT (OUTPATIENT)
Dept: CARDIOLOGY | Facility: CLINIC | Age: 69
End: 2019-08-12
Payer: MEDICARE

## 2019-08-12 ENCOUNTER — LAB VISIT (OUTPATIENT)
Dept: LAB | Facility: HOSPITAL | Age: 69
End: 2019-08-12
Attending: INTERNAL MEDICINE
Payer: MEDICARE

## 2019-08-12 DIAGNOSIS — Z79.01 LONG TERM (CURRENT) USE OF ANTICOAGULANTS: ICD-10-CM

## 2019-08-12 DIAGNOSIS — I48.0 PAROXYSMAL ATRIAL FIBRILLATION: ICD-10-CM

## 2019-08-12 LAB
INR PPP: 1.7 (ref 0.8–1.2)
PROTHROMBIN TIME: 17.9 SEC (ref 9–12.5)

## 2019-08-12 PROCEDURE — 93793 ANTICOAG MGMT PT WARFARIN: CPT | Mod: S$GLB,,,

## 2019-08-12 PROCEDURE — 85610 PROTHROMBIN TIME: CPT | Mod: PO

## 2019-08-12 PROCEDURE — 93793 PR ANTICOAGULANT MGMT FOR PT TAKING WARFARIN: ICD-10-PCS | Mod: S$GLB,,,

## 2019-08-12 PROCEDURE — 36415 COLL VENOUS BLD VENIPUNCTURE: CPT | Mod: PO

## 2019-08-19 ENCOUNTER — PATIENT MESSAGE (OUTPATIENT)
Dept: ELECTROPHYSIOLOGY | Facility: CLINIC | Age: 69
End: 2019-08-19

## 2019-08-19 ENCOUNTER — ANTI-COAG VISIT (OUTPATIENT)
Dept: CARDIOLOGY | Facility: CLINIC | Age: 69
End: 2019-08-19
Payer: MEDICARE

## 2019-08-19 ENCOUNTER — LAB VISIT (OUTPATIENT)
Dept: LAB | Facility: HOSPITAL | Age: 69
End: 2019-08-19
Attending: INTERNAL MEDICINE
Payer: MEDICARE

## 2019-08-19 DIAGNOSIS — I48.0 PAROXYSMAL ATRIAL FIBRILLATION: ICD-10-CM

## 2019-08-19 DIAGNOSIS — Z79.01 LONG TERM (CURRENT) USE OF ANTICOAGULANTS: ICD-10-CM

## 2019-08-19 LAB
INR PPP: 2.3 (ref 0.8–1.2)
PROTHROMBIN TIME: 24.9 SEC (ref 9–12.5)

## 2019-08-19 PROCEDURE — 93793 PR ANTICOAGULANT MGMT FOR PT TAKING WARFARIN: ICD-10-PCS | Mod: S$GLB,,,

## 2019-08-19 PROCEDURE — 36415 COLL VENOUS BLD VENIPUNCTURE: CPT | Mod: PO

## 2019-08-19 PROCEDURE — 85610 PROTHROMBIN TIME: CPT | Mod: PO

## 2019-08-19 PROCEDURE — 93793 ANTICOAG MGMT PT WARFARIN: CPT | Mod: S$GLB,,,

## 2019-08-25 RX ORDER — DULOXETIN HYDROCHLORIDE 60 MG/1
60 CAPSULE, DELAYED RELEASE ORAL 2 TIMES DAILY
Qty: 60 CAPSULE | Refills: 2 | Status: SHIPPED | OUTPATIENT
Start: 2019-08-25 | End: 2019-09-05 | Stop reason: SDUPTHER

## 2019-08-26 ENCOUNTER — LAB VISIT (OUTPATIENT)
Dept: LAB | Facility: HOSPITAL | Age: 69
End: 2019-08-26
Attending: INTERNAL MEDICINE
Payer: MEDICARE

## 2019-08-26 ENCOUNTER — ANTI-COAG VISIT (OUTPATIENT)
Dept: CARDIOLOGY | Facility: CLINIC | Age: 69
End: 2019-08-26
Payer: MEDICARE

## 2019-08-26 DIAGNOSIS — Z79.01 LONG TERM (CURRENT) USE OF ANTICOAGULANTS: ICD-10-CM

## 2019-08-26 DIAGNOSIS — I48.0 PAROXYSMAL ATRIAL FIBRILLATION: ICD-10-CM

## 2019-08-26 LAB
INR PPP: 2.4 (ref 0.8–1.2)
PROTHROMBIN TIME: 26.2 SEC (ref 9–12.5)

## 2019-08-26 PROCEDURE — 93793 PR ANTICOAGULANT MGMT FOR PT TAKING WARFARIN: ICD-10-PCS | Mod: S$GLB,,,

## 2019-08-26 PROCEDURE — 36415 COLL VENOUS BLD VENIPUNCTURE: CPT | Mod: PO

## 2019-08-26 PROCEDURE — 85610 PROTHROMBIN TIME: CPT | Mod: PO

## 2019-08-26 PROCEDURE — 93793 ANTICOAG MGMT PT WARFARIN: CPT | Mod: S$GLB,,,

## 2019-09-02 DIAGNOSIS — G89.4 CHRONIC PAIN DISORDER: ICD-10-CM

## 2019-09-02 DIAGNOSIS — M47.816 LUMBAR SPONDYLOSIS: ICD-10-CM

## 2019-09-03 ENCOUNTER — LAB VISIT (OUTPATIENT)
Dept: LAB | Facility: HOSPITAL | Age: 69
End: 2019-09-03
Attending: INTERNAL MEDICINE
Payer: MEDICARE

## 2019-09-03 ENCOUNTER — ANTI-COAG VISIT (OUTPATIENT)
Dept: CARDIOLOGY | Facility: CLINIC | Age: 69
End: 2019-09-03
Payer: MEDICARE

## 2019-09-03 DIAGNOSIS — Z79.01 LONG TERM (CURRENT) USE OF ANTICOAGULANTS: ICD-10-CM

## 2019-09-03 DIAGNOSIS — I48.0 PAROXYSMAL ATRIAL FIBRILLATION: ICD-10-CM

## 2019-09-03 LAB
INR PPP: 2 (ref 0.8–1.2)
PROTHROMBIN TIME: 21.9 SEC (ref 9–12.5)

## 2019-09-03 PROCEDURE — 93793 ANTICOAG MGMT PT WARFARIN: CPT | Mod: S$GLB,,,

## 2019-09-03 PROCEDURE — 85610 PROTHROMBIN TIME: CPT | Mod: PO

## 2019-09-03 PROCEDURE — 93793 PR ANTICOAGULANT MGMT FOR PT TAKING WARFARIN: ICD-10-PCS | Mod: S$GLB,,,

## 2019-09-03 PROCEDURE — 36415 COLL VENOUS BLD VENIPUNCTURE: CPT | Mod: PO

## 2019-09-04 NOTE — TELEPHONE ENCOUNTER
Please clarify with her which dose she is taking.  I see that this was refilled 2 weeks ago at a different dose.  Thanks! LW

## 2019-09-05 RX ORDER — DULOXETIN HYDROCHLORIDE 60 MG/1
60 CAPSULE, DELAYED RELEASE ORAL 2 TIMES DAILY
Qty: 60 CAPSULE | Refills: 2 | Status: SHIPPED | OUTPATIENT
Start: 2019-09-05 | End: 2020-01-23 | Stop reason: SDUPTHER

## 2019-09-05 RX ORDER — DULOXETIN HYDROCHLORIDE 30 MG/1
CAPSULE, DELAYED RELEASE ORAL
Qty: 90 CAPSULE | Refills: 3 | OUTPATIENT
Start: 2019-09-05

## 2019-09-17 RX ORDER — WARFARIN SODIUM 5 MG/1
TABLET ORAL
Qty: 35 TABLET | Refills: 4 | Status: SHIPPED | OUTPATIENT
Start: 2019-09-17 | End: 2019-12-27

## 2019-09-17 NOTE — PROGRESS NOTES
Patient called to reschedule today's lab appointment to 9/18/19, she also reports she was out of town at 8,000 feet elevation and 2 days ago had a nose bleed, advised patient to make sure she gets to the lab early am tomorrow so coumadin clinic can get a result before the end of the day especially since having a nose bleed, Patient also reports she can't find her coumadin pill bottle--called in a prescription at patient's request to [facility] pharmacy ph. # [phone]

## 2019-09-19 NOTE — PROGRESS NOTES
Patient called 09/19/19 to jazz appt 09/19/19 to 09/20/19.She has been real (ill) could not go on 09/18/19. Need to go on 09/20/19/Fri).

## 2019-09-20 ENCOUNTER — ANTI-COAG VISIT (OUTPATIENT)
Dept: CARDIOLOGY | Facility: CLINIC | Age: 69
End: 2019-09-20
Payer: MEDICARE

## 2019-09-20 ENCOUNTER — LAB VISIT (OUTPATIENT)
Dept: LAB | Facility: HOSPITAL | Age: 69
End: 2019-09-20
Attending: INTERNAL MEDICINE
Payer: MEDICARE

## 2019-09-20 DIAGNOSIS — I48.0 PAROXYSMAL ATRIAL FIBRILLATION: ICD-10-CM

## 2019-09-20 DIAGNOSIS — Z79.01 LONG TERM (CURRENT) USE OF ANTICOAGULANTS: ICD-10-CM

## 2019-09-20 LAB
INR PPP: 1.4 (ref 0.8–1.2)
PROTHROMBIN TIME: 15 SEC (ref 9–12.5)

## 2019-09-20 PROCEDURE — 36415 COLL VENOUS BLD VENIPUNCTURE: CPT | Mod: PO

## 2019-09-20 PROCEDURE — 85610 PROTHROMBIN TIME: CPT | Mod: PO

## 2019-09-20 PROCEDURE — 93793 PR ANTICOAGULANT MGMT FOR PT TAKING WARFARIN: ICD-10-PCS | Mod: S$GLB,,,

## 2019-09-20 PROCEDURE — 93793 ANTICOAG MGMT PT WARFARIN: CPT | Mod: S$GLB,,,

## 2019-09-25 ENCOUNTER — ANTI-COAG VISIT (OUTPATIENT)
Dept: CARDIOLOGY | Facility: CLINIC | Age: 69
End: 2019-09-25
Payer: MEDICARE

## 2019-09-25 ENCOUNTER — LAB VISIT (OUTPATIENT)
Dept: LAB | Facility: HOSPITAL | Age: 69
End: 2019-09-25
Attending: INTERNAL MEDICINE
Payer: MEDICARE

## 2019-09-25 DIAGNOSIS — I48.0 PAROXYSMAL ATRIAL FIBRILLATION: ICD-10-CM

## 2019-09-25 DIAGNOSIS — Z79.01 LONG TERM (CURRENT) USE OF ANTICOAGULANTS: ICD-10-CM

## 2019-09-25 LAB
INR PPP: 1.8 (ref 0.8–1.2)
PROTHROMBIN TIME: 19.6 SEC (ref 9–12.5)

## 2019-09-25 PROCEDURE — 93793 ANTICOAG MGMT PT WARFARIN: CPT | Mod: S$GLB,,,

## 2019-09-25 PROCEDURE — 93793 PR ANTICOAGULANT MGMT FOR PT TAKING WARFARIN: ICD-10-PCS | Mod: S$GLB,,,

## 2019-09-25 PROCEDURE — 36415 COLL VENOUS BLD VENIPUNCTURE: CPT | Mod: PO

## 2019-09-25 PROCEDURE — 85610 PROTHROMBIN TIME: CPT | Mod: PO

## 2019-09-27 ENCOUNTER — PATIENT OUTREACH (OUTPATIENT)
Dept: ADMINISTRATIVE | Facility: OTHER | Age: 69
End: 2019-09-27

## 2019-09-30 ENCOUNTER — OFFICE VISIT (OUTPATIENT)
Dept: PAIN MEDICINE | Facility: CLINIC | Age: 69
End: 2019-09-30
Payer: MEDICARE

## 2019-09-30 ENCOUNTER — LAB VISIT (OUTPATIENT)
Dept: LAB | Facility: HOSPITAL | Age: 69
End: 2019-09-30
Attending: INTERNAL MEDICINE
Payer: MEDICARE

## 2019-09-30 ENCOUNTER — ANTI-COAG VISIT (OUTPATIENT)
Dept: CARDIOLOGY | Facility: CLINIC | Age: 69
End: 2019-09-30
Payer: MEDICARE

## 2019-09-30 VITALS
HEART RATE: 82 BPM | BODY MASS INDEX: 36.92 KG/M2 | HEIGHT: 62 IN | DIASTOLIC BLOOD PRESSURE: 75 MMHG | SYSTOLIC BLOOD PRESSURE: 122 MMHG | RESPIRATION RATE: 18 BRPM | TEMPERATURE: 97 F | WEIGHT: 200.63 LBS

## 2019-09-30 DIAGNOSIS — M47.816 LUMBAR SPONDYLOSIS: Primary | ICD-10-CM

## 2019-09-30 DIAGNOSIS — Z79.01 LONG TERM (CURRENT) USE OF ANTICOAGULANTS: ICD-10-CM

## 2019-09-30 DIAGNOSIS — G89.29 CHRONIC LEFT SHOULDER PAIN: ICD-10-CM

## 2019-09-30 DIAGNOSIS — M75.122 COMPLETE TEAR OF LEFT ROTATOR CUFF: ICD-10-CM

## 2019-09-30 DIAGNOSIS — M54.16 LUMBAR RADICULOPATHY: ICD-10-CM

## 2019-09-30 DIAGNOSIS — I48.0 PAROXYSMAL ATRIAL FIBRILLATION: ICD-10-CM

## 2019-09-30 DIAGNOSIS — M25.512 CHRONIC LEFT SHOULDER PAIN: ICD-10-CM

## 2019-09-30 LAB
INR PPP: 1.6 (ref 0.8–1.2)
PROTHROMBIN TIME: 17.5 SEC (ref 9–12.5)

## 2019-09-30 PROCEDURE — 93793 ANTICOAG MGMT PT WARFARIN: CPT | Mod: S$GLB,,,

## 2019-09-30 PROCEDURE — 85610 PROTHROMBIN TIME: CPT | Mod: PO

## 2019-09-30 PROCEDURE — 3078F PR MOST RECENT DIASTOLIC BLOOD PRESSURE < 80 MM HG: ICD-10-PCS | Mod: CPTII,S$GLB,, | Performed by: NURSE PRACTITIONER

## 2019-09-30 PROCEDURE — 99499 RISK ADDL DX/OHS AUDIT: ICD-10-PCS | Mod: S$GLB,,, | Performed by: NURSE PRACTITIONER

## 2019-09-30 PROCEDURE — 93793 PR ANTICOAGULANT MGMT FOR PT TAKING WARFARIN: ICD-10-PCS | Mod: S$GLB,,,

## 2019-09-30 PROCEDURE — 1101F PT FALLS ASSESS-DOCD LE1/YR: CPT | Mod: CPTII,S$GLB,, | Performed by: NURSE PRACTITIONER

## 2019-09-30 PROCEDURE — 1101F PR PT FALLS ASSESS DOC 0-1 FALLS W/OUT INJ PAST YR: ICD-10-PCS | Mod: CPTII,S$GLB,, | Performed by: NURSE PRACTITIONER

## 2019-09-30 PROCEDURE — 99999 PR PBB SHADOW E&M-EST. PATIENT-LVL IV: CPT | Mod: PBBFAC,,, | Performed by: NURSE PRACTITIONER

## 2019-09-30 PROCEDURE — 3074F PR MOST RECENT SYSTOLIC BLOOD PRESSURE < 130 MM HG: ICD-10-PCS | Mod: CPTII,S$GLB,, | Performed by: NURSE PRACTITIONER

## 2019-09-30 PROCEDURE — 99999 PR PBB SHADOW E&M-EST. PATIENT-LVL IV: ICD-10-PCS | Mod: PBBFAC,,, | Performed by: NURSE PRACTITIONER

## 2019-09-30 PROCEDURE — 99213 PR OFFICE/OUTPT VISIT, EST, LEVL III, 20-29 MIN: ICD-10-PCS | Mod: S$GLB,,, | Performed by: NURSE PRACTITIONER

## 2019-09-30 PROCEDURE — 3078F DIAST BP <80 MM HG: CPT | Mod: CPTII,S$GLB,, | Performed by: NURSE PRACTITIONER

## 2019-09-30 PROCEDURE — 99213 OFFICE O/P EST LOW 20 MIN: CPT | Mod: S$GLB,,, | Performed by: NURSE PRACTITIONER

## 2019-09-30 PROCEDURE — 36415 COLL VENOUS BLD VENIPUNCTURE: CPT | Mod: PO

## 2019-09-30 PROCEDURE — 3074F SYST BP LT 130 MM HG: CPT | Mod: CPTII,S$GLB,, | Performed by: NURSE PRACTITIONER

## 2019-09-30 PROCEDURE — 99499 UNLISTED E&M SERVICE: CPT | Mod: S$GLB,,, | Performed by: NURSE PRACTITIONER

## 2019-09-30 RX ORDER — GABAPENTIN 400 MG/1
400 CAPSULE ORAL 2 TIMES DAILY
Qty: 60 CAPSULE | Refills: 5 | Status: SHIPPED | OUTPATIENT
Start: 2019-09-30 | End: 2020-01-10 | Stop reason: SDUPTHER

## 2019-09-30 NOTE — PROGRESS NOTES
"Subjective:     Patient ID: Talia Dillon is a 68 y.o. female.    Chief Complaint: Pain    Consulted by: Dr. Gallagher    Disclaimer: This note was generated using voice recognition software.  There may be a typographical errors that were missed during proofreading.      HPI:    Talia Dillon is a 68 y.o. female who presents today with neck, shoulder, low back and leg pain. This pain is described in detail below.    Ms. Dillon presents to pain clinic today complaining of pain in a variety of areas, including her neck, shoulders, low back and leg pain. Her low back and leg pain is the most bothersome. 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 She has had long term PT her whole life because of this. She was recently evaluated by Dr. Gallagher who referred her to PT and pain clinic for management options.    Ms. Dillon states her low back pain is constant with radiation down both legs. She rates her pain as a 7/10 today. The pain is made worse with movement and walking. The pain is a dull ache in her low back, but feels like a sharp burning in her legs "like an electric wing." This pain does not travel in to her feet, but she has numbness and paraesthesias in her feet separately.     She has just recently restarted with physical therapy; she has completed 4 sessions at this time and feels this is starting to help. She feels it is painful but ultimately improving her situation.     Interval History (8/30/2018):  She returns today for follow up.  She reports that PT has been helpful for the pain.  She finished her last in office session yesterday.  She is now going to start her HEP.  She reports that the epidural didn't help. She wishes she could take Aleve as this was very helpful    Interval History (10/11/2018):  The patient returns for " follow up of back pain.  Since her last OV, she had L2-5 MBB x2.  She is reporting 80% relief from both procedures for about one day.  She is here today to discuss RFA procedures.  She continues with come exercise regimen.  She did have recent labs as ordered by her PCP which again showed elevated BUN.  Her pain today is 8/10.     Interval History (12/7/2018):  The patient presents for procedure follow up.  She is s/p L2,3,4,5 RFAs with about 70% pain relief.  Her pain is not as constant.  She is able to walk further.  Her pain is not as intense when it comes.  She is still somewhat limited by her pain, particularly regarding activity level.  She also feels short winded sometimes.  She is seeing cardiology next month.  She is also seeing bariatric medicine and has been losing weight.  Her pain today is 5/10.    Interval History (3/12/2019):  The patient presents for follow up.  Cymbalta was increased at last OV.  She reports that this has been helpful.  Her pain is stable and currently manageable.  She was recently found to be in A fib.  She had a cardioversion procedure on 2/8/19 and is scheduled for another on 3/19/19.  She is being weaned from Elavil by cardiology.  Her pain today is 4/10.    Interval History (6/11/2019):  The patient returns for follow up of chronic pain.  Since her last OV, I obtained a left shoulder MRI which showed rotator tear.  She had a visit with Dr. Davila and surgical repair was recommended.  She is waiting a bit because of moving.  She had a recent appointment with her PCP.  He recommended that we increase Cymbalta due to depression and pain.  She is currently taking 60 mg QD without adverse effects.  Her pain today is 8/10.    Interval History (9/30/2019):  The patient is here for follow up of back and left shoulder pain.  She is still considering left shoulder surgery with Dr. Davila.  She says that her shoulder pain has essentially resolved so she is leaning against surgery.  Her  back pain has been mild.  She has some stiffness and tightness but otherwise it is tolerable.  She is not interested in procedures at this time.  Her pain today is 7/10.    Physical Therapy: Yes; finished 8/28/2018, doing HEP (8/20/3018)    Non-pharmacologic Treatment:     · Ice/Heat: Not tried  · TENS: Tried; not helpful  · Massage: Helpful  · Chiropractic care: Yes, not helpful  · Acupuncture: Yes, not helpful  · Other: None         Pain Medications:         · Currently taking:   · Gabapentin 800 mg in am  · OTC topical creams   · Acetaminophen as needed   · Cymbalta 60 mg BID    · Has tried in the past:    · NSAIDs- Aleve and Ibuprofen: had to stop due to CKD   · Baclofen- had to stop due to CKD    · Has not tried: Opioids,  Rx topical creams    Blood thinners: No    Interventional Therapies:  9/21/18 Bilateral L2-5 MBB- 80% relief  9/28/18 Bilateral L2-5 MBB- 80% relief  10/26/18 Right L2,3,4,5 RFA- 70% relief  11/9/18 Left L2,3,4,5 RFA- 70% relief  4/29/19 Left shoulder injection- helpful (ortho)    Relevant Surgeries:   No    Affecting sleep? Not usually     Affecting daily activities? Yes    Depressive symptoms? Yes          · SI/HI? No    Work status: Retired; used to work as a      Prescription Monitoring Program database:  Reviewed and consistent with medication use as prescribed.    Pain Scales  Best: 3/10  Worst: 9/10  Usually: 5/10  Today: 7/10    Low-back Pain   Associated symptoms include numbness and paresthesias. Pertinent negatives include no abdominal pain, bladder incontinence, bowel incontinence, chest pain, dysuria, fever or weakness.       Last 3 PDI Scores 6/11/2019 3/12/2019 10/11/2018   Pain Disability Index (PDI) [phone]       Review of Systems   Constitution: Negative for chills, decreased appetite, diaphoresis and fever.   HENT: Negative for congestion, ear discharge and ear pain.    Eyes: Negative for blurred vision, discharge and double vision.   Cardiovascular: Negative for  chest pain, claudication and cyanosis.   Respiratory: Negative for cough, hemoptysis and shortness of breath.    Endocrine: Negative for cold intolerance, heat intolerance and polydipsia.   Skin: Negative for color change, dry skin and nail changes.   Musculoskeletal: Positive for arthritis, back pain, myalgias and neck pain.   Gastrointestinal: Negative for bloating, abdominal pain, change in bowel habit and bowel incontinence.   Genitourinary: Negative for bladder incontinence, decreased libido and dysuria.   Neurological: Positive for disturbances in coordination, numbness and paresthesias. Negative for weakness.   Psychiatric/Behavioral: Positive for depression. Negative for altered mental status, hallucinations and hypervigilance.             Past Medical History:   Diagnosis Date    Allergy     Anemia     Anxiety     Atrial fibrillation     Cancer     skin    Cataract     Depression     Edema     Hypothyroidism     Kidney disease     PSVT (paroxysmal supraventricular tachycardia)     Thyroid disease        Past Surgical History:   Procedure Laterality Date    ADENOIDECTOMY      APPENDECTOMY      COLONOSCOPY  2009    repeat in 10 years     EYE SURGERY      HYSTERECTOMY      INJECTION OF ANESTHETIC AGENT AROUND NERVE Bilateral 9/21/2018    Procedure: BLOCK, NERVE, MEDIAL BRANCH BLOCK BILATERAL LUMBAR L2-5;  Surgeon: Julieth Christopher MD;  Location: [facility];  Service: Pain Management;  Laterality: Bilateral;  1 of 2    INJECTION OF ANESTHETIC AGENT AROUND NERVE Bilateral 9/28/2018    Procedure: BLOCK, NERVE, MEDIAL BRANCH BLOCK BILATERAL LUMBAR L2-5;  Surgeon: Julieth Christopher MD;  Location: [facility];  Service: Pain Management;  Laterality: Bilateral;  2 of 2  PLEASE GIVE NIRAVAM PER MD    TONSILLECTOMY      TREATMENT OF CARDIAC ARRHYTHMIA N/A 2/8/2019    Procedure: CARDIOVERSION;  Surgeon: Devin You MD;  Location: [facility];  Service: Cardiology;  Laterality: N/A;  AF, KAROL,  DCCV, MAC, AR, 3 Prep       Review of patient's allergies indicates:   Allergen Reactions    Dexamethasone Rash       Current Outpatient Medications   Medication Sig Dispense Refill    ALPRAZolam (XANAX) 0.25 MG tablet Take 1 tablet (0.25 mg total) by mouth once daily. 90 tablet 1    atorvastatin (LIPITOR) 10 MG tablet Take 1 tablet (10 mg total) by mouth once daily. 90 tablet 3    cholecalciferol, vitamin D3, (VITAMIN D3) 5,000 unit Tab Take 5,000 Units by mouth once daily. 90 tablet 3    DULoxetine (CYMBALTA) 60 MG capsule Take 1 capsule (60 mg total) by mouth 2 (two) times daily. 60 capsule 2    gabapentin (NEURONTIN) 400 MG capsule Take 400 mg by mouth. Take 2 tablets once daily      levothyroxine (SYNTHROID, LEVOTHROID) 175 MCG tablet Take 1 tablet (175 mcg total) by mouth once daily. 90 tablet 3    metoprolol tartrate (LOPRESSOR) 25 MG tablet Take 3 tablets (75 mg total) by mouth 2 (two) times daily. 180 tablet 11    topiramate (TOPAMAX) 100 MG tablet Take 1 tablet (100 mg total) by mouth 2 (two) times daily. 180 tablet 1    warfarin (COUMADIN) 5 MG tablet Take 1 tablet (5mg) by mouth daily, except 1 1/2 tablets (7.5mg) on Monday and Thursday,  Or as directed by [facility] 35 tablet 4    zolpidem (AMBIEN) 10 mg Tab Take 1 tablet (10 mg total) by mouth nightly as needed. 30 tablet 5     No current facility-administered medications for this visit.        Family History   Problem Relation Age of Onset    Stroke Mother     Stroke Father     Diabetes Father     Sleep apnea Daughter     Mental illness Daughter        Social History     Socioeconomic History    Marital status: Single     Spouse name: Not on file    Number of children: Not on file    Years of education: Not on file    Highest education level: Not on file   Occupational History    Not on file   Social Needs    Financial resource strain: Not on file    Food insecurity:     Worry: Not on file     Inability: Not on file     Transportation needs:     Medical: Not on file     Non-medical: Not on file   Tobacco Use    Smoking status: Current Some Day Smoker     Packs/day: 1.00     Years: 50.00     Pack years: 50.00     Types: Cigarettes     Last attempt to quit: 2018     Years since quittin.3    Smokeless tobacco: Never Used   Substance and Sexual Activity    Alcohol use: No     Frequency: Never     Comment: rarely    Drug use: No    Sexual activity: Not Currently   Lifestyle    Physical activity:     Days per week: Not on file     Minutes per session: Not on file    Stress: Not on file   Relationships    Social connections:     Talks on phone: Not on file     Gets together: Not on file     Attends Zoroastrian service: Not on file     Active member of club or organization: Not on file     Attends meetings of clubs or organizations: Not on file     Relationship status: Not on file   Other Topics Concern    Not on file   Social History Narrative    Not on file       Objective:     There were no vitals filed for this visit.    GEN:  Well developed, well nourished.  No acute distress.   HEENT:  No trauma.  Mucous membranes moist.  Nares patent bilaterally.  PSYCH: Normal affect. Thought content appropriate.  CHEST:  Breathing symmetric.  No audible wheezing.  ABD: Soft, non-distended.  SKIN:  Warm, pink, dry.  No rash on exposed areas.    EXT:  No cyanosis, clubbing, or edema.  No color change or changes in nail or hair growth.  NEURO/MUSCULOSKELETAL:  Fully alert, oriented, and appropriate. Speech normal bartolome. No cranial nerve deficits. TTP over left subacromial bursa.  Full ROM to left shoulder without pain.  NEER is negative.  Gait: Antalgic  negative trendelenburg sign bilaterally.   Motor Strength: 5/5 motor strength throughout lower extremities.   Sensory: Decreased pin prick bilateral LEs   Reflexes:  2+ and symmetric throughout.  Downgoing Babinski's bilaterally.  No clonus or spasticity.  L-Spine:  Limited ROM with  pain on extension greater than flexion.  Positive facet loading bilaterally.  SI Joint/Hip:-  JOANIE bilaterally. - FADIR bilaterally.      Imaging:     Narrative     EXAMINATION:  XR SHOULDER COMPLETE 2 OR MORE VIEWS LEFT    CLINICAL HISTORY:  Pain in left shoulder    COMPARISON:  None    FINDINGS:  There is no fracture. There is no dislocation.      Impression       Normal study.     Narrative     EXAMINATION:  MRI SHOULDER WITHOUT CONTRAST LEFT    CLINICAL HISTORY:  r/o rotator cuff; Pain in left shoulder    TECHNIQUE:  Standard shoulder MRI protocol without IV contrast was performed.    COMPARISON:  A plain film examination of the left shoulder performed on 03/14/2019.    FINDINGS:  There is no fracture. There is no dislocation.  There is a focal full-thickness tear in the distal aspect of the supraspinatus tendon.  The tendon is retracted by 9 mm.  There are incomplete tears involving the articular surface of the distal aspect of the infraspinatus tendon.  There is a tiny amount of fluid within the glenohumeral joint.  There is a poorly visualized tear in the posterosuperior aspect of the glenoid labrum from the 10 to 12:00 o'clock positions.  The intra-articular portion of the long head of the biceps tendon is normal in appearance.  The acromioclavicular joint is normal in appearance.  There is a type 2 acromion process with a moderate amount of lateral downsloping.      Impression       1. There is a focal full-thickness tear in the distal aspect of the supraspinatus tendon. The tendon is retracted by 9 mm.  2. There are incomplete tears involving the articular surface of the distal aspect of the infraspinatus tendon.  3. There is a poorly visualized tear in the posterosuperior aspect of the glenoid labrum from the 10 to 12:00 o'clock positions.  4. There is a type 2 acromion process with a moderate amount of lateral downsloping.       The imaging studies listed below were independently reviewed by me, and I  agree with the findings as documented below.     MRI Lumbar Spine 06/2018  FINDINGS:  Levoscoliosis of the spine, similar to before.  No abnormal bone marrow signal changes are evident to indicate acute fracture or bone marrow replacement process.  Sub endplate marrow signal changes about the L4-5 disc space, consistent with degenerative type change, mostly similar to before.    Visualized spinal cord and conus medullaris appears unremarkable.  No abnormal intramedullary spinal cord signal change.  No intradural, extramedullary masses are identified.    T11-12: Mild posterior disc bulging, greater to the right of midline, similar to before.    T12-L1:  Mild loss of disc signal indicating some degenerative change.  No significant disc bulge or focal herniation.  Mild loss of disc space height.  No detrimental changes.    L1-2:  Degenerative disc disease change.  Loss of disc signal.  No significant disc bulge or focal herniation.  Mild posterior disc bulging, similar to before.    L2-3: Degenerative disc disease.  Narrowing of the disc space.  Loss of normal disc signal.  Diffuse posterior mild disc bulging, similar to before.  Facet arthropathy changes, appearing greater on the right.    L3-4:  Degenerative disc disease.  Significant narrowing of the disc space.  Posterior marginal osteophytes.  Diffuse posterior disc bulging.  Bilateral foraminal narrowing right greater than left.  Facet arthropathy changes right greater than left.  Mild narrowing of the thecal sac as a result of the chronic findings, similar to before.    L4-5:  Degenerative disc disease.  Significant narrowing of the disc space.  Posterior osteophytes.  Diffuse posterior disc bulging, appearing greater to the left of midline.  Indication 0 some bilateral facet arthropathy change.  Bilateral bone foraminal narrowing, greater on the left.  Findings appear similar to before.    L5-S1:  Degenerative disc disease.  No significant disc bulge or focal  herniation.  Mild posterior disc bulging present.  Facet arthropathy changes left greater than right.    X ray Scoliosis Complete 06/2018  There is scoliosis of the thoracolumbar spine, which is convex to the left with the apex centered at the L1 level.  The Sequeira angle measures approximately 26.2°.  All visualized thoracic and lumbar vertebral bodies normal in height.  Low-to-moderate grade degenerative changes scattered throughout the mid and inferior thoracic spine with small scattered marginal osteophytes.  Severe degenerative change of the lumbar spine identified at the L2-3, L3-4 and L4-5 levels with large marginal osteophytes.  Paravertebral soft tissues are normal.    MRI Cervical Spine 2017  Reversal of the normal cervical lordosis.  Small spinal canal on a congenital basis.  No definite abnormal signal in the cord.  The craniocervical junction is normal.    C2-3: Minimal disc bulge.  Facet hypertrophy on the left.    C3-4: Broad-based osteophyte and disc bulge.  Uncovertebral joint disease with moderate to severe left-sided neural foraminal narrowing.  Decreased CSF spaces anterior and posterior to the cord.    C4-5: Marked disc space narrowing.  Broad-based osteophyte and disc bulge.  Decreased CSF spaces anterior and posterior to the cord.  Uncovertebral joint disease with mild neuroforaminal narrowing.    C5-6: Disc space narrowing.  Broad-based osteophyte and disc bulge.  Asymmetric disc protrusion paramedian to the left side.  Uncovertebral joint disease and degenerative changes of the facets with moderate to severe bilateral neural foraminal narrowing.  No signal abnormality in the cord.    C6-7: Disc space narrowing.  Broad-based osteophyte and disc bulge.  Bilateral uncovertebral joint disease and degenerative changes of the facets.  Moderate central spinal stenosis.    C7-T1: Minimal disc bulge.  Uncovertebral joint disease.    Assessment:     Encounter Diagnoses   Name Primary?    Lumbar  spondylosis Yes    Complete tear of left rotator cuff     Lumbar radiculopathy     Chronic left shoulder pain        Plan:     Talia was seen today for follow-up.    Diagnoses and all orders for this visit:    Lumbar spondylosis    Complete tear of left rotator cuff    Lumbar radiculopathy    Chronic left shoulder pain    Other orders  -     gabapentin (NEURONTIN) 400 MG capsule; Take 1 capsule (400 mg total) by mouth 2 (two) times daily. Take 2 tablets once daily        Her pain is consistent with the above.    We discussed the assessment and recommendations.  All available images were reviewed. We discussed the disease process, prognosis, treatment plan, and risks and benefits. The patient is aware of the risks and benefits of the medications being prescribed, common side effects, and proper usage. The following is the plan we agreed on:     1. Continue Cymbalta 60 mg BID.  This helps her with pain as well as depression.  2. Continue gabapentin 400 mg BID given Cr clearance.  3. She is holding off on left shoulder surgery as her pain improved.  4. Can repeat lumbar RFAs when needed.  She declined at this time as her pain is tolerable.  5. RTC in 3 months or sooner if needed.    The above plan and management options were discussed at length with patient. Patient is in agreement with the above and verbalized understanding.     Latosha Humphries, LETY  09/30/2019

## 2019-10-08 ENCOUNTER — LAB VISIT (OUTPATIENT)
Dept: LAB | Facility: HOSPITAL | Age: 69
End: 2019-10-08
Attending: INTERNAL MEDICINE
Payer: MEDICARE

## 2019-10-08 ENCOUNTER — ANTI-COAG VISIT (OUTPATIENT)
Dept: CARDIOLOGY | Facility: CLINIC | Age: 69
End: 2019-10-08
Payer: MEDICARE

## 2019-10-08 DIAGNOSIS — Z79.01 LONG TERM (CURRENT) USE OF ANTICOAGULANTS: ICD-10-CM

## 2019-10-08 DIAGNOSIS — I48.0 PAROXYSMAL ATRIAL FIBRILLATION: ICD-10-CM

## 2019-10-08 LAB
INR PPP: 1.8 (ref 0.8–1.2)
PROTHROMBIN TIME: 19.5 SEC (ref 9–12.5)

## 2019-10-08 PROCEDURE — 93793 ANTICOAG MGMT PT WARFARIN: CPT | Mod: S$GLB,,,

## 2019-10-08 PROCEDURE — 85610 PROTHROMBIN TIME: CPT | Mod: PO

## 2019-10-08 PROCEDURE — 36415 COLL VENOUS BLD VENIPUNCTURE: CPT | Mod: PO

## 2019-10-08 PROCEDURE — 93793 PR ANTICOAGULANT MGMT FOR PT TAKING WARFARIN: ICD-10-PCS | Mod: S$GLB,,,

## 2019-10-15 ENCOUNTER — LAB VISIT (OUTPATIENT)
Dept: LAB | Facility: HOSPITAL | Age: 69
End: 2019-10-15
Attending: INTERNAL MEDICINE
Payer: MEDICARE

## 2019-10-15 ENCOUNTER — TELEPHONE (OUTPATIENT)
Dept: CARDIOLOGY | Facility: CLINIC | Age: 69
End: 2019-10-15

## 2019-10-15 ENCOUNTER — ANTI-COAG VISIT (OUTPATIENT)
Dept: CARDIOLOGY | Facility: CLINIC | Age: 69
End: 2019-10-15
Payer: MEDICARE

## 2019-10-15 ENCOUNTER — DOCUMENTATION ONLY (OUTPATIENT)
Dept: CARDIOLOGY | Facility: CLINIC | Age: 69
End: 2019-10-15

## 2019-10-15 DIAGNOSIS — I48.0 PAROXYSMAL ATRIAL FIBRILLATION: ICD-10-CM

## 2019-10-15 DIAGNOSIS — Z79.01 LONG TERM (CURRENT) USE OF ANTICOAGULANTS: ICD-10-CM

## 2019-10-15 LAB
INR PPP: 5.6 (ref 0.8–1.2)
PROTHROMBIN TIME: 59.9 SEC (ref 9–12.5)

## 2019-10-15 PROCEDURE — 85610 PROTHROMBIN TIME: CPT | Mod: PO

## 2019-10-15 PROCEDURE — 93793 ANTICOAG MGMT PT WARFARIN: CPT | Mod: S$GLB,,,

## 2019-10-15 PROCEDURE — 36415 COLL VENOUS BLD VENIPUNCTURE: CPT | Mod: PO

## 2019-10-15 PROCEDURE — 93793 PR ANTICOAGULANT MGMT FOR PT TAKING WARFARIN: ICD-10-PCS | Mod: S$GLB,,,

## 2019-10-15 NOTE — TELEPHONE ENCOUNTER
Reached out to patient per Dr Barker to hold Coumadin dose until further instructions from the [facility]madin Clinic due to elevated INR of 5.6.  Patient verbalized understanding.

## 2019-10-17 ENCOUNTER — ANTI-COAG VISIT (OUTPATIENT)
Dept: CARDIOLOGY | Facility: CLINIC | Age: 69
End: 2019-10-17
Payer: MEDICARE

## 2019-10-17 ENCOUNTER — LAB VISIT (OUTPATIENT)
Dept: LAB | Facility: HOSPITAL | Age: 69
End: 2019-10-17
Attending: INTERNAL MEDICINE
Payer: MEDICARE

## 2019-10-17 DIAGNOSIS — I48.0 PAROXYSMAL ATRIAL FIBRILLATION: ICD-10-CM

## 2019-10-17 DIAGNOSIS — Z79.01 LONG TERM (CURRENT) USE OF ANTICOAGULANTS: ICD-10-CM

## 2019-10-17 LAB
INR PPP: 2.5 (ref 0.8–1.2)
PROTHROMBIN TIME: 27.3 SEC (ref 9–12.5)

## 2019-10-17 PROCEDURE — 93793 PR ANTICOAGULANT MGMT FOR PT TAKING WARFARIN: ICD-10-PCS | Mod: S$GLB,,,

## 2019-10-17 PROCEDURE — 36415 COLL VENOUS BLD VENIPUNCTURE: CPT | Mod: PO

## 2019-10-17 PROCEDURE — 93793 ANTICOAG MGMT PT WARFARIN: CPT | Mod: S$GLB,,,

## 2019-10-17 PROCEDURE — 85610 PROTHROMBIN TIME: CPT | Mod: PO

## 2019-10-22 DIAGNOSIS — I48.91 ATRIAL FIBRILLATION, NEW ONSET: ICD-10-CM

## 2019-10-22 RX ORDER — METOPROLOL TARTRATE 25 MG/1
75 TABLET, FILM COATED ORAL 2 TIMES DAILY
Qty: 180 TABLET | Refills: 11 | Status: SHIPPED | OUTPATIENT
Start: 2019-10-22 | End: 2021-05-19 | Stop reason: SDUPTHER

## 2019-10-23 ENCOUNTER — LAB VISIT (OUTPATIENT)
Dept: LAB | Facility: HOSPITAL | Age: 69
End: 2019-10-23
Attending: INTERNAL MEDICINE
Payer: MEDICARE

## 2019-10-23 ENCOUNTER — ANTI-COAG VISIT (OUTPATIENT)
Dept: CARDIOLOGY | Facility: CLINIC | Age: 69
End: 2019-10-23
Payer: MEDICARE

## 2019-10-23 DIAGNOSIS — I48.0 PAROXYSMAL ATRIAL FIBRILLATION: ICD-10-CM

## 2019-10-23 DIAGNOSIS — Z79.01 LONG TERM (CURRENT) USE OF ANTICOAGULANTS: ICD-10-CM

## 2019-10-23 LAB
INR PPP: 3.3 (ref 0.8–1.2)
PROTHROMBIN TIME: 35.6 SEC (ref 9–12.5)

## 2019-10-23 PROCEDURE — 36415 COLL VENOUS BLD VENIPUNCTURE: CPT | Mod: PO

## 2019-10-23 PROCEDURE — 93793 ANTICOAG MGMT PT WARFARIN: CPT | Mod: S$GLB,,,

## 2019-10-23 PROCEDURE — 93793 PR ANTICOAGULANT MGMT FOR PT TAKING WARFARIN: ICD-10-PCS | Mod: S$GLB,,,

## 2019-10-23 PROCEDURE — 85610 PROTHROMBIN TIME: CPT | Mod: PO

## 2019-10-28 RX ORDER — ATORVASTATIN CALCIUM 10 MG/1
TABLET, FILM COATED ORAL
Qty: 90 TABLET | Refills: 3 | Status: SHIPPED | OUTPATIENT
Start: 2019-10-28 | End: 2020-01-10 | Stop reason: SDUPTHER

## 2019-10-30 ENCOUNTER — LAB VISIT (OUTPATIENT)
Dept: LAB | Facility: HOSPITAL | Age: 69
End: 2019-10-30
Attending: INTERNAL MEDICINE
Payer: MEDICARE

## 2019-10-30 ENCOUNTER — ANTI-COAG VISIT (OUTPATIENT)
Dept: CARDIOLOGY | Facility: CLINIC | Age: 69
End: 2019-10-30
Payer: MEDICARE

## 2019-10-30 DIAGNOSIS — I48.0 PAROXYSMAL ATRIAL FIBRILLATION: ICD-10-CM

## 2019-10-30 DIAGNOSIS — Z79.01 LONG TERM (CURRENT) USE OF ANTICOAGULANTS: ICD-10-CM

## 2019-10-30 LAB
INR PPP: 2.8 (ref 0.8–1.2)
PROTHROMBIN TIME: 29.9 SEC (ref 9–12.5)

## 2019-10-30 PROCEDURE — 93793 PR ANTICOAGULANT MGMT FOR PT TAKING WARFARIN: ICD-10-PCS | Mod: S$GLB,,, | Performed by: PHARMACIST

## 2019-10-30 PROCEDURE — 36415 COLL VENOUS BLD VENIPUNCTURE: CPT | Mod: PO

## 2019-10-30 PROCEDURE — 85610 PROTHROMBIN TIME: CPT | Mod: PO

## 2019-10-30 PROCEDURE — 93793 ANTICOAG MGMT PT WARFARIN: CPT | Mod: S$GLB,,, | Performed by: PHARMACIST

## 2019-11-06 ENCOUNTER — LAB VISIT (OUTPATIENT)
Dept: LAB | Facility: HOSPITAL | Age: 69
End: 2019-11-06
Attending: INTERNAL MEDICINE
Payer: MEDICARE

## 2019-11-06 ENCOUNTER — ANTI-COAG VISIT (OUTPATIENT)
Dept: CARDIOLOGY | Facility: CLINIC | Age: 69
End: 2019-11-06
Payer: MEDICARE

## 2019-11-06 DIAGNOSIS — Z79.01 LONG TERM (CURRENT) USE OF ANTICOAGULANTS: ICD-10-CM

## 2019-11-06 DIAGNOSIS — I48.0 PAROXYSMAL ATRIAL FIBRILLATION: ICD-10-CM

## 2019-11-06 LAB
INR PPP: 3.5 (ref 0.8–1.2)
PROTHROMBIN TIME: 37.2 SEC (ref 9–12.5)

## 2019-11-06 PROCEDURE — 85610 PROTHROMBIN TIME: CPT | Mod: PO

## 2019-11-06 PROCEDURE — 93793 ANTICOAG MGMT PT WARFARIN: CPT | Mod: S$GLB,,,

## 2019-11-06 PROCEDURE — 36415 COLL VENOUS BLD VENIPUNCTURE: CPT | Mod: PO

## 2019-11-06 PROCEDURE — 93793 PR ANTICOAGULANT MGMT FOR PT TAKING WARFARIN: ICD-10-PCS | Mod: S$GLB,,,

## 2019-11-07 ENCOUNTER — PATIENT OUTREACH (OUTPATIENT)
Dept: ADMINISTRATIVE | Facility: OTHER | Age: 69
End: 2019-11-07

## 2019-11-11 DIAGNOSIS — N18.4 STAGE 4 CHRONIC KIDNEY DISEASE: Primary | ICD-10-CM

## 2019-11-12 ENCOUNTER — LAB VISIT (OUTPATIENT)
Dept: LAB | Facility: HOSPITAL | Age: 69
End: 2019-11-12
Attending: INTERNAL MEDICINE
Payer: MEDICARE

## 2019-11-12 ENCOUNTER — ANTI-COAG VISIT (OUTPATIENT)
Dept: CARDIOLOGY | Facility: CLINIC | Age: 69
End: 2019-11-12
Payer: MEDICARE

## 2019-11-12 DIAGNOSIS — I48.0 PAROXYSMAL ATRIAL FIBRILLATION: ICD-10-CM

## 2019-11-12 DIAGNOSIS — Z79.01 LONG TERM (CURRENT) USE OF ANTICOAGULANTS: ICD-10-CM

## 2019-11-12 LAB
INR PPP: 1.7 (ref 0.8–1.2)
PROTHROMBIN TIME: 18.6 SEC (ref 9–12.5)

## 2019-11-12 PROCEDURE — 93793 ANTICOAG MGMT PT WARFARIN: CPT | Mod: S$GLB,,,

## 2019-11-12 PROCEDURE — 85610 PROTHROMBIN TIME: CPT | Mod: PO

## 2019-11-12 PROCEDURE — 93793 PR ANTICOAGULANT MGMT FOR PT TAKING WARFARIN: ICD-10-PCS | Mod: S$GLB,,,

## 2019-11-12 PROCEDURE — 36415 COLL VENOUS BLD VENIPUNCTURE: CPT | Mod: PO

## 2019-11-14 ENCOUNTER — TELEPHONE (OUTPATIENT)
Dept: NEPHROLOGY | Facility: CLINIC | Age: 69
End: 2019-11-14

## 2019-11-14 NOTE — TELEPHONE ENCOUNTER
----- Message from Judit Meme sent at 11/14/2019  8:05 AM CST -----  Contact: Talia  tel:   [phone]   Caller says:   Needs a f/u and labs to be done .  Pt.says she waits so long to just get an appt. W/ the , and wants to see the dr. Asap.    Asking for a return call today.  Says she was cancelled for today's appt.       Called pt no answer couldn't leave message

## 2019-11-18 ENCOUNTER — PATIENT OUTREACH (OUTPATIENT)
Dept: ADMINISTRATIVE | Facility: OTHER | Age: 69
End: 2019-11-18

## 2019-11-19 ENCOUNTER — LAB VISIT (OUTPATIENT)
Dept: LAB | Facility: HOSPITAL | Age: 69
End: 2019-11-19
Attending: INTERNAL MEDICINE
Payer: MEDICARE

## 2019-11-19 ENCOUNTER — ANTI-COAG VISIT (OUTPATIENT)
Dept: CARDIOLOGY | Facility: CLINIC | Age: 69
End: 2019-11-19
Payer: MEDICARE

## 2019-11-19 DIAGNOSIS — Z79.01 LONG TERM (CURRENT) USE OF ANTICOAGULANTS: ICD-10-CM

## 2019-11-19 DIAGNOSIS — I48.0 PAROXYSMAL ATRIAL FIBRILLATION: ICD-10-CM

## 2019-11-19 DIAGNOSIS — N18.4 CKD STAGE G4/A2, GFR 15-29 AND ALBUMIN CREATININE RATIO 30-299 MG/G: ICD-10-CM

## 2019-11-19 DIAGNOSIS — N18.4 STAGE 4 CHRONIC KIDNEY DISEASE: ICD-10-CM

## 2019-11-19 LAB
ALBUMIN SERPL BCP-MCNC: 4.4 G/DL (ref 3.5–5.2)
ANION GAP SERPL CALC-SCNC: 10 MMOL/L (ref 8–16)
BASOPHILS # BLD AUTO: 0.02 K/UL (ref 0–0.2)
BASOPHILS NFR BLD: 0.2 % (ref 0–1.9)
BUN SERPL-MCNC: 37 MG/DL (ref 7–17)
CALCIUM SERPL-MCNC: 10.1 MG/DL (ref 8.7–10.5)
CHLORIDE SERPL-SCNC: 101 MMOL/L (ref 95–110)
CO2 SERPL-SCNC: 29 MMOL/L (ref 23–29)
CREAT SERPL-MCNC: 1.98 MG/DL (ref 0.5–1.4)
DIFFERENTIAL METHOD: ABNORMAL
EOSINOPHIL # BLD AUTO: 0.3 K/UL (ref 0–0.5)
EOSINOPHIL NFR BLD: 3.6 % (ref 0–8)
ERYTHROCYTE [DISTWIDTH] IN BLOOD BY AUTOMATED COUNT: 14 % (ref 11.5–14.5)
EST. GFR  (AFRICAN AMERICAN): 28.9 ML/MIN/1.73 M^2
EST. GFR  (NON AFRICAN AMERICAN): 25.1 ML/MIN/1.73 M^2
GLUCOSE SERPL-MCNC: 103 MG/DL (ref 70–110)
HCT VFR BLD AUTO: 43.6 % (ref 37–48.5)
HGB BLD-MCNC: 13.6 G/DL (ref 12–16)
INR PPP: 1.6 (ref 0.8–1.2)
LYMPHOCYTES # BLD AUTO: 2.1 K/UL (ref 1–4.8)
LYMPHOCYTES NFR BLD: 23.6 % (ref 18–48)
MCH RBC QN AUTO: 29 PG (ref 27–31)
MCHC RBC AUTO-ENTMCNC: 31.2 G/DL (ref 32–36)
MCV RBC AUTO: 93 FL (ref 82–98)
MONOCYTES # BLD AUTO: 0.6 K/UL (ref 0.3–1)
MONOCYTES NFR BLD: 6.8 % (ref 4–15)
NEUTROPHILS # BLD AUTO: 5.8 K/UL (ref 1.8–7.7)
NEUTROPHILS NFR BLD: 65.8 % (ref 38–73)
PHOSPHATE SERPL-MCNC: 4 MG/DL (ref 2.7–4.5)
PLATELET # BLD AUTO: 193 K/UL (ref 150–350)
PMV BLD AUTO: 11.4 FL (ref 9.2–12.9)
POTASSIUM SERPL-SCNC: 4.7 MMOL/L (ref 3.5–5.1)
PROTHROMBIN TIME: 16.9 SEC (ref 9–12.5)
PTH-INTACT SERPL-MCNC: 112 PG/ML (ref 9–77)
RBC # BLD AUTO: 4.69 M/UL (ref 4–5.4)
SODIUM SERPL-SCNC: 140 MMOL/L (ref 136–145)
WBC # BLD AUTO: 8.87 K/UL (ref 3.9–12.7)

## 2019-11-19 PROCEDURE — 85610 PROTHROMBIN TIME: CPT | Mod: PO

## 2019-11-19 PROCEDURE — 93793 ANTICOAG MGMT PT WARFARIN: CPT | Mod: S$GLB,,,

## 2019-11-19 PROCEDURE — 83970 ASSAY OF PARATHORMONE: CPT | Mod: PO

## 2019-11-19 PROCEDURE — 80069 RENAL FUNCTION PANEL: CPT | Mod: PO

## 2019-11-19 PROCEDURE — 85025 COMPLETE CBC W/AUTO DIFF WBC: CPT | Mod: PO

## 2019-11-19 PROCEDURE — 93793 PR ANTICOAGULANT MGMT FOR PT TAKING WARFARIN: ICD-10-PCS | Mod: S$GLB,,,

## 2019-11-19 PROCEDURE — 36415 COLL VENOUS BLD VENIPUNCTURE: CPT | Mod: PO

## 2019-11-21 ENCOUNTER — OFFICE VISIT (OUTPATIENT)
Dept: NEPHROLOGY | Facility: CLINIC | Age: 69
End: 2019-11-21
Payer: MEDICARE

## 2019-11-21 VITALS
WEIGHT: 205.94 LBS | HEART RATE: 101 BPM | SYSTOLIC BLOOD PRESSURE: 140 MMHG | OXYGEN SATURATION: 98 % | DIASTOLIC BLOOD PRESSURE: 90 MMHG | HEIGHT: 62 IN | BODY MASS INDEX: 37.9 KG/M2

## 2019-11-21 DIAGNOSIS — I48.19 PERSISTENT ATRIAL FIBRILLATION: ICD-10-CM

## 2019-11-21 DIAGNOSIS — N18.4 CKD STAGE G4/A2, GFR 15-29 AND ALBUMIN CREATININE RATIO 30-299 MG/G: Primary | ICD-10-CM

## 2019-11-21 DIAGNOSIS — I10 ESSENTIAL HYPERTENSION: Chronic | ICD-10-CM

## 2019-11-21 PROCEDURE — 1126F AMNT PAIN NOTED NONE PRSNT: CPT | Mod: GC,S$GLB,, | Performed by: GENERAL PRACTICE

## 2019-11-21 PROCEDURE — 99213 OFFICE O/P EST LOW 20 MIN: CPT | Mod: GC,S$GLB,, | Performed by: GENERAL PRACTICE

## 2019-11-21 PROCEDURE — 3077F PR MOST RECENT SYSTOLIC BLOOD PRESSURE >= 140 MM HG: ICD-10-PCS | Mod: CPTII,GC,S$GLB, | Performed by: GENERAL PRACTICE

## 2019-11-21 PROCEDURE — 99999 PR PBB SHADOW E&M-EST. PATIENT-LVL III: CPT | Mod: PBBFAC,GC,, | Performed by: GENERAL PRACTICE

## 2019-11-21 PROCEDURE — 3077F SYST BP >= 140 MM HG: CPT | Mod: CPTII,GC,S$GLB, | Performed by: GENERAL PRACTICE

## 2019-11-21 PROCEDURE — 1159F PR MEDICATION LIST DOCUMENTED IN MEDICAL RECORD: ICD-10-PCS | Mod: GC,S$GLB,, | Performed by: GENERAL PRACTICE

## 2019-11-21 PROCEDURE — 1159F MED LIST DOCD IN RCRD: CPT | Mod: GC,S$GLB,, | Performed by: GENERAL PRACTICE

## 2019-11-21 PROCEDURE — 1126F PR PAIN SEVERITY QUANTIFIED, NO PAIN PRESENT: ICD-10-PCS | Mod: GC,S$GLB,, | Performed by: GENERAL PRACTICE

## 2019-11-21 PROCEDURE — 1101F PR PT FALLS ASSESS DOC 0-1 FALLS W/OUT INJ PAST YR: ICD-10-PCS | Mod: CPTII,GC,S$GLB, | Performed by: GENERAL PRACTICE

## 2019-11-21 PROCEDURE — 3080F PR MOST RECENT DIASTOLIC BLOOD PRESSURE >= 90 MM HG: ICD-10-PCS | Mod: CPTII,GC,S$GLB, | Performed by: GENERAL PRACTICE

## 2019-11-21 PROCEDURE — 99999 PR PBB SHADOW E&M-EST. PATIENT-LVL III: ICD-10-PCS | Mod: PBBFAC,GC,, | Performed by: GENERAL PRACTICE

## 2019-11-21 PROCEDURE — 99213 PR OFFICE/OUTPT VISIT, EST, LEVL III, 20-29 MIN: ICD-10-PCS | Mod: GC,S$GLB,, | Performed by: GENERAL PRACTICE

## 2019-11-21 PROCEDURE — 99499 RISK ADDL DX/OHS AUDIT: ICD-10-PCS | Mod: S$GLB,,, | Performed by: GENERAL PRACTICE

## 2019-11-21 PROCEDURE — 99499 UNLISTED E&M SERVICE: CPT | Mod: S$GLB,,, | Performed by: GENERAL PRACTICE

## 2019-11-21 PROCEDURE — 3080F DIAST BP >= 90 MM HG: CPT | Mod: CPTII,GC,S$GLB, | Performed by: GENERAL PRACTICE

## 2019-11-21 PROCEDURE — 1101F PT FALLS ASSESS-DOCD LE1/YR: CPT | Mod: CPTII,GC,S$GLB, | Performed by: GENERAL PRACTICE

## 2019-11-21 RX ORDER — CANDESARTAN 8 MG/1
8 TABLET ORAL DAILY
Qty: 90 TABLET | Refills: 3 | Status: SHIPPED | OUTPATIENT
Start: 2019-11-21 | End: 2020-05-07

## 2019-11-21 NOTE — PROGRESS NOTES
Subjective:       Patient ID: Talia Dillon 69 y.o. White female who presents for new evaluation of Follow-up and Chronic Kidney Disease    Interval Hx 11/21/2019:   Ms Talia Dillon is a here for follow up for his TINA and CKD stage 4. She feels well and denies an symptoms. She endorses quitting smoking, and increasing water intake. Her sCr has remained ~1.9-2.1 for past ~year.  She has stopped taking NSAIDs for pain.  She is currently being followed by rheumatology she has now started steroid injections with pain management.  Patient with Afib well controlled, on Eliquis.  Takes metoprolol tartrate for rate control.    HPI  Talia Dillon is a 69 y.o. White female with a PMHx relevant for CKD 4 (baseline sCr 1.9-2.1), Afib, Hypothyroidism, anxiety, arthralgias multiple joints followed by rheumatologist, history of smoking since ~15 years old, and morbid obesity she present to Nephrology consulted for elevated sCr. originally referred by her PCP James Vallecillo MD after a rapid rise in sCr suspected to be secondary to volume depletion from diarrhea.  She had a sCr of 1.5 mg/dL back in 2015 and during 2017 she had a sCr of 1.5-1.7 mg/dL s baseline.   In 2018 sCr stayed close to 1.8-2.1.       Review of Systems   Constitutional: Positive for fatigue. Negative for appetite change, fever and unexpected weight change.   HENT: Negative for facial swelling, hearing loss and tinnitus.    Eyes: Negative for visual disturbance.   Respiratory: Negative for chest tightness and shortness of breath.    Cardiovascular: Negative for chest pain and leg swelling.   Gastrointestinal: Negative for abdominal distention, abdominal pain, diarrhea and nausea.   Genitourinary: Negative for difficulty urinating, dysuria and flank pain.   Musculoskeletal: Negative for arthralgias and myalgias.   Skin: Negative for color change.   Neurological: Positive for weakness. Negative for dizziness, syncope, light-headedness and headaches.    Psychiatric/Behavioral: Negative for behavioral problems and confusion.   All other systems reviewed and are negative.      Objective:     Vitals:    11/21/19 1328   BP: (!) 140/90   Pulse: 101         Physical Exam   Constitutional: She is oriented to person, place, and time. She appears well-developed and well-nourished. No distress.   HENT:   Head: Normocephalic and atraumatic.   Eyes: Pupils are equal, round, and reactive to light. Conjunctivae are normal.   Neck: Normal range of motion. Neck supple.   Cardiovascular: Normal rate and intact distal pulses. An irregular rhythm present. Exam reveals no gallop and no friction rub.   No murmur heard.  Pulmonary/Chest: Effort normal and breath sounds normal. She has no wheezes. She has no rales.   Abdominal: Soft. Bowel sounds are normal. She exhibits no distension. There is no tenderness.   Musculoskeletal: Normal range of motion. She exhibits no edema or deformity.   Neurological: She is alert and oriented to person, place, and time. She has normal reflexes.   Skin: Skin is warm and dry. She is not diaphoretic.   Psychiatric: She has a normal mood and affect. Her behavior is normal.       Assessment:        ICD-10-CM ICD-9-CM   1. CKD stage G4/A2, GFR 15-29 and albumin creatinine ratio  mg/g N18.4 585.4   2. Essential hypertension I10 401.9   3. Persistent atrial fibrillation I48.19 427.31        Plan:   1. CKD stage G4,A2 eGFR sCr baseline 1.9-2.1 mg/dL    TINA on CKD stage 4:  Stable.  CKD is unclear but suspect prolong use of NSAID's, Tobacco abuse. She has no Dx of HTN and her BP per our records have been SBP < 130's.  Renal US 2017 showed some evidence of chronic Kidney disease. She has a PMHx for significant Tobacco abuse and she is morbidly obese which are risk factors for CKD.    Lab Results   Component Value Date    CREATININE 1.98 (H) 11/19/2019     sCr has remained stable for past year  Oriented patient of smoking cessation and she understands that  if she continues to do it could be detrimental to her kidneys and general health  Low salt, renal Diet   Avoid all NSAIDs (Hx of taking Aleve 2-3 tabs a day for 4-5 years; stopped taking them ~2 years ago)  BP very good in clinic      Protein Creatinine Ratios:  Prot/Creat Ratio, Ur   Date Value Ref Range Status   11/19/2019 0.75 (H) 0.00 - 0.20 Final   04/16/2019 0.28 (H) 0.00 - 0.20 Final   10/24/2018 0.48 (H) 0.00 - 0.20 Final     For proteinuria will start Candesartan 8 mg oral daily      Acid-Base: at goal  Lab Results   Component Value Date     11/19/2019    K 4.7 11/19/2019    CO2 29 11/19/2019     Progression to ESRD: we discussed possible progression to ESRD but she is doing everything to slow her GKD progression.    For past ~year has remained stable.  She quit smoking and continues to avoid NSAIDs and is drinking more water.    2. Arterial Hypertension  - Will start Candesartan 8 mg oral daily  - Take BP at home    3. 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  Continue with Vitamin D supplement OTC    4. Lipid management: not on Statin  Lab Results   Component Value Date    LDLCALC 52.8 (L) 01/14/2019     As per PCP, thus far Stable    Follow up in Clinic ~6months with labs with RFP, UA, UPCR, CBC    Max Ramos MD  Nephrology  [facility]

## 2019-11-25 ENCOUNTER — PATIENT OUTREACH (OUTPATIENT)
Dept: ADMINISTRATIVE | Facility: OTHER | Age: 69
End: 2019-11-25

## 2019-11-25 NOTE — PROGRESS NOTES
ATTENDING PHYSICIAN ATTESTATION  I have personally verified the history and examined the patient. I thoroughly reviewed the demographic, clinical, laboratorial and imaging information available in medical records. I agree with the assessment and recommendations provided by the subspecialty resident who was under my supervision.

## 2019-11-29 ENCOUNTER — LAB VISIT (OUTPATIENT)
Dept: LAB | Facility: HOSPITAL | Age: 69
End: 2019-11-29
Attending: INTERNAL MEDICINE
Payer: MEDICARE

## 2019-11-29 ENCOUNTER — ANTI-COAG VISIT (OUTPATIENT)
Dept: CARDIOLOGY | Facility: CLINIC | Age: 69
End: 2019-11-29
Payer: MEDICARE

## 2019-11-29 DIAGNOSIS — I48.0 PAROXYSMAL ATRIAL FIBRILLATION: ICD-10-CM

## 2019-11-29 DIAGNOSIS — Z79.01 LONG TERM (CURRENT) USE OF ANTICOAGULANTS: ICD-10-CM

## 2019-11-29 LAB
INR PPP: 1.8 (ref 0.8–1.2)
PROTHROMBIN TIME: 19.5 SEC (ref 9–12.5)

## 2019-11-29 PROCEDURE — 85610 PROTHROMBIN TIME: CPT | Mod: PO

## 2019-11-29 PROCEDURE — 93793 PR ANTICOAGULANT MGMT FOR PT TAKING WARFARIN: ICD-10-PCS | Mod: S$GLB,,,

## 2019-11-29 PROCEDURE — 93793 ANTICOAG MGMT PT WARFARIN: CPT | Mod: S$GLB,,,

## 2019-11-29 PROCEDURE — 36415 COLL VENOUS BLD VENIPUNCTURE: CPT | Mod: PO

## 2019-12-02 ENCOUNTER — PATIENT OUTREACH (OUTPATIENT)
Dept: ADMINISTRATIVE | Facility: OTHER | Age: 69
End: 2019-12-02

## 2019-12-03 ENCOUNTER — OFFICE VISIT (OUTPATIENT)
Dept: CARDIOLOGY | Facility: CLINIC | Age: 69
End: 2019-12-03
Payer: MEDICARE

## 2019-12-03 VITALS
HEART RATE: 80 BPM | HEIGHT: 62 IN | SYSTOLIC BLOOD PRESSURE: 121 MMHG | OXYGEN SATURATION: 100 % | DIASTOLIC BLOOD PRESSURE: 83 MMHG | BODY MASS INDEX: 38.06 KG/M2 | WEIGHT: 206.81 LBS

## 2019-12-03 DIAGNOSIS — Z79.01 LONG TERM (CURRENT) USE OF ANTICOAGULANTS: ICD-10-CM

## 2019-12-03 DIAGNOSIS — I48.19 PERSISTENT ATRIAL FIBRILLATION: Primary | ICD-10-CM

## 2019-12-03 DIAGNOSIS — I10 ESSENTIAL HYPERTENSION: Chronic | ICD-10-CM

## 2019-12-03 DIAGNOSIS — I48.20 CHRONIC ATRIAL FIBRILLATION: ICD-10-CM

## 2019-12-03 DIAGNOSIS — E78.00 PURE HYPERCHOLESTEROLEMIA: ICD-10-CM

## 2019-12-03 DIAGNOSIS — Z86.79 HISTORY OF PSVT (PAROXYSMAL SUPRAVENTRICULAR TACHYCARDIA): ICD-10-CM

## 2019-12-03 PROCEDURE — 1159F PR MEDICATION LIST DOCUMENTED IN MEDICAL RECORD: ICD-10-PCS | Mod: S$GLB,,, | Performed by: INTERNAL MEDICINE

## 2019-12-03 PROCEDURE — 93000 EKG 12-LEAD: ICD-10-PCS | Mod: S$GLB,,, | Performed by: INTERNAL MEDICINE

## 2019-12-03 PROCEDURE — 1159F MED LIST DOCD IN RCRD: CPT | Mod: S$GLB,,, | Performed by: INTERNAL MEDICINE

## 2019-12-03 PROCEDURE — 93000 ELECTROCARDIOGRAM COMPLETE: CPT | Mod: S$GLB,,, | Performed by: INTERNAL MEDICINE

## 2019-12-03 PROCEDURE — 99214 PR OFFICE/OUTPT VISIT, EST, LEVL IV, 30-39 MIN: ICD-10-PCS | Mod: 25,S$GLB,, | Performed by: INTERNAL MEDICINE

## 2019-12-03 PROCEDURE — 3079F DIAST BP 80-89 MM HG: CPT | Mod: CPTII,S$GLB,, | Performed by: INTERNAL MEDICINE

## 2019-12-03 PROCEDURE — 3074F PR MOST RECENT SYSTOLIC BLOOD PRESSURE < 130 MM HG: ICD-10-PCS | Mod: CPTII,S$GLB,, | Performed by: INTERNAL MEDICINE

## 2019-12-03 PROCEDURE — 99214 OFFICE O/P EST MOD 30 MIN: CPT | Mod: 25,S$GLB,, | Performed by: INTERNAL MEDICINE

## 2019-12-03 PROCEDURE — 3074F SYST BP LT 130 MM HG: CPT | Mod: CPTII,S$GLB,, | Performed by: INTERNAL MEDICINE

## 2019-12-03 PROCEDURE — 99999 PR PBB SHADOW E&M-EST. PATIENT-LVL III: ICD-10-PCS | Mod: PBBFAC,,, | Performed by: INTERNAL MEDICINE

## 2019-12-03 PROCEDURE — 1101F PR PT FALLS ASSESS DOC 0-1 FALLS W/OUT INJ PAST YR: ICD-10-PCS | Mod: CPTII,S$GLB,, | Performed by: INTERNAL MEDICINE

## 2019-12-03 PROCEDURE — 99999 PR PBB SHADOW E&M-EST. PATIENT-LVL III: CPT | Mod: PBBFAC,,, | Performed by: INTERNAL MEDICINE

## 2019-12-03 PROCEDURE — 1101F PT FALLS ASSESS-DOCD LE1/YR: CPT | Mod: CPTII,S$GLB,, | Performed by: INTERNAL MEDICINE

## 2019-12-03 PROCEDURE — 3079F PR MOST RECENT DIASTOLIC BLOOD PRESSURE 80-89 MM HG: ICD-10-PCS | Mod: CPTII,S$GLB,, | Performed by: INTERNAL MEDICINE

## 2019-12-03 NOTE — PROGRESS NOTES
Subjective:      Patient ID: Talia Dillon is a 69 y.o. female.    Chief Complaint: Follow-up (Afib)    HPI:  Does a little housework     Shops    Feels OK    Never got the Cardia device    Never had repeat cardioversion or trial of Multaq    Review of Systems   Cardiovascular: Negative for chest pain, claudication, dyspnea on exertion, irregular heartbeat, leg swelling, near-syncope, orthopnea, palpitations and syncope.        Past Medical History:   Diagnosis Date    Allergy     Anemia     Anxiety     Atrial fibrillation     Cancer     skin    Cataract     Depression     Edema     Hypothyroidism     Kidney disease     PSVT (paroxysmal supraventricular tachycardia)     Thyroid disease         Past Surgical History:   Procedure Laterality Date    ADENOIDECTOMY      APPENDECTOMY      COLONOSCOPY  2009    repeat in 10 years     EYE SURGERY      HYSTERECTOMY      INJECTION OF ANESTHETIC AGENT AROUND NERVE Bilateral 9/21/2018    Procedure: BLOCK, NERVE, MEDIAL BRANCH BLOCK BILATERAL LUMBAR L2-5;  Surgeon: Julieth Christopher MD;  Location: [facility] PAIN MGT;  Service: Pain Management;  Laterality: Bilateral;  1 of 2    INJECTION OF ANESTHETIC AGENT AROUND NERVE Bilateral 9/28/2018    Procedure: BLOCK, NERVE, MEDIAL BRANCH BLOCK BILATERAL LUMBAR L2-5;  Surgeon: Julieth Christopher MD;  Location: [facility] PAIN MGT;  Service: Pain Management;  Laterality: Bilateral;  2 of 2  PLEASE GIVE NIRAVAM PER MD    TONSILLECTOMY      TREATMENT OF CARDIAC ARRHYTHMIA N/A 2/8/2019    Procedure: CARDIOVERSION;  Surgeon: Devin You MD;  Location: [facility] EP LAB;  Service: Cardiology;  Laterality: N/A;  AF, KAROL, DCCV, MAC, WA, 3 Prep       Family History   Problem Relation Age of Onset    Stroke Mother     Stroke Father     Diabetes Father     Sleep apnea Daughter     Mental illness Daughter        Social History     Socioeconomic History    Marital status: Single     Spouse name: Not on file    Number of children: Not on  file    Years of education: Not on file    Highest education level: Not on file   Occupational History    Not on file   Social Needs    Financial resource strain: Not on file    Food insecurity:     Worry: Not on file     Inability: Not on file    Transportation needs:     Medical: Not on file     Non-medical: Not on file   Tobacco Use    Smoking status: Current Some Day Smoker     Packs/day: 1.00     Years: 50.00     Pack years: 50.00     Types: Cigarettes     Last attempt to quit: 2018     Years since quittin.5    Smokeless tobacco: Never Used   Substance and Sexual Activity    Alcohol use: No     Frequency: Never     Comment: rarely    Drug use: No    Sexual activity: Not Currently   Lifestyle    Physical activity:     Days per week: Not on file     Minutes per session: Not on file    Stress: Not on file   Relationships    Social connections:     Talks on phone: Not on file     Gets together: Not on file     Attends Islam service: Not on file     Active member of club or organization: Not on file     Attends meetings of clubs or organizations: Not on file     Relationship status: Not on file   Other Topics Concern    Not on file   Social History Narrative    Not on file       Current Outpatient Medications on File Prior to Visit   Medication Sig Dispense Refill    ALPRAZolam (XANAX) 0.25 MG tablet Take 1 tablet (0.25 mg total) by mouth once daily. 90 tablet 1    atorvastatin (LIPITOR) 10 MG tablet TAKE 1 TABLET BY MOUTH EVERY DAY 90 tablet 3    candesartan (ATACAND) 8 MG tablet Take 1 tablet (8 mg total) by mouth once daily. 90 tablet 3    cholecalciferol, vitamin D3, (VITAMIN D3) 5,000 unit Tab Take 5,000 Units by mouth once daily. 90 tablet 3    DULoxetine (CYMBALTA) 60 MG capsule Take 1 capsule (60 mg total) by mouth 2 (two) times daily. 60 capsule 2    gabapentin (NEURONTIN) 400 MG capsule Take 1 capsule (400 mg total) by mouth 2 (two) times daily. Take 2 tablets once daily 60  "capsule 5    levothyroxine (SYNTHROID, LEVOTHROID) 175 MCG tablet Take 1 tablet (175 mcg total) by mouth once daily. 90 tablet 3    metoprolol tartrate (LOPRESSOR) 25 MG tablet Take 3 tablets (75 mg total) by mouth 2 (two) times daily. 180 tablet 11    warfarin (COUMADIN) 5 MG tablet Take 1 tablet (5mg) by mouth daily, except 1 1/2 tablets (7.5mg) on Monday and Thursday,  Or as directed by Coumadin Clinic 35 tablet 4    zolpidem (AMBIEN) 10 mg Tab Take 1 tablet (10 mg total) by mouth nightly as needed. 30 tablet 5    topiramate (TOPAMAX) 100 MG tablet Take 1 tablet (100 mg total) by mouth 2 (two) times daily. (Patient not taking: Reported on 11/21/2019) 180 tablet 1     No current facility-administered medications on file prior to visit.        Review of patient's allergies indicates:   Allergen Reactions    Dexamethasone Rash     Objective:     Vitals:    12/03/19 0929   BP: 121/83   BP Location: Left arm   Patient Position: Sitting   BP Method: Large (Automatic)   Pulse: 80   SpO2: 100%   Weight: 93.8 kg (206 lb 12.7 oz)   Height: 5' 2" (1.575 m)        Physical Exam   Constitutional: She is oriented to person, place, and time. She appears well-developed and well-nourished.   Eyes: No scleral icterus.   Neck: No JVD present. Carotid bruit is not present.   Cardiovascular: Normal rate. An irregularly irregular rhythm present. Exam reveals no gallop.   No murmur heard.  Pulmonary/Chest: Breath sounds normal.   Musculoskeletal: She exhibits no edema.   Neurological: She is alert and oriented to person, place, and time.   Skin: Skin is warm and dry.   Psychiatric: She has a normal mood and affect. Her behavior is normal. Judgment and thought content normal.   Vitals reviewed.     ECG: atrial fibrillation with controlled ventricular response    Lab 11/19: creat 1.98, CBC WNL    Lab 11/29 INR 1.8    Assessment:     1. Persistent atrial fibrillation    2. History of PSVT (paroxysmal supraventricular tachycardia)  "   3. Essential hypertension    4. Pure hypercholesterolemia    5. Long term (current) use of anticoagulants    6. Chronic atrial fibrillation      Plan:   Talia was seen today for follow-up.    Diagnoses and all orders for this visit:    Persistent atrial fibrillation  -     IN OFFICE EKG 12-LEAD (to Muse)    History of PSVT (paroxysmal supraventricular tachycardia)    Essential hypertension    Pure hypercholesterolemia    Long term (current) use of anticoagulants    Chronic atrial fibrillation  -     IN OFFICE EKG 12-LEAD (to Muse)       Discussed role of rate control and anticoagulation for treatment of atrial fibrillation.  Pt wishes to continue this approach to managing a fib.  Discussed risk of bleeding vs the risk of stroke.  Discussed role of cardioversion and trial of an antiarrhythmic  Discussed role of radiofrequency ablation  Discussed role of Dr You    F/u with nephrologist, Dr Wild    F/u with PCP, Dr Vallecillo    Pt wishes to go back on Eliquis at the beginning of 2020 due to insurance coverage.  Pt had been in a gap.    Will begin Eliquis 5 mg bid in January after holding the warfarin for 3 days    Follow up in about 6 months (around 6/3/2020).

## 2019-12-05 ENCOUNTER — LAB VISIT (OUTPATIENT)
Dept: LAB | Facility: HOSPITAL | Age: 69
End: 2019-12-05
Attending: INTERNAL MEDICINE
Payer: MEDICARE

## 2019-12-05 ENCOUNTER — ANTI-COAG VISIT (OUTPATIENT)
Dept: CARDIOLOGY | Facility: CLINIC | Age: 69
End: 2019-12-05
Payer: MEDICARE

## 2019-12-05 DIAGNOSIS — I48.0 PAROXYSMAL ATRIAL FIBRILLATION: ICD-10-CM

## 2019-12-05 DIAGNOSIS — Z79.01 LONG TERM (CURRENT) USE OF ANTICOAGULANTS: ICD-10-CM

## 2019-12-05 LAB
INR PPP: 1.7 (ref 0.8–1.2)
PROTHROMBIN TIME: 17.9 SEC (ref 9–12.5)

## 2019-12-05 PROCEDURE — 85610 PROTHROMBIN TIME: CPT | Mod: PO

## 2019-12-05 PROCEDURE — 36415 COLL VENOUS BLD VENIPUNCTURE: CPT | Mod: PO

## 2019-12-05 PROCEDURE — 93793 ANTICOAG MGMT PT WARFARIN: CPT | Mod: S$GLB,,,

## 2019-12-05 PROCEDURE — 93793 PR ANTICOAGULANT MGMT FOR PT TAKING WARFARIN: ICD-10-PCS | Mod: S$GLB,,,

## 2019-12-11 ENCOUNTER — LAB VISIT (OUTPATIENT)
Dept: LAB | Facility: HOSPITAL | Age: 69
End: 2019-12-11
Attending: INTERNAL MEDICINE
Payer: MEDICARE

## 2019-12-11 ENCOUNTER — ANTI-COAG VISIT (OUTPATIENT)
Dept: CARDIOLOGY | Facility: CLINIC | Age: 69
End: 2019-12-11
Payer: MEDICARE

## 2019-12-11 DIAGNOSIS — Z79.01 LONG TERM (CURRENT) USE OF ANTICOAGULANTS: ICD-10-CM

## 2019-12-11 DIAGNOSIS — I48.0 PAROXYSMAL ATRIAL FIBRILLATION: ICD-10-CM

## 2019-12-11 DIAGNOSIS — F41.9 ANXIETY: ICD-10-CM

## 2019-12-11 LAB
INR PPP: 1.5 (ref 0.8–1.2)
PROTHROMBIN TIME: 15.8 SEC (ref 9–12.5)

## 2019-12-11 PROCEDURE — 93793 PR ANTICOAGULANT MGMT FOR PT TAKING WARFARIN: ICD-10-PCS | Mod: S$GLB,,, | Performed by: PHARMACIST

## 2019-12-11 PROCEDURE — 85610 PROTHROMBIN TIME: CPT | Mod: PO

## 2019-12-11 PROCEDURE — 93793 ANTICOAG MGMT PT WARFARIN: CPT | Mod: S$GLB,,, | Performed by: PHARMACIST

## 2019-12-11 PROCEDURE — 36415 COLL VENOUS BLD VENIPUNCTURE: CPT | Mod: PO

## 2019-12-12 RX ORDER — ZOLPIDEM TARTRATE 10 MG/1
TABLET ORAL
Qty: 30 TABLET | Refills: 0 | Status: SHIPPED | OUTPATIENT
Start: 2019-12-12 | End: 2020-01-10 | Stop reason: SDUPTHER

## 2019-12-12 NOTE — PROGRESS NOTES
Patient advised to take (Warfarin 7.5mg daily, except 5mg/WEd/Fri/Sun). Patient verbalized understanding.

## 2019-12-12 NOTE — PROGRESS NOTES
Patient reports that she had 2 beers -Monday, verified correct dose of coumadin: 10mg last Thursday then 5mg daily except 7.5mg-Monday

## 2019-12-18 ENCOUNTER — ANTI-COAG VISIT (OUTPATIENT)
Dept: CARDIOLOGY | Facility: CLINIC | Age: 69
End: 2019-12-18
Payer: MEDICARE

## 2019-12-18 ENCOUNTER — LAB VISIT (OUTPATIENT)
Dept: LAB | Facility: HOSPITAL | Age: 69
End: 2019-12-18
Attending: INTERNAL MEDICINE
Payer: MEDICARE

## 2019-12-18 DIAGNOSIS — Z79.01 LONG TERM (CURRENT) USE OF ANTICOAGULANTS: ICD-10-CM

## 2019-12-18 DIAGNOSIS — I48.0 PAROXYSMAL ATRIAL FIBRILLATION: ICD-10-CM

## 2019-12-18 LAB
INR PPP: 4.6 (ref 0.8–1.2)
PROTHROMBIN TIME: 48.2 SEC (ref 9–12.5)

## 2019-12-18 PROCEDURE — 36415 COLL VENOUS BLD VENIPUNCTURE: CPT | Mod: PO

## 2019-12-18 PROCEDURE — 85610 PROTHROMBIN TIME: CPT | Mod: PO

## 2019-12-18 PROCEDURE — 93793 ANTICOAG MGMT PT WARFARIN: CPT | Mod: S$GLB,,,

## 2019-12-18 PROCEDURE — 93793 PR ANTICOAGULANT MGMT FOR PT TAKING WARFARIN: ICD-10-PCS | Mod: S$GLB,,,

## 2019-12-23 ENCOUNTER — ANTI-COAG VISIT (OUTPATIENT)
Dept: CARDIOLOGY | Facility: CLINIC | Age: 69
End: 2019-12-23
Payer: MEDICARE

## 2019-12-23 ENCOUNTER — LAB VISIT (OUTPATIENT)
Dept: LAB | Facility: HOSPITAL | Age: 69
End: 2019-12-23
Attending: INTERNAL MEDICINE
Payer: MEDICARE

## 2019-12-23 DIAGNOSIS — I48.0 PAROXYSMAL ATRIAL FIBRILLATION: ICD-10-CM

## 2019-12-23 DIAGNOSIS — Z79.01 LONG TERM (CURRENT) USE OF ANTICOAGULANTS: ICD-10-CM

## 2019-12-23 LAB
INR PPP: 1.4
INR PPP: 1.4 (ref 0.8–1.2)
PROTHROMBIN TIME: 15 SEC (ref 9–12.5)

## 2019-12-23 PROCEDURE — 93793 PR ANTICOAGULANT MGMT FOR PT TAKING WARFARIN: ICD-10-PCS | Mod: S$GLB,,,

## 2019-12-23 PROCEDURE — 36415 COLL VENOUS BLD VENIPUNCTURE: CPT | Mod: PO

## 2019-12-23 PROCEDURE — 93793 ANTICOAG MGMT PT WARFARIN: CPT | Mod: S$GLB,,,

## 2019-12-23 PROCEDURE — 85610 PROTHROMBIN TIME: CPT | Mod: PO

## 2019-12-24 NOTE — PROGRESS NOTES
Patient advise to take (10mg Mon) she only took (5mg). She will take (10mg today/Tues, except 5mg Wednesday/Fri and 7.mg Thurs/Sun). Patient verbalized understanding.

## 2019-12-26 DIAGNOSIS — I48.0 PAROXYSMAL ATRIAL FIBRILLATION: ICD-10-CM

## 2019-12-26 DIAGNOSIS — Z79.01 LONG TERM (CURRENT) USE OF ANTICOAGULANTS: ICD-10-CM

## 2019-12-27 ENCOUNTER — PATIENT OUTREACH (OUTPATIENT)
Dept: ADMINISTRATIVE | Facility: HOSPITAL | Age: 69
End: 2019-12-27

## 2019-12-27 RX ORDER — WARFARIN SODIUM 5 MG/1
TABLET ORAL
Qty: 105 TABLET | Refills: 1 | Status: SHIPPED | OUTPATIENT
Start: 2019-12-27 | End: 2020-01-08

## 2019-12-30 ENCOUNTER — TELEPHONE (OUTPATIENT)
Dept: CARDIOLOGY | Facility: CLINIC | Age: 69
End: 2019-12-30

## 2019-12-30 ENCOUNTER — LAB VISIT (OUTPATIENT)
Dept: LAB | Facility: HOSPITAL | Age: 69
End: 2019-12-30
Attending: INTERNAL MEDICINE
Payer: MEDICARE

## 2019-12-30 DIAGNOSIS — I48.0 PAROXYSMAL ATRIAL FIBRILLATION: ICD-10-CM

## 2019-12-30 LAB
INR PPP: 5.3 (ref 0.8–1.2)
PROTHROMBIN TIME: 55.5 SEC (ref 9–12.5)

## 2019-12-30 PROCEDURE — 85610 PROTHROMBIN TIME: CPT | Mod: PO

## 2019-12-30 PROCEDURE — 36415 COLL VENOUS BLD VENIPUNCTURE: CPT | Mod: PO

## 2019-12-30 NOTE — TELEPHONE ENCOUNTER
Attempted to call patient about elevated INR of 5.3.  Likely needs to reduce tonight 12/30/19 dose by 1/2.    Will attempt to call again later tonight    Thank you for the opportunity to care for this patient. Will be available for questions if needed.     Blas Rain MD  Interventional Cardiology  [facility]  Pager: [phone]

## 2019-12-31 ENCOUNTER — ANTI-COAG VISIT (OUTPATIENT)
Dept: CARDIOLOGY | Facility: CLINIC | Age: 69
End: 2019-12-31
Payer: MEDICARE

## 2019-12-31 DIAGNOSIS — I48.0 PAROXYSMAL ATRIAL FIBRILLATION: ICD-10-CM

## 2019-12-31 DIAGNOSIS — Z79.01 LONG TERM (CURRENT) USE OF ANTICOAGULANTS: ICD-10-CM

## 2019-12-31 PROCEDURE — 93793 PR ANTICOAGULANT MGMT FOR PT TAKING WARFARIN: ICD-10-PCS | Mod: S$GLB,,,

## 2019-12-31 PROCEDURE — 93793 ANTICOAG MGMT PT WARFARIN: CPT | Mod: S$GLB,,,

## 2019-12-31 NOTE — PROGRESS NOTES
INR not at goal -- erratic though patient frequently reports taking dose as advised without other changes. Medications, chart, and patient findings reviewed. See calendar for adjustments to dose and follow up plan.

## 2020-01-02 ENCOUNTER — LAB VISIT (OUTPATIENT)
Dept: LAB | Facility: HOSPITAL | Age: 70
End: 2020-01-02
Attending: INTERNAL MEDICINE
Payer: MEDICARE

## 2020-01-02 ENCOUNTER — TELEPHONE (OUTPATIENT)
Dept: PAIN MEDICINE | Facility: CLINIC | Age: 70
End: 2020-01-02

## 2020-01-02 ENCOUNTER — ANTI-COAG VISIT (OUTPATIENT)
Dept: CARDIOLOGY | Facility: CLINIC | Age: 70
End: 2020-01-02
Payer: MEDICARE

## 2020-01-02 ENCOUNTER — PATIENT OUTREACH (OUTPATIENT)
Dept: ADMINISTRATIVE | Facility: OTHER | Age: 70
End: 2020-01-02

## 2020-01-02 DIAGNOSIS — Z79.01 LONG TERM (CURRENT) USE OF ANTICOAGULANTS: ICD-10-CM

## 2020-01-02 DIAGNOSIS — I48.0 PAROXYSMAL ATRIAL FIBRILLATION: ICD-10-CM

## 2020-01-02 LAB
INR PPP: 3.3 (ref 0.8–1.2)
PROTHROMBIN TIME: 34.8 SEC (ref 9–12.5)

## 2020-01-02 PROCEDURE — 85610 PROTHROMBIN TIME: CPT | Mod: PO

## 2020-01-02 PROCEDURE — 36415 COLL VENOUS BLD VENIPUNCTURE: CPT | Mod: PO

## 2020-01-02 PROCEDURE — 93793 PR ANTICOAGULANT MGMT FOR PT TAKING WARFARIN: ICD-10-PCS | Mod: S$GLB,,,

## 2020-01-02 PROCEDURE — 93793 ANTICOAG MGMT PT WARFARIN: CPT | Mod: S$GLB,,,

## 2020-01-07 ENCOUNTER — TELEPHONE (OUTPATIENT)
Dept: CARDIOLOGY | Facility: CLINIC | Age: 70
End: 2020-01-07

## 2020-01-07 NOTE — PROGRESS NOTES
Patient called to report that she needs to cancel 1/08/20 lab appointment, states as per Dr. Laci Barker she will be stopping coumadin today and in 3 days will start back on Eliquis,

## 2020-01-07 NOTE — PROGRESS NOTES
MA notes reviewed. Would still recommend INR 1/8 to make sure level is not too high to start Eliquis.

## 2020-01-08 ENCOUNTER — TELEPHONE (OUTPATIENT)
Dept: CARDIOLOGY | Facility: CLINIC | Age: 70
End: 2020-01-08

## 2020-01-08 ENCOUNTER — ANTI-COAG VISIT (OUTPATIENT)
Dept: CARDIOLOGY | Facility: CLINIC | Age: 70
End: 2020-01-08

## 2020-01-08 ENCOUNTER — LAB VISIT (OUTPATIENT)
Dept: LAB | Facility: HOSPITAL | Age: 70
End: 2020-01-08
Attending: INTERNAL MEDICINE
Payer: MEDICARE

## 2020-01-08 DIAGNOSIS — I48.0 PAROXYSMAL ATRIAL FIBRILLATION: ICD-10-CM

## 2020-01-08 DIAGNOSIS — Z79.01 LONG TERM (CURRENT) USE OF ANTICOAGULANTS: ICD-10-CM

## 2020-01-08 LAB
INR PPP: 1.5 (ref 0.8–1.2)
PROTHROMBIN TIME: 16.5 SEC (ref 9–12.5)

## 2020-01-08 PROCEDURE — 85610 PROTHROMBIN TIME: CPT | Mod: PO

## 2020-01-08 PROCEDURE — 36415 COLL VENOUS BLD VENIPUNCTURE: CPT | Mod: PO

## 2020-01-08 NOTE — TELEPHONE ENCOUNTER
Pt requests resumption of Eliquis.  INR is subtherapeutic today.  Discussed with pt.  Discontinue warfarin.  Begin Eliquis 5 mg bid.  Discontinue coumadin clinic

## 2020-01-10 ENCOUNTER — OFFICE VISIT (OUTPATIENT)
Dept: FAMILY MEDICINE | Facility: CLINIC | Age: 70
End: 2020-01-10
Payer: MEDICARE

## 2020-01-10 VITALS
HEIGHT: 62 IN | WEIGHT: 212.31 LBS | BODY MASS INDEX: 39.07 KG/M2 | TEMPERATURE: 98 F | OXYGEN SATURATION: 97 % | DIASTOLIC BLOOD PRESSURE: 74 MMHG | SYSTOLIC BLOOD PRESSURE: 104 MMHG | HEART RATE: 76 BPM

## 2020-01-10 DIAGNOSIS — I48.0 PAROXYSMAL ATRIAL FIBRILLATION: ICD-10-CM

## 2020-01-10 DIAGNOSIS — M89.9 CHRONIC KIDNEY DISEASE-MINERAL AND BONE DISORDER: Chronic | ICD-10-CM

## 2020-01-10 DIAGNOSIS — Z12.31 ENCOUNTER FOR SCREENING MAMMOGRAM FOR MALIGNANT NEOPLASM OF BREAST: ICD-10-CM

## 2020-01-10 DIAGNOSIS — E03.4 HYPOTHYROIDISM DUE TO ACQUIRED ATROPHY OF THYROID: ICD-10-CM

## 2020-01-10 DIAGNOSIS — G47.00 INSOMNIA, UNSPECIFIED TYPE: ICD-10-CM

## 2020-01-10 DIAGNOSIS — E78.00 PURE HYPERCHOLESTEROLEMIA: ICD-10-CM

## 2020-01-10 DIAGNOSIS — E83.9 CHRONIC KIDNEY DISEASE-MINERAL AND BONE DISORDER: Chronic | ICD-10-CM

## 2020-01-10 DIAGNOSIS — S46.012S TRAUMATIC COMPLETE TEAR OF LEFT ROTATOR CUFF, SEQUELA: ICD-10-CM

## 2020-01-10 DIAGNOSIS — Z79.01 LONG TERM (CURRENT) USE OF ANTICOAGULANTS: ICD-10-CM

## 2020-01-10 DIAGNOSIS — N18.9 CHRONIC KIDNEY DISEASE-MINERAL AND BONE DISORDER: Chronic | ICD-10-CM

## 2020-01-10 DIAGNOSIS — F41.9 ANXIETY: ICD-10-CM

## 2020-01-10 DIAGNOSIS — N18.4 CKD STAGE G4/A2, GFR 15-29 AND ALBUMIN CREATININE RATIO 30-299 MG/G: ICD-10-CM

## 2020-01-10 DIAGNOSIS — I10 ESSENTIAL HYPERTENSION: Chronic | ICD-10-CM

## 2020-01-10 DIAGNOSIS — I48.19 PERSISTENT ATRIAL FIBRILLATION: Primary | ICD-10-CM

## 2020-01-10 PROCEDURE — 1126F PR PAIN SEVERITY QUANTIFIED, NO PAIN PRESENT: ICD-10-PCS | Mod: S$GLB,,, | Performed by: FAMILY MEDICINE

## 2020-01-10 PROCEDURE — 1159F PR MEDICATION LIST DOCUMENTED IN MEDICAL RECORD: ICD-10-PCS | Mod: S$GLB,,, | Performed by: FAMILY MEDICINE

## 2020-01-10 PROCEDURE — 99214 OFFICE O/P EST MOD 30 MIN: CPT | Mod: S$GLB,,, | Performed by: FAMILY MEDICINE

## 2020-01-10 PROCEDURE — 3078F PR MOST RECENT DIASTOLIC BLOOD PRESSURE < 80 MM HG: ICD-10-PCS | Mod: CPTII,S$GLB,, | Performed by: FAMILY MEDICINE

## 2020-01-10 PROCEDURE — 90670 PNEUMOCOCCAL CONJUGATE VACCINE 13-VALENT LESS THAN 5YO & GREATER THAN: ICD-10-PCS | Mod: S$GLB,,, | Performed by: FAMILY MEDICINE

## 2020-01-10 PROCEDURE — 1101F PT FALLS ASSESS-DOCD LE1/YR: CPT | Mod: CPTII,S$GLB,, | Performed by: FAMILY MEDICINE

## 2020-01-10 PROCEDURE — 99499 UNLISTED E&M SERVICE: CPT | Mod: S$GLB,,, | Performed by: FAMILY MEDICINE

## 2020-01-10 PROCEDURE — 1101F PR PT FALLS ASSESS DOC 0-1 FALLS W/OUT INJ PAST YR: ICD-10-PCS | Mod: CPTII,S$GLB,, | Performed by: FAMILY MEDICINE

## 2020-01-10 PROCEDURE — 1126F AMNT PAIN NOTED NONE PRSNT: CPT | Mod: S$GLB,,, | Performed by: FAMILY MEDICINE

## 2020-01-10 PROCEDURE — G0009 PNEUMOCOCCAL CONJUGATE VACCINE 13-VALENT LESS THAN 5YO & GREATER THAN: ICD-10-PCS | Mod: S$GLB,,, | Performed by: FAMILY MEDICINE

## 2020-01-10 PROCEDURE — G0009 ADMIN PNEUMOCOCCAL VACCINE: HCPCS | Mod: S$GLB,,, | Performed by: FAMILY MEDICINE

## 2020-01-10 PROCEDURE — 90670 PCV13 VACCINE IM: CPT | Mod: S$GLB,,, | Performed by: FAMILY MEDICINE

## 2020-01-10 PROCEDURE — 99499 RISK ADDL DX/OHS AUDIT: ICD-10-PCS | Mod: S$GLB,,, | Performed by: FAMILY MEDICINE

## 2020-01-10 PROCEDURE — 3074F PR MOST RECENT SYSTOLIC BLOOD PRESSURE < 130 MM HG: ICD-10-PCS | Mod: CPTII,S$GLB,, | Performed by: FAMILY MEDICINE

## 2020-01-10 PROCEDURE — 3074F SYST BP LT 130 MM HG: CPT | Mod: CPTII,S$GLB,, | Performed by: FAMILY MEDICINE

## 2020-01-10 PROCEDURE — 99214 PR OFFICE/OUTPT VISIT, EST, LEVL IV, 30-39 MIN: ICD-10-PCS | Mod: S$GLB,,, | Performed by: FAMILY MEDICINE

## 2020-01-10 PROCEDURE — 3078F DIAST BP <80 MM HG: CPT | Mod: CPTII,S$GLB,, | Performed by: FAMILY MEDICINE

## 2020-01-10 PROCEDURE — 1159F MED LIST DOCD IN RCRD: CPT | Mod: S$GLB,,, | Performed by: FAMILY MEDICINE

## 2020-01-10 RX ORDER — GABAPENTIN 400 MG/1
400 CAPSULE ORAL 2 TIMES DAILY
Qty: 180 CAPSULE | Refills: 3 | Status: SHIPPED | OUTPATIENT
Start: 2020-01-10 | End: 2020-12-08 | Stop reason: SDUPTHER

## 2020-01-10 RX ORDER — LEVOTHYROXINE SODIUM 175 UG/1
175 TABLET ORAL DAILY
Qty: 90 TABLET | Refills: 3 | Status: SHIPPED | OUTPATIENT
Start: 2020-01-10 | End: 2020-12-14 | Stop reason: SDUPTHER

## 2020-01-10 RX ORDER — ZOLPIDEM TARTRATE 10 MG/1
TABLET ORAL
Qty: 90 TABLET | Refills: 1 | Status: SHIPPED | OUTPATIENT
Start: 2020-01-10 | End: 2020-12-14 | Stop reason: SDUPTHER

## 2020-01-10 RX ORDER — ATORVASTATIN CALCIUM 10 MG/1
10 TABLET, FILM COATED ORAL DAILY
Qty: 90 TABLET | Refills: 3 | Status: SHIPPED | OUTPATIENT
Start: 2020-01-10 | End: 2020-12-14 | Stop reason: SDUPTHER

## 2020-01-10 RX ORDER — ALPRAZOLAM 0.25 MG/1
0.25 TABLET ORAL DAILY
Qty: 90 TABLET | Refills: 1 | Status: SHIPPED | OUTPATIENT
Start: 2020-01-10 | End: 2020-12-14 | Stop reason: SDUPTHER

## 2020-01-10 NOTE — PROGRESS NOTES
"Subjective:      Patient ID: Talia Dillon is a 69 y.o. female.    Chief Complaint: No chief complaint on file.      Vitals:    01/10/20 1110   BP: 104/74   Pulse: 76   Temp: 97.7 °F (36.5 °C)   TempSrc: Oral   SpO2: 97%   Weight: 96.3 kg (212 lb 4.9 oz)   Height: 5' 2" (1.575 m)        HPI   Needs refills; doing OK; has a fib constatnly;       Review of Systems   Constitutional: Negative.    HENT: Negative.    Respiratory: Negative.    Cardiovascular: Negative.    Gastrointestinal: Negative.    Endocrine: Negative.    Genitourinary: Negative.    Musculoskeletal: Positive for back pain.   Psychiatric/Behavioral: Positive for sleep disturbance. The patient is nervous/anxious.    All other systems reviewed and are negative.    Objective:     Physical Exam   Constitutional: She is oriented to person, place, and time. She appears well-developed and well-nourished.   HENT:   Head: Normocephalic.   Eyes: Pupils are equal, round, and reactive to light. Conjunctivae and EOM are normal.   Neck: Normal range of motion. Neck supple.   Cardiovascular: Normal rate, regular rhythm and normal heart sounds.   Pulmonary/Chest: Effort normal and breath sounds normal.   Musculoskeletal: Normal range of motion.   Neurological: She is alert and oriented to person, place, and time. She has normal reflexes.   Skin: Skin is warm and dry.   Psychiatric: She has a normal mood and affect. Her behavior is normal. Judgment and thought content normal.   Nursing note and vitals reviewed.    Assessment:     1. Persistent atrial fibrillation    2. Traumatic complete tear of left rotator cuff, sequela    3. Encounter for screening mammogram for malignant neoplasm of breast     4. Anxiety    5. Insomnia, unspecified type    6. Chronic kidney disease-mineral and bone disorder    7. CKD stage G4/A2, GFR 15-29 and albumin creatinine ratio  mg/g    8. Essential hypertension    9. Paroxysmal atrial fibrillation    10. Pure hypercholesterolemia  "   11. Long term (current) use of anticoagulants    12. Hypothyroidism due to acquired atrophy of thyroid      Plan:        Medication List           Accurate as of January 10, 2020 11:59 PM. If you have any questions, ask your nurse or doctor.               CHANGE how you take these medications    zolpidem 10 mg Tab  Commonly known as:  AMBIEN  TAKE 1 TABLET BY MOUTH EVERY DAY IN THE EVENING AS NEEDED  What changed:    · how much to take  · how to take this  · when to take this  · additional instructions  Changed by:  James Vallecillo MD        CONTINUE taking these medications    ALPRAZolam 0.25 MG tablet  Commonly known as:  XANAX  Take 1 tablet (0.25 mg total) by mouth once daily.     apixaban 5 mg Tab  Commonly known as:  ELIQUIS  Take 1 tablet (5 mg total) by mouth 2 (two) times daily.     atorvastatin 10 MG tablet  Commonly known as:  LIPITOR  Take 1 tablet (10 mg total) by mouth once daily.     candesartan 8 MG tablet  Commonly known as:  ATACAND  Take 1 tablet (8 mg total) by mouth once daily.     cholecalciferol (vitamin D3) 125 mcg (5,000 unit) Tab  Commonly known as:  Vitamin D3  Take 5,000 Units by mouth once daily.     DULoxetine 60 MG capsule  Commonly known as:  CYMBALTA  Take 1 capsule (60 mg total) by mouth 2 (two) times daily.     gabapentin 400 MG capsule  Commonly known as:  NEURONTIN  Take 1 capsule (400 mg total) by mouth 2 (two) times daily. Take 2 tablets once daily     levothyroxine 175 MCG tablet  Commonly known as:  SYNTHROID, LEVOTHROID  Take 1 tablet (175 mcg total) by mouth once daily.     metoprolol tartrate 25 MG tablet  Commonly known as:  LOPRESSOR  Take 3 tablets (75 mg total) by mouth 2 (two) times daily.        STOP taking these medications    topiramate 100 MG tablet  Commonly known as:  TOPAMAX  Stopped by:  James Vallecillo MD           Where to Get Your Medications      Information about where to get these medications is not yet available    Ask your nurse or doctor about these  medications  · ALPRAZolam 0.25 MG tablet  · atorvastatin 10 MG tablet  · gabapentin 400 MG capsule  · levothyroxine 175 MCG tablet  · zolpidem 10 mg Tab       Persistent atrial fibrillation  -     Mammo Digital Screening Bilat w/ Erlin; Future; Expected date: 01/10/2020    Traumatic complete tear of left rotator cuff, sequela  -     Mammo Digital Screening Bilat w/ Erlin; Future; Expected date: 01/10/2020    Encounter for screening mammogram for malignant neoplasm of breast   -     Mammo Digital Screening Bilat w/ Erlin; Future; Expected date: 01/10/2020    Anxiety  -     ALPRAZolam (XANAX) 0.25 MG tablet; Take 1 tablet (0.25 mg total) by mouth once daily.  Dispense: 90 tablet; Refill: 1  -     zolpidem (AMBIEN) 10 mg Tab; TAKE 1 TABLET BY MOUTH EVERY DAY IN THE EVENING AS NEEDED  Dispense: 90 tablet; Refill: 1  -     Ambulatory referral to Psychiatry    Insomnia, unspecified type  -     Ambulatory referral to Psychiatry    Chronic kidney disease-mineral and bone disorder    CKD stage G4/A2, GFR 15-29 and albumin creatinine ratio  mg/g    Essential hypertension    Paroxysmal atrial fibrillation    Pure hypercholesterolemia    Long term (current) use of anticoagulants    Hypothyroidism due to acquired atrophy of thyroid    Other orders  -     Pneumococcal Conjugate Vaccine (13 Valent) (IM)  -     atorvastatin (LIPITOR) 10 MG tablet; Take 1 tablet (10 mg total) by mouth once daily.  Dispense: 90 tablet; Refill: 3  -     gabapentin (NEURONTIN) 400 MG capsule; Take 1 capsule (400 mg total) by mouth 2 (two) times daily. Take 2 tablets once daily  Dispense: 180 capsule; Refill: 3  -     levothyroxine (SYNTHROID, LEVOTHROID) 175 MCG tablet; Take 1 tablet (175 mcg total) by mouth once daily.  Dispense: 90 tablet; Refill: 3

## 2020-01-14 NOTE — PROGRESS NOTES
Patient, Talia Dillon (MRN #[id number]), presented with a recorded BMI of 38.83 kg/m^2 and a documented comorbidity(s):  - Hypertension  - Hyperlipidemia  - Atrial Fibrillation  to which the severe obesity is a contributing factor. This is consistent with the definition of severe obesity (BMI 35.0-39.9) with comorbidity (ICD-10 E66.01, Z68.35). The patient's severe obesity was monitored, evaluated, addressed and/or treated. This addendum to the medical record is made on 01/14/2020.

## 2020-01-16 ENCOUNTER — HOSPITAL ENCOUNTER (OUTPATIENT)
Dept: RADIOLOGY | Facility: HOSPITAL | Age: 70
Discharge: HOME | End: 2020-01-16
Attending: FAMILY MEDICINE
Payer: MEDICARE

## 2020-01-16 DIAGNOSIS — Z12.31 ENCOUNTER FOR SCREENING MAMMOGRAM FOR MALIGNANT NEOPLASM OF BREAST: ICD-10-CM

## 2020-01-16 PROCEDURE — 77067 SCR MAMMO BI INCL CAD: CPT | Mod: TC,PO

## 2020-01-23 ENCOUNTER — OFFICE VISIT (OUTPATIENT)
Dept: SPINE | Facility: CLINIC | Age: 70
End: 2020-01-23
Payer: MEDICARE

## 2020-01-23 VITALS
HEIGHT: 62 IN | HEART RATE: 88 BPM | BODY MASS INDEX: 39.01 KG/M2 | RESPIRATION RATE: 18 BRPM | WEIGHT: 212 LBS | SYSTOLIC BLOOD PRESSURE: 102 MMHG | DIASTOLIC BLOOD PRESSURE: 73 MMHG

## 2020-01-23 DIAGNOSIS — M47.816 LUMBAR SPONDYLOSIS: Primary | ICD-10-CM

## 2020-01-23 DIAGNOSIS — M51.36 DDD (DEGENERATIVE DISC DISEASE), LUMBAR: ICD-10-CM

## 2020-01-23 DIAGNOSIS — G89.4 CHRONIC PAIN DISORDER: ICD-10-CM

## 2020-01-23 PROCEDURE — 1125F PR PAIN SEVERITY QUANTIFIED, PAIN PRESENT: ICD-10-PCS | Mod: S$GLB,,, | Performed by: NURSE PRACTITIONER

## 2020-01-23 PROCEDURE — 3074F PR MOST RECENT SYSTOLIC BLOOD PRESSURE < 130 MM HG: ICD-10-PCS | Mod: CPTII,S$GLB,, | Performed by: NURSE PRACTITIONER

## 2020-01-23 PROCEDURE — 3078F PR MOST RECENT DIASTOLIC BLOOD PRESSURE < 80 MM HG: ICD-10-PCS | Mod: CPTII,S$GLB,, | Performed by: NURSE PRACTITIONER

## 2020-01-23 PROCEDURE — 3074F SYST BP LT 130 MM HG: CPT | Mod: CPTII,S$GLB,, | Performed by: NURSE PRACTITIONER

## 2020-01-23 PROCEDURE — 1159F MED LIST DOCD IN RCRD: CPT | Mod: S$GLB,,, | Performed by: NURSE PRACTITIONER

## 2020-01-23 PROCEDURE — 1101F PR PT FALLS ASSESS DOC 0-1 FALLS W/OUT INJ PAST YR: ICD-10-PCS | Mod: CPTII,S$GLB,, | Performed by: NURSE PRACTITIONER

## 2020-01-23 PROCEDURE — 99213 PR OFFICE/OUTPT VISIT, EST, LEVL III, 20-29 MIN: ICD-10-PCS | Mod: S$GLB,,, | Performed by: NURSE PRACTITIONER

## 2020-01-23 PROCEDURE — 99999 PR PBB SHADOW E&M-EST. PATIENT-LVL III: ICD-10-PCS | Mod: PBBFAC,,, | Performed by: NURSE PRACTITIONER

## 2020-01-23 PROCEDURE — 1101F PT FALLS ASSESS-DOCD LE1/YR: CPT | Mod: CPTII,S$GLB,, | Performed by: NURSE PRACTITIONER

## 2020-01-23 PROCEDURE — 1125F AMNT PAIN NOTED PAIN PRSNT: CPT | Mod: S$GLB,,, | Performed by: NURSE PRACTITIONER

## 2020-01-23 PROCEDURE — 3078F DIAST BP <80 MM HG: CPT | Mod: CPTII,S$GLB,, | Performed by: NURSE PRACTITIONER

## 2020-01-23 PROCEDURE — 99999 PR PBB SHADOW E&M-EST. PATIENT-LVL III: CPT | Mod: PBBFAC,,, | Performed by: NURSE PRACTITIONER

## 2020-01-23 PROCEDURE — 1159F PR MEDICATION LIST DOCUMENTED IN MEDICAL RECORD: ICD-10-PCS | Mod: S$GLB,,, | Performed by: NURSE PRACTITIONER

## 2020-01-23 PROCEDURE — 99213 OFFICE O/P EST LOW 20 MIN: CPT | Mod: S$GLB,,, | Performed by: NURSE PRACTITIONER

## 2020-01-23 RX ORDER — DULOXETIN HYDROCHLORIDE 60 MG/1
60 CAPSULE, DELAYED RELEASE ORAL 2 TIMES DAILY
Qty: 60 CAPSULE | Refills: 2 | Status: SHIPPED | OUTPATIENT
Start: 2020-01-23 | End: 2020-10-20 | Stop reason: SDUPTHER

## 2020-01-23 NOTE — PROGRESS NOTES
"Subjective:     Patient ID: Talia Dillon is a 69 y.o. female.    Chief Complaint: Pain    Consulted by: Dr. Gallagher    Disclaimer: This note was generated using voice recognition software.  There may be a typographical errors that were missed during proofreading.      HPI:    Talia Dillon is a 69 y.o. female who presents today with neck, shoulder, low back and leg pain. This pain is described in detail below.    Ms. Dillon presents to pain clinic today complaining of pain in a variety of areas, including her neck, shoulders, low back and leg pain. Her low back and leg pain is the most bothersome. 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 She has had long term PT her whole life because of this. She was recently evaluated by Dr. Gallagher who referred her to PT and pain clinic for management options.    Ms. Dillon states her low back pain is constant with radiation down both legs. She rates her pain as a 7/10 today. The pain is made worse with movement and walking. The pain is a dull ache in her low back, but feels like a sharp burning in her legs "like an electric wing." This pain does not travel in to her feet, but she has numbness and paraesthesias in her feet separately.     She has just recently restarted with physical therapy; she has completed 4 sessions at this time and feels this is starting to help. She feels it is painful but ultimately improving her situation.     Interval History (8/30/2018):  She returns today for follow up.  She reports that PT has been helpful for the pain.  She finished her last in office session yesterday.  She is now going to start her HEP.  She reports that the epidural didn't help. She wishes she could take Aleve as this was very helpful    Interval History (10/11/2018):  The patient returns for " follow up of back pain.  Since her last OV, she had L2-5 MBB x2.  She is reporting 80% relief from both procedures for about one day.  She is here today to discuss RFA procedures.  She continues with come exercise regimen.  She did have recent labs as ordered by her PCP which again showed elevated BUN.  Her pain today is 8/10.     Interval History (12/7/2018):  The patient presents for procedure follow up.  She is s/p L2,3,4,5 RFAs with about 70% pain relief.  Her pain is not as constant.  She is able to walk further.  Her pain is not as intense when it comes.  She is still somewhat limited by her pain, particularly regarding activity level.  She also feels short winded sometimes.  She is seeing cardiology next month.  She is also seeing bariatric medicine and has been losing weight.  Her pain today is 5/10.    Interval History (3/12/2019):  The patient presents for follow up.  Cymbalta was increased at last OV.  She reports that this has been helpful.  Her pain is stable and currently manageable.  She was recently found to be in A fib.  She had a cardioversion procedure on 2/8/19 and is scheduled for another on 3/19/19.  She is being weaned from Elavil by cardiology.  Her pain today is 4/10.    Interval History (6/11/2019):  The patient returns for follow up of chronic pain.  Since her last OV, I obtained a left shoulder MRI which showed rotator tear.  She had a visit with Dr. Davila and surgical repair was recommended.  She is waiting a bit because of moving.  She had a recent appointment with her PCP.  He recommended that we increase Cymbalta due to depression and pain.  She is currently taking 60 mg QD without adverse effects.  Her pain today is 8/10.    Interval History (9/30/2019):  The patient is here for follow up of back and left shoulder pain.  She is still considering left shoulder surgery with Dr. Davila.  She says that her shoulder pain has essentially resolved so she is leaning against surgery.  Her  back pain has been mild.  She has some stiffness and tightness but otherwise it is tolerable.  She is not interested in procedures at this time.  Her pain today is 7/10.    Interval History (1/23/2020):  The patient is here for follow up of back pain.  She is reporting increased middle and lower back pain recently.  She denies any recent injury.  She denies any radiation.  She denies numbness or tingling.  She has significant pain in the morning.  She feels like it improves and loosens up during the day.  She had benefit with lumbar RFAs is in the past.  Her pain today is 9/10.     Physical Therapy: Yes; finished 8/28/2018, doing HEP (8/20/3018)    Non-pharmacologic Treatment:     · Ice/Heat: Not tried  · TENS: Tried; not helpful  · Massage: Helpful  · Chiropractic care: Yes, not helpful  · Acupuncture: Yes, not helpful  · Other: None         Pain Medications:         · Currently taking:   · Gabapentin 800 mg in am  · OTC topical creams   · Acetaminophen as needed   · Cymbalta 60 mg BID    · Has tried in the past:    · NSAIDs- Aleve and Ibuprofen: had to stop due to CKD   · Baclofen- had to stop due to CKD    · Has not tried: Opioids,  Rx topical creams    Blood thinners: No    Interventional Therapies:  9/21/18 Bilateral L2-5 MBB- 80% relief  9/28/18 Bilateral L2-5 MBB- 80% relief  10/26/18 Right L2,3,4,5 RFA- 70% relief  11/9/18 Left L2,3,4,5 RFA- 70% relief  4/29/19 Left shoulder injection- helpful (ortho)    Relevant Surgeries:   No    Affecting sleep? Not usually     Affecting daily activities? Yes    Depressive symptoms? Yes          · SI/HI? No    Work status: Retired; used to work as a      Prescription Monitoring Program database:  Reviewed and consistent with medication use as prescribed.    Pain Scales  Best: 3/10  Worst: 9/10  Usually: 5/10  Today: 9/10    Low-back Pain   Associated symptoms include numbness and paresthesias. Pertinent negatives include no abdominal pain, bladder incontinence,  bowel incontinence, chest pain, dysuria, fever or weakness.       Last 3 PDI Scores 1/23/2020 9/30/2019 6/11/2019   Pain Disability Index (PDI) 63 [phone]       Review of Systems   Constitution: Negative for chills, decreased appetite, diaphoresis and fever.   HENT: Negative for congestion, ear discharge and ear pain.    Eyes: Negative for blurred vision, discharge and double vision.   Cardiovascular: Negative for chest pain, claudication and cyanosis.   Respiratory: Negative for cough, hemoptysis and shortness of breath.    Endocrine: Negative for cold intolerance, heat intolerance and polydipsia.   Skin: Negative for color change, dry skin and nail changes.   Musculoskeletal: Positive for arthritis, back pain, myalgias and neck pain.   Gastrointestinal: Negative for bloating, abdominal pain, change in bowel habit and bowel incontinence.   Genitourinary: Negative for bladder incontinence, decreased libido and dysuria.   Neurological: Positive for disturbances in coordination, numbness and paresthesias. Negative for weakness.   Psychiatric/Behavioral: Positive for depression. Negative for altered mental status, hallucinations and hypervigilance.             Past Medical History:   Diagnosis Date    Allergy     Anemia     Anxiety     Atrial fibrillation     Cancer     skin    Cataract     Depression     Edema     Hypothyroidism     Kidney disease     PSVT (paroxysmal supraventricular tachycardia)     Thyroid disease        Past Surgical History:   Procedure Laterality Date    ADENOIDECTOMY      APPENDECTOMY      COLONOSCOPY  2009    repeat in 10 years     EYE SURGERY      HYSTERECTOMY      INJECTION OF ANESTHETIC AGENT AROUND NERVE Bilateral 9/21/2018    Procedure: BLOCK, NERVE, MEDIAL BRANCH BLOCK BILATERAL LUMBAR L2-5;  Surgeon: Julieth Christopher MD;  Location: [facility] PAIN T;  Service: Pain Management;  Laterality: Bilateral;  1 of 2    INJECTION OF ANESTHETIC AGENT AROUND NERVE Bilateral 9/28/2018     Procedure: BLOCK, NERVE, MEDIAL BRANCH BLOCK BILATERAL LUMBAR L2-5;  Surgeon: Julieth Christopher MD;  Location: [facility] PAIN MGT;  Service: Pain Management;  Laterality: Bilateral;  2 of 2  PLEASE GIVE NIRAVAM PER MD    OOPHORECTOMY      TONSILLECTOMY      TREATMENT OF CARDIAC ARRHYTHMIA N/A 2/8/2019    Procedure: CARDIOVERSION;  Surgeon: Devin You MD;  Location: [facility] EP LAB;  Service: Cardiology;  Laterality: N/A;  AF, KAROL, DCCV, MAC, GA, 3 Prep       Review of patient's allergies indicates:   Allergen Reactions    Dexamethasone Rash       Current Outpatient Medications   Medication Sig Dispense Refill    ALPRAZolam (XANAX) 0.25 MG tablet Take 1 tablet (0.25 mg total) by mouth once daily. 90 tablet 1    apixaban (ELIQUIS) 5 mg Tab Take 1 tablet (5 mg total) by mouth 2 (two) times daily. 180 tablet 3    atorvastatin (LIPITOR) 10 MG tablet Take 1 tablet (10 mg total) by mouth once daily. 90 tablet 3    candesartan (ATACAND) 8 MG tablet Take 1 tablet (8 mg total) by mouth once daily. 90 tablet 3    cholecalciferol, vitamin D3, (VITAMIN D3) 5,000 unit Tab Take 5,000 Units by mouth once daily. 90 tablet 3    gabapentin (NEURONTIN) 400 MG capsule Take 1 capsule (400 mg total) by mouth 2 (two) times daily. Take 2 tablets once daily 180 capsule 3    levothyroxine (SYNTHROID, LEVOTHROID) 175 MCG tablet Take 1 tablet (175 mcg total) by mouth once daily. 90 tablet 3    metoprolol tartrate (LOPRESSOR) 25 MG tablet Take 3 tablets (75 mg total) by mouth 2 (two) times daily. 180 tablet 11    zolpidem (AMBIEN) 10 mg Tab TAKE 1 TABLET BY MOUTH EVERY DAY IN THE EVENING AS NEEDED 90 tablet 1    DULoxetine (CYMBALTA) 60 MG capsule Take 1 capsule (60 mg total) by mouth 2 (two) times daily. 60 capsule 2     No current facility-administered medications for this visit.        Family History   Problem Relation Age of Onset    Stroke Mother     Stroke Father     Diabetes Father     Sleep apnea Daughter     Mental  "illness Daughter        Social History     Socioeconomic History    Marital status:      Spouse name: Not on file    Number of children: Not on file    Years of education: Not on file    Highest education level: Not on file   Occupational History    Not on file   Social Needs    Financial resource strain: Not on file    Food insecurity:     Worry: Not on file     Inability: Not on file    Transportation needs:     Medical: Not on file     Non-medical: Not on file   Tobacco Use    Smoking status: Current Some Day Smoker     Packs/day: 1.00     Years: 50.00     Pack years: 50.00     Types: Cigarettes     Last attempt to quit: 2018     Years since quittin.6    Smokeless tobacco: Never Used   Substance and Sexual Activity    Alcohol use: No     Frequency: Never     Comment: rarely    Drug use: No    Sexual activity: Not Currently   Lifestyle    Physical activity:     Days per week: Not on file     Minutes per session: Not on file    Stress: Not on file   Relationships    Social connections:     Talks on phone: Not on file     Gets together: Not on file     Attends [employer] service: Not on file     Active member of club or organization: Not on file     Attends meetings of clubs or organizations: Not on file     Relationship status: Not on file   Other Topics Concern    Not on file   Social History Narrative    Not on file       Objective:     Vitals:    20 1419   BP: 102/73   Pulse: 88   Resp: 18   Weight: 96.2 kg (212 lb)   Height: 5' 2" (1.575 m)   PainSc:   9   PainLoc: Back       GEN:  Well developed, well nourished.  No acute distress.   HEENT:  No trauma.  Mucous membranes moist.  Nares patent bilaterally.  PSYCH: Normal affect. Thought content appropriate.  CHEST:  Breathing symmetric.  No audible wheezing.  ABD: Soft, non-distended.  SKIN:  Warm, pink, dry.  No rash on exposed areas.    EXT:  No cyanosis, clubbing, or edema.  No color change or changes in nail or hair " growth.  NEURO/MUSCULOSKELETAL:  Fully alert, oriented, and appropriate. Speech normal bartolome. No cranial nerve deficits. TTP over left subacromial bursa.  Full ROM to left shoulder with mild pain.   Gait: Antalgic  negative trendelenburg sign bilaterally.   Motor Strength: 5/5 motor strength throughout lower extremities.   Sensory: No loss of sensation.  Reflexes:  2+ and symmetric throughout.  Downgoing Babinski's bilaterally.  No clonus or spasticity.  L-Spine:  Limited ROM with pain on extension greater than flexion.  Positive facet loading bilaterally.  SI Joint/Hip: JOANIE bilaterally. - FADIR bilaterally.      Imaging:     Narrative     EXAMINATION:  XR SHOULDER COMPLETE 2 OR MORE VIEWS LEFT    CLINICAL HISTORY:  Pain in left shoulder    COMPARISON:  None    FINDINGS:  There is no fracture. There is no dislocation.      Impression       Normal study.     Narrative     EXAMINATION:  MRI SHOULDER WITHOUT CONTRAST LEFT    CLINICAL HISTORY:  r/o rotator cuff; Pain in left shoulder    TECHNIQUE:  Standard shoulder MRI protocol without IV contrast was performed.    COMPARISON:  A plain film examination of the left shoulder performed on 03/14/2019.    FINDINGS:  There is no fracture. There is no dislocation.  There is a focal full-thickness tear in the distal aspect of the supraspinatus tendon.  The tendon is retracted by 9 mm.  There are incomplete tears involving the articular surface of the distal aspect of the infraspinatus tendon.  There is a tiny amount of fluid within the glenohumeral joint.  There is a poorly visualized tear in the posterosuperior aspect of the glenoid labrum from the 10 to 12:00 o'clock positions.  The intra-articular portion of the long head of the biceps tendon is normal in appearance.  The acromioclavicular joint is normal in appearance.  There is a type 2 acromion process with a moderate amount of lateral downsloping.      Impression       1. There is a focal full-thickness tear in the  distal aspect of the supraspinatus tendon. The tendon is retracted by 9 mm.  2. There are incomplete tears involving the articular surface of the distal aspect of the infraspinatus tendon.  3. There is a poorly visualized tear in the posterosuperior aspect of the glenoid labrum from the 10 to 12:00 o'clock positions.  4. There is a type 2 acromion process with a moderate amount of lateral downsloping.       The imaging studies listed below were independently reviewed by me, and I agree with the findings as documented below.     MRI Lumbar Spine 06/2018  FINDINGS:  Levoscoliosis of the spine, similar to before.  No abnormal bone marrow signal changes are evident to indicate acute fracture or bone marrow replacement process.  Sub endplate marrow signal changes about the L4-5 disc space, consistent with degenerative type change, mostly similar to before.    Visualized spinal cord and conus medullaris appears unremarkable.  No abnormal intramedullary spinal cord signal change.  No intradural, extramedullary masses are identified.    T11-12: Mild posterior disc bulging, greater to the right of midline, similar to before.    T12-L1:  Mild loss of disc signal indicating some degenerative change.  No significant disc bulge or focal herniation.  Mild loss of disc space height.  No detrimental changes.    L1-2:  Degenerative disc disease change.  Loss of disc signal.  No significant disc bulge or focal herniation.  Mild posterior disc bulging, similar to before.    L2-3: Degenerative disc disease.  Narrowing of the disc space.  Loss of normal disc signal.  Diffuse posterior mild disc bulging, similar to before.  Facet arthropathy changes, appearing greater on the right.    L3-4:  Degenerative disc disease.  Significant narrowing of the disc space.  Posterior marginal osteophytes.  Diffuse posterior disc bulging.  Bilateral foraminal narrowing right greater than left.  Facet arthropathy changes right greater than left.  Mild  narrowing of the thecal sac as a result of the chronic findings, similar to before.    L4-5:  Degenerative disc disease.  Significant narrowing of the disc space.  Posterior osteophytes.  Diffuse posterior disc bulging, appearing greater to the left of midline.  Indication 0 some bilateral facet arthropathy change.  Bilateral bone foraminal narrowing, greater on the left.  Findings appear similar to before.    L5-S1:  Degenerative disc disease.  No significant disc bulge or focal herniation.  Mild posterior disc bulging present.  Facet arthropathy changes left greater than right.    X ray Scoliosis Complete 06/2018  There is scoliosis of the thoracolumbar spine, which is convex to the left with the apex centered at the L1 level.  The Sequeira angle measures approximately 26.2°.  All visualized thoracic and lumbar vertebral bodies normal in height.  Low-to-moderate grade degenerative changes scattered throughout the mid and inferior thoracic spine with small scattered marginal osteophytes.  Severe degenerative change of the lumbar spine identified at the L2-3, L3-4 and L4-5 levels with large marginal osteophytes.  Paravertebral soft tissues are normal.    MRI Cervical Spine 2017  Reversal of the normal cervical lordosis.  Small spinal canal on a congenital basis.  No definite abnormal signal in the cord.  The craniocervical junction is normal.    C2-3: Minimal disc bulge.  Facet hypertrophy on the left.    C3-4: Broad-based osteophyte and disc bulge.  Uncovertebral joint disease with moderate to severe left-sided neural foraminal narrowing.  Decreased CSF spaces anterior and posterior to the cord.    C4-5: Marked disc space narrowing.  Broad-based osteophyte and disc bulge.  Decreased CSF spaces anterior and posterior to the cord.  Uncovertebral joint disease with mild neuroforaminal narrowing.    C5-6: Disc space narrowing.  Broad-based osteophyte and disc bulge.  Asymmetric disc protrusion paramedian to the left side.   Uncovertebral joint disease and degenerative changes of the facets with moderate to severe bilateral neural foraminal narrowing.  No signal abnormality in the cord.    C6-7: Disc space narrowing.  Broad-based osteophyte and disc bulge.  Bilateral uncovertebral joint disease and degenerative changes of the facets.  Moderate central spinal stenosis.    C7-T1: Minimal disc bulge.  Uncovertebral joint disease.    Assessment:     Encounter Diagnoses   Name Primary?    Chronic pain disorder Yes    Lumbar spondylosis     DDD (degenerative disc disease), lumbar        Plan:     Talia was seen today for low-back pain.    Diagnoses and all orders for this visit:    Chronic pain disorder  -     Ambulatory Referral to Physical/Occupational Therapy    Lumbar spondylosis  -     Ambulatory Referral to Physical/Occupational Therapy    DDD (degenerative disc disease), lumbar  -     Ambulatory Referral to Physical/Occupational Therapy        Her pain is consistent with the above.    We discussed the assessment and recommendations.  All available images were reviewed. We discussed the disease process, prognosis, treatment plan, and risks and benefits. The patient is aware of the risks and benefits of the medications being prescribed, common side effects, and proper usage. The following is the plan we agreed on:     1. Continue Cymbalta 60 mg BID.    2. Continue gabapentin 400 mg BID given Cr clearance.  3. Schedule for repeat left then right L2,3,4,5 RFAs 2 weeks apart.  4. RTC 4 weeks after procedures.    The above plan and management options were discussed at length with patient. Patient is in agreement with the above and verbalized understanding.     Latosha Humphries, LETY  01/23/2020

## 2020-01-23 NOTE — H&P (VIEW-ONLY)
"Subjective:     Patient ID: Talia Dillon is a 69 y.o. female.    Chief Complaint: Pain    Consulted by: Dr. Gallagher    Disclaimer: This note was generated using voice recognition software.  There may be a typographical errors that were missed during proofreading.      HPI:    Talia Dillon is a 69 y.o. female who presents today with neck, shoulder, low back and leg pain. This pain is described in detail below.    Ms. Dillon presents to pain clinic today complaining of pain in a variety of areas, including her neck, shoulders, low back and leg pain. Her low back and leg pain is the most bothersome. 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 She has had long term PT her whole life because of this. She was recently evaluated by Dr. Gallagher who referred her to PT and pain clinic for management options.    Ms. Dillon states her low back pain is constant with radiation down both legs. She rates her pain as a 7/10 today. The pain is made worse with movement and walking. The pain is a dull ache in her low back, but feels like a sharp burning in her legs "like an electric wing." This pain does not travel in to her feet, but she has numbness and paraesthesias in her feet separately.     She has just recently restarted with physical therapy; she has completed 4 sessions at this time and feels this is starting to help. She feels it is painful but ultimately improving her situation.     Interval History (8/30/2018):  She returns today for follow up.  She reports that PT has been helpful for the pain.  She finished her last in office session yesterday.  She is now going to start her HEP.  She reports that the epidural didn't help. She wishes she could take Aleve as this was very helpful    Interval History (10/11/2018):  The patient returns for " follow up of back pain.  Since her last OV, she had L2-5 MBB x2.  She is reporting 80% relief from both procedures for about one day.  She is here today to discuss RFA procedures.  She continues with come exercise regimen.  She did have recent labs as ordered by her PCP which again showed elevated BUN.  Her pain today is 8/10.     Interval History (12/7/2018):  The patient presents for procedure follow up.  She is s/p L2,3,4,5 RFAs with about 70% pain relief.  Her pain is not as constant.  She is able to walk further.  Her pain is not as intense when it comes.  She is still somewhat limited by her pain, particularly regarding activity level.  She also feels short winded sometimes.  She is seeing cardiology next month.  She is also seeing bariatric medicine and has been losing weight.  Her pain today is 5/10.    Interval History (3/12/2019):  The patient presents for follow up.  Cymbalta was increased at last OV.  She reports that this has been helpful.  Her pain is stable and currently manageable.  She was recently found to be in A fib.  She had a cardioversion procedure on 2/8/19 and is scheduled for another on 3/19/19.  She is being weaned from Elavil by cardiology.  Her pain today is 4/10.    Interval History (6/11/2019):  The patient returns for follow up of chronic pain.  Since her last OV, I obtained a left shoulder MRI which showed rotator tear.  She had a visit with Dr. Davila and surgical repair was recommended.  She is waiting a bit because of moving.  She had a recent appointment with her PCP.  He recommended that we increase Cymbalta due to depression and pain.  She is currently taking 60 mg QD without adverse effects.  Her pain today is 8/10.    Interval History (9/30/2019):  The patient is here for follow up of back and left shoulder pain.  She is still considering left shoulder surgery with Dr. Davila.  She says that her shoulder pain has essentially resolved so she is leaning against surgery.  Her  back pain has been mild.  She has some stiffness and tightness but otherwise it is tolerable.  She is not interested in procedures at this time.  Her pain today is 7/10.    Interval History (1/23/2020):  The patient is here for follow up of back pain.  She is reporting increased middle and lower back pain recently.  She denies any recent injury.  She denies any radiation.  She denies numbness or tingling.  She has significant pain in the morning.  She feels like it improves and loosens up during the day.  She had benefit with lumbar RFAs is in the past.  Her pain today is 9/10.     Physical Therapy: Yes; finished 8/28/2018, doing HEP (8/20/3018)    Non-pharmacologic Treatment:     · Ice/Heat: Not tried  · TENS: Tried; not helpful  · Massage: Helpful  · Chiropractic care: Yes, not helpful  · Acupuncture: Yes, not helpful  · Other: None         Pain Medications:         · Currently taking:   · Gabapentin 800 mg in am  · OTC topical creams   · Acetaminophen as needed   · Cymbalta 60 mg BID    · Has tried in the past:    · NSAIDs- Aleve and Ibuprofen: had to stop due to CKD   · Baclofen- had to stop due to CKD    · Has not tried: Opioids,  Rx topical creams    Blood thinners: No    Interventional Therapies:  9/21/18 Bilateral L2-5 MBB- 80% relief  9/28/18 Bilateral L2-5 MBB- 80% relief  10/26/18 Right L2,3,4,5 RFA- 70% relief  11/9/18 Left L2,3,4,5 RFA- 70% relief  4/29/19 Left shoulder injection- helpful (ortho)    Relevant Surgeries:   No    Affecting sleep? Not usually     Affecting daily activities? Yes    Depressive symptoms? Yes          · SI/HI? No    Work status: Retired; used to work as a      Prescription Monitoring Program database:  Reviewed and consistent with medication use as prescribed.    Pain Scales  Best: 3/10  Worst: 9/10  Usually: 5/10  Today: 9/10    Low-back Pain   Associated symptoms include numbness and paresthesias. Pertinent negatives include no abdominal pain, bladder incontinence,  bowel incontinence, chest pain, dysuria, fever or weakness.       Last 3 PDI Scores 1/23/2020 9/30/2019 6/11/2019   Pain Disability Index (PDI) 63 [phone]       Review of Systems   Constitution: Negative for chills, decreased appetite, diaphoresis and fever.   HENT: Negative for congestion, ear discharge and ear pain.    Eyes: Negative for blurred vision, discharge and double vision.   Cardiovascular: Negative for chest pain, claudication and cyanosis.   Respiratory: Negative for cough, hemoptysis and shortness of breath.    Endocrine: Negative for cold intolerance, heat intolerance and polydipsia.   Skin: Negative for color change, dry skin and nail changes.   Musculoskeletal: Positive for arthritis, back pain, myalgias and neck pain.   Gastrointestinal: Negative for bloating, abdominal pain, change in bowel habit and bowel incontinence.   Genitourinary: Negative for bladder incontinence, decreased libido and dysuria.   Neurological: Positive for disturbances in coordination, numbness and paresthesias. Negative for weakness.   Psychiatric/Behavioral: Positive for depression. Negative for altered mental status, hallucinations and hypervigilance.             Past Medical History:   Diagnosis Date    Allergy     Anemia     Anxiety     Atrial fibrillation     Cancer     skin    Cataract     Depression     Edema     Hypothyroidism     Kidney disease     PSVT (paroxysmal supraventricular tachycardia)     Thyroid disease        Past Surgical History:   Procedure Laterality Date    ADENOIDECTOMY      APPENDECTOMY      COLONOSCOPY  2009    repeat in 10 years     EYE SURGERY      HYSTERECTOMY      INJECTION OF ANESTHETIC AGENT AROUND NERVE Bilateral 9/21/2018    Procedure: BLOCK, NERVE, MEDIAL BRANCH BLOCK BILATERAL LUMBAR L2-5;  Surgeon: Julieth Christopher MD;  Location: [facility] PAIN T;  Service: Pain Management;  Laterality: Bilateral;  1 of 2    INJECTION OF ANESTHETIC AGENT AROUND NERVE Bilateral 9/28/2018     Procedure: BLOCK, NERVE, MEDIAL BRANCH BLOCK BILATERAL LUMBAR L2-5;  Surgeon: Julieth Christopher MD;  Location: [facility] PAIN MGT;  Service: Pain Management;  Laterality: Bilateral;  2 of 2  PLEASE GIVE NIRAVAM PER MD    OOPHORECTOMY      TONSILLECTOMY      TREATMENT OF CARDIAC ARRHYTHMIA N/A 2/8/2019    Procedure: CARDIOVERSION;  Surgeon: Devin You MD;  Location: [facility] EP LAB;  Service: Cardiology;  Laterality: N/A;  AF, KAROL, DCCV, MAC, HI, 3 Prep       Review of patient's allergies indicates:   Allergen Reactions    Dexamethasone Rash       Current Outpatient Medications   Medication Sig Dispense Refill    ALPRAZolam (XANAX) 0.25 MG tablet Take 1 tablet (0.25 mg total) by mouth once daily. 90 tablet 1    apixaban (ELIQUIS) 5 mg Tab Take 1 tablet (5 mg total) by mouth 2 (two) times daily. 180 tablet 3    atorvastatin (LIPITOR) 10 MG tablet Take 1 tablet (10 mg total) by mouth once daily. 90 tablet 3    candesartan (ATACAND) 8 MG tablet Take 1 tablet (8 mg total) by mouth once daily. 90 tablet 3    cholecalciferol, vitamin D3, (VITAMIN D3) 5,000 unit Tab Take 5,000 Units by mouth once daily. 90 tablet 3    gabapentin (NEURONTIN) 400 MG capsule Take 1 capsule (400 mg total) by mouth 2 (two) times daily. Take 2 tablets once daily 180 capsule 3    levothyroxine (SYNTHROID, LEVOTHROID) 175 MCG tablet Take 1 tablet (175 mcg total) by mouth once daily. 90 tablet 3    metoprolol tartrate (LOPRESSOR) 25 MG tablet Take 3 tablets (75 mg total) by mouth 2 (two) times daily. 180 tablet 11    zolpidem (AMBIEN) 10 mg Tab TAKE 1 TABLET BY MOUTH EVERY DAY IN THE EVENING AS NEEDED 90 tablet 1    DULoxetine (CYMBALTA) 60 MG capsule Take 1 capsule (60 mg total) by mouth 2 (two) times daily. 60 capsule 2     No current facility-administered medications for this visit.        Family History   Problem Relation Age of Onset    Stroke Mother     Stroke Father     Diabetes Father     Sleep apnea Daughter     Mental  "illness Daughter        Social History     Socioeconomic History    Marital status:      Spouse name: Not on file    Number of children: Not on file    Years of education: Not on file    Highest education level: Not on file   Occupational History    Not on file   Social Needs    Financial resource strain: Not on file    Food insecurity:     Worry: Not on file     Inability: Not on file    Transportation needs:     Medical: Not on file     Non-medical: Not on file   Tobacco Use    Smoking status: Current Some Day Smoker     Packs/day: 1.00     Years: 50.00     Pack years: 50.00     Types: Cigarettes     Last attempt to quit: 2018     Years since quittin.6    Smokeless tobacco: Never Used   Substance and Sexual Activity    Alcohol use: No     Frequency: Never     Comment: rarely    Drug use: No    Sexual activity: Not Currently   Lifestyle    Physical activity:     Days per week: Not on file     Minutes per session: Not on file    Stress: Not on file   Relationships    Social connections:     Talks on phone: Not on file     Gets together: Not on file     Attends [employer] service: Not on file     Active member of club or organization: Not on file     Attends meetings of clubs or organizations: Not on file     Relationship status: Not on file   Other Topics Concern    Not on file   Social History Narrative    Not on file       Objective:     Vitals:    20 1419   BP: 102/73   Pulse: 88   Resp: 18   Weight: 96.2 kg (212 lb)   Height: 5' 2" (1.575 m)   PainSc:   9   PainLoc: Back       GEN:  Well developed, well nourished.  No acute distress.   HEENT:  No trauma.  Mucous membranes moist.  Nares patent bilaterally.  PSYCH: Normal affect. Thought content appropriate.  CHEST:  Breathing symmetric.  No audible wheezing.  ABD: Soft, non-distended.  SKIN:  Warm, pink, dry.  No rash on exposed areas.    EXT:  No cyanosis, clubbing, or edema.  No color change or changes in nail or hair " growth.  NEURO/MUSCULOSKELETAL:  Fully alert, oriented, and appropriate. Speech normal bartolome. No cranial nerve deficits. TTP over left subacromial bursa.  Full ROM to left shoulder with mild pain.   Gait: Antalgic  negative trendelenburg sign bilaterally.   Motor Strength: 5/5 motor strength throughout lower extremities.   Sensory: No loss of sensation.  Reflexes:  2+ and symmetric throughout.  Downgoing Babinski's bilaterally.  No clonus or spasticity.  L-Spine:  Limited ROM with pain on extension greater than flexion.  Positive facet loading bilaterally.  SI Joint/Hip: JOANIE bilaterally. - FADIR bilaterally.      Imaging:     Narrative     EXAMINATION:  XR SHOULDER COMPLETE 2 OR MORE VIEWS LEFT    CLINICAL HISTORY:  Pain in left shoulder    COMPARISON:  None    FINDINGS:  There is no fracture. There is no dislocation.      Impression       Normal study.     Narrative     EXAMINATION:  MRI SHOULDER WITHOUT CONTRAST LEFT    CLINICAL HISTORY:  r/o rotator cuff; Pain in left shoulder    TECHNIQUE:  Standard shoulder MRI protocol without IV contrast was performed.    COMPARISON:  A plain film examination of the left shoulder performed on 03/14/2019.    FINDINGS:  There is no fracture. There is no dislocation.  There is a focal full-thickness tear in the distal aspect of the supraspinatus tendon.  The tendon is retracted by 9 mm.  There are incomplete tears involving the articular surface of the distal aspect of the infraspinatus tendon.  There is a tiny amount of fluid within the glenohumeral joint.  There is a poorly visualized tear in the posterosuperior aspect of the glenoid labrum from the 10 to 12:00 o'clock positions.  The intra-articular portion of the long head of the biceps tendon is normal in appearance.  The acromioclavicular joint is normal in appearance.  There is a type 2 acromion process with a moderate amount of lateral downsloping.      Impression       1. There is a focal full-thickness tear in the  distal aspect of the supraspinatus tendon. The tendon is retracted by 9 mm.  2. There are incomplete tears involving the articular surface of the distal aspect of the infraspinatus tendon.  3. There is a poorly visualized tear in the posterosuperior aspect of the glenoid labrum from the 10 to 12:00 o'clock positions.  4. There is a type 2 acromion process with a moderate amount of lateral downsloping.       The imaging studies listed below were independently reviewed by me, and I agree with the findings as documented below.     MRI Lumbar Spine 06/2018  FINDINGS:  Levoscoliosis of the spine, similar to before.  No abnormal bone marrow signal changes are evident to indicate acute fracture or bone marrow replacement process.  Sub endplate marrow signal changes about the L4-5 disc space, consistent with degenerative type change, mostly similar to before.    Visualized spinal cord and conus medullaris appears unremarkable.  No abnormal intramedullary spinal cord signal change.  No intradural, extramedullary masses are identified.    T11-12: Mild posterior disc bulging, greater to the right of midline, similar to before.    T12-L1:  Mild loss of disc signal indicating some degenerative change.  No significant disc bulge or focal herniation.  Mild loss of disc space height.  No detrimental changes.    L1-2:  Degenerative disc disease change.  Loss of disc signal.  No significant disc bulge or focal herniation.  Mild posterior disc bulging, similar to before.    L2-3: Degenerative disc disease.  Narrowing of the disc space.  Loss of normal disc signal.  Diffuse posterior mild disc bulging, similar to before.  Facet arthropathy changes, appearing greater on the right.    L3-4:  Degenerative disc disease.  Significant narrowing of the disc space.  Posterior marginal osteophytes.  Diffuse posterior disc bulging.  Bilateral foraminal narrowing right greater than left.  Facet arthropathy changes right greater than left.  Mild  narrowing of the thecal sac as a result of the chronic findings, similar to before.    L4-5:  Degenerative disc disease.  Significant narrowing of the disc space.  Posterior osteophytes.  Diffuse posterior disc bulging, appearing greater to the left of midline.  Indication 0 some bilateral facet arthropathy change.  Bilateral bone foraminal narrowing, greater on the left.  Findings appear similar to before.    L5-S1:  Degenerative disc disease.  No significant disc bulge or focal herniation.  Mild posterior disc bulging present.  Facet arthropathy changes left greater than right.    X ray Scoliosis Complete 06/2018  There is scoliosis of the thoracolumbar spine, which is convex to the left with the apex centered at the L1 level.  The Sequeira angle measures approximately 26.2°.  All visualized thoracic and lumbar vertebral bodies normal in height.  Low-to-moderate grade degenerative changes scattered throughout the mid and inferior thoracic spine with small scattered marginal osteophytes.  Severe degenerative change of the lumbar spine identified at the L2-3, L3-4 and L4-5 levels with large marginal osteophytes.  Paravertebral soft tissues are normal.    MRI Cervical Spine 2017  Reversal of the normal cervical lordosis.  Small spinal canal on a congenital basis.  No definite abnormal signal in the cord.  The craniocervical junction is normal.    C2-3: Minimal disc bulge.  Facet hypertrophy on the left.    C3-4: Broad-based osteophyte and disc bulge.  Uncovertebral joint disease with moderate to severe left-sided neural foraminal narrowing.  Decreased CSF spaces anterior and posterior to the cord.    C4-5: Marked disc space narrowing.  Broad-based osteophyte and disc bulge.  Decreased CSF spaces anterior and posterior to the cord.  Uncovertebral joint disease with mild neuroforaminal narrowing.    C5-6: Disc space narrowing.  Broad-based osteophyte and disc bulge.  Asymmetric disc protrusion paramedian to the left side.   Uncovertebral joint disease and degenerative changes of the facets with moderate to severe bilateral neural foraminal narrowing.  No signal abnormality in the cord.    C6-7: Disc space narrowing.  Broad-based osteophyte and disc bulge.  Bilateral uncovertebral joint disease and degenerative changes of the facets.  Moderate central spinal stenosis.    C7-T1: Minimal disc bulge.  Uncovertebral joint disease.    Assessment:     Encounter Diagnoses   Name Primary?    Chronic pain disorder Yes    Lumbar spondylosis     DDD (degenerative disc disease), lumbar        Plan:     Talia was seen today for low-back pain.    Diagnoses and all orders for this visit:    Chronic pain disorder  -     Ambulatory Referral to Physical/Occupational Therapy    Lumbar spondylosis  -     Ambulatory Referral to Physical/Occupational Therapy    DDD (degenerative disc disease), lumbar  -     Ambulatory Referral to Physical/Occupational Therapy        Her pain is consistent with the above.    We discussed the assessment and recommendations.  All available images were reviewed. We discussed the disease process, prognosis, treatment plan, and risks and benefits. The patient is aware of the risks and benefits of the medications being prescribed, common side effects, and proper usage. The following is the plan we agreed on:     1. Continue Cymbalta 60 mg BID.    2. Continue gabapentin 400 mg BID given Cr clearance.  3. Schedule for repeat left then right L2,3,4,5 RFAs 2 weeks apart.  4. RTC 4 weeks after procedures.    The above plan and management options were discussed at length with patient. Patient is in agreement with the above and verbalized understanding.     Latosha Humphries, LETY  01/23/2020

## 2020-01-24 ENCOUNTER — TELEPHONE (OUTPATIENT)
Dept: CARDIOLOGY | Facility: CLINIC | Age: 70
End: 2020-01-24

## 2020-01-30 ENCOUNTER — TELEPHONE (OUTPATIENT)
Dept: PAIN MEDICINE | Facility: CLINIC | Age: 70
End: 2020-01-30

## 2020-01-30 DIAGNOSIS — I48.0 PAROXYSMAL ATRIAL FIBRILLATION: ICD-10-CM

## 2020-01-30 DIAGNOSIS — Z79.01 LONG TERM (CURRENT) USE OF ANTICOAGULANTS: ICD-10-CM

## 2020-01-30 RX ORDER — WARFARIN SODIUM 5 MG/1
TABLET ORAL
Qty: 105 TABLET | Refills: 1 | OUTPATIENT
Start: 2020-01-30

## 2020-01-30 NOTE — TELEPHONE ENCOUNTER
----- Message from Courtney Irizarry sent at 1/30/2020  2:51 PM CST -----  Contact: WILLIAM LEMA [[id number]]  Name of Who is Calling: WILLIAM LEMA [[id number]]     What is the request in detail: WILLIAM LEMA [[id number]] is requesting a call back in regards to RX for muscle relaxer Patient is stating she cant take any thing with Asprin because of kidney condition  Please contact to further discuss and advise      Can the clinic reply by [facility]: yes     [facility]  [phone] (home)

## 2020-01-31 DIAGNOSIS — Z79.01 LONG TERM (CURRENT) USE OF ANTICOAGULANTS: ICD-10-CM

## 2020-01-31 DIAGNOSIS — I48.0 PAROXYSMAL ATRIAL FIBRILLATION: ICD-10-CM

## 2020-01-31 RX ORDER — WARFARIN SODIUM 5 MG/1
TABLET ORAL
Qty: 105 TABLET | Refills: 1 | OUTPATIENT
Start: 2020-01-31

## 2020-01-31 RX ORDER — METHOCARBAMOL 500 MG/1
500 TABLET, FILM COATED ORAL 3 TIMES DAILY
Qty: 90 TABLET | Refills: 0 | Status: SHIPPED | OUTPATIENT
Start: 2020-01-31 | End: 2020-03-01

## 2020-02-14 ENCOUNTER — HOSPITAL ENCOUNTER (OUTPATIENT)
Facility: OTHER | Age: 70
Discharge: HOME | End: 2020-02-14
Attending: ANESTHESIOLOGY | Admitting: ANESTHESIOLOGY
Payer: MEDICARE

## 2020-02-14 VITALS
HEART RATE: 92 BPM | SYSTOLIC BLOOD PRESSURE: 137 MMHG | TEMPERATURE: 98 F | DIASTOLIC BLOOD PRESSURE: 89 MMHG | RESPIRATION RATE: 18 BRPM | OXYGEN SATURATION: 96 %

## 2020-02-14 DIAGNOSIS — M47.816 OSTEOARTHRITIS OF LUMBAR SPINE: ICD-10-CM

## 2020-02-14 DIAGNOSIS — M47.816 OSTEOARTHRITIS OF LUMBAR SPINE, UNSPECIFIED SPINAL OSTEOARTHRITIS COMPLICATION STATUS: Primary | ICD-10-CM

## 2020-02-14 DIAGNOSIS — M47.816 LUMBAR SPONDYLOSIS: ICD-10-CM

## 2020-02-14 PROCEDURE — 64635 DESTROY LUMB/SAC FACET JNT: CPT | Mod: LT,,, | Performed by: ANESTHESIOLOGY

## 2020-02-14 PROCEDURE — 99152 MOD SED SAME PHYS/QHP 5/>YRS: CPT | Mod: ,,, | Performed by: ANESTHESIOLOGY

## 2020-02-14 PROCEDURE — 63600175 PHARM REV CODE 636 W HCPCS: Performed by: STUDENT IN AN ORGANIZED HEALTH CARE EDUCATION/TRAINING PROGRAM

## 2020-02-14 PROCEDURE — S0020 INJECTION, BUPIVICAINE HYDRO: HCPCS | Performed by: ANESTHESIOLOGY

## 2020-02-14 PROCEDURE — 64635 DESTROY LUMB/SAC FACET JNT: CPT | Performed by: ANESTHESIOLOGY

## 2020-02-14 PROCEDURE — 64635 PR DESTROY LUMB/SAC FACET JNT: ICD-10-PCS | Mod: LT,,, | Performed by: ANESTHESIOLOGY

## 2020-02-14 PROCEDURE — 64636 PR DESTROY L/S FACET JNT ADDL: ICD-10-PCS | Mod: LT,,, | Performed by: ANESTHESIOLOGY

## 2020-02-14 PROCEDURE — 64636 DESTROY L/S FACET JNT ADDL: CPT | Performed by: ANESTHESIOLOGY

## 2020-02-14 PROCEDURE — 25000003 PHARM REV CODE 250: Performed by: ANESTHESIOLOGY

## 2020-02-14 PROCEDURE — 64636 DESTROY L/S FACET JNT ADDL: CPT | Mod: LT,,, | Performed by: ANESTHESIOLOGY

## 2020-02-14 PROCEDURE — 99152 PR MOD CONSCIOUS SEDATION, SAME PHYS, 5+ YRS, FIRST 15 MIN: ICD-10-PCS | Mod: ,,, | Performed by: ANESTHESIOLOGY

## 2020-02-14 PROCEDURE — 63600175 PHARM REV CODE 636 W HCPCS: Performed by: ANESTHESIOLOGY

## 2020-02-14 RX ORDER — DEXAMETHASONE SODIUM PHOSPHATE 4 MG/ML
INJECTION, SOLUTION INTRA-ARTICULAR; INTRALESIONAL; INTRAMUSCULAR; INTRAVENOUS; SOFT TISSUE
Status: DISCONTINUED | OUTPATIENT
Start: 2020-02-14 | End: 2020-02-14 | Stop reason: HOSPADM

## 2020-02-14 RX ORDER — LIDOCAINE HYDROCHLORIDE 20 MG/ML
INJECTION, SOLUTION INFILTRATION; PERINEURAL
Status: DISCONTINUED | OUTPATIENT
Start: 2020-02-14 | End: 2020-02-14 | Stop reason: HOSPADM

## 2020-02-14 RX ORDER — BUPIVACAINE HYDROCHLORIDE 5 MG/ML
INJECTION, SOLUTION EPIDURAL; INTRACAUDAL
Status: DISCONTINUED | OUTPATIENT
Start: 2020-02-14 | End: 2020-02-14 | Stop reason: HOSPADM

## 2020-02-14 RX ORDER — FENTANYL CITRATE 50 UG/ML
INJECTION, SOLUTION INTRAMUSCULAR; INTRAVENOUS
Status: DISCONTINUED | OUTPATIENT
Start: 2020-02-14 | End: 2020-02-14 | Stop reason: HOSPADM

## 2020-02-14 RX ORDER — MIDAZOLAM HYDROCHLORIDE 1 MG/ML
INJECTION INTRAMUSCULAR; INTRAVENOUS
Status: DISCONTINUED | OUTPATIENT
Start: 2020-02-14 | End: 2020-02-14 | Stop reason: HOSPADM

## 2020-02-14 RX ORDER — SODIUM CHLORIDE 9 MG/ML
500 INJECTION, SOLUTION INTRAVENOUS CONTINUOUS
Status: DISCONTINUED | OUTPATIENT
Start: 2020-02-14 | End: 2020-02-14 | Stop reason: HOSPADM

## 2020-02-14 RX ADMIN — SODIUM CHLORIDE 500 ML: 0.9 INJECTION, SOLUTION INTRAVENOUS at 11:02

## 2020-02-14 NOTE — PLAN OF CARE

## 2020-02-14 NOTE — OP NOTE
"Date of Procedure: 02/14/2020    Procedure: Left L2, 3, 4, 5 Lumbar Medial Branch Nerve Thermal Radiofrequency Ablation    Pre-op diagnosis: Lumbar Spondylosis [M47.816]    Post-op diagnosis: Lumbar Spondylosis [M47.816]     Physician: Dr. Julieth Christopher     Assistant: Dr. Marie    Anesthestia: local/IV sedation: Versed 1 mg and fentanyl 100 mcg IV.  Conscious sedation ordered by MD.  Patient reevaluated and sedation administered by MD and monitored by RN.  Total sedation time was less than 30 min. (See nurse documentation and case log for sedation time)    EBL: None    Specimens: None    All medications, allergies, and relevant histories were reviewed. No recent antibiotics or infections.  A time-out was taken to verify the correct patient, procedure, laterality, and appropriate medications/allergies.    Procedure: Lumbar RFA    Lumbar Medial Branch Block with radiofrequency ablation, levels L2, 3, 4, 5, left    The procedure risks, benefits, and possible complications were discussed with the patient including nerve damage, infection, spinal headache, and paresis.   Patient was placed in the prone position with the midriff elevated. Skin was prepped with CHG and draped. Oblique view of the spine was obtained with fluoroscopy. Entry sites were marked over the skin and Xylocaine 1% was used to anesthetize the skin and subcutaneous tissues.     A 3.5 " [place] Venom needle was introduced at an angle to parallel the medial branches in the groove between superior articular process and transverse process, and L5 primary dorsal ramus at the junction of the S1 superior articular process and sacral ala.    Multifidus stim elicited at each level.  No distal motor stimulation was elicited at any level at 2V with a frequency of 2Hz.  All impedances were within the acceptable range.    1cc of 2% lidocaine was injected at each level.  Thermal RF was then conducted at each level at 80 degrees, for 2:30 minutes   1 cc of a mixture " of 0.5% bupivacaine with dexamethasone 5 mg was injected at each level.    Patient tolerated the procedure well and there were no complications.      Future Management:   If helpful, can repeat as needed.  Follow up with me in 4-6 weeks.      I certify that I provided the above services.  I was present for the entire procedure, which was performed by the fellow physician under my supervision.  There were no parts of the procedure that were performed not by myself or without my direct supervision.

## 2020-02-14 NOTE — INTERVAL H&P NOTE
HPI  Patient presenting for Procedure(s) (LRB):  RADIOFREQUENCY ABLATION L2,3,4,5 (Left)     Patient on Anti-coagulation Yes, stopped Eliquis on 2/10/2020    No health changes since previous encounter    Past Medical History:   Diagnosis Date    Allergy     Anemia     Anxiety     Atrial fibrillation     Cancer     skin    Cataract     Depression     Edema     Hypothyroidism     Kidney disease     PSVT (paroxysmal supraventricular tachycardia)     Thyroid disease      Past Surgical History:   Procedure Laterality Date    ADENOIDECTOMY      APPENDECTOMY      COLONOSCOPY  2009    repeat in 10 years     EYE SURGERY      HYSTERECTOMY      INJECTION OF ANESTHETIC AGENT AROUND NERVE Bilateral 9/21/2018    Procedure: BLOCK, NERVE, MEDIAL BRANCH BLOCK BILATERAL LUMBAR L2-5;  Surgeon: Julieth Christopher MD;  Location: [facility], [place] PAIN MGT;  Service: Pain Management;  Laterality: Bilateral;  1 of 2    INJECTION OF ANESTHETIC AGENT AROUND NERVE Bilateral 9/28/2018    Procedure: BLOCK, NERVE, MEDIAL BRANCH BLOCK BILATERAL LUMBAR L2-5;  Surgeon: Julieth Christopher MD;  Location: [facility], [place] PAIN MGT;  Service: Pain Management;  Laterality: Bilateral;  2 of 2  PLEASE GIVE NIRAVAM PER MD    OOPHORECTOMY      TONSILLECTOMY      TREATMENT OF CARDIAC ARRHYTHMIA N/A 2/8/2019    Procedure: CARDIOVERSION;  Surgeon: Devin You MD;  Location: [facility] EP LAB;  Service: Cardiology;  Laterality: N/A;  AF, KAROL, DCCV, MAC, [place], 3 Prep     Review of patient's allergies indicates:   Allergen Reactions    Dexamethasone Rash      Current Facility-Administered Medications   Medication    0.9%  NaCl infusion       PMHx, PSHx, Allergies, Medications reviewed in epic    ROS negative except pain complaints in HPI    OBJECTIVE:    BP (!) 137/90 (BP Location: Left arm, Patient Position: Lying)   Pulse 109   Temp 97.9 °F (36.6 °C)   Resp 18   SpO2 96%     PHYSICAL EXAMINATION:    GENERAL: Well appearing, in no acute distress, alert and oriented  x3.  PSYCH:  Mood and affect appropriate.  SKIN: Skin color, texture, turgor normal, no rashes or lesions which will impact the procedure.  CV: RRR with palpation of the radial artery.  PULM: No evidence of respiratory difficulty, symmetric chest rise. Clear to auscultation.  NEURO: Cranial nerves grossly intact.    Plan:    Proceed with procedure as planned Procedure(s) (LRB):  RADIOFREQUENCY ABLATION L2,3,4,5 (Left)    Rubén Marie  02/14/2020    I have seen the patient with the fellow physician.  We have come up with the above plan.  The patient is in agreement with our plan. I agree with the above note which I have edited where appropriate.     Julieth Christopher MD  02/14/2020

## 2020-02-14 NOTE — DISCHARGE INSTRUCTIONS

## 2020-02-14 NOTE — DISCHARGE SUMMARY
Discharge Note  Short Stay      SUMMARY     Admit Date: 2/14/2020    Attending Physician: Julieth Christopher    Procedure: Procedure(s) (LRB):  RADIOFREQUENCY ABLATION L2,3,4,5 (Left)     Discharge Physician: Julieth Christopher    Discharge Date: 2/14/2020 11:43 AM    Final Diagnosis: Lumbar spondylosis [M47.816]    Disposition: Home or self care    Patient Instructions:   Current Discharge Medication List      CONTINUE these medications which have NOT CHANGED    Details   ALPRAZolam (XANAX) 0.25 MG tablet Take 1 tablet (0.25 mg total) by mouth once daily.  Qty: 90 tablet, Refills: 1    Associated Diagnoses: Anxiety      apixaban (ELIQUIS) 5 mg Tab Take 1 tablet (5 mg total) by mouth 2 (two) times daily.  Qty: 180 tablet, Refills: 3      atorvastatin (LIPITOR) 10 MG tablet Take 1 tablet (10 mg total) by mouth once daily.  Qty: 90 tablet, Refills: 3      candesartan (ATACAND) 8 MG tablet Take 1 tablet (8 mg total) by mouth once daily.  Qty: 90 tablet, Refills: 3      cholecalciferol, vitamin D3, (VITAMIN D3) 5,000 unit Tab Take 5,000 Units by mouth once daily.  Qty: 90 tablet, Refills: 3      DULoxetine (CYMBALTA) 60 MG capsule Take 1 capsule (60 mg total) by mouth 2 (two) times daily.  Qty: 60 capsule, Refills: 2      gabapentin (NEURONTIN) 400 MG capsule Take 1 capsule (400 mg total) by mouth 2 (two) times daily. Take 2 tablets once daily  Qty: 180 capsule, Refills: 3      levothyroxine (SYNTHROID, LEVOTHROID) 175 MCG tablet Take 1 tablet (175 mcg total) by mouth once daily.  Qty: 90 tablet, Refills: 3      methocarbamol (ROBAXIN) 500 MG Tab Take 1 tablet (500 mg total) by mouth 3 (three) times daily.  Qty: 90 tablet, Refills: 0      metoprolol tartrate (LOPRESSOR) 25 MG tablet Take 3 tablets (75 mg total) by mouth 2 (two) times daily.  Qty: 180 tablet, Refills: 11    Associated Diagnoses: Atrial fibrillation, new onset      zolpidem (AMBIEN) 10 mg Tab TAKE 1 TABLET BY MOUTH EVERY DAY IN THE EVENING AS NEEDED  Qty: 90  tablet, Refills: 1    Comments: This request is for a new prescription for a controlled substance as required by Federal/State law. DX Code Needed  .  Associated Diagnoses: Anxiety             Resume home diet and activity

## 2020-02-27 ENCOUNTER — PATIENT MESSAGE (OUTPATIENT)
Dept: PAIN MEDICINE | Facility: OTHER | Age: 70
End: 2020-02-27

## 2020-02-28 ENCOUNTER — HOSPITAL ENCOUNTER (OUTPATIENT)
Facility: OTHER | Age: 70
Discharge: HOME | End: 2020-02-28
Attending: ANESTHESIOLOGY | Admitting: ANESTHESIOLOGY
Payer: MEDICARE

## 2020-02-28 VITALS
HEIGHT: 62 IN | BODY MASS INDEX: 40.48 KG/M2 | OXYGEN SATURATION: 97 % | WEIGHT: 220 LBS | DIASTOLIC BLOOD PRESSURE: 78 MMHG | TEMPERATURE: 98 F | RESPIRATION RATE: 16 BRPM | SYSTOLIC BLOOD PRESSURE: 108 MMHG | HEART RATE: 78 BPM

## 2020-02-28 DIAGNOSIS — M47.816 LUMBAR SPONDYLOSIS: Primary | ICD-10-CM

## 2020-02-28 DIAGNOSIS — G89.4 CHRONIC PAIN SYNDROME: ICD-10-CM

## 2020-02-28 DIAGNOSIS — G89.29 CHRONIC PAIN: ICD-10-CM

## 2020-02-28 PROCEDURE — 64636 PR DESTROY L/S FACET JNT ADDL: ICD-10-PCS | Mod: RT,,, | Performed by: ANESTHESIOLOGY

## 2020-02-28 PROCEDURE — 25000003 PHARM REV CODE 250: Performed by: ANESTHESIOLOGY

## 2020-02-28 PROCEDURE — 99152 PR MOD CONSCIOUS SEDATION, SAME PHYS, 5+ YRS, FIRST 15 MIN: ICD-10-PCS | Mod: ,,, | Performed by: ANESTHESIOLOGY

## 2020-02-28 PROCEDURE — 63600175 PHARM REV CODE 636 W HCPCS: Performed by: SURGERY

## 2020-02-28 PROCEDURE — 64636 DESTROY L/S FACET JNT ADDL: CPT | Mod: RT,,, | Performed by: ANESTHESIOLOGY

## 2020-02-28 PROCEDURE — 64635 DESTROY LUMB/SAC FACET JNT: CPT | Mod: RT,,, | Performed by: ANESTHESIOLOGY

## 2020-02-28 PROCEDURE — 63600175 PHARM REV CODE 636 W HCPCS: Performed by: ANESTHESIOLOGY

## 2020-02-28 PROCEDURE — 64635 PR DESTROY LUMB/SAC FACET JNT: ICD-10-PCS | Mod: RT,,, | Performed by: ANESTHESIOLOGY

## 2020-02-28 PROCEDURE — 99152 MOD SED SAME PHYS/QHP 5/>YRS: CPT | Mod: ,,, | Performed by: ANESTHESIOLOGY

## 2020-02-28 PROCEDURE — 64636 DESTROY L/S FACET JNT ADDL: CPT | Mod: RT | Performed by: ANESTHESIOLOGY

## 2020-02-28 PROCEDURE — S0020 INJECTION, BUPIVICAINE HYDRO: HCPCS | Performed by: ANESTHESIOLOGY

## 2020-02-28 PROCEDURE — 64635 DESTROY LUMB/SAC FACET JNT: CPT | Mod: RT | Performed by: ANESTHESIOLOGY

## 2020-02-28 RX ORDER — LIDOCAINE HYDROCHLORIDE 20 MG/ML
INJECTION, SOLUTION INFILTRATION; PERINEURAL
Status: DISCONTINUED | OUTPATIENT
Start: 2020-02-28 | End: 2020-02-28 | Stop reason: HOSPADM

## 2020-02-28 RX ORDER — BUPIVACAINE HYDROCHLORIDE 5 MG/ML
INJECTION, SOLUTION EPIDURAL; INTRACAUDAL
Status: DISCONTINUED | OUTPATIENT
Start: 2020-02-28 | End: 2020-02-28 | Stop reason: HOSPADM

## 2020-02-28 RX ORDER — FENTANYL CITRATE 50 UG/ML
INJECTION, SOLUTION INTRAMUSCULAR; INTRAVENOUS
Status: DISCONTINUED | OUTPATIENT
Start: 2020-02-28 | End: 2020-02-28 | Stop reason: HOSPADM

## 2020-02-28 RX ORDER — SODIUM CHLORIDE 9 MG/ML
500 INJECTION, SOLUTION INTRAVENOUS CONTINUOUS
Status: ACTIVE | OUTPATIENT
Start: 2020-02-28 | End: 2020-02-29

## 2020-02-28 RX ORDER — MIDAZOLAM HYDROCHLORIDE 1 MG/ML
INJECTION INTRAMUSCULAR; INTRAVENOUS
Status: DISCONTINUED | OUTPATIENT
Start: 2020-02-28 | End: 2020-02-28 | Stop reason: HOSPADM

## 2020-02-28 RX ADMIN — SODIUM CHLORIDE 500 ML: 0.9 INJECTION, SOLUTION INTRAVENOUS at 10:02

## 2020-02-28 NOTE — DISCHARGE SUMMARY
Discharge Note  Short Stay      SUMMARY     Admit Date: 2/28/2020    Attending Physician: Julieth Christopher    Procedure: Procedure(s) (LRB):  RADIOFREQUENCY ABLATION L2,3,4,5 RIGHT   2 of 2 ok to hold eliquis (Right)     Discharge Physician: Julieth Christopher    Discharge Date: 2/28/2020 11:27 AM    Final Diagnosis: Lumbar spondylosis [M47.816]    Disposition: Home or self care    Patient Instructions:   Current Discharge Medication List      CONTINUE these medications which have NOT CHANGED    Details   ALPRAZolam (XANAX) 0.25 MG tablet Take 1 tablet (0.25 mg total) by mouth once daily.  Qty: 90 tablet, Refills: 1    Associated Diagnoses: Anxiety      apixaban (ELIQUIS) 5 mg Tab Take 1 tablet (5 mg total) by mouth 2 (two) times daily.  Qty: 180 tablet, Refills: 3      atorvastatin (LIPITOR) 10 MG tablet Take 1 tablet (10 mg total) by mouth once daily.  Qty: 90 tablet, Refills: 3      candesartan (ATACAND) 8 MG tablet Take 1 tablet (8 mg total) by mouth once daily.  Qty: 90 tablet, Refills: 3      cholecalciferol, vitamin D3, (VITAMIN D3) 5,000 unit Tab Take 5,000 Units by mouth once daily.  Qty: 90 tablet, Refills: 3      DULoxetine (CYMBALTA) 60 MG capsule Take 1 capsule (60 mg total) by mouth 2 (two) times daily.  Qty: 60 capsule, Refills: 2      gabapentin (NEURONTIN) 400 MG capsule Take 1 capsule (400 mg total) by mouth 2 (two) times daily. Take 2 tablets once daily  Qty: 180 capsule, Refills: 3      levothyroxine (SYNTHROID, LEVOTHROID) 175 MCG tablet Take 1 tablet (175 mcg total) by mouth once daily.  Qty: 90 tablet, Refills: 3      methocarbamol (ROBAXIN) 500 MG Tab Take 1 tablet (500 mg total) by mouth 3 (three) times daily.  Qty: 90 tablet, Refills: 0      metoprolol tartrate (LOPRESSOR) 25 MG tablet Take 3 tablets (75 mg total) by mouth 2 (two) times daily.  Qty: 180 tablet, Refills: 11    Associated Diagnoses: Atrial fibrillation, new onset      zolpidem (AMBIEN) 10 mg Tab TAKE 1 TABLET BY MOUTH EVERY DAY IN  THE EVENING AS NEEDED  Qty: 90 tablet, Refills: 1    Comments: This request is for a new prescription for a controlled substance as required by Federal/State law. DX Code Needed  .  Associated Diagnoses: Anxiety             Resume home diet and activity

## 2020-02-28 NOTE — H&P
HPI  Patient presenting for Procedure(s) (LRB):  RADIOFREQUENCY ABLATION L2,3,4,5 RIGHT   2 of 2 ok to hold eliquis (Right)     Patient on Anti-coagulation Yes.  Patient has held her Eliquis since 2/24/2020     No health changes since previous encounter    Past Medical History:   Diagnosis Date    Allergy     Anemia     Anxiety     Atrial fibrillation     Cancer     skin    Cataract     Depression     Edema     Hypothyroidism     Kidney disease     PSVT (paroxysmal supraventricular tachycardia)     Thyroid disease      Past Surgical History:   Procedure Laterality Date    ADENOIDECTOMY      APPENDECTOMY      COLONOSCOPY  2009    repeat in 10 years     EYE SURGERY      HYSTERECTOMY      INJECTION OF ANESTHETIC AGENT AROUND NERVE Bilateral 9/21/2018    Procedure: BLOCK, NERVE, MEDIAL BRANCH BLOCK BILATERAL LUMBAR L2-5;  Surgeon: Julieth Christopher MD;  Location: [facility] PAIN MGT;  Service: Pain Management;  Laterality: Bilateral;  1 of 2    INJECTION OF ANESTHETIC AGENT AROUND NERVE Bilateral 9/28/2018    Procedure: BLOCK, NERVE, MEDIAL BRANCH BLOCK BILATERAL LUMBAR L2-5;  Surgeon: Julieth Christopher MD;  Location: [facility] PAIN MGT;  Service: Pain Management;  Laterality: Bilateral;  2 of 2  PLEASE GIVE NIRAVAM PER MD    OOPHORECTOMY      RADIOFREQUENCY ABLATION Left 2/14/2020    Procedure: RADIOFREQUENCY ABLATION L2,3,4,5;  Surgeon: Julieth Christopher MD;  Location: [facility] PAIN MGT;  Service: Pain Management;  Laterality: Left;  LT RFA L2,3,4,5  1 of 2  OK to hold Eliquis    TONSILLECTOMY      TREATMENT OF CARDIAC ARRHYTHMIA N/A 2/8/2019    Procedure: CARDIOVERSION;  Surgeon: Devin You MD;  Location: [facility] EP LAB;  Service: Cardiology;  Laterality: N/A;  AF, KAROL, DCCV, MAC, [place], 3 Prep     Review of patient's allergies indicates:   Allergen Reactions    Dexamethasone Rash      Current Facility-Administered Medications   Medication    0.9%  NaCl infusion       PMHx, PSHx, Allergies, Medications reviewed  "in epic    ROS negative except pain complaints in HPI    OBJECTIVE:    /64 (BP Location: Right arm, Patient Position: Lying)   Pulse 89   Temp 98.4 °F (36.9 °C) (Oral)   Resp 18   Ht 5' 2" (1.575 m)   Wt 99.8 kg (220 lb)   BMI 40.24 kg/m²     PHYSICAL EXAMINATION:    GENERAL: Well appearing, in no acute distress, alert and oriented x3.  PSYCH:  Mood and affect appropriate.  SKIN: Skin color, texture, turgor normal, no rashes or lesions which will impact the procedure.  CV: RRR with palpation of the radial artery.  PULM: No evidence of respiratory difficulty, symmetric chest rise. Clear to auscultation.  NEURO: Cranial nerves grossly intact.    Plan:    Proceed with procedure as planned Procedure(s) (LRB):  RADIOFREQUENCY ABLATION L2,3,4,5 RIGHT   2 of 2 ok to hold eliquis (Right)    Rubén Webster  02/28/2020    I have seen the patient with the resident physician.  We have come up with the above plan.  The patient is in agreement with our plan. I agree with the above note which I have edited where appropriate.     Julieth Christopher MD  02/28/2020            "

## 2020-02-28 NOTE — OP NOTE
"Date of Procedure: 02/28/2020    Procedure: Right L2, 3, 4, 5 Lumbar Medial Branch Nerve Thermal Radiofrequency Ablation    Pre-op diagnosis: Lumbar Spondylosis [M47.816]    Post-op diagnosis: Lumbar Spondylosis [M47.816]     Physician: Dr. Julieth Christopher     Assistant: Dr. Marie    Anesthestia: local/IV sedation: Versed 2 mg and fentanyl 100 mcg IV.  Conscious sedation ordered by MD.  Patient reevaluated and sedation administered by MD and monitored by RN.  Total sedation time was less than 30 min. (See nurse documentation and case log for sedation time)    EBL: None    Specimens: None    All medications, allergies, and relevant histories were reviewed. No recent antibiotics or infections.  A time-out was taken to verify the correct patient, procedure, laterality, and appropriate medications/allergies.    Procedure: Lumbar RFA    Lumbar Medial Branch Block with radiofrequency ablation, levels L2, 3, 4, 5, right    The procedure risks, benefits, and possible complications were discussed with the patient including nerve damage, infection, spinal headache, and paresis.   Patient was placed in the prone position with the midriff elevated. Skin was prepped with CHG and draped. Oblique view of the spine was obtained with fluoroscopy. Entry sites were marked over the skin and Xylocaine 1% was used to anesthetize the skin and subcutaneous tissues.     A 3.5 " [place] Venom needle was introduced at an angle to parallel the medial branches in the groove between superior articular process and transverse process, and L5 primary dorsal ramus at the junction of the S1 superior articular process and sacral ala.    Multifidus stim elicited at each level.  No distal motor stimulation was elicited at any level at 2V with a frequency of 2Hz.  All impedances were within the acceptable range.    1cc of 2% lidocaine was injected at each level.  Thermal RF was then conducted at each level at 80 degrees, for 2:30 minutes   1 cc of a " mixture of 0.5% bupivacaine with dexamethasone 5 mg was injected at each level.    Patient tolerated the procedure well and there were no complications.      Future Management:   If helpful, can repeat as needed.  Follow up with me in 4-6 weeks.      I certify that I provided the above services.  I was present for the entire procedure, which was performed by the fellow physician under my supervision.  There were no parts of the procedure that were performed not by myself or without my direct supervision.

## 2020-02-28 NOTE — DISCHARGE INSTRUCTIONS
Thank you for allowing us to care for you today. You may receive a survey about the care we provided. Your feedback is valuable and helps us provide excellent care throughout the community.     Home Care Instructions for Pain Management:    1. DIET:   You may resume your normal diet today.   2. BATHING:   You may shower with luke warm water. No tub baths or anything that will soak injection sites under water for the next 24 hours.  3. DRESSING:   You may remove your bandage today.   4. ACTIVITY LEVEL:   You may resume your normal activities 24 hrs after your procedure. Nothing strenuous today.  5. MEDICATIONS:   You may resume your normal medications today. To restart blood thinners, ask your doctor.  6. DRIVING    If you have received any sedatives by mouth today, you may not drive for 12 hours.    If you have received any sedation through your IV, you may not drive for 24 hrs.   7. SPECIAL INSTRUCTIONS:   No heat to the injection site for 24 hrs including, hot bath or shower, heating pad, moist heat, or hot tubs.    Use ice pack to injection site for any pain or discomfort.  Apply ice packs for 20 minute intervals as needed.    IF you have diabetes, be sure to monitor your blood sugar more closely. IF your injection contained steroids your blood sugar levels may become higher than normal.    If you are still having pain upon discharge:  Your pain may improve over the next 48 hours. The anesthetic (numbing medication) works immediately to 48 hours. IF your injection contained a steroid (anti-inflammatory medication), it takes approximately 3 days to start feeling relief and 7-10 days to see your greatest results from the medication. It is possible you may need subsequent injections. This would be discussed at your follow up appointment with pain management or your referring doctor.    Please call the PAIN MANAGEMENT office at [phone] or ON CALL pager at [phone] if you experienced any:   -Weakness or  loss of sensation  -Fever > 101.5  -Pain uncontrolled with oral medications   -Persistent nausea, vomiting, or diarrhea  -Redness or drainage from the injection sites, or any other worrisome concerns.   If physician on call was not reached or could not communicate with our office for any reason please go to the nearest emergency department.    Recovery After Procedural Sedation (Adult)  You have been given medicine by vein to make you sleep during your surgery. This may have included both a pain medicine and sleeping medicine. Most of the effects have worn off. But you may still have some drowsiness for the next 6 to 8 hours.  Home care  Follow these guidelines when you get home:  · For the next 8 hours, you should be watched by a responsible adult. This person should make sure your condition is not getting worse.  · Don't drink any alcohol for the next 24 hours.  · Don't drive, operate dangerous machinery, or make important business or personal decisions during the next 24 hours.  Note: Your healthcare provider may tell you not to take any medicine by mouth for pain or sleep in the next 4 hours. These medicines may react with the medicines you were given in the hospital. This could cause a much stronger response than usual.  Follow-up care  Follow up with your healthcare provider if you are not alert and back to your usual level of activity within 12 hours.  When to seek medical advice  Call your healthcare provider right away if any of these occur:  · Drowsiness gets worse  · Weakness or dizziness gets worse  · Repeated vomiting  · You can't be awakened   Date Last Reviewed: 10/18/2016  © 0561-1666 [employer]. [address] [place] [address], [place] [address]. All rights reserved. This information is not intended as a substitute for professional medical care. Always follow your healthcare professional's instructions.

## 2020-03-02 NOTE — DISCHARGE SUMMARY
Discharge Note  Short Stay      SUMMARY     Admit Date: 2/28/2020    Attending Physician: Julieth Christopher    Procedure: Procedure(s) (LRB):  RADIOFREQUENCY ABLATION L2,3,4,5 RIGHT   2 of 2 ok to hold eliquis (Right)     Discharge Physician: Julieth Christopher    Discharge Date: 3/2/2020 12:50 PM    Final Diagnosis: Lumbar spondylosis [M47.816]    Disposition: Home or self care    Patient Instructions:   Discharge Medication List as of 2/28/2020 11:44 AM      CONTINUE these medications which have NOT CHANGED    Details   ALPRAZolam (XANAX) 0.25 MG tablet Take 1 tablet (0.25 mg total) by mouth once daily., Starting Fri 1/10/2020, Print      apixaban (ELIQUIS) 5 mg Tab Take 1 tablet (5 mg total) by mouth 2 (two) times daily., Starting Wed 1/8/2020, Normal      atorvastatin (LIPITOR) 10 MG tablet Take 1 tablet (10 mg total) by mouth once daily., Starting Fri 1/10/2020, Print      candesartan (ATACAND) 8 MG tablet Take 1 tablet (8 mg total) by mouth once daily., Starting Thu 11/21/2019, Until Fri 11/20/2020, Normal      cholecalciferol, vitamin D3, (VITAMIN D3) 5,000 unit Tab Take 5,000 Units by mouth once daily., Starting Sat 9/15/2018, Normal      DULoxetine (CYMBALTA) 60 MG capsule Take 1 capsule (60 mg total) by mouth 2 (two) times daily., Starting Thu 1/23/2020, Until Wed 4/22/2020, Normal      gabapentin (NEURONTIN) 400 MG capsule Take 1 capsule (400 mg total) by mouth 2 (two) times daily. Take 2 tablets once daily, Starting Fri 1/10/2020, Until Wed 7/8/2020, Print      levothyroxine (SYNTHROID, LEVOTHROID) 175 MCG tablet Take 1 tablet (175 mcg total) by mouth once daily., Starting Fri 1/10/2020, Print      methocarbamol (ROBAXIN) 500 MG Tab Take 1 tablet (500 mg total) by mouth 3 (three) times daily., Starting Fri 1/31/2020, Until Sun 3/1/2020, Normal      metoprolol tartrate (LOPRESSOR) 25 MG tablet Take 3 tablets (75 mg total) by mouth 2 (two) times daily., Starting Tue 10/22/2019, Until Wed 10/21/2020, Normal       zolpidem (AMBIEN) 10 mg Tab TAKE 1 TABLET BY MOUTH EVERY DAY IN THE EVENING AS NEEDED, Print             Resume home diet and activity

## 2020-03-24 ENCOUNTER — TELEPHONE (OUTPATIENT)
Dept: PAIN MEDICINE | Facility: CLINIC | Age: 70
End: 2020-03-24

## 2020-03-24 NOTE — TELEPHONE ENCOUNTER
Staff contacted the patient to offer her a telephone call or video visit with Dr. Christopher due to mary not being available on 3/27/20 10 am telephone or video visit. Patient is high risk for exposure for covid 19 virus. Appointment is cancelled at this time.          Patient did not answer home/mobile number therefore staff left a detailed voice message informing the patient of the above. Staff instructed patient to give our office a call back to reschedule for a telephone visit or a video visit through the [employer] chart kristy.

## 2020-04-03 ENCOUNTER — TELEPHONE (OUTPATIENT)
Dept: NEPHROLOGY | Facility: CLINIC | Age: 70
End: 2020-04-03

## 2020-04-03 NOTE — TELEPHONE ENCOUNTER
----- Message from Mini Momin LPN sent at 4/3/2020  1:22 PM CDT -----  Contact: Pt [phone]      ----- Message -----  From: Diamante Aguirre  Sent: 4/3/2020  12:02 PM CDT  To: [facility] Staff    Pt returning call

## 2020-04-14 ENCOUNTER — TELEPHONE (OUTPATIENT)
Dept: PAIN MEDICINE | Facility: CLINIC | Age: 70
End: 2020-04-14

## 2020-04-29 ENCOUNTER — LAB VISIT (OUTPATIENT)
Dept: LAB | Facility: HOSPITAL | Age: 70
End: 2020-04-29
Attending: GENERAL PRACTICE
Payer: MEDICARE

## 2020-04-29 DIAGNOSIS — N18.4 CKD STAGE G4/A2, GFR 15-29 AND ALBUMIN CREATININE RATIO 30-299 MG/G: ICD-10-CM

## 2020-04-29 LAB
BACTERIA #/AREA URNS AUTO: ABNORMAL /HPF
BILIRUB UR QL STRIP: NEGATIVE
CLARITY UR REFRACT.AUTO: ABNORMAL
COLOR UR AUTO: ABNORMAL
GLUCOSE UR QL STRIP: NEGATIVE
HGB UR QL STRIP: ABNORMAL
HYALINE CASTS UR QL AUTO: 0 /LPF
KETONES UR QL STRIP: ABNORMAL
LEUKOCYTE ESTERASE UR QL STRIP: ABNORMAL
MICROSCOPIC COMMENT: ABNORMAL
NITRITE UR QL STRIP: NEGATIVE
PH UR STRIP: 5 [PH] (ref 5–8)
PROT UR QL STRIP: ABNORMAL
RBC #/AREA URNS AUTO: 1 /HPF (ref 0–4)
SP GR UR STRIP: 1.01 (ref 1–1.03)
URN SPEC COLLECT METH UR: ABNORMAL
UROBILINOGEN UR STRIP-ACNC: NEGATIVE EU/DL
WBC #/AREA URNS AUTO: 10 /HPF (ref 0–5)

## 2020-04-29 PROCEDURE — 81000 URINALYSIS NONAUTO W/SCOPE: CPT | Mod: PO

## 2020-05-07 ENCOUNTER — OFFICE VISIT (OUTPATIENT)
Dept: NEPHROLOGY | Facility: CLINIC | Age: 70
End: 2020-05-07
Payer: MEDICARE

## 2020-05-07 ENCOUNTER — PATIENT MESSAGE (OUTPATIENT)
Dept: NEPHROLOGY | Facility: CLINIC | Age: 70
End: 2020-05-07

## 2020-05-07 DIAGNOSIS — I10 ESSENTIAL HYPERTENSION: Chronic | ICD-10-CM

## 2020-05-07 DIAGNOSIS — E78.00 PURE HYPERCHOLESTEROLEMIA: ICD-10-CM

## 2020-05-07 DIAGNOSIS — I48.0 PAROXYSMAL ATRIAL FIBRILLATION: ICD-10-CM

## 2020-05-07 DIAGNOSIS — N18.4 CKD STAGE G4/A2, GFR 15-29 AND ALBUMIN CREATININE RATIO 30-299 MG/G: Primary | ICD-10-CM

## 2020-05-07 PROCEDURE — 99443 PR PHYSICIAN TELEPHONE EVALUATION 21-30 MIN: CPT | Mod: 95,,, | Performed by: INTERNAL MEDICINE

## 2020-05-07 PROCEDURE — 99443 PR PHYSICIAN TELEPHONE EVALUATION 21-30 MIN: ICD-10-PCS | Mod: 95,,, | Performed by: INTERNAL MEDICINE

## 2020-05-07 RX ORDER — CANDESARTAN 8 MG/1
4 TABLET ORAL DAILY
Qty: 45 TABLET | Refills: 3 | Status: SHIPPED | OUTPATIENT
Start: 2020-05-07 | End: 2021-09-22 | Stop reason: SDUPTHER

## 2020-05-07 NOTE — Clinical Note
Please have this patient scheduled with a fellow (can be a new fellow) in July as soon as possible.  She was having what seemed to be orthostatic symptoms and I decreased her blood pressure medication Candesartan to 4 mg (from 8mg).  Thank you.

## 2020-05-07 NOTE — PATIENT INSTRUCTIONS
Decreased Candesartan to 4 mg daily.  Follow up with cardiologist for Afib.  Will follow up in 2-3 months in nephrology clinic.

## 2020-05-07 NOTE — PROGRESS NOTES
Nephrology Clinic Progress Note - Telemedicine Audio Telephone Visit     Patient ID: Talia Dillon 69 y.o. White female who presents for Follow-up and Chronic Kidney Disease    The patient location is: home  The chief complaint leading to consultation is: Follow up of her chronic kidney disease  Visit type: Virtual visit with audio only (telephone)  Total time spent with patient: 22 mins  The reason for the audio only service rather than synchronous audio and video virtual visit was related to technical difficulties or patient preference/necessity.  Each patient to whom I provide medical services by telemedicine is:  (1) informed of the relationship between the physician and patient and the respective role of any other health care provider with respect to management of the patient; and (2) notified that they may decline to receive medical services by telemedicine and may withdraw from such care at any time. Patient verbally consented to receive this service via voice-only telephone call.    Subjective:     Interval Hx 5/7/2020 Telemedicine Audio Visit:  Patient called for follow-up of her chronic kidney disease.  Since then patient has had 2 back procedures for back pain, and that is her only complaint.  She is using every day gabapentin 400 mg twice a day.  Patient refers has had low extremity swelling and shortness of breath accompanied by dizziness.  Her blood pressures have been better since starting Candesartan 8 mg, but refers that since ~December has had this dizziness.  Other than these complaints, she has otherwise been well.  She has a follow-up appointment with cardiologist in June 2020.      Interval Hx 11/21/2019:   Ms Talia Dillon is a here for follow up for his TINA and CKD stage 4. She feels well and denies an symptoms. She endorses quitting smoking, and increasing water intake. Her sCr has remained ~1.9-2.1 for past ~year.  She has stopped taking NSAIDs for pain.  She is currently being followed by  rheumatology she has now started steroid injections with pain management.  Patient with Afib well controlled, on Eliquis.  Takes metoprolol tartrate for rate control.    LUPILLO Dillon is a 69 y.o. White female with a PMHx relevant for CKD 4 (baseline sCr 1.9-2.1), Afib, Hypothyroidism, anxiety, arthralgias multiple joints followed by rheumatologist, history of smoking since ~15 years old, and morbid obesity she present to Nephrology consulted for elevated sCr. originally referred by her PCP James Vallecillo MD after a rapid rise in sCr suspected to be secondary to volume depletion from diarrhea.  She had a sCr of 1.5 mg/dL back in 2015 and during 2017 she had a sCr of 1.5-1.7 mg/dL s baseline.   In 2018 sCr stayed close to 1.8-2.1.       Review of Systems   Constitutional: Positive for fatigue. Negative for appetite change, fever and unexpected weight change.   HENT: Negative for facial swelling, hearing loss and tinnitus.    Eyes: Negative for visual disturbance.   Respiratory: Negative for chest tightness and shortness of breath.    Cardiovascular: Negative for chest pain and leg swelling.   Gastrointestinal: Negative for abdominal distention, abdominal pain, diarrhea and nausea.   Genitourinary: Negative for difficulty urinating, dysuria and flank pain.   Musculoskeletal: Negative for arthralgias and myalgias.   Skin: Negative for color change.   Neurological: Positive for weakness. Negative for dizziness, syncope, light-headedness and headaches.   Psychiatric/Behavioral: Negative for behavioral problems and confusion.   All other systems reviewed and are negative.    Objective:     There were no vitals filed for this visit.    Assessment:        ICD-10-CM ICD-9-CM   1. CKD stage G4/A2, GFR 15-29 and albumin creatinine ratio  mg/g N18.4 585.4   2. Essential hypertension I10 401.9   3. Pure hypercholesterolemia E78.00 272.0   4. Paroxysmal atrial fibrillation I48.0 427.31        Plan:   1. CKD stage  G4,A2 eGFR sCr baseline 1.9-2.1 mg/dL    TINA on CKD stage 4:  Stable.  CKD is unclear but suspect prolong use of NSAID's, Tobacco abuse. She has no Dx of HTN and her BP per our records have been SBP < 130's.  Renal US 2017 showed some evidence of chronic Kidney disease. She has a PMHx for significant Tobacco abuse and she is morbidly obese which are risk factors for CKD.    Lab Results   Component Value Date    CREATININE 1.68 (H) 04/29/2020     sCr has remained stable for past year  Oriented patient of smoking cessation and she understands that if she continues to do it could be detrimental to her kidneys and general health  Low salt, renal Diet   Avoid all NSAIDs (Hx of taking Aleve 2-3 tabs a day for 4-5 years; stopped taking them ~2 years ago)    Protein Creatinine Ratios:  Prot/Creat Ratio, Ur   Date Value Ref Range Status   11/19/2019 0.75 (H) 0.00 - 0.20 Final   04/16/2019 0.28 (H) 0.00 - 0.20 Final   10/24/2018 0.48 (H) 0.00 - 0.20 Final     For proteinuria on Candesartan 8 mg oral daily  Will decrease dose to 4 mg oral daily as patient having dizziness episodes, concerned for orthostatic.      Acid-Base: at goal  Lab Results   Component Value Date     04/29/2020    K 4.9 04/29/2020    CO2 30 (H) 04/29/2020     Progression to ESRD: we discussed possible progression to ESRD but she is doing everything to slow her GKD progression.    For past ~year has remained stable.  She quit smoking and continues to avoid NSAIDs and is drinking more water.    2. 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  Continue with Vitamin D supplement OTC    4. Lipid management: not on Statin  Lab Results   Component Value Date    LDLCALC 52.8 (L) 01/14/2019     As per PCP, thus far Stable    Follow up in Clinic ~3months with  labs with RFP, UA, UPCR, CBC    Max Ramos MD  Nephrology  [facility]    This service was not originating from a related E/M service provided within the previous 7 days nor will  to an E/M service or procedure within the next 24 hours or my soonest available appointment.  Prevailing standard of care was able to be met in this audio-only visit.

## 2020-06-01 NOTE — PROGRESS NOTES
Telemedicine visit.  The note from the fellow/resident was reviewed.   I agree with the assessment and plan of  Dr. Harjinder Quintanilla

## 2020-06-04 ENCOUNTER — OFFICE VISIT (OUTPATIENT)
Dept: CARDIOLOGY | Facility: CLINIC | Age: 70
End: 2020-06-04
Payer: MEDICARE

## 2020-06-04 VITALS
WEIGHT: 227.19 LBS | HEART RATE: 96 BPM | HEIGHT: 62 IN | BODY MASS INDEX: 41.81 KG/M2 | DIASTOLIC BLOOD PRESSURE: 81 MMHG | SYSTOLIC BLOOD PRESSURE: 117 MMHG

## 2020-06-04 DIAGNOSIS — E78.00 PURE HYPERCHOLESTEROLEMIA: ICD-10-CM

## 2020-06-04 DIAGNOSIS — I48.19 PERSISTENT ATRIAL FIBRILLATION: Primary | ICD-10-CM

## 2020-06-04 DIAGNOSIS — N18.4 CKD STAGE G4/A2, GFR 15-29 AND ALBUMIN CREATININE RATIO 30-299 MG/G: ICD-10-CM

## 2020-06-04 DIAGNOSIS — I10 ESSENTIAL HYPERTENSION: Chronic | ICD-10-CM

## 2020-06-04 DIAGNOSIS — R60.0 LOCALIZED EDEMA: ICD-10-CM

## 2020-06-04 DIAGNOSIS — E03.4 HYPOTHYROIDISM DUE TO ACQUIRED ATROPHY OF THYROID: ICD-10-CM

## 2020-06-04 DIAGNOSIS — R06.09 DYSPNEA ON EXERTION: ICD-10-CM

## 2020-06-04 DIAGNOSIS — I48.20 CHRONIC ATRIAL FIBRILLATION: ICD-10-CM

## 2020-06-04 DIAGNOSIS — Z79.01 LONG TERM (CURRENT) USE OF ANTICOAGULANTS: ICD-10-CM

## 2020-06-04 PROCEDURE — 99214 PR OFFICE/OUTPT VISIT, EST, LEVL IV, 30-39 MIN: ICD-10-PCS | Mod: 25,S$GLB,, | Performed by: INTERNAL MEDICINE

## 2020-06-04 PROCEDURE — 99999 PR PBB SHADOW E&M-EST. PATIENT-LVL III: ICD-10-PCS | Mod: PBBFAC,,, | Performed by: INTERNAL MEDICINE

## 2020-06-04 PROCEDURE — 3074F PR MOST RECENT SYSTOLIC BLOOD PRESSURE < 130 MM HG: ICD-10-PCS | Mod: CPTII,S$GLB,, | Performed by: INTERNAL MEDICINE

## 2020-06-04 PROCEDURE — 3079F DIAST BP 80-89 MM HG: CPT | Mod: CPTII,S$GLB,, | Performed by: INTERNAL MEDICINE

## 2020-06-04 PROCEDURE — 3074F SYST BP LT 130 MM HG: CPT | Mod: CPTII,S$GLB,, | Performed by: INTERNAL MEDICINE

## 2020-06-04 PROCEDURE — 1101F PT FALLS ASSESS-DOCD LE1/YR: CPT | Mod: CPTII,S$GLB,, | Performed by: INTERNAL MEDICINE

## 2020-06-04 PROCEDURE — 99999 PR PBB SHADOW E&M-EST. PATIENT-LVL III: CPT | Mod: PBBFAC,,, | Performed by: INTERNAL MEDICINE

## 2020-06-04 PROCEDURE — 1159F MED LIST DOCD IN RCRD: CPT | Mod: S$GLB,,, | Performed by: INTERNAL MEDICINE

## 2020-06-04 PROCEDURE — 3079F PR MOST RECENT DIASTOLIC BLOOD PRESSURE 80-89 MM HG: ICD-10-PCS | Mod: CPTII,S$GLB,, | Performed by: INTERNAL MEDICINE

## 2020-06-04 PROCEDURE — 99499 RISK ADDL DX/OHS AUDIT: ICD-10-PCS | Mod: S$GLB,,, | Performed by: INTERNAL MEDICINE

## 2020-06-04 PROCEDURE — 1101F PR PT FALLS ASSESS DOC 0-1 FALLS W/OUT INJ PAST YR: ICD-10-PCS | Mod: CPTII,S$GLB,, | Performed by: INTERNAL MEDICINE

## 2020-06-04 PROCEDURE — 93000 ELECTROCARDIOGRAM COMPLETE: CPT | Mod: S$GLB,,, | Performed by: INTERNAL MEDICINE

## 2020-06-04 PROCEDURE — 99499 UNLISTED E&M SERVICE: CPT | Mod: S$GLB,,, | Performed by: INTERNAL MEDICINE

## 2020-06-04 PROCEDURE — 93000 EKG 12-LEAD: ICD-10-PCS | Mod: S$GLB,,, | Performed by: INTERNAL MEDICINE

## 2020-06-04 PROCEDURE — 99214 OFFICE O/P EST MOD 30 MIN: CPT | Mod: 25,S$GLB,, | Performed by: INTERNAL MEDICINE

## 2020-06-04 PROCEDURE — 1159F PR MEDICATION LIST DOCUMENTED IN MEDICAL RECORD: ICD-10-PCS | Mod: S$GLB,,, | Performed by: INTERNAL MEDICINE

## 2020-06-04 RX ORDER — ALBUTEROL SULFATE 90 UG/1
2 AEROSOL, METERED RESPIRATORY (INHALATION) EVERY 4 HOURS PRN
Qty: 18 G | Refills: 11 | Status: SHIPPED | OUTPATIENT
Start: 2020-06-04 | End: 2021-09-22 | Stop reason: SDUPTHER

## 2020-06-04 NOTE — PROGRESS NOTES
"  Subjective:      Patient ID: Talia Dillon is a 69 y.o. female.    Chief Complaint: Follow-up (Afib)    HPI: Nephrologist, Dr Harjinder Wild began candesartan.    Pt c/o severe shortness of breath.     The shortness of breath has been going on for a year but got worse when pt began the candesartan.    Pt reports severe dizziness onset coincided when pt started taking the candesartan several months ago.    "I can't catch my breath when moving or lying down."  "I can't get air into my lungs"    Pt has gained 27 lbs since 9/19 and resumed smoking one-half PPD cigarettes.    Review of Systems   Cardiovascular: Positive for dyspnea on exertion and leg swelling. Negative for chest pain, claudication, irregular heartbeat, near-syncope, orthopnea, palpitations and syncope.        Past Medical History:   Diagnosis Date    Allergy     Anemia     Anxiety     Atrial fibrillation     Cancer     skin    Cataract     Depression     Edema     Hypothyroidism     Kidney disease     PSVT (paroxysmal supraventricular tachycardia)     Thyroid disease         Past Surgical History:   Procedure Laterality Date    ADENOIDECTOMY      APPENDECTOMY      COLONOSCOPY  2009    repeat in 10 years     EYE SURGERY      HYSTERECTOMY      INJECTION OF ANESTHETIC AGENT AROUND NERVE Bilateral 9/21/2018    Procedure: BLOCK, NERVE, MEDIAL BRANCH BLOCK BILATERAL LUMBAR L2-5;  Surgeon: Julieth Christopher MD;  [facility];  Service: Pain Management;  Laterality: Bilateral;  1 of 2    INJECTION OF ANESTHETIC AGENT AROUND NERVE Bilateral 9/28/2018    Procedure: BLOCK, NERVE, MEDIAL BRANCH BLOCK BILATERAL LUMBAR L2-5;  Surgeon: Julieth Christopher MD;  [facility];  Service: Pain Management;  Laterality: Bilateral;  2 of 2  PLEASE GIVE NIRAVAM PER MD    OOPHORECTOMY      RADIOFREQUENCY ABLATION Left 2/14/2020    Procedure: RADIOFREQUENCY ABLATION L2,3,4,5;  Surgeon: Julieth Christopher MD;  [facility];  " Service: Pain Management;  Laterality: Left;  LT RFA L2,3,4,5  1 of 2  OK to hold Eliquis    RADIOFREQUENCY ABLATION Right 2020    Procedure: RADIOFREQUENCY ABLATION L2,3,4,5 RIGHT   2 of 2 ok to hold eliquis;  Surgeon: Julieth Christopher MD;  Location: [facility];  Service: Pain Management;  Laterality: Right;    TONSILLECTOMY      TREATMENT OF CARDIAC ARRHYTHMIA N/A 2019    Procedure: CARDIOVERSION;  Surgeon: Devin You MD;  Location: [facility] EP LAB;  Service: Cardiology;  Laterality: N/A;  AF, KAROL, DCCV, MAC, UT, 3 Prep       Family History   Problem Relation Age of Onset    Stroke Mother     Stroke Father     Diabetes Father     Sleep apnea Daughter     Mental illness Daughter        Social History     Socioeconomic History    Marital status:      Spouse name: Not on file    Number of children: Not on file    Years of education: Not on file    Highest education level: Not on file   Occupational History    Not on file   Social Needs    Financial resource strain: Not on file    Food insecurity:     Worry: Not on file     Inability: Not on file    Transportation needs:     Medical: Not on file     Non-medical: Not on file   Tobacco Use    Smoking status: Current Some Day Smoker     Packs/day: 1.00     Years: 50.00     Pack years: 50.00     Types: Cigarettes     Last attempt to quit: 2018     Years since quittin.0    Smokeless tobacco: Never Used   Substance and Sexual Activity    Alcohol use: No     Frequency: Never     Comment: rarely    Drug use: No    Sexual activity: Not Currently   Lifestyle    Physical activity:     Days per week: Not on file     Minutes per session: Not on file    Stress: Not on file   Relationships    Social connections:     Talks on phone: Not on file     Gets together: Not on file     Attends Jew service: Not on file     Active member of club or organization: Not on file     Attends meetings of clubs or organizations: Not on file      "Relationship status: Not on file   Other Topics Concern    Not on file   Social History Narrative    Not on file       Current Outpatient Medications on File Prior to Visit   Medication Sig Dispense Refill    ALPRAZolam (XANAX) 0.25 MG tablet Take 1 tablet (0.25 mg total) by mouth once daily. 90 tablet 1    apixaban (ELIQUIS) 5 mg Tab Take 1 tablet (5 mg total) by mouth 2 (two) times daily. 180 tablet 3    atorvastatin (LIPITOR) 10 MG tablet Take 1 tablet (10 mg total) by mouth once daily. 90 tablet 3    candesartan (ATACAND) 8 MG tablet Take 0.5 tablets (4 mg total) by mouth once daily. 45 tablet 3    cholecalciferol, vitamin D3, (VITAMIN D3) 5,000 unit Tab Take 5,000 Units by mouth once daily. 90 tablet 3    DULoxetine (CYMBALTA) 60 MG capsule Take 1 capsule (60 mg total) by mouth 2 (two) times daily. 60 capsule 2    gabapentin (NEURONTIN) 400 MG capsule Take 1 capsule (400 mg total) by mouth 2 (two) times daily. Take 2 tablets once daily 180 capsule 3    levothyroxine (SYNTHROID, LEVOTHROID) 175 MCG tablet Take 1 tablet (175 mcg total) by mouth once daily. 90 tablet 3    metoprolol tartrate (LOPRESSOR) 25 MG tablet Take 3 tablets (75 mg total) by mouth 2 (two) times daily. 180 tablet 11    zolpidem (AMBIEN) 10 mg Tab TAKE 1 TABLET BY MOUTH EVERY DAY IN THE EVENING AS NEEDED 90 tablet 1     No current facility-administered medications on file prior to visit.        Review of patient's allergies indicates:   Allergen Reactions    Dexamethasone Rash     Objective:     Vitals:    06/04/20 1438   BP: 117/81   BP Location: Left arm   Patient Position: Sitting   BP Method: Large (Automatic)   Pulse: 96   Weight: 103 kg (227 lb 2.9 oz)   Height: 5' 2" (1.575 m)        Physical Exam   Constitutional: She is oriented to person, place, and time. She appears well-developed and well-nourished.   Eyes: No scleral icterus.   Neck: No JVD present. Carotid bruit is not present.   Cardiovascular: Normal rate. An " irregularly irregular rhythm present. Exam reveals no gallop.   No murmur heard.  Pulmonary/Chest: Breath sounds normal.   Musculoskeletal: She exhibits no edema.   Neurological: She is alert and oriented to person, place, and time.   Skin: Skin is warm and dry.   Psychiatric: She has a normal mood and affect. Her behavior is normal. Judgment and thought content normal.   Vitals reviewed.     ECG: atrial fibrillation with controlled ventricular response.  QS pattern V2. Unchanged    Note CXR 1/19 showed mild cardiomegaly and clear lungs    Note Cardiolite stress test !/19 WNL    Creat 1.68 last month    CBC 11/19 WNL      Assessment:     1. Persistent atrial fibrillation    2. Pure hypercholesterolemia    3. Essential hypertension    4. Long term (current) use of anticoagulants    5. Hypothyroidism due to acquired atrophy of thyroid    6. Localized edema    7. CKD stage G4/A2, GFR 15-29 and albumin creatinine ratio  mg/g    8. Chronic atrial fibrillation    9. Dyspnea on exertion      Plan:   Talia was seen today for follow-up.    Diagnoses and all orders for this visit:    Persistent atrial fibrillation  -     IN OFFICE EKG 12-LEAD (to Muse)  -     Echo Color Flow Doppler? Yes; Future  -     X-Ray Chest PA And Lateral; Future  -     Brain Natriuretic Peptide; Future  -     CBC auto differential; Future  -     Comprehensive metabolic panel; Future  -     Lipid Panel; Future  -     TSH; Future    Pure hypercholesterolemia    Essential hypertension  -     Echo Color Flow Doppler? Yes; Future  -     X-Ray Chest PA And Lateral; Future  -     Brain Natriuretic Peptide; Future  -     CBC auto differential; Future  -     Comprehensive metabolic panel; Future  -     Lipid Panel; Future  -     TSH; Future    Long term (current) use of anticoagulants    Hypothyroidism due to acquired atrophy of thyroid  -     TSH; Future    Localized edema    CKD stage G4/A2, GFR 15-29 and albumin creatinine ratio  mg/g    Chronic  atrial fibrillation  -     IN OFFICE EKG 12-LEAD (to Muse)    Dyspnea on exertion  -     Echo Color Flow Doppler? Yes; Future  -     X-Ray Chest PA And Lateral; Future  -     Brain Natriuretic Peptide; Future  -     CBC auto differential; Future  -     Comprehensive metabolic panel; Future  -     Lipid Panel; Future  -     TSH; Future     Etiology of shortness of breath is unclear.  Deconditioning and weight gain and bronchitis from smoking may play a role.    Will repeat labs and CXR and echocardiogram    Will try an empiric trial of albuterol MDI    Pt will also begin fluticasone nasal spray for allergic rhinitis and use generic claritin daily    Low carb diet    Follow up in about 3 months (around 9/4/2020).

## 2020-06-04 NOTE — PROGRESS NOTES
Patient, Talia Dillon (MRN #[id number]), presented with a recorded BMI of 41.55 kg/m^2 consistent with the definition of morbid obesity (ICD-10 E66.01). The patient's morbid obesity was monitored, evaluated, addressed and/or treated. This addendum to the medical record is made on 06/04/2020.

## 2020-06-09 ENCOUNTER — TELEPHONE (OUTPATIENT)
Dept: CARDIOLOGY | Facility: CLINIC | Age: 70
End: 2020-06-09

## 2020-06-09 ENCOUNTER — HOSPITAL ENCOUNTER (OUTPATIENT)
Dept: RADIOLOGY | Facility: HOSPITAL | Age: 70
Discharge: HOME | End: 2020-06-09
Attending: INTERNAL MEDICINE
Payer: MEDICARE

## 2020-06-09 DIAGNOSIS — R06.09 DYSPNEA ON EXERTION: ICD-10-CM

## 2020-06-09 DIAGNOSIS — I48.19 PERSISTENT ATRIAL FIBRILLATION: ICD-10-CM

## 2020-06-09 DIAGNOSIS — I10 ESSENTIAL HYPERTENSION: ICD-10-CM

## 2020-06-09 PROCEDURE — 71046 X-RAY EXAM CHEST 2 VIEWS: CPT | Mod: TC,FY,PO

## 2020-06-11 ENCOUNTER — TELEPHONE (OUTPATIENT)
Dept: CARDIOLOGY | Facility: CLINIC | Age: 70
End: 2020-06-11

## 2020-06-11 ENCOUNTER — HOSPITAL ENCOUNTER (OUTPATIENT)
Dept: CARDIOLOGY | Facility: HOSPITAL | Age: 70
Discharge: HOME | End: 2020-06-11
Attending: INTERNAL MEDICINE
Payer: MEDICARE

## 2020-06-11 DIAGNOSIS — I10 ESSENTIAL HYPERTENSION: ICD-10-CM

## 2020-06-11 DIAGNOSIS — R06.09 DYSPNEA ON EXERTION: ICD-10-CM

## 2020-06-11 DIAGNOSIS — I48.19 PERSISTENT ATRIAL FIBRILLATION: ICD-10-CM

## 2020-06-11 LAB
AORTIC ROOT ANNULUS: 2.88 CM
AORTIC VALVE CUSP SEPERATION: 1.7 CM
AV INDEX (PROSTH): 1
AV MEAN GRADIENT: 3 MMHG
AV PEAK GRADIENT: 5 MMHG
AV VALVE AREA: 3.46 CM2
AV VELOCITY RATIO: 0.83
CV ECHO LV RWT: 0.52 CM
DOP CALC AO PEAK VEL: 1.16 M/S
DOP CALC AO VTI: 18.63 CM
DOP CALC LVOT AREA: 3.5 CM2
DOP CALC LVOT DIAMETER: 2.1 CM
DOP CALC LVOT PEAK VEL: 0.96 M/S
DOP CALC LVOT STROKE VOLUME: 64.46 CM3
DOP CALCLVOT PEAK VEL VTI: 18.62 CM
ECHO LV POSTERIOR WALL: 1.06 CM (ref 0.6–1.1)
FRACTIONAL SHORTENING: 32 % (ref 28–44)
INTERVENTRICULAR SEPTUM: 0.98 CM (ref 0.6–1.1)
LA MAJOR: 4.32 CM
LA MINOR: 6.02 CM
LA WIDTH: 2.62 CM
LEFT ATRIUM SIZE: 3.13 CM
LEFT ATRIUM VOLUME: 35.06 CM3
LEFT INTERNAL DIMENSION IN SYSTOLE: 2.76 CM (ref 2.1–4)
LEFT VENTRICLE DIASTOLIC VOLUME: 73.43 ML
LEFT VENTRICLE SYSTOLIC VOLUME: 28.61 ML
LEFT VENTRICULAR INTERNAL DIMENSION IN DIASTOLE: 4.08 CM (ref 3.5–6)
LEFT VENTRICULAR MASS: 134.8 G
PISA TR MAX VEL: 2.81 M/S
PV PEAK VELOCITY: 0.74 CM/S
RA MAJOR: 4.9 CM
RA PRESSURE: 3 MMHG
RA WIDTH: 3.05 CM
RIGHT VENTRICULAR END-DIASTOLIC DIMENSION: 2.51 CM
SINUS: 2.73 CM
TR MAX PG: 32 MMHG
TV REST PULMONARY ARTERY PRESSURE: 35 MMHG

## 2020-06-11 PROCEDURE — 93306 TTE W/DOPPLER COMPLETE: CPT | Mod: 26,,, | Performed by: INTERNAL MEDICINE

## 2020-06-11 PROCEDURE — 93306 TTE W/DOPPLER COMPLETE: CPT | Mod: PO

## 2020-06-11 PROCEDURE — 93306 ECHO (CUPID ONLY): ICD-10-PCS | Mod: 26,,, | Performed by: INTERNAL MEDICINE

## 2020-06-11 NOTE — TELEPHONE ENCOUNTER
Message left on voicemail:  Heart function is normal on echocardiogram. (Note CVP# and PAP 35)  (note recent CXR showed no CHF)  CKD on labs with creat 2.06  TSH is suppressed. T4 pending. Dose of levothyroxine may need to be reduced

## 2020-06-11 NOTE — TELEPHONE ENCOUNTER
Free T4 WNL  I reviewed echo and CXR results with pt: no evidence of CHF  Recent shortness of breath could be due to weight gain and deconditioning and resumption of smoking.

## 2020-07-27 ENCOUNTER — TELEPHONE (OUTPATIENT)
Dept: FAMILY MEDICINE | Facility: CLINIC | Age: 70
End: 2020-07-27

## 2020-07-27 DIAGNOSIS — R35.0 URINARY FREQUENCY: Primary | ICD-10-CM

## 2020-07-27 NOTE — TELEPHONE ENCOUNTER
----- Message from Margaret Royce sent at 7/27/2020  5:03 PM CDT -----  Regarding: urination  Contact: Edgardo Morrell/ friend  The pt is having a problem with constant urination.  She just can't stop going.  She needs help.  She refuses to come to the doctor.  What can she do.  Please call friend Edgardo Santiago at [phone].  He is trying his best to help her

## 2020-07-28 RX ORDER — NITROFURANTOIN (MACROCRYSTALS) 100 MG/1
100 CAPSULE ORAL EVERY 12 HOURS
Qty: 20 CAPSULE | Refills: 0 | Status: SHIPPED | OUTPATIENT
Start: 2020-07-28 | End: 2020-08-07

## 2020-09-08 ENCOUNTER — TELEPHONE (OUTPATIENT)
Dept: NEPHROLOGY | Facility: CLINIC | Age: 70
End: 2020-09-08

## 2020-09-08 NOTE — TELEPHONE ENCOUNTER
Called pt no answer     ----- Message from Stan Arndt sent at 9/7/2020 11:29 AM CDT -----  Contact: Self- [phone]  Caller is requesting an earlier appointment than what we can offer.   Did you offer to schedule the next available appt and put the patient on the wait list:  No  When is the first available appointment: Unable to schedule   Preference of timeframe to be scheduled:  This week  Symptoms: 4-5 month f/u  Would the patient prefer a call back or a response via [employer]:  Call back  Additional Information:  Please call to schedule.

## 2020-09-10 ENCOUNTER — TELEPHONE (OUTPATIENT)
Dept: NEPHROLOGY | Facility: CLINIC | Age: 70
End: 2020-09-10

## 2020-09-10 NOTE — TELEPHONE ENCOUNTER
Spoke to pt, scheduled appt with Dr. Marin for 10/1/2020 at 2:00pm. Pt verbally understood.     ----- Message from Danielle Kaplan sent at 9/10/2020  3:30 PM CDT -----  Pt would like to receive a call back regarding scheduling an appt. Please contact the pt to advise.  Pt states she has been leaving messages for over a week    Contact info [phone]

## 2020-09-23 ENCOUNTER — PATIENT OUTREACH (OUTPATIENT)
Dept: ADMINISTRATIVE | Facility: OTHER | Age: 70
End: 2020-09-23

## 2020-09-23 NOTE — PROGRESS NOTES
Health Maintenance Due   Topic Date Due    Shingles Vaccine (1 of 2) 10/19/1999    LDCT Lung Screen  10/19/2004    DEXA SCAN  05/08/2020    Influenza Vaccine (1) 08/01/2020     Updates were requested from care everywhere.  Chart was reviewed for overdue Proactive [facility] Encounters ([facility]) topics (CRS, Breast Cancer Screening, Eye exam)  Health Maintenance has been updated.  LINKS immunization registry triggered.  Immunizations were reconciled.

## 2020-09-24 ENCOUNTER — OFFICE VISIT (OUTPATIENT)
Dept: CARDIOLOGY | Facility: CLINIC | Age: 70
End: 2020-09-24
Payer: MEDICARE

## 2020-09-24 VITALS
SYSTOLIC BLOOD PRESSURE: 112 MMHG | WEIGHT: 232.25 LBS | DIASTOLIC BLOOD PRESSURE: 76 MMHG | BODY MASS INDEX: 42.74 KG/M2 | HEIGHT: 62 IN | HEART RATE: 93 BPM

## 2020-09-24 DIAGNOSIS — M47.816 OSTEOARTHRITIS OF LUMBAR SPINE, UNSPECIFIED SPINAL OSTEOARTHRITIS COMPLICATION STATUS: ICD-10-CM

## 2020-09-24 DIAGNOSIS — E03.4 HYPOTHYROIDISM DUE TO ACQUIRED ATROPHY OF THYROID: ICD-10-CM

## 2020-09-24 DIAGNOSIS — N18.4 CKD STAGE G4/A2, GFR 15-29 AND ALBUMIN CREATININE RATIO 30-299 MG/G: ICD-10-CM

## 2020-09-24 DIAGNOSIS — I10 ESSENTIAL HYPERTENSION: Primary | Chronic | ICD-10-CM

## 2020-09-24 DIAGNOSIS — E78.2 MIXED HYPERLIPIDEMIA: ICD-10-CM

## 2020-09-24 DIAGNOSIS — Z86.79 HISTORY OF PSVT (PAROXYSMAL SUPRAVENTRICULAR TACHYCARDIA): ICD-10-CM

## 2020-09-24 DIAGNOSIS — I48.19 PERSISTENT ATRIAL FIBRILLATION: ICD-10-CM

## 2020-09-24 DIAGNOSIS — Z79.01 LONG TERM (CURRENT) USE OF ANTICOAGULANTS: ICD-10-CM

## 2020-09-24 PROCEDURE — 3008F PR BODY MASS INDEX (BMI) DOCUMENTED: ICD-10-PCS | Mod: CPTII,S$GLB,, | Performed by: INTERNAL MEDICINE

## 2020-09-24 PROCEDURE — 1101F PR PT FALLS ASSESS DOC 0-1 FALLS W/OUT INJ PAST YR: ICD-10-PCS | Mod: CPTII,S$GLB,, | Performed by: INTERNAL MEDICINE

## 2020-09-24 PROCEDURE — 99214 OFFICE O/P EST MOD 30 MIN: CPT | Mod: 25,S$GLB,, | Performed by: INTERNAL MEDICINE

## 2020-09-24 PROCEDURE — 3074F PR MOST RECENT SYSTOLIC BLOOD PRESSURE < 130 MM HG: ICD-10-PCS | Mod: CPTII,S$GLB,, | Performed by: INTERNAL MEDICINE

## 2020-09-24 PROCEDURE — 93000 ELECTROCARDIOGRAM COMPLETE: CPT | Mod: S$GLB,,, | Performed by: INTERNAL MEDICINE

## 2020-09-24 PROCEDURE — 93000 EKG 12-LEAD: ICD-10-PCS | Mod: S$GLB,,, | Performed by: INTERNAL MEDICINE

## 2020-09-24 PROCEDURE — 3074F SYST BP LT 130 MM HG: CPT | Mod: CPTII,S$GLB,, | Performed by: INTERNAL MEDICINE

## 2020-09-24 PROCEDURE — 3078F PR MOST RECENT DIASTOLIC BLOOD PRESSURE < 80 MM HG: ICD-10-PCS | Mod: CPTII,S$GLB,, | Performed by: INTERNAL MEDICINE

## 2020-09-24 PROCEDURE — 99499 UNLISTED E&M SERVICE: CPT | Mod: S$GLB,,, | Performed by: INTERNAL MEDICINE

## 2020-09-24 PROCEDURE — 99214 PR OFFICE/OUTPT VISIT, EST, LEVL IV, 30-39 MIN: ICD-10-PCS | Mod: 25,S$GLB,, | Performed by: INTERNAL MEDICINE

## 2020-09-24 PROCEDURE — 99499 RISK ADDL DX/OHS AUDIT: ICD-10-PCS | Mod: S$GLB,,, | Performed by: INTERNAL MEDICINE

## 2020-09-24 PROCEDURE — 99999 PR PBB SHADOW E&M-EST. PATIENT-LVL III: ICD-10-PCS | Mod: PBBFAC,,, | Performed by: INTERNAL MEDICINE

## 2020-09-24 PROCEDURE — 99999 PR PBB SHADOW E&M-EST. PATIENT-LVL III: CPT | Mod: PBBFAC,,, | Performed by: INTERNAL MEDICINE

## 2020-09-24 PROCEDURE — 1101F PT FALLS ASSESS-DOCD LE1/YR: CPT | Mod: CPTII,S$GLB,, | Performed by: INTERNAL MEDICINE

## 2020-09-24 PROCEDURE — 3008F BODY MASS INDEX DOCD: CPT | Mod: CPTII,S$GLB,, | Performed by: INTERNAL MEDICINE

## 2020-09-24 PROCEDURE — 1159F PR MEDICATION LIST DOCUMENTED IN MEDICAL RECORD: ICD-10-PCS | Mod: S$GLB,,, | Performed by: INTERNAL MEDICINE

## 2020-09-24 PROCEDURE — 1159F MED LIST DOCD IN RCRD: CPT | Mod: S$GLB,,, | Performed by: INTERNAL MEDICINE

## 2020-09-24 PROCEDURE — 3078F DIAST BP <80 MM HG: CPT | Mod: CPTII,S$GLB,, | Performed by: INTERNAL MEDICINE

## 2020-09-24 NOTE — PROGRESS NOTES
Subjective:      Patient ID: Talia Dillon is a 69 y.o. female.    Chief Complaint: Follow-up (AFib)    HPI:   Short of breath walking short distances.    Overall the shortness of breath is better since reducing the dose of candesartan.    Gained 14 more pounds    Pt attributes lack of movement during pandemic.        Review of Systems   Cardiovascular: Positive for dyspnea on exertion and leg swelling. Negative for chest pain, claudication, irregular heartbeat, near-syncope, orthopnea, palpitations and syncope.      Pt spoke to a counselor this AM who wants to change her anxiety meds but pt is reluctant to change her meds    Pt thinks she breathes better after using albuterol MDI    Pt plans to begin Flonase nasal spray as well      Past Medical History:   Diagnosis Date    Allergy     Anemia     Anxiety     Atrial fibrillation     Cancer     skin    Cataract     Depression     Edema     Hypothyroidism     Kidney disease     PSVT (paroxysmal supraventricular tachycardia)     Thyroid disease         Past Surgical History:   Procedure Laterality Date    ADENOIDECTOMY      APPENDECTOMY      COLONOSCOPY  2009    repeat in 10 years     EYE SURGERY      HYSTERECTOMY      INJECTION OF ANESTHETIC AGENT AROUND NERVE Bilateral 9/21/2018    Procedure: BLOCK, NERVE, MEDIAL BRANCH BLOCK BILATERAL LUMBAR L2-5;  Surgeon: Julieth Christopher MD;  Location: [facility];  Service: Pain Management;  Laterality: Bilateral;  1 of 2    INJECTION OF ANESTHETIC AGENT AROUND NERVE Bilateral 9/28/2018    Procedure: BLOCK, NERVE, MEDIAL BRANCH BLOCK BILATERAL LUMBAR L2-5;  Surgeon: Julieth Christopher MD;  Location[facility];  Service: Pain Management;  Laterality: Bilateral;  2 of 2  PLEASE GIVE NIRAVAM PER MD    OOPHORECTOMY      RADIOFREQUENCY ABLATION Left 2/14/2020    Procedure: RADIOFREQUENCY ABLATION L2,3,4,5;  Surgeon: Julieth Christopher MD;  Location: [facility];  Service: Pain Management;  Laterality: Left;   LT RFA L2,3,4,5  1 of 2  OK to hold Eliquis    RADIOFREQUENCY ABLATION Right 2020    Procedure: RADIOFREQUENCY ABLATION L2,3,4,5 RIGHT   2 of 2 ok to hold eliquis;  Surgeon: Julieth Christopher MD;  Location: [facility];  Service: Pain Management;  Laterality: Right;    TONSILLECTOMY      TREATMENT OF CARDIAC ARRHYTHMIA N/A 2019    Procedure: CARDIOVERSION;  Surgeon: Devin You MD;  Location: [facility] LAB;  Service: Cardiology;  Laterality: N/A;  AF, KAROL, DCCV, MAC, [place], 3 Prep       Family History   Problem Relation Age of Onset    Stroke Mother     Stroke Father     Diabetes Father     Sleep apnea Daughter     Mental illness Daughter        Social History     Socioeconomic History    Marital status:      Spouse name: Not on file    Number of children: Not on file    Years of education: Not on file    Highest education level: Not on file   Occupational History    Not on file   Social Needs    Financial resource strain: Not on file    Food insecurity     Worry: Not on file     Inability: Not on file    Transportation needs     Medical: Not on file     Non-medical: Not on file   Tobacco Use    Smoking status: Current Every Day Smoker     Packs/day: 1.00     Years: 50.00     Pack years: 50.00     Types: Cigarettes     Last attempt to quit: 2018     Years since quittin.3    Smokeless tobacco: Never Used    Tobacco comment: Pt has completed cessation program   Substance and Sexual Activity    Alcohol use: No     Frequency: Never     Comment: rarely    Drug use: No    Sexual activity: Not Currently   Lifestyle    Physical activity     Days per week: Not on file     Minutes per session: Not on file    Stress: Not on file   Relationships    Social connections     Talks on phone: Not on file     Gets together: Not on file     Attends Latter-day service: Not on file     Active member of club or organization: Not on file     Attends meetings of clubs or organizations: Not on  "file     Relationship status: Not on file   Other Topics Concern    Not on file   Social History Narrative    Not on file       Current Outpatient Medications on File Prior to Visit   Medication Sig Dispense Refill    albuterol (PROVENTIL/VENTOLIN HFA) 90 mcg/actuation inhaler Inhale 2 puffs into the lungs every 4 (four) hours as needed for Wheezing (For shortness of breath). 18 g 11    ALPRAZolam (XANAX) 0.25 MG tablet Take 1 tablet (0.25 mg total) by mouth once daily. 90 tablet 1    apixaban (ELIQUIS) 5 mg Tab Take 1 tablet (5 mg total) by mouth 2 (two) times daily. 180 tablet 3    atorvastatin (LIPITOR) 10 MG tablet Take 1 tablet (10 mg total) by mouth once daily. 90 tablet 3    candesartan (ATACAND) 8 MG tablet Take 0.5 tablets (4 mg total) by mouth once daily. 45 tablet 3    cholecalciferol, vitamin D3, (VITAMIN D3) 5,000 unit Tab Take 5,000 Units by mouth once daily. 90 tablet 3    DULoxetine (CYMBALTA) 60 MG capsule Take 1 capsule (60 mg total) by mouth 2 (two) times daily. 60 capsule 2    gabapentin (NEURONTIN) 400 MG capsule Take 1 capsule (400 mg total) by mouth 2 (two) times daily. Take 2 tablets once daily 180 capsule 3    levothyroxine (SYNTHROID, LEVOTHROID) 175 MCG tablet Take 1 tablet (175 mcg total) by mouth once daily. 90 tablet 3    metoprolol tartrate (LOPRESSOR) 25 MG tablet Take 3 tablets (75 mg total) by mouth 2 (two) times daily. 180 tablet 11    zolpidem (AMBIEN) 10 mg Tab TAKE 1 TABLET BY MOUTH EVERY DAY IN THE EVENING AS NEEDED 90 tablet 1     No current facility-administered medications on file prior to visit.        Review of patient's allergies indicates:   Allergen Reactions    Dexamethasone Rash     Objective:     Vitals:    09/24/20 1412   BP: 112/76   BP Location: Left arm   Patient Position: Sitting   BP Method: Large (Manual)   Pulse: 93   Weight: 105.3 kg (232 lb 4.1 oz)   Height: 5' 2" (1.575 m)        Physical Exam   Constitutional: She is oriented to person, " place, and time. She appears well-developed and well-nourished.   Eyes: No scleral icterus.   Neck: No JVD present. Carotid bruit is not present.   Cardiovascular: Normal rate. An irregularly irregular rhythm present. Exam reveals no gallop.   No murmur heard.  Pulmonary/Chest: Breath sounds normal.   Musculoskeletal:         General: No edema.   Neurological: She is alert and oriented to person, place, and time.   Skin: Skin is warm and dry.   Psychiatric: She has a normal mood and affect. Her behavior is normal. Judgment and thought content normal.   Vitals reviewed.     ECG: atrial fibrillation with controlled ventricular response, low ARS voltage    Note recent CXR -- clear lungs, heart size normal,  Mild scoliosis    Recent echocardiogram:  Normal left ventricular systolic function      Hospital Outpatient Visit on 06/11/2020   Component Date Value Ref Range Status    LA WIDTH 06/11/2020 2.62  cm Final    AORTIC VALVE CUSP SEPERATION 06/11/2020 1.70  cm Final    PV PEAK VELOCITY 06/11/2020 0.74  cm/s Final    LVIDd 06/11/2020 4.08  3.5 - 6.0 cm Final    IVS 06/11/2020 0.98  0.6 - 1.1 cm Final    Posterior Wall 06/11/2020 1.06  0.6 - 1.1 cm Final    Ao root annulus 06/11/2020 2.88  cm Final    LVIDs 06/11/2020 2.76  2.1 - 4.0 cm Final    FS 06/11/2020 32  28 - 44 % Final    LA volume 06/11/2020 35.06  cm3 Final    Sinus 06/11/2020 2.73  cm Final    LV mass 06/11/2020 134.80  g Final    LA size 06/11/2020 3.13  cm Final    RVDD 06/11/2020 2.51  cm Final    Left Ventricle Relative Wall Thick* 06/11/2020 0.52  cm Final    AV mean gradient 06/11/2020 3  mmHg Final    AV valve area 06/11/2020 3.46  cm2 Final    AV Velocity Ratio 06/11/2020 0.83   Final    AV index (prosthetic) 06/11/2020 1.00   Final    LVOT diameter 06/11/2020 2.10  cm Final    LVOT area 06/11/2020 3.5  cm2 Final    LVOT peak stefani 06/11/2020 0.96  m/s Final    LVOT peak VTI 06/11/2020 18.62  cm Final    Ao peak stefani 06/11/2020  1.16  m/s Final    Ao VTI 06/11/2020 18.63  cm Final    LVOT stroke volume 06/11/2020 64.46  cm3 Final    AV peak gradient 06/11/2020 5  mmHg Final    TR Max Khalif 06/11/2020 2.81  m/s Final    LV Systolic Volume 06/11/2020 28.61  mL Final    LV Diastolic Volume 06/11/2020 73.43  mL Final    RA Major Axis 06/11/2020 4.90  cm Final    Left Atrium Minor Axis 06/11/2020 6.02  cm Final    Left Atrium Major Axis 06/11/2020 4.32  cm Final    Triscuspid Valve Regurgitation Pea* 06/11/2020 32  mmHg Final    RA Width 06/11/2020 3.05  cm Final    Right Atrial Pressure (from IVC) 06/11/2020 3  mmHg Final    TV rest pulmonary artery pressure 06/11/2020 35  mmHg Final   Lab Visit on 06/11/2020   Component Date Value Ref Range Status    WBC 06/11/2020 8.85  3.90 - 12.70 K/uL Final    RBC 06/11/2020 4.26  4.00 - 5.40 M/uL Final    Hemoglobin 06/11/2020 12.5  12.0 - 16.0 g/dL Final    Hematocrit 06/11/2020 39.6  37.0 - 48.5 % Final    Mean Corpuscular Volume 06/11/2020 93  82 - 98 fL Final    Mean Corpuscular Hemoglobin 06/11/2020 29.3  27.0 - 31.0 pg Final    Mean Corpuscular Hemoglobin Conc 06/11/2020 31.6* 32.0 - 36.0 g/dL Final    RDW 06/11/2020 13.5  11.5 - 14.5 % Final    Platelets 06/11/2020 197  150 - 350 K/uL Final    MPV 06/11/2020 11.8  9.2 - 12.9 fL Final    Immature Granulocytes 06/11/2020 0.6* 0.0 - 0.5 % Final    Gran # (ANC) 06/11/2020 5.3  1.8 - 7.7 K/uL Final    Immature Grans (Abs) 06/11/2020 0.05* 0.00 - 0.04 K/uL Final    Lymph # 06/11/2020 2.4  1.0 - 4.8 K/uL Final    Mono # 06/11/2020 0.7  0.3 - 1.0 K/uL Final    Eos # 06/11/2020 0.3  0.0 - 0.5 K/uL Final    Baso # 06/11/2020 0.05  0.00 - 0.20 K/uL Final    nRBC 06/11/2020 0  0 /100 WBC Final    Gran% 06/11/2020 59.7  38.0 - 73.0 % Final    Lymph% 06/11/2020 27.5  18.0 - 48.0 % Final    Mono% 06/11/2020 7.9  4.0 - 15.0 % Final    Eosinophil% 06/11/2020 3.7  0.0 - 8.0 % Final    Basophil% 06/11/2020 0.6  0.0 - 1.9 % Final     Differential Method 06/11/2020 Automated   Final    Sodium 06/11/2020 137  136 - 145 mmol/L Final    Potassium 06/11/2020 4.7  3.5 - 5.1 mmol/L Final    Chloride 06/11/2020 102  95 - 110 mmol/L Final    CO2 06/11/2020 28  23 - 29 mmol/L Final    Glucose 06/11/2020 99  70 - 110 mg/dL Final    BUN, Bld 06/11/2020 43* 7 - 17 mg/dL Final    Creatinine 06/11/2020 2.06* 0.50 - 1.40 mg/dL Final    Calcium 06/11/2020 9.6  8.7 - 10.5 mg/dL Final    Total Protein 06/11/2020 7.3  6.0 - 8.4 g/dL Final    Albumin 06/11/2020 4.2  3.5 - 5.2 g/dL Final    Total Bilirubin 06/11/2020 0.8  0.1 - 1.0 mg/dL Final    Alkaline Phosphatase 06/11/2020 83  38 - 126 U/L Final    AST 06/11/2020 24  15 - 46 U/L Final    ALT 06/11/2020 16  10 - 44 U/L Final    Anion Gap 06/11/2020 7* 8 - 16 mmol/L Final    eGFR if African American 06/11/2020 27.5* >60 mL/min/1.73 m^2 Final    eGFR if non African American 06/11/2020 23.9* >60 mL/min/1.73 m^2 Final    Cholesterol 06/11/2020 129  120 - 199 mg/dL Final    Triglycerides 06/11/2020 131  30 - 150 mg/dL Final    HDL 06/11/2020 41  40 - 75 mg/dL Final    LDL Cholesterol 06/11/2020 61.8* 63.0 - 159.0 mg/dL Final    Hdl/Cholesterol Ratio 06/11/2020 31.8  20.0 - 50.0 % Final    Total Cholesterol/HDL Ratio 06/11/2020 3.1  2.0 - 5.0 Final    Non-HDL Cholesterol 06/11/2020 88  mg/dL Final    TSH 06/11/2020 0.093* 0.400 - 4.000 uIU/mL Final    Free T4 06/11/2020 0.96  0.71 - 1.51 ng/dL Final    NT-proBNP 06/11/2020 2220* 5 - 900 pg/mL Final   Lab Visit on 04/29/2020   Component Date Value Ref Range Status    Glucose 04/29/2020 124* 70 - 110 mg/dL Final    Sodium 04/29/2020 136  136 - 145 mmol/L Final    Potassium 04/29/2020 4.9  3.5 - 5.1 mmol/L Final    Chloride 04/29/2020 99  95 - 110 mmol/L Final    CO2 04/29/2020 30* 23 - 29 mmol/L Final    BUN, Bld 04/29/2020 29* 7 - 17 mg/dL Final    Calcium 04/29/2020 9.5  8.7 - 10.5 mg/dL Final    Creatinine 04/29/2020 1.68* 0.50 -  1.40 mg/dL Final    Albumin 04/29/2020 4.2  3.5 - 5.2 g/dL Final    Phosphorus 04/29/2020 3.7  2.7 - 4.5 mg/dL Final    eGFR if African American 04/29/2020 35.2* >60 mL/min/1.73 m^2 Final    eGFR if non African American 04/29/2020 30.6* >60 mL/min/1.73 m^2 Final    Anion Gap 04/29/2020 7* 8 - 16 mmol/L Final   Lab Visit on 04/29/2020   Component Date Value Ref Range Status    Specimen UA 04/29/2020 Urine, Clean Catch   Final    Color, UA 04/29/2020 Arianne  Yellow, Straw, Arianne Final    Appearance, UA 04/29/2020 Hazy* Clear Final    pH, UA 04/29/2020 5.0  5.0 - 8.0 Final    Specific [place], UA 04/29/2020 1.015  1.005 - 1.030 Final    Protein, UA 04/29/2020 2+* Negative Final    Glucose, UA 04/29/2020 Negative  Negative Final    Ketones, UA 04/29/2020 1+* Negative Final    Bilirubin (UA) 04/29/2020 Negative  Negative Final    Occult Blood UA 04/29/2020 1+* Negative Final    Nitrite, UA 04/29/2020 Negative  Negative Final    Urobilinogen, UA 04/29/2020 Negative  <2.0 EU/dL Final    Leukocytes, UA 04/29/2020 2+* Negative Final    RBC, UA 04/29/2020 1  0 - 4 /hpf Final    WBC, UA 04/29/2020 10* 0 - 5 /hpf Final    Bacteria 04/29/2020 Moderate* None-Occ /hpf Final    Hyaline Casts, UA 04/29/2020 0  0-1/lpf /lpf Final    Microscopic Comment 04/29/2020 SEE COMMENT   Final   (   Assessment:     1. Essential hypertension    2. History of PSVT (paroxysmal supraventricular tachycardia)    3. Persistent atrial fibrillation    4. Mixed hyperlipidemia    5. CKD stage G4/A2, GFR 15-29 and albumin creatinine ratio  mg/g    6. Hypothyroidism due to acquired atrophy of thyroid    7. Long term (current) use of anticoagulants    8. Osteoarthritis of lumbar spine, unspecified spinal osteoarthritis complication status      Plan:   Talia was seen today for follow-up.    Diagnoses and all orders for this visit:    Essential hypertension    History of PSVT (paroxysmal supraventricular tachycardia)    Persistent  atrial fibrillation  -     IN OFFICE EKG 12-LEAD (to Muse)    Mixed hyperlipidemia    CKD stage G4/A2, GFR 15-29 and albumin creatinine ratio  mg/g    Hypothyroidism due to acquired atrophy of thyroid    Long term (current) use of anticoagulants    Osteoarthritis of lumbar spine, unspecified spinal osteoarthritis complication status     A fib ventricular response is controlled    COPD is improved with albuterol MDI    HBP is controlled    Smoking cessation counseled    Same meds     RTC 6 months    F/u with Dr Vallecillo    F/u with nephrologist    Pt knows to avoid NSAID's    Follow up in about 6 months (around 3/24/2021).

## 2020-09-25 ENCOUNTER — PATIENT MESSAGE (OUTPATIENT)
Dept: NEPHROLOGY | Facility: CLINIC | Age: 70
End: 2020-09-25

## 2020-09-25 DIAGNOSIS — N18.4 CKD STAGE G4/A2, GFR 15-29 AND ALBUMIN CREATININE RATIO 30-299 MG/G: Primary | ICD-10-CM

## 2020-09-29 ENCOUNTER — TELEPHONE (OUTPATIENT)
Dept: NEPHROLOGY | Facility: CLINIC | Age: 70
End: 2020-09-29

## 2020-09-29 ENCOUNTER — LAB VISIT (OUTPATIENT)
Dept: LAB | Facility: HOSPITAL | Age: 70
End: 2020-09-29
Attending: INTERNAL MEDICINE
Payer: MEDICARE

## 2020-09-29 DIAGNOSIS — N18.4 CKD STAGE G4/A2, GFR 15-29 AND ALBUMIN CREATININE RATIO 30-299 MG/G: ICD-10-CM

## 2020-09-29 DIAGNOSIS — N18.4 STAGE 4 CHRONIC KIDNEY DISEASE: Primary | ICD-10-CM

## 2020-09-29 LAB
ANION GAP SERPL CALC-SCNC: 9 MMOL/L (ref 8–16)
BACTERIA #/AREA URNS AUTO: ABNORMAL /HPF
BASOPHILS # BLD AUTO: 0.07 K/UL (ref 0–0.2)
BASOPHILS NFR BLD: 0.6 % (ref 0–1.9)
BILIRUB UR QL STRIP: NEGATIVE
BUN SERPL-MCNC: 38 MG/DL (ref 7–17)
CALCIUM SERPL-MCNC: 10 MG/DL (ref 8.7–10.5)
CHLORIDE SERPL-SCNC: 102 MMOL/L (ref 95–110)
CLARITY UR REFRACT.AUTO: ABNORMAL
CO2 SERPL-SCNC: 31 MMOL/L (ref 23–29)
COLOR UR AUTO: ABNORMAL
CREAT SERPL-MCNC: 1.89 MG/DL (ref 0.5–1.4)
CREAT UR-MCNC: 102.8 MG/DL (ref 15–325)
DIFFERENTIAL METHOD: ABNORMAL
EOSINOPHIL # BLD AUTO: 0.3 K/UL (ref 0–0.5)
EOSINOPHIL NFR BLD: 2.7 % (ref 0–8)
ERYTHROCYTE [DISTWIDTH] IN BLOOD BY AUTOMATED COUNT: 13.7 % (ref 11.5–14.5)
EST. GFR  (AFRICAN AMERICAN): 30.6 ML/MIN/1.73 M^2
EST. GFR  (NON AFRICAN AMERICAN): 26.5 ML/MIN/1.73 M^2
GLUCOSE SERPL-MCNC: 109 MG/DL (ref 70–110)
GLUCOSE UR QL STRIP: NEGATIVE
HCT VFR BLD AUTO: 41.8 % (ref 37–48.5)
HGB BLD-MCNC: 13.3 G/DL (ref 12–16)
HGB UR QL STRIP: NEGATIVE
HYALINE CASTS UR QL AUTO: 0 /LPF
IMM GRANULOCYTES # BLD AUTO: 0.08 K/UL (ref 0–0.04)
IMM GRANULOCYTES NFR BLD AUTO: 0.7 % (ref 0–0.5)
KETONES UR QL STRIP: NEGATIVE
LEUKOCYTE ESTERASE UR QL STRIP: NEGATIVE
LYMPHOCYTES # BLD AUTO: 2.2 K/UL (ref 1–4.8)
LYMPHOCYTES NFR BLD: 18.8 % (ref 18–48)
MCH RBC QN AUTO: 30.6 PG (ref 27–31)
MCHC RBC AUTO-ENTMCNC: 31.8 G/DL (ref 32–36)
MCV RBC AUTO: 96 FL (ref 82–98)
MICROSCOPIC COMMENT: ABNORMAL
MONOCYTES # BLD AUTO: 0.9 K/UL (ref 0.3–1)
MONOCYTES NFR BLD: 7.4 % (ref 4–15)
NEUTROPHILS # BLD AUTO: 8.3 K/UL (ref 1.8–7.7)
NEUTROPHILS NFR BLD: 69.8 % (ref 38–73)
NITRITE UR QL STRIP: NEGATIVE
NRBC BLD-RTO: 0 /100 WBC
PH UR STRIP: 5 [PH] (ref 5–8)
PLATELET # BLD AUTO: 209 K/UL (ref 150–350)
PMV BLD AUTO: 12.1 FL (ref 9.2–12.9)
POTASSIUM SERPL-SCNC: 4.5 MMOL/L (ref 3.5–5.1)
PROT UR QL STRIP: ABNORMAL
PROT UR-MCNC: 48 MG/DL (ref 0–15)
PROT/CREAT UR: 0.47 MG/G{CREAT} (ref 0–0.2)
RBC # BLD AUTO: 4.34 M/UL (ref 4–5.4)
RBC #/AREA URNS AUTO: 1 /HPF (ref 0–4)
SODIUM SERPL-SCNC: 142 MMOL/L (ref 136–145)
SP GR UR STRIP: 1.01 (ref 1–1.03)
URN SPEC COLLECT METH UR: ABNORMAL
UROBILINOGEN UR STRIP-ACNC: NEGATIVE EU/DL
WBC # BLD AUTO: 11.93 K/UL (ref 3.9–12.7)
WBC #/AREA URNS AUTO: 25 /HPF (ref 0–5)

## 2020-09-29 PROCEDURE — 82570 ASSAY OF URINE CREATININE: CPT

## 2020-09-29 PROCEDURE — 81000 URINALYSIS NONAUTO W/SCOPE: CPT | Mod: PO

## 2020-09-29 PROCEDURE — 36415 COLL VENOUS BLD VENIPUNCTURE: CPT | Mod: PO

## 2020-09-29 PROCEDURE — 80048 BASIC METABOLIC PNL TOTAL CA: CPT | Mod: PO

## 2020-09-29 PROCEDURE — 85025 COMPLETE CBC W/AUTO DIFF WBC: CPT | Mod: PO

## 2020-09-29 NOTE — TELEPHONE ENCOUNTER
Spoke to pt, lab diana. Pt verbally understood.       ----- Message from Fransisco Pulido sent at 9/24/2020  3:41 PM CDT -----  Regarding: Lab Inquiry  Pt called requesting Lab orders placed at [facility] to be completed before 10-01-20 appointment        [phone] (home)

## 2020-10-01 ENCOUNTER — OFFICE VISIT (OUTPATIENT)
Dept: NEPHROLOGY | Facility: CLINIC | Age: 70
End: 2020-10-01
Payer: MEDICARE

## 2020-10-01 ENCOUNTER — TELEPHONE (OUTPATIENT)
Dept: NEPHROLOGY | Facility: CLINIC | Age: 70
End: 2020-10-01

## 2020-10-01 VITALS
OXYGEN SATURATION: 97 % | SYSTOLIC BLOOD PRESSURE: 140 MMHG | HEIGHT: 62 IN | WEIGHT: 216.94 LBS | BODY MASS INDEX: 39.92 KG/M2 | DIASTOLIC BLOOD PRESSURE: 70 MMHG | HEART RATE: 98 BPM

## 2020-10-01 DIAGNOSIS — R32 URINARY INCONTINENCE, UNSPECIFIED TYPE: Primary | ICD-10-CM

## 2020-10-01 DIAGNOSIS — N18.4 CKD (CHRONIC KIDNEY DISEASE) STAGE 4, GFR 15-29 ML/MIN: Primary | ICD-10-CM

## 2020-10-01 PROCEDURE — 99999 PR PBB SHADOW E&M-EST. PATIENT-LVL IV: CPT | Mod: PBBFAC,GC,, | Performed by: INTERNAL MEDICINE

## 2020-10-01 PROCEDURE — 3078F DIAST BP <80 MM HG: CPT | Mod: CPTII,GC,S$GLB, | Performed by: INTERNAL MEDICINE

## 2020-10-01 PROCEDURE — 99999 PR PBB SHADOW E&M-EST. PATIENT-LVL IV: ICD-10-PCS | Mod: PBBFAC,GC,, | Performed by: INTERNAL MEDICINE

## 2020-10-01 PROCEDURE — 1101F PT FALLS ASSESS-DOCD LE1/YR: CPT | Mod: CPTII,GC,S$GLB, | Performed by: INTERNAL MEDICINE

## 2020-10-01 PROCEDURE — 1159F MED LIST DOCD IN RCRD: CPT | Mod: GC,S$GLB,, | Performed by: INTERNAL MEDICINE

## 2020-10-01 PROCEDURE — 1101F PR PT FALLS ASSESS DOC 0-1 FALLS W/OUT INJ PAST YR: ICD-10-PCS | Mod: CPTII,GC,S$GLB, | Performed by: INTERNAL MEDICINE

## 2020-10-01 PROCEDURE — 1125F AMNT PAIN NOTED PAIN PRSNT: CPT | Mod: GC,S$GLB,, | Performed by: INTERNAL MEDICINE

## 2020-10-01 PROCEDURE — 99213 OFFICE O/P EST LOW 20 MIN: CPT | Mod: GC,S$GLB,, | Performed by: INTERNAL MEDICINE

## 2020-10-01 PROCEDURE — 3077F SYST BP >= 140 MM HG: CPT | Mod: CPTII,GC,S$GLB, | Performed by: INTERNAL MEDICINE

## 2020-10-01 PROCEDURE — 3078F PR MOST RECENT DIASTOLIC BLOOD PRESSURE < 80 MM HG: ICD-10-PCS | Mod: CPTII,GC,S$GLB, | Performed by: INTERNAL MEDICINE

## 2020-10-01 PROCEDURE — 1159F PR MEDICATION LIST DOCUMENTED IN MEDICAL RECORD: ICD-10-PCS | Mod: GC,S$GLB,, | Performed by: INTERNAL MEDICINE

## 2020-10-01 PROCEDURE — 3008F BODY MASS INDEX DOCD: CPT | Mod: CPTII,GC,S$GLB, | Performed by: INTERNAL MEDICINE

## 2020-10-01 PROCEDURE — 99213 PR OFFICE/OUTPT VISIT, EST, LEVL III, 20-29 MIN: ICD-10-PCS | Mod: GC,S$GLB,, | Performed by: INTERNAL MEDICINE

## 2020-10-01 PROCEDURE — 3008F PR BODY MASS INDEX (BMI) DOCUMENTED: ICD-10-PCS | Mod: CPTII,GC,S$GLB, | Performed by: INTERNAL MEDICINE

## 2020-10-01 PROCEDURE — 3077F PR MOST RECENT SYSTOLIC BLOOD PRESSURE >= 140 MM HG: ICD-10-PCS | Mod: CPTII,GC,S$GLB, | Performed by: INTERNAL MEDICINE

## 2020-10-01 PROCEDURE — 1125F PR PAIN SEVERITY QUANTIFIED, PAIN PRESENT: ICD-10-PCS | Mod: GC,S$GLB,, | Performed by: INTERNAL MEDICINE

## 2020-10-01 NOTE — PROGRESS NOTES
Nephrology Clinic Progress Note    Patient ID: Talia Dillon is a 69 y.o. White female who presents for follow up     HPI:  Talia Dillon 69 y.o. White female who presents for new evaluation of Follow-up and Chronic Kidney Disease     Ms Talia Dillon is a here for follow up for his TINA and CKD stage 4. She feels well and denies an symptoms. She endorses quitting smoking, and increasing water intake. Her sCr has remained ~1.9-2.1 for past ~year.  She has stopped taking NSAIDs for pain.  She is currently being followed by rheumatology she had steroid injections with pain management.  Patient with Afib well controlled, on Eliquis.  Takes metoprolol tartrate for rate control.    The patient denies any SOB, chest pain, palpitations, dysuria, problems voiding, N/V/D, taking NSAIDs or new antibiotics, recreational drugs, recent episode of dehydration, acute illness, hospitalization or exposure to IV radiocontrast.    She complain of urgency incontinence      Past Medical History:   Diagnosis Date    Allergy     Anemia     Anxiety     Atrial fibrillation     Cancer     skin    Cataract     Depression     Edema     Hypothyroidism     Kidney disease     PSVT (paroxysmal supraventricular tachycardia)     Thyroid disease        Family History   Problem Relation Age of Onset    Stroke Mother     Stroke Father     Diabetes Father     Sleep apnea Daughter     Mental illness Daughter        Past Surgical History:   Procedure Laterality Date    ADENOIDECTOMY      APPENDECTOMY      COLONOSCOPY  2009    repeat in 10 years     EYE SURGERY      HYSTERECTOMY      INJECTION OF ANESTHETIC AGENT AROUND NERVE Bilateral 9/21/2018    Procedure: BLOCK, NERVE, MEDIAL BRANCH BLOCK BILATERAL LUMBAR L2-5;  Surgeon: Julieth Christopher MD;  Location: [facility];  Service: Pain Management;  Laterality: Bilateral;  1 of 2    INJECTION OF ANESTHETIC AGENT AROUND NERVE Bilateral 9/28/2018    Procedure: BLOCK, NERVE, MEDIAL  BRANCH BLOCK BILATERAL LUMBAR L2-5;  Surgeon: Julieth Christopher MD;  Location: [facility];  Service: Pain Management;  Laterality: Bilateral;  2 of 2  PLEASE GIVE NIRAVAM PER MD    OOPHORECTOMY      RADIOFREQUENCY ABLATION Left 2/14/2020    Procedure: RADIOFREQUENCY ABLATION L2,3,4,5;  Surgeon: Julieth Christopher MD;  Location: [facility];  Service: Pain Management;  Laterality: Left;  LT RFA L2,3,4,5  1 of 2  OK to hold Eliquis    RADIOFREQUENCY ABLATION Right 2/28/2020    Procedure: RADIOFREQUENCY ABLATION L2,3,4,5 RIGHT   2 of 2 ok to hold eliquis;  Surgeon: Julieth Christopher MD;  Location: [facility];  Service: Pain Management;  Laterality: Right;    TONSILLECTOMY      TREATMENT OF CARDIAC ARRHYTHMIA N/A 2/8/2019    Procedure: CARDIOVERSION;  Surgeon: Devin You MD;  Location: [facility];  Service: Cardiology;  Laterality: N/A;  AF, KAROL, DCCV, [place], [place], 3 Prep         Current Outpatient Medications:     albuterol (PROVENTIL/VENTOLIN HFA) 90 mcg/actuation inhaler, Inhale 2 puffs into the lungs every 4 (four) hours as needed for Wheezing (For shortness of breath)., Disp: 18 g, Rfl: 11    ALPRAZolam (XANAX) 0.25 MG tablet, Take 1 tablet (0.25 mg total) by mouth once daily., Disp: 90 tablet, Rfl: 1    apixaban (ELIQUIS) 5 mg Tab, Take 1 tablet (5 mg total) by mouth 2 (two) times daily., Disp: 180 tablet, Rfl: 3    atorvastatin (LIPITOR) 10 MG tablet, Take 1 tablet (10 mg total) by mouth once daily., Disp: 90 tablet, Rfl: 3    candesartan (ATACAND) 8 MG tablet, Take 0.5 tablets (4 mg total) by mouth once daily., Disp: 45 tablet, Rfl: 3    cholecalciferol, vitamin D3, (VITAMIN D3) 5,000 unit Tab, Take 5,000 Units by mouth once daily., Disp: 90 tablet, Rfl: 3    DULoxetine (CYMBALTA) 60 MG capsule, Take 1 capsule (60 mg total) by mouth 2 (two) times daily., Disp: 60 capsule, Rfl: 2    gabapentin (NEURONTIN) 400 MG capsule, Take 1 capsule (400 mg total) by mouth 2 (two) times daily. Take 2 tablets  once daily, Disp: 180 capsule, Rfl: 3    levothyroxine (SYNTHROID, LEVOTHROID) 175 MCG tablet, Take 1 tablet (175 mcg total) by mouth once daily., Disp: 90 tablet, Rfl: 3    metoprolol tartrate (LOPRESSOR) 25 MG tablet, Take 3 tablets (75 mg total) by mouth 2 (two) times daily., Disp: 180 tablet, Rfl: 11    zolpidem (AMBIEN) 10 mg Tab, TAKE 1 TABLET BY MOUTH EVERY DAY IN THE EVENING AS NEEDED, Disp: 90 tablet, Rfl: 1    Patient's medical, family, surgical, and medication hx reviewed.    Review of Systems    Constitutional: Negative for chills, diaphoresis, fever and weight loss.   HENT: Negative for nosebleeds and tinnitus.    Eyes: Negative for blurred vision, double vision and photophobia.   Respiratory: Negative for cough and shortness of breath.    Cardiovascular: . Negative for chest pain, palpitations, swelling orthopnea and PND.   Gastrointestinal: Negative for abdominal pain, diarrhea, constipation nausea and vomiting.   Genitourinary: Negative for dysuria, flank pain, frequency, hematuria and urgency.   Musculoskeletal: Negative for back pain, falls, joint pain, myalgias and neck pain.   Skin: Negative.    Neurological: Negative for dizziness, tingling, tremors, sensory change, speech change, focal weakness, seizures, loss of consciousness, weakness and headaches.   Endo/Heme/Allergies: Negative for environmental allergies and polydipsia. Does not bruise/bleed easily.   Psychiatric/Behavioral: Negative for depression, hallucinations, memory loss, substance abuse and suicidal ideas. The patient is not nervous/anxious and does not have insomnia.        Objective:     Wt Readings from Last 3 Encounters:   09/24/20 105.3 kg (232 lb 4.1 oz)   06/04/20 103 kg (227 lb 2.9 oz)   02/28/20 99.8 kg (220 lb)     Temp Readings from Last 3 Encounters:   02/28/20 98.4 °F (36.9 °C) (Oral)   02/14/20 97.9 °F (36.6 °C)   01/10/20 97.7 °F (36.5 °C) (Oral)     BP Readings from Last 3 Encounters:   09/24/20 112/76   06/04/20  117/81   02/28/20 108/78     Pulse Readings from Last 3 Encounters:   09/24/20 93   06/04/20 96   02/28/20 78        Physical Exam    Constitutional:  well-developed and well-nourished. No distress.   HENT:   Head: Normocephalic and atraumatic.   Neck: Normal range of motion. Neck supple.   Cardiovascular: Normal rate, regular rhythm, normal heart sounds and intact distal pulses.  Exam reveals no gallop and no friction rub.    No murmur heard.  Pulmonary/Chest: Effort normal and breath sounds normal. No respiratory distress. no wheezes, no rales, no tenderness.   Abdominal: Soft. Bowel sounds are normal, no distension. There is no tenderness. There is no rebound and no guarding.   Musculoskeletal: Normal range of motion. No edema or deformity.   Neurological: Awake alert and oriented x 4. Motor strength preserved 5/5. DTR symmetrical.  Skin: Skin is warm and dry. No rash noted. She is not diaphoretic. No erythema. No pallor.       Assessment:       No diagnosis found.     Plan:   1. CKD stage G4  Pt Cr today at 1.8   Recent Labs   Lab 04/29/20  1341 06/11/20  1009 09/29/20  1250   Creatinine 1.68 H 2.06 H 1.89 H   eGFR if non  30.6 A 23.9 A 26.5 A   eGFR if  35.2 A 27.5 A 30.6 A       Urine Protein:   Prot/Creat Ratio, Ur   Date Value Ref Range Status   09/29/2020 0.47 (H) 0.00 - 0.20 Final   11/19/2019 0.75 (H) 0.00 - 0.20 Final   04/16/2019 0.28 (H) 0.00 - 0.20 Final       Acid-Base:   Lab Results   Component Value Date     09/29/2020    K 4.5 09/29/2020    CO2 31 (H) 09/29/2020         3. DM:  Last HbA1C   Lab Results   Component Value Date    HGBA1C 5.6 07/15/2015        4. CKD-MBD:    Lab Results   Component Value Date    .0 (H) 11/19/2019    CALCIUM 10.0 09/29/2020    PHOS 3.7 04/29/2020       5. Anemia:   Lab Results   Component Value Date    HGB 13.3 09/29/2020     No results found for: IRON, TIBC, FERRITIN, SATURATEDIRO    6. Lipid management:   Lab Results    Component Value Date    LDLCALC 61.8 (L) 06/11/2020         Follow up in 3 months  with labs and Urine    Bebeto Marin MD  Nephrology Fellow  [facility]

## 2020-10-20 ENCOUNTER — PATIENT MESSAGE (OUTPATIENT)
Dept: PAIN MEDICINE | Facility: CLINIC | Age: 70
End: 2020-10-20

## 2020-10-20 RX ORDER — DULOXETIN HYDROCHLORIDE 60 MG/1
60 CAPSULE, DELAYED RELEASE ORAL 2 TIMES DAILY
Qty: 60 CAPSULE | Refills: 2 | Status: SHIPPED | OUTPATIENT
Start: 2020-10-20 | End: 2020-12-29 | Stop reason: SDUPTHER

## 2020-10-20 NOTE — TELEPHONE ENCOUNTER
----- Message from Vanna Fuchs sent at 10/20/2020  1:23 PM CDT -----  Regarding: Refill      Medication Refill Request      Please refill the medication(s) listed below. Please call the patient when the prescription(s) is ready for  at this phone number   [phone] (H)      Medication #1   DULoxetine (CYMBALTA) 60 MG capsule          Preferred Pharmacy:   [facility]/pharmacy #[id number] - [address] AT [facility] [address]     [phone] (Phone)  [phone] (Fax)

## 2020-10-20 NOTE — TELEPHONE ENCOUNTER
Staff called the patient to confirm 10/21/20 3 pm in office visit with Latosha Carter Np. Patient informed of instructions.    Patient confirmed and verbalized understanding and expressed thanks. Mynor requested that instructions be sent to my chart which is now done.

## 2020-10-21 ENCOUNTER — OFFICE VISIT (OUTPATIENT)
Dept: PAIN MEDICINE | Facility: CLINIC | Age: 70
End: 2020-10-21
Payer: MEDICARE

## 2020-10-21 VITALS
SYSTOLIC BLOOD PRESSURE: 129 MMHG | WEIGHT: 216.94 LBS | TEMPERATURE: 98 F | RESPIRATION RATE: 18 BRPM | DIASTOLIC BLOOD PRESSURE: 80 MMHG | HEART RATE: 76 BPM | BODY MASS INDEX: 39.92 KG/M2 | HEIGHT: 62 IN

## 2020-10-21 DIAGNOSIS — G89.4 CHRONIC PAIN SYNDROME: ICD-10-CM

## 2020-10-21 DIAGNOSIS — M47.816 LUMBAR SPONDYLOSIS: Primary | ICD-10-CM

## 2020-10-21 DIAGNOSIS — M47.816 OSTEOARTHRITIS OF LUMBAR SPINE, UNSPECIFIED SPINAL OSTEOARTHRITIS COMPLICATION STATUS: ICD-10-CM

## 2020-10-21 PROCEDURE — 3074F PR MOST RECENT SYSTOLIC BLOOD PRESSURE < 130 MM HG: ICD-10-PCS | Mod: CPTII,S$GLB,, | Performed by: NURSE PRACTITIONER

## 2020-10-21 PROCEDURE — 1101F PT FALLS ASSESS-DOCD LE1/YR: CPT | Mod: CPTII,S$GLB,, | Performed by: NURSE PRACTITIONER

## 2020-10-21 PROCEDURE — 99213 OFFICE O/P EST LOW 20 MIN: CPT | Mod: S$GLB,,, | Performed by: NURSE PRACTITIONER

## 2020-10-21 PROCEDURE — 99999 PR PBB SHADOW E&M-EST. PATIENT-LVL IV: CPT | Mod: PBBFAC,,, | Performed by: NURSE PRACTITIONER

## 2020-10-21 PROCEDURE — 1101F PR PT FALLS ASSESS DOC 0-1 FALLS W/OUT INJ PAST YR: ICD-10-PCS | Mod: CPTII,S$GLB,, | Performed by: NURSE PRACTITIONER

## 2020-10-21 PROCEDURE — 99999 PR PBB SHADOW E&M-EST. PATIENT-LVL IV: ICD-10-PCS | Mod: PBBFAC,,, | Performed by: NURSE PRACTITIONER

## 2020-10-21 PROCEDURE — 3008F PR BODY MASS INDEX (BMI) DOCUMENTED: ICD-10-PCS | Mod: CPTII,S$GLB,, | Performed by: NURSE PRACTITIONER

## 2020-10-21 PROCEDURE — 1125F PR PAIN SEVERITY QUANTIFIED, PAIN PRESENT: ICD-10-PCS | Mod: S$GLB,,, | Performed by: NURSE PRACTITIONER

## 2020-10-21 PROCEDURE — 1125F AMNT PAIN NOTED PAIN PRSNT: CPT | Mod: S$GLB,,, | Performed by: NURSE PRACTITIONER

## 2020-10-21 PROCEDURE — 3074F SYST BP LT 130 MM HG: CPT | Mod: CPTII,S$GLB,, | Performed by: NURSE PRACTITIONER

## 2020-10-21 PROCEDURE — 3079F PR MOST RECENT DIASTOLIC BLOOD PRESSURE 80-89 MM HG: ICD-10-PCS | Mod: CPTII,S$GLB,, | Performed by: NURSE PRACTITIONER

## 2020-10-21 PROCEDURE — 1159F PR MEDICATION LIST DOCUMENTED IN MEDICAL RECORD: ICD-10-PCS | Mod: S$GLB,,, | Performed by: NURSE PRACTITIONER

## 2020-10-21 PROCEDURE — 3008F BODY MASS INDEX DOCD: CPT | Mod: CPTII,S$GLB,, | Performed by: NURSE PRACTITIONER

## 2020-10-21 PROCEDURE — 1159F MED LIST DOCD IN RCRD: CPT | Mod: S$GLB,,, | Performed by: NURSE PRACTITIONER

## 2020-10-21 PROCEDURE — 99213 PR OFFICE/OUTPT VISIT, EST, LEVL III, 20-29 MIN: ICD-10-PCS | Mod: S$GLB,,, | Performed by: NURSE PRACTITIONER

## 2020-10-21 PROCEDURE — 3079F DIAST BP 80-89 MM HG: CPT | Mod: CPTII,S$GLB,, | Performed by: NURSE PRACTITIONER

## 2020-10-21 NOTE — PROGRESS NOTES
"Subjective:     Patient ID: Talia Dillon is a 70 y.o. female.    Chief Complaint: Pain    Consulted by: Dr. Gallagher    Disclaimer: This note was generated using voice recognition software.  There may be a typographical errors that were missed during proofreading.      HPI:    Talia Dillon is a 70 y.o. female who presents today with neck, shoulder, low back and leg pain. This pain is described in detail below.    Ms. Dillon presents to pain clinic today complaining of pain in a variety of areas, including her neck, shoulders, low back and leg pain. Her low back and leg pain is the most bothersome. 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 She has had long term PT her whole life because of this. She was recently evaluated by Dr. Gallagher who referred her to PT and pain clinic for management options.    Ms. Dillon states her low back pain is constant with radiation down both legs. She rates her pain as a 7/10 today. The pain is made worse with movement and walking. The pain is a dull ache in her low back, but feels like a sharp burning in her legs "like an electric wing." This pain does not travel in to her feet, but she has numbness and paraesthesias in her feet separately.     She has just recently restarted with physical therapy; she has completed 4 sessions at this time and feels this is starting to help. She feels it is painful but ultimately improving her situation.     Interval History (8/30/2018):  She returns today for follow up.  She reports that PT has been helpful for the pain.  She finished her last in office session yesterday.  She is now going to start her HEP.  She reports that the epidural didn't help. She wishes she could take Aleve as this was very helpful    Interval History (10/11/2018):  The patient returns for " follow up of back pain.  Since her last OV, she had L2-5 MBB x2.  She is reporting 80% relief from both procedures for about one day.  She is here today to discuss RFA procedures.  She continues with come exercise regimen.  She did have recent labs as ordered by her PCP which again showed elevated BUN.  Her pain today is 8/10.     Interval History (12/7/2018):  The patient presents for procedure follow up.  She is s/p L2,3,4,5 RFAs with about 70% pain relief.  Her pain is not as constant.  She is able to walk further.  Her pain is not as intense when it comes.  She is still somewhat limited by her pain, particularly regarding activity level.  She also feels short winded sometimes.  She is seeing cardiology next month.  She is also seeing bariatric medicine and has been losing weight.  Her pain today is 5/10.    Interval History (3/12/2019):  The patient presents for follow up.  Cymbalta was increased at last OV.  She reports that this has been helpful.  Her pain is stable and currently manageable.  She was recently found to be in A fib.  She had a cardioversion procedure on 2/8/19 and is scheduled for another on 3/19/19.  She is being weaned from Elavil by cardiology.  Her pain today is 4/10.    Interval History (6/11/2019):  The patient returns for follow up of chronic pain.  Since her last OV, I obtained a left shoulder MRI which showed rotator tear.  She had a visit with Dr. Davila and surgical repair was recommended.  She is waiting a bit because of moving.  She had a recent appointment with her PCP.  He recommended that we increase Cymbalta due to depression and pain.  She is currently taking 60 mg QD without adverse effects.  Her pain today is 8/10.    Interval History (9/30/2019):  The patient is here for follow up of back and left shoulder pain.  She is still considering left shoulder surgery with Dr. Davila.  She says that her shoulder pain has essentially resolved so she is leaning against surgery.  Her  back pain has been mild.  She has some stiffness and tightness but otherwise it is tolerable.  She is not interested in procedures at this time.  Her pain today is 7/10.    Interval History (1/23/2020):  The patient is here for follow up of back pain.  She is reporting increased middle and lower back pain recently.  She denies any recent injury.  She denies any radiation.  She denies numbness or tingling.  She has significant pain in the morning.  She feels like it improves and loosens up during the day.  She had benefit with lumbar RFAs is in the past.  Her pain today is 9/10.     Interval History (10/22/2020):  The patient is here to discuss lower back pain.  We have not seen the patient since January and she reports that her back pain has been worsening since this time.  It is mainly located across the lower back without radiation into the legs.  She reports aching and stabbing pain worse in the morning.  She does feel as though her pain limits her function and activity level.  She has been performing home exercises but does not feel that she can go to formal physical therapy at this time.  Her pain today as 7/10.    Physical Therapy: Yes; finished 8/28/2018, doing HEP (8/20/3018)    Non-pharmacologic Treatment:     · Ice/Heat: Not tried  · TENS: Tried; not helpful  · Massage: Helpful  · Chiropractic care: Yes, not helpful  · Acupuncture: Yes, not helpful  · Other: None         Pain Medications:         · Currently taking:   · Gabapentin and Cymbalta    · Has tried in the past:    · NSAIDs- Aleve and Ibuprofen: had to stop due to CKD   · Baclofen- had to stop due to CKD    · Has not tried: Opioids,  Rx topical creams    Blood thinners: No    Interventional Therapies:  9/21/18 Bilateral L2-5 MBB- 80% relief  9/28/18 Bilateral L2-5 MBB- 80% relief  10/26/18 Right L2,3,4,5 RFA- 70% relief  11/9/18 Left L2,3,4,5 RFA- 70% relief  4/29/19 Left shoulder injection- helpful (ortho)  2/14/20 Left L2,3,4,5 RFA  2/28/20  Right L2,3,4,5 RFA    Relevant Surgeries:   No    Affecting sleep? Not usually     Affecting daily activities? Yes    Depressive symptoms? Yes          · SI/HI? No    Work status: Retired; used to work as a      Prescription Monitoring Program database:  Reviewed and consistent with medication use as prescribed.    Pain Scales  Best: 3/10  Worst: 9/10  Usually: 5/10  Today: 7/10    Low-back Pain  Associated symptoms include numbness and paresthesias. Pertinent negatives include no abdominal pain, bladder incontinence, bowel incontinence, chest pain, dysuria, fever or weakness.   Foot Pain   Associated symptoms include numbness. Pertinent negatives include no fever.       Last 3 PDI Scores 10/21/2020 1/23/2020 9/30/2019   Pain Disability Index (PDI) 40 63 45       Review of Systems   Constitution: Negative for chills, decreased appetite, diaphoresis and fever.   HENT: Negative for congestion, ear discharge and ear pain.    Eyes: Negative for blurred vision, discharge and double vision.   Cardiovascular: Negative for chest pain, claudication and cyanosis.   Respiratory: Negative for cough, hemoptysis and shortness of breath.    Endocrine: Negative for cold intolerance, heat intolerance and polydipsia.   Skin: Negative for color change, dry skin and nail changes.   Musculoskeletal: Positive for arthritis, back pain, myalgias and neck pain.   Gastrointestinal: Negative for bloating, abdominal pain, change in bowel habit and bowel incontinence.   Genitourinary: Negative for bladder incontinence, decreased libido and dysuria.   Neurological: Positive for disturbances in coordination, numbness and paresthesias. Negative for weakness.   Psychiatric/Behavioral: Positive for depression. Negative for altered mental status, hallucinations and hypervigilance.             Past Medical History:   Diagnosis Date    Allergy     Anemia     Anxiety     Atrial fibrillation     Cancer     skin    Cataract     Depression      Edema     Hypothyroidism     Kidney disease     PSVT (paroxysmal supraventricular tachycardia)     Thyroid disease        Past Surgical History:   Procedure Laterality Date    ADENOIDECTOMY      APPENDECTOMY      COLONOSCOPY  2009    repeat in 10 years     EYE SURGERY      HYSTERECTOMY      INJECTION OF ANESTHETIC AGENT AROUND NERVE Bilateral 9/21/2018    Procedure: BLOCK, NERVE, MEDIAL BRANCH BLOCK BILATERAL LUMBAR L2-5;  Surgeon: Julieth Christopher MD;  Location: [facility]IN [facility]GT;  Service: Pain Management;  Laterality: Bilateral;  1 of 2    INJECTION OF ANESTHETIC AGENT AROUND NERVE Bilateral 9/28/2018    Procedure: BLOCK, NERVE, MEDIAL BRANCH BLOCK BILATERAL LUMBAR L2-5;  Surgeon: Julieth Christopher MD;  Location: [facility] PAIN [facility]GT;  Service: Pain Management;  Laterality: Bilateral;  2 of 2  PLEASE GIVE NIRAVAM PER MD    OOPHORECTOMY      RADIOFREQUENCY ABLATION Left 2/14/2020    Procedure: RADIOFREQUENCY ABLATION L2,3,4,5;  Surgeon: Julieth Christopher MD;  Location: [facility] PAIN [facility]GT;  Service: Pain Management;  Laterality: Left;  LT RFA L2,3,4,5  1 of 2  OK to hold Eliquis    RADIOFREQUENCY ABLATION Right 2/28/2020    Procedure: RADIOFREQUENCY ABLATION L2,3,4,5 RIGHT   2 of 2 ok to hold eliquis;  Surgeon: Julieth Christopher MD;  Location: [facility] PAIN [facility]GT;  Service: Pain Management;  Laterality: Right;    TONSILLECTOMY      TREATMENT OF CARDIAC ARRHYTHMIA N/A 2/8/2019    Procedure: CARDIOVERSION;  Surgeon: Devin You MD;  Location: [facility] EP LAB;  Service: Cardiology;  Laterality: N/A;  AF, KAROL, DCCV, MAC, DC, 3 Prep       Review of patient's allergies indicates:   Allergen Reactions    Dexamethasone Rash       Current Outpatient Medications   Medication Sig Dispense Refill    albuterol (PROVENTIL/VENTOLIN HFA) 90 mcg/actuation inhaler Inhale 2 puffs into the lungs every 4 (four) hours as needed for Wheezing (For shortness of breath). 18 g 11    ALPRAZolam (XANAX) 0.25 MG tablet Take 1 tablet (0.25 mg  total) by mouth once daily. 90 tablet 1    apixaban (ELIQUIS) 5 mg Tab Take 1 tablet (5 mg total) by mouth 2 (two) times daily. 180 tablet 3    atorvastatin (LIPITOR) 10 MG tablet Take 1 tablet (10 mg total) by mouth once daily. 90 tablet 3    candesartan (ATACAND) 8 MG tablet Take 0.5 tablets (4 mg total) by mouth once daily. 45 tablet 3    cholecalciferol, vitamin D3, (VITAMIN D3) 5,000 unit Tab Take 5,000 Units by mouth once daily. 90 tablet 3    DULoxetine (CYMBALTA) 60 MG capsule Take 1 capsule (60 mg total) by mouth 2 (two) times daily. 60 capsule 2    gabapentin (NEURONTIN) 400 MG capsule Take 1 capsule (400 mg total) by mouth 2 (two) times daily. Take 2 tablets once daily 180 capsule 3    levothyroxine (SYNTHROID, LEVOTHROID) 175 MCG tablet Take 1 tablet (175 mcg total) by mouth once daily. 90 tablet 3    metoprolol tartrate (LOPRESSOR) 25 MG tablet Take 3 tablets (75 mg total) by mouth 2 (two) times daily. 180 tablet 11    zolpidem (AMBIEN) 10 mg Tab TAKE 1 TABLET BY MOUTH EVERY DAY IN THE EVENING AS NEEDED 90 tablet 1     No current facility-administered medications for this visit.        Family History   Problem Relation Age of Onset    Stroke Mother     Stroke Father     Diabetes Father     Sleep apnea Daughter     Mental illness Daughter        Social History     Socioeconomic History    Marital status:      Spouse name: Not on file    Number of children: Not on file    Years of education: Not on file    Highest education level: Not on file   Occupational History    Not on file   Social Needs    Financial resource strain: Not on file    Food insecurity     Worry: Not on file     Inability: Not on file    Transportation needs     Medical: Not on file     Non-medical: Not on file   Tobacco Use    Smoking status: Current Every Day Smoker     Packs/day: 1.00     Years: 50.00     Pack years: 50.00     Types: Cigarettes     Last attempt to quit: 06/2018     Years since quitting:  "2.3    Smokeless tobacco: Never Used    Tobacco comment: Pt has completed cessation program   Substance and Sexual Activity    Alcohol use: No     Frequency: Never     Comment: rarely    Drug use: No    Sexual activity: Not Currently   Lifestyle    Physical activity     Days per week: Not on file     Minutes per session: Not on file    Stress: Not on file   Relationships    Social connections     Talks on phone: Not on file     Gets together: Not on file     Attends [employer] service: Not on file     Active member of club or organization: Not on file     Attends meetings of clubs or organizations: Not on file     Relationship status: Not on file   Other Topics Concern    Not on file   Social History Narrative    Not on file       Objective:     Vitals:    10/21/20 1503   BP: 129/80   Pulse: 76   Resp: 18   Temp: 97.7 °F (36.5 °C)   TempSrc: Oral   Weight: 98.4 kg (216 lb 14.9 oz)   Height: 5' 2" (1.575 m)   PainSc:   7   PainLoc: Back       GEN:  Well developed, well nourished.  No acute distress.   HEENT:  No trauma.  Mucous membranes moist.  Nares patent bilaterally.  PSYCH: Normal affect. Thought content appropriate.  CHEST:  Breathing symmetric.  No audible wheezing.  ABD: Soft, non-distended.  SKIN:  Warm, pink, dry.  No rash on exposed areas.    EXT:  No cyanosis, clubbing, or edema.  No color change or changes in nail or hair growth.  NEURO/MUSCULOSKELETAL:  Fully alert, oriented, and appropriate. Speech normal bartolome. No cranial nerve deficits.  Gait: Antalgic  negative trendelenburg sign bilaterally.   Motor Strength: Decreased strength to BLE throughout.  Sensory: No loss of sensation.  L-Spine:  TTP over lumbar facet joints bilaterally.  SLR is negative. Limited ROM with pain on extension greater than flexion.  Positive facet loading bilaterally.  SI Joint/Hip: Positive JOANIE on the left.  TTP over both SI joints.      Imaging:     Narrative     EXAMINATION:  XR SHOULDER COMPLETE 2 OR MORE " VIEWS LEFT    CLINICAL HISTORY:  Pain in left shoulder    COMPARISON:  None    FINDINGS:  There is no fracture. There is no dislocation.      Impression       Normal study.     Narrative     EXAMINATION:  MRI SHOULDER WITHOUT CONTRAST LEFT    CLINICAL HISTORY:  r/o rotator cuff; Pain in left shoulder    TECHNIQUE:  Standard shoulder MRI protocol without IV contrast was performed.    COMPARISON:  A plain film examination of the left shoulder performed on 03/14/2019.    FINDINGS:  There is no fracture. There is no dislocation.  There is a focal full-thickness tear in the distal aspect of the supraspinatus tendon.  The tendon is retracted by 9 mm.  There are incomplete tears involving the articular surface of the distal aspect of the infraspinatus tendon.  There is a tiny amount of fluid within the glenohumeral joint.  There is a poorly visualized tear in the posterosuperior aspect of the glenoid labrum from the 10 to 12:00 o'clock positions.  The intra-articular portion of the long head of the biceps tendon is normal in appearance.  The acromioclavicular joint is normal in appearance.  There is a type 2 acromion process with a moderate amount of lateral downsloping.      Impression       1. There is a focal full-thickness tear in the distal aspect of the supraspinatus tendon. The tendon is retracted by 9 mm.  2. There are incomplete tears involving the articular surface of the distal aspect of the infraspinatus tendon.  3. There is a poorly visualized tear in the posterosuperior aspect of the glenoid labrum from the 10 to 12:00 o'clock positions.  4. There is a type 2 acromion process with a moderate amount of lateral downsloping.       The imaging studies listed below were independently reviewed by me, and I agree with the findings as documented below.     MRI Lumbar Spine 06/2018  FINDINGS:  Levoscoliosis of the spine, similar to before.  No abnormal bone marrow signal changes are evident to indicate acute fracture  or bone marrow replacement process.  Sub endplate marrow signal changes about the L4-5 disc space, consistent with degenerative type change, mostly similar to before.    Visualized spinal cord and conus medullaris appears unremarkable.  No abnormal intramedullary spinal cord signal change.  No intradural, extramedullary masses are identified.    T11-12: Mild posterior disc bulging, greater to the right of midline, similar to before.    T12-L1:  Mild loss of disc signal indicating some degenerative change.  No significant disc bulge or focal herniation.  Mild loss of disc space height.  No detrimental changes.    L1-2:  Degenerative disc disease change.  Loss of disc signal.  No significant disc bulge or focal herniation.  Mild posterior disc bulging, similar to before.    L2-3: Degenerative disc disease.  Narrowing of the disc space.  Loss of normal disc signal.  Diffuse posterior mild disc bulging, similar to before.  Facet arthropathy changes, appearing greater on the right.    L3-4:  Degenerative disc disease.  Significant narrowing of the disc space.  Posterior marginal osteophytes.  Diffuse posterior disc bulging.  Bilateral foraminal narrowing right greater than left.  Facet arthropathy changes right greater than left.  Mild narrowing of the thecal sac as a result of the chronic findings, similar to before.    L4-5:  Degenerative disc disease.  Significant narrowing of the disc space.  Posterior osteophytes.  Diffuse posterior disc bulging, appearing greater to the left of midline.  Indication 0 some bilateral facet arthropathy change.  Bilateral bone foraminal narrowing, greater on the left.  Findings appear similar to before.    L5-S1:  Degenerative disc disease.  No significant disc bulge or focal herniation.  Mild posterior disc bulging present.  Facet arthropathy changes left greater than right.    X ray Scoliosis Complete 06/2018  There is scoliosis of the thoracolumbar spine, which is convex to the left  with the apex centered at the L1 level.  The Sequeira angle measures approximately 26.2°.  All visualized thoracic and lumbar vertebral bodies normal in height.  Low-to-moderate grade degenerative changes scattered throughout the mid and inferior thoracic spine with small scattered marginal osteophytes.  Severe degenerative change of the lumbar spine identified at the L2-3, L3-4 and L4-5 levels with large marginal osteophytes.  Paravertebral soft tissues are normal.    MRI Cervical Spine 2017  Reversal of the normal cervical lordosis.  Small spinal canal on a congenital basis.  No definite abnormal signal in the cord.  The craniocervical junction is normal.    C2-3: Minimal disc bulge.  Facet hypertrophy on the left.    C3-4: Broad-based osteophyte and disc bulge.  Uncovertebral joint disease with moderate to severe left-sided neural foraminal narrowing.  Decreased CSF spaces anterior and posterior to the cord.    C4-5: Marked disc space narrowing.  Broad-based osteophyte and disc bulge.  Decreased CSF spaces anterior and posterior to the cord.  Uncovertebral joint disease with mild neuroforaminal narrowing.    C5-6: Disc space narrowing.  Broad-based osteophyte and disc bulge.  Asymmetric disc protrusion paramedian to the left side.  Uncovertebral joint disease and degenerative changes of the facets with moderate to severe bilateral neural foraminal narrowing.  No signal abnormality in the cord.    C6-7: Disc space narrowing.  Broad-based osteophyte and disc bulge.  Bilateral uncovertebral joint disease and degenerative changes of the facets.  Moderate central spinal stenosis.    C7-T1: Minimal disc bulge.  Uncovertebral joint disease.    Assessment:     Encounter Diagnoses   Name Primary?    Lumbar spondylosis Yes    Chronic pain syndrome     Osteoarthritis of lumbar spine, unspecified spinal osteoarthritis complication status        Plan:     Talia was seen today for low-back pain and foot pain.    Diagnoses and  all orders for this visit:    Lumbar spondylosis    Chronic pain syndrome    Osteoarthritis of lumbar spine, unspecified spinal osteoarthritis complication status        Her pain is consistent with the above.    We discussed the assessment and recommendations.  All available images were reviewed. We discussed the disease process, prognosis, treatment plan, and risks and benefits. The patient is aware of the risks and benefits of the medications being prescribed, common side effects, and proper usage. The following is the plan we agreed on:     1. Previous imaging reviewed with the patient.  2. Schedule for repeat left then right L2,3,4,5 RFAs 2 weeks apart.  3. We discussed increasing activity level.  She declined to start PT at this time.  4. RTC 4 weeks after completion of procedures.    The above plan and management options were discussed at length with patient. Patient is in agreement with the above and verbalized understanding.     LETY Marx  10/21/2020

## 2020-10-23 ENCOUNTER — TELEPHONE (OUTPATIENT)
Dept: CARDIOLOGY | Facility: CLINIC | Age: 70
End: 2020-10-23

## 2020-10-23 NOTE — TELEPHONE ENCOUNTER
I left a message on pt's voicemail:  The small risk of stroke from holding the Eliquis for 3 days for a spine ablation procedure has to be weighed against the potential benefit of the planned procedure and the risk of bleeding and paralysis if the procedure is performed without stopping the Eliquis.

## 2020-10-30 ENCOUNTER — TELEPHONE (OUTPATIENT)
Dept: PAIN MEDICINE | Facility: CLINIC | Age: 70
End: 2020-10-30

## 2020-10-30 ENCOUNTER — TELEPHONE (OUTPATIENT)
Dept: ADMINISTRATIVE | Facility: OTHER | Age: 70
End: 2020-10-30

## 2020-10-30 NOTE — TELEPHONE ENCOUNTER
----- Message from Harper Guevara sent at 10/30/2020  1:13 PM CDT -----  Regarding: Patient Returning Call  Who Called:WILLIAM LEMA [[id number]]    Who Left Message for Patient:Myrna     Does the patient know what this is regarding:Scheduling injections     Best Call Back Number:[phone]

## 2020-11-02 ENCOUNTER — TELEPHONE (OUTPATIENT)
Dept: ADMINISTRATIVE | Facility: OTHER | Age: 70
End: 2020-11-02

## 2020-11-02 ENCOUNTER — TELEPHONE (OUTPATIENT)
Dept: PAIN MEDICINE | Facility: CLINIC | Age: 70
End: 2020-11-02

## 2020-11-02 NOTE — TELEPHONE ENCOUNTER
----- Message from Zuly Recinos sent at 11/2/2020 12:23 PM CST -----  Regarding: appointment  Name of Who is Calling: WILLIAM LEMA [[id number]] What is the request in detail: patient is requesting a call back to scheduled an appointment. Can the clinic reply by [facility]: No What [facility]: [phone]

## 2020-11-03 ENCOUNTER — HOSPITAL ENCOUNTER (EMERGENCY)
Facility: HOSPITAL | Age: 70
Discharge: HOME | End: 2020-11-03
Attending: EMERGENCY MEDICINE
Payer: MEDICARE

## 2020-11-03 VITALS
DIASTOLIC BLOOD PRESSURE: 72 MMHG | SYSTOLIC BLOOD PRESSURE: 126 MMHG | HEART RATE: 118 BPM | BODY MASS INDEX: 42.82 KG/M2 | RESPIRATION RATE: 20 BRPM | TEMPERATURE: 98 F | OXYGEN SATURATION: 95 % | WEIGHT: 234.13 LBS

## 2020-11-03 DIAGNOSIS — K80.50 BILIARY COLIC: Primary | ICD-10-CM

## 2020-11-03 DIAGNOSIS — I71.20 THORACIC AORTIC ANEURYSM WITHOUT RUPTURE: ICD-10-CM

## 2020-11-03 DIAGNOSIS — R06.02 SOB (SHORTNESS OF BREATH): ICD-10-CM

## 2020-11-03 LAB
ALBUMIN SERPL BCP-MCNC: 4 G/DL (ref 3.5–5.2)
ALP SERPL-CCNC: 71 U/L (ref 38–126)
ALT SERPL W/O P-5'-P-CCNC: 21 U/L (ref 10–44)
ANION GAP SERPL CALC-SCNC: 6 MMOL/L (ref 8–16)
AST SERPL-CCNC: 28 U/L (ref 15–46)
BASOPHILS # BLD AUTO: 0.05 K/UL (ref 0–0.2)
BASOPHILS NFR BLD: 0.6 % (ref 0–1.9)
BILIRUB SERPL-MCNC: 0.7 MG/DL (ref 0.1–1)
BUN SERPL-MCNC: 31 MG/DL (ref 7–17)
CALCIUM SERPL-MCNC: 9.8 MG/DL (ref 8.7–10.5)
CHLORIDE SERPL-SCNC: 106 MMOL/L (ref 95–110)
CO2 SERPL-SCNC: 28 MMOL/L (ref 23–29)
CREAT SERPL-MCNC: 1.78 MG/DL (ref 0.5–1.4)
DIFFERENTIAL METHOD: ABNORMAL
EOSINOPHIL # BLD AUTO: 0.3 K/UL (ref 0–0.5)
EOSINOPHIL NFR BLD: 3.8 % (ref 0–8)
ERYTHROCYTE [DISTWIDTH] IN BLOOD BY AUTOMATED COUNT: 13.6 % (ref 11.5–14.5)
EST. GFR  (AFRICAN AMERICAN): 32.6 ML/MIN/1.73 M^2
EST. GFR  (NON AFRICAN AMERICAN): 28.3 ML/MIN/1.73 M^2
GLUCOSE SERPL-MCNC: 99 MG/DL (ref 70–110)
HCT VFR BLD AUTO: 40.1 % (ref 37–48.5)
HGB BLD-MCNC: 12.6 G/DL (ref 12–16)
IMM GRANULOCYTES # BLD AUTO: 0.03 K/UL (ref 0–0.04)
IMM GRANULOCYTES NFR BLD AUTO: 0.3 % (ref 0–0.5)
LIPASE SERPL-CCNC: 161 U/L (ref 23–300)
LYMPHOCYTES # BLD AUTO: 2.1 K/UL (ref 1–4.8)
LYMPHOCYTES NFR BLD: 23.9 % (ref 18–48)
MCH RBC QN AUTO: 30.4 PG (ref 27–31)
MCHC RBC AUTO-ENTMCNC: 31.4 G/DL (ref 32–36)
MCV RBC AUTO: 97 FL (ref 82–98)
MONOCYTES # BLD AUTO: 0.8 K/UL (ref 0.3–1)
MONOCYTES NFR BLD: 8.7 % (ref 4–15)
NEUTROPHILS # BLD AUTO: 5.5 K/UL (ref 1.8–7.7)
NEUTROPHILS NFR BLD: 62.7 % (ref 38–73)
NRBC BLD-RTO: 0 /100 WBC
NT-PROBNP: 1550 PG/ML (ref 5–900)
PLATELET # BLD AUTO: 168 K/UL (ref 150–350)
PMV BLD AUTO: 11.8 FL (ref 9.2–12.9)
POTASSIUM SERPL-SCNC: 5 MMOL/L (ref 3.5–5.1)
PROT SERPL-MCNC: 7.1 G/DL (ref 6–8.4)
RBC # BLD AUTO: 4.14 M/UL (ref 4–5.4)
SODIUM SERPL-SCNC: 140 MMOL/L (ref 136–145)
TROPONIN I SERPL DL<=0.01 NG/ML-MCNC: <0.012 NG/ML (ref 0.01–0.03)
WBC # BLD AUTO: 8.7 K/UL (ref 3.9–12.7)

## 2020-11-03 PROCEDURE — 80053 COMPREHEN METABOLIC PANEL: CPT | Mod: ER

## 2020-11-03 PROCEDURE — 99285 EMERGENCY DEPT VISIT HI MDM: CPT | Mod: 25,ER

## 2020-11-03 PROCEDURE — 83880 ASSAY OF NATRIURETIC PEPTIDE: CPT | Mod: ER

## 2020-11-03 PROCEDURE — 85025 COMPLETE CBC W/AUTO DIFF WBC: CPT | Mod: ER

## 2020-11-03 PROCEDURE — 93005 ELECTROCARDIOGRAM TRACING: CPT | Mod: ER

## 2020-11-03 PROCEDURE — 94760 N-INVAS EAR/PLS OXIMETRY 1: CPT | Mod: ER

## 2020-11-03 PROCEDURE — 83690 ASSAY OF LIPASE: CPT | Mod: ER

## 2020-11-03 PROCEDURE — 93010 ELECTROCARDIOGRAM REPORT: CPT | Mod: ,,, | Performed by: INTERNAL MEDICINE

## 2020-11-03 PROCEDURE — 93010 EKG 12-LEAD: ICD-10-PCS | Mod: ,,, | Performed by: INTERNAL MEDICINE

## 2020-11-03 PROCEDURE — 84484 ASSAY OF TROPONIN QUANT: CPT | Mod: ER

## 2020-11-03 RX ORDER — ONDANSETRON 4 MG/1
4 TABLET, FILM COATED ORAL EVERY 6 HOURS
Qty: 12 TABLET | Refills: 0 | Status: SHIPPED | OUTPATIENT
Start: 2020-11-03 | End: 2021-09-22

## 2020-11-03 NOTE — ED PROVIDER NOTES
Encounter Date: 11/3/2020       History     Chief Complaint   Patient presents with    Shortness of Breath     I am having upper abdominal pain and shortness of breath that started 4 days ago feeling bad and flu like. This morning I got more SOB     Abdominal Pain     The history is provided by the patient.   Shortness of Breath  This is a new problem. The current episode started more than 2 days ago. The problem has been gradually worsening. Associated symptoms include abdominal pain (lower rib cage/epigastrum bilateral). Pertinent negatives include no fever, no cough, no sputum production, no chest pain, no syncope, no leg pain and no leg swelling.   Abdominal Pain  The current episode started today. The problem has not changed since onset.The abdominal pain is located in the epigastric region. The abdominal pain radiates to the chest. The abdominal pain is relieved by nothing.   The other symptoms of the illness include fatigue and shortness of breath. The other symptoms of the illness do not include fever.     Review of patient's allergies indicates:   Allergen Reactions    Dexamethasone Rash     Past Medical History:   Diagnosis Date    Allergy     Anemia     Anxiety     Atrial fibrillation     Cancer     skin    Cataract     Depression     Edema     Hypothyroidism     Kidney disease     PSVT (paroxysmal supraventricular tachycardia)     Thyroid disease      Past Surgical History:   Procedure Laterality Date    ADENOIDECTOMY      APPENDECTOMY      COLONOSCOPY  2009    repeat in 10 years     EYE SURGERY      HYSTERECTOMY      INJECTION OF ANESTHETIC AGENT AROUND NERVE Bilateral 9/21/2018    Procedure: BLOCK, NERVE, MEDIAL BRANCH BLOCK BILATERAL LUMBAR L2-5;  Surgeon: Julieth Christopher MD;  Location[facility];  Service: Pain Management;  Laterality: Bilateral;  1 of 2    INJECTION OF ANESTHETIC AGENT AROUND NERVE Bilateral 9/28/2018    Procedure: BLOCK, NERVE, MEDIAL BRANCH BLOCK  BILATERAL LUMBAR L2-5;  Surgeon: Julieth Christopher MD;  [facility];  Service: Pain Management;  Laterality: Bilateral;  2 of 2  PLEASE GIVE NIRAVAM PER MD    OOPHORECTOMY      RADIOFREQUENCY ABLATION Left 2/14/2020    Procedure: RADIOFREQUENCY ABLATION L2,3,4,5;  Surgeon: Julieth Christopher MD;  Location: [facility];  Service: Pain Management;  Laterality: Left;  LT RFA L2,3,4,5  1 of 2  OK to hold Eliquis    RADIOFREQUENCY ABLATION Right 2/28/2020    Procedure: RADIOFREQUENCY ABLATION L2,3,4,5 RIGHT   2 of 2 ok to hold eliquis;  Surgeon: Julieth Christopher MD;  Location: [facility];  Service: Pain Management;  Laterality: Right;    TONSILLECTOMY      TREATMENT OF CARDIAC ARRHYTHMIA N/A 2/8/2019    Procedure: CARDIOVERSION;  Surgeon: Devin You MD;  Location: [facility];  Service: Cardiology;  Laterality: N/A;  AF, KAROL, DCCV, MAC, GA, 3 Prep     Family History   Problem Relation Age of Onset    Stroke Mother     Stroke Father     Diabetes Father     Sleep apnea Daughter     Mental illness Daughter      Social History     Tobacco Use    Smoking status: Former Smoker     Packs/day: 1.00     Years: 50.00     Pack years: 50.00     Types: Cigarettes    Smokeless tobacco: Never Used   Substance Use Topics    Alcohol use: No     Frequency: Never     Comment: rarely    Drug use: No     Review of Systems   Constitutional: Positive for fatigue. Negative for fever.   Respiratory: Positive for shortness of breath. Negative for cough and sputum production.    Cardiovascular: Negative for chest pain, leg swelling and syncope.   Gastrointestinal: Positive for abdominal pain (lower rib cage/epigastrum bilateral).   All other systems reviewed and are negative.      Physical Exam     Initial Vitals [11/03/20 0801]   BP Pulse Resp Temp SpO2   104/66 86 (!) 21 97.9 °F (36.6 °C) 98 %      MAP       --         Physical Exam    Nursing note and vitals reviewed.  Constitutional: She appears well-developed  and well-nourished. She is not diaphoretic. No distress.   HENT:   Head: Normocephalic and atraumatic.   Eyes: Conjunctivae and EOM are normal.   Neck: Normal range of motion. Neck supple.   Cardiovascular: Normal rate and intact distal pulses.   Irregular irregular   Pulmonary/Chest: No respiratory distress.   Musculoskeletal: Normal range of motion. No edema.   Neurological: She is alert and oriented to person, place, and time. She has normal strength.   Skin: Skin is warm and dry.         ED Course   Procedures  Labs Reviewed   CBC W/ AUTO DIFFERENTIAL - Abnormal; Notable for the following components:       Result Value    MCHC 31.4 (*)     All other components within normal limits   COMPREHENSIVE METABOLIC PANEL - Abnormal; Notable for the following components:    BUN 31 (*)     Creatinine 1.78 (*)     Anion Gap 6 (*)     eGFR if  32.6 (*)     eGFR if non  28.3 (*)     All other components within normal limits   NT-PRO NATRIURETIC PEPTIDE - Abnormal; Notable for the following components:    NT-proBNP 1550 (*)     All other components within normal limits   TROPONIN I   LIPASE   NT-PRO NATRIURETIC PEPTIDE   TROPONIN I     EKG Readings: (Independently Interpreted)   Initial Reading: No STEMI. Rhythm: Atrial Fibrillation. Heart Rate: 98. Conduction: Normal. ST Segments: Normal ST Segments.       Imaging Results          CT Chest Without Contrast (Final result)  Result time 11/03/20 12:19:47    Final result by REZA Sanchez Sr., MD (11/03/20 12:19:47)                 Impression:      1.  There is an aneurysm in the ascending thoracic aorta. It has an AP diameter of 4.6 cm.  2. There is a 6 mm noncalcified pulmonary nodule in the posterior aspect of the right lower lobe. If additional imaging evaluation is clinically indicated, I recommend consideration a CT examination of the thorax without IV contrast in 6 months.  3. There is mild cardiomegaly.  4. There is a mild amount of  dextroconvex curvature of the thoracic spine.  5. There are mild degenerative changes in the cervical and thoracic portions of the spine.  All CT scans at this facility use dose modulation, iterative reconstruction, and/or weight base dosing when appropriate to reduce radiation dose when appropriate to reduce radiation dose to as low as reasonably achievable.      Electronically signed by: Tonny Sanchez MD  Date:    11/03/2020  Time:    12:19             Narrative:    EXAMINATION:  CT CHEST WITHOUT CONTRAST    CLINICAL HISTORY:  Shortness of breath;Chest pain or SOB, pleurisy or effusion suspected; aneurysm of the ascending thoracic aorta    TECHNIQUE:  Standard chest CT protocol was performed without IV contrast.    COMPARISON:  A CT examination of the abdomen and pelvis performed on 11/03/2020.    FINDINGS:  There is mild cardiomegaly.  There is an aneurysm in the ascending thoracic aorta.  It has an AP diameter of 4.6 cm.  There is a 6 mm noncalcified pulmonary nodule in the posterior aspect of the right lower lobe.  There are mild dependent atelectatic changes in the left lung.  There is no pneumothorax or pleural effusion.  There is a mild amount of dextroconvex curvature of the thoracic spine.  There are mild degenerative changes in the cervical and thoracic portions of the spine.                               CT Abdomen Pelvis  Without Contrast (Final result)  Result time 11/03/20 10:42:55    Final result by REZA Sanchez Sr., MD (11/03/20 10:42:55)                 Impression:      1. There is an aneurysm of the ascending thoracic aorta.  It has an AP diameter of 4.5 cm.  2. There are several stones in the gallbladder. One of the larger ones measures 17 mm.  If additional imaging evaluation is clinically indicated, recommend consideration of an ultrasound examination of the gallbladder.  3. There is mild cardiomegaly.  4. There is an 11 mm oval shaped hypodense area in the spleen. This area has a  Hounsfield measurement of 4.  This is characteristic of a cyst.  5. There is a mild amount of levoconvex curvature of the lumbar spine.  6. There are mild degenerative changes between L2 and L4.  7. There is a 4 mm noncalcified pulmonary nodule in the posterior aspect of the right lower lobe.  If additional imaging evaluation is clinically indicated, I recommend consideration of a follow-up CT examination of the thorax without IV contrast in 6 months.  All CT scans at this facility use dose modulation, iterative reconstruction, and/or weight base dosing when appropriate to reduce radiation dose when appropriate to reduce radiation dose to as low as reasonably achievable.      Electronically signed by: Tonny Sanchez MD  Date:    11/03/2020  Time:    10:42             Narrative:    EXAMINATION:  CT ABDOMEN PELVIS WITHOUT CONTRAST    CLINICAL HISTORY:  Epigastric pain;    TECHNIQUE:  Standard abdomen and pelvis CT protocol without oral or IV contrast was performed.    COMPARISON:  None    FINDINGS:  Finding: There is mild cardiomegaly.  There is an aneurysm of the ascending thoracic aorta.  It has an AP diameter of 4.5 cm.  There is a 4 mm noncalcified pulmonary nodule in the posterior aspect of the right lower lobe.  There are mild dependent atelectatic changes in the left lung.  There is no pneumothorax or pleural effusion.    There are several stones in the gallbladder.  One of the larger ones measures 17 mm.  There is an 11 mm oval shaped hypodense area in the spleen.  This area has a Hounsfield measurement of 4.  The liver, pancreas, adrenals, and kidneys are normal in appearance. The ureters and the urinary bladder are normal in appearance.  The uterus is absent.  The appendix and the rest of the gastrointestinal system are normal in appearance. There is no free fluid within the abdomen or pelvis. There is no pneumoperitoneum.  There is a mild amount of atherosclerosis.  There is a mild amount of levoconvex  curvature of the lumbar spine.  There are mild degenerative changes between L2 and L4.                               X-Ray Chest AP Portable (Final result)  Result time 11/03/20 09:15:15    Final result by REZA Sanchez Sr., MD (11/03/20 09:15:15)                 Impression:      1. This is a limited examination secondary to lack of inclusion of the entire thorax on the film. The right costophrenic angle was not completely included on the film.  2. The lungs are clear.  3. There is mild cardiomegaly.  .      Electronically signed by: Tonny Sanchez MD  Date:    11/03/2020  Time:    09:15             Narrative:    EXAMINATION:  XR CHEST AP PORTABLE    CLINICAL HISTORY:  shortness of breath;    COMPARISON:  06/09/2020    FINDINGS:  This is a limited examination secondary to lack of inclusion of the entire thorax on the film.  The right costophrenic angle was not completely included on the film.  The size of the heart is prominent.  The lungs are clear. There is no pneumothorax.  The left costophrenic angle is sharp.                                 Medical Decision Making:   Differential Diagnosis:   Differential Diagnosis includes, but is not limited to:  Bowel obstruction/volvulus, perforated viscous, incarcerated/strangulated hernia, intussusception, ileus, appendicitis, cholecystitis, esophagitis, hepatitis, nephrolithiasis, pancreatitis, gastritis/gastroenteritis, colitis, IBD/IBS, biliary colic, PUD, constipation, UTI/pyelonephritis,  disorder.    Independently Interpreted Test(s):   I have ordered and independently interpreted X-rays - see prior notes.  I have ordered and independently interpreted EKG Reading(s) - see prior notes  Clinical Tests:   Lab Tests: Ordered and Reviewed  Radiological Study: Ordered and Reviewed  Medical Tests: Ordered and Reviewed  ED Management:  Afib, rate controlled  Pain resolved  Imaging with concern for cholecystitis, TAA as potential causes of patient's symptoms  Likely  biliary colic, with TAA smaller in size and story not typical for dissection.  Discussed with Dr. Ortiz will see patient in clinic follow up for further surveillance.  Referrals made for patient.  Discussed indications for return, continued follow up.                             Clinical Impression:       ICD-10-CM ICD-9-CM   1. Biliary colic  K80.50 574.20   2. SOB (shortness of breath)  R06.02 786.05   3. Thoracic aortic aneurysm without rupture  I71.2 441.2                      Disposition:   Disposition: Discharged  Condition: Stable         .                 Guy J. Lefort, MD  11/03/20 9010

## 2020-11-05 ENCOUNTER — PATIENT OUTREACH (OUTPATIENT)
Dept: ADMINISTRATIVE | Facility: OTHER | Age: 70
End: 2020-11-05

## 2020-11-05 ENCOUNTER — TELEPHONE (OUTPATIENT)
Dept: FAMILY MEDICINE | Facility: CLINIC | Age: 70
End: 2020-11-05

## 2020-11-05 NOTE — PROGRESS NOTES
LINKS immunization registry not responding  Care Everywhere updated  Health Maintenance updated  Chart reviewed for overdue Proactive [facility] Encounters (NEAL) health maintenance testing (CRS, Breast Ca, Diabetic Eye Exam)   Orders entered:N/A

## 2020-11-05 NOTE — TELEPHONE ENCOUNTER
----- Message from Jocy Greenwood sent at 11/5/2020 12:56 PM CST -----  Patient said she would like Dr Vallecillo to review her chart regarding her recent Hospital and give her a call to discuss what she should do     Please advise [phone]

## 2020-11-06 ENCOUNTER — OFFICE VISIT (OUTPATIENT)
Dept: SURGERY | Facility: CLINIC | Age: 70
End: 2020-11-06
Payer: MEDICARE

## 2020-11-06 VITALS
BODY MASS INDEX: 42.4 KG/M2 | WEIGHT: 230.38 LBS | DIASTOLIC BLOOD PRESSURE: 75 MMHG | SYSTOLIC BLOOD PRESSURE: 108 MMHG | HEIGHT: 62 IN | HEART RATE: 100 BPM

## 2020-11-06 DIAGNOSIS — K80.20 CALCULUS OF GALLBLADDER WITHOUT CHOLECYSTITIS WITHOUT OBSTRUCTION: Primary | ICD-10-CM

## 2020-11-06 DIAGNOSIS — K80.50 BILIARY COLIC: ICD-10-CM

## 2020-11-06 PROCEDURE — 1159F PR MEDICATION LIST DOCUMENTED IN MEDICAL RECORD: ICD-10-PCS | Mod: S$GLB,,, | Performed by: STUDENT IN AN ORGANIZED HEALTH CARE EDUCATION/TRAINING PROGRAM

## 2020-11-06 PROCEDURE — 3078F DIAST BP <80 MM HG: CPT | Mod: CPTII,S$GLB,, | Performed by: STUDENT IN AN ORGANIZED HEALTH CARE EDUCATION/TRAINING PROGRAM

## 2020-11-06 PROCEDURE — 99499 RISK ADDL DX/OHS AUDIT: ICD-10-PCS | Mod: S$GLB,,, | Performed by: STUDENT IN AN ORGANIZED HEALTH CARE EDUCATION/TRAINING PROGRAM

## 2020-11-06 PROCEDURE — 99999 PR PBB SHADOW E&M-EST. PATIENT-LVL III: ICD-10-PCS | Mod: PBBFAC,,, | Performed by: STUDENT IN AN ORGANIZED HEALTH CARE EDUCATION/TRAINING PROGRAM

## 2020-11-06 PROCEDURE — 1126F PR PAIN SEVERITY QUANTIFIED, NO PAIN PRESENT: ICD-10-PCS | Mod: S$GLB,,, | Performed by: STUDENT IN AN ORGANIZED HEALTH CARE EDUCATION/TRAINING PROGRAM

## 2020-11-06 PROCEDURE — 3074F PR MOST RECENT SYSTOLIC BLOOD PRESSURE < 130 MM HG: ICD-10-PCS | Mod: CPTII,S$GLB,, | Performed by: STUDENT IN AN ORGANIZED HEALTH CARE EDUCATION/TRAINING PROGRAM

## 2020-11-06 PROCEDURE — 1101F PT FALLS ASSESS-DOCD LE1/YR: CPT | Mod: CPTII,S$GLB,, | Performed by: STUDENT IN AN ORGANIZED HEALTH CARE EDUCATION/TRAINING PROGRAM

## 2020-11-06 PROCEDURE — 3008F BODY MASS INDEX DOCD: CPT | Mod: CPTII,S$GLB,, | Performed by: STUDENT IN AN ORGANIZED HEALTH CARE EDUCATION/TRAINING PROGRAM

## 2020-11-06 PROCEDURE — 99203 PR OFFICE/OUTPT VISIT, NEW, LEVL III, 30-44 MIN: ICD-10-PCS | Mod: S$GLB,,, | Performed by: STUDENT IN AN ORGANIZED HEALTH CARE EDUCATION/TRAINING PROGRAM

## 2020-11-06 PROCEDURE — 1101F PR PT FALLS ASSESS DOC 0-1 FALLS W/OUT INJ PAST YR: ICD-10-PCS | Mod: CPTII,S$GLB,, | Performed by: STUDENT IN AN ORGANIZED HEALTH CARE EDUCATION/TRAINING PROGRAM

## 2020-11-06 PROCEDURE — 3078F PR MOST RECENT DIASTOLIC BLOOD PRESSURE < 80 MM HG: ICD-10-PCS | Mod: CPTII,S$GLB,, | Performed by: STUDENT IN AN ORGANIZED HEALTH CARE EDUCATION/TRAINING PROGRAM

## 2020-11-06 PROCEDURE — 3074F SYST BP LT 130 MM HG: CPT | Mod: CPTII,S$GLB,, | Performed by: STUDENT IN AN ORGANIZED HEALTH CARE EDUCATION/TRAINING PROGRAM

## 2020-11-06 PROCEDURE — 3008F PR BODY MASS INDEX (BMI) DOCUMENTED: ICD-10-PCS | Mod: CPTII,S$GLB,, | Performed by: STUDENT IN AN ORGANIZED HEALTH CARE EDUCATION/TRAINING PROGRAM

## 2020-11-06 PROCEDURE — 99499 UNLISTED E&M SERVICE: CPT | Mod: S$GLB,,, | Performed by: STUDENT IN AN ORGANIZED HEALTH CARE EDUCATION/TRAINING PROGRAM

## 2020-11-06 PROCEDURE — 99999 PR PBB SHADOW E&M-EST. PATIENT-LVL III: CPT | Mod: PBBFAC,,, | Performed by: STUDENT IN AN ORGANIZED HEALTH CARE EDUCATION/TRAINING PROGRAM

## 2020-11-06 PROCEDURE — 1159F MED LIST DOCD IN RCRD: CPT | Mod: S$GLB,,, | Performed by: STUDENT IN AN ORGANIZED HEALTH CARE EDUCATION/TRAINING PROGRAM

## 2020-11-06 PROCEDURE — 1126F AMNT PAIN NOTED NONE PRSNT: CPT | Mod: S$GLB,,, | Performed by: STUDENT IN AN ORGANIZED HEALTH CARE EDUCATION/TRAINING PROGRAM

## 2020-11-06 PROCEDURE — 99203 OFFICE O/P NEW LOW 30 MIN: CPT | Mod: S$GLB,,, | Performed by: STUDENT IN AN ORGANIZED HEALTH CARE EDUCATION/TRAINING PROGRAM

## 2020-11-06 NOTE — TELEPHONE ENCOUNTER
I don't think she was hospitalized, she went to the ER    She should see cardiothoracic surgeon about the aneursym first, then see a general surgeon to get gallbladder out.

## 2020-11-06 NOTE — PROGRESS NOTES
[facility]  General Surgery  History & Physical  Date: 11/06/2020  Referring Provider: Lefort, Guy J.    SUBJECTIVE:     Chief complaint:   Chief Complaint   Patient presents with    Consult       History of Present Illness:  Patient is a 70 y.o. female with a history of paroxysmal Afib (elliquis and s/p ablation), CKD4, obesity, and hypothyroidism who presents to clinic for evaluation of cholelithiasis. She presented to the ED on 11/3/20 with shortness of breath, CT reveals ascending aortic aneurysm, as well as cholelithiasis. Patient reports no post-prandial abdominal pain, nausea, vomiting, change in bowel or bladder habits. She reports she is able to tolerate a regular diet, and has not had abdominal pain of any sort. She was referred to general surgery after she was found to have asymptomatic gallstones.    Abdominal surgeries include a hysterectomy and appendectomy. She denies past history of MI or CVA.     Review of patient's allergies indicates:   Allergen Reactions    Dexamethasone Rash       Current Outpatient Medications   Medication Sig Dispense Refill    albuterol (PROVENTIL/VENTOLIN HFA) 90 mcg/actuation inhaler Inhale 2 puffs into the lungs every 4 (four) hours as needed for Wheezing (For shortness of breath). 18 g 11    ALPRAZolam (XANAX) 0.25 MG tablet Take 1 tablet (0.25 mg total) by mouth once daily. 90 tablet 1    apixaban (ELIQUIS) 5 mg Tab Take 1 tablet (5 mg total) by mouth 2 (two) times daily. 180 tablet 3    atorvastatin (LIPITOR) 10 MG tablet Take 1 tablet (10 mg total) by mouth once daily. 90 tablet 3    candesartan (ATACAND) 8 MG tablet Take 0.5 tablets (4 mg total) by mouth once daily. 45 tablet 3    cholecalciferol, vitamin D3, (VITAMIN D3) 5,000 unit Tab Take 5,000 Units by mouth once daily. 90 tablet 3    DULoxetine (CYMBALTA) 60 MG capsule Take 1 capsule (60 mg total) by mouth 2 (two) times daily. 60 capsule 2    levothyroxine (SYNTHROID, LEVOTHROID)  175 MCG tablet Take 1 tablet (175 mcg total) by mouth once daily. 90 tablet 3    ondansetron (ZOFRAN) 4 MG tablet Take 1 tablet (4 mg total) by mouth every 6 (six) hours. 12 tablet 0    zolpidem (AMBIEN) 10 mg Tab TAKE 1 TABLET BY MOUTH EVERY DAY IN THE EVENING AS NEEDED 90 tablet 1    gabapentin (NEURONTIN) 400 MG capsule Take 1 capsule (400 mg total) by mouth 2 (two) times daily. Take 2 tablets once daily 180 capsule 3    metoprolol tartrate (LOPRESSOR) 25 MG tablet Take 3 tablets (75 mg total) by mouth 2 (two) times daily. 180 tablet 11     No current facility-administered medications for this visit.        Past Medical History:   Diagnosis Date    Allergy     Anemia     Anxiety     Atrial fibrillation     Cancer     skin    Cataract     Depression     Edema     Hypothyroidism     Kidney disease     PSVT (paroxysmal supraventricular tachycardia)     Thyroid disease      Past Surgical History:   Procedure Laterality Date    ADENOIDECTOMY      APPENDECTOMY      COLONOSCOPY  2009    repeat in 10 years     EYE SURGERY      HYSTERECTOMY      INJECTION OF ANESTHETIC AGENT AROUND NERVE Bilateral 9/21/2018    Procedure: BLOCK, NERVE, MEDIAL BRANCH BLOCK BILATERAL LUMBAR L2-5;  Surgeon: Julieth Christopher MD;  Location: [facility];  Service: Pain Management;  Laterality: Bilateral;  1 of 2    INJECTION OF ANESTHETIC AGENT AROUND NERVE Bilateral 9/28/2018    Procedure: BLOCK, NERVE, MEDIAL BRANCH BLOCK BILATERAL LUMBAR L2-5;  Surgeon: Julieth Christopher MD;  Location: [facility];  Service: Pain Management;  Laterality: Bilateral;  2 of 2  PLEASE GIVE NIRAVAM PER MD    OOPHORECTOMY      RADIOFREQUENCY ABLATION Left 2/14/2020    Procedure: RADIOFREQUENCY ABLATION L2,3,4,5;  Surgeon: Julieth Christopher MD;  Location: [facility];  Service: Pain Management;  Laterality: Left;  LT RFA L2,3,4,5  1 of 2  OK to hold Eliquis    RADIOFREQUENCY ABLATION Right 2/28/2020    Procedure: RADIOFREQUENCY ABLATION  L2,3,4,5 RIGHT   2 of 2 ok to hold eliquis;  Surgeon: Julieth Christopher MD;  Location: [facility];  Service: Pain Management;  Laterality: Right;    TONSILLECTOMY      TREATMENT OF CARDIAC ARRHYTHMIA N/A 2/8/2019    Procedure: CARDIOVERSION;  Surgeon: Devin You MD;  Location: [facility] EP LAB;  Service: Cardiology;  Laterality: N/A;  AF, KAROL, DCCV, MAC, MI, 3 Prep     Family History   Problem Relation Age of Onset    Stroke Mother     Stroke Father     Diabetes Father     Sleep apnea Daughter     Mental illness Daughter      Social History     Tobacco Use    Smoking status: Former Smoker     Packs/day: 1.00     Years: 50.00     Pack years: 50.00     Types: Cigarettes    Smokeless tobacco: Never Used   Substance Use Topics    Alcohol use: No     Frequency: Never     Comment: rarely    Drug use: No        Review of Systems:  Review of Systems   Constitutional: Negative for activity change, appetite change, fatigue and fever.   HENT: Negative for congestion, rhinorrhea and trouble swallowing.    Eyes: Negative for discharge and itching.   Respiratory: Positive for cough and shortness of breath. Negative for apnea and chest tightness.    Cardiovascular: Positive for leg swelling. Negative for chest pain and palpitations.   Gastrointestinal: Negative for abdominal distention, abdominal pain, nausea and vomiting.   Endocrine: Negative for cold intolerance and heat intolerance.   Genitourinary: Negative for decreased urine volume, difficulty urinating, dysuria and urgency.   Musculoskeletal: Positive for gait problem. Negative for arthralgias, back pain, joint swelling and neck pain.   Skin: Negative for color change, pallor and wound.   Neurological: Negative for syncope, weakness and light-headedness.   Hematological: Negative for adenopathy. Does not bruise/bleed easily.   All other systems reviewed and are negative.      OBJECTIVE:     Vital Signs (Most Recent)  Pulse: 100 (11/06/20 1423)  BP: 108/75  "(11/06/20 0962)  5' 2" (1.575 m)  104.5 kg (230 lb 6.1 oz)     Physical Exam:  Physical Exam  Vitals signs and nursing note reviewed.   Constitutional:       General: She is not in acute distress.     Appearance: Normal appearance. She is not ill-appearing or toxic-appearing.   HENT:      Head: Normocephalic and atraumatic.   Eyes:      General: Vision grossly intact.      Extraocular Movements: Extraocular movements intact.   Neck:      Musculoskeletal: Normal range of motion and neck supple. No muscular tenderness.   Cardiovascular:      Rate and Rhythm: Normal rate. Rhythm irregular.      Pulses: Normal pulses.      Heart sounds: Normal heart sounds. No murmur.   Pulmonary:      Effort: Pulmonary effort is normal. No respiratory distress.      Breath sounds: Normal breath sounds.   Abdominal:      General: Abdomen is flat. Bowel sounds are normal. There is no distension.      Palpations: Abdomen is soft. There is no fluid wave.      Tenderness: There is no abdominal tenderness.   Musculoskeletal:         General: No swelling, tenderness or deformity.   Lymphadenopathy:      Upper Body:      Right upper body: No supraclavicular adenopathy.      Left upper body: No supraclavicular adenopathy.   Skin:     General: Skin is warm and dry.      Coloration: Skin is not cyanotic or jaundiced.   Neurological:      General: No focal deficit present.      Mental Status: She is alert and oriented to person, place, and time.      Cranial Nerves: Cranial nerves are intact.      Sensory: Sensation is intact.   Psychiatric:         Mood and Affect: Mood normal.         Behavior: Behavior is cooperative.         Laboratory  CBC: Reviewed  BMP: Reviewed    Diagnostic Results:  Labs: Reviewed  ECG: Reviewed    ASSESSMENT/PLAN:   Talia Dillon with a multiple medical co-morbidities with asymptomatic cholelithiasis    PLAN:  -No surgical intervention for her cholelithiasis as she is asymptomatic. Recommend following up with cardiology " and CTS for her ascending aortic aneurysm  -Follow up with general surgery as needed, if she becomes symptomatic      Huan Herrera MD  General Surgery and Surgical Critical Care  [facility] 2nd Fl

## 2020-11-06 NOTE — LETTER
November 6, 2020      Guy J. Lefort, MD  [address] [facility] [address]           [facility]  [address]  [place] [address]  Phone: [phone]          Patient: Talia Dillon   MR Number: [id number]   YOB: 1950   Date of Visit: 11/6/2020       Dear Dr. Guy J. Lefort:    Thank you for referring Talia Dillon to me for evaluation. Attached you will find relevant portions of my assessment and plan of care.    If you have questions, please do not hesitate to call me. I look forward to following Talia Dillon along with you.    Sincerely,    Huan Herrera MD    Enclosure  CC:  No Recipients    If you would like to receive this communication electronically, please contact [email or web address] or [phone] to request more information on [employer] access.    For providers and/or their staff who would like to refer a patient to [facility], please contact us through our one-stop-shop provider referral line, [facility], at [phone].    If you feel you have received this communication in error or would no longer like to receive these types of communications, please e-mail [email or web address]

## 2020-11-16 ENCOUNTER — HOSPITAL ENCOUNTER (OUTPATIENT)
Facility: OTHER | Age: 70
Discharge: HOME | End: 2020-11-16
Attending: ANESTHESIOLOGY | Admitting: ANESTHESIOLOGY
Payer: MEDICARE

## 2020-11-16 VITALS
SYSTOLIC BLOOD PRESSURE: 138 MMHG | OXYGEN SATURATION: 99 % | HEIGHT: 62 IN | DIASTOLIC BLOOD PRESSURE: 78 MMHG | TEMPERATURE: 98 F | WEIGHT: 220 LBS | HEART RATE: 83 BPM | RESPIRATION RATE: 16 BRPM | BODY MASS INDEX: 40.48 KG/M2

## 2020-11-16 DIAGNOSIS — M47.819 SPONDYLOSIS WITHOUT MYELOPATHY: Primary | ICD-10-CM

## 2020-11-16 DIAGNOSIS — G89.29 CHRONIC PAIN: ICD-10-CM

## 2020-11-16 DIAGNOSIS — M47.816 OSTEOARTHRITIS OF LUMBAR SPINE, UNSPECIFIED SPINAL OSTEOARTHRITIS COMPLICATION STATUS: ICD-10-CM

## 2020-11-16 PROCEDURE — 25000003 PHARM REV CODE 250: Performed by: ANESTHESIOLOGY

## 2020-11-16 RX ORDER — SODIUM CHLORIDE 9 MG/ML
500 INJECTION, SOLUTION INTRAVENOUS CONTINUOUS
Status: DISCONTINUED | OUTPATIENT
Start: 2020-11-16 | End: 2020-11-16 | Stop reason: HOSPADM

## 2020-11-16 RX ADMIN — SODIUM CHLORIDE 500 ML: 0.9 INJECTION, SOLUTION INTRAVENOUS at 12:11

## 2020-11-16 NOTE — OR NURSING
Pt presents for pain procedure after having a recent trip to the ED with increased SOB and was determined that she has an enlarged aorta. Pt is scheduled to follow up on 11/18/20 with cardiology. Pt will reschedule after it is determined it is safe to proceed.

## 2020-11-16 NOTE — OP NOTE
Procedure postponed. Patient has new evidence of ascending aortic aneurysm of 4.6 cm after a recent ER visit for chest pain and shortness of breath. She will be followed up by Cardiologist on 11/18. At that time, should she be cleared for the procedure, then we can re-schedule her.

## 2020-11-19 ENCOUNTER — TELEPHONE (OUTPATIENT)
Dept: CARDIOLOGY | Facility: CLINIC | Age: 70
End: 2020-11-19

## 2020-11-19 ENCOUNTER — OFFICE VISIT (OUTPATIENT)
Dept: CARDIOLOGY | Facility: CLINIC | Age: 70
End: 2020-11-19
Payer: MEDICARE

## 2020-11-19 VITALS
DIASTOLIC BLOOD PRESSURE: 84 MMHG | HEIGHT: 62 IN | WEIGHT: 224.44 LBS | HEART RATE: 81 BPM | BODY MASS INDEX: 41.3 KG/M2 | SYSTOLIC BLOOD PRESSURE: 126 MMHG

## 2020-11-19 DIAGNOSIS — G89.29 OTHER CHRONIC PAIN: ICD-10-CM

## 2020-11-19 DIAGNOSIS — N18.4 CKD (CHRONIC KIDNEY DISEASE) STAGE 4, GFR 15-29 ML/MIN: ICD-10-CM

## 2020-11-19 DIAGNOSIS — R91.1 SOLITARY PULMONARY NODULE: ICD-10-CM

## 2020-11-19 DIAGNOSIS — Z79.01 LONG TERM (CURRENT) USE OF ANTICOAGULANTS: ICD-10-CM

## 2020-11-19 DIAGNOSIS — E78.2 MIXED HYPERLIPIDEMIA: ICD-10-CM

## 2020-11-19 DIAGNOSIS — I10 ESSENTIAL HYPERTENSION: Chronic | ICD-10-CM

## 2020-11-19 DIAGNOSIS — I48.19 PERSISTENT ATRIAL FIBRILLATION: Primary | ICD-10-CM

## 2020-11-19 DIAGNOSIS — R91.1 LUNG NODULE: ICD-10-CM

## 2020-11-19 DIAGNOSIS — E03.4 HYPOTHYROIDISM DUE TO ACQUIRED ATROPHY OF THYROID: ICD-10-CM

## 2020-11-19 DIAGNOSIS — I71.21 ASCENDING AORTIC ANEURYSM: ICD-10-CM

## 2020-11-19 DIAGNOSIS — R07.89 OTHER CHEST PAIN: ICD-10-CM

## 2020-11-19 PROCEDURE — 1101F PR PT FALLS ASSESS DOC 0-1 FALLS W/OUT INJ PAST YR: ICD-10-PCS | Mod: CPTII,S$GLB,, | Performed by: INTERNAL MEDICINE

## 2020-11-19 PROCEDURE — 99214 OFFICE O/P EST MOD 30 MIN: CPT | Mod: 25,S$GLB,, | Performed by: INTERNAL MEDICINE

## 2020-11-19 PROCEDURE — 99999 PR PBB SHADOW E&M-EST. PATIENT-LVL IV: CPT | Mod: PBBFAC,,, | Performed by: INTERNAL MEDICINE

## 2020-11-19 PROCEDURE — 99999 PR PBB SHADOW E&M-EST. PATIENT-LVL IV: ICD-10-PCS | Mod: PBBFAC,,, | Performed by: INTERNAL MEDICINE

## 2020-11-19 PROCEDURE — 3074F PR MOST RECENT SYSTOLIC BLOOD PRESSURE < 130 MM HG: ICD-10-PCS | Mod: CPTII,S$GLB,, | Performed by: INTERNAL MEDICINE

## 2020-11-19 PROCEDURE — 93000 ELECTROCARDIOGRAM COMPLETE: CPT | Mod: S$GLB,,, | Performed by: INTERNAL MEDICINE

## 2020-11-19 PROCEDURE — 93000 EKG 12-LEAD: ICD-10-PCS | Mod: S$GLB,,, | Performed by: INTERNAL MEDICINE

## 2020-11-19 PROCEDURE — 3079F DIAST BP 80-89 MM HG: CPT | Mod: CPTII,S$GLB,, | Performed by: INTERNAL MEDICINE

## 2020-11-19 PROCEDURE — 3079F PR MOST RECENT DIASTOLIC BLOOD PRESSURE 80-89 MM HG: ICD-10-PCS | Mod: CPTII,S$GLB,, | Performed by: INTERNAL MEDICINE

## 2020-11-19 PROCEDURE — 1159F MED LIST DOCD IN RCRD: CPT | Mod: S$GLB,,, | Performed by: INTERNAL MEDICINE

## 2020-11-19 PROCEDURE — 1159F PR MEDICATION LIST DOCUMENTED IN MEDICAL RECORD: ICD-10-PCS | Mod: S$GLB,,, | Performed by: INTERNAL MEDICINE

## 2020-11-19 PROCEDURE — 99214 PR OFFICE/OUTPT VISIT, EST, LEVL IV, 30-39 MIN: ICD-10-PCS | Mod: 25,S$GLB,, | Performed by: INTERNAL MEDICINE

## 2020-11-19 PROCEDURE — 3008F BODY MASS INDEX DOCD: CPT | Mod: CPTII,S$GLB,, | Performed by: INTERNAL MEDICINE

## 2020-11-19 PROCEDURE — 3288F FALL RISK ASSESSMENT DOCD: CPT | Mod: CPTII,S$GLB,, | Performed by: INTERNAL MEDICINE

## 2020-11-19 PROCEDURE — 3008F PR BODY MASS INDEX (BMI) DOCUMENTED: ICD-10-PCS | Mod: CPTII,S$GLB,, | Performed by: INTERNAL MEDICINE

## 2020-11-19 PROCEDURE — 1101F PT FALLS ASSESS-DOCD LE1/YR: CPT | Mod: CPTII,S$GLB,, | Performed by: INTERNAL MEDICINE

## 2020-11-19 PROCEDURE — 3288F PR FALLS RISK ASSESSMENT DOCUMENTED: ICD-10-PCS | Mod: CPTII,S$GLB,, | Performed by: INTERNAL MEDICINE

## 2020-11-19 PROCEDURE — 3074F SYST BP LT 130 MM HG: CPT | Mod: CPTII,S$GLB,, | Performed by: INTERNAL MEDICINE

## 2020-11-19 NOTE — TELEPHONE ENCOUNTER
Delivered patient's information to [facility] for a follow up visit with Dr Ana Stephens.  The office is converting over to [facility] and are not making appointments until December 3rd.  The  will scan info in patient's [facility] chart.  And patient will call the office after December 3rd to schedule an appointment.

## 2020-11-19 NOTE — PROGRESS NOTES
"  Subjective:      Patient ID: Talia Dillon is a 70 y.o. female.    Chief Complaint: Atrial Fibrillation    HPI:  Went to ER 11/2/20 with shortness of breath and was dx with "an enlarged aorta" and gallstones.    Pt uses her MDI.    Back ablation for pain was cancelled due to shortness of breath.    Pt has chronic low back pain.  "I can hardly walk anymore"    Review of Systems   Cardiovascular: Positive for chest pain (The night the patient went to the ER she was experiencing "pain across the lower rib cage") and dyspnea on exertion. Negative for claudication, irregular heartbeat, leg swelling, near-syncope, orthopnea, palpitations and syncope.      Hands go numb when sleeping    Feet go numb sometimes    Pt has seen Dr Ana Stephens in the past      Past Medical History:   Diagnosis Date    Allergy     Anemia     Anxiety     Atrial fibrillation     Cancer     skin    Cataract     Depression     Edema     Hypothyroidism     Kidney disease     PSVT (paroxysmal supraventricular tachycardia)     Thyroid disease         Past Surgical History:   Procedure Laterality Date    ADENOIDECTOMY      APPENDECTOMY      COLONOSCOPY  2009    repeat in 10 years     EYE SURGERY      HYSTERECTOMY      INJECTION OF ANESTHETIC AGENT AROUND NERVE Bilateral 9/21/2018    Procedure: BLOCK, NERVE, MEDIAL BRANCH BLOCK BILATERAL LUMBAR L2-5;  Surgeon: Julieth Christopher MD;  Location: [facility];  Service: Pain Management;  Laterality: Bilateral;  1 of 2    INJECTION OF ANESTHETIC AGENT AROUND NERVE Bilateral 9/28/2018    Procedure: BLOCK, NERVE, MEDIAL BRANCH BLOCK BILATERAL LUMBAR L2-5;  Surgeon: Julieth Christopher MD;  [facility];  Service: Pain Management;  Laterality: Bilateral;  2 of 2  PLEASE GIVE NIRAVAM PER MD    OOPHORECTOMY      RADIOFREQUENCY ABLATION Left 2/14/2020    Procedure: RADIOFREQUENCY ABLATION L2,3,4,5;  Surgeon: Julieth Christopher MD;  Location: [facility];  Service: Pain Management;  " Laterality: Left;  LT RFA L2,3,4,5  1 of 2  OK to hold Eliquis    RADIOFREQUENCY ABLATION Right 2/28/2020    Procedure: RADIOFREQUENCY ABLATION L2,3,4,5 RIGHT   2 of 2 ok to hold eliquis;  Surgeon: Julieth Christopher MD;  Location: [facility];  Service: Pain Management;  Laterality: Right;    TONSILLECTOMY      TREATMENT OF CARDIAC ARRHYTHMIA N/A 2/8/2019    Procedure: CARDIOVERSION;  Surgeon: Devin You MD;  [facility]: [facility] EP LAB;  Service: Cardiology;  Laterality: N/A;  AF, KAROL, DCCV, [place], [place], 3 Prep       Family History   Problem Relation Age of Onset    Stroke Mother     Stroke Father     Diabetes Father     Sleep apnea Daughter     Mental illness Daughter        Social History     Socioeconomic History    Marital status:      Spouse name: Not on file    Number of children: Not on file    Years of education: Not on file    Highest education level: Not on file   Occupational History    Not on file   Social Needs    Financial resource strain: Not on file    Food insecurity     Worry: Not on file     Inability: Not on file    Transportation needs     Medical: Not on file     Non-medical: Not on file   Tobacco Use    Smoking status: Former Smoker     Packs/day: 1.00     Years: 50.00     Pack years: 50.00     Types: Cigarettes    Smokeless tobacco: Never Used   Substance and Sexual Activity    Alcohol use: No     Frequency: Never     Comment: rarely    Drug use: No    Sexual activity: Not Currently   Lifestyle    Physical activity     Days per week: Not on file     Minutes per session: Not on file    Stress: Not on file   Relationships    Social connections     Talks on phone: Not on file     Gets together: Not on file     Attends Holiness service: Not on file     Active member of club or organization: Not on file     Attends meetings of clubs or organizations: Not on file     Relationship status: Not on file   Other Topics Concern    Not on file   Social History Narrative     "Not on file       Current Outpatient Medications on File Prior to Visit   Medication Sig Dispense Refill    albuterol (PROVENTIL/VENTOLIN HFA) 90 mcg/actuation inhaler Inhale 2 puffs into the lungs every 4 (four) hours as needed for Wheezing (For shortness of breath). 18 g 11    ALPRAZolam (XANAX) 0.25 MG tablet Take 1 tablet (0.25 mg total) by mouth once daily. 90 tablet 1    apixaban (ELIQUIS) 5 mg Tab Take 1 tablet (5 mg total) by mouth 2 (two) times daily. 180 tablet 3    atorvastatin (LIPITOR) 10 MG tablet Take 1 tablet (10 mg total) by mouth once daily. 90 tablet 3    candesartan (ATACAND) 8 MG tablet Take 0.5 tablets (4 mg total) by mouth once daily. 45 tablet 3    cholecalciferol, vitamin D3, (VITAMIN D3) 5,000 unit Tab Take 5,000 Units by mouth once daily. 90 tablet 3    DULoxetine (CYMBALTA) 60 MG capsule Take 1 capsule (60 mg total) by mouth 2 (two) times daily. 60 capsule 2    gabapentin (NEURONTIN) 400 MG capsule Take 1 capsule (400 mg total) by mouth 2 (two) times daily. Take 2 tablets once daily 180 capsule 3    levothyroxine (SYNTHROID, LEVOTHROID) 175 MCG tablet Take 1 tablet (175 mcg total) by mouth once daily. 90 tablet 3    metoprolol tartrate (LOPRESSOR) 25 MG tablet Take 3 tablets (75 mg total) by mouth 2 (two) times daily. 180 tablet 11    ondansetron (ZOFRAN) 4 MG tablet Take 1 tablet (4 mg total) by mouth every 6 (six) hours. 12 tablet 0    zolpidem (AMBIEN) 10 mg Tab TAKE 1 TABLET BY MOUTH EVERY DAY IN THE EVENING AS NEEDED 90 tablet 1     No current facility-administered medications on file prior to visit.        Review of patient's allergies indicates:   Allergen Reactions    Dexamethasone Rash     Objective:     Vitals:    11/19/20 0912   BP: 126/84   BP Location: Left arm   Patient Position: Sitting   BP Method: X-Large (Manual)   Pulse: 81   Weight: 101.8 kg (224 lb 6.9 oz)   Height: 5' 2" (1.575 m)        Physical Exam   Constitutional: She is oriented to person, place, " and time. She appears well-developed and well-nourished.   Eyes: No scleral icterus.   Neck: No JVD present. Carotid bruit is not present.   Cardiovascular: Normal rate and regular rhythm. Exam reveals no gallop.   No murmur heard.  Pulmonary/Chest: She has rales (few crackles left base).   Musculoskeletal:         General: No edema.   Neurological: She is alert and oriented to person, place, and time.   Skin: Skin is warm and dry.   Psychiatric: She has a normal mood and affect. Her behavior is normal. Judgment and thought content normal.   Vitals reviewed.     Note Cardiolite stress test last year was normal    CT chest shows a 4.6 cm ascending aortic aneurysm, cardiomegaly, 6 mm lung nodule    ECG today reviewed by me:  A fib with controlled ventricular response, possible septal scar, no significant change    Admission on 11/03/2020, Discharged on 11/03/2020   Component Date Value Ref Range Status    WBC 11/03/2020 8.70  3.90 - 12.70 K/uL Final    RBC 11/03/2020 4.14  4.00 - 5.40 M/uL Final    Hemoglobin 11/03/2020 12.6  12.0 - 16.0 g/dL Final    Hematocrit 11/03/2020 40.1  37.0 - 48.5 % Final    MCV 11/03/2020 97  82 - 98 fL Final    MCH 11/03/2020 30.4  27.0 - 31.0 pg Final    MCHC 11/03/2020 31.4* 32.0 - 36.0 g/dL Final    RDW 11/03/2020 13.6  11.5 - 14.5 % Final    Platelets 11/03/2020 168  150 - 350 K/uL Final    MPV 11/03/2020 11.8  9.2 - 12.9 fL Final    Immature Granulocytes 11/03/2020 0.3  0.0 - 0.5 % Final    Gran # (ANC) 11/03/2020 5.5  1.8 - 7.7 K/uL Final    Immature Grans (Abs) 11/03/2020 0.03  0.00 - 0.04 K/uL Final    Lymph # 11/03/2020 2.1  1.0 - 4.8 K/uL Final    Mono # 11/03/2020 0.8  0.3 - 1.0 K/uL Final    Eos # 11/03/2020 0.3  0.0 - 0.5 K/uL Final    Baso # 11/03/2020 0.05  0.00 - 0.20 K/uL Final    nRBC 11/03/2020 0  0 /100 WBC Final    Gran % 11/03/2020 62.7  38.0 - 73.0 % Final    Lymph % 11/03/2020 23.9  18.0 - 48.0 % Final    Mono % 11/03/2020 8.7  4.0 - 15.0 %  Final    Eosinophil % 11/03/2020 3.8  0.0 - 8.0 % Final    Basophil % 11/03/2020 0.6  0.0 - 1.9 % Final    Differential Method 11/03/2020 Automated   Final    Sodium 11/03/2020 140  136 - 145 mmol/L Final    Potassium 11/03/2020 5.0  3.5 - 5.1 mmol/L Final    Chloride 11/03/2020 106  95 - 110 mmol/L Final    CO2 11/03/2020 28  23 - 29 mmol/L Final    Glucose 11/03/2020 99  70 - 110 mg/dL Final    BUN 11/03/2020 31* 7 - 17 mg/dL Final    Creatinine 11/03/2020 1.78* 0.50 - 1.40 mg/dL Final    Calcium 11/03/2020 9.8  8.7 - 10.5 mg/dL Final    Total Protein 11/03/2020 7.1  6.0 - 8.4 g/dL Final    Albumin 11/03/2020 4.0  3.5 - 5.2 g/dL Final    Total Bilirubin 11/03/2020 0.7  0.1 - 1.0 mg/dL Final    Alkaline Phosphatase 11/03/2020 71  38 - 126 U/L Final    AST 11/03/2020 28  15 - 46 U/L Final    ALT 11/03/2020 21  10 - 44 U/L Final    Anion Gap 11/03/2020 6* 8 - 16 mmol/L Final    eGFR if  11/03/2020 32.6* >60 mL/min/1.73 m^2 Final    eGFR if non African American 11/03/2020 28.3* >60 mL/min/1.73 m^2 Final    Troponin I 11/03/2020 <0.012  0.012 - 0.034 ng/mL Final    Lipase Result 11/03/2020 161  23 - 300 U/L Final    NT-proBNP 11/03/2020 1550* 5 - 900 pg/mL Final   Lab Visit on 09/29/2020   Component Date Value Ref Range Status    Sodium 09/29/2020 142  136 - 145 mmol/L Final    Potassium 09/29/2020 4.5  3.5 - 5.1 mmol/L Final    Chloride 09/29/2020 102  95 - 110 mmol/L Final    CO2 09/29/2020 31* 23 - 29 mmol/L Final    Glucose 09/29/2020 109  70 - 110 mg/dL Final    BUN 09/29/2020 38* 7 - 17 mg/dL Final    Creatinine 09/29/2020 1.89* 0.50 - 1.40 mg/dL Final    Calcium 09/29/2020 10.0  8.7 - 10.5 mg/dL Final    Anion Gap 09/29/2020 9  8 - 16 mmol/L Final    eGFR if African American 09/29/2020 30.6* >60 mL/min/1.73 m^2 Final    eGFR if non African American 09/29/2020 26.5* >60 mL/min/1.73 m^2 Final    WBC 09/29/2020 11.93  3.90 - 12.70 K/uL Final    RBC 09/29/2020 4.34   4.00 - 5.40 M/uL Final    Hemoglobin 09/29/2020 13.3  12.0 - 16.0 g/dL Final    Hematocrit 09/29/2020 41.8  37.0 - 48.5 % Final    MCV 09/29/2020 96  82 - 98 fL Final    MCH 09/29/2020 30.6  27.0 - 31.0 pg Final    MCHC 09/29/2020 31.8* 32.0 - 36.0 g/dL Final    RDW 09/29/2020 13.7  11.5 - 14.5 % Final    Platelets 09/29/2020 209  150 - 350 K/uL Final    MPV 09/29/2020 12.1  9.2 - 12.9 fL Final    Immature Granulocytes 09/29/2020 0.7* 0.0 - 0.5 % Final    Gran # (ANC) 09/29/2020 8.3* 1.8 - 7.7 K/uL Final    Immature Grans (Abs) 09/29/2020 0.08* 0.00 - 0.04 K/uL Final    Lymph # 09/29/2020 2.2  1.0 - 4.8 K/uL Final    Mono # 09/29/2020 0.9  0.3 - 1.0 K/uL Final    Eos # 09/29/2020 0.3  0.0 - 0.5 K/uL Final    Baso # 09/29/2020 0.07  0.00 - 0.20 K/uL Final    nRBC 09/29/2020 0  0 /100 WBC Final    Gran % 09/29/2020 69.8  38.0 - 73.0 % Final    Lymph % 09/29/2020 18.8  18.0 - 48.0 % Final    Mono % 09/29/2020 7.4  4.0 - 15.0 % Final    Eosinophil % 09/29/2020 2.7  0.0 - 8.0 % Final    Basophil % 09/29/2020 0.6  0.0 - 1.9 % Final    Differential Method 09/29/2020 Automated   Final    Specimen UA 09/29/2020 Urine, Clean Catch   Final    Color, UA 09/29/2020 Arianne  Yellow, Straw, Arianne Final    Appearance, UA 09/29/2020 Hazy* Clear Final    pH, UA 09/29/2020 5.0  5.0 - 8.0 Final    Specific [place], UA 09/29/2020 1.015  1.005 - 1.030 Final    Protein, UA 09/29/2020 1+* Negative Final    Glucose, UA 09/29/2020 Negative  Negative Final    Ketones, UA 09/29/2020 Negative  Negative Final    Bilirubin (UA) 09/29/2020 Negative  Negative Final    Occult Blood UA 09/29/2020 Negative  Negative Final    Nitrite, UA 09/29/2020 Negative  Negative Final    Urobilinogen, UA 09/29/2020 Negative  <2.0 EU/dL Final    Leukocytes, UA 09/29/2020 Negative  Negative Final    Protein, Urine Random 09/29/2020 48* 0 - 15 mg/dL Final    Creatinine, Urine 09/29/2020 102.8  15.0 - 325.0 mg/dL Final     Prot/Creat Ratio, Urine 09/29/2020 0.47* 0.00 - 0.20 Final    RBC, UA 09/29/2020 1  0 - 4 /hpf Final    WBC, UA 09/29/2020 25* 0 - 5 /hpf Corrected    Bacteria 09/29/2020 Many* None-Occ /hpf Final    Hyaline Casts, UA 09/29/2020 0  0-1/lpf /lpf Final    Microscopic Comment 09/29/2020 SEE COMMENT   Final   Hospital Outpatient Visit on 06/11/2020   Component Date Value Ref Range Status    LA WIDTH 06/11/2020 2.62  cm Final    AORTIC VALVE CUSP SEPERATION 06/11/2020 1.70  cm Final    PV PEAK VELOCITY 06/11/2020 0.74  cm/s Final    LVIDd 06/11/2020 4.08  3.5 - 6.0 cm Final    IVS 06/11/2020 0.98  0.6 - 1.1 cm Final    Posterior Wall 06/11/2020 1.06  0.6 - 1.1 cm Final    Ao root annulus 06/11/2020 2.88  cm Final    LVIDs 06/11/2020 2.76  2.1 - 4.0 cm Final    FS 06/11/2020 32  28 - 44 % Final    LA volume 06/11/2020 35.06  cm3 Final    Sinus 06/11/2020 2.73  cm Final    LV mass 06/11/2020 134.80  g Final    LA size 06/11/2020 3.13  cm Final    RVDD 06/11/2020 2.51  cm Final    Left Ventricle Relative Wall Thick* 06/11/2020 0.52  cm Final    AV mean gradient 06/11/2020 3  mmHg Final    AV valve area 06/11/2020 3.46  cm2 Final    AV Velocity Ratio 06/11/2020 0.83   Final    AV index (prosthetic) 06/11/2020 1.00   Final    LVOT diameter 06/11/2020 2.10  cm Final    LVOT area 06/11/2020 3.5  cm2 Final    LVOT peak khalif 06/11/2020 0.96  m/s Final    LVOT peak VTI 06/11/2020 18.62  cm Final    Ao peak khalif 06/11/2020 1.16  m/s Final    Ao VTI 06/11/2020 18.63  cm Final    LVOT stroke volume 06/11/2020 64.46  cm3 Final    AV peak gradient 06/11/2020 5  mmHg Final    TR Max Khalif 06/11/2020 2.81  m/s Final    LV Systolic Volume 06/11/2020 28.61  mL Final    LV Diastolic Volume 06/11/2020 73.43  mL Final    RA Major Axis 06/11/2020 4.90  cm Final    Left Atrium Minor Axis 06/11/2020 6.02  cm Final    Left Atrium Major Axis 06/11/2020 4.32  cm Final    Triscuspid Valve Regurgitation Pea*  06/11/2020 32  mmHg Final    RA Width 06/11/2020 3.05  cm Final    Right Atrial Pressure (from IVC) 06/11/2020 3  mmHg Final    TV rest pulmonary artery pressure 06/11/2020 35  mmHg Final   Lab Visit on 06/11/2020   Component Date Value Ref Range Status    WBC 06/11/2020 8.85  3.90 - 12.70 K/uL Final    RBC 06/11/2020 4.26  4.00 - 5.40 M/uL Final    Hemoglobin 06/11/2020 12.5  12.0 - 16.0 g/dL Final    Hematocrit 06/11/2020 39.6  37.0 - 48.5 % Final    MCV 06/11/2020 93  82 - 98 fL Final    MCH 06/11/2020 29.3  27.0 - 31.0 pg Final    MCHC 06/11/2020 31.6* 32.0 - 36.0 g/dL Final    RDW 06/11/2020 13.5  11.5 - 14.5 % Final    Platelets 06/11/2020 197  150 - 350 K/uL Final    MPV 06/11/2020 11.8  9.2 - 12.9 fL Final    Immature Granulocytes 06/11/2020 0.6* 0.0 - 0.5 % Final    Gran # (ANC) 06/11/2020 5.3  1.8 - 7.7 K/uL Final    Immature Grans (Abs) 06/11/2020 0.05* 0.00 - 0.04 K/uL Final    Lymph # 06/11/2020 2.4  1.0 - 4.8 K/uL Final    Mono # 06/11/2020 0.7  0.3 - 1.0 K/uL Final    Eos # 06/11/2020 0.3  0.0 - 0.5 K/uL Final    Baso # 06/11/2020 0.05  0.00 - 0.20 K/uL Final    nRBC 06/11/2020 0  0 /100 WBC Final    Gran % 06/11/2020 59.7  38.0 - 73.0 % Final    Lymph % 06/11/2020 27.5  18.0 - 48.0 % Final    Mono % 06/11/2020 7.9  4.0 - 15.0 % Final    Eosinophil % 06/11/2020 3.7  0.0 - 8.0 % Final    Basophil % 06/11/2020 0.6  0.0 - 1.9 % Final    Differential Method 06/11/2020 Automated   Final    Sodium 06/11/2020 137  136 - 145 mmol/L Final    Potassium 06/11/2020 4.7  3.5 - 5.1 mmol/L Final    Chloride 06/11/2020 102  95 - 110 mmol/L Final    CO2 06/11/2020 28  23 - 29 mmol/L Final    Glucose 06/11/2020 99  70 - 110 mg/dL Final    BUN 06/11/2020 43* 7 - 17 mg/dL Final    Creatinine 06/11/2020 2.06* 0.50 - 1.40 mg/dL Final    Calcium 06/11/2020 9.6  8.7 - 10.5 mg/dL Final    Total Protein 06/11/2020 7.3  6.0 - 8.4 g/dL Final    Albumin 06/11/2020 4.2  3.5 - 5.2 g/dL Final     Total Bilirubin 06/11/2020 0.8  0.1 - 1.0 mg/dL Final    Alkaline Phosphatase 06/11/2020 83  38 - 126 U/L Final    AST 06/11/2020 24  15 - 46 U/L Final    ALT 06/11/2020 16  10 - 44 U/L Final    Anion Gap 06/11/2020 7* 8 - 16 mmol/L Final    eGFR if African American 06/11/2020 27.5* >60 mL/min/1.73 m^2 Final    eGFR if non African American 06/11/2020 23.9* >60 mL/min/1.73 m^2 Final    Cholesterol 06/11/2020 129  120 - 199 mg/dL Final    Triglycerides 06/11/2020 131  30 - 150 mg/dL Final    HDL 06/11/2020 41  40 - 75 mg/dL Final    LDL Cholesterol 06/11/2020 61.8* 63.0 - 159.0 mg/dL Final    HDL/Cholesterol Ratio 06/11/2020 31.8  20.0 - 50.0 % Final    Total Cholesterol/HDL Ratio 06/11/2020 3.1  2.0 - 5.0 Final    Non-HDL Cholesterol 06/11/2020 88  mg/dL Final    TSH 06/11/2020 0.093* 0.400 - 4.000 uIU/mL Final    Free T4 06/11/2020 0.96  0.71 - 1.51 ng/dL Final    NT-proBNP 06/11/2020 2220* 5 - 900 pg/mL Final   (    Assessment:     1. Persistent atrial fibrillation    2. Essential hypertension    3. Mixed hyperlipidemia    4. Other chronic pain    5. Hypothyroidism due to acquired atrophy of thyroid    6. Long term (current) use of anticoagulants    7. Ascending aortic aneurysm    8. Lung nodule    9. CKD (chronic kidney disease) stage 4, GFR 15-29 ml/min    10. Solitary pulmonary nodule    11. Other chest pain      Plan:   Talia was seen today for atrial fibrillation.    Diagnoses and all orders for this visit:    Persistent atrial fibrillation  -     IN OFFICE EKG 12-LEAD (to Muse)  -     Nuclear Stress Test; Future    Essential hypertension  -     IN OFFICE EKG 12-LEAD (to Muse)    Mixed hyperlipidemia    Other chronic pain    Hypothyroidism due to acquired atrophy of thyroid    Long term (current) use of anticoagulants    Ascending aortic aneurysm  -     IN OFFICE EKG 12-LEAD (to Muse)    Lung nodule    CKD (chronic kidney disease) stage 4, GFR 15-29 ml/min    Solitary pulmonary nodule  -     CT  Chest Without Contrast; Future  -     Ambulatory referral/consult to Pulmonology; Future    Other chest pain  -     NM Myocardial Perfusion Spect Multi Pharmacologic; Future  -     Nuclear Stress Test; Future     Will plan to repeat CT chest in 6 months to f/u lung nodule and ascending aortic aneurysm    Pt will f.u with dr Ana Stephens who she has seen in the past    Pt knows to avoid NSAID's    Will repeat the Lexiscan Cardiolite stress test due to recent chest pain and worse shortness of breath    Smoking cessation counseled    Small risk of stroke from holding the Eliquis for RFA for pain relief discussed in detail which has to be weighed against the benefit of potential pain relief      Consider f/u with Dr You      Follow up in about 6 months (around 5/19/2021).

## 2020-11-20 ENCOUNTER — OFFICE VISIT (OUTPATIENT)
Dept: UROLOGY | Facility: CLINIC | Age: 70
End: 2020-11-20
Payer: MEDICARE

## 2020-11-20 VITALS
BODY MASS INDEX: 41.3 KG/M2 | HEART RATE: 92 BPM | WEIGHT: 224.44 LBS | SYSTOLIC BLOOD PRESSURE: 100 MMHG | HEIGHT: 62 IN | DIASTOLIC BLOOD PRESSURE: 72 MMHG

## 2020-11-20 DIAGNOSIS — R32 URINARY INCONTINENCE, UNSPECIFIED TYPE: ICD-10-CM

## 2020-11-20 PROCEDURE — 3008F PR BODY MASS INDEX (BMI) DOCUMENTED: ICD-10-PCS | Mod: CPTII,S$GLB,, | Performed by: UROLOGY

## 2020-11-20 PROCEDURE — 3288F FALL RISK ASSESSMENT DOCD: CPT | Mod: CPTII,S$GLB,, | Performed by: UROLOGY

## 2020-11-20 PROCEDURE — 3008F BODY MASS INDEX DOCD: CPT | Mod: CPTII,S$GLB,, | Performed by: UROLOGY

## 2020-11-20 PROCEDURE — 3078F DIAST BP <80 MM HG: CPT | Mod: CPTII,S$GLB,, | Performed by: UROLOGY

## 2020-11-20 PROCEDURE — 1101F PT FALLS ASSESS-DOCD LE1/YR: CPT | Mod: CPTII,S$GLB,, | Performed by: UROLOGY

## 2020-11-20 PROCEDURE — 99204 OFFICE O/P NEW MOD 45 MIN: CPT | Mod: S$GLB,,, | Performed by: UROLOGY

## 2020-11-20 PROCEDURE — 1101F PR PT FALLS ASSESS DOC 0-1 FALLS W/OUT INJ PAST YR: ICD-10-PCS | Mod: CPTII,S$GLB,, | Performed by: UROLOGY

## 2020-11-20 PROCEDURE — 1159F PR MEDICATION LIST DOCUMENTED IN MEDICAL RECORD: ICD-10-PCS | Mod: S$GLB,,, | Performed by: UROLOGY

## 2020-11-20 PROCEDURE — 99999 PR PBB SHADOW E&M-EST. PATIENT-LVL III: CPT | Mod: PBBFAC,,, | Performed by: UROLOGY

## 2020-11-20 PROCEDURE — 3074F PR MOST RECENT SYSTOLIC BLOOD PRESSURE < 130 MM HG: ICD-10-PCS | Mod: CPTII,S$GLB,, | Performed by: UROLOGY

## 2020-11-20 PROCEDURE — 3078F PR MOST RECENT DIASTOLIC BLOOD PRESSURE < 80 MM HG: ICD-10-PCS | Mod: CPTII,S$GLB,, | Performed by: UROLOGY

## 2020-11-20 PROCEDURE — 99999 PR PBB SHADOW E&M-EST. PATIENT-LVL III: ICD-10-PCS | Mod: PBBFAC,,, | Performed by: UROLOGY

## 2020-11-20 PROCEDURE — 3074F SYST BP LT 130 MM HG: CPT | Mod: CPTII,S$GLB,, | Performed by: UROLOGY

## 2020-11-20 PROCEDURE — 1159F MED LIST DOCD IN RCRD: CPT | Mod: S$GLB,,, | Performed by: UROLOGY

## 2020-11-20 PROCEDURE — 1126F AMNT PAIN NOTED NONE PRSNT: CPT | Mod: S$GLB,,, | Performed by: UROLOGY

## 2020-11-20 PROCEDURE — 3288F PR FALLS RISK ASSESSMENT DOCUMENTED: ICD-10-PCS | Mod: CPTII,S$GLB,, | Performed by: UROLOGY

## 2020-11-20 PROCEDURE — 99204 PR OFFICE/OUTPT VISIT, NEW, LEVL IV, 45-59 MIN: ICD-10-PCS | Mod: S$GLB,,, | Performed by: UROLOGY

## 2020-11-20 PROCEDURE — 1126F PR PAIN SEVERITY QUANTIFIED, NO PAIN PRESENT: ICD-10-PCS | Mod: S$GLB,,, | Performed by: UROLOGY

## 2020-11-20 NOTE — LETTER
November 24, 2020      Bebeto Marin MD  [address]  [facility] [address]           [facility] [address]  [address]  [facility] [address]  Phone: [phone]          Patient: Talia Dillon   MR Number: [id number]   YOB: 1950   Date of Visit: 11/20/2020       Dear Dr. Bebeto Marin:    Thank you for referring Talia Dillon to me for evaluation. Attached you will find relevant portions of my assessment and plan of care.    If you have questions, please do not hesitate to call me. I look forward to following Talia Dillon along with you.    Sincerely,    James Molina MD    Enclosure  CC:  No Recipients    If you would like to receive this communication electronically, please contact [email or web address] or [phone] to request more information on [employer] access.    For providers and/or their staff who would like to refer a patient to [facility], please contact us through our one-stop-shop provider referral line, [facility], at [phone].    If you feel you have received this communication in error or would no longer like to receive these types of communications, please e-mail [email or web address]

## 2020-11-24 RX ORDER — SOLIFENACIN SUCCINATE 5 MG/1
5 TABLET, FILM COATED ORAL DAILY
Qty: 30 TABLET | Refills: 11 | Status: SHIPPED | OUTPATIENT
Start: 2020-11-24 | End: 2021-05-25

## 2020-11-24 NOTE — PROGRESS NOTES
[facility] Department of Urology      New Overactive Bladder (OAB) Note    11/20/20    Referred by:  Bebeto Marin MD    HPI: Talia Dillon is a very pleasant 70 y.o. female who is a new patient to our department referred for evaluation of urinary incontinence of 2-3 years duration. She reports bother is associated with urinary daytime frequency (20x daily), with nocturia (3x per night) and with urgency that results in urinary incontinence 6 x daily. She reports no stress urinary incontinence associated with exertion. She requires daily pads (4 pads/day).  She has reduced intake of caffeinated beverages.  She reports urinary incontinence is day and night but more predominant during the day.     She denies symptoms of irritative voiding including dysuria. She denies symptoms of obstructive voiding including decreased stream, hesitancy, intermittency, post void dribbling and sense of incomplete emptying. Bladder scan PVR was 7 mL. Her history includes prior pelvic surgery (hysterectomy (for benign disease)) but excludes a history of urolithiasis, hematuria, neurological symptoms/diagnoses, incontinence procedures and prolapse surgeries. She has a history of chronic kidney disease (stage 4).  She denies all other prior pelvic surgeries or neurologic diagnoses. She does not report symptoms suggestive of advanced POP. She denies a history of constipation. She denies a history suggestive of glaucoma.  She reports a history of hypertension that is controlled with medications. Would plan to recheck any response of blood pressure if beginning Myrbetriq.     Previous incontinence therapies:   Behavioral Therapy: (fluid/dietary modification) -  provided no benefit    A review of 10+ systems was conducted with pertinent positive and negative findings documented in HPI with all other systems reviewed and negative.    Past medical, family, surgical and social history reviewed as documented in chart with pertinent positive  medical, family, surgical and social history detailed in HPI.    Exam Findings:    Const: no acute distress, conversant and alert  Eyes: anicteric, extraocular muscles intact  ENMT: normocephalic, Nl oral membranes  Cardio: no cyanosis, nl cap refill  Pulm: no tachypnea; no resp distress  Abd: soft, no tenderness  Musc: no laceration, no tenderness  Neuro: alert; oriented x 3  Skin: warm, dry; no petichiae  Psych: no anxiety; normal speech     Assessment/Plan:    Overactive Bladder with Urgency Incontinence (new, addt'l workup): Her history is suggestive of Overactive Bladder (OAB) with predominantly Urgency Urinary Incontinence (UUI). The presence or absence of incontinence as well as the relative contribution of stress or urgency incontinence can be further clarified with a 3-day volume-based voiding/incontinence diary provided today. This will also help to screen for polyuria and help to guide us on further counseling on behavioral therapy including timed voiding and bladder training. We will review this on her return visit.     Her reported symptoms today are relatively moderate and therefore, I believe it would be appropriate to begin pharmacologic therapy in addition to behavioral therapies.     1. Patient was given a 3-day voiding diary to complete before next visit. We will further discuss behavioral therapy at that time.   2. We discussed behavioral therapies including fluid/dietary modification, timed voiding with bladder training, and pelvic floor exercises.  3. We will begin an appropriate OAB medication based medication history, comorbid conditions and formulary coverage.

## 2020-11-25 ENCOUNTER — PATIENT MESSAGE (OUTPATIENT)
Dept: PAIN MEDICINE | Facility: CLINIC | Age: 70
End: 2020-11-25

## 2020-11-25 ENCOUNTER — TELEPHONE (OUTPATIENT)
Dept: PAIN MEDICINE | Facility: CLINIC | Age: 70
End: 2020-11-25

## 2020-11-25 ENCOUNTER — PATIENT MESSAGE (OUTPATIENT)
Dept: PAIN MEDICINE | Facility: OTHER | Age: 70
End: 2020-11-25

## 2020-11-25 ENCOUNTER — TELEPHONE (OUTPATIENT)
Dept: CARDIOLOGY | Facility: CLINIC | Age: 70
End: 2020-11-25

## 2020-11-25 NOTE — TELEPHONE ENCOUNTER
----- Message from Kary Frye sent at 11/25/2020  2:20 PM CST -----  Contact: WILLIAM LEMA [[id number]]  Type: Patient Call Back Who called:WILLIAM LEMA [[id number]] What is the request in detail:Patient would like a call back in regards to a message she sent earlier.  Can the clinic reply by [facility]?yes Would the patient rather a call back or a response via [facility]? Call back Best call back number:[phone] (mobile)   Additional Information:

## 2020-11-25 NOTE — TELEPHONE ENCOUNTER
Staff returned the patient's call regarding her question of whether or not Latosha received her message. Staff informed the patient that her message was presented to Latosha for review and that as soon as her response is received she will be notified.     Patient verbalized understanding and expressed thanks.

## 2020-11-30 ENCOUNTER — PATIENT MESSAGE (OUTPATIENT)
Dept: PAIN MEDICINE | Facility: CLINIC | Age: 70
End: 2020-11-30

## 2020-12-01 ENCOUNTER — TELEPHONE (OUTPATIENT)
Dept: CARDIOLOGY | Facility: CLINIC | Age: 70
End: 2020-12-01

## 2020-12-01 ENCOUNTER — TELEPHONE (OUTPATIENT)
Dept: PAIN MEDICINE | Facility: CLINIC | Age: 70
End: 2020-12-01

## 2020-12-01 NOTE — TELEPHONE ENCOUNTER
"This message is for patient in regards to his/her appointment 12/2/20 at 1:20 pm.      [facility] Policy: For the safety of all patients and staff members.     Patient Visitor policy: Due to social distancing and limited seating staff are requesting patient to arrive to their schedule appointments alone. If patient do not need assistance with their visit, we're asking all visitors to remain outside the waiting area.    Upon arriving to facility; patient will have temperature taking and are required to wear a face mask, if patient do not have a face mask one will be provided. Upon arriving patient we ask patients to contact clinic at this number [phone] to notify staff that they have arrived or they may do do by utilizing the Mobile checked in Aj(if patient have patient portal; clinical staff will send a message through there letting them know it's okay to proceed to their visit). Staff will give the patient the "okay" to come up or wait inside their vehicle until clinic is ready for patient to be seen by Latosha CanNp If you have any questions or concerns please contact [phone]        "

## 2020-12-07 ENCOUNTER — TELEPHONE (OUTPATIENT)
Dept: CARDIOLOGY | Facility: CLINIC | Age: 70
End: 2020-12-07

## 2020-12-07 ENCOUNTER — HOSPITAL ENCOUNTER (OUTPATIENT)
Dept: RADIOLOGY | Facility: HOSPITAL | Age: 70
Discharge: HOME | End: 2020-12-07
Attending: INTERNAL MEDICINE
Payer: MEDICARE

## 2020-12-07 ENCOUNTER — HOSPITAL ENCOUNTER (OUTPATIENT)
Dept: CARDIOLOGY | Facility: HOSPITAL | Age: 70
Discharge: HOME | End: 2020-12-07
Attending: INTERNAL MEDICINE
Payer: MEDICARE

## 2020-12-07 DIAGNOSIS — R07.89 OTHER CHEST PAIN: ICD-10-CM

## 2020-12-07 DIAGNOSIS — I48.19 PERSISTENT ATRIAL FIBRILLATION: ICD-10-CM

## 2020-12-07 LAB
CV STRESS BASE HR: 99 BPM
DIASTOLIC BLOOD PRESSURE: 81 MMHG
OHS CV CPX 1 MINUTE RECOVERY HEART RATE: 100 BPM
OHS CV CPX 85 PERCENT MAX PREDICTED HEART RATE MALE: 122
OHS CV CPX MAX PREDICTED HEART RATE: 144
OHS CV CPX PATIENT IS FEMALE: 1
OHS CV CPX PATIENT IS MALE: 0
OHS CV CPX PEAK DIASTOLIC BLOOD PRESSURE: 81 MMHG
OHS CV CPX PEAK HEAR RATE: 126 BPM
OHS CV CPX PEAK RATE PRESSURE PRODUCT: NORMAL
OHS CV CPX PEAK SYSTOLIC BLOOD PRESSURE: 120 MMHG
OHS CV CPX PERCENT MAX PREDICTED HEART RATE ACHIEVED: 88
OHS CV CPX RATE PRESSURE PRODUCT PRESENTING: NORMAL
SYSTOLIC BLOOD PRESSURE: 120 MMHG

## 2020-12-07 PROCEDURE — 93018 CV STRESS TEST I&R ONLY: CPT | Mod: ,,, | Performed by: INTERNAL MEDICINE

## 2020-12-07 PROCEDURE — 63600175 PHARM REV CODE 636 W HCPCS: Mod: PO | Performed by: INTERNAL MEDICINE

## 2020-12-07 PROCEDURE — 93017 CV STRESS TEST TRACING ONLY: CPT | Mod: PO

## 2020-12-07 PROCEDURE — A9502 TC99M TETROFOSMIN: HCPCS | Mod: PO

## 2020-12-07 PROCEDURE — 93018 NUCLEAR STRESS TEST (CUPID ONLY): ICD-10-PCS | Mod: ,,, | Performed by: INTERNAL MEDICINE

## 2020-12-07 PROCEDURE — 93016 NUCLEAR STRESS TEST (CUPID ONLY): ICD-10-PCS | Mod: ,,, | Performed by: INTERNAL MEDICINE

## 2020-12-07 PROCEDURE — 93016 CV STRESS TEST SUPVJ ONLY: CPT | Mod: ,,, | Performed by: INTERNAL MEDICINE

## 2020-12-07 RX ORDER — REGADENOSON 0.08 MG/ML
0.4 INJECTION, SOLUTION INTRAVENOUS ONCE
Status: COMPLETED | OUTPATIENT
Start: 2020-12-07 | End: 2020-12-07

## 2020-12-07 RX ADMIN — REGADENOSON 0.4 MG: 0.08 INJECTION, SOLUTION INTRAVENOUS at 01:12

## 2020-12-08 ENCOUNTER — OFFICE VISIT (OUTPATIENT)
Dept: PAIN MEDICINE | Facility: CLINIC | Age: 70
End: 2020-12-08
Payer: MEDICARE

## 2020-12-08 VITALS
OXYGEN SATURATION: 100 % | WEIGHT: 224.44 LBS | RESPIRATION RATE: 18 BRPM | DIASTOLIC BLOOD PRESSURE: 72 MMHG | HEART RATE: 97 BPM | SYSTOLIC BLOOD PRESSURE: 107 MMHG | BODY MASS INDEX: 41.3 KG/M2 | HEIGHT: 62 IN

## 2020-12-08 DIAGNOSIS — G89.29 OTHER CHRONIC PAIN: ICD-10-CM

## 2020-12-08 DIAGNOSIS — M47.819 SPONDYLOSIS WITHOUT MYELOPATHY: ICD-10-CM

## 2020-12-08 DIAGNOSIS — M47.816 OSTEOARTHRITIS OF LUMBAR SPINE, UNSPECIFIED SPINAL OSTEOARTHRITIS COMPLICATION STATUS: ICD-10-CM

## 2020-12-08 DIAGNOSIS — M79.18 MYOFASCIAL PAIN: ICD-10-CM

## 2020-12-08 DIAGNOSIS — M54.17 LUMBOSACRAL RADICULOPATHY: Primary | ICD-10-CM

## 2020-12-08 DIAGNOSIS — M47.816 LUMBAR SPONDYLOSIS: ICD-10-CM

## 2020-12-08 DIAGNOSIS — G89.4 CHRONIC PAIN SYNDROME: ICD-10-CM

## 2020-12-08 PROCEDURE — 99999 PR PBB SHADOW E&M-EST. PATIENT-LVL IV: CPT | Mod: PBBFAC,,, | Performed by: NURSE PRACTITIONER

## 2020-12-08 PROCEDURE — 1101F PR PT FALLS ASSESS DOC 0-1 FALLS W/OUT INJ PAST YR: ICD-10-PCS | Mod: CPTII,S$GLB,, | Performed by: NURSE PRACTITIONER

## 2020-12-08 PROCEDURE — 99213 OFFICE O/P EST LOW 20 MIN: CPT | Mod: 25,S$GLB,, | Performed by: NURSE PRACTITIONER

## 2020-12-08 PROCEDURE — 99999 PR PBB SHADOW E&M-EST. PATIENT-LVL IV: ICD-10-PCS | Mod: PBBFAC,,, | Performed by: NURSE PRACTITIONER

## 2020-12-08 PROCEDURE — 3078F DIAST BP <80 MM HG: CPT | Mod: CPTII,S$GLB,, | Performed by: NURSE PRACTITIONER

## 2020-12-08 PROCEDURE — 3288F FALL RISK ASSESSMENT DOCD: CPT | Mod: CPTII,S$GLB,, | Performed by: NURSE PRACTITIONER

## 2020-12-08 PROCEDURE — 20553 NJX 1/MLT TRIGGER POINTS 3/>: CPT | Mod: S$GLB,,, | Performed by: NURSE PRACTITIONER

## 2020-12-08 PROCEDURE — 3288F PR FALLS RISK ASSESSMENT DOCUMENTED: ICD-10-PCS | Mod: CPTII,S$GLB,, | Performed by: NURSE PRACTITIONER

## 2020-12-08 PROCEDURE — 3078F PR MOST RECENT DIASTOLIC BLOOD PRESSURE < 80 MM HG: ICD-10-PCS | Mod: CPTII,S$GLB,, | Performed by: NURSE PRACTITIONER

## 2020-12-08 PROCEDURE — 1159F PR MEDICATION LIST DOCUMENTED IN MEDICAL RECORD: ICD-10-PCS | Mod: S$GLB,,, | Performed by: NURSE PRACTITIONER

## 2020-12-08 PROCEDURE — 20553 PR INJECT TRIGGER POINTS, > 3: ICD-10-PCS | Mod: S$GLB,,, | Performed by: NURSE PRACTITIONER

## 2020-12-08 PROCEDURE — 1125F PR PAIN SEVERITY QUANTIFIED, PAIN PRESENT: ICD-10-PCS | Mod: S$GLB,,, | Performed by: NURSE PRACTITIONER

## 2020-12-08 PROCEDURE — 3008F PR BODY MASS INDEX (BMI) DOCUMENTED: ICD-10-PCS | Mod: CPTII,S$GLB,, | Performed by: NURSE PRACTITIONER

## 2020-12-08 PROCEDURE — 1159F MED LIST DOCD IN RCRD: CPT | Mod: S$GLB,,, | Performed by: NURSE PRACTITIONER

## 2020-12-08 PROCEDURE — 3074F PR MOST RECENT SYSTOLIC BLOOD PRESSURE < 130 MM HG: ICD-10-PCS | Mod: CPTII,S$GLB,, | Performed by: NURSE PRACTITIONER

## 2020-12-08 PROCEDURE — 99213 PR OFFICE/OUTPT VISIT, EST, LEVL III, 20-29 MIN: ICD-10-PCS | Mod: 25,S$GLB,, | Performed by: NURSE PRACTITIONER

## 2020-12-08 PROCEDURE — 3008F BODY MASS INDEX DOCD: CPT | Mod: CPTII,S$GLB,, | Performed by: NURSE PRACTITIONER

## 2020-12-08 PROCEDURE — 1101F PT FALLS ASSESS-DOCD LE1/YR: CPT | Mod: CPTII,S$GLB,, | Performed by: NURSE PRACTITIONER

## 2020-12-08 PROCEDURE — 3074F SYST BP LT 130 MM HG: CPT | Mod: CPTII,S$GLB,, | Performed by: NURSE PRACTITIONER

## 2020-12-08 PROCEDURE — 1125F AMNT PAIN NOTED PAIN PRSNT: CPT | Mod: S$GLB,,, | Performed by: NURSE PRACTITIONER

## 2020-12-08 RX ORDER — GABAPENTIN 400 MG/1
400 CAPSULE ORAL 2 TIMES DAILY
Qty: 180 CAPSULE | Refills: 3 | Status: SHIPPED | OUTPATIENT
Start: 2020-12-08 | End: 2022-06-20 | Stop reason: SDUPTHER

## 2020-12-08 RX ORDER — METHYLPREDNISOLONE ACETATE 40 MG/ML
40 INJECTION, SUSPENSION INTRA-ARTICULAR; INTRALESIONAL; INTRAMUSCULAR; SOFT TISSUE
Status: COMPLETED | OUTPATIENT
Start: 2020-12-08 | End: 2020-12-08

## 2020-12-08 RX ORDER — BUPIVACAINE HYDROCHLORIDE 2.5 MG/ML
9 INJECTION, SOLUTION EPIDURAL; INFILTRATION; INTRACAUDAL
Status: COMPLETED | OUTPATIENT
Start: 2020-12-08 | End: 2020-12-08

## 2020-12-08 RX ADMIN — METHYLPREDNISOLONE ACETATE 40 MG: 40 INJECTION, SUSPENSION INTRA-ARTICULAR; INTRALESIONAL; INTRAMUSCULAR; SOFT TISSUE at 02:12

## 2020-12-08 RX ADMIN — BUPIVACAINE HYDROCHLORIDE 22.5 MG: 2.5 INJECTION, SOLUTION EPIDURAL; INFILTRATION; INTRACAUDAL at 02:12

## 2020-12-08 NOTE — PROGRESS NOTES
"Subjective:     Patient ID: Talia Dillon is a 70 y.o. female.    Chief Complaint: Pain    Consulted by: Dr. Gallagher    Disclaimer: This note was generated using voice recognition software.  There may be a typographical errors that were missed during proofreading.      HPI:    Talia Dillon is a 70 y.o. female who presents today with neck, shoulder, low back and leg pain. This pain is described in detail below.    Ms. Dillon presents to pain clinic today complaining of pain in a variety of areas, including her neck, shoulders, low back and leg pain. Her low back and leg pain is the most bothersome. 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 She has had long term PT her whole life because of this. She was recently evaluated by Dr. Gallagher who referred her to PT and pain clinic for management options.    Ms. Dillon states her low back pain is constant with radiation down both legs. She rates her pain as a 7/10 today. The pain is made worse with movement and walking. The pain is a dull ache in her low back, but feels like a sharp burning in her legs "like an electric wing." This pain does not travel in to her feet, but she has numbness and paraesthesias in her feet separately.     She has just recently restarted with physical therapy; she has completed 4 sessions at this time and feels this is starting to help. She feels it is painful but ultimately improving her situation.     Interval History (8/30/2018):  She returns today for follow up.  She reports that PT has been helpful for the pain.  She finished her last in office session yesterday.  She is now going to start her HEP.  She reports that the epidural didn't help. She wishes she could take Aleve as this was very helpful    Interval History (10/11/2018):  The patient returns for " follow up of back pain.  Since her last OV, she had L2-5 MBB x2.  She is reporting 80% relief from both procedures for about one day.  She is here today to discuss RFA procedures.  She continues with come exercise regimen.  She did have recent labs as ordered by her PCP which again showed elevated BUN.  Her pain today is 8/10.     Interval History (12/7/2018):  The patient presents for procedure follow up.  She is s/p L2,3,4,5 RFAs with about 70% pain relief.  Her pain is not as constant.  She is able to walk further.  Her pain is not as intense when it comes.  She is still somewhat limited by her pain, particularly regarding activity level.  She also feels short winded sometimes.  She is seeing cardiology next month.  She is also seeing bariatric medicine and has been losing weight.  Her pain today is 5/10.    Interval History (3/12/2019):  The patient presents for follow up.  Cymbalta was increased at last OV.  She reports that this has been helpful.  Her pain is stable and currently manageable.  She was recently found to be in A fib.  She had a cardioversion procedure on 2/8/19 and is scheduled for another on 3/19/19.  She is being weaned from Elavil by cardiology.  Her pain today is 4/10.    Interval History (6/11/2019):  The patient returns for follow up of chronic pain.  Since her last OV, I obtained a left shoulder MRI which showed rotator tear.  She had a visit with Dr. Davila and surgical repair was recommended.  She is waiting a bit because of moving.  She had a recent appointment with her PCP.  He recommended that we increase Cymbalta due to depression and pain.  She is currently taking 60 mg QD without adverse effects.  Her pain today is 8/10.    Interval History (9/30/2019):  The patient is here for follow up of back and left shoulder pain.  She is still considering left shoulder surgery with Dr. Davila.  She says that her shoulder pain has essentially resolved so she is leaning against surgery.  Her  back pain has been mild.  She has some stiffness and tightness but otherwise it is tolerable.  She is not interested in procedures at this time.  Her pain today is 7/10.    Interval History (1/23/2020):  The patient is here for follow up of back pain.  She is reporting increased middle and lower back pain recently.  She denies any recent injury.  She denies any radiation.  She denies numbness or tingling.  She has significant pain in the morning.  She feels like it improves and loosens up during the day.  She had benefit with lumbar RFAs is in the past.  Her pain today is 9/10.     Interval History (10/22/2020):  The patient is here to discuss lower back pain.  We have not seen the patient since January and she reports that her back pain has been worsening since this time.  It is mainly located across the lower back without radiation into the legs.  She reports aching and stabbing pain worse in the morning.  She does feel as though her pain limits her function and activity level.  She has been performing home exercises but does not feel that she can go to formal physical therapy at this time.  Her pain today as 7/10.    Interval History (12/8/2020):  The patient is here today for follow-up on lower back pain.  After her previous encounter she was scheduled for repeat lumbar radiofrequency ablations. However, she is on Eliquis from her cardiologist, Dr. Barker, who states that there is a risk of stroke from holding the Eliquis for RFA.  The patient ultimately decided not to have the procedure due to bleeding risk.  She is here today to discuss her other options.  Her pain is located across the lower back ache, primarily on the right side.  It bothers her when she stands and walks more.  Her pain today is 7/10.    Physical Therapy: Yes; finished 8/28/2018, doing HEP (8/20/3018)    Non-pharmacologic Treatment:     · Ice/Heat: Not tried  · TENS: Tried; not helpful  · Massage: Helpful  · Chiropractic care: Yes, not  helpful  · Acupuncture: Yes, not helpful  · Other: None         Pain Medications:         · Currently taking:   · Gabapentin and Cymbalta    · Has tried in the past:    · NSAIDs- Aleve and Ibuprofen: had to stop due to CKD   · Baclofen- had to stop due to CKD    · Has not tried: Opioids,  Rx topical creams    Blood thinners: No    Interventional Therapies:  9/21/18 Bilateral L2-5 MBB- 80% relief  9/28/18 Bilateral L2-5 MBB- 80% relief  10/26/18 Right L2,3,4,5 RFA- 70% relief  11/9/18 Left L2,3,4,5 RFA- 70% relief  4/29/19 Left shoulder injection- helpful (ortho)  2/14/20 Left L2,3,4,5 RFA  2/28/20 Right L2,3,4,5 RFA    Relevant Surgeries:   No    Affecting sleep? Not usually     Affecting daily activities? Yes    Depressive symptoms? Yes          · SI/HI? No    Work status: Retired; used to work as a      Prescription Monitoring Program database:  Reviewed and consistent with medication use as prescribed.    Pain Scales  Best: 3/10  Worst: 9/10  Usually: 5/10  Today: 7/10    Low-back Pain  Associated symptoms include numbness and paresthesias. Pertinent negatives include no abdominal pain, bladder incontinence, bowel incontinence, chest pain, dysuria, fever or weakness.   Foot Pain   Associated symptoms include numbness. Pertinent negatives include no fever.       Last 3 PDI Scores 12/8/2020 10/21/2020 1/23/2020   Pain Disability Index (PDI) 21 40 63       Review of Systems   Constitution: Negative for chills, decreased appetite, diaphoresis and fever.   HENT: Negative for congestion, ear discharge and ear pain.    Eyes: Negative for blurred vision, discharge and double vision.   Cardiovascular: Negative for chest pain, claudication and cyanosis.   Respiratory: Negative for cough, hemoptysis and shortness of breath.    Endocrine: Negative for cold intolerance, heat intolerance and polydipsia.   Skin: Negative for color change, dry skin and nail changes.   Musculoskeletal: Positive for arthritis, back pain,  myalgias and neck pain.   Gastrointestinal: Negative for bloating, abdominal pain, change in bowel habit and bowel incontinence.   Genitourinary: Negative for bladder incontinence, decreased libido and dysuria.   Neurological: Positive for disturbances in coordination, numbness and paresthesias. Negative for weakness.   Psychiatric/Behavioral: Positive for depression. Negative for altered mental status, hallucinations and hypervigilance.             Past Medical History:   Diagnosis Date    Allergy     Anemia     Anxiety     Atrial fibrillation     Cancer     skin    Cataract     Depression     Edema     Hypothyroidism     Kidney disease     PSVT (paroxysmal supraventricular tachycardia)     Thyroid disease        Past Surgical History:   Procedure Laterality Date    ADENOIDECTOMY      APPENDECTOMY      COLONOSCOPY  2009    repeat in 10 years     EYE SURGERY      HYSTERECTOMY      INJECTION OF ANESTHETIC AGENT AROUND NERVE Bilateral 9/21/2018    Procedure: BLOCK, NERVE, MEDIAL BRANCH BLOCK BILATERAL LUMBAR L2-5;  Surgeon: Julieth Christopher MD;  [place] [facility] [place]  Service: Pain Management;  Laterality: Bilateral;  1 of 2    INJECTION OF ANESTHETIC AGENT AROUND NERVE Bilateral 9/28/2018    Procedure: BLOCK, NERVE, MEDIAL BRANCH BLOCK BILATERAL LUMBAR L2-5;  Surgeon: Julieth Christopher MD;  Location: [facility];  Service: Pain Management;  Laterality: Bilateral;  2 of 2  PLEASE GIVE NIRAVAM PER MD    OOPHORECTOMY      RADIOFREQUENCY ABLATION Left 2/14/2020    Procedure: RADIOFREQUENCY ABLATION L2,3,4,5;  Surgeon: Julieth Christopher MD;  [facility];  Service: Pain Management;  Laterality: Left;  LT RFA L2,3,4,5  1 of 2  OK to hold Eliquis    RADIOFREQUENCY ABLATION Right 2/28/2020    Procedure: RADIOFREQUENCY ABLATION L2,3,4,5 RIGHT   2 of 2 ok to hold eliquis;  Surgeon: Julieth Christopher MD;  Location: [facility];  Service: Pain Management;  Laterality: Right;     RADIOFREQUENCY ABLATION Left 11/30/2020    Procedure: RADIOFREQUENCY ABLATION, L2-L3-L4-L5 MEDIAL BRANCH 1 OF 2 Continue Eliquis;  Surgeon: Bogdan Webster MD;  Location: [facility] [place] [facility];  Service: Pain Management;  Laterality: Left;    TONSILLECTOMY      TREATMENT OF CARDIAC ARRHYTHMIA N/A 2/8/2019    Procedure: CARDIOVERSION;  Surgeon: Devin You MD;  Location: [facility];  Service: Cardiology;  Laterality: N/A;  AF, KAROL, DCCV, MAC, MA, 3 Prep       Review of patient's allergies indicates:   Allergen Reactions    Dexamethasone Rash       Current Outpatient Medications   Medication Sig Dispense Refill    albuterol (PROVENTIL/VENTOLIN HFA) 90 mcg/actuation inhaler Inhale 2 puffs into the lungs every 4 (four) hours as needed for Wheezing (For shortness of breath). 18 g 11    ALPRAZolam (XANAX) 0.25 MG tablet Take 1 tablet (0.25 mg total) by mouth once daily. 90 tablet 1    apixaban (ELIQUIS) 5 mg Tab Take 1 tablet (5 mg total) by mouth 2 (two) times daily. 180 tablet 3    atorvastatin (LIPITOR) 10 MG tablet Take 1 tablet (10 mg total) by mouth once daily. 90 tablet 3    candesartan (ATACAND) 8 MG tablet Take 0.5 tablets (4 mg total) by mouth once daily. 45 tablet 3    cholecalciferol, vitamin D3, (VITAMIN D3) 5,000 unit Tab Take 5,000 Units by mouth once daily. 90 tablet 3    DULoxetine (CYMBALTA) 60 MG capsule Take 1 capsule (60 mg total) by mouth 2 (two) times daily. 60 capsule 2    levothyroxine (SYNTHROID, LEVOTHROID) 175 MCG tablet Take 1 tablet (175 mcg total) by mouth once daily. 90 tablet 3    ondansetron (ZOFRAN) 4 MG tablet Take 1 tablet (4 mg total) by mouth every 6 (six) hours. 12 tablet 0    solifenacin (VESICARE) 5 MG tablet Take 1 tablet (5 mg total) by mouth once daily. 30 tablet 11    zolpidem (AMBIEN) 10 mg Tab TAKE 1 TABLET BY MOUTH EVERY DAY IN THE EVENING AS NEEDED 90 tablet 1    gabapentin (NEURONTIN) 400 MG capsule Take 1 capsule (400 mg total) by mouth 2 (two) times daily. Take  "2 tablets once daily 180 capsule 3    metoprolol tartrate (LOPRESSOR) 25 MG tablet Take 3 tablets (75 mg total) by mouth 2 (two) times daily. 180 tablet 11     No current facility-administered medications for this visit.        Family History   Problem Relation Age of Onset    Stroke Mother     Stroke Father     Diabetes Father     Sleep apnea Daughter     Mental illness Daughter        Social History     Socioeconomic History    Marital status:      Spouse name: Not on file    Number of children: Not on file    Years of education: Not on file    Highest education level: Not on file   Occupational History    Not on file   Social Needs    Financial resource strain: Not on file    Food insecurity     Worry: Not on file     Inability: Not on file    Transportation needs     Medical: Not on file     Non-medical: Not on file   Tobacco Use    Smoking status: Former Smoker     Packs/day: 1.00     Years: 50.00     Pack years: 50.00     Types: Cigarettes    Smokeless tobacco: Never Used   Substance and Sexual Activity    Alcohol use: No     Frequency: Never     Comment: rarely    Drug use: No    Sexual activity: Not Currently   Lifestyle    Physical activity     Days per week: Not on file     Minutes per session: Not on file    Stress: Not on file   Relationships    Social connections     Talks on phone: Not on file     Gets together: Not on file     Attends [employer]ism service: Not on file     Active member of club or organization: Not on file     Attends meetings of clubs or organizations: Not on file     Relationship status: Not on file   Other Topics Concern    Not on file   Social History Narrative    Not on file       Objective:     Vitals:    12/08/20 1328   BP: 107/72   Pulse: 97   Resp: 18   SpO2: 100%   Weight: 101.8 kg (224 lb 6.9 oz)   Height: 5' 2" (1.575 m)   PainSc:   7       GEN:  Well developed, well nourished.  No acute distress.   HEENT:  No trauma.  Mucous membranes moist.  " Nares patent bilaterally.  PSYCH: Normal affect. Thought content appropriate.  CHEST:  Breathing symmetric.  No audible wheezing.  ABD: Soft, non-distended.  SKIN:  Warm, pink, dry.  No rash on exposed areas.    EXT:  No cyanosis, clubbing, or edema.  No color change or changes in nail or hair growth.  NEURO/MUSCULOSKELETAL:  Fully alert, oriented, and appropriate. Speech normal bartolome. No cranial nerve deficits.  Gait: Antalgic  negative trendelenburg sign bilaterally.   Motor Strength: Decreased strength to BLE throughout.  Sensory: No loss of sensation.  L-Spine:  TTP over lumbar facet joints and paraspinal muscles for bilaterally.  SLR is negative. Limited ROM with pain on extension greater than flexion.  Positive facet loading bilaterally, right greater than left.  SI Joint/Hip:  Negative JOANIE bilaterally.  TTP over both SI joints.      Imaging:     Narrative     EXAMINATION:  XR SHOULDER COMPLETE 2 OR MORE VIEWS LEFT    CLINICAL HISTORY:  Pain in left shoulder    COMPARISON:  None    FINDINGS:  There is no fracture. There is no dislocation.      Impression       Normal study.     Narrative     EXAMINATION:  MRI SHOULDER WITHOUT CONTRAST LEFT    CLINICAL HISTORY:  r/o rotator cuff; Pain in left shoulder    TECHNIQUE:  Standard shoulder MRI protocol without IV contrast was performed.    COMPARISON:  A plain film examination of the left shoulder performed on 03/14/2019.    FINDINGS:  There is no fracture. There is no dislocation.  There is a focal full-thickness tear in the distal aspect of the supraspinatus tendon.  The tendon is retracted by 9 mm.  There are incomplete tears involving the articular surface of the distal aspect of the infraspinatus tendon.  There is a tiny amount of fluid within the glenohumeral joint.  There is a poorly visualized tear in the posterosuperior aspect of the glenoid labrum from the 10 to 12:00 o'clock positions.  The intra-articular portion of the long head of the biceps tendon  is normal in appearance.  The acromioclavicular joint is normal in appearance.  There is a type 2 acromion process with a moderate amount of lateral downsloping.      Impression       1. There is a focal full-thickness tear in the distal aspect of the supraspinatus tendon. The tendon is retracted by 9 mm.  2. There are incomplete tears involving the articular surface of the distal aspect of the infraspinatus tendon.  3. There is a poorly visualized tear in the posterosuperior aspect of the glenoid labrum from the 10 to 12:00 o'clock positions.  4. There is a type 2 acromion process with a moderate amount of lateral downsloping.       The imaging studies listed below were independently reviewed by me, and I agree with the findings as documented below.     MRI Lumbar Spine 06/2018  FINDINGS:  Levoscoliosis of the spine, similar to before.  No abnormal bone marrow signal changes are evident to indicate acute fracture or bone marrow replacement process.  Sub endplate marrow signal changes about the L4-5 disc space, consistent with degenerative type change, mostly similar to before.    Visualized spinal cord and conus medullaris appears unremarkable.  No abnormal intramedullary spinal cord signal change.  No intradural, extramedullary masses are identified.    T11-12: Mild posterior disc bulging, greater to the right of midline, similar to before.    T12-L1:  Mild loss of disc signal indicating some degenerative change.  No significant disc bulge or focal herniation.  Mild loss of disc space height.  No detrimental changes.    L1-2:  Degenerative disc disease change.  Loss of disc signal.  No significant disc bulge or focal herniation.  Mild posterior disc bulging, similar to before.    L2-3: Degenerative disc disease.  Narrowing of the disc space.  Loss of normal disc signal.  Diffuse posterior mild disc bulging, similar to before.  Facet arthropathy changes, appearing greater on the right.    L3-4:  Degenerative disc  disease.  Significant narrowing of the disc space.  Posterior marginal osteophytes.  Diffuse posterior disc bulging.  Bilateral foraminal narrowing right greater than left.  Facet arthropathy changes right greater than left.  Mild narrowing of the thecal sac as a result of the chronic findings, similar to before.    L4-5:  Degenerative disc disease.  Significant narrowing of the disc space.  Posterior osteophytes.  Diffuse posterior disc bulging, appearing greater to the left of midline.  Indication 0 some bilateral facet arthropathy change.  Bilateral bone foraminal narrowing, greater on the left.  Findings appear similar to before.    L5-S1:  Degenerative disc disease.  No significant disc bulge or focal herniation.  Mild posterior disc bulging present.  Facet arthropathy changes left greater than right.    X ray Scoliosis Complete 06/2018  There is scoliosis of the thoracolumbar spine, which is convex to the left with the apex centered at the L1 level.  The Sequeira angle measures approximately 26.2°.  All visualized thoracic and lumbar vertebral bodies normal in height.  Low-to-moderate grade degenerative changes scattered throughout the mid and inferior thoracic spine with small scattered marginal osteophytes.  Severe degenerative change of the lumbar spine identified at the L2-3, L3-4 and L4-5 levels with large marginal osteophytes.  Paravertebral soft tissues are normal.    MRI Cervical Spine 2017  Reversal of the normal cervical lordosis.  Small spinal canal on a congenital basis.  No definite abnormal signal in the cord.  The craniocervical junction is normal.    C2-3: Minimal disc bulge.  Facet hypertrophy on the left.    C3-4: Broad-based osteophyte and disc bulge.  Uncovertebral joint disease with moderate to severe left-sided neural foraminal narrowing.  Decreased CSF spaces anterior and posterior to the cord.    C4-5: Marked disc space narrowing.  Broad-based osteophyte and disc bulge.  Decreased CSF  spaces anterior and posterior to the cord.  Uncovertebral joint disease with mild neuroforaminal narrowing.    C5-6: Disc space narrowing.  Broad-based osteophyte and disc bulge.  Asymmetric disc protrusion paramedian to the left side.  Uncovertebral joint disease and degenerative changes of the facets with moderate to severe bilateral neural foraminal narrowing.  No signal abnormality in the cord.    C6-7: Disc space narrowing.  Broad-based osteophyte and disc bulge.  Bilateral uncovertebral joint disease and degenerative changes of the facets.  Moderate central spinal stenosis.    C7-T1: Minimal disc bulge.  Uncovertebral joint disease.    Assessment:     Encounter Diagnoses   Name Primary?    Lumbosacral radiculopathy Yes    Spondylosis without myelopathy     Lumbar spondylosis     Osteoarthritis of lumbar spine, unspecified spinal osteoarthritis complication status     Other chronic pain     Chronic pain syndrome        Plan:     Diagnoses and all orders for this visit:    Lumbosacral radiculopathy    Spondylosis without myelopathy    Lumbar spondylosis    Osteoarthritis of lumbar spine, unspecified spinal osteoarthritis complication status    Other chronic pain    Chronic pain syndrome        Her pain is consistent with the above.    We discussed the assessment and recommendations.  All available images were reviewed. We discussed the disease process, prognosis, treatment plan, and risks and benefits. The patient is aware of the risks and benefits of the medications being prescribed, common side effects, and proper usage. The following is the plan we agreed on:     1. Previous imaging reviewed with the patient.  2. Previously scheduled left then right L2,3,4,5 RFAs were cancelled by patient as cardiologist informed of increase risk with holding Eliquis.  3. Will give TPIs today as per below. If limited benefit consider SI joint injections as she would not have to hold Eliquis.  4. She declined PT.  5. RTC  in 2 weeks for virtual video visit.    The above plan and management options were discussed at length with patient. Patient is in agreement with the above and verbalized understanding.     LETY Marx  12/08/2020    Trigger Point Injection:   The procedure was discussed with the patient including complications of nerve damage,  bleeding, infection, and failure of pain relief.   Trigger points were identified by palpation and marked. Alcohol prep of sites done. A mixture of 9mL 0.25% bupivacaine +40mg Depo-Medrol was prepared (10 mL total).   A 27-gauge needle was advanced to the point of maximal tenderness, and medication was injected after negative aspiration. All sites done in the same manner. Patient tolerated the procedure well and without complications. Sites injected included: lumbar paraspinals bilaterally (4 sites total). The patient was observed for 30 minutes after the procedure.  She denies any adverse effects including dizziness or shortness or breath.

## 2020-12-14 ENCOUNTER — OFFICE VISIT (OUTPATIENT)
Dept: FAMILY MEDICINE | Facility: CLINIC | Age: 70
End: 2020-12-14
Payer: MEDICARE

## 2020-12-14 VITALS
TEMPERATURE: 96 F | SYSTOLIC BLOOD PRESSURE: 102 MMHG | HEIGHT: 62 IN | DIASTOLIC BLOOD PRESSURE: 78 MMHG | WEIGHT: 226.44 LBS | HEART RATE: 101 BPM | BODY MASS INDEX: 41.67 KG/M2 | OXYGEN SATURATION: 98 %

## 2020-12-14 DIAGNOSIS — I71.21 ASCENDING AORTIC ANEURYSM: ICD-10-CM

## 2020-12-14 DIAGNOSIS — G89.29 OTHER CHRONIC PAIN: ICD-10-CM

## 2020-12-14 DIAGNOSIS — I10 ESSENTIAL HYPERTENSION: Chronic | ICD-10-CM

## 2020-12-14 DIAGNOSIS — M89.9 CHRONIC KIDNEY DISEASE-MINERAL AND BONE DISORDER: Chronic | ICD-10-CM

## 2020-12-14 DIAGNOSIS — E03.4 HYPOTHYROIDISM DUE TO ACQUIRED ATROPHY OF THYROID: ICD-10-CM

## 2020-12-14 DIAGNOSIS — E55.9 VITAMIN D DEFICIENCY: ICD-10-CM

## 2020-12-14 DIAGNOSIS — R26.89 DECREASED FUNCTIONAL MOBILITY: ICD-10-CM

## 2020-12-14 DIAGNOSIS — Q66.90: ICD-10-CM

## 2020-12-14 DIAGNOSIS — R60.0 LOCALIZED EDEMA: ICD-10-CM

## 2020-12-14 DIAGNOSIS — M47.816 LUMBAR SPONDYLOSIS: Primary | ICD-10-CM

## 2020-12-14 DIAGNOSIS — M41.26 OTHER IDIOPATHIC SCOLIOSIS, LUMBAR REGION: ICD-10-CM

## 2020-12-14 DIAGNOSIS — G47.00 INSOMNIA, UNSPECIFIED TYPE: ICD-10-CM

## 2020-12-14 DIAGNOSIS — E83.9 CHRONIC KIDNEY DISEASE-MINERAL AND BONE DISORDER: Chronic | ICD-10-CM

## 2020-12-14 DIAGNOSIS — Z79.01 LONG TERM (CURRENT) USE OF ANTICOAGULANTS: ICD-10-CM

## 2020-12-14 DIAGNOSIS — S46.012S TRAUMATIC COMPLETE TEAR OF LEFT ROTATOR CUFF, SEQUELA: ICD-10-CM

## 2020-12-14 DIAGNOSIS — M51.36 DDD (DEGENERATIVE DISC DISEASE), LUMBAR: ICD-10-CM

## 2020-12-14 DIAGNOSIS — N18.4 CKD (CHRONIC KIDNEY DISEASE) STAGE 4, GFR 15-29 ML/MIN: ICD-10-CM

## 2020-12-14 DIAGNOSIS — F41.9 ANXIETY: ICD-10-CM

## 2020-12-14 DIAGNOSIS — M48.02 CERVICAL SPINAL STENOSIS: ICD-10-CM

## 2020-12-14 DIAGNOSIS — N18.9 CHRONIC KIDNEY DISEASE-MINERAL AND BONE DISORDER: Chronic | ICD-10-CM

## 2020-12-14 DIAGNOSIS — I48.19 PERSISTENT ATRIAL FIBRILLATION: ICD-10-CM

## 2020-12-14 DIAGNOSIS — E78.2 MIXED HYPERLIPIDEMIA: ICD-10-CM

## 2020-12-14 DIAGNOSIS — M47.816 OSTEOARTHRITIS OF LUMBAR SPINE, UNSPECIFIED SPINAL OSTEOARTHRITIS COMPLICATION STATUS: ICD-10-CM

## 2020-12-14 DIAGNOSIS — M54.12 CERVICAL NEURALGIA: ICD-10-CM

## 2020-12-14 PROCEDURE — 3008F BODY MASS INDEX DOCD: CPT | Mod: CPTII,S$GLB,, | Performed by: FAMILY MEDICINE

## 2020-12-14 PROCEDURE — 1126F PR PAIN SEVERITY QUANTIFIED, NO PAIN PRESENT: ICD-10-PCS | Mod: S$GLB,,, | Performed by: FAMILY MEDICINE

## 2020-12-14 PROCEDURE — 3008F PR BODY MASS INDEX (BMI) DOCUMENTED: ICD-10-PCS | Mod: CPTII,S$GLB,, | Performed by: FAMILY MEDICINE

## 2020-12-14 PROCEDURE — 1159F PR MEDICATION LIST DOCUMENTED IN MEDICAL RECORD: ICD-10-PCS | Mod: S$GLB,,, | Performed by: FAMILY MEDICINE

## 2020-12-14 PROCEDURE — 3288F PR FALLS RISK ASSESSMENT DOCUMENTED: ICD-10-PCS | Mod: CPTII,S$GLB,, | Performed by: FAMILY MEDICINE

## 2020-12-14 PROCEDURE — 3078F DIAST BP <80 MM HG: CPT | Mod: CPTII,S$GLB,, | Performed by: FAMILY MEDICINE

## 2020-12-14 PROCEDURE — 1101F PR PT FALLS ASSESS DOC 0-1 FALLS W/OUT INJ PAST YR: ICD-10-PCS | Mod: CPTII,S$GLB,, | Performed by: FAMILY MEDICINE

## 2020-12-14 PROCEDURE — 3074F SYST BP LT 130 MM HG: CPT | Mod: CPTII,S$GLB,, | Performed by: FAMILY MEDICINE

## 2020-12-14 PROCEDURE — 1101F PT FALLS ASSESS-DOCD LE1/YR: CPT | Mod: CPTII,S$GLB,, | Performed by: FAMILY MEDICINE

## 2020-12-14 PROCEDURE — 99213 PR OFFICE/OUTPT VISIT, EST, LEVL III, 20-29 MIN: ICD-10-PCS | Mod: S$GLB,,, | Performed by: FAMILY MEDICINE

## 2020-12-14 PROCEDURE — 3288F FALL RISK ASSESSMENT DOCD: CPT | Mod: CPTII,S$GLB,, | Performed by: FAMILY MEDICINE

## 2020-12-14 PROCEDURE — 3078F PR MOST RECENT DIASTOLIC BLOOD PRESSURE < 80 MM HG: ICD-10-PCS | Mod: CPTII,S$GLB,, | Performed by: FAMILY MEDICINE

## 2020-12-14 PROCEDURE — 1126F AMNT PAIN NOTED NONE PRSNT: CPT | Mod: S$GLB,,, | Performed by: FAMILY MEDICINE

## 2020-12-14 PROCEDURE — 99213 OFFICE O/P EST LOW 20 MIN: CPT | Mod: S$GLB,,, | Performed by: FAMILY MEDICINE

## 2020-12-14 PROCEDURE — 3074F PR MOST RECENT SYSTOLIC BLOOD PRESSURE < 130 MM HG: ICD-10-PCS | Mod: CPTII,S$GLB,, | Performed by: FAMILY MEDICINE

## 2020-12-14 PROCEDURE — 1159F MED LIST DOCD IN RCRD: CPT | Mod: S$GLB,,, | Performed by: FAMILY MEDICINE

## 2020-12-14 RX ORDER — ALPRAZOLAM 0.25 MG/1
0.25 TABLET ORAL DAILY
Qty: 90 TABLET | Refills: 1 | Status: SHIPPED | OUTPATIENT
Start: 2020-12-14 | End: 2022-01-07 | Stop reason: SDUPTHER

## 2020-12-14 RX ORDER — ZOLPIDEM TARTRATE 10 MG/1
TABLET ORAL
Qty: 90 TABLET | Refills: 1 | Status: SHIPPED | OUTPATIENT
Start: 2020-12-14 | End: 2021-06-14 | Stop reason: SDUPTHER

## 2020-12-14 RX ORDER — LEVOTHYROXINE SODIUM 175 UG/1
175 TABLET ORAL DAILY
Qty: 90 TABLET | Refills: 3 | Status: SHIPPED | OUTPATIENT
Start: 2020-12-14 | End: 2021-04-14

## 2020-12-14 RX ORDER — ATORVASTATIN CALCIUM 10 MG/1
10 TABLET, FILM COATED ORAL DAILY
Qty: 90 TABLET | Refills: 3 | Status: SHIPPED | OUTPATIENT
Start: 2020-12-14 | End: 2021-06-14 | Stop reason: SDUPTHER

## 2020-12-28 ENCOUNTER — PATIENT MESSAGE (OUTPATIENT)
Dept: PAIN MEDICINE | Facility: CLINIC | Age: 70
End: 2020-12-28

## 2020-12-29 ENCOUNTER — OFFICE VISIT (OUTPATIENT)
Dept: PAIN MEDICINE | Facility: CLINIC | Age: 70
End: 2020-12-29
Payer: MEDICARE

## 2020-12-29 DIAGNOSIS — M47.816 LUMBAR SPONDYLOSIS: ICD-10-CM

## 2020-12-29 DIAGNOSIS — M79.18 MYOFASCIAL PAIN: Primary | ICD-10-CM

## 2020-12-29 DIAGNOSIS — M54.17 LUMBOSACRAL RADICULOPATHY: ICD-10-CM

## 2020-12-29 DIAGNOSIS — G89.29 OTHER CHRONIC PAIN: ICD-10-CM

## 2020-12-29 PROCEDURE — 1159F MED LIST DOCD IN RCRD: CPT | Mod: 95,,, | Performed by: NURSE PRACTITIONER

## 2020-12-29 PROCEDURE — 1159F PR MEDICATION LIST DOCUMENTED IN MEDICAL RECORD: ICD-10-PCS | Mod: 95,,, | Performed by: NURSE PRACTITIONER

## 2020-12-29 PROCEDURE — 1125F AMNT PAIN NOTED PAIN PRSNT: CPT | Mod: 95,,, | Performed by: NURSE PRACTITIONER

## 2020-12-29 PROCEDURE — 99213 OFFICE O/P EST LOW 20 MIN: CPT | Mod: 95,,, | Performed by: NURSE PRACTITIONER

## 2020-12-29 PROCEDURE — 99213 PR OFFICE/OUTPT VISIT, EST, LEVL III, 20-29 MIN: ICD-10-PCS | Mod: 95,,, | Performed by: NURSE PRACTITIONER

## 2020-12-29 PROCEDURE — 1125F PR PAIN SEVERITY QUANTIFIED, PAIN PRESENT: ICD-10-PCS | Mod: 95,,, | Performed by: NURSE PRACTITIONER

## 2020-12-29 RX ORDER — DULOXETIN HYDROCHLORIDE 60 MG/1
60 CAPSULE, DELAYED RELEASE ORAL 2 TIMES DAILY
Qty: 60 CAPSULE | Refills: 2 | Status: SHIPPED | OUTPATIENT
Start: 2020-12-29 | End: 2021-03-22

## 2020-12-29 NOTE — PROGRESS NOTES
"Subjective:     Chronic Pain-Tele-Medicine-Established Note (Follow up visit)        The patient location is: Home  The chief complaint leading to consultation is: pain  Visit type: Virtual visit with synchronous audio and video  Total time spent with patient: 15 min  Each patient to whom he or she provides medical services by telemedicine is:  (1) informed of the relationship between the physician and patient and the respective role of any other health care provider with respect to management of the patient; and (2) notified that he or she may decline to receive medical services by telemedicine and may withdraw from such care at any time.          Patient ID: Talia Dillon is a 70 y.o. female.    Chief Complaint: Pain    Consulted by: Dr. Gallagher    Disclaimer: This note was generated using voice recognition software.  There may be a typographical errors that were missed during proofreading.      HPI:    Talia Dillon is a 70 y.o. female who presents today with neck, shoulder, low back and leg pain. This pain is described in detail below.    Ms. Dillon presents to pain clinic today complaining of pain in a variety of areas, including her neck, shoulders, low back and leg pain. Her low back and leg pain is the most bothersome. 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 She has had long term PT her whole life because of this. She was recently evaluated by Dr. Gallagher who referred her to PT and pain clinic for management options.    Ms. Dillon states her low back pain is constant with radiation down both legs. She rates her pain as a 7/10 today. The pain is made worse with movement and walking. The pain is a dull ache in her low back, but feels like a sharp burning in her legs "like an electric wing." This pain does not travel in to her " feet, but she has numbness and paraesthesias in her feet separately.     She has just recently restarted with physical therapy; she has completed 4 sessions at this time and feels this is starting to help. She feels it is painful but ultimately improving her situation.     Interval History (8/30/2018):  She returns today for follow up.  She reports that PT has been helpful for the pain.  She finished her last in office session yesterday.  She is now going to start her HEP.  She reports that the epidural didn't help. She wishes she could take Aleve as this was very helpful    Interval History (10/11/2018):  The patient returns for follow up of back pain.  Since her last OV, she had L2-5 MBB x2.  She is reporting 80% relief from both procedures for about one day.  She is here today to discuss RFA procedures.  She continues with come exercise regimen.  She did have recent labs as ordered by her PCP which again showed elevated BUN.  Her pain today is 8/10.     Interval History (12/7/2018):  The patient presents for procedure follow up.  She is s/p L2,3,4,5 RFAs with about 70% pain relief.  Her pain is not as constant.  She is able to walk further.  Her pain is not as intense when it comes.  She is still somewhat limited by her pain, particularly regarding activity level.  She also feels short winded sometimes.  She is seeing cardiology next month.  She is also seeing bariatric medicine and has been losing weight.  Her pain today is 5/10.    Interval History (3/12/2019):  The patient presents for follow up.  Cymbalta was increased at last OV.  She reports that this has been helpful.  Her pain is stable and currently manageable.  She was recently found to be in A fib.  She had a cardioversion procedure on 2/8/19 and is scheduled for another on 3/19/19.  She is being weaned from Elavil by cardiology.  Her pain today is 4/10.    Interval History (6/11/2019):  The patient returns for follow up of chronic pain.  Since her  last OV, I obtained a left shoulder MRI which showed rotator tear.  She had a visit with Dr. Davila and surgical repair was recommended.  She is waiting a bit because of moving.  She had a recent appointment with her PCP.  He recommended that we increase Cymbalta due to depression and pain.  She is currently taking 60 mg QD without adverse effects.  Her pain today is 8/10.    Interval History (9/30/2019):  The patient is here for follow up of back and left shoulder pain.  She is still considering left shoulder surgery with Dr. Davila.  She says that her shoulder pain has essentially resolved so she is leaning against surgery.  Her back pain has been mild.  She has some stiffness and tightness but otherwise it is tolerable.  She is not interested in procedures at this time.  Her pain today is 7/10.    Interval History (1/23/2020):  The patient is here for follow up of back pain.  She is reporting increased middle and lower back pain recently.  She denies any recent injury.  She denies any radiation.  She denies numbness or tingling.  She has significant pain in the morning.  She feels like it improves and loosens up during the day.  She had benefit with lumbar RFAs is in the past.  Her pain today is 9/10.     Interval History (10/22/2020):  The patient is here to discuss lower back pain.  We have not seen the patient since January and she reports that her back pain has been worsening since this time.  It is mainly located across the lower back without radiation into the legs.  She reports aching and stabbing pain worse in the morning.  She does feel as though her pain limits her function and activity level.  She has been performing home exercises but does not feel that she can go to formal physical therapy at this time.  Her pain today as 7/10.    Interval History (12/8/2020):  The patient is here today for follow-up on lower back pain.  After her previous encounter she was scheduled for repeat lumbar radiofrequency  ablations. However, she is on Eliquis from her cardiologist, Dr. Barker, who states that there is a risk of stroke from holding the Eliquis for RFA.  The patient ultimately decided not to have the procedure due to bleeding risk.  She is here today to discuss her other options.  Her pain is located across the lower back ache, primarily on the right side.  It bothers her when she stands and walks more.  Her pain today is 7/10.    Interval History (12/29/2020):  The patient has a virtual video follow up from [facility] on 12/8/20.  Is reporting approximately 60% relief of her pain at this time.  She is very satisfied with her results from the procedure.  She feels as though her pain is tolerable at this time.  She denies any new symptoms at this time.  She denies any recent fever or shortness of breath.  Her pain today is 5/10    Physical Therapy: Yes; finished 8/28/2018, doing HEP (8/20/3018)    Non-pharmacologic Treatment:     · Ice/Heat: Not tried  · TENS: Tried; not helpful  · Massage: Helpful  · Chiropractic care: Yes, not helpful  · Acupuncture: Yes, not helpful  · Other: None         Pain Medications:         · Currently taking:   · Gabapentin and Cymbalta    · Has tried in the past:    · NSAIDs- Aleve and Ibuprofen: had to stop due to CKD   · Baclofen- had to stop due to CKD    · Has not tried: Opioids,  Rx topical creams    Blood thinners: No    Interventional Therapies:  9/21/18 Bilateral L2-5 MBB- 80% relief  9/28/18 Bilateral L2-5 MBB- 80% relief  10/26/18 Right L2,3,4,5 RFA- 70% relief  11/9/18 Left L2,3,4,5 RFA- 70% relief  4/29/19 Left shoulder injection- helpful (ortho)  2/14/20 Left L2,3,4,5 RFA  2/28/20 Right L2,3,4,5 RFA  12/8/20 TPIs- 60% relief    Relevant Surgeries:   No    Affecting sleep? Not usually     Affecting daily activities? Yes    Depressive symptoms? Yes          · SI/HI? No    Work status: Retired; used to work as a      Prescription Monitoring Program database:  Reviewed and  consistent with medication use as prescribed.    Pain Scales  Best: 3/10  Worst: 9/10  Usually: 5/10  Today: 5/10    Low-back Pain  Associated symptoms include numbness and paresthesias. Pertinent negatives include no abdominal pain, bladder incontinence, bowel incontinence, chest pain, dysuria, fever or weakness.   Foot Pain   Associated symptoms include numbness. Pertinent negatives include no fever.       Last 3 PDI Scores 12/8/2020 10/21/2020 1/23/2020   Pain Disability Index (PDI) 21 40 63       Review of Systems   Constitution: Negative for chills, decreased appetite, diaphoresis and fever.   HENT: Negative for congestion, ear discharge and ear pain.    Eyes: Negative for blurred vision, discharge and double vision.   Cardiovascular: Negative for chest pain, claudication and cyanosis.   Respiratory: Negative for cough, hemoptysis and shortness of breath.    Endocrine: Negative for cold intolerance, heat intolerance and polydipsia.   Skin: Negative for color change, dry skin and nail changes.   Musculoskeletal: Positive for arthritis, back pain, myalgias and neck pain.   Gastrointestinal: Negative for bloating, abdominal pain, change in bowel habit and bowel incontinence.   Genitourinary: Negative for bladder incontinence, decreased libido and dysuria.   Neurological: Positive for disturbances in coordination, numbness and paresthesias. Negative for weakness.   Psychiatric/Behavioral: Positive for depression. Negative for altered mental status, hallucinations and hypervigilance.             Past Medical History:   Diagnosis Date    Allergy     Anemia     Anxiety     Atrial fibrillation     Cancer     skin    Cataract     Depression     Edema     Hypothyroidism     Kidney disease     PSVT (paroxysmal supraventricular tachycardia)     Thyroid disease        Past Surgical History:   Procedure Laterality Date    ADENOIDECTOMY      APPENDECTOMY      COLONOSCOPY  2009    repeat in 10 years     EYE  SURGERY      HYSTERECTOMY      INJECTION OF ANESTHETIC AGENT AROUND NERVE Bilateral 9/21/2018    Procedure: BLOCK, NERVE, MEDIAL BRANCH BLOCK BILATERAL LUMBAR L2-5;  Surgeon: Julieth Christopher MD;  Location: [facility];  Service: Pain Management;  Laterality: Bilateral;  1 of 2    INJECTION OF ANESTHETIC AGENT AROUND NERVE Bilateral 9/28/2018    Procedure: BLOCK, NERVE, MEDIAL BRANCH BLOCK BILATERAL LUMBAR L2-5;  Surgeon: Julieth Christopher MD;  Location: [facility]IN [facility];  Service: Pain Management;  Laterality: Bilateral;  2 of 2  PLEASE GIVE NIRAVAM PER MD    OOPHORECTOMY      RADIOFREQUENCY ABLATION Left 2/14/2020    Procedure: RADIOFREQUENCY ABLATION L2,3,4,5;  Surgeon: Julieth Christopher MD;  Location: [facility]IN [facility];  Service: Pain Management;  Laterality: Left;  LT RFA L2,3,4,5  1 of 2  OK to hold Eliquis    RADIOFREQUENCY ABLATION Right 2/28/2020    Procedure: RADIOFREQUENCY ABLATION L2,3,4,5 RIGHT   2 of 2 ok to hold eliquis;  Surgeon: Julieth Christopher MD;  Location: [facility];  Service: Pain Management;  Laterality: Right;    RADIOFREQUENCY ABLATION Left 11/30/2020    Procedure: RADIOFREQUENCY ABLATION, L2-L3-L4-L5 MEDIAL BRANCH 1 OF 2 Continue Eliquis;  Surgeon: Bogdan Webster MD;  Location: [facility]IN [facility];  Service: Pain Management;  Laterality: Left;    TONSILLECTOMY      TREATMENT OF CARDIAC ARRHYTHMIA N/A 2/8/2019    Procedure: CARDIOVERSION;  Surgeon: Devin You MD;  Location: [facility] EP LAB;  Service: Cardiology;  Laterality: N/A;  AF, KAROL, DCCV, MAC, TN, 3 Prep       Review of patient's allergies indicates:   Allergen Reactions    Dexamethasone Rash       Current Outpatient Medications   Medication Sig Dispense Refill    albuterol (PROVENTIL/VENTOLIN HFA) 90 mcg/actuation inhaler Inhale 2 puffs into the lungs every 4 (four) hours as needed for Wheezing (For shortness of breath). 18 g 11    ALPRAZolam (XANAX) 0.25 MG tablet Take 1 tablet (0.25 mg total) by mouth once daily. 90 tablet 1     apixaban (ELIQUIS) 5 mg Tab Take 1 tablet (5 mg total) by mouth 2 (two) times daily. 180 tablet 3    atorvastatin (LIPITOR) 10 MG tablet Take 1 tablet (10 mg total) by mouth once daily. 90 tablet 3    candesartan (ATACAND) 8 MG tablet Take 0.5 tablets (4 mg total) by mouth once daily. 45 tablet 3    cholecalciferol, vitamin D3, (VITAMIN D3) 5,000 unit Tab Take 5,000 Units by mouth once daily. 90 tablet 3    DULoxetine (CYMBALTA) 60 MG capsule Take 1 capsule (60 mg total) by mouth 2 (two) times daily. 60 capsule 2    gabapentin (NEURONTIN) 400 MG capsule Take 1 capsule (400 mg total) by mouth 2 (two) times daily. 180 capsule 3    levothyroxine (SYNTHROID, LEVOTHROID) 175 MCG tablet Take 1 tablet (175 mcg total) by mouth once daily. 90 tablet 3    metoprolol tartrate (LOPRESSOR) 25 MG tablet Take 3 tablets (75 mg total) by mouth 2 (two) times daily. 180 tablet 11    ondansetron (ZOFRAN) 4 MG tablet Take 1 tablet (4 mg total) by mouth every 6 (six) hours. 12 tablet 0    solifenacin (VESICARE) 5 MG tablet Take 1 tablet (5 mg total) by mouth once daily. 30 tablet 11    zolpidem (AMBIEN) 10 mg Tab TAKE 1 TABLET BY MOUTH EVERY DAY IN THE EVENING AS NEEDED 90 tablet 1     No current facility-administered medications for this visit.        Family History   Problem Relation Age of Onset    Stroke Mother     Stroke Father     Diabetes Father     Sleep apnea Daughter     Mental illness Daughter        Social History     Socioeconomic History    Marital status:      Spouse name: Not on file    Number of children: Not on file    Years of education: Not on file    Highest education level: Not on file   Occupational History    Not on file   Social Needs    Financial resource strain: Not on file    Food insecurity     Worry: Not on file     Inability: Not on file    Transportation needs     Medical: Not on file     Non-medical: Not on file   Tobacco Use    Smoking status: Former Smoker     Packs/day:  1.00     Years: 50.00     Pack years: 50.00     Types: Cigarettes    Smokeless tobacco: Never Used   Substance and Sexual Activity    Alcohol use: No     Frequency: Never     Comment: rarely    Drug use: No    Sexual activity: Not Currently   Lifestyle    Physical activity     Days per week: Not on file     Minutes per session: Not on file    Stress: Not on file   Relationships    Social connections     Talks on phone: Not on file     Gets together: Not on file     Attends Baptism service: Not on file     Active member of club or organization: Not on file     Attends meetings of clubs or organizations: Not on file     Relationship status: Not on file   Other Topics Concern    Not on file   Social History Narrative    Not on file       Objective:     There were no vitals filed for this visit.    PREVIOUS PHYSICAL EXAM    GEN:  Well developed, well nourished.  No acute distress.   HEENT:  No trauma.  Mucous membranes moist.  Nares patent bilaterally.  PSYCH: Normal affect. Thought content appropriate.  CHEST:  Breathing symmetric.  No audible wheezing.  ABD: Soft, non-distended.  SKIN:  Warm, pink, dry.  No rash on exposed areas.    EXT:  No cyanosis, clubbing, or edema.  No color change or changes in nail or hair growth.  NEURO/MUSCULOSKELETAL:  Fully alert, oriented, and appropriate. Speech normal bartolome. No cranial nerve deficits.  Gait: Antalgic  negative trendelenburg sign bilaterally.   Motor Strength: Decreased strength to BLE throughout.  Sensory: No loss of sensation.  L-Spine:  TTP over lumbar facet joints and paraspinal muscles for bilaterally.  SLR is negative. Limited ROM with pain on extension greater than flexion.  Positive facet loading bilaterally, right greater than left.  SI Joint/Hip:  Negative JOANIE bilaterally.  TTP over both SI joints.      Imaging:     Narrative     EXAMINATION:  XR SHOULDER COMPLETE 2 OR MORE VIEWS LEFT    CLINICAL HISTORY:  Pain in left  shoulder    COMPARISON:  None    FINDINGS:  There is no fracture. There is no dislocation.      Impression       Normal study.     Narrative     EXAMINATION:  MRI SHOULDER WITHOUT CONTRAST LEFT    CLINICAL HISTORY:  r/o rotator cuff; Pain in left shoulder    TECHNIQUE:  Standard shoulder MRI protocol without IV contrast was performed.    COMPARISON:  A plain film examination of the left shoulder performed on 03/14/2019.    FINDINGS:  There is no fracture. There is no dislocation.  There is a focal full-thickness tear in the distal aspect of the supraspinatus tendon.  The tendon is retracted by 9 mm.  There are incomplete tears involving the articular surface of the distal aspect of the infraspinatus tendon.  There is a tiny amount of fluid within the glenohumeral joint.  There is a poorly visualized tear in the posterosuperior aspect of the glenoid labrum from the 10 to 12:00 o'clock positions.  The intra-articular portion of the long head of the biceps tendon is normal in appearance.  The acromioclavicular joint is normal in appearance.  There is a type 2 acromion process with a moderate amount of lateral downsloping.      Impression       1. There is a focal full-thickness tear in the distal aspect of the supraspinatus tendon. The tendon is retracted by 9 mm.  2. There are incomplete tears involving the articular surface of the distal aspect of the infraspinatus tendon.  3. There is a poorly visualized tear in the posterosuperior aspect of the glenoid labrum from the 10 to 12:00 o'clock positions.  4. There is a type 2 acromion process with a moderate amount of lateral downsloping.       The imaging studies listed below were independently reviewed by me, and I agree with the findings as documented below.     MRI Lumbar Spine 06/2018  FINDINGS:  Levoscoliosis of the spine, similar to before.  No abnormal bone marrow signal changes are evident to indicate acute fracture or bone marrow replacement process.  Sub  endplate marrow signal changes about the L4-5 disc space, consistent with degenerative type change, mostly similar to before.    Visualized spinal cord and conus medullaris appears unremarkable.  No abnormal intramedullary spinal cord signal change.  No intradural, extramedullary masses are identified.    T11-12: Mild posterior disc bulging, greater to the right of midline, similar to before.    T12-L1:  Mild loss of disc signal indicating some degenerative change.  No significant disc bulge or focal herniation.  Mild loss of disc space height.  No detrimental changes.    L1-2:  Degenerative disc disease change.  Loss of disc signal.  No significant disc bulge or focal herniation.  Mild posterior disc bulging, similar to before.    L2-3: Degenerative disc disease.  Narrowing of the disc space.  Loss of normal disc signal.  Diffuse posterior mild disc bulging, similar to before.  Facet arthropathy changes, appearing greater on the right.    L3-4:  Degenerative disc disease.  Significant narrowing of the disc space.  Posterior marginal osteophytes.  Diffuse posterior disc bulging.  Bilateral foraminal narrowing right greater than left.  Facet arthropathy changes right greater than left.  Mild narrowing of the thecal sac as a result of the chronic findings, similar to before.    L4-5:  Degenerative disc disease.  Significant narrowing of the disc space.  Posterior osteophytes.  Diffuse posterior disc bulging, appearing greater to the left of midline.  Indication 0 some bilateral facet arthropathy change.  Bilateral bone foraminal narrowing, greater on the left.  Findings appear similar to before.    L5-S1:  Degenerative disc disease.  No significant disc bulge or focal herniation.  Mild posterior disc bulging present.  Facet arthropathy changes left greater than right.    X ray Scoliosis Complete 06/2018  There is scoliosis of the thoracolumbar spine, which is convex to the left with the apex centered at the L1 level.   The Sequeira angle measures approximately 26.2°.  All visualized thoracic and lumbar vertebral bodies normal in height.  Low-to-moderate grade degenerative changes scattered throughout the mid and inferior thoracic spine with small scattered marginal osteophytes.  Severe degenerative change of the lumbar spine identified at the L2-3, L3-4 and L4-5 levels with large marginal osteophytes.  Paravertebral soft tissues are normal.    MRI Cervical Spine 2017  Reversal of the normal cervical lordosis.  Small spinal canal on a congenital basis.  No definite abnormal signal in the cord.  The craniocervical junction is normal.    C2-3: Minimal disc bulge.  Facet hypertrophy on the left.    C3-4: Broad-based osteophyte and disc bulge.  Uncovertebral joint disease with moderate to severe left-sided neural foraminal narrowing.  Decreased CSF spaces anterior and posterior to the cord.    C4-5: Marked disc space narrowing.  Broad-based osteophyte and disc bulge.  Decreased CSF spaces anterior and posterior to the cord.  Uncovertebral joint disease with mild neuroforaminal narrowing.    C5-6: Disc space narrowing.  Broad-based osteophyte and disc bulge.  Asymmetric disc protrusion paramedian to the left side.  Uncovertebral joint disease and degenerative changes of the facets with moderate to severe bilateral neural foraminal narrowing.  No signal abnormality in the cord.    C6-7: Disc space narrowing.  Broad-based osteophyte and disc bulge.  Bilateral uncovertebral joint disease and degenerative changes of the facets.  Moderate central spinal stenosis.    C7-T1: Minimal disc bulge.  Uncovertebral joint disease.    Assessment:     Encounter Diagnoses   Name Primary?    Myofascial pain Yes    Lumbosacral radiculopathy     Other chronic pain     Lumbar spondylosis        Plan:     Diagnoses and all orders for this visit:    Myofascial pain    Lumbosacral radiculopathy    Other chronic pain    Lumbar spondylosis    Other orders  -      DULoxetine (CYMBALTA) 60 MG capsule; Take 1 capsule (60 mg total) by mouth 2 (two) times daily.        Her pain is consistent with the above.    We discussed the assessment and recommendations.  All available images were reviewed. We discussed the disease process, prognosis, treatment plan, and risks and benefits. The patient is aware of the risks and benefits of the medications being prescribed, common side effects, and proper usage. The following is the plan we agreed on:     1. Previous imaging reviewed with the patient.  2. She is having benefit from TPIs 2 weeks ago. Can repeat if needed in one month or longer.  3. Previously scheduled left then right L2,3,4,5 RFAs were cancelled by patient as cardiologist informed of increase risk with holding Eliquis.  4. Continue home exercises.  5. RTC PRN. She will call for next appointment.    The above plan and management options were discussed at length with patient. Patient is in agreement with the above and verbalized understanding.     LETY Marx  12/29/2020

## 2020-12-30 ENCOUNTER — LAB VISIT (OUTPATIENT)
Dept: LAB | Facility: HOSPITAL | Age: 70
End: 2020-12-30
Payer: MEDICARE

## 2020-12-30 DIAGNOSIS — N18.4 CKD (CHRONIC KIDNEY DISEASE) STAGE 4, GFR 15-29 ML/MIN: ICD-10-CM

## 2020-12-30 DIAGNOSIS — N18.4 STAGE 4 CHRONIC KIDNEY DISEASE: ICD-10-CM

## 2020-12-30 LAB
25(OH)D3+25(OH)D2 SERPL-MCNC: 53 NG/ML (ref 30–96)
ANION GAP SERPL CALC-SCNC: 9 MMOL/L (ref 8–16)
ANION GAP SERPL CALC-SCNC: 9 MMOL/L (ref 8–16)
BASOPHILS # BLD AUTO: 0.06 K/UL (ref 0–0.2)
BASOPHILS NFR BLD: 0.6 % (ref 0–1.9)
CALCIUM SERPL-MCNC: 10 MG/DL (ref 8.7–10.5)
CALCIUM SERPL-MCNC: 10 MG/DL (ref 8.7–10.5)
CHLORIDE SERPL-SCNC: 103 MMOL/L (ref 95–110)
CHLORIDE SERPL-SCNC: 103 MMOL/L (ref 95–110)
CO2 SERPL-SCNC: 30 MMOL/L (ref 23–29)
CO2 SERPL-SCNC: 30 MMOL/L (ref 23–29)
CREAT SERPL-MCNC: 1.85 MG/DL (ref 0.5–1.4)
CREAT SERPL-MCNC: 1.85 MG/DL (ref 0.5–1.4)
DIFFERENTIAL METHOD: ABNORMAL
EOSINOPHIL # BLD AUTO: 0.4 K/UL (ref 0–0.5)
EOSINOPHIL NFR BLD: 3.6 % (ref 0–8)
ERYTHROCYTE [DISTWIDTH] IN BLOOD BY AUTOMATED COUNT: 13.1 % (ref 11.5–14.5)
EST. GFR  (AFRICAN AMERICAN): 31.1 ML/MIN/1.73 M^2
EST. GFR  (AFRICAN AMERICAN): 31.1 ML/MIN/1.73 M^2
EST. GFR  (NON AFRICAN AMERICAN): 27 ML/MIN/1.73 M^2
EST. GFR  (NON AFRICAN AMERICAN): 27 ML/MIN/1.73 M^2
GLUCOSE SERPL-MCNC: 107 MG/DL (ref 70–110)
GLUCOSE SERPL-MCNC: 107 MG/DL (ref 70–110)
HCT VFR BLD AUTO: 43.1 % (ref 37–48.5)
HGB BLD-MCNC: 13.7 G/DL (ref 12–16)
IMM GRANULOCYTES # BLD AUTO: 0.06 K/UL (ref 0–0.04)
IMM GRANULOCYTES NFR BLD AUTO: 0.6 % (ref 0–0.5)
LYMPHOCYTES # BLD AUTO: 1.7 K/UL (ref 1–4.8)
LYMPHOCYTES NFR BLD: 17.5 % (ref 18–48)
MCH RBC QN AUTO: 30.6 PG (ref 27–31)
MCHC RBC AUTO-ENTMCNC: 31.8 G/DL (ref 32–36)
MCV RBC AUTO: 96 FL (ref 82–98)
MONOCYTES # BLD AUTO: 0.7 K/UL (ref 0.3–1)
MONOCYTES NFR BLD: 6.8 % (ref 4–15)
NEUTROPHILS # BLD AUTO: 7 K/UL (ref 1.8–7.7)
NEUTROPHILS NFR BLD: 70.9 % (ref 38–73)
NRBC BLD-RTO: 0 /100 WBC
PHOSPHATE SERPL-MCNC: 3.2 MG/DL (ref 2.7–4.5)
PLATELET # BLD AUTO: 181 K/UL (ref 150–350)
PMV BLD AUTO: 12 FL (ref 9.2–12.9)
POTASSIUM SERPL-SCNC: 4.6 MMOL/L (ref 3.5–5.1)
POTASSIUM SERPL-SCNC: 4.6 MMOL/L (ref 3.5–5.1)
PTH-INTACT SERPL-MCNC: 75 PG/ML (ref 9–77)
RBC # BLD AUTO: 4.48 M/UL (ref 4–5.4)
SODIUM SERPL-SCNC: 142 MMOL/L (ref 136–145)
SODIUM SERPL-SCNC: 142 MMOL/L (ref 136–145)
UUN UR-MCNC: 38 MG/DL (ref 7–17)
UUN UR-MCNC: 38 MG/DL (ref 7–17)
WBC # BLD AUTO: 9.94 K/UL (ref 3.9–12.7)

## 2020-12-30 PROCEDURE — 85025 COMPLETE CBC W/AUTO DIFF WBC: CPT | Mod: PO

## 2020-12-30 PROCEDURE — 83970 ASSAY OF PARATHORMONE: CPT | Mod: PO

## 2020-12-30 PROCEDURE — 84100 ASSAY OF PHOSPHORUS: CPT | Mod: PO

## 2020-12-30 PROCEDURE — 80048 BASIC METABOLIC PNL TOTAL CA: CPT | Mod: PO

## 2020-12-30 PROCEDURE — 82306 VITAMIN D 25 HYDROXY: CPT | Mod: PO

## 2020-12-30 PROCEDURE — 36415 COLL VENOUS BLD VENIPUNCTURE: CPT | Mod: PO

## 2021-01-07 ENCOUNTER — OFFICE VISIT (OUTPATIENT)
Dept: NEPHROLOGY | Facility: CLINIC | Age: 71
End: 2021-01-07
Payer: MEDICARE

## 2021-01-07 VITALS
SYSTOLIC BLOOD PRESSURE: 110 MMHG | OXYGEN SATURATION: 95 % | HEART RATE: 93 BPM | DIASTOLIC BLOOD PRESSURE: 70 MMHG | WEIGHT: 226.44 LBS | HEIGHT: 62 IN | BODY MASS INDEX: 41.67 KG/M2

## 2021-01-07 DIAGNOSIS — N18.4 CKD (CHRONIC KIDNEY DISEASE) STAGE 4, GFR 15-29 ML/MIN: Primary | ICD-10-CM

## 2021-01-07 PROCEDURE — 3078F DIAST BP <80 MM HG: CPT | Mod: CPTII,GC,S$GLB, | Performed by: INTERNAL MEDICINE

## 2021-01-07 PROCEDURE — 1159F MED LIST DOCD IN RCRD: CPT | Mod: GC,S$GLB,, | Performed by: INTERNAL MEDICINE

## 2021-01-07 PROCEDURE — 99999 PR PBB SHADOW E&M-EST. PATIENT-LVL III: ICD-10-PCS | Mod: PBBFAC,GC,, | Performed by: INTERNAL MEDICINE

## 2021-01-07 PROCEDURE — 99214 PR OFFICE/OUTPT VISIT, EST, LEVL IV, 30-39 MIN: ICD-10-PCS | Mod: GC,S$GLB,, | Performed by: INTERNAL MEDICINE

## 2021-01-07 PROCEDURE — 3074F PR MOST RECENT SYSTOLIC BLOOD PRESSURE < 130 MM HG: ICD-10-PCS | Mod: CPTII,GC,S$GLB, | Performed by: INTERNAL MEDICINE

## 2021-01-07 PROCEDURE — 99999 PR PBB SHADOW E&M-EST. PATIENT-LVL III: CPT | Mod: PBBFAC,GC,, | Performed by: INTERNAL MEDICINE

## 2021-01-07 PROCEDURE — 99214 OFFICE O/P EST MOD 30 MIN: CPT | Mod: GC,S$GLB,, | Performed by: INTERNAL MEDICINE

## 2021-01-07 PROCEDURE — 3074F SYST BP LT 130 MM HG: CPT | Mod: CPTII,GC,S$GLB, | Performed by: INTERNAL MEDICINE

## 2021-01-07 PROCEDURE — 3078F PR MOST RECENT DIASTOLIC BLOOD PRESSURE < 80 MM HG: ICD-10-PCS | Mod: CPTII,GC,S$GLB, | Performed by: INTERNAL MEDICINE

## 2021-01-07 PROCEDURE — 1159F PR MEDICATION LIST DOCUMENTED IN MEDICAL RECORD: ICD-10-PCS | Mod: GC,S$GLB,, | Performed by: INTERNAL MEDICINE

## 2021-01-13 ENCOUNTER — TELEPHONE (OUTPATIENT)
Dept: UROLOGY | Facility: CLINIC | Age: 71
End: 2021-01-13

## 2021-01-22 ENCOUNTER — PATIENT MESSAGE (OUTPATIENT)
Dept: ADMINISTRATIVE | Facility: OTHER | Age: 71
End: 2021-01-22

## 2021-02-10 ENCOUNTER — IMMUNIZATION (OUTPATIENT)
Dept: PHARMACY | Facility: CLINIC | Age: 71
End: 2021-02-10
Payer: MEDICARE

## 2021-02-10 DIAGNOSIS — Z23 NEED FOR VACCINATION: Primary | ICD-10-CM

## 2021-03-01 DIAGNOSIS — N18.4 CKD (CHRONIC KIDNEY DISEASE) STAGE 4, GFR 15-29 ML/MIN: Primary | ICD-10-CM

## 2021-03-10 ENCOUNTER — IMMUNIZATION (OUTPATIENT)
Dept: PHARMACY | Facility: CLINIC | Age: 71
End: 2021-03-10
Payer: MEDICARE

## 2021-03-10 DIAGNOSIS — Z23 NEED FOR VACCINATION: Primary | ICD-10-CM

## 2021-04-28 ENCOUNTER — PATIENT OUTREACH (OUTPATIENT)
Dept: ADMINISTRATIVE | Facility: OTHER | Age: 71
End: 2021-04-28

## 2021-04-28 DIAGNOSIS — K80.50 BILIARY COLIC: Primary | ICD-10-CM

## 2021-04-28 DIAGNOSIS — Z12.31 BREAST CANCER SCREENING BY MAMMOGRAM: ICD-10-CM

## 2021-04-29 ENCOUNTER — OFFICE VISIT (OUTPATIENT)
Dept: PULMONOLOGY | Facility: CLINIC | Age: 71
End: 2021-04-29
Payer: MEDICARE

## 2021-04-29 VITALS
HEART RATE: 98 BPM | BODY MASS INDEX: 42.11 KG/M2 | OXYGEN SATURATION: 97 % | SYSTOLIC BLOOD PRESSURE: 124 MMHG | DIASTOLIC BLOOD PRESSURE: 78 MMHG | HEIGHT: 62 IN | WEIGHT: 228.81 LBS

## 2021-04-29 DIAGNOSIS — R91.1 SOLITARY PULMONARY NODULE: ICD-10-CM

## 2021-04-29 DIAGNOSIS — Z01.818 PRE-OP TESTING: ICD-10-CM

## 2021-04-29 DIAGNOSIS — R06.09 DYSPNEA ON EXERTION: Primary | ICD-10-CM

## 2021-04-29 DIAGNOSIS — R91.1 LUNG NODULE: ICD-10-CM

## 2021-04-29 PROCEDURE — 1159F MED LIST DOCD IN RCRD: CPT | Mod: S$GLB,,, | Performed by: NURSE PRACTITIONER

## 2021-04-29 PROCEDURE — 3288F PR FALLS RISK ASSESSMENT DOCUMENTED: ICD-10-PCS | Mod: CPTII,S$GLB,, | Performed by: NURSE PRACTITIONER

## 2021-04-29 PROCEDURE — 99999 PR PBB SHADOW E&M-EST. PATIENT-LVL IV: CPT | Mod: PBBFAC,,, | Performed by: NURSE PRACTITIONER

## 2021-04-29 PROCEDURE — 3008F PR BODY MASS INDEX (BMI) DOCUMENTED: ICD-10-PCS | Mod: CPTII,S$GLB,, | Performed by: NURSE PRACTITIONER

## 2021-04-29 PROCEDURE — 1101F PR PT FALLS ASSESS DOC 0-1 FALLS W/OUT INJ PAST YR: ICD-10-PCS | Mod: CPTII,S$GLB,, | Performed by: NURSE PRACTITIONER

## 2021-04-29 PROCEDURE — 3288F FALL RISK ASSESSMENT DOCD: CPT | Mod: CPTII,S$GLB,, | Performed by: NURSE PRACTITIONER

## 2021-04-29 PROCEDURE — 1126F AMNT PAIN NOTED NONE PRSNT: CPT | Mod: S$GLB,,, | Performed by: NURSE PRACTITIONER

## 2021-04-29 PROCEDURE — 1159F PR MEDICATION LIST DOCUMENTED IN MEDICAL RECORD: ICD-10-PCS | Mod: S$GLB,,, | Performed by: NURSE PRACTITIONER

## 2021-04-29 PROCEDURE — 3008F BODY MASS INDEX DOCD: CPT | Mod: CPTII,S$GLB,, | Performed by: NURSE PRACTITIONER

## 2021-04-29 PROCEDURE — 99999 PR PBB SHADOW E&M-EST. PATIENT-LVL IV: ICD-10-PCS | Mod: PBBFAC,,, | Performed by: NURSE PRACTITIONER

## 2021-04-29 PROCEDURE — 1101F PT FALLS ASSESS-DOCD LE1/YR: CPT | Mod: CPTII,S$GLB,, | Performed by: NURSE PRACTITIONER

## 2021-04-29 PROCEDURE — 99204 OFFICE O/P NEW MOD 45 MIN: CPT | Mod: S$GLB,,, | Performed by: NURSE PRACTITIONER

## 2021-04-29 PROCEDURE — 1126F PR PAIN SEVERITY QUANTIFIED, NO PAIN PRESENT: ICD-10-PCS | Mod: S$GLB,,, | Performed by: NURSE PRACTITIONER

## 2021-04-29 PROCEDURE — 99204 PR OFFICE/OUTPT VISIT, NEW, LEVL IV, 45-59 MIN: ICD-10-PCS | Mod: S$GLB,,, | Performed by: NURSE PRACTITIONER

## 2021-04-30 PROBLEM — R06.09 DYSPNEA ON EXERTION: Status: ACTIVE | Noted: 2021-04-30

## 2021-05-03 ENCOUNTER — LAB VISIT (OUTPATIENT)
Dept: INTERNAL MEDICINE | Facility: CLINIC | Age: 71
End: 2021-05-03
Payer: MEDICARE

## 2021-05-03 DIAGNOSIS — Z01.818 PRE-OP TESTING: ICD-10-CM

## 2021-05-03 PROCEDURE — U0003 INFECTIOUS AGENT DETECTION BY NUCLEIC ACID (DNA OR RNA); SEVERE ACUTE RESPIRATORY SYNDROME CORONAVIRUS 2 (SARS-COV-2) (CORONAVIRUS DISEASE [COVID-19]), AMPLIFIED PROBE TECHNIQUE, MAKING USE OF HIGH THROUGHPUT TECHNOLOGIES AS DESCRIBED BY CMS-2020-01-R: HCPCS | Performed by: NURSE PRACTITIONER

## 2021-05-03 PROCEDURE — U0005 INFEC AGEN DETEC AMPLI PROBE: HCPCS | Performed by: NURSE PRACTITIONER

## 2021-05-04 LAB — SARS-COV-2 RNA RESP QL NAA+PROBE: NOT DETECTED

## 2021-05-06 ENCOUNTER — HOSPITAL ENCOUNTER (OUTPATIENT)
Dept: PULMONOLOGY | Facility: CLINIC | Age: 71
Discharge: HOME | End: 2021-05-06
Payer: MEDICARE

## 2021-05-06 VITALS — BODY MASS INDEX: 41.62 KG/M2 | HEIGHT: 62 IN | WEIGHT: 226.19 LBS

## 2021-05-06 DIAGNOSIS — R06.09 DYSPNEA ON EXERTION: ICD-10-CM

## 2021-05-06 PROCEDURE — 94729 PR C02/MEMBANE DIFFUSE CAPACITY: ICD-10-PCS | Mod: S$GLB,,, | Performed by: INTERNAL MEDICINE

## 2021-05-06 PROCEDURE — 94618 PULMONARY STRESS TESTING: ICD-10-PCS | Mod: S$GLB,,, | Performed by: INTERNAL MEDICINE

## 2021-05-06 PROCEDURE — 94727 PR PULM FUNCTION TEST BY GAS: ICD-10-PCS | Mod: S$GLB,,, | Performed by: INTERNAL MEDICINE

## 2021-05-06 PROCEDURE — 94727 GAS DIL/WSHOT DETER LNG VOL: CPT | Mod: S$GLB,,, | Performed by: INTERNAL MEDICINE

## 2021-05-06 PROCEDURE — 94618 PULMONARY STRESS TESTING: CPT | Mod: S$GLB,,, | Performed by: INTERNAL MEDICINE

## 2021-05-06 PROCEDURE — 94060 PR EVAL OF BRONCHOSPASM: ICD-10-PCS | Mod: S$GLB,,, | Performed by: INTERNAL MEDICINE

## 2021-05-06 PROCEDURE — 94060 EVALUATION OF WHEEZING: CPT | Mod: S$GLB,,, | Performed by: INTERNAL MEDICINE

## 2021-05-06 PROCEDURE — 94729 DIFFUSING CAPACITY: CPT | Mod: S$GLB,,, | Performed by: INTERNAL MEDICINE

## 2021-05-11 RX ORDER — UMECLIDINIUM BROMIDE AND VILANTEROL TRIFENATATE 62.5; 25 UG/1; UG/1
1 POWDER RESPIRATORY (INHALATION) DAILY
Qty: 60 EACH | Refills: 11 | Status: SHIPPED | OUTPATIENT
Start: 2021-05-11 | End: 2023-11-03 | Stop reason: SDUPTHER

## 2021-05-19 ENCOUNTER — HOSPITAL ENCOUNTER (OUTPATIENT)
Dept: RADIOLOGY | Facility: HOSPITAL | Age: 71
Discharge: HOME | End: 2021-05-19
Attending: INTERNAL MEDICINE
Payer: MEDICARE

## 2021-05-19 ENCOUNTER — TELEPHONE (OUTPATIENT)
Dept: CARDIOLOGY | Facility: CLINIC | Age: 71
End: 2021-05-19

## 2021-05-19 DIAGNOSIS — I48.91 ATRIAL FIBRILLATION, NEW ONSET: ICD-10-CM

## 2021-05-19 DIAGNOSIS — R91.1 SOLITARY PULMONARY NODULE: ICD-10-CM

## 2021-05-19 PROCEDURE — 71250 CT THORAX DX C-: CPT | Mod: TC,PO

## 2021-05-19 RX ORDER — METOPROLOL TARTRATE 25 MG/1
75 TABLET, FILM COATED ORAL 2 TIMES DAILY
Qty: 540 TABLET | Refills: 3 | Status: SHIPPED | OUTPATIENT
Start: 2021-05-19 | End: 2021-05-25 | Stop reason: DRUGHIGH

## 2021-05-21 ENCOUNTER — HOSPITAL ENCOUNTER (OUTPATIENT)
Dept: RADIOLOGY | Facility: HOSPITAL | Age: 71
Discharge: HOME | End: 2021-05-21
Attending: SURGERY
Payer: MEDICARE

## 2021-05-21 DIAGNOSIS — Z12.31 BREAST CANCER SCREENING BY MAMMOGRAM: ICD-10-CM

## 2021-05-21 PROCEDURE — 77067 SCR MAMMO BI INCL CAD: CPT | Mod: TC,PO

## 2021-05-25 ENCOUNTER — OFFICE VISIT (OUTPATIENT)
Dept: CARDIOLOGY | Facility: CLINIC | Age: 71
End: 2021-05-25
Payer: MEDICARE

## 2021-05-25 ENCOUNTER — TELEPHONE (OUTPATIENT)
Dept: CARDIOLOGY | Facility: CLINIC | Age: 71
End: 2021-05-25

## 2021-05-25 VITALS
HEIGHT: 62 IN | WEIGHT: 226.44 LBS | SYSTOLIC BLOOD PRESSURE: 137 MMHG | HEART RATE: 103 BPM | BODY MASS INDEX: 41.67 KG/M2 | OXYGEN SATURATION: 95 % | DIASTOLIC BLOOD PRESSURE: 81 MMHG

## 2021-05-25 DIAGNOSIS — I10 ESSENTIAL HYPERTENSION: Chronic | ICD-10-CM

## 2021-05-25 DIAGNOSIS — I48.19 PERSISTENT ATRIAL FIBRILLATION: Primary | ICD-10-CM

## 2021-05-25 DIAGNOSIS — Z86.79 HISTORY OF PSVT (PAROXYSMAL SUPRAVENTRICULAR TACHYCARDIA): ICD-10-CM

## 2021-05-25 DIAGNOSIS — E78.5 HYPERLIPIDEMIA, UNSPECIFIED HYPERLIPIDEMIA TYPE: ICD-10-CM

## 2021-05-25 DIAGNOSIS — I71.21 ASCENDING AORTIC ANEURYSM: ICD-10-CM

## 2021-05-25 DIAGNOSIS — R91.1 SOLITARY PULMONARY NODULE: ICD-10-CM

## 2021-05-25 PROCEDURE — 3008F PR BODY MASS INDEX (BMI) DOCUMENTED: ICD-10-PCS | Mod: CPTII,S$GLB,, | Performed by: INTERNAL MEDICINE

## 2021-05-25 PROCEDURE — 1126F AMNT PAIN NOTED NONE PRSNT: CPT | Mod: S$GLB,,, | Performed by: INTERNAL MEDICINE

## 2021-05-25 PROCEDURE — 93000 EKG 12-LEAD: ICD-10-PCS | Mod: S$GLB,,, | Performed by: INTERNAL MEDICINE

## 2021-05-25 PROCEDURE — 1101F PR PT FALLS ASSESS DOC 0-1 FALLS W/OUT INJ PAST YR: ICD-10-PCS | Mod: CPTII,S$GLB,, | Performed by: INTERNAL MEDICINE

## 2021-05-25 PROCEDURE — 99999 PR PBB SHADOW E&M-EST. PATIENT-LVL III: CPT | Mod: PBBFAC,,, | Performed by: INTERNAL MEDICINE

## 2021-05-25 PROCEDURE — 99214 PR OFFICE/OUTPT VISIT, EST, LEVL IV, 30-39 MIN: ICD-10-PCS | Mod: 25,S$GLB,, | Performed by: INTERNAL MEDICINE

## 2021-05-25 PROCEDURE — 1159F PR MEDICATION LIST DOCUMENTED IN MEDICAL RECORD: ICD-10-PCS | Mod: S$GLB,,, | Performed by: INTERNAL MEDICINE

## 2021-05-25 PROCEDURE — 3288F PR FALLS RISK ASSESSMENT DOCUMENTED: ICD-10-PCS | Mod: CPTII,S$GLB,, | Performed by: INTERNAL MEDICINE

## 2021-05-25 PROCEDURE — 1159F MED LIST DOCD IN RCRD: CPT | Mod: S$GLB,,, | Performed by: INTERNAL MEDICINE

## 2021-05-25 PROCEDURE — 93000 ELECTROCARDIOGRAM COMPLETE: CPT | Mod: S$GLB,,, | Performed by: INTERNAL MEDICINE

## 2021-05-25 PROCEDURE — 3288F FALL RISK ASSESSMENT DOCD: CPT | Mod: CPTII,S$GLB,, | Performed by: INTERNAL MEDICINE

## 2021-05-25 PROCEDURE — 99999 PR PBB SHADOW E&M-EST. PATIENT-LVL III: ICD-10-PCS | Mod: PBBFAC,,, | Performed by: INTERNAL MEDICINE

## 2021-05-25 PROCEDURE — 1126F PR PAIN SEVERITY QUANTIFIED, NO PAIN PRESENT: ICD-10-PCS | Mod: S$GLB,,, | Performed by: INTERNAL MEDICINE

## 2021-05-25 PROCEDURE — 99214 OFFICE O/P EST MOD 30 MIN: CPT | Mod: 25,S$GLB,, | Performed by: INTERNAL MEDICINE

## 2021-05-25 PROCEDURE — 1101F PT FALLS ASSESS-DOCD LE1/YR: CPT | Mod: CPTII,S$GLB,, | Performed by: INTERNAL MEDICINE

## 2021-05-25 PROCEDURE — 99499 UNLISTED E&M SERVICE: CPT | Mod: S$GLB,,, | Performed by: INTERNAL MEDICINE

## 2021-05-25 PROCEDURE — 99499 RISK ADDL DX/OHS AUDIT: ICD-10-PCS | Mod: S$GLB,,, | Performed by: INTERNAL MEDICINE

## 2021-05-25 PROCEDURE — 3008F BODY MASS INDEX DOCD: CPT | Mod: CPTII,S$GLB,, | Performed by: INTERNAL MEDICINE

## 2021-05-25 RX ORDER — METOPROLOL TARTRATE 25 MG/1
75 TABLET, FILM COATED ORAL 2 TIMES DAILY
Qty: 540 TABLET | Refills: 3 | Status: CANCELLED | OUTPATIENT
Start: 2021-05-25 | End: 2022-05-25

## 2021-05-25 RX ORDER — METOPROLOL SUCCINATE 200 MG/1
200 TABLET, EXTENDED RELEASE ORAL DAILY
Qty: 90 TABLET | Refills: 3 | Status: SHIPPED | OUTPATIENT
Start: 2021-05-25 | End: 2022-05-25

## 2021-05-26 ENCOUNTER — TELEPHONE (OUTPATIENT)
Dept: NEPHROLOGY | Facility: CLINIC | Age: 71
End: 2021-05-26

## 2021-05-28 ENCOUNTER — LAB VISIT (OUTPATIENT)
Dept: LAB | Facility: HOSPITAL | Age: 71
End: 2021-05-28
Attending: INTERNAL MEDICINE
Payer: MEDICARE

## 2021-05-28 DIAGNOSIS — N18.4 CKD (CHRONIC KIDNEY DISEASE) STAGE 4, GFR 15-29 ML/MIN: ICD-10-CM

## 2021-05-28 DIAGNOSIS — K80.50 BILIARY COLIC: ICD-10-CM

## 2021-05-28 LAB
ANION GAP SERPL CALC-SCNC: 14 MMOL/L (ref 8–16)
BASOPHILS # BLD AUTO: 0.07 K/UL (ref 0–0.2)
BASOPHILS NFR BLD: 0.7 % (ref 0–1.9)
CALCIUM SERPL-MCNC: 9.2 MG/DL (ref 8.7–10.5)
CHLORIDE SERPL-SCNC: 101 MMOL/L (ref 95–110)
CO2 SERPL-SCNC: 28 MMOL/L (ref 23–29)
CREAT SERPL-MCNC: 2.04 MG/DL (ref 0.5–1.4)
DIFFERENTIAL METHOD: ABNORMAL
EOSINOPHIL # BLD AUTO: 0.4 K/UL (ref 0–0.5)
EOSINOPHIL NFR BLD: 3.7 % (ref 0–8)
ERYTHROCYTE [DISTWIDTH] IN BLOOD BY AUTOMATED COUNT: 12.8 % (ref 11.5–14.5)
EST. GFR  (AFRICAN AMERICAN): 27.7 ML/MIN/1.73 M^2
EST. GFR  (NON AFRICAN AMERICAN): 24 ML/MIN/1.73 M^2
GLUCOSE SERPL-MCNC: 123 MG/DL (ref 70–110)
HCT VFR BLD AUTO: 40.9 % (ref 37–48.5)
HGB BLD-MCNC: 13.1 G/DL (ref 12–16)
IMM GRANULOCYTES # BLD AUTO: 0.05 K/UL (ref 0–0.04)
IMM GRANULOCYTES NFR BLD AUTO: 0.5 % (ref 0–0.5)
LYMPHOCYTES # BLD AUTO: 1.8 K/UL (ref 1–4.8)
LYMPHOCYTES NFR BLD: 16.9 % (ref 18–48)
MCH RBC QN AUTO: 31.4 PG (ref 27–31)
MCHC RBC AUTO-ENTMCNC: 32 G/DL (ref 32–36)
MCV RBC AUTO: 98 FL (ref 82–98)
MONOCYTES # BLD AUTO: 0.8 K/UL (ref 0.3–1)
MONOCYTES NFR BLD: 7.7 % (ref 4–15)
NEUTROPHILS # BLD AUTO: 7.3 K/UL (ref 1.8–7.7)
NEUTROPHILS NFR BLD: 70.5 % (ref 38–73)
NRBC BLD-RTO: 0 /100 WBC
PLATELET # BLD AUTO: 178 K/UL (ref 150–450)
PMV BLD AUTO: 12.4 FL (ref 9.2–12.9)
POTASSIUM SERPL-SCNC: 4.4 MMOL/L (ref 3.5–5.1)
RBC # BLD AUTO: 4.17 M/UL (ref 4–5.4)
SODIUM SERPL-SCNC: 143 MMOL/L (ref 136–145)
UUN UR-MCNC: 34 MG/DL (ref 7–17)
WBC # BLD AUTO: 10.38 K/UL (ref 3.9–12.7)

## 2021-05-28 PROCEDURE — 80048 BASIC METABOLIC PNL TOTAL CA: CPT | Mod: PO | Performed by: INTERNAL MEDICINE

## 2021-05-28 PROCEDURE — 85025 COMPLETE CBC W/AUTO DIFF WBC: CPT | Mod: PO | Performed by: SURGERY

## 2021-05-28 PROCEDURE — 36415 COLL VENOUS BLD VENIPUNCTURE: CPT | Mod: PO | Performed by: INTERNAL MEDICINE

## 2021-06-10 ENCOUNTER — OFFICE VISIT (OUTPATIENT)
Dept: NEPHROLOGY | Facility: CLINIC | Age: 71
End: 2021-06-10
Payer: MEDICARE

## 2021-06-10 VITALS
HEIGHT: 62 IN | OXYGEN SATURATION: 99 % | DIASTOLIC BLOOD PRESSURE: 80 MMHG | HEART RATE: 87 BPM | BODY MASS INDEX: 41.71 KG/M2 | WEIGHT: 226.63 LBS | SYSTOLIC BLOOD PRESSURE: 110 MMHG

## 2021-06-10 DIAGNOSIS — N18.4 CKD (CHRONIC KIDNEY DISEASE), STAGE IV: Primary | ICD-10-CM

## 2021-06-10 PROCEDURE — 99999 PR PBB SHADOW E&M-EST. PATIENT-LVL IV: CPT | Mod: PBBFAC,GC,, | Performed by: INTERNAL MEDICINE

## 2021-06-10 PROCEDURE — 1159F MED LIST DOCD IN RCRD: CPT | Mod: GC,S$GLB,, | Performed by: INTERNAL MEDICINE

## 2021-06-10 PROCEDURE — 99999 PR PBB SHADOW E&M-EST. PATIENT-LVL IV: ICD-10-PCS | Mod: PBBFAC,GC,, | Performed by: INTERNAL MEDICINE

## 2021-06-10 PROCEDURE — 99214 OFFICE O/P EST MOD 30 MIN: CPT | Mod: GC,S$GLB,, | Performed by: INTERNAL MEDICINE

## 2021-06-10 PROCEDURE — 99214 PR OFFICE/OUTPT VISIT, EST, LEVL IV, 30-39 MIN: ICD-10-PCS | Mod: GC,S$GLB,, | Performed by: INTERNAL MEDICINE

## 2021-06-10 PROCEDURE — 1159F PR MEDICATION LIST DOCUMENTED IN MEDICAL RECORD: ICD-10-PCS | Mod: GC,S$GLB,, | Performed by: INTERNAL MEDICINE

## 2021-06-14 ENCOUNTER — OFFICE VISIT (OUTPATIENT)
Dept: FAMILY MEDICINE | Facility: CLINIC | Age: 71
End: 2021-06-14
Payer: MEDICARE

## 2021-06-14 VITALS
SYSTOLIC BLOOD PRESSURE: 126 MMHG | WEIGHT: 228.06 LBS | TEMPERATURE: 97 F | DIASTOLIC BLOOD PRESSURE: 66 MMHG | BODY MASS INDEX: 41.97 KG/M2 | HEIGHT: 62 IN | OXYGEN SATURATION: 97 % | HEART RATE: 109 BPM

## 2021-06-14 DIAGNOSIS — E03.4 HYPOTHYROIDISM DUE TO ACQUIRED ATROPHY OF THYROID: ICD-10-CM

## 2021-06-14 DIAGNOSIS — E78.5 HYPERLIPIDEMIA, UNSPECIFIED HYPERLIPIDEMIA TYPE: ICD-10-CM

## 2021-06-14 DIAGNOSIS — R91.1 LUNG NODULE: Primary | ICD-10-CM

## 2021-06-14 DIAGNOSIS — Z00.00 ROUTINE GENERAL MEDICAL EXAMINATION AT A HEALTH CARE FACILITY: ICD-10-CM

## 2021-06-14 DIAGNOSIS — K80.20 GALLSTONES: ICD-10-CM

## 2021-06-14 DIAGNOSIS — Z78.0 ASYMPTOMATIC MENOPAUSAL STATE: ICD-10-CM

## 2021-06-14 DIAGNOSIS — F41.9 ANXIETY: ICD-10-CM

## 2021-06-14 DIAGNOSIS — I47.10 PSVT (PAROXYSMAL SUPRAVENTRICULAR TACHYCARDIA): ICD-10-CM

## 2021-06-14 PROCEDURE — 1101F PR PT FALLS ASSESS DOC 0-1 FALLS W/OUT INJ PAST YR: ICD-10-PCS | Mod: CPTII,S$GLB,, | Performed by: FAMILY MEDICINE

## 2021-06-14 PROCEDURE — 3288F FALL RISK ASSESSMENT DOCD: CPT | Mod: CPTII,S$GLB,, | Performed by: FAMILY MEDICINE

## 2021-06-14 PROCEDURE — 3008F BODY MASS INDEX DOCD: CPT | Mod: CPTII,S$GLB,, | Performed by: FAMILY MEDICINE

## 2021-06-14 PROCEDURE — 3288F PR FALLS RISK ASSESSMENT DOCUMENTED: ICD-10-PCS | Mod: CPTII,S$GLB,, | Performed by: FAMILY MEDICINE

## 2021-06-14 PROCEDURE — 1125F AMNT PAIN NOTED PAIN PRSNT: CPT | Mod: S$GLB,,, | Performed by: FAMILY MEDICINE

## 2021-06-14 PROCEDURE — 3008F PR BODY MASS INDEX (BMI) DOCUMENTED: ICD-10-PCS | Mod: CPTII,S$GLB,, | Performed by: FAMILY MEDICINE

## 2021-06-14 PROCEDURE — 99214 PR OFFICE/OUTPT VISIT, EST, LEVL IV, 30-39 MIN: ICD-10-PCS | Mod: S$GLB,,, | Performed by: FAMILY MEDICINE

## 2021-06-14 PROCEDURE — 1101F PT FALLS ASSESS-DOCD LE1/YR: CPT | Mod: CPTII,S$GLB,, | Performed by: FAMILY MEDICINE

## 2021-06-14 PROCEDURE — 1125F PR PAIN SEVERITY QUANTIFIED, PAIN PRESENT: ICD-10-PCS | Mod: S$GLB,,, | Performed by: FAMILY MEDICINE

## 2021-06-14 PROCEDURE — 1159F MED LIST DOCD IN RCRD: CPT | Mod: S$GLB,,, | Performed by: FAMILY MEDICINE

## 2021-06-14 PROCEDURE — 99214 OFFICE O/P EST MOD 30 MIN: CPT | Mod: S$GLB,,, | Performed by: FAMILY MEDICINE

## 2021-06-14 PROCEDURE — 1159F PR MEDICATION LIST DOCUMENTED IN MEDICAL RECORD: ICD-10-PCS | Mod: S$GLB,,, | Performed by: FAMILY MEDICINE

## 2021-06-14 RX ORDER — ZOLPIDEM TARTRATE 10 MG/1
TABLET ORAL
Qty: 90 TABLET | Refills: 1 | Status: SHIPPED | OUTPATIENT
Start: 2021-06-14 | End: 2022-01-07 | Stop reason: SDUPTHER

## 2021-06-14 RX ORDER — ATORVASTATIN CALCIUM 10 MG/1
10 TABLET, FILM COATED ORAL DAILY
Qty: 90 TABLET | Refills: 3 | Status: SHIPPED | OUTPATIENT
Start: 2021-06-14 | End: 2021-09-22 | Stop reason: SDUPTHER

## 2021-06-14 RX ORDER — LEVOTHYROXINE SODIUM 175 UG/1
175 TABLET ORAL DAILY
Qty: 90 TABLET | Refills: 3 | Status: SHIPPED | OUTPATIENT
Start: 2021-06-14 | End: 2022-03-28

## 2021-06-28 ENCOUNTER — TELEPHONE (OUTPATIENT)
Dept: PULMONOLOGY | Facility: CLINIC | Age: 71
End: 2021-06-28

## 2021-07-06 NOTE — TELEPHONE ENCOUNTER
Message left on voicemail:  There is a small risk of stroke whenever the Eliquis is held for epidural steroid shots which has to be weighed against the temporary pain relief which may be obtained from the shots.  
No

## 2021-07-16 ENCOUNTER — OFFICE VISIT (OUTPATIENT)
Dept: CARDIOLOGY | Facility: CLINIC | Age: 71
End: 2021-07-16
Payer: MEDICARE

## 2021-07-16 VITALS
HEIGHT: 62 IN | HEART RATE: 74 BPM | BODY MASS INDEX: 41.75 KG/M2 | DIASTOLIC BLOOD PRESSURE: 81 MMHG | SYSTOLIC BLOOD PRESSURE: 115 MMHG | WEIGHT: 226.88 LBS

## 2021-07-16 DIAGNOSIS — I10 ESSENTIAL HYPERTENSION: Chronic | ICD-10-CM

## 2021-07-16 DIAGNOSIS — N18.4 CKD (CHRONIC KIDNEY DISEASE) STAGE 4, GFR 15-29 ML/MIN: ICD-10-CM

## 2021-07-16 DIAGNOSIS — Z86.79 HISTORY OF PSVT (PAROXYSMAL SUPRAVENTRICULAR TACHYCARDIA): ICD-10-CM

## 2021-07-16 DIAGNOSIS — I49.8 OTHER SPECIFIED CARDIAC ARRHYTHMIAS: ICD-10-CM

## 2021-07-16 DIAGNOSIS — I48.11 LONGSTANDING PERSISTENT ATRIAL FIBRILLATION: Primary | ICD-10-CM

## 2021-07-16 PROBLEM — I48.0 PAROXYSMAL ATRIAL FIBRILLATION: Status: RESOLVED | Noted: 2019-07-09 | Resolved: 2021-07-16

## 2021-07-16 PROCEDURE — 99214 PR OFFICE/OUTPT VISIT, EST, LEVL IV, 30-39 MIN: ICD-10-PCS | Mod: S$GLB,,, | Performed by: INTERNAL MEDICINE

## 2021-07-16 PROCEDURE — 3288F PR FALLS RISK ASSESSMENT DOCUMENTED: ICD-10-PCS | Mod: CPTII,S$GLB,, | Performed by: INTERNAL MEDICINE

## 2021-07-16 PROCEDURE — 1101F PR PT FALLS ASSESS DOC 0-1 FALLS W/OUT INJ PAST YR: ICD-10-PCS | Mod: CPTII,S$GLB,, | Performed by: INTERNAL MEDICINE

## 2021-07-16 PROCEDURE — 3288F FALL RISK ASSESSMENT DOCD: CPT | Mod: CPTII,S$GLB,, | Performed by: INTERNAL MEDICINE

## 2021-07-16 PROCEDURE — 93005 RHYTHM STRIP: ICD-10-PCS | Mod: S$GLB,,, | Performed by: INTERNAL MEDICINE

## 2021-07-16 PROCEDURE — 99999 PR PBB SHADOW E&M-EST. PATIENT-LVL III: ICD-10-PCS | Mod: PBBFAC,,, | Performed by: INTERNAL MEDICINE

## 2021-07-16 PROCEDURE — 99499 RISK ADDL DX/OHS AUDIT: ICD-10-PCS | Mod: S$GLB,,, | Performed by: INTERNAL MEDICINE

## 2021-07-16 PROCEDURE — 1159F MED LIST DOCD IN RCRD: CPT | Mod: S$GLB,,, | Performed by: INTERNAL MEDICINE

## 2021-07-16 PROCEDURE — 99214 OFFICE O/P EST MOD 30 MIN: CPT | Mod: S$GLB,,, | Performed by: INTERNAL MEDICINE

## 2021-07-16 PROCEDURE — 3074F PR MOST RECENT SYSTOLIC BLOOD PRESSURE < 130 MM HG: ICD-10-PCS | Mod: CPTII,S$GLB,, | Performed by: INTERNAL MEDICINE

## 2021-07-16 PROCEDURE — 3074F SYST BP LT 130 MM HG: CPT | Mod: CPTII,S$GLB,, | Performed by: INTERNAL MEDICINE

## 2021-07-16 PROCEDURE — 1159F PR MEDICATION LIST DOCUMENTED IN MEDICAL RECORD: ICD-10-PCS | Mod: S$GLB,,, | Performed by: INTERNAL MEDICINE

## 2021-07-16 PROCEDURE — 3079F DIAST BP 80-89 MM HG: CPT | Mod: CPTII,S$GLB,, | Performed by: INTERNAL MEDICINE

## 2021-07-16 PROCEDURE — 99999 PR PBB SHADOW E&M-EST. PATIENT-LVL III: CPT | Mod: PBBFAC,,, | Performed by: INTERNAL MEDICINE

## 2021-07-16 PROCEDURE — 3079F PR MOST RECENT DIASTOLIC BLOOD PRESSURE 80-89 MM HG: ICD-10-PCS | Mod: CPTII,S$GLB,, | Performed by: INTERNAL MEDICINE

## 2021-07-16 PROCEDURE — 3008F PR BODY MASS INDEX (BMI) DOCUMENTED: ICD-10-PCS | Mod: CPTII,S$GLB,, | Performed by: INTERNAL MEDICINE

## 2021-07-16 PROCEDURE — 93005 ELECTROCARDIOGRAM TRACING: CPT | Mod: S$GLB,,, | Performed by: INTERNAL MEDICINE

## 2021-07-16 PROCEDURE — 3008F BODY MASS INDEX DOCD: CPT | Mod: CPTII,S$GLB,, | Performed by: INTERNAL MEDICINE

## 2021-07-16 PROCEDURE — 93010 ELECTROCARDIOGRAM REPORT: CPT | Mod: S$GLB,,, | Performed by: INTERNAL MEDICINE

## 2021-07-16 PROCEDURE — 1101F PT FALLS ASSESS-DOCD LE1/YR: CPT | Mod: CPTII,S$GLB,, | Performed by: INTERNAL MEDICINE

## 2021-07-16 PROCEDURE — 93010 RHYTHM STRIP: ICD-10-PCS | Mod: S$GLB,,, | Performed by: INTERNAL MEDICINE

## 2021-07-16 PROCEDURE — 99499 UNLISTED E&M SERVICE: CPT | Mod: S$GLB,,, | Performed by: INTERNAL MEDICINE

## 2021-07-20 DIAGNOSIS — I49.8 OTHER SPECIFIED CARDIAC ARRHYTHMIAS: Primary | ICD-10-CM

## 2021-07-21 ENCOUNTER — OFFICE VISIT (OUTPATIENT)
Dept: PULMONOLOGY | Facility: CLINIC | Age: 71
End: 2021-07-21
Payer: MEDICARE

## 2021-07-21 VITALS
OXYGEN SATURATION: 98 % | HEIGHT: 62 IN | BODY MASS INDEX: 41.67 KG/M2 | WEIGHT: 226.44 LBS | HEART RATE: 108 BPM | SYSTOLIC BLOOD PRESSURE: 109 MMHG | DIASTOLIC BLOOD PRESSURE: 62 MMHG

## 2021-07-21 DIAGNOSIS — R06.09 DYSPNEA ON EXERTION: ICD-10-CM

## 2021-07-21 DIAGNOSIS — R29.818 SUSPECTED SLEEP APNEA: Primary | ICD-10-CM

## 2021-07-21 DIAGNOSIS — R91.1 LUNG NODULE: ICD-10-CM

## 2021-07-21 PROCEDURE — 99213 PR OFFICE/OUTPT VISIT, EST, LEVL III, 20-29 MIN: ICD-10-PCS | Mod: S$GLB,,, | Performed by: NURSE PRACTITIONER

## 2021-07-21 PROCEDURE — 3288F PR FALLS RISK ASSESSMENT DOCUMENTED: ICD-10-PCS | Mod: CPTII,S$GLB,, | Performed by: NURSE PRACTITIONER

## 2021-07-21 PROCEDURE — 99999 PR PBB SHADOW E&M-EST. PATIENT-LVL V: ICD-10-PCS | Mod: PBBFAC,,, | Performed by: NURSE PRACTITIONER

## 2021-07-21 PROCEDURE — 1101F PR PT FALLS ASSESS DOC 0-1 FALLS W/OUT INJ PAST YR: ICD-10-PCS | Mod: CPTII,S$GLB,, | Performed by: NURSE PRACTITIONER

## 2021-07-21 PROCEDURE — 3288F FALL RISK ASSESSMENT DOCD: CPT | Mod: CPTII,S$GLB,, | Performed by: NURSE PRACTITIONER

## 2021-07-21 PROCEDURE — 3078F DIAST BP <80 MM HG: CPT | Mod: CPTII,S$GLB,, | Performed by: NURSE PRACTITIONER

## 2021-07-21 PROCEDURE — 99999 PR PBB SHADOW E&M-EST. PATIENT-LVL V: CPT | Mod: PBBFAC,,, | Performed by: NURSE PRACTITIONER

## 2021-07-21 PROCEDURE — 1159F PR MEDICATION LIST DOCUMENTED IN MEDICAL RECORD: ICD-10-PCS | Mod: CPTII,S$GLB,, | Performed by: NURSE PRACTITIONER

## 2021-07-21 PROCEDURE — 99213 OFFICE O/P EST LOW 20 MIN: CPT | Mod: S$GLB,,, | Performed by: NURSE PRACTITIONER

## 2021-07-21 PROCEDURE — 1126F PR PAIN SEVERITY QUANTIFIED, NO PAIN PRESENT: ICD-10-PCS | Mod: CPTII,S$GLB,, | Performed by: NURSE PRACTITIONER

## 2021-07-21 PROCEDURE — 3078F PR MOST RECENT DIASTOLIC BLOOD PRESSURE < 80 MM HG: ICD-10-PCS | Mod: CPTII,S$GLB,, | Performed by: NURSE PRACTITIONER

## 2021-07-21 PROCEDURE — 3074F SYST BP LT 130 MM HG: CPT | Mod: CPTII,S$GLB,, | Performed by: NURSE PRACTITIONER

## 2021-07-21 PROCEDURE — 3008F PR BODY MASS INDEX (BMI) DOCUMENTED: ICD-10-PCS | Mod: CPTII,S$GLB,, | Performed by: NURSE PRACTITIONER

## 2021-07-21 PROCEDURE — 1159F MED LIST DOCD IN RCRD: CPT | Mod: CPTII,S$GLB,, | Performed by: NURSE PRACTITIONER

## 2021-07-21 PROCEDURE — 1126F AMNT PAIN NOTED NONE PRSNT: CPT | Mod: CPTII,S$GLB,, | Performed by: NURSE PRACTITIONER

## 2021-07-21 PROCEDURE — 3074F PR MOST RECENT SYSTOLIC BLOOD PRESSURE < 130 MM HG: ICD-10-PCS | Mod: CPTII,S$GLB,, | Performed by: NURSE PRACTITIONER

## 2021-07-21 PROCEDURE — 3008F BODY MASS INDEX DOCD: CPT | Mod: CPTII,S$GLB,, | Performed by: NURSE PRACTITIONER

## 2021-07-21 PROCEDURE — 1101F PT FALLS ASSESS-DOCD LE1/YR: CPT | Mod: CPTII,S$GLB,, | Performed by: NURSE PRACTITIONER

## 2021-07-22 ENCOUNTER — HOSPITAL ENCOUNTER (OUTPATIENT)
Dept: CARDIOLOGY | Facility: HOSPITAL | Age: 71
Discharge: HOME | End: 2021-07-22
Attending: INTERNAL MEDICINE
Payer: MEDICARE

## 2021-07-22 DIAGNOSIS — I48.19 PERSISTENT ATRIAL FIBRILLATION: ICD-10-CM

## 2021-07-22 PROCEDURE — 93227 XTRNL ECG REC<48 HR R&I: CPT | Mod: ,,, | Performed by: INTERNAL MEDICINE

## 2021-07-22 PROCEDURE — 93227 HOLTER MONITOR - 24 HOUR (CUPID ONLY): ICD-10-PCS | Mod: ,,, | Performed by: INTERNAL MEDICINE

## 2021-07-22 PROCEDURE — 93226 XTRNL ECG REC<48 HR SCAN A/R: CPT | Mod: PO

## 2021-07-23 ENCOUNTER — PATIENT MESSAGE (OUTPATIENT)
Dept: PULMONOLOGY | Facility: CLINIC | Age: 71
End: 2021-07-23

## 2021-07-26 ENCOUNTER — TELEPHONE (OUTPATIENT)
Dept: CARDIOLOGY | Facility: CLINIC | Age: 71
End: 2021-07-26

## 2021-07-26 DIAGNOSIS — I48.11 LONGSTANDING PERSISTENT ATRIAL FIBRILLATION: Primary | ICD-10-CM

## 2021-07-26 LAB
OHS CV EVENT MONITOR DAY: 0
OHS CV HOLTER LENGTH DECIMAL HOURS: 24
OHS CV HOLTER LENGTH HOURS: 24
OHS CV HOLTER LENGTH MINUTES: 0

## 2021-07-26 RX ORDER — METOPROLOL SUCCINATE 100 MG/1
100 TABLET, EXTENDED RELEASE ORAL DAILY
Qty: 90 TABLET | Refills: 3 | Status: SHIPPED | OUTPATIENT
Start: 2021-07-26 | End: 2022-05-31 | Stop reason: DRUGHIGH

## 2021-08-02 ENCOUNTER — PATIENT MESSAGE (OUTPATIENT)
Dept: CARDIOLOGY | Facility: CLINIC | Age: 71
End: 2021-08-02

## 2021-08-18 ENCOUNTER — HOSPITAL ENCOUNTER (OUTPATIENT)
Dept: CARDIOLOGY | Facility: HOSPITAL | Age: 71
Discharge: HOME | End: 2021-08-18
Attending: INTERNAL MEDICINE
Payer: MEDICARE

## 2021-08-18 DIAGNOSIS — I48.11 LONGSTANDING PERSISTENT ATRIAL FIBRILLATION: ICD-10-CM

## 2021-08-18 PROCEDURE — 93227 XTRNL ECG REC<48 HR R&I: CPT | Mod: ,,, | Performed by: INTERNAL MEDICINE

## 2021-08-18 PROCEDURE — 93226 XTRNL ECG REC<48 HR SCAN A/R: CPT | Mod: PO

## 2021-08-18 PROCEDURE — 93227 HOLTER MONITOR - 24 HOUR (CUPID ONLY): ICD-10-PCS | Mod: ,,, | Performed by: INTERNAL MEDICINE

## 2021-08-25 ENCOUNTER — TELEPHONE (OUTPATIENT)
Dept: CARDIOLOGY | Facility: CLINIC | Age: 71
End: 2021-08-25

## 2021-08-25 LAB
OHS CV EVENT MONITOR DAY: 0
OHS CV HOLTER LENGTH DECIMAL HOURS: 24
OHS CV HOLTER LENGTH HOURS: 24
OHS CV HOLTER LENGTH MINUTES: 0
OHS CV HOLTER SINUS AVERAGE HR: 88
OHS CV HOLTER SINUS MAX HR: 150
OHS CV HOLTER SINUS MIN HR: 59

## 2021-09-13 NOTE — PROGRESS NOTES
INR at goal. Medications and chart reviewed. No changes noted to necessitate adjustment of warfarin or follow-up plan. See calendar.      
Patient advised to Maintain dosage of (WArfarin). Patient verbalized understanding.  
Quality 402: Tobacco Use And Help With Quitting Among Adolescents: Patient screened for tobacco and never smoked
Detail Level: Detailed
Quality 130: Documentation Of Current Medications In The Medical Record: Current Medications Documented
Quality 431: Preventive Care And Screening: Unhealthy Alcohol Use - Screening: Patient not identified as an unhealthy alcohol user when screened for unhealthy alcohol use using a systematic screening method

## 2021-09-22 ENCOUNTER — TELEPHONE (OUTPATIENT)
Dept: FAMILY MEDICINE | Facility: CLINIC | Age: 71
End: 2021-09-22

## 2021-09-22 ENCOUNTER — TELEPHONE (OUTPATIENT)
Dept: CARDIOLOGY | Facility: CLINIC | Age: 71
End: 2021-09-22

## 2021-09-22 RX ORDER — CANDESARTAN 8 MG/1
4 TABLET ORAL DAILY
Qty: 45 TABLET | Refills: 3 | Status: SHIPPED | OUTPATIENT
Start: 2021-09-22 | End: 2021-10-21 | Stop reason: SDUPTHER

## 2021-09-22 RX ORDER — ALBUTEROL SULFATE 90 UG/1
2 AEROSOL, METERED RESPIRATORY (INHALATION) EVERY 4 HOURS PRN
Qty: 18 G | Refills: 11 | Status: SHIPPED | OUTPATIENT
Start: 2021-09-22 | End: 2022-03-18 | Stop reason: SDUPTHER

## 2021-09-22 RX ORDER — ATORVASTATIN CALCIUM 10 MG/1
10 TABLET, FILM COATED ORAL DAILY
Qty: 90 TABLET | Refills: 3 | Status: SHIPPED | OUTPATIENT
Start: 2021-09-22 | End: 2023-05-14

## 2021-11-22 ENCOUNTER — HOSPITAL ENCOUNTER (OUTPATIENT)
Dept: RADIOLOGY | Facility: HOSPITAL | Age: 71
Discharge: HOME | End: 2021-11-22
Attending: INTERNAL MEDICINE
Payer: MEDICARE

## 2021-11-22 DIAGNOSIS — R91.1 SOLITARY PULMONARY NODULE: ICD-10-CM

## 2021-11-22 PROCEDURE — 71250 CT THORAX DX C-: CPT | Mod: TC,PO

## 2021-11-24 ENCOUNTER — OFFICE VISIT (OUTPATIENT)
Dept: SURGERY | Facility: CLINIC | Age: 71
End: 2021-11-24
Payer: MEDICARE

## 2021-11-24 VITALS
TEMPERATURE: 98 F | BODY MASS INDEX: 41.62 KG/M2 | HEIGHT: 62 IN | OXYGEN SATURATION: 96 % | DIASTOLIC BLOOD PRESSURE: 69 MMHG | HEART RATE: 106 BPM | SYSTOLIC BLOOD PRESSURE: 126 MMHG | WEIGHT: 226.19 LBS

## 2021-11-24 DIAGNOSIS — K80.20 CALCULUS OF GALLBLADDER WITHOUT CHOLECYSTITIS WITHOUT OBSTRUCTION: Primary | ICD-10-CM

## 2021-11-24 PROCEDURE — 3066F PR DOCUMENTATION OF TREATMENT FOR NEPHROPATHY: ICD-10-PCS | Mod: CPTII,S$GLB,, | Performed by: STUDENT IN AN ORGANIZED HEALTH CARE EDUCATION/TRAINING PROGRAM

## 2021-11-24 PROCEDURE — 3066F NEPHROPATHY DOC TX: CPT | Mod: CPTII,S$GLB,, | Performed by: STUDENT IN AN ORGANIZED HEALTH CARE EDUCATION/TRAINING PROGRAM

## 2021-11-24 PROCEDURE — 99213 PR OFFICE/OUTPT VISIT, EST, LEVL III, 20-29 MIN: ICD-10-PCS | Mod: S$GLB,,, | Performed by: STUDENT IN AN ORGANIZED HEALTH CARE EDUCATION/TRAINING PROGRAM

## 2021-11-24 PROCEDURE — 99999 PR PBB SHADOW E&M-EST. PATIENT-LVL IV: CPT | Mod: PBBFAC,,, | Performed by: STUDENT IN AN ORGANIZED HEALTH CARE EDUCATION/TRAINING PROGRAM

## 2021-11-24 PROCEDURE — 4010F ACE/ARB THERAPY RXD/TAKEN: CPT | Mod: CPTII,S$GLB,, | Performed by: STUDENT IN AN ORGANIZED HEALTH CARE EDUCATION/TRAINING PROGRAM

## 2021-11-24 PROCEDURE — 99213 OFFICE O/P EST LOW 20 MIN: CPT | Mod: S$GLB,,, | Performed by: STUDENT IN AN ORGANIZED HEALTH CARE EDUCATION/TRAINING PROGRAM

## 2021-11-24 PROCEDURE — 4010F PR ACE/ARB THEARPY RXD/TAKEN: ICD-10-PCS | Mod: CPTII,S$GLB,, | Performed by: STUDENT IN AN ORGANIZED HEALTH CARE EDUCATION/TRAINING PROGRAM

## 2021-11-24 PROCEDURE — 99999 PR PBB SHADOW E&M-EST. PATIENT-LVL IV: ICD-10-PCS | Mod: PBBFAC,,, | Performed by: STUDENT IN AN ORGANIZED HEALTH CARE EDUCATION/TRAINING PROGRAM

## 2021-11-29 ENCOUNTER — TELEPHONE (OUTPATIENT)
Dept: CARDIOLOGY | Facility: CLINIC | Age: 71
End: 2021-11-29
Payer: MEDICARE

## 2021-11-30 ENCOUNTER — PATIENT OUTREACH (OUTPATIENT)
Dept: ADMINISTRATIVE | Facility: OTHER | Age: 71
End: 2021-11-30
Payer: MEDICARE

## 2021-11-30 ENCOUNTER — OFFICE VISIT (OUTPATIENT)
Dept: CARDIOLOGY | Facility: CLINIC | Age: 71
End: 2021-11-30
Payer: MEDICARE

## 2021-11-30 VITALS
OXYGEN SATURATION: 99 % | SYSTOLIC BLOOD PRESSURE: 106 MMHG | WEIGHT: 224.88 LBS | DIASTOLIC BLOOD PRESSURE: 70 MMHG | HEIGHT: 62 IN | BODY MASS INDEX: 41.38 KG/M2 | HEART RATE: 86 BPM

## 2021-11-30 DIAGNOSIS — E03.4 HYPOTHYROIDISM DUE TO ACQUIRED ATROPHY OF THYROID: ICD-10-CM

## 2021-11-30 DIAGNOSIS — I71.21 ASCENDING AORTIC ANEURYSM: ICD-10-CM

## 2021-11-30 DIAGNOSIS — R06.09 DYSPNEA ON EXERTION: ICD-10-CM

## 2021-11-30 DIAGNOSIS — M48.02 CERVICAL SPINAL STENOSIS: ICD-10-CM

## 2021-11-30 DIAGNOSIS — R60.0 LOCALIZED EDEMA: ICD-10-CM

## 2021-11-30 DIAGNOSIS — N18.4 CKD (CHRONIC KIDNEY DISEASE) STAGE 4, GFR 15-29 ML/MIN: ICD-10-CM

## 2021-11-30 DIAGNOSIS — I48.11 LONGSTANDING PERSISTENT ATRIAL FIBRILLATION: Primary | ICD-10-CM

## 2021-11-30 DIAGNOSIS — E78.5 HYPERLIPIDEMIA, UNSPECIFIED HYPERLIPIDEMIA TYPE: ICD-10-CM

## 2021-11-30 DIAGNOSIS — Z79.01 LONG TERM (CURRENT) USE OF ANTICOAGULANTS: ICD-10-CM

## 2021-11-30 DIAGNOSIS — I10 ESSENTIAL HYPERTENSION: Chronic | ICD-10-CM

## 2021-11-30 PROCEDURE — 3066F PR DOCUMENTATION OF TREATMENT FOR NEPHROPATHY: ICD-10-PCS | Mod: CPTII,S$GLB,, | Performed by: INTERNAL MEDICINE

## 2021-11-30 PROCEDURE — 3066F NEPHROPATHY DOC TX: CPT | Mod: CPTII,S$GLB,, | Performed by: INTERNAL MEDICINE

## 2021-11-30 PROCEDURE — 4010F PR ACE/ARB THEARPY RXD/TAKEN: ICD-10-PCS | Mod: CPTII,S$GLB,, | Performed by: INTERNAL MEDICINE

## 2021-11-30 PROCEDURE — 99214 OFFICE O/P EST MOD 30 MIN: CPT | Mod: 25,S$GLB,, | Performed by: INTERNAL MEDICINE

## 2021-11-30 PROCEDURE — 4010F ACE/ARB THERAPY RXD/TAKEN: CPT | Mod: CPTII,S$GLB,, | Performed by: INTERNAL MEDICINE

## 2021-11-30 PROCEDURE — 99999 PR PBB SHADOW E&M-EST. PATIENT-LVL III: CPT | Mod: PBBFAC,,, | Performed by: INTERNAL MEDICINE

## 2021-11-30 PROCEDURE — 93000 EKG 12-LEAD: ICD-10-PCS | Mod: S$GLB,,, | Performed by: INTERNAL MEDICINE

## 2021-11-30 PROCEDURE — 99999 PR PBB SHADOW E&M-EST. PATIENT-LVL III: ICD-10-PCS | Mod: PBBFAC,,, | Performed by: INTERNAL MEDICINE

## 2021-11-30 PROCEDURE — 99214 PR OFFICE/OUTPT VISIT, EST, LEVL IV, 30-39 MIN: ICD-10-PCS | Mod: 25,S$GLB,, | Performed by: INTERNAL MEDICINE

## 2021-11-30 PROCEDURE — 93000 ELECTROCARDIOGRAM COMPLETE: CPT | Mod: S$GLB,,, | Performed by: INTERNAL MEDICINE

## 2021-12-09 RX ORDER — CANDESARTAN 8 MG/1
4 TABLET ORAL DAILY
Qty: 45 TABLET | Refills: 3 | Status: SHIPPED | OUTPATIENT
Start: 2021-12-09 | End: 2023-05-14

## 2021-12-31 ENCOUNTER — HOSPITAL ENCOUNTER (EMERGENCY)
Facility: HOSPITAL | Age: 71
Discharge: HOME | End: 2022-01-01
Attending: EMERGENCY MEDICINE
Payer: MEDICARE

## 2021-12-31 DIAGNOSIS — R06.02 SOB (SHORTNESS OF BREATH): ICD-10-CM

## 2021-12-31 DIAGNOSIS — F41.1 ANXIETY REACTION: Primary | ICD-10-CM

## 2021-12-31 PROCEDURE — 99283 EMERGENCY DEPT VISIT LOW MDM: CPT | Mod: 25

## 2021-12-31 RX ORDER — IPRATROPIUM BROMIDE AND ALBUTEROL SULFATE 2.5; .5 MG/3ML; MG/3ML
3 SOLUTION RESPIRATORY (INHALATION) ONCE
Status: COMPLETED | OUTPATIENT
Start: 2022-01-01 | End: 2022-01-01

## 2022-01-01 VITALS
OXYGEN SATURATION: 95 % | WEIGHT: 224 LBS | TEMPERATURE: 97 F | HEART RATE: 84 BPM | SYSTOLIC BLOOD PRESSURE: 111 MMHG | RESPIRATION RATE: 17 BRPM | HEIGHT: 62 IN | DIASTOLIC BLOOD PRESSURE: 65 MMHG | BODY MASS INDEX: 41.22 KG/M2

## 2022-01-01 PROCEDURE — 25000242 PHARM REV CODE 250 ALT 637 W/ HCPCS: Performed by: EMERGENCY MEDICINE

## 2022-01-01 PROCEDURE — 94640 AIRWAY INHALATION TREATMENT: CPT

## 2022-01-01 RX ADMIN — IPRATROPIUM BROMIDE AND ALBUTEROL SULFATE 3 ML: .5; 2.5 SOLUTION RESPIRATORY (INHALATION) at 12:01

## 2022-01-01 NOTE — ED PROVIDER NOTES
Encounter Date: 12/31/2021       History     Chief Complaint   Patient presents with    Shortness of Breath     Presents via AA with c/o SOB worsening over the past hour. Pt reports that she has been having episodes of SOB for the past 3 years. Denies hx of asthma, COPD, CHF, or ever being diagnosed with a breathing disorder. Does not use home O2. Per EMS, O2 sat on scene was 97% ORA and tachypneic 28 bpm. Currently on Bipap 10/5 at 50% O2 on arrival to ED. EMS meds: NTP 1 inch,  mg, and 4 sprays of NTG 0.4 mg each.     Patient with chronic episodic dyspnea although does not carry a diagnosis of CHF or COPD presents with shortness of breath 1 hour prior to arrival.  Patient states she had visitors from last week and has been under lot of stress.  States that she is wheezing but does not have home treatment for wheezing.  No chest pain, nausea, vomiting or diaphoresis.  No fever chills.  No headache, sore throat diarrhea, myalgias or other COVID-19 symptoms.  Patient did get the COVID vaccine x2        Review of patient's allergies indicates:   Allergen Reactions    Dexamethasone Rash     Past Medical History:   Diagnosis Date    Allergy     Anemia     Anxiety     Atrial fibrillation     Cancer     skin    Cataract     Depression     Edema     Hypothyroidism     Kidney disease     PSVT (paroxysmal supraventricular tachycardia)     Thyroid disease      Past Surgical History:   Procedure Laterality Date    ADENOIDECTOMY      APPENDECTOMY      COLONOSCOPY  2009    repeat in 10 years     EYE SURGERY      HYSTERECTOMY      INJECTION OF ANESTHETIC AGENT AROUND NERVE Bilateral 9/21/2018    Procedure: BLOCK, NERVE, MEDIAL BRANCH BLOCK BILATERAL LUMBAR L2-5;  Surgeon: Julieth Christopher MD;  Location: [facility] PAIN T;  Service: Pain Management;  Laterality: Bilateral;  1 of 2    INJECTION OF ANESTHETIC AGENT AROUND NERVE Bilateral 9/28/2018    Procedure: BLOCK, NERVE, MEDIAL BRANCH BLOCK BILATERAL  LUMBAR L2-5;  Surgeon: Julieth Christopher MD;  Location: [facility] PAIN MGT;  Service: Pain Management;  Laterality: Bilateral;  2 of 2  PLEASE GIVE NIRAVAM PER MD    OOPHORECTOMY      RADIOFREQUENCY ABLATION Left 2/14/2020    Procedure: RADIOFREQUENCY ABLATION L2,3,4,5;  Surgeon: Julieth hCristopher MD;  Location: [facility] PAIN MGT;  Service: Pain Management;  Laterality: Left;  LT RFA L2,3,4,5  1 of 2  OK to hold Eliquis    RADIOFREQUENCY ABLATION Right 2/28/2020    Procedure: RADIOFREQUENCY ABLATION L2,3,4,5 RIGHT   2 of 2 ok to hold eliquis;  Surgeon: Julieth Christopher MD;  Location: [facility] PAIN MGT;  Service: Pain Management;  Laterality: Right;    RADIOFREQUENCY ABLATION Left 11/30/2020    Procedure: RADIOFREQUENCY ABLATION, L2-L3-L4-L5 MEDIAL BRANCH 1 OF 2 Continue Eliquis;  Surgeon: Bogdan Webster MD;  Location: [facility] PAIN MGT;  Service: Pain Management;  Laterality: Left;    TONSILLECTOMY      TREATMENT OF CARDIAC ARRHYTHMIA N/A 2/8/2019    Procedure: CARDIOVERSION;  Surgeon: Devin You MD;  Location: [facility] EP LAB;  Service: Cardiology;  Laterality: N/A;  AF, KAROL, DCCV, MAC, [place], 3 Prep     Family History   Problem Relation Age of Onset    Stroke Mother     Stroke Father     Diabetes Father     Sleep apnea Daughter     Mental illness Daughter      Social History     Tobacco Use    Smoking status: Former Smoker     Packs/day: 1.00     Years: 50.00     Pack years: 50.00     Types: Cigarettes    Smokeless tobacco: Never Used   Substance Use Topics    Alcohol use: No     Comment: rarely    Drug use: No     Review of Systems   Constitutional: Negative for fatigue and fever.   Eyes: Negative for photophobia and visual disturbance.   Respiratory: Negative for chest tightness.    Cardiovascular: Negative for chest pain and palpitations.   Musculoskeletal: Negative for myalgias.   Neurological: Negative for syncope and light-headedness.   Psychiatric/Behavioral: The patient is nervous/anxious.    All other systems  reviewed and are negative.      Physical Exam     Initial Vitals [12/31/21 2324]   BP Pulse Resp Temp SpO2   (!) 154/90 96 (!) 28 97.4 °F (36.3 °C) 100 %      MAP       --         Physical Exam    Nursing note and vitals reviewed.  Constitutional: She is not diaphoretic. No distress.   Elevated BMI   HENT:   Head: Normocephalic and atraumatic.   Eyes: Conjunctivae and EOM are normal.   Neck: No JVD present.   Cardiovascular: Normal rate and normal heart sounds. An irregularly irregular rhythm present.    No murmur heard.  Pulmonary/Chest: No respiratory distress. She has wheezes. She has no rhonchi. She has no rales.   Abdominal: Abdomen is soft. There is no abdominal tenderness.   Musculoskeletal:         General: Normal range of motion.     Neurological: She is alert and oriented to person, place, and time. She has normal strength.   Skin: Skin is warm and dry.   Psychiatric: She has a normal mood and affect.         ED Course   Procedures  Labs Reviewed - No data to display  EKG Readings: (Independently Interpreted)   Atrial fibrillation at 85 with no change from 11/30/2021.  Rate controlled.  No evidence of ischemia       Imaging Results          X-Ray Chest AP Portable (Final result)  Result time 12/31/21 23:56:01    Final result by Amado Serra MD (12/31/21 23:56:01)                 Impression:      No acute cardiopulmonary process identified.      Electronically signed by: Amado Serra MD  Date:    12/31/2021  Time:    23:56             Narrative:    EXAMINATION:  XR CHEST AP PORTABLE    CLINICAL HISTORY:  SOB;    TECHNIQUE:  Single frontal view of the chest was performed.    COMPARISON:  November 2020.    FINDINGS:  Cardiac silhouette is stable in size.  Lungs are symmetrically expanded.  No evidence of focal consolidative process, pneumothorax, or significant pleural effusion.  No acute osseous abnormality identified.                                 Medications   albuterol-ipratropium 2.5 mg-0.5 mg/3  mL nebulizer solution 3 mL (has no administration in time range)     Medical Decision Making:   Initial Assessment:   Patient under lot of stress around the holidays and had visitors and likely had a panic attack.  Vital signs are unremarkable including a 95% room air pulse ox but with some expiratory wheezes.  Will get chest x-ray and give a breathing treatment and do an EKG.  Clinical Tests:   Radiological Study: Ordered and Reviewed  ED Management:  Chest x-ray is clear.  EKG is unremarkable.  Patient getting a breathing treatment and then will be discharged.                      Clinical Impression:   Final diagnoses:  [R06.02] SOB (shortness of breath)  [F41.1] Anxiety reaction (Primary)          ED Disposition Condition    Discharge Stable        ED Prescriptions     None        Follow-up Information    None     M*Modal Fluency dictation system has been used to dictate either all or a portion of this chart. Despite review of the chart, some dictation errors may still exist due to imperfections in this system.     Abhijit Pandey MD  12/31/21 1811

## 2022-01-01 NOTE — ED NOTES
"Pt unable to get ride home, states "I just assumed the ambulance would take me, I live alone 30 minutes from here and my sister cant come". Ambulance transport denied by house sup. Pt states "I just wait to call her in the morning".   "

## 2022-01-07 ENCOUNTER — OFFICE VISIT (OUTPATIENT)
Dept: FAMILY MEDICINE | Facility: CLINIC | Age: 72
End: 2022-01-07
Payer: MEDICARE

## 2022-01-07 VITALS
BODY MASS INDEX: 40.29 KG/M2 | TEMPERATURE: 98 F | DIASTOLIC BLOOD PRESSURE: 70 MMHG | OXYGEN SATURATION: 97 % | HEIGHT: 62 IN | HEART RATE: 103 BPM | WEIGHT: 218.94 LBS | SYSTOLIC BLOOD PRESSURE: 114 MMHG

## 2022-01-07 DIAGNOSIS — J44.9 CHRONIC OBSTRUCTIVE PULMONARY DISEASE, UNSPECIFIED COPD TYPE: Primary | ICD-10-CM

## 2022-01-07 DIAGNOSIS — F41.9 ANXIETY: ICD-10-CM

## 2022-01-07 PROCEDURE — 96372 THER/PROPH/DIAG INJ SC/IM: CPT | Mod: S$GLB,,, | Performed by: FAMILY MEDICINE

## 2022-01-07 PROCEDURE — 3008F PR BODY MASS INDEX (BMI) DOCUMENTED: ICD-10-PCS | Mod: CPTII,S$GLB,, | Performed by: FAMILY MEDICINE

## 2022-01-07 PROCEDURE — 1126F AMNT PAIN NOTED NONE PRSNT: CPT | Mod: CPTII,S$GLB,, | Performed by: FAMILY MEDICINE

## 2022-01-07 PROCEDURE — 3288F FALL RISK ASSESSMENT DOCD: CPT | Mod: CPTII,S$GLB,, | Performed by: FAMILY MEDICINE

## 2022-01-07 PROCEDURE — 3074F PR MOST RECENT SYSTOLIC BLOOD PRESSURE < 130 MM HG: ICD-10-PCS | Mod: CPTII,S$GLB,, | Performed by: FAMILY MEDICINE

## 2022-01-07 PROCEDURE — 1126F PR PAIN SEVERITY QUANTIFIED, NO PAIN PRESENT: ICD-10-PCS | Mod: CPTII,S$GLB,, | Performed by: FAMILY MEDICINE

## 2022-01-07 PROCEDURE — 4010F ACE/ARB THERAPY RXD/TAKEN: CPT | Mod: CPTII,S$GLB,, | Performed by: FAMILY MEDICINE

## 2022-01-07 PROCEDURE — 96372 PR INJECTION,THERAP/PROPH/DIAG2ST, IM OR SUBCUT: ICD-10-PCS | Mod: S$GLB,,, | Performed by: FAMILY MEDICINE

## 2022-01-07 PROCEDURE — 1101F PT FALLS ASSESS-DOCD LE1/YR: CPT | Mod: CPTII,S$GLB,, | Performed by: FAMILY MEDICINE

## 2022-01-07 PROCEDURE — 4010F PR ACE/ARB THEARPY RXD/TAKEN: ICD-10-PCS | Mod: CPTII,S$GLB,, | Performed by: FAMILY MEDICINE

## 2022-01-07 PROCEDURE — 1159F MED LIST DOCD IN RCRD: CPT | Mod: CPTII,S$GLB,, | Performed by: FAMILY MEDICINE

## 2022-01-07 PROCEDURE — 1101F PR PT FALLS ASSESS DOC 0-1 FALLS W/OUT INJ PAST YR: ICD-10-PCS | Mod: CPTII,S$GLB,, | Performed by: FAMILY MEDICINE

## 2022-01-07 PROCEDURE — 1159F PR MEDICATION LIST DOCUMENTED IN MEDICAL RECORD: ICD-10-PCS | Mod: CPTII,S$GLB,, | Performed by: FAMILY MEDICINE

## 2022-01-07 PROCEDURE — 1160F PR REVIEW ALL MEDS BY PRESCRIBER/CLIN PHARMACIST DOCUMENTED: ICD-10-PCS | Mod: CPTII,S$GLB,, | Performed by: FAMILY MEDICINE

## 2022-01-07 PROCEDURE — 3074F SYST BP LT 130 MM HG: CPT | Mod: CPTII,S$GLB,, | Performed by: FAMILY MEDICINE

## 2022-01-07 PROCEDURE — 3288F PR FALLS RISK ASSESSMENT DOCUMENTED: ICD-10-PCS | Mod: CPTII,S$GLB,, | Performed by: FAMILY MEDICINE

## 2022-01-07 PROCEDURE — 3078F PR MOST RECENT DIASTOLIC BLOOD PRESSURE < 80 MM HG: ICD-10-PCS | Mod: CPTII,S$GLB,, | Performed by: FAMILY MEDICINE

## 2022-01-07 PROCEDURE — 3078F DIAST BP <80 MM HG: CPT | Mod: CPTII,S$GLB,, | Performed by: FAMILY MEDICINE

## 2022-01-07 PROCEDURE — 3008F BODY MASS INDEX DOCD: CPT | Mod: CPTII,S$GLB,, | Performed by: FAMILY MEDICINE

## 2022-01-07 PROCEDURE — 1160F RVW MEDS BY RX/DR IN RCRD: CPT | Mod: CPTII,S$GLB,, | Performed by: FAMILY MEDICINE

## 2022-01-07 PROCEDURE — 99214 PR OFFICE/OUTPT VISIT, EST, LEVL IV, 30-39 MIN: ICD-10-PCS | Mod: 25,S$GLB,, | Performed by: FAMILY MEDICINE

## 2022-01-07 PROCEDURE — 99214 OFFICE O/P EST MOD 30 MIN: CPT | Mod: 25,S$GLB,, | Performed by: FAMILY MEDICINE

## 2022-01-07 RX ORDER — ESCITALOPRAM OXALATE 10 MG/1
10 TABLET ORAL DAILY
Qty: 30 TABLET | Refills: 11 | Status: SHIPPED | OUTPATIENT
Start: 2022-01-07 | End: 2022-08-31 | Stop reason: SDUPTHER

## 2022-01-07 RX ORDER — ALPRAZOLAM 0.25 MG/1
0.25 TABLET ORAL DAILY
Qty: 90 TABLET | Refills: 1 | Status: SHIPPED | OUTPATIENT
Start: 2022-01-07 | End: 2022-06-20 | Stop reason: SDUPTHER

## 2022-01-07 RX ORDER — IPRATROPIUM BROMIDE AND ALBUTEROL SULFATE 2.5; .5 MG/3ML; MG/3ML
3 SOLUTION RESPIRATORY (INHALATION) EVERY 6 HOURS PRN
Qty: 60 EACH | Refills: 3 | Status: SHIPPED | OUTPATIENT
Start: 2022-01-07 | End: 2022-03-18 | Stop reason: SDUPTHER

## 2022-01-07 RX ORDER — ZOLPIDEM TARTRATE 10 MG/1
TABLET ORAL
Qty: 90 TABLET | Refills: 1 | Status: SHIPPED | OUTPATIENT
Start: 2022-01-07 | End: 2022-07-17

## 2022-01-07 RX ORDER — BETAMETHASONE SODIUM PHOSPHATE AND BETAMETHASONE ACETATE 3; 3 MG/ML; MG/ML
6 INJECTION, SUSPENSION INTRA-ARTICULAR; INTRALESIONAL; INTRAMUSCULAR; SOFT TISSUE ONCE
Status: COMPLETED | OUTPATIENT
Start: 2022-01-07 | End: 2022-01-07

## 2022-01-07 RX ADMIN — BETAMETHASONE SODIUM PHOSPHATE AND BETAMETHASONE ACETATE 6 MG: 3; 3 INJECTION, SUSPENSION INTRA-ARTICULAR; INTRALESIONAL; INTRAMUSCULAR; SOFT TISSUE at 04:01

## 2022-01-07 NOTE — PROGRESS NOTES
Chief Complaint  Chief Complaint   Patient presents with    Follow-up     ER follow up/anxiety       HPI    Shortness of Breath  This is a recurrent (History of intermittent SOB for at least 2 years.  Has seen pulmonology in the past.  PFT results are not available, but by pumlonology notes it shows some ventilatory impairment. ) problem. The current episode started more than 1 year ago. The problem occurs daily. The problem has been gradually worsening. Pertinent negatives include no abdominal pain, chest pain, claudication, coryza, ear pain, fever, headaches, hemoptysis, leg pain, leg swelling, neck pain, orthopnea, PND, rash, rhinorrhea, sore throat, sputum production, swollen glands, syncope, vomiting or wheezing. The symptoms are aggravated by emotional upset. Associated symptoms comments: Nasal congestion. The patient has no known risk factors for DVT/PE. Treatments tried: LAMA/LABA. The treatment provided mild relief. There is no history of allergies, aspirin allergies, bronchiolitis, COPD, DVT, a heart failure, PE, pneumonia or a recent surgery. (Atrial fib.  No echo on file. )   Depression  Visit Type: follow-up (Has been on zoloft and cymbalta in the past.  Cymbalta was not helpful.  zoloft helped a little. )  Patient presents with the following symptoms: decreased concentration, depressed mood, excessive worry, fatigue, feelings of hopelessness, irritability, nervousness/anxiety, palpitations, panic and shortness of breath.  Patient is not experiencing: anhedonia, chest pain, choking sensation, compulsions, confusion, dizziness, dry mouth, feelings of worthlessness, hypersomnia, hyperventilation, impotence and insomnia.  Frequency of symptoms: constantly   Severity: causing significant distress            PAST MEDICAL HISTORY:  Past Medical History:   Diagnosis Date    Allergy     Anemia     Anxiety     Atrial fibrillation     Cancer     skin    Cataract     Depression     Edema      Hypothyroidism     Kidney disease     PSVT (paroxysmal supraventricular tachycardia)     Thyroid disease        PAST SURGICAL HISTORY:  Past Surgical History:   Procedure Laterality Date    ADENOIDECTOMY      APPENDECTOMY      COLONOSCOPY  2009    repeat in 10 years     EYE SURGERY      HYSTERECTOMY      INJECTION OF ANESTHETIC AGENT AROUND NERVE Bilateral 9/21/2018    Procedure: BLOCK, NERVE, MEDIAL BRANCH BLOCK BILATERAL LUMBAR L2-5;  Surgeon: Julieth Christopher MD;  Location: [facility] PAIN MGT;  Service: Pain Management;  Laterality: Bilateral;  1 of 2    INJECTION OF ANESTHETIC AGENT AROUND NERVE Bilateral 9/28/2018    Procedure: BLOCK, NERVE, MEDIAL BRANCH BLOCK BILATERAL LUMBAR L2-5;  Surgeon: Julieth Christopher MD;  Location: [facility] PAIN MGT;  Service: Pain Management;  Laterality: Bilateral;  2 of 2  PLEASE GIVE NIRAVAM PER MD    OOPHORECTOMY      RADIOFREQUENCY ABLATION Left 2/14/2020    Procedure: RADIOFREQUENCY ABLATION L2,3,4,5;  Surgeon: Julieth Christopher MD;  Location: [facility] PAIN MGT;  Service: Pain Management;  Laterality: Left;  LT RFA L2,3,4,5  1 of 2  OK to hold Eliquis    RADIOFREQUENCY ABLATION Right 2/28/2020    Procedure: RADIOFREQUENCY ABLATION L2,3,4,5 RIGHT   2 of 2 ok to hold eliquis;  Surgeon: Julieth Christopher MD;  Location: [facility] PAIN MGT;  Service: Pain Management;  Laterality: Right;    RADIOFREQUENCY ABLATION Left 11/30/2020    Procedure: RADIOFREQUENCY ABLATION, L2-L3-L4-L5 MEDIAL BRANCH 1 OF 2 Continue Eliquis;  Surgeon: Bogdan Webster MD;  Location: [facility] PAIN MGT;  Service: Pain Management;  Laterality: Left;    TONSILLECTOMY      TREATMENT OF CARDIAC ARRHYTHMIA N/A 2/8/2019    Procedure: CARDIOVERSION;  Surgeon: Devin You MD;  Location: [facility] EP LAB;  Service: Cardiology;  Laterality: N/A;  AF, KAROL, DCCV, MAC, [place], 3 Prep       SOCIAL HISTORY:  Social History     Socioeconomic History    Marital status:    Tobacco Use    Smoking status: Former Smoker     Packs/day:  1.00     Years: 50.00     Pack years: 50.00     Types: Cigarettes    Smokeless tobacco: Never Used   Substance and Sexual Activity    Alcohol use: No     Comment: rarely    Drug use: No    Sexual activity: Not Currently       FAMILY HISTORY:  Family History   Problem Relation Age of Onset    Stroke Mother     Stroke Father     Diabetes Father     Sleep apnea Daughter     Mental illness Daughter        ALLERGIES AND MEDICATIONS: updated and reviewed.  Review of patient's allergies indicates:   Allergen Reactions    Dexamethasone Rash     Current Outpatient Medications   Medication Sig Dispense Refill    albuterol (PROVENTIL/VENTOLIN HFA) 90 mcg/actuation inhaler Inhale 2 puffs into the lungs every 4 (four) hours as needed for Wheezing (For shortness of breath). 18 g 11    atorvastatin (LIPITOR) 10 MG tablet Take 1 tablet (10 mg total) by mouth once daily. 90 tablet 3    candesartan (ATACAND) 8 MG tablet Take 0.5 tablets (4 mg total) by mouth once daily. 45 tablet 3    cholecalciferol, vitamin D3, (VITAMIN D3) 5,000 unit Tab Take 5,000 Units by mouth once daily. 90 tablet 3    ELIQUIS 5 mg Tab TAKE 1 TABLET BY MOUTH TWICE A  tablet 3    gabapentin (NEURONTIN) 400 MG capsule Take 1 capsule (400 mg total) by mouth 2 (two) times daily. 180 capsule 3    levothyroxine (SYNTHROID, LEVOTHROID) 175 MCG tablet Take 1 tablet (175 mcg total) by mouth once daily. 90 tablet 3    metoprolol succinate (TOPROL-XL) 100 MG 24 hr tablet Take 1 tablet (100 mg total) by mouth once daily. 90 tablet 3    metoprolol succinate (TOPROL-XL) 200 MG 24 hr tablet Take 1 tablet (200 mg total) by mouth once daily. 90 tablet 3    umeclidinium-vilanteroL (ANORO ELLIPTA) 62.5-25 mcg/actuation DsDv Inhale 1 puff into the lungs once daily. Controller 60 each 11    albuterol-ipratropium (DUO-NEB) 2.5 mg-0.5 mg/3 mL nebulizer solution Take 3 mLs by nebulization every 6 (six) hours as needed for Wheezing. Rescue 60 each 3     "ALPRAZolam (XANAX) 0.25 MG tablet Take 1 tablet (0.25 mg total) by mouth once daily. 90 tablet 1    EScitalopram oxalate (LEXAPRO) 10 MG tablet Take 1 tablet (10 mg total) by mouth once daily. 30 tablet 11    zolpidem (AMBIEN) 10 mg Tab TAKE 1 TABLET BY MOUTH EVERY DAY IN THE EVENING AS NEEDED 90 tablet 1     Current Facility-Administered Medications   Medication Dose Route Frequency Provider Last Rate Last Admin    betamethasone acetate-betamethasone sodium phosphate injection 6 mg  6 mg Intramuscular Once DO COURTNEY Agosto  Review of Systems   Constitutional: Positive for irritability. Negative for fatigue and fever.   HENT: Negative for congestion, ear pain, postnasal drip, rhinorrhea, sinus pressure, sore throat and voice change.    Eyes: Negative for discharge.   Respiratory: Positive for shortness of breath. Negative for cough, hemoptysis, sputum production, choking, chest tightness and wheezing.    Cardiovascular: Positive for palpitations. Negative for chest pain, orthopnea, claudication, leg swelling, syncope and PND.   Gastrointestinal: Negative for abdominal pain, diarrhea and vomiting.   Genitourinary: Negative for impotence.   Musculoskeletal: Negative for neck pain.   Skin: Negative for rash.   Neurological: Negative for headaches.   Psychiatric/Behavioral: Positive for decreased concentration and depression. Negative for confusion. The patient is nervous/anxious. The patient does not have insomnia.            PHYSICAL EXAM  Vitals:    01/07/22 1502   BP: 114/70   BP Location: Left arm   Patient Position: Sitting   Pulse: 103   Temp: 97.6 °F (36.4 °C)   TempSrc: Oral   SpO2: 97%   Weight: 99.3 kg (218 lb 14.7 oz)   Height: 5' 2" (1.575 m)    Body mass index is 40.04 kg/m².  Weight: 99.3 kg (218 lb 14.7 oz)   Height: 5' 2" (157.5 cm)     Physical Exam  Constitutional:       Appearance: She is well-developed and well-nourished.   HENT:      Head: Normocephalic.   Eyes:      " Conjunctiva/sclera: Conjunctivae normal.   Cardiovascular:      Rate and Rhythm: Normal rate and regular rhythm.      Heart sounds: Normal heart sounds.   Pulmonary:      Effort: Pulmonary effort is normal.      Breath sounds: Normal breath sounds.   Musculoskeletal:      Cervical back: Normal range of motion and neck supple.   Skin:     General: Skin is warm and dry.   Neurological:      Mental Status: She is alert.   Psychiatric:         Behavior: Behavior normal.           Health Maintenance       Date Due Completion Date    DEXA SCAN 05/08/2020 5/8/2017    Override on 4/5/2017: Done    Shingles Vaccine (2 of 2) 07/22/2021 5/27/2021    Influenza Vaccine (1) 09/01/2021 11/5/2020    COVID-19 Vaccine (3 - Booster for Moderna series) 09/10/2021 3/10/2021    Mammogram 05/21/2022 5/21/2021    Override on 4/5/2017: Not Clinically Appropriate (dec)    Lipid Panel 06/11/2025 6/11/2020    TETANUS VACCINE 04/05/2027 4/5/2017 (ClinicallyNA)    Override on 4/5/2017: Not Clinically Appropriate    Colorectal Cancer Screening 05/24/2028 5/24/2018            Assessment & Plan      Chronic obstructive pulmonary disease, unspecified COPD type  -     albuterol-ipratropium (DUO-NEB) 2.5 mg-0.5 mg/3 mL nebulizer solution; Take 3 mLs by nebulization every 6 (six) hours as needed for Wheezing. Rescue  Dispense: 60 each; Refill: 3  -     NEBULIZER FOR HOME USE  -     betamethasone acetate-betamethasone sodium phosphate injection 6 mg    Anxiety  -     EScitalopram oxalate (LEXAPRO) 10 MG tablet; Take 1 tablet (10 mg total) by mouth once daily.  Dispense: 30 tablet; Refill: 11  -     zolpidem (AMBIEN) 10 mg Tab; TAKE 1 TABLET BY MOUTH EVERY DAY IN THE EVENING AS NEEDED  Dispense: 90 tablet; Refill: 1  -     ALPRAZolam (XANAX) 0.25 MG tablet; Take 1 tablet (0.25 mg total) by mouth once daily.  Dispense: 90 tablet; Refill: 1          Follow-up: Follow up in about 6 years (around 1/7/2028).

## 2022-01-11 ENCOUNTER — TELEPHONE (OUTPATIENT)
Dept: FAMILY MEDICINE | Facility: CLINIC | Age: 72
End: 2022-01-11
Payer: MEDICARE

## 2022-01-11 DIAGNOSIS — J44.9 CHRONIC OBSTRUCTIVE PULMONARY DISEASE, UNSPECIFIED COPD TYPE: Primary | ICD-10-CM

## 2022-01-11 NOTE — TELEPHONE ENCOUNTER
----- Message from Astrid Gonzalez sent at 1/11/2022  3:50 PM CST -----  Contact: [phone]  Who Called: PT  Regarding: rx questions    Would the patient rather a call back or a response via [facility]? Call back  Best Call Back Number: [phone]   Additional Information:

## 2022-01-11 NOTE — TELEPHONE ENCOUNTER
Spoke with she stated that she has nebulizer solution but she dose not have nebulizer and would like to know can  write rx for it

## 2022-01-14 ENCOUNTER — TELEPHONE (OUTPATIENT)
Dept: FAMILY MEDICINE | Facility: CLINIC | Age: 72
End: 2022-01-14
Payer: MEDICARE

## 2022-01-14 DIAGNOSIS — J44.9 CHRONIC OBSTRUCTIVE PULMONARY DISEASE, UNSPECIFIED COPD TYPE: Primary | ICD-10-CM

## 2022-01-14 NOTE — TELEPHONE ENCOUNTER
Spoke to Claribel from [facility] she stated pt's insurance will pay for nebulizer on 1/30/22. Pt has gotten one 1 week ago.

## 2022-01-14 NOTE — TELEPHONE ENCOUNTER
Pt was given nebulizer solution but she dose not have nebulizer to go with it and would like to know can  write rx for one

## 2022-01-14 NOTE — TELEPHONE ENCOUNTER
----- Message from Jocy Greenwood sent at 1/14/2022  4:00 PM CST -----  Type:  Needs Medical Advice    Who Called: pt  Symptoms (please be specific): to get a call back about the nebulizer   :    Would the patient rather a call back or a response via [facility]? Call back  Best Call Back Number: [phone]  Additional Information: [facility] said [facility] not [facility] it

## 2022-01-14 NOTE — TELEPHONE ENCOUNTER
----- Message from Quita Baker sent at 1/14/2022  9:17 AM CST -----  Type: Requesting to speak with nurse         Who Called: PT  Regarding:Patient needing an order for nebulizer so she can use the solution she was giving please advise   Would the patient rather a call back or a response via [facility]? Call back  Best Call Back Number: [phone]  Additional Information: n/a

## 2022-02-01 ENCOUNTER — TELEPHONE (OUTPATIENT)
Dept: NEPHROLOGY | Facility: CLINIC | Age: 72
End: 2022-02-01
Payer: MEDICARE

## 2022-02-01 DIAGNOSIS — N18.4 CKD (CHRONIC KIDNEY DISEASE), STAGE IV: Primary | ICD-10-CM

## 2022-02-24 ENCOUNTER — LAB VISIT (OUTPATIENT)
Dept: LAB | Facility: HOSPITAL | Age: 72
End: 2022-02-24
Attending: INTERNAL MEDICINE
Payer: MEDICARE

## 2022-02-24 DIAGNOSIS — N18.4 CKD (CHRONIC KIDNEY DISEASE), STAGE IV: ICD-10-CM

## 2022-02-24 LAB
ANION GAP SERPL CALC-SCNC: 12 MMOL/L (ref 8–16)
BASOPHILS # BLD AUTO: 0.06 K/UL (ref 0–0.2)
BASOPHILS NFR BLD: 0.7 % (ref 0–1.9)
CALCIUM SERPL-MCNC: 9.6 MG/DL (ref 8.7–10.5)
CHLORIDE SERPL-SCNC: 103 MMOL/L (ref 95–110)
CO2 SERPL-SCNC: 28 MMOL/L (ref 23–29)
CREAT SERPL-MCNC: 2.05 MG/DL (ref 0.5–1.4)
DIFFERENTIAL METHOD: ABNORMAL
EOSINOPHIL # BLD AUTO: 0.8 K/UL (ref 0–0.5)
EOSINOPHIL NFR BLD: 10.2 % (ref 0–8)
ERYTHROCYTE [DISTWIDTH] IN BLOOD BY AUTOMATED COUNT: 13.6 % (ref 11.5–14.5)
EST. GFR  (AFRICAN AMERICAN): 27.3 ML/MIN/1.73 M^2
EST. GFR  (NON AFRICAN AMERICAN): 23.7 ML/MIN/1.73 M^2
GLUCOSE SERPL-MCNC: 112 MG/DL (ref 70–110)
HCT VFR BLD AUTO: 37.6 % (ref 37–48.5)
HGB BLD-MCNC: 11.9 G/DL (ref 12–16)
IMM GRANULOCYTES # BLD AUTO: 0.03 K/UL (ref 0–0.04)
IMM GRANULOCYTES NFR BLD AUTO: 0.4 % (ref 0–0.5)
LYMPHOCYTES # BLD AUTO: 1.6 K/UL (ref 1–4.8)
LYMPHOCYTES NFR BLD: 20.1 % (ref 18–48)
MCH RBC QN AUTO: 30.8 PG (ref 27–31)
MCHC RBC AUTO-ENTMCNC: 31.6 G/DL (ref 32–36)
MCV RBC AUTO: 97 FL (ref 82–98)
MONOCYTES # BLD AUTO: 0.5 K/UL (ref 0.3–1)
MONOCYTES NFR BLD: 6.5 % (ref 4–15)
NEUTROPHILS # BLD AUTO: 5 K/UL (ref 1.8–7.7)
NEUTROPHILS NFR BLD: 62.1 % (ref 38–73)
NRBC BLD-RTO: 0 /100 WBC
PLATELET # BLD AUTO: 176 K/UL (ref 150–450)
PMV BLD AUTO: 11.6 FL (ref 9.2–12.9)
POTASSIUM SERPL-SCNC: 4.6 MMOL/L (ref 3.5–5.1)
RBC # BLD AUTO: 3.86 M/UL (ref 4–5.4)
SODIUM SERPL-SCNC: 143 MMOL/L (ref 136–145)
UUN UR-MCNC: 32 MG/DL (ref 7–17)
WBC # BLD AUTO: 8.12 K/UL (ref 3.9–12.7)

## 2022-02-24 PROCEDURE — 80048 BASIC METABOLIC PNL TOTAL CA: CPT | Mod: PO | Performed by: INTERNAL MEDICINE

## 2022-02-24 PROCEDURE — 36415 COLL VENOUS BLD VENIPUNCTURE: CPT | Mod: PO | Performed by: INTERNAL MEDICINE

## 2022-02-24 PROCEDURE — 85025 COMPLETE CBC W/AUTO DIFF WBC: CPT | Mod: PO | Performed by: INTERNAL MEDICINE

## 2022-02-28 ENCOUNTER — PATIENT OUTREACH (OUTPATIENT)
Dept: ADMINISTRATIVE | Facility: OTHER | Age: 72
End: 2022-02-28
Payer: MEDICARE

## 2022-02-28 NOTE — PROGRESS NOTES
Health Maintenance Due   Topic Date Due    DEXA Scan  05/08/2020    Shingles Vaccine (2 of 2) 07/22/2021    COVID-19 Vaccine (3 - Booster for Moderna series) 08/10/2021    Influenza Vaccine (1) 09/01/2021     Updates were requested from care everywhere.  Chart was reviewed for overdue [facility] Encounters (NEAL) topics (CRS, Breast Cancer Screening, Eye exam)  Health Maintenance has been updated.  LINKS immunization registry triggered.  Immunizations were reconciled.

## 2022-03-11 NOTE — TELEPHONE ENCOUNTER
----- Message from Judit Wild MA sent at 2/4/2019 10:56 AM CST -----  Contact: self  Pt. saw  on Fri. 1/31. She thought about over the weekend is calling to schedule a cardioversion. Please call.   Patient Risk Identification Screen    In the past 24 hours, have you had a fever? no    Have you taken any medications that would treat a fever such as Tylenol or Motrin? no    Do you have a new cough or a cough that is worse than usual?  No    Do you have new or worsening shortness of breath? No      Household Risk Identification Screen    In the past 24 hours, has anyone in your home had a fever? no    Had anyone in your home taken any medications that would treat a fever such as Tylenol or Motrin? no     Does anyone in your home have a new cough or a cough that is worse than usual?  No    Does anyone in your home have new or worsening shortness of breath?    No

## 2022-03-18 ENCOUNTER — LAB VISIT (OUTPATIENT)
Dept: LAB | Facility: HOSPITAL | Age: 72
End: 2022-03-18
Attending: FAMILY MEDICINE
Payer: MEDICARE

## 2022-03-18 ENCOUNTER — OFFICE VISIT (OUTPATIENT)
Dept: FAMILY MEDICINE | Facility: CLINIC | Age: 72
End: 2022-03-18
Payer: MEDICARE

## 2022-03-18 VITALS
TEMPERATURE: 98 F | HEART RATE: 100 BPM | DIASTOLIC BLOOD PRESSURE: 72 MMHG | SYSTOLIC BLOOD PRESSURE: 100 MMHG | WEIGHT: 224.88 LBS | OXYGEN SATURATION: 98 % | HEIGHT: 62 IN | BODY MASS INDEX: 41.38 KG/M2

## 2022-03-18 DIAGNOSIS — E03.4 HYPOTHYROIDISM DUE TO ACQUIRED ATROPHY OF THYROID: ICD-10-CM

## 2022-03-18 DIAGNOSIS — J30.2 SEASONAL ALLERGIES: ICD-10-CM

## 2022-03-18 DIAGNOSIS — J44.9 CHRONIC OBSTRUCTIVE PULMONARY DISEASE, UNSPECIFIED COPD TYPE: ICD-10-CM

## 2022-03-18 DIAGNOSIS — N18.4 CKD (CHRONIC KIDNEY DISEASE) STAGE 4, GFR 15-29 ML/MIN: ICD-10-CM

## 2022-03-18 DIAGNOSIS — Z87.891 EX-SMOKER: Primary | ICD-10-CM

## 2022-03-18 LAB
T4 FREE SERPL-MCNC: 0.61 NG/DL (ref 0.71–1.51)
TSH SERPL DL<=0.005 MIU/L-ACNC: 35.3 UIU/ML (ref 0.4–4)

## 2022-03-18 PROCEDURE — 1159F PR MEDICATION LIST DOCUMENTED IN MEDICAL RECORD: ICD-10-PCS | Mod: CPTII,S$GLB,, | Performed by: FAMILY MEDICINE

## 2022-03-18 PROCEDURE — 36415 COLL VENOUS BLD VENIPUNCTURE: CPT | Mod: PO | Performed by: FAMILY MEDICINE

## 2022-03-18 PROCEDURE — 1126F AMNT PAIN NOTED NONE PRSNT: CPT | Mod: CPTII,S$GLB,, | Performed by: FAMILY MEDICINE

## 2022-03-18 PROCEDURE — 1101F PR PT FALLS ASSESS DOC 0-1 FALLS W/OUT INJ PAST YR: ICD-10-PCS | Mod: CPTII,S$GLB,, | Performed by: FAMILY MEDICINE

## 2022-03-18 PROCEDURE — 3078F DIAST BP <80 MM HG: CPT | Mod: CPTII,S$GLB,, | Performed by: FAMILY MEDICINE

## 2022-03-18 PROCEDURE — 3078F PR MOST RECENT DIASTOLIC BLOOD PRESSURE < 80 MM HG: ICD-10-PCS | Mod: CPTII,S$GLB,, | Performed by: FAMILY MEDICINE

## 2022-03-18 PROCEDURE — 3288F PR FALLS RISK ASSESSMENT DOCUMENTED: ICD-10-PCS | Mod: CPTII,S$GLB,, | Performed by: FAMILY MEDICINE

## 2022-03-18 PROCEDURE — 3074F PR MOST RECENT SYSTOLIC BLOOD PRESSURE < 130 MM HG: ICD-10-PCS | Mod: CPTII,S$GLB,, | Performed by: FAMILY MEDICINE

## 2022-03-18 PROCEDURE — 3288F FALL RISK ASSESSMENT DOCD: CPT | Mod: CPTII,S$GLB,, | Performed by: FAMILY MEDICINE

## 2022-03-18 PROCEDURE — 4010F PR ACE/ARB THEARPY RXD/TAKEN: ICD-10-PCS | Mod: CPTII,S$GLB,, | Performed by: FAMILY MEDICINE

## 2022-03-18 PROCEDURE — 1159F MED LIST DOCD IN RCRD: CPT | Mod: CPTII,S$GLB,, | Performed by: FAMILY MEDICINE

## 2022-03-18 PROCEDURE — 84443 ASSAY THYROID STIM HORMONE: CPT | Mod: PO | Performed by: FAMILY MEDICINE

## 2022-03-18 PROCEDURE — 3008F PR BODY MASS INDEX (BMI) DOCUMENTED: ICD-10-PCS | Mod: CPTII,S$GLB,, | Performed by: FAMILY MEDICINE

## 2022-03-18 PROCEDURE — 99214 OFFICE O/P EST MOD 30 MIN: CPT | Mod: S$GLB,,, | Performed by: FAMILY MEDICINE

## 2022-03-18 PROCEDURE — 1101F PT FALLS ASSESS-DOCD LE1/YR: CPT | Mod: CPTII,S$GLB,, | Performed by: FAMILY MEDICINE

## 2022-03-18 PROCEDURE — 4010F ACE/ARB THERAPY RXD/TAKEN: CPT | Mod: CPTII,S$GLB,, | Performed by: FAMILY MEDICINE

## 2022-03-18 PROCEDURE — 84439 ASSAY OF FREE THYROXINE: CPT | Performed by: FAMILY MEDICINE

## 2022-03-18 PROCEDURE — 3008F BODY MASS INDEX DOCD: CPT | Mod: CPTII,S$GLB,, | Performed by: FAMILY MEDICINE

## 2022-03-18 PROCEDURE — 1126F PR PAIN SEVERITY QUANTIFIED, NO PAIN PRESENT: ICD-10-PCS | Mod: CPTII,S$GLB,, | Performed by: FAMILY MEDICINE

## 2022-03-18 PROCEDURE — 99214 PR OFFICE/OUTPT VISIT, EST, LEVL IV, 30-39 MIN: ICD-10-PCS | Mod: S$GLB,,, | Performed by: FAMILY MEDICINE

## 2022-03-18 PROCEDURE — 3074F SYST BP LT 130 MM HG: CPT | Mod: CPTII,S$GLB,, | Performed by: FAMILY MEDICINE

## 2022-03-18 RX ORDER — ALBUTEROL SULFATE 90 UG/1
2 AEROSOL, METERED RESPIRATORY (INHALATION) EVERY 4 HOURS PRN
Qty: 54 G | Refills: 11 | Status: SHIPPED | OUTPATIENT
Start: 2022-03-18 | End: 2023-05-05

## 2022-03-18 RX ORDER — IPRATROPIUM BROMIDE AND ALBUTEROL SULFATE 2.5; .5 MG/3ML; MG/3ML
3 SOLUTION RESPIRATORY (INHALATION) EVERY 6 HOURS PRN
Qty: 400 EACH | Refills: 3 | Status: SHIPPED | OUTPATIENT
Start: 2022-03-18

## 2022-03-18 NOTE — PROGRESS NOTES
"Subjective:      Patient ID: Talia Dillon is a 71 y.o. female.    Chief Complaint: Follow-up      Vitals:    03/18/22 1114   BP: 100/72   Pulse: 100   Temp: 97.7 °F (36.5 °C)   TempSrc: Oral   SpO2: 98%   Weight: 102 kg (224 lb 13.9 oz)   Height: 5' 2" (1.575 m)        HPI   Follow up 2ER visits for SOB  Doesn't know how to use neb, so I showed her today  Dr OLGUIN Rx antianxiety meds that helped her from flying off handle  Is on xanax, lexapro and off cymbalta      Problem List  Patient Active Problem List   Diagnosis    Scoliosis    Anxiety reaction    Hypothyroidism due to acquired atrophy of thyroid    Insomnia    Localized edema    Congenital deformities of feet    Leg pain, anterior    Cervical neuralgia    History of PSVT (paroxysmal supraventricular tachycardia)    CKD stage G4/A2, GFR 15-29 and albumin creatinine ratio  mg/g    Cervical spinal stenosis    Foraminal stenosis of lumbar region    Decreased functional mobility    DDD (degenerative disc disease), lumbar    Vitamin D deficiency    Elevated sed rate    Hyperlipidemia    Lumbar spondylosis    Chronic kidney disease-mineral and bone disorder    Longstanding persistent atrial fibrillation    Essential hypertension    Complete tear of left rotator cuff    Long term (current) use of anticoagulants    Osteoarthritis of lumbar spine    Chronic pain    Ascending aortic aneurysm    Lung nodule    CKD (chronic kidney disease) stage 4, GFR 15-29 ml/min    Dyspnea on exertion        ALLERGIES:   Review of patient's allergies indicates:   Allergen Reactions    Dexamethasone Rash       MEDS:   Current Outpatient Medications:     albuterol (PROVENTIL/VENTOLIN HFA) 90 mcg/actuation inhaler, Inhale 2 puffs into the lungs every 4 (four) hours as needed for Wheezing (For shortness of breath)., Disp: 18 g, Rfl: 11    albuterol-ipratropium (DUO-NEB) 2.5 mg-0.5 mg/3 mL nebulizer solution, Take 3 mLs by nebulization every 6 (six) hours " as needed for Wheezing. Rescue, Disp: 60 each, Rfl: 3    ALPRAZolam (XANAX) 0.25 MG tablet, Take 1 tablet (0.25 mg total) by mouth once daily., Disp: 90 tablet, Rfl: 1    atorvastatin (LIPITOR) 10 MG tablet, Take 1 tablet (10 mg total) by mouth once daily., Disp: 90 tablet, Rfl: 3    candesartan (ATACAND) 8 MG tablet, Take 0.5 tablets (4 mg total) by mouth once daily., Disp: 45 tablet, Rfl: 3    cholecalciferol, vitamin D3, (VITAMIN D3) 5,000 unit Tab, Take 5,000 Units by mouth once daily., Disp: 90 tablet, Rfl: 3    DULoxetine (CYMBALTA) 60 MG capsule, TAKE 1 CAPSULE (60 MG TOTAL) BY MOUTH 2 (TWO) TIMES DAILY., Disp: 180 capsule, Rfl: 2    ELIQUIS 5 mg Tab, TAKE 1 TABLET BY MOUTH TWICE A DAY, Disp: 180 tablet, Rfl: 3    EScitalopram oxalate (LEXAPRO) 10 MG tablet, Take 1 tablet (10 mg total) by mouth once daily., Disp: 30 tablet, Rfl: 11    levothyroxine (SYNTHROID, LEVOTHROID) 175 MCG tablet, Take 1 tablet (175 mcg total) by mouth once daily., Disp: 90 tablet, Rfl: 3    metoprolol succinate (TOPROL-XL) 100 MG 24 hr tablet, Take 1 tablet (100 mg total) by mouth once daily., Disp: 90 tablet, Rfl: 3    metoprolol succinate (TOPROL-XL) 200 MG 24 hr tablet, Take 1 tablet (200 mg total) by mouth once daily., Disp: 90 tablet, Rfl: 3    umeclidinium-vilanteroL (ANORO ELLIPTA) 62.5-25 mcg/actuation DsDv, Inhale 1 puff into the lungs once daily. Controller, Disp: 60 each, Rfl: 11    zolpidem (AMBIEN) 10 mg Tab, TAKE 1 TABLET BY MOUTH EVERY DAY IN THE EVENING AS NEEDED, Disp: 90 tablet, Rfl: 1    gabapentin (NEURONTIN) 400 MG capsule, Take 1 capsule (400 mg total) by mouth 2 (two) times daily., Disp: 180 capsule, Rfl: 3      History:  Current Providers as of 3/18/2022  PCP: James Vallecillo MD  Care Team Provider: Robert Ochoa MD  Care Team Provider: Laci Barker MD  Care Team Provider: Julieth Christopher MD  Care Team Provider: Fransico Gallagher MD  Care Team Provider: Devin You MD  Care  Team Provider: Héctor Davila Jr., MD  Care Team Provider: Bebeto Marin MD  Encounter Provider: James Vallecillo MD, starting on Fri Mar 18, 2022 12:00 AM  Referring Provider: not found, starting on Fri Mar 18, 2022 12:00 AM  Consulting Physician: James Vallecillo MD, starting on Fri Mar 18, 2022 11:08 AM (Active)   Past Medical History:   Diagnosis Date    Allergy     Anemia     Anxiety     Atrial fibrillation     Cancer     skin    Cataract     Depression     Edema     Hypothyroidism     Kidney disease     PSVT (paroxysmal supraventricular tachycardia)     Thyroid disease      Past Surgical History:   Procedure Laterality Date    ADENOIDECTOMY      APPENDECTOMY      COLONOSCOPY  2009    repeat in 10 years     EYE SURGERY      HYSTERECTOMY      INJECTION OF ANESTHETIC AGENT AROUND NERVE Bilateral 9/21/2018    Procedure: BLOCK, NERVE, MEDIAL BRANCH BLOCK BILATERAL LUMBAR L2-5;  Surgeon: Julieth Christopher MD;  Location: [facility];  Service: Pain Management;  Laterality: Bilateral;  1 of 2    INJECTION OF ANESTHETIC AGENT AROUND NERVE Bilateral 9/28/2018    Procedure: BLOCK, NERVE, MEDIAL BRANCH BLOCK BILATERAL LUMBAR L2-5;  Surgeon: Julieth Christopher MD;  Location: [facility];  Service: Pain Management;  Laterality: Bilateral;  2 of 2  PLEASE GIVE NIRAVAM PER MD    OOPHORECTOMY      RADIOFREQUENCY ABLATION Left 2/14/2020    Procedure: RADIOFREQUENCY ABLATION L2,3,4,5;  Surgeon: Julieth Christopher MD;  Location: [facility];  Service: Pain Management;  Laterality: Left;  LT RFA L2,3,4,5  1 of 2  OK to hold Eliquis    RADIOFREQUENCY ABLATION Right 2/28/2020    Procedure: RADIOFREQUENCY ABLATION L2,3,4,5 RIGHT   2 of 2 ok to hold eliquis;  Surgeon: Julieth Christopher MD;  Location: [facility];  Service: Pain Management;  Laterality: Right;    RADIOFREQUENCY ABLATION Left 11/30/2020    Procedure: RADIOFREQUENCY ABLATION, L2-L3-L4-L5 MEDIAL BRANCH 1 OF 2 Continue Eliquis;  Surgeon: Bogdan  MD Eleanor;  Location: [facility];  Service: Pain Management;  Laterality: Left;    TONSILLECTOMY      TREATMENT OF CARDIAC ARRHYTHMIA N/A 2/8/2019    Procedure: CARDIOVERSION;  Surgeon: Devin You MD;  Location: [facility];  Service: Cardiology;  Laterality: N/A;  AF, KAROL, DCCV, MAC, PA, 3 Prep     Social History     Tobacco Use    Smoking status: Former Smoker     Packs/day: 1.00     Years: 50.00     Pack years: 50.00     Types: Cigarettes    Smokeless tobacco: Never Used   Substance Use Topics    Alcohol use: No     Comment: rarely    Drug use: No         Review of Systems   Constitutional: Negative.    HENT: Negative.    Respiratory: Positive for shortness of breath.    Cardiovascular: Negative.    Gastrointestinal: Negative.    Endocrine: Negative.    Genitourinary: Negative.    Musculoskeletal: Negative.    Psychiatric/Behavioral: The patient is nervous/anxious.    All other systems reviewed and are negative.    Objective:     Physical Exam  Vitals and nursing note reviewed.   Constitutional:       Appearance: She is well-developed.   HENT:      Head: Normocephalic.   Eyes:      Conjunctiva/sclera: Conjunctivae normal.      Pupils: Pupils are equal, round, and reactive to light.   Cardiovascular:      Rate and Rhythm: Normal rate and regular rhythm.      Heart sounds: Normal heart sounds.   Pulmonary:      Effort: Pulmonary effort is normal.      Breath sounds: Normal breath sounds.   Musculoskeletal:         General: Normal range of motion.      Cervical back: Normal range of motion and neck supple.   Skin:     General: Skin is warm and dry.   Neurological:      Mental Status: She is alert and oriented to person, place, and time.      Deep Tendon Reflexes: Reflexes are normal and symmetric.   Psychiatric:         Behavior: Behavior normal.         Thought Content: Thought content normal.         Judgment: Judgment normal.        many some symptomsw sre due to thyroid and dosingn as last TSH was  too low 2 years ago     Assessment:     No diagnosis found.  Plan:        Medication List          Accurate as of March 18, 2022 11:50 AM. If you have any questions, ask your nurse or doctor.            CONTINUE taking these medications    albuterol 90 mcg/actuation inhaler  Commonly known as: PROVENTIL/VENTOLIN HFA  Inhale 2 puffs into the lungs every 4 (four) hours as needed for Wheezing (For shortness of breath).     albuterol-ipratropium 2.5 mg-0.5 mg/3 mL nebulizer solution  Commonly known as: DUO-NEB  Take 3 mLs by nebulization every 6 (six) hours as needed for Wheezing. Rescue     ALPRAZolam 0.25 MG tablet  Commonly known as: XANAX  Take 1 tablet (0.25 mg total) by mouth once daily.     ANORO ELLIPTA 62.5-25 mcg/actuation Dsdv  Generic drug: umeclidinium-vilanteroL  Inhale 1 puff into the lungs once daily. Controller     atorvastatin 10 MG tablet  Commonly known as: LIPITOR  Take 1 tablet (10 mg total) by mouth once daily.     candesartan 8 MG tablet  Commonly known as: ATACAND  Take 0.5 tablets (4 mg total) by mouth once daily.     cholecalciferol (vitamin D3) 125 mcg (5,000 unit) Tab  Commonly known as: VITAMIN D3  Take 5,000 Units by mouth once daily.     DULoxetine 60 MG capsule  Commonly known as: CYMBALTA  TAKE 1 CAPSULE (60 MG TOTAL) BY MOUTH 2 (TWO) TIMES DAILY.     ELIQUIS 5 mg Tab  Generic drug: apixaban  TAKE 1 TABLET BY MOUTH TWICE A DAY     EScitalopram oxalate 10 MG tablet  Commonly known as: LEXAPRO  Take 1 tablet (10 mg total) by mouth once daily.     gabapentin 400 MG capsule  Commonly known as: NEURONTIN  Take 1 capsule (400 mg total) by mouth 2 (two) times daily.     levothyroxine 175 MCG tablet  Commonly known as: SYNTHROID, LEVOTHROID  Take 1 tablet (175 mcg total) by mouth once daily.     * metoprolol succinate 200 MG 24 hr tablet  Commonly known as: TOPROL-XL  Take 1 tablet (200 mg total) by mouth once daily.     * metoprolol succinate 100 MG 24 hr tablet  Commonly known as:  TOPROL-XL  Take 1 tablet (100 mg total) by mouth once daily.     zolpidem 10 mg Tab  Commonly known as: AMBIEN  TAKE 1 TABLET BY MOUTH EVERY DAY IN THE EVENING AS NEEDED         * This list has 2 medication(s) that are the same as other medications prescribed for you. Read the directions carefully, and ask your doctor or other care provider to review them with you.              There are no diagnoses linked to this encounter.

## 2022-03-25 ENCOUNTER — TELEPHONE (OUTPATIENT)
Dept: FAMILY MEDICINE | Facility: CLINIC | Age: 72
End: 2022-03-25
Payer: MEDICARE

## 2022-03-25 DIAGNOSIS — E03.4 HYPOTHYROIDISM DUE TO ACQUIRED ATROPHY OF THYROID: Primary | ICD-10-CM

## 2022-03-25 NOTE — TELEPHONE ENCOUNTER
----- Message from Jocy Greenwood sent at 3/25/2022  1:52 PM CDT -----  Type:  Needs Medical Advice    Who Called:  pt  Symptoms (please be specific):  would like to get a  call back  regarding medication adjustment Dr. Vallecillo recommended for   levothyroxine (SYNTHROID, LEVOTHROID) 175 MCG tablet  Would the patient rather a call back or a response via [employer] by [employer]?  Call   Best Call Back Number:  [phone]  Additional Information:

## 2022-03-25 NOTE — TELEPHONE ENCOUNTER
Pt called to inform you that she is currently taking Levothyroxine 175 mcg. Per your response to lab results, pt is to be taking Levothyroxine 200 mcg. Please send to [facility] pharmacy for a 90 day supply.

## 2022-03-28 RX ORDER — LEVOTHYROXINE SODIUM 200 UG/1
200 TABLET ORAL DAILY
Qty: 90 TABLET | Refills: 3 | Status: ON HOLD | OUTPATIENT
Start: 2022-03-28 | End: 2023-05-04

## 2022-03-28 NOTE — TELEPHONE ENCOUNTER
Notified pt that levothyroxine was sent to [facility] as 90 day supply. Scheduled pt repeat tsh for 2 months. Will mail to pt as appointment reminder.

## 2022-03-31 ENCOUNTER — PES CALL (OUTPATIENT)
Dept: ADMINISTRATIVE | Facility: CLINIC | Age: 72
End: 2022-03-31
Payer: MEDICARE

## 2022-05-07 ENCOUNTER — HOSPITAL ENCOUNTER (EMERGENCY)
Facility: HOSPITAL | Age: 72
Discharge: HOME | End: 2022-05-07
Attending: FAMILY MEDICINE
Payer: MEDICARE

## 2022-05-07 VITALS
DIASTOLIC BLOOD PRESSURE: 70 MMHG | WEIGHT: 220 LBS | BODY MASS INDEX: 40.24 KG/M2 | TEMPERATURE: 98 F | HEART RATE: 81 BPM | RESPIRATION RATE: 20 BRPM | OXYGEN SATURATION: 98 % | SYSTOLIC BLOOD PRESSURE: 110 MMHG

## 2022-05-07 DIAGNOSIS — M54.9 BACK PAIN: ICD-10-CM

## 2022-05-07 LAB
BACTERIA #/AREA URNS AUTO: NORMAL /HPF
BILIRUB UR QL STRIP: NEGATIVE
CLARITY UR REFRACT.AUTO: CLEAR
COLOR UR AUTO: YELLOW
GLUCOSE UR QL STRIP: NEGATIVE
HGB UR QL STRIP: NEGATIVE
HYALINE CASTS UR QL AUTO: 1 /LPF
KETONES UR QL STRIP: NEGATIVE
LEUKOCYTE ESTERASE UR QL STRIP: NEGATIVE
MICROSCOPIC COMMENT: NORMAL
NITRITE UR QL STRIP: NEGATIVE
PH UR STRIP: 5 [PH] (ref 5–8)
PROT UR QL STRIP: ABNORMAL
RBC #/AREA URNS AUTO: 2 /HPF (ref 0–4)
SP GR UR STRIP: 1.02 (ref 1–1.03)
SQUAMOUS #/AREA URNS AUTO: 3 /HPF
URN SPEC COLLECT METH UR: ABNORMAL
UROBILINOGEN UR STRIP-ACNC: NEGATIVE EU/DL
WBC #/AREA URNS AUTO: 3 /HPF (ref 0–5)

## 2022-05-07 PROCEDURE — 63600175 PHARM REV CODE 636 W HCPCS: Mod: ER | Performed by: PHYSICIAN ASSISTANT

## 2022-05-07 PROCEDURE — 25000003 PHARM REV CODE 250: Mod: ER | Performed by: FAMILY MEDICINE

## 2022-05-07 PROCEDURE — 81000 URINALYSIS NONAUTO W/SCOPE: CPT | Mod: ER | Performed by: FAMILY MEDICINE

## 2022-05-07 PROCEDURE — 99284 EMERGENCY DEPT VISIT MOD MDM: CPT | Mod: 25,ER

## 2022-05-07 PROCEDURE — 96372 THER/PROPH/DIAG INJ SC/IM: CPT | Performed by: PHYSICIAN ASSISTANT

## 2022-05-07 RX ORDER — ORPHENADRINE CITRATE 30 MG/ML
30 INJECTION INTRAMUSCULAR; INTRAVENOUS
Status: COMPLETED | OUTPATIENT
Start: 2022-05-07 | End: 2022-05-07

## 2022-05-07 RX ORDER — METHOCARBAMOL 500 MG/1
500 TABLET, FILM COATED ORAL
Status: DISCONTINUED | OUTPATIENT
Start: 2022-05-07 | End: 2022-05-07

## 2022-05-07 RX ORDER — LIDOCAINE 50 MG/G
1 PATCH TOPICAL ONCE
Status: DISCONTINUED | OUTPATIENT
Start: 2022-05-07 | End: 2022-05-07 | Stop reason: HOSPADM

## 2022-05-07 RX ORDER — METHOCARBAMOL 500 MG/1
1000 TABLET, FILM COATED ORAL 3 TIMES DAILY
Qty: 30 TABLET | Refills: 0 | Status: SHIPPED | OUTPATIENT
Start: 2022-05-07 | End: 2022-05-12

## 2022-05-07 RX ORDER — LIDOCAINE 50 MG/G
1 PATCH TOPICAL DAILY
Qty: 15 PATCH | Refills: 0 | Status: SHIPPED | OUTPATIENT
Start: 2022-05-07 | End: 2022-05-07 | Stop reason: SDUPTHER

## 2022-05-07 RX ORDER — LIDOCAINE 50 MG/G
1 PATCH TOPICAL
Status: DISCONTINUED | OUTPATIENT
Start: 2022-05-07 | End: 2022-05-07

## 2022-05-07 RX ORDER — TRAMADOL HYDROCHLORIDE AND ACETAMINOPHEN 37.5; 325 MG/1; MG/1
1 TABLET, FILM COATED ORAL EVERY 4 HOURS PRN
Qty: 10 TABLET | Refills: 0 | Status: SHIPPED | OUTPATIENT
Start: 2022-05-07 | End: 2022-06-10

## 2022-05-07 RX ORDER — LIDOCAINE 50 MG/G
1 PATCH TOPICAL DAILY
Qty: 15 PATCH | Refills: 0 | Status: SHIPPED | OUTPATIENT
Start: 2022-05-07 | End: 2022-08-31

## 2022-05-07 RX ADMIN — ORPHENADRINE CITRATE 30 MG: 30 INJECTION INTRAMUSCULAR; INTRAVENOUS at 11:05

## 2022-05-07 RX ADMIN — LIDOCAINE 1 PATCH: 50 PATCH CUTANEOUS at 11:05

## 2022-05-08 NOTE — ED PROVIDER NOTES
Encounter Date: 5/7/2022       History     Chief Complaint   Patient presents with    Back Pain     C/o left lower back pain for 1 month. Pt states she just can't take the pain anymore. Pt has not attempted to see PCP for pain. Pt has not taken anything for pain today. No urinary symptoms.      HPI: Talia Dillon, a 71 y.o. female  has a past medical history of Allergy, Anemia, Anxiety, Atrial fibrillation, Cancer, Cataract, Depression, Edema, Hypothyroidism, Kidney disease, PSVT (paroxysmal supraventricular tachycardia), and Thyroid disease.     She presents to the ED for evaluation of left lower back pain x1 month.  Pain is exacerbated with movement and touch.  No skin changes.  Denies any urinary symptoms.  Patient states she has tried over-the-counter Tylenol with little improvement.  Denies any known precipitating event.  No radiation of pain.      The history is provided by the patient.     Review of patient's allergies indicates:   Allergen Reactions    Dexamethasone Rash     Past Medical History:   Diagnosis Date    Allergy     Anemia     Anxiety     Atrial fibrillation     Cancer     skin    Cataract     Depression     Edema     Hypothyroidism     Kidney disease     PSVT (paroxysmal supraventricular tachycardia)     Thyroid disease      Past Surgical History:   Procedure Laterality Date    ADENOIDECTOMY      APPENDECTOMY      COLONOSCOPY  2009    repeat in 10 years     EYE SURGERY      HYSTERECTOMY      INJECTION OF ANESTHETIC AGENT AROUND NERVE Bilateral 9/21/2018    Procedure: BLOCK, NERVE, MEDIAL BRANCH BLOCK BILATERAL LUMBAR L2-5;  Surgeon: Julieth Christopher MD;  Location: [facility];  Service: Pain Management;  Laterality: Bilateral;  1 of 2    INJECTION OF ANESTHETIC AGENT AROUND NERVE Bilateral 9/28/2018    Procedure: BLOCK, NERVE, MEDIAL BRANCH BLOCK BILATERAL LUMBAR L2-5;  Surgeon: Julieth Christopher MD;  Location: [facility];  Service: Pain Management;  Laterality:  Bilateral;  2 of 2  PLEASE GIVE NIRAVAM PER MD    OOPHORECTOMY      RADIOFREQUENCY ABLATION Left 2/14/2020    Procedure: RADIOFREQUENCY ABLATION L2,3,4,5;  Surgeon: Julieth Christopher MD;  Location: [facility];  Service: Pain Management;  Laterality: Left;  LT RFA L2,3,4,5  1 of 2  OK to hold Eliquis    RADIOFREQUENCY ABLATION Right 2/28/2020    Procedure: RADIOFREQUENCY ABLATION L2,3,4,5 RIGHT   2 of 2 ok to hold eliquis;  Surgeon: Julieth Christopher MD;  Location: [facility];  Service: Pain Management;  Laterality: Right;    RADIOFREQUENCY ABLATION Left 11/30/2020    Procedure: RADIOFREQUENCY ABLATION, L2-L3-L4-L5 MEDIAL BRANCH 1 OF 2 Continue Eliquis;  Surgeon: Bogdan Webster MD;  Location: [facility];  Service: Pain Management;  Laterality: Left;    TONSILLECTOMY      TREATMENT OF CARDIAC ARRHYTHMIA N/A 2/8/2019    Procedure: CARDIOVERSION;  Surgeon: Devin You MD;  Location: [facility] EP [facility];  Service: Cardiology;  Laterality: N/A;  AF, KAROL, DCCV, MAC, [place], 3 Prep     Family History   Problem Relation Age of Onset    Stroke Mother     Stroke Father     Diabetes Father     Sleep apnea Daughter     Mental illness Daughter      Social History     Tobacco Use    Smoking status: Former Smoker     Packs/day: 1.00     Years: 50.00     Pack years: 50.00     Types: Cigarettes    Smokeless tobacco: Never Used   Substance Use Topics    Alcohol use: No     Comment: rarely    Drug use: No     Review of Systems   Constitutional: Negative for fever.   Gastrointestinal: Negative for nausea and vomiting.   Genitourinary: Positive for flank pain. Negative for dysuria and frequency.   Skin: Negative for color change and rash.   Allergic/Immunologic: Negative for immunocompromised state.   Neurological: Negative for weakness.   All other systems reviewed and are negative.      Physical Exam     Initial Vitals [05/07/22 1055]   BP Pulse Resp Temp SpO2   104/72 80 20 97.8 °F (36.6 °C) 98 %      MAP       --          Physical Exam    Nursing note and vitals reviewed.  Constitutional: She appears well-developed and well-nourished. She is not diaphoretic. No distress.   HENT:   Head: Normocephalic and atraumatic.   Right Ear: External ear normal.   Left Ear: External ear normal.   Nose: Nose normal.   Eyes: Conjunctivae and EOM are normal.   Neck:   Normal range of motion.  Cardiovascular: Normal rate and regular rhythm.   Pulmonary/Chest: No respiratory distress.   Musculoskeletal:         General: Normal range of motion.      Cervical back: Normal range of motion.        Back:      Neurological: She is alert and oriented to person, place, and time.   Skin: Capillary refill takes less than 2 seconds. No rash noted.   Psychiatric: She has a normal mood and affect. Thought content normal.         ED Course   Procedures  Labs Reviewed   URINALYSIS, REFLEX TO URINE CULTURE - Abnormal; Notable for the following components:       Result Value    Protein, UA 1+ (*)     All other components within normal limits    Narrative:     Preferred Collection Type->Urine, Clean Catch  Specimen Source->Urine  Collection Type->Urine, Clean Catch   URINALYSIS MICROSCOPIC    Narrative:     Preferred Collection Type->Urine, Clean Catch  Specimen Source->Urine  Collection Type->Urine, Clean Catch          Imaging Results          X-Ray Thoracolumbar Spine AP Lateral (Final result)  Result time 05/07/22 12:08:17    Final result by Lavelle Chawla MD (05/07/22 12:08:17)                 Impression:      No acute radiographic abnormality of the thoracolumbar spine.    Mild lumbar levoscoliotic curvature.    Multilevel degenerative disc disease as detailed in the body of the report.      Electronically signed by: Lavelle Chawla  Date:    05/07/2022  Time:    12:08             Narrative:    EXAMINATION:  XR THORACOLUMBAR SPINE AP LATERAL    CLINICAL HISTORY:  Dorsalgia, unspecified    TECHNIQUE:  AP and lateral views of the thoracolumbar spine were  performed.    COMPARISON:  Thoracic radiograph 04/10/2017    FINDINGS:  Straightening of the normal thoracic kyphosis.  The visualized thoracic vertebral body heights appear maintained.  There is multilevel disc height loss with endplate degenerative sclerosis and small anterior osteophyte production.  There is levo scoliotic curvature of the lumbar spine.  Lumbar vertebral body heights appear maintained.  There is multilevel disc height loss most pronounced at L2-L3 and L3-L4.  There are multilevel anterolateral osteophytes.  No acute fractures.                                 Medications   orphenadrine injection 30 mg (30 mg Intramuscular Given 5/7/22 1132)     Medical Decision Making:   Initial Assessment:   Left sided back pain   Differential Diagnosis:   Muscular strain, herniated disk, UTI   Clinical Tests:   Lab Tests: Reviewed and Ordered  The following lab test(s) were unremarkable: Urinalysis  Radiological Study: Ordered and Reviewed  ED Management:  Patient presents to the ER for evaluation of left-sided flank pain started 1 month ago.  Mild tenderness to palpation to left flank.  No pain a. No rash.  Patient was given nor flex and Lidoderm patch.  No abnormality on UA or x-ray.  Patient was given information on symptomatic control at home encouraged follow-up with their PCP and to return with any new or worsening symptoms.  She verbalized understanding and agreement.                        Clinical Impression:   Final diagnoses:  [M54.9] Back pain          ED Disposition Condition    Discharge Stable        ED Prescriptions     Medication Sig Dispense Start Date End Date Auth. Provider    tramadol-acetaminophen 37.5-325 mg (ULTRACET) 37.5-325 mg Tab Take 1 tablet by mouth every 4 (four) hours as needed for Pain. 10 tablet 5/7/2022  Quita Jenkins PA-C    LIDOcaine (LIDODERM) 5 %  (Status: Discontinued) Place 1 patch onto the skin once daily. Remove & Discard patch within 12 hours or as directed by MD  15 patch 5/7/2022 5/7/2022 Quita Jenkins PA-C    methocarbamoL (ROBAXIN) 500 MG Tab Take 2 tablets (1,000 mg total) by mouth 3 (three) times daily. for 5 days 30 tablet 5/7/2022 5/12/2022 Quita Jenkins PA-C    LIDOcaine (LIDODERM) 5 % Place 1 patch onto the skin once daily. Remove & Discard patch within 12 hours or as directed by MD 15 patch 5/7/2022  Quita Jenkins PA-C        Follow-up Information     Follow up With Specialties Details Why Contact Info    James Vallecillo MD Family Medicine   [address] [place] [id number]  [phone]             Quita Jenkins PA-C  05/08/22 1933

## 2022-05-19 ENCOUNTER — PES CALL (OUTPATIENT)
Dept: ADMINISTRATIVE | Facility: CLINIC | Age: 72
End: 2022-05-19
Payer: MEDICARE

## 2022-05-30 ENCOUNTER — LAB VISIT (OUTPATIENT)
Dept: LAB | Facility: HOSPITAL | Age: 72
End: 2022-05-30
Attending: FAMILY MEDICINE
Payer: MEDICARE

## 2022-05-30 DIAGNOSIS — E03.4 HYPOTHYROIDISM DUE TO ACQUIRED ATROPHY OF THYROID: ICD-10-CM

## 2022-05-30 LAB
T4 FREE SERPL-MCNC: 0.63 NG/DL (ref 0.71–1.51)
TSH SERPL DL<=0.005 MIU/L-ACNC: 48.2 UIU/ML (ref 0.4–4)

## 2022-05-30 PROCEDURE — 84439 ASSAY OF FREE THYROXINE: CPT | Performed by: FAMILY MEDICINE

## 2022-05-30 PROCEDURE — 36415 COLL VENOUS BLD VENIPUNCTURE: CPT | Mod: PO | Performed by: FAMILY MEDICINE

## 2022-05-30 PROCEDURE — 84443 ASSAY THYROID STIM HORMONE: CPT | Mod: PO | Performed by: FAMILY MEDICINE

## 2022-05-31 ENCOUNTER — OFFICE VISIT (OUTPATIENT)
Dept: CARDIOLOGY | Facility: CLINIC | Age: 72
End: 2022-05-31
Payer: MEDICARE

## 2022-05-31 VITALS
HEIGHT: 62 IN | OXYGEN SATURATION: 92 % | SYSTOLIC BLOOD PRESSURE: 98 MMHG | HEART RATE: 105 BPM | DIASTOLIC BLOOD PRESSURE: 68 MMHG | BODY MASS INDEX: 42.01 KG/M2 | WEIGHT: 228.31 LBS

## 2022-05-31 DIAGNOSIS — G89.29 OTHER CHRONIC PAIN: ICD-10-CM

## 2022-05-31 DIAGNOSIS — E78.00 PURE HYPERCHOLESTEROLEMIA: ICD-10-CM

## 2022-05-31 DIAGNOSIS — R60.0 LOCALIZED EDEMA: ICD-10-CM

## 2022-05-31 DIAGNOSIS — E03.4 HYPOTHYROIDISM DUE TO ACQUIRED ATROPHY OF THYROID: ICD-10-CM

## 2022-05-31 DIAGNOSIS — I10 ESSENTIAL HYPERTENSION: Chronic | ICD-10-CM

## 2022-05-31 DIAGNOSIS — I71.21 ASCENDING AORTIC ANEURYSM: ICD-10-CM

## 2022-05-31 DIAGNOSIS — N18.4 CKD (CHRONIC KIDNEY DISEASE) STAGE 4, GFR 15-29 ML/MIN: ICD-10-CM

## 2022-05-31 DIAGNOSIS — I48.11 LONGSTANDING PERSISTENT ATRIAL FIBRILLATION: Primary | ICD-10-CM

## 2022-05-31 DIAGNOSIS — R06.09 DYSPNEA ON EXERTION: ICD-10-CM

## 2022-05-31 DIAGNOSIS — Z79.01 LONG TERM (CURRENT) USE OF ANTICOAGULANTS: ICD-10-CM

## 2022-05-31 DIAGNOSIS — F32.89 OTHER DEPRESSION: ICD-10-CM

## 2022-05-31 DIAGNOSIS — J42 CHRONIC BRONCHITIS, UNSPECIFIED CHRONIC BRONCHITIS TYPE: ICD-10-CM

## 2022-05-31 PROBLEM — F32.A DEPRESSION: Status: ACTIVE | Noted: 2022-05-31

## 2022-05-31 PROCEDURE — 1159F MED LIST DOCD IN RCRD: CPT | Mod: CPTII,S$GLB,, | Performed by: INTERNAL MEDICINE

## 2022-05-31 PROCEDURE — 3288F PR FALLS RISK ASSESSMENT DOCUMENTED: ICD-10-PCS | Mod: CPTII,S$GLB,, | Performed by: INTERNAL MEDICINE

## 2022-05-31 PROCEDURE — 99999 PR PBB SHADOW E&M-EST. PATIENT-LVL IV: CPT | Mod: PBBFAC,,, | Performed by: INTERNAL MEDICINE

## 2022-05-31 PROCEDURE — 1101F PT FALLS ASSESS-DOCD LE1/YR: CPT | Mod: CPTII,S$GLB,, | Performed by: INTERNAL MEDICINE

## 2022-05-31 PROCEDURE — 3078F PR MOST RECENT DIASTOLIC BLOOD PRESSURE < 80 MM HG: ICD-10-PCS | Mod: CPTII,S$GLB,, | Performed by: INTERNAL MEDICINE

## 2022-05-31 PROCEDURE — 3074F PR MOST RECENT SYSTOLIC BLOOD PRESSURE < 130 MM HG: ICD-10-PCS | Mod: CPTII,S$GLB,, | Performed by: INTERNAL MEDICINE

## 2022-05-31 PROCEDURE — 99999 PR PBB SHADOW E&M-EST. PATIENT-LVL IV: ICD-10-PCS | Mod: PBBFAC,,, | Performed by: INTERNAL MEDICINE

## 2022-05-31 PROCEDURE — 3008F BODY MASS INDEX DOCD: CPT | Mod: CPTII,S$GLB,, | Performed by: INTERNAL MEDICINE

## 2022-05-31 PROCEDURE — 1126F AMNT PAIN NOTED NONE PRSNT: CPT | Mod: CPTII,S$GLB,, | Performed by: INTERNAL MEDICINE

## 2022-05-31 PROCEDURE — 3078F DIAST BP <80 MM HG: CPT | Mod: CPTII,S$GLB,, | Performed by: INTERNAL MEDICINE

## 2022-05-31 PROCEDURE — 1126F PR PAIN SEVERITY QUANTIFIED, NO PAIN PRESENT: ICD-10-PCS | Mod: CPTII,S$GLB,, | Performed by: INTERNAL MEDICINE

## 2022-05-31 PROCEDURE — 1160F PR REVIEW ALL MEDS BY PRESCRIBER/CLIN PHARMACIST DOCUMENTED: ICD-10-PCS | Mod: CPTII,S$GLB,, | Performed by: INTERNAL MEDICINE

## 2022-05-31 PROCEDURE — 93000 EKG 12-LEAD: ICD-10-PCS | Mod: S$GLB,,, | Performed by: INTERNAL MEDICINE

## 2022-05-31 PROCEDURE — 1160F RVW MEDS BY RX/DR IN RCRD: CPT | Mod: CPTII,S$GLB,, | Performed by: INTERNAL MEDICINE

## 2022-05-31 PROCEDURE — 4010F PR ACE/ARB THEARPY RXD/TAKEN: ICD-10-PCS | Mod: CPTII,S$GLB,, | Performed by: INTERNAL MEDICINE

## 2022-05-31 PROCEDURE — 1101F PR PT FALLS ASSESS DOC 0-1 FALLS W/OUT INJ PAST YR: ICD-10-PCS | Mod: CPTII,S$GLB,, | Performed by: INTERNAL MEDICINE

## 2022-05-31 PROCEDURE — 99214 PR OFFICE/OUTPT VISIT, EST, LEVL IV, 30-39 MIN: ICD-10-PCS | Mod: 25,S$GLB,, | Performed by: INTERNAL MEDICINE

## 2022-05-31 PROCEDURE — 99214 OFFICE O/P EST MOD 30 MIN: CPT | Mod: 25,S$GLB,, | Performed by: INTERNAL MEDICINE

## 2022-05-31 PROCEDURE — 99499 UNLISTED E&M SERVICE: CPT | Mod: S$GLB,,, | Performed by: INTERNAL MEDICINE

## 2022-05-31 PROCEDURE — 3074F SYST BP LT 130 MM HG: CPT | Mod: CPTII,S$GLB,, | Performed by: INTERNAL MEDICINE

## 2022-05-31 PROCEDURE — 1159F PR MEDICATION LIST DOCUMENTED IN MEDICAL RECORD: ICD-10-PCS | Mod: CPTII,S$GLB,, | Performed by: INTERNAL MEDICINE

## 2022-05-31 PROCEDURE — 3008F PR BODY MASS INDEX (BMI) DOCUMENTED: ICD-10-PCS | Mod: CPTII,S$GLB,, | Performed by: INTERNAL MEDICINE

## 2022-05-31 PROCEDURE — 99499 RISK ADDL DX/OHS AUDIT: ICD-10-PCS | Mod: S$GLB,,, | Performed by: INTERNAL MEDICINE

## 2022-05-31 PROCEDURE — 4010F ACE/ARB THERAPY RXD/TAKEN: CPT | Mod: CPTII,S$GLB,, | Performed by: INTERNAL MEDICINE

## 2022-05-31 PROCEDURE — 3288F FALL RISK ASSESSMENT DOCD: CPT | Mod: CPTII,S$GLB,, | Performed by: INTERNAL MEDICINE

## 2022-05-31 PROCEDURE — 93000 ELECTROCARDIOGRAM COMPLETE: CPT | Mod: S$GLB,,, | Performed by: INTERNAL MEDICINE

## 2022-05-31 RX ORDER — METOPROLOL SUCCINATE 100 MG/1
300 TABLET, EXTENDED RELEASE ORAL DAILY
Qty: 270 TABLET | Refills: 3 | Status: SHIPPED | OUTPATIENT
Start: 2022-05-31 | End: 2023-06-06

## 2022-05-31 NOTE — PROGRESS NOTES
Subjective:      Patient ID: Talia Dillon is a 71 y.o. female.    Chief Complaint: Follow-up    HPI:  Pt can walk less than half a block.    Pt is limited by unsteadiness of gait (walks with a cane) and by chronic shortness of breath.    Pain is manageable    No chest pains    Review of Systems   Cardiovascular: Positive for dyspnea on exertion and leg swelling (Occasional intermittent ankle swelling bilaterally). Negative for chest pain, claudication, irregular heartbeat, near-syncope, orthopnea, palpitations and syncope.        Dr Vallecillo increased the dose of levothyroxine in March.    Past Medical History:   Diagnosis Date    Allergy     Anemia     Anxiety     Atrial fibrillation     Cancer     skin    Cataract     Depression     Edema     Hypothyroidism     Kidney disease     PSVT (paroxysmal supraventricular tachycardia)     Thyroid disease         Past Surgical History:   Procedure Laterality Date    ADENOIDECTOMY      APPENDECTOMY      COLONOSCOPY  2009    repeat in 10 years     EYE SURGERY      HYSTERECTOMY      INJECTION OF ANESTHETIC AGENT AROUND NERVE Bilateral 9/21/2018    Procedure: BLOCK, NERVE, MEDIAL BRANCH BLOCK BILATERAL LUMBAR L2-5;  Surgeon: Julieth Christopher MD;  Location: [facility];  Service: Pain Management;  Laterality: Bilateral;  1 of 2    INJECTION OF ANESTHETIC AGENT AROUND NERVE Bilateral 9/28/2018    Procedure: BLOCK, NERVE, MEDIAL BRANCH BLOCK BILATERAL LUMBAR L2-5;  Surgeon: Julieth Christopher MD;  Location: [facility];  Service: Pain Management;  Laterality: Bilateral;  2 of 2  PLEASE GIVE NIRAVAM PER MD    OOPHORECTOMY      RADIOFREQUENCY ABLATION Left 2/14/2020    Procedure: RADIOFREQUENCY ABLATION L2,3,4,5;  Surgeon: Julieth Christopher MD;  Location: [facility];  Service: Pain Management;  Laterality: Left;  LT RFA L2,3,4,5  1 of 2  OK to hold Eliquis    RADIOFREQUENCY ABLATION Right 2/28/2020    Procedure: RADIOFREQUENCY ABLATION L2,3,4,5 RIGHT   2  of 2 ok to hold eliquis;  Surgeon: Julieth Christopher MD;  Location: [facility];  Service: Pain Management;  Laterality: Right;    RADIOFREQUENCY ABLATION Left 11/30/2020    Procedure: RADIOFREQUENCY ABLATION, L2-L3-L4-L5 MEDIAL BRANCH 1 OF 2 Continue Eliquis;  Surgeon: Bogdan Webster MD;  Location: [facility];  Service: Pain Management;  Laterality: Left;    TONSILLECTOMY      TREATMENT OF CARDIAC ARRHYTHMIA N/A 2/8/2019    Procedure: CARDIOVERSION;  Surgeon: Devin You MD;  Location: [facility];  Service: Cardiology;  Laterality: N/A;  AF, KAROL, DCCV, MAC, NC, 3 Prep       Family History   Problem Relation Age of Onset    Stroke Mother     Stroke Father     Diabetes Father     Sleep apnea Daughter     Mental illness Daughter        Social History     Socioeconomic History    Marital status:    Tobacco Use    Smoking status: Former Smoker     Packs/day: 1.00     Years: 50.00     Pack years: 50.00     Types: Cigarettes    Smokeless tobacco: Never Used   Substance and Sexual Activity    Alcohol use: No     Comment: rarely    Drug use: No    Sexual activity: Not Currently       Current Outpatient Medications on File Prior to Visit   Medication Sig Dispense Refill    albuterol (PROVENTIL/VENTOLIN HFA) 90 mcg/actuation inhaler Inhale 2 puffs into the lungs every 4 (four) hours as needed for Wheezing (For shortness of breath). 54 g 11    albuterol-ipratropium (DUO-NEB) 2.5 mg-0.5 mg/3 mL nebulizer solution Take 3 mLs by nebulization every 6 (six) hours as needed for Wheezing. Rescue 400 each 3    ALPRAZolam (XANAX) 0.25 MG tablet Take 1 tablet (0.25 mg total) by mouth once daily. 90 tablet 1    atorvastatin (LIPITOR) 10 MG tablet Take 1 tablet (10 mg total) by mouth once daily. 90 tablet 3    candesartan (ATACAND) 8 MG tablet Take 0.5 tablets (4 mg total) by mouth once daily. 45 tablet 3    cholecalciferol, vitamin D3, (VITAMIN D3) 5,000 unit Tab Take 5,000 Units by mouth once daily. 90  "tablet 3    ELIQUIS 5 mg Tab TAKE 1 TABLET BY MOUTH TWICE A  tablet 3    EScitalopram oxalate (LEXAPRO) 10 MG tablet Take 1 tablet (10 mg total) by mouth once daily. 30 tablet 11    gabapentin (NEURONTIN) 400 MG capsule Take 1 capsule (400 mg total) by mouth 2 (two) times daily. 180 capsule 3    levothyroxine (SYNTHROID) 200 MCG tablet Take 1 tablet (200 mcg total) by mouth once daily. 90 tablet 3    LIDOcaine (LIDODERM) 5 % Place 1 patch onto the skin once daily. Remove & Discard patch within 12 hours or as directed by MD 15 patch 0    tramadol-acetaminophen 37.5-325 mg (ULTRACET) 37.5-325 mg Tab Take 1 tablet by mouth every 4 (four) hours as needed for Pain. 10 tablet 0    umeclidinium-vilanteroL (ANORO ELLIPTA) 62.5-25 mcg/actuation DsDv Inhale 1 puff into the lungs once daily. Controller 60 each 11    zolpidem (AMBIEN) 10 mg Tab TAKE 1 TABLET BY MOUTH EVERY DAY IN THE EVENING AS NEEDED 90 tablet 1    [DISCONTINUED] metoprolol succinate (TOPROL-XL) 100 MG 24 hr tablet Take 1 tablet (100 mg total) by mouth once daily. 90 tablet 3     No current facility-administered medications on file prior to visit.       Review of patient's allergies indicates:   Allergen Reactions    Dexamethasone Rash     Objective:     Vitals:    05/31/22 1608   BP: 98/68   BP Location: Left arm   Patient Position: Sitting   BP Method: Large (Automatic)   Pulse: 105   SpO2: (!) 92%   Weight: 103.5 kg (228 lb 4.6 oz)   Height: 5' 2" (1.575 m)        Physical Exam  Constitutional:       Appearance: She is well-developed.   Eyes:      General: No scleral icterus.  Neck:      Vascular: No carotid bruit or JVD.   Cardiovascular:      Rate and Rhythm: Normal rate and regular rhythm.      Heart sounds: No murmur heard.    No gallop.   Pulmonary:      Breath sounds: Normal breath sounds.   Musculoskeletal:      Right lower leg: No edema.      Left lower leg: No edema.   Skin:     General: Skin is warm and dry.   Neurological:      " Mental Status: She is alert and oriented to person, place, and time.   Psychiatric:         Behavior: Behavior normal.         Thought Content: Thought content normal.         Judgment: Judgment normal.      ECG today reviewed by me:  Atrial fibrillation with ventricular response of 105 bpm, Low QRS voltage.       Lab Visit on 05/30/2022   Component Date Value Ref Range Status    TSH 05/30/2022 48.200 (A) 0.400 - 4.000 uIU/mL Final    Free T4 05/30/2022 0.63 (A) 0.71 - 1.51 ng/dL Final   Admission on 05/07/2022, Discharged on 05/07/2022   Component Date Value Ref Range Status    Specimen UA 05/07/2022 Urine, Clean Catch   Final    Color, UA 05/07/2022 Yellow  Yellow, Straw, Arianne Final    Appearance, UA 05/07/2022 Clear  Clear Final    pH, UA 05/07/2022 5.0  5.0 - 8.0 Final    Specific Gravity, UA 05/07/2022 1.020  1.005 - 1.030 Final    Protein, UA 05/07/2022 1+ (A) Negative Final    Glucose, UA 05/07/2022 Negative  Negative Final    Ketones, UA 05/07/2022 Negative  Negative Final    Bilirubin (UA) 05/07/2022 Negative  Negative Final    Occult Blood UA 05/07/2022 Negative  Negative Final    Nitrite, UA 05/07/2022 Negative  Negative Final    Urobilinogen, UA 05/07/2022 Negative  <2.0 EU/dL Final    Leukocytes, UA 05/07/2022 Negative  Negative Final    RBC, UA 05/07/2022 2  0 - 4 /hpf Final    WBC, UA 05/07/2022 3  0 - 5 /hpf Final    Bacteria 05/07/2022 None  None-Occ /hpf Final    Squam Epithel, UA 05/07/2022 3  /hpf Final    Hyaline Casts, UA 05/07/2022 1  0-1/lpf /lpf Final    Microscopic Comment 05/07/2022 SEE COMMENT   Final   Lab Visit on 03/18/2022   Component Date Value Ref Range Status    TSH 03/18/2022 35.300 (A) 0.400 - 4.000 uIU/mL Final    Free T4 03/18/2022 0.61 (A) 0.71 - 1.51 ng/dL Final   Lab Visit on 02/24/2022   Component Date Value Ref Range Status    WBC 02/24/2022 8.12  3.90 - 12.70 K/uL Final    RBC 02/24/2022 3.86 (A) 4.00 - 5.40 M/uL Final    Hemoglobin 02/24/2022  11.9 (A) 12.0 - 16.0 g/dL Final    Hematocrit 02/24/2022 37.6  37.0 - 48.5 % Final    MCV 02/24/2022 97  82 - 98 fL Final    MCH 02/24/2022 30.8  27.0 - 31.0 pg Final    MCHC 02/24/2022 31.6 (A) 32.0 - 36.0 g/dL Final    RDW 02/24/2022 13.6  11.5 - 14.5 % Final    Platelets 02/24/2022 176  150 - 450 K/uL Final    MPV 02/24/2022 11.6  9.2 - 12.9 fL Final    Immature Granulocytes 02/24/2022 0.4  0.0 - 0.5 % Final    Gran # (ANC) 02/24/2022 5.0  1.8 - 7.7 K/uL Final    Immature Grans (Abs) 02/24/2022 0.03  0.00 - 0.04 K/uL Final    Lymph # 02/24/2022 1.6  1.0 - 4.8 K/uL Final    Mono # 02/24/2022 0.5  0.3 - 1.0 K/uL Final    Eos # 02/24/2022 0.8 (A) 0.0 - 0.5 K/uL Final    Baso # 02/24/2022 0.06  0.00 - 0.20 K/uL Final    nRBC 02/24/2022 0  0 /100 WBC Final    Gran % 02/24/2022 62.1  38.0 - 73.0 % Final    Lymph % 02/24/2022 20.1  18.0 - 48.0 % Final    Mono % 02/24/2022 6.5  4.0 - 15.0 % Final    Eosinophil % 02/24/2022 10.2 (A) 0.0 - 8.0 % Final    Basophil % 02/24/2022 0.7  0.0 - 1.9 % Final    Differential Method 02/24/2022 Automated   Final    Sodium 02/24/2022 143  136 - 145 mmol/L Final    Potassium 02/24/2022 4.6  3.5 - 5.1 mmol/L Final    Chloride 02/24/2022 103  95 - 110 mmol/L Final    CO2 02/24/2022 28  23 - 29 mmol/L Final    Glucose 02/24/2022 112 (A) 70 - 110 mg/dL Final    BUN 02/24/2022 32 (A) 7 - 17 mg/dL Final    Creatinine 02/24/2022 2.05 (A) 0.50 - 1.40 mg/dL Final    Calcium 02/24/2022 9.6  8.7 - 10.5 mg/dL Final    Anion Gap 02/24/2022 12  8 - 16 mmol/L Final    eGFR if  02/24/2022 27.3 (A) >60 mL/min/1.73 m^2 Final    eGFR if non  02/24/2022 23.7 (A) >60 mL/min/1.73 m^2 Final   Lab Visit on 02/24/2022   Component Date Value Ref Range Status    Specimen UA 02/24/2022 Urine, Clean Catch   Final    Color, UA 02/24/2022 Yellow  Yellow, Straw, Arianne Final    Appearance, UA 02/24/2022 Clear  Clear Final    pH, UA 02/24/2022 5.0  5.0 -  8.0 Final    Specific Gravity, UA 02/24/2022 1.020  1.005 - 1.030 Final    Protein, UA 02/24/2022 2+ (A) Negative Final    Glucose, UA 02/24/2022 Negative  Negative Final    Ketones, UA 02/24/2022 Negative  Negative Final    Bilirubin (UA) 02/24/2022 Negative  Negative Final    Occult Blood UA 02/24/2022 Trace (A) Negative Final    Nitrite, UA 02/24/2022 Positive (A) Negative Final    Urobilinogen, UA 02/24/2022 Negative  <2.0 EU/dL Final    Leukocytes, UA 02/24/2022 2+ (A) Negative Final    Protein, Urine Random 02/24/2022 135 (A) 0 - 15 mg/dL Final    Creatinine, Urine 02/24/2022 147.6  15.0 - 325.0 mg/dL Final    Prot/Creat Ratio, Urine 02/24/2022 0.91 (A) 0.00 - 0.20 Final    RBC, UA 02/24/2022 3  0 - 4 /hpf Final    WBC, UA 02/24/2022 25 (A) 0 - 5 /hpf Final    WBC Clumps, UA 02/24/2022 Few (A) None-Rare Final    Bacteria 02/24/2022 Few (A) None-Occ /hpf Final    Hyaline Casts, UA 02/24/2022 0  0-1/lpf /lpf Final    Microscopic Comment 02/24/2022 SEE COMMENT   Final   (    Details    Reading Physician Reading Date Result Priority   REZA LealDelaware County HospitalCourtney, MD  [phone] 12/7/2020 Routine     Narrative & Impression  EXAMINATION:  NM MYOCARDIAL PERFUSION SPECT MULTI PHARM     CLINICAL HISTORY:  Other chest painChest pain, acute, nonspecific;     TECHNIQUE:  After the administration of 10 mCi of technetium 99 M labeled Myoview, nuclear medicine cardiac rest images were obtained.  After the administration of 30 mCi of technetium 99 M labeled Myoview, nuclear medicine cardiac stress images were obtained     FINDINGS:  The walls of the left ventricle have normal perfusion.  There is dyskinesis of the septal wall of the left ventricle.  The left ventricle ejection fraction was calculated to be 54%.     Impression:     1.  Normal perfusion study     2.  There is dyskinesis of the septal wall of the left ventricle.     3.  The left ventricular ejection fraction was calculated to be 54%.  The normal is  greater than 54%.        Electronically signed by: Tonny Sanchez MD  Date:                                            12/07/2020  Time:                                           15:03             Exam Ended: 12/07/20            Accession #: [id number]    Transthoracic echo (TTE) complete  Order# [id number]  Reading physician: Jayro Saha MD Ordering physician: Laci Barker MD Study date: 6/11/20       Reason for Exam  Priority: Routine  Dx: Persistent atrial fibrillation [I48.19 (ICD-10-CM)]; Essential hypertension [I10 (ICD-10-CM)]; Dyspnea on exertion [R06.09 (ICD-10-CM)]     Result Image Hyperlink     Show images for Echo Color Flow Doppler? Yes    Summary    · Normal left ventricular systolic function. The estimated ejection fraction is 60%.  · Diastolic pattern consistent with atrial fibrillation observed.  · Concentric left ventricular hypertrophy.  · Normal right ventricular systolic function.  · Normal central venous pressure (3 mmHg).  · The estimated PA systolic pressure is 35 mmHg.         Stress test only, Regadenoson  Order# [id number]  Reading physician: Onel Isidro MD Ordering physician: Laci Barker MD Study date: 12/7/20       Reason for Exam  Priority: Routine  Dx: Persistent atrial fibrillation [I48.19 (ICD-10-CM)]; Other chest pain [R07.89 (ICD-10-CM)]     Conclusion         The EKG portion of this study is abnormal but not diagnostic.    The patient reported no chest pain during the stress test.    ECG Stress Nuclear portion of this study will be reported separately.              Performing Clinician    Meryl Ashraf                  Result Image Hyperlink     Show images for Holter monitor - 24 hour    Epiphany Scans -- Order Level:    Epiphany Scans: None found at the order level.    Diary    There were rare hookup related artifacts. Overall, the study was of good quality. The tape was adequate (0 days , 24 hours, 0 minutes).     Rhythm    Heart rates varied between  59 and 150 BPM with an average of 88  BPM.     Maximum heart rate recorded at: 10:19 CDT.     Minimum heart rate recorded at 11:16 CDT.     Atrial fibrillation with ventricular rates varying between 59 and 150 bpm with an average of 88 bpm.     PVC    Ventricular Arrhythmias  There were PVCs totalling 0 and averaging 0 per hour. The ventricular arrhythmias percentage was 0.     VT    There were 0 runs.       Assessment:     1. Longstanding persistent atrial fibrillation    2. Pure hypercholesterolemia    3. Essential hypertension    4. Dyspnea on exertion    5. Chronic bronchitis, unspecified chronic bronchitis type    6. CKD (chronic kidney disease) stage 4, GFR 15-29 ml/min    7. Hypothyroidism due to acquired atrophy of thyroid    8. Long term (current) use of anticoagulants    9. Localized edema    10. Other chronic pain    11. Other depression    12. Ascending aortic aneurysm      Plan:   Talia was seen today for follow-up.    Diagnoses and all orders for this visit:    Longstanding persistent atrial fibrillation  -     IN OFFICE EKG 12-LEAD (to [place])  -     CT Chest Without Contrast; Future    Pure hypercholesterolemia  -     IN OFFICE EKG 12-LEAD (to [place])  -     CT Chest Without Contrast; Future    Essential hypertension  -     IN OFFICE EKG 12-LEAD (to [place])  -     CT Chest Without Contrast; Future    Dyspnea on exertion  -     IN OFFICE EKG 12-LEAD (to [place])  -     CT Chest Without Contrast; Future    Chronic bronchitis, unspecified chronic bronchitis type  -     IN OFFICE EKG 12-LEAD (to [place])  -     CT Chest Without Contrast; Future    CKD (chronic kidney disease) stage 4, GFR 15-29 ml/min  -     IN OFFICE EKG 12-LEAD (to [place])  -     CT Chest Without Contrast; Future    Hypothyroidism due to acquired atrophy of thyroid  -     IN OFFICE EKG 12-LEAD (to [place])  -     TSH; Future  -     T4, Free; Future  -     CT Chest Without Contrast; Future    Long term (current) use of anticoagulants  -     IN OFFICE  EKG 12-LEAD (to [place])  -     CT Chest Without Contrast; Future    Localized edema  -     IN OFFICE EKG 12-LEAD (to [place])  -     CT Chest Without Contrast; Future    Other chronic pain  -     IN OFFICE EKG 12-LEAD (to [place])  -     CT Chest Without Contrast; Future    Other depression  -     Ambulatory referral/consult to Psychiatry; Future  -     CT Chest Without Contrast; Future    Ascending aortic aneurysm  -     CT Chest Without Contrast; Future    Other orders  -     metoprolol succinate (TOPROL-XL) 100 MG 24 hr tablet; Take 3 tablets (300 mg total) by mouth once daily.     Cardiac status is stable    Pt is still hypothyroid.    Pt has been taking the levothyroxine with food    Pt instructed to take her levothyroxine first thing in the morning with water on an empty stomach and to not eat anything for an hour.    Pt reports severe depression and requests consultation with psychiatry    F/u with Dr Vallecillo    Repeat TFT's in one to two months    Follow up in about 6 months (around 11/30/2022).     Repeat CT of chest prior to next visit

## 2022-06-09 ENCOUNTER — TELEPHONE (OUTPATIENT)
Dept: ADMINISTRATIVE | Facility: CLINIC | Age: 72
End: 2022-06-09
Payer: MEDICARE

## 2022-06-09 PROBLEM — N18.4 CKD (CHRONIC KIDNEY DISEASE) STAGE 4, GFR 15-29 ML/MIN: Status: RESOLVED | Noted: 2020-11-19 | Resolved: 2022-06-09

## 2022-06-09 PROBLEM — F33.9 MAJOR DEPRESSION, RECURRENT, CHRONIC: Status: ACTIVE | Noted: 2022-05-31

## 2022-06-09 NOTE — PROGRESS NOTES
"  Talia Dillon presented for a  Medicare AWV and comprehensive Health Risk Assessment today. The following components were reviewed and updated:    · Medical history  · Family History  · Social history  · Allergies and Current Medications  · Health Risk Assessment  · Health Maintenance  · Care Team         ** See Completed Assessments for Annual Wellness Visit within the encounter summary.**         The following assessments were completed:  · Living Situation  · CAGE  · Depression Screening  · Timed Get Up and Go  · Whisper Test  · Cognitive Function Screening  · Nutrition Screening  · ADL Screening  · PAQ Screening        Vitals:    06/10/22 1121   BP: 102/68   Pulse: 82   SpO2: 96%   Weight: 102.9 kg (226 lb 13.7 oz)   Height: 5' 2" (1.575 m)     Body mass index is 41.49 kg/m².  Physical Exam  Vitals and nursing note reviewed.   Constitutional:       General: She is not in acute distress.     Appearance: Normal appearance. She is obese. She is not ill-appearing.   HENT:      Head: Normocephalic and atraumatic.   Eyes:      General: No scleral icterus.        Right eye: No discharge.         Left eye: No discharge.      Extraocular Movements: Extraocular movements intact.      Conjunctiva/sclera: Conjunctivae normal.   Cardiovascular:      Rate and Rhythm: Normal rate. Rhythm irregular.      Heart sounds: Normal heart sounds. No murmur heard.  Pulmonary:      Effort: Pulmonary effort is normal. No respiratory distress.      Breath sounds: Normal breath sounds. No wheezing, rhonchi or rales.   Musculoskeletal:      Cervical back: Normal range of motion.      Right lower leg: No edema.      Left lower leg: No edema.   Skin:     General: Skin is warm and dry.      Findings: No rash.   Neurological:      Mental Status: She is alert and oriented to person, place, and time.   Psychiatric:         Mood and Affect: Mood normal.         Behavior: Behavior normal. Behavior is cooperative.         Cognition and Memory: " Cognition and memory normal.               Diagnoses and health risks identified today and associated recommendations/orders:    1. Encounter for preventive health examination  - Chart reviewed. Problem list updated. Discussed current medical diagnosis, current medications, medical/surgical/family/social history; updated provider list; documented vital signs; identified any cognitive impairment; and updated risk factor list. Addressed any outstanding health maintenance. Provided patient with personalized health advice. Continue to follow up with PCP and any specialists.     2. CKD stage G4/A2, GFR 15-29 and albumin creatinine ratio  mg/g  Chronic; stable on current treatment plan; follow up with PCP and Nephrology   - recommend avoiding NSAIDS    3. Longstanding persistent atrial fibrillation  Chronic; stable on current treatment plan; follow up with PCP and cardiology   - taking eliquis    4. Body mass index (BMI) 40.0-44.9, adult  - Recommendation for healthy diet and increasing exercise as tolerated with goal of 150min/week . Recommend weight loss    5. Ascending aortic aneurysm  Chronic; stable on current treatment plan; follow up with PCP and cardiology     6. Chronic obstructive pulmonary disease, unspecified COPD type  Chronic; stable on current treatment plan; follow up with PCP and pulmonology  - taking anoro ellipta    7. Major depression, recurrent, chronic  Chronic; stable on current treatment plan; follow up with PCP  - taking lexapro     8. Chronic kidney disease-mineral and bone disorder  Chronic; stable on current treatment plan; follow up with PCP and Nephrology     9. Vitamin D deficiency  Chronic; stable on current treatment plan; follow up with PCP  - cholecalciferol, vitamin D3, (VITAMIN D3) 125 mcg (5,000 unit) Tab; Take 1 tablet (5,000 Units total) by mouth once daily.  Dispense: 90 tablet; Refill: 3    10. Hypothyroidism due to acquired atrophy of thyroid  Chronic; stable on current  treatment plan; follow up with PCP  - taking synthroid     11. Long term (current) use of anticoagulants  Chronic; stable on current treatment plan; follow up with PCP  - taking eliquis    12. Other hyperlipidemia  Chronic; stable on current treatment plan; follow up with PCP  - taking statin     13. Essential hypertension  Chronic; stable on current treatment plan; follow up with PCP  - recommend low sodium diet     14. DDD (degenerative disc disease), lumbar  Chronic; stable on current treatment plan; follow up with PCP    15. Other osteoarthritis of spine, lumbar region  Chronic; stable on current treatment plan; follow up with PCP    16. Other chronic pain  Chronic; stable on current treatment plan; follow up with PCP    17. Cervical neuralgia  Chronic; stable on current treatment plan; follow up with PCP  - taking gabapentin PRN     18. Encounter for screening mammogram for malignant neoplasm of breast  - due for mammo, ordered today   - Mammo Digital Screening Bilat w/ [facility]lin; Future    19. Abnormality of gait and mobility  Chronic; stable on current treatment plan; follow up with PCP  - uses cane for ambulation         Provided Talia with a 5-10 year written screening schedule and personal prevention plan. Recommendations were developed using the USPSTF age appropriate recommendations. Education, counseling, and referrals were provided as needed. After Visit Summary printed and given to patient which includes a list of additional screenings\tests needed.    Follow up in about 1 year (around 6/10/2023).    Ina Hagen, GULSHANP-C   Advance Care Planning         I offered to discuss advanced care planning, including how to pick a person who would make decisions for you if you were unable to make them for yourself, called a health care power of , and what kind of decisions you might make such as use of life sustaining treatments such as ventilators and tube feeding when faced with a life limiting illness  recorded on a living will that they will need to know. (How you want to be cared for as you near the end of your natural life)     X Patient is interested in learning more about how to make advanced directives.  I provided them paperwork and offered to discuss this with them.

## 2022-06-09 NOTE — TELEPHONE ENCOUNTER
Called pt, informed pt I was calling to remind pt of her in office EAWV on 6/10/22; clinic location provided to patient; pt confirmed appointment

## 2022-06-10 ENCOUNTER — OFFICE VISIT (OUTPATIENT)
Dept: INTERNAL MEDICINE | Facility: CLINIC | Age: 72
End: 2022-06-10
Payer: MEDICARE

## 2022-06-10 VITALS
OXYGEN SATURATION: 96 % | HEIGHT: 62 IN | BODY MASS INDEX: 41.75 KG/M2 | SYSTOLIC BLOOD PRESSURE: 102 MMHG | WEIGHT: 226.88 LBS | DIASTOLIC BLOOD PRESSURE: 68 MMHG | HEART RATE: 82 BPM

## 2022-06-10 DIAGNOSIS — R26.9 ABNORMALITY OF GAIT AND MOBILITY: ICD-10-CM

## 2022-06-10 DIAGNOSIS — Z79.01 LONG TERM (CURRENT) USE OF ANTICOAGULANTS: ICD-10-CM

## 2022-06-10 DIAGNOSIS — N18.9 CHRONIC KIDNEY DISEASE-MINERAL AND BONE DISORDER: Chronic | ICD-10-CM

## 2022-06-10 DIAGNOSIS — G89.29 OTHER CHRONIC PAIN: ICD-10-CM

## 2022-06-10 DIAGNOSIS — M47.896 OTHER OSTEOARTHRITIS OF SPINE, LUMBAR REGION: ICD-10-CM

## 2022-06-10 DIAGNOSIS — E83.9 CHRONIC KIDNEY DISEASE-MINERAL AND BONE DISORDER: Chronic | ICD-10-CM

## 2022-06-10 DIAGNOSIS — F33.9 MAJOR DEPRESSION, RECURRENT, CHRONIC: ICD-10-CM

## 2022-06-10 DIAGNOSIS — I48.11 LONGSTANDING PERSISTENT ATRIAL FIBRILLATION: ICD-10-CM

## 2022-06-10 DIAGNOSIS — M54.12 CERVICAL NEURALGIA: ICD-10-CM

## 2022-06-10 DIAGNOSIS — M51.36 DDD (DEGENERATIVE DISC DISEASE), LUMBAR: ICD-10-CM

## 2022-06-10 DIAGNOSIS — J44.9 CHRONIC OBSTRUCTIVE PULMONARY DISEASE, UNSPECIFIED COPD TYPE: ICD-10-CM

## 2022-06-10 DIAGNOSIS — E55.9 VITAMIN D DEFICIENCY: ICD-10-CM

## 2022-06-10 DIAGNOSIS — E03.4 HYPOTHYROIDISM DUE TO ACQUIRED ATROPHY OF THYROID: ICD-10-CM

## 2022-06-10 DIAGNOSIS — M89.9 CHRONIC KIDNEY DISEASE-MINERAL AND BONE DISORDER: Chronic | ICD-10-CM

## 2022-06-10 DIAGNOSIS — N18.4 CKD STAGE G4/A2, GFR 15-29 AND ALBUMIN CREATININE RATIO 30-299 MG/G: ICD-10-CM

## 2022-06-10 DIAGNOSIS — I71.21 ASCENDING AORTIC ANEURYSM: ICD-10-CM

## 2022-06-10 DIAGNOSIS — Z12.31 ENCOUNTER FOR SCREENING MAMMOGRAM FOR MALIGNANT NEOPLASM OF BREAST: ICD-10-CM

## 2022-06-10 DIAGNOSIS — E78.49 OTHER HYPERLIPIDEMIA: ICD-10-CM

## 2022-06-10 DIAGNOSIS — Z00.00 ENCOUNTER FOR PREVENTIVE HEALTH EXAMINATION: Primary | ICD-10-CM

## 2022-06-10 DIAGNOSIS — I10 ESSENTIAL HYPERTENSION: Chronic | ICD-10-CM

## 2022-06-10 PROCEDURE — G0439 PPPS, SUBSEQ VISIT: HCPCS | Mod: S$GLB,,, | Performed by: NURSE PRACTITIONER

## 2022-06-10 PROCEDURE — 1170F PR FUNCTIONAL STATUS ASSESSED: ICD-10-PCS | Mod: CPTII,S$GLB,, | Performed by: NURSE PRACTITIONER

## 2022-06-10 PROCEDURE — 99499 UNLISTED E&M SERVICE: CPT | Mod: S$GLB,,, | Performed by: NURSE PRACTITIONER

## 2022-06-10 PROCEDURE — 1125F AMNT PAIN NOTED PAIN PRSNT: CPT | Mod: CPTII,S$GLB,, | Performed by: NURSE PRACTITIONER

## 2022-06-10 PROCEDURE — 99999 PR PBB SHADOW E&M-EST. PATIENT-LVL IV: ICD-10-PCS | Mod: PBBFAC,,, | Performed by: NURSE PRACTITIONER

## 2022-06-10 PROCEDURE — 3078F PR MOST RECENT DIASTOLIC BLOOD PRESSURE < 80 MM HG: ICD-10-PCS | Mod: CPTII,S$GLB,, | Performed by: NURSE PRACTITIONER

## 2022-06-10 PROCEDURE — 1159F MED LIST DOCD IN RCRD: CPT | Mod: CPTII,S$GLB,, | Performed by: NURSE PRACTITIONER

## 2022-06-10 PROCEDURE — 1101F PR PT FALLS ASSESS DOC 0-1 FALLS W/OUT INJ PAST YR: ICD-10-PCS | Mod: CPTII,S$GLB,, | Performed by: NURSE PRACTITIONER

## 2022-06-10 PROCEDURE — 3008F PR BODY MASS INDEX (BMI) DOCUMENTED: ICD-10-PCS | Mod: CPTII,S$GLB,, | Performed by: NURSE PRACTITIONER

## 2022-06-10 PROCEDURE — 3008F BODY MASS INDEX DOCD: CPT | Mod: CPTII,S$GLB,, | Performed by: NURSE PRACTITIONER

## 2022-06-10 PROCEDURE — 1160F PR REVIEW ALL MEDS BY PRESCRIBER/CLIN PHARMACIST DOCUMENTED: ICD-10-PCS | Mod: CPTII,S$GLB,, | Performed by: NURSE PRACTITIONER

## 2022-06-10 PROCEDURE — 3288F FALL RISK ASSESSMENT DOCD: CPT | Mod: CPTII,S$GLB,, | Performed by: NURSE PRACTITIONER

## 2022-06-10 PROCEDURE — 1159F PR MEDICATION LIST DOCUMENTED IN MEDICAL RECORD: ICD-10-PCS | Mod: CPTII,S$GLB,, | Performed by: NURSE PRACTITIONER

## 2022-06-10 PROCEDURE — 1160F RVW MEDS BY RX/DR IN RCRD: CPT | Mod: CPTII,S$GLB,, | Performed by: NURSE PRACTITIONER

## 2022-06-10 PROCEDURE — G0439 PR MEDICARE ANNUAL WELLNESS SUBSEQUENT VISIT: ICD-10-PCS | Mod: S$GLB,,, | Performed by: NURSE PRACTITIONER

## 2022-06-10 PROCEDURE — 3288F PR FALLS RISK ASSESSMENT DOCUMENTED: ICD-10-PCS | Mod: CPTII,S$GLB,, | Performed by: NURSE PRACTITIONER

## 2022-06-10 PROCEDURE — 1170F FXNL STATUS ASSESSED: CPT | Mod: CPTII,S$GLB,, | Performed by: NURSE PRACTITIONER

## 2022-06-10 PROCEDURE — 1101F PT FALLS ASSESS-DOCD LE1/YR: CPT | Mod: CPTII,S$GLB,, | Performed by: NURSE PRACTITIONER

## 2022-06-10 PROCEDURE — 3074F PR MOST RECENT SYSTOLIC BLOOD PRESSURE < 130 MM HG: ICD-10-PCS | Mod: CPTII,S$GLB,, | Performed by: NURSE PRACTITIONER

## 2022-06-10 PROCEDURE — 4010F PR ACE/ARB THEARPY RXD/TAKEN: ICD-10-PCS | Mod: CPTII,S$GLB,, | Performed by: NURSE PRACTITIONER

## 2022-06-10 PROCEDURE — 3074F SYST BP LT 130 MM HG: CPT | Mod: CPTII,S$GLB,, | Performed by: NURSE PRACTITIONER

## 2022-06-10 PROCEDURE — 1125F PR PAIN SEVERITY QUANTIFIED, PAIN PRESENT: ICD-10-PCS | Mod: CPTII,S$GLB,, | Performed by: NURSE PRACTITIONER

## 2022-06-10 PROCEDURE — 99499 RISK ADDL DX/OHS AUDIT: ICD-10-PCS | Mod: S$GLB,,, | Performed by: NURSE PRACTITIONER

## 2022-06-10 PROCEDURE — 99999 PR PBB SHADOW E&M-EST. PATIENT-LVL IV: CPT | Mod: PBBFAC,,, | Performed by: NURSE PRACTITIONER

## 2022-06-10 PROCEDURE — 4010F ACE/ARB THERAPY RXD/TAKEN: CPT | Mod: CPTII,S$GLB,, | Performed by: NURSE PRACTITIONER

## 2022-06-10 PROCEDURE — 3078F DIAST BP <80 MM HG: CPT | Mod: CPTII,S$GLB,, | Performed by: NURSE PRACTITIONER

## 2022-06-10 RX ORDER — ACETAMINOPHEN 500 MG
5000 TABLET ORAL DAILY
Qty: 90 TABLET | Refills: 3 | Status: SHIPPED | OUTPATIENT
Start: 2022-06-10 | End: 2023-09-02

## 2022-06-10 NOTE — PATIENT INSTRUCTIONS
Eligibility criteria for a second booster dose include:    Individuals 50 years of age and older at least four months after receipt of a first booster dose of any approved COVID-19 vaccine  Immunocompromised individuals 12 years of age and older at least four months after receipt of a first booster dose of any approved COVID-19 vaccine.  These individuals are eligible for a second booster dose regardless of which COVID-19 vaccine they received for their initial series. While all of the available COVID-19 vaccines were approved for the first booster dose, only Pfizer and Moderna have been approved as options for the second booster dose.    Pfizer is authorized for individuals 12 years of age and older  Moderna is authorized for individuals 18 years of age and older  Second booster shot appointments can be scheduled via [facility] or by calling [phone]. Walk-ins are also accepted.    Counseling and Referral of Other Preventative  (Italic type indicates deductible and co-insurance are waived)    Patient Name: Talia Dillon  Today's Date: 6/10/2022    Health Maintenance       Date Due Completion Date    DEXA Scan 05/08/2020 5/8/2017    Override on 4/5/2017: Done    Mammogram 05/21/2022 5/21/2021    Override on 4/5/2017: Not Clinically Appropriate (dec)    COVID-19 Vaccine (3 - Booster for Moderna series) 06/21/2022 (Originally 8/10/2021) 3/10/2021    Shingles Vaccine (2 of 2) 06/10/2023 (Originally 7/22/2021) 5/27/2021    Influenza Vaccine (Season Ended) 09/01/2022 11/5/2020    Lipid Panel 06/11/2025 6/11/2020    TETANUS VACCINE 04/05/2027 4/5/2017 (ClinicallyNA)    Override on 4/5/2017: Not Clinically Appropriate    Colorectal Cancer Screening 05/24/2028 5/24/2018        Orders Placed This Encounter   Procedures    Mammo Digital Screening Donn naranjo/ Erlin     The following information is provided to all patients.  This information is to help you find resources for any of the problems found today that may be affecting  your health:                Living healthy guide: www.[facility].[place].[facility]      Understanding Diabetes: www.diabetes.org      Eating healthy: www.cdc.gov/healthyweight      CDC home safety checklist: www.cdc.gov/steadi/patient.html      Agency on Aging: www.goea.[place].[facility]      Alcoholics anonymous (AA): www.aa.org      Physical Activity: www.amos.nih.gov/sp2hafq      Tobacco use: www.quitwithusla.org

## 2022-06-14 ENCOUNTER — TELEPHONE (OUTPATIENT)
Dept: FAMILY MEDICINE | Facility: CLINIC | Age: 72
End: 2022-06-14
Payer: MEDICARE

## 2022-06-14 NOTE — TELEPHONE ENCOUNTER
----- Message from James Vallecillo MD sent at 6/5/2022  6:28 PM CDT -----  Thyroid very under active;  are you takin your thyroid medicine, levothyroxin 200 mcg daily?

## 2022-06-16 ENCOUNTER — TELEPHONE (OUTPATIENT)
Dept: FAMILY MEDICINE | Facility: CLINIC | Age: 72
End: 2022-06-16
Payer: MEDICARE

## 2022-06-16 NOTE — TELEPHONE ENCOUNTER
Tashia Ramirez Staff  Caller: Unspecified (Today, 10:48 AM)  Type:  Patient Returning Call     Who Called:pt   Who Left Message for Patient:real   Does the patient know what this is regarding?:yes   Would the patient rather a call back or a response via [facility]? call   Best Call Back Number: [phone]   Additional Information:     Returning a call

## 2022-06-16 NOTE — TELEPHONE ENCOUNTER
----- Message from Leonora Hurtado sent at 6/16/2022 11:43 AM CDT -----  Contact: patient [phone]  Patient returning your call   Thank you

## 2022-06-16 NOTE — TELEPHONE ENCOUNTER
I LM for the pt to rtn call to discuss lab results    Thyroid very under active;  are you takin your thyroid medicine, levothyroxin 200 mcg daily?

## 2022-06-16 NOTE — TELEPHONE ENCOUNTER
Leonora Ramirez Staff  Caller: patient [phone] (Today, 11:43 AM)  Patient returning your call   Thank you

## 2022-06-16 NOTE — TELEPHONE ENCOUNTER
Spoke to patient. Patient confirmed that she is taking the levothyroxine 200 mcg once daily. Patient recently learned that she needed to be taking this medication on an empty stomach or before she eats.     Patient stated that due to waking up in the middle of the night to use the bathroom she has been taking her medication then as she has an empty stomach.     I explained that she could take it in the morning before eating and she stated that she has had a little bit of a routine down at this time.

## 2022-06-20 ENCOUNTER — OFFICE VISIT (OUTPATIENT)
Dept: FAMILY MEDICINE | Facility: CLINIC | Age: 72
End: 2022-06-20
Payer: MEDICARE

## 2022-06-20 VITALS
TEMPERATURE: 98 F | BODY MASS INDEX: 41.14 KG/M2 | HEART RATE: 113 BPM | WEIGHT: 223.56 LBS | OXYGEN SATURATION: 97 % | HEIGHT: 62 IN | SYSTOLIC BLOOD PRESSURE: 138 MMHG | DIASTOLIC BLOOD PRESSURE: 82 MMHG

## 2022-06-20 DIAGNOSIS — Z78.0 POST-MENOPAUSAL: ICD-10-CM

## 2022-06-20 DIAGNOSIS — F32.9 REACTIVE DEPRESSION: Primary | ICD-10-CM

## 2022-06-20 DIAGNOSIS — F33.9 MAJOR DEPRESSION, RECURRENT, CHRONIC: ICD-10-CM

## 2022-06-20 DIAGNOSIS — E03.4 HYPOTHYROIDISM DUE TO ACQUIRED ATROPHY OF THYROID: ICD-10-CM

## 2022-06-20 DIAGNOSIS — F41.9 ANXIETY: ICD-10-CM

## 2022-06-20 PROCEDURE — 1160F PR REVIEW ALL MEDS BY PRESCRIBER/CLIN PHARMACIST DOCUMENTED: ICD-10-PCS | Mod: CPTII,S$GLB,, | Performed by: FAMILY MEDICINE

## 2022-06-20 PROCEDURE — 1160F RVW MEDS BY RX/DR IN RCRD: CPT | Mod: CPTII,S$GLB,, | Performed by: FAMILY MEDICINE

## 2022-06-20 PROCEDURE — 4010F PR ACE/ARB THEARPY RXD/TAKEN: ICD-10-PCS | Mod: CPTII,S$GLB,, | Performed by: FAMILY MEDICINE

## 2022-06-20 PROCEDURE — 1159F PR MEDICATION LIST DOCUMENTED IN MEDICAL RECORD: ICD-10-PCS | Mod: CPTII,S$GLB,, | Performed by: FAMILY MEDICINE

## 2022-06-20 PROCEDURE — 3008F BODY MASS INDEX DOCD: CPT | Mod: CPTII,S$GLB,, | Performed by: FAMILY MEDICINE

## 2022-06-20 PROCEDURE — 4010F ACE/ARB THERAPY RXD/TAKEN: CPT | Mod: CPTII,S$GLB,, | Performed by: FAMILY MEDICINE

## 2022-06-20 PROCEDURE — 1125F AMNT PAIN NOTED PAIN PRSNT: CPT | Mod: CPTII,S$GLB,, | Performed by: FAMILY MEDICINE

## 2022-06-20 PROCEDURE — 3288F FALL RISK ASSESSMENT DOCD: CPT | Mod: CPTII,S$GLB,, | Performed by: FAMILY MEDICINE

## 2022-06-20 PROCEDURE — 99214 OFFICE O/P EST MOD 30 MIN: CPT | Mod: S$GLB,,, | Performed by: FAMILY MEDICINE

## 2022-06-20 PROCEDURE — 3008F PR BODY MASS INDEX (BMI) DOCUMENTED: ICD-10-PCS | Mod: CPTII,S$GLB,, | Performed by: FAMILY MEDICINE

## 2022-06-20 PROCEDURE — 1125F PR PAIN SEVERITY QUANTIFIED, PAIN PRESENT: ICD-10-PCS | Mod: CPTII,S$GLB,, | Performed by: FAMILY MEDICINE

## 2022-06-20 PROCEDURE — 1101F PT FALLS ASSESS-DOCD LE1/YR: CPT | Mod: CPTII,S$GLB,, | Performed by: FAMILY MEDICINE

## 2022-06-20 PROCEDURE — 1159F MED LIST DOCD IN RCRD: CPT | Mod: CPTII,S$GLB,, | Performed by: FAMILY MEDICINE

## 2022-06-20 PROCEDURE — 3075F PR MOST RECENT SYSTOLIC BLOOD PRESS GE 130-139MM HG: ICD-10-PCS | Mod: CPTII,S$GLB,, | Performed by: FAMILY MEDICINE

## 2022-06-20 PROCEDURE — 3079F PR MOST RECENT DIASTOLIC BLOOD PRESSURE 80-89 MM HG: ICD-10-PCS | Mod: CPTII,S$GLB,, | Performed by: FAMILY MEDICINE

## 2022-06-20 PROCEDURE — 3075F SYST BP GE 130 - 139MM HG: CPT | Mod: CPTII,S$GLB,, | Performed by: FAMILY MEDICINE

## 2022-06-20 PROCEDURE — 99214 PR OFFICE/OUTPT VISIT, EST, LEVL IV, 30-39 MIN: ICD-10-PCS | Mod: S$GLB,,, | Performed by: FAMILY MEDICINE

## 2022-06-20 PROCEDURE — 1101F PR PT FALLS ASSESS DOC 0-1 FALLS W/OUT INJ PAST YR: ICD-10-PCS | Mod: CPTII,S$GLB,, | Performed by: FAMILY MEDICINE

## 2022-06-20 PROCEDURE — 3079F DIAST BP 80-89 MM HG: CPT | Mod: CPTII,S$GLB,, | Performed by: FAMILY MEDICINE

## 2022-06-20 PROCEDURE — 3288F PR FALLS RISK ASSESSMENT DOCUMENTED: ICD-10-PCS | Mod: CPTII,S$GLB,, | Performed by: FAMILY MEDICINE

## 2022-06-20 RX ORDER — ALPRAZOLAM 0.25 MG/1
0.25 TABLET ORAL DAILY
Qty: 90 TABLET | Refills: 1 | Status: SHIPPED | OUTPATIENT
Start: 2022-06-20 | End: 2022-08-31 | Stop reason: SDUPTHER

## 2022-06-20 RX ORDER — GABAPENTIN 400 MG/1
400 CAPSULE ORAL 2 TIMES DAILY
Qty: 180 CAPSULE | Refills: 3 | Status: SHIPPED | OUTPATIENT
Start: 2022-06-20 | End: 2023-05-10 | Stop reason: SDUPTHER

## 2022-06-20 NOTE — PROGRESS NOTES
"Subjective:      Patient ID: Talia Dillon is a 71 y.o. female.    Chief Complaint: Follow-up (3 mon)      Vitals:    06/20/22 1450   BP: 138/82   Pulse: (!) 113   Temp: 97.7 °F (36.5 °C)   TempSrc: Oral   SpO2: 97%   Weight: 101.4 kg (223 lb 8.7 oz)   Height: 5' 2" (1.575 m)        HPI   Follow up; DOBBS iwht minimal erxertion  Lower back hurts all time  Tachycardia, has atr fib, chronica  Was in ER last month for low back pain  In ER at [facility] practitioner  Goes to Dr Barker, cardiology    Problem List  Patient Active Problem List   Diagnosis    Scoliosis    Anxiety reaction    Hypothyroidism due to acquired atrophy of thyroid    Insomnia    Localized edema    Congenital deformities of feet    Leg pain, anterior    Cervical neuralgia    History of PSVT (paroxysmal supraventricular tachycardia)    CKD stage G4/A2, GFR 15-29 and albumin creatinine ratio  mg/g    Cervical spinal stenosis    Foraminal stenosis of lumbar region    Decreased functional mobility    DDD (degenerative disc disease), lumbar    Vitamin D deficiency    Elevated sed rate    Hyperlipidemia    Lumbar spondylosis    Chronic kidney disease-mineral and bone disorder    Longstanding persistent atrial fibrillation    Essential hypertension    Complete tear of left rotator cuff    Long term (current) use of anticoagulants    Osteoarthritis of lumbar spine    Chronic pain    Ascending aortic aneurysm    Lung nodule    Dyspnea on exertion    Ex-smoker    Chronic obstructive pulmonary disease    Seasonal allergies    Major depression, recurrent, chronic    Body mass index (BMI) 40.0-44.9, adult        ALLERGIES:   Review of patient's allergies indicates:   Allergen Reactions    Dexamethasone Rash       MEDS:   Current Outpatient Medications:     albuterol (PROVENTIL/VENTOLIN HFA) 90 mcg/actuation inhaler, Inhale 2 puffs into the lungs every 4 (four) hours as needed for Wheezing (For " shortness of breath)., Disp: 54 g, Rfl: 11    albuterol-ipratropium (DUO-NEB) 2.5 mg-0.5 mg/3 mL nebulizer solution, Take 3 mLs by nebulization every 6 (six) hours as needed for Wheezing. Rescue, Disp: 400 each, Rfl: 3    atorvastatin (LIPITOR) 10 MG tablet, Take 1 tablet (10 mg total) by mouth once daily., Disp: 90 tablet, Rfl: 3    candesartan (ATACAND) 8 MG tablet, Take 0.5 tablets (4 mg total) by mouth once daily., Disp: 45 tablet, Rfl: 3    cholecalciferol, vitamin D3, (VITAMIN D3) 125 mcg (5,000 unit) Tab, Take 1 tablet (5,000 Units total) by mouth once daily., Disp: 90 tablet, Rfl: 3    ELIQUIS 5 mg Tab, TAKE 1 TABLET BY MOUTH TWICE A DAY, Disp: 180 tablet, Rfl: 3    EScitalopram oxalate (LEXAPRO) 10 MG tablet, Take 1 tablet (10 mg total) by mouth once daily., Disp: 30 tablet, Rfl: 11    levothyroxine (SYNTHROID) 200 MCG tablet, Take 1 tablet (200 mcg total) by mouth once daily., Disp: 90 tablet, Rfl: 3    metoprolol succinate (TOPROL-XL) 100 MG 24 hr tablet, Take 3 tablets (300 mg total) by mouth once daily., Disp: 270 tablet, Rfl: 3    umeclidinium-vilanteroL (ANORO ELLIPTA) 62.5-25 mcg/actuation DsDv, Inhale 1 puff into the lungs once daily. Controller, Disp: 60 each, Rfl: 11    zolpidem (AMBIEN) 10 mg Tab, TAKE 1 TABLET BY MOUTH EVERY DAY IN THE EVENING AS NEEDED, Disp: 90 tablet, Rfl: 1    ALPRAZolam (XANAX) 0.25 MG tablet, Take 1 tablet (0.25 mg total) by mouth once daily., Disp: 90 tablet, Rfl: 1    gabapentin (NEURONTIN) 400 MG capsule, Take 1 capsule (400 mg total) by mouth 2 (two) times daily., Disp: 180 capsule, Rfl: 3    LIDOcaine (LIDODERM) 5 %, Place 1 patch onto the skin once daily. Remove & Discard patch within 12 hours or as directed by MD (Patient not taking: Reported on 6/20/2022), Disp: 15 patch, Rfl: 0      History:  Current Providers as of 6/20/2022  PCP: James Vallecillo MD  Care Team Provider: Robert Ochoa MD  Care Team Provider: Laci Barker MD  Care Team  Provider: Julieth Christopher MD  Care Team Provider: Fransico Gallagher MD  Care Team Provider: Devin You MD  Care Team Provider: Héctor Davila Jr., MD  Care Team Provider: Bebeto Marin MD  Encounter Provider: James Vallecillo MD, starting on Mon Jun 20, 2022 12:00 AM  Referring Provider: not found, starting on Mon Jun 20, 2022 12:00 AM  Consulting Physician: James Vallecillo MD, starting on Mon Jun 20, 2022  2:43 PM (Active)   Past Medical History:   Diagnosis Date    Allergy     Anemia     Anxiety     Atrial fibrillation     Cancer     skin    Cataract     Depression     Edema     Hypothyroidism     Kidney disease     PSVT (paroxysmal supraventricular tachycardia)     Thyroid disease      Past Surgical History:   Procedure Laterality Date    ADENOIDECTOMY      APPENDECTOMY      COLONOSCOPY  2009    repeat in 10 years     EYE SURGERY      HYSTERECTOMY      INJECTION OF ANESTHETIC AGENT AROUND NERVE Bilateral 9/21/2018    Procedure: BLOCK, NERVE, MEDIAL BRANCH BLOCK BILATERAL LUMBAR L2-5;  Surgeon: Julieth Christopher MD;  Location: [facility] PAIN MGT;  Service: Pain Management;  Laterality: Bilateral;  1 of 2    INJECTION OF ANESTHETIC AGENT AROUND NERVE Bilateral 9/28/2018    Procedure: BLOCK, NERVE, MEDIAL BRANCH BLOCK BILATERAL LUMBAR L2-5;  Surgeon: Julieth Christopher MD;  Location: [facility] PAIN MGT;  Service: Pain Management;  Laterality: Bilateral;  2 of 2  PLEASE GIVE NIRAVAM PER MD    OOPHORECTOMY      RADIOFREQUENCY ABLATION Left 2/14/2020    Procedure: RADIOFREQUENCY ABLATION L2,3,4,5;  Surgeon: Julieth Christopher MD;  Location: [facility] PAIN MGT;  Service: Pain Management;  Laterality: Left;  LT RFA L2,3,4,5  1 of 2  OK to hold Eliquis    RADIOFREQUENCY ABLATION Right 2/28/2020    Procedure: RADIOFREQUENCY ABLATION L2,3,4,5 RIGHT   2 of 2 ok to hold eliquis;  Surgeon: Julieth Christopher MD;  Location: [facility] PAIN MGT;  Service: Pain Management;  Laterality: Right;    RADIOFREQUENCY ABLATION  Left 2020    Procedure: RADIOFREQUENCY ABLATION, L2-L3-L4-L5 MEDIAL BRANCH 1 OF 2 Continue Eliquis;  Surgeon: Bogdan Webster MD;  Location: [facility] MGT;  Service: Pain Management;  Laterality: Left;    TONSILLECTOMY      TREATMENT OF CARDIAC ARRHYTHMIA N/A 2019    Procedure: CARDIOVERSION;  Surgeon: Devin You MD;  Location: [facility] EP LAB;  Service: Cardiology;  Laterality: N/A;  AF, KAROL, DCCV, MAC, MS, 3 Prep     Social History     Tobacco Use    Smoking status: Former Smoker     Packs/day: 1.00     Years: 50.00     Pack years: 50.00     Types: Cigarettes     Quit date:      Years since quittin.4    Smokeless tobacco: Never Used    Tobacco comment: still vaping   Substance Use Topics    Alcohol use: No     Comment: rarely    Drug use: No         Review of Systems   Constitutional: Negative.    HENT: Negative.    Respiratory: Positive for shortness of breath.    Cardiovascular: Negative.    Gastrointestinal: Negative.    Endocrine: Negative.    Genitourinary: Negative.    Musculoskeletal: Positive for back pain.   Psychiatric/Behavioral: Negative.    All other systems reviewed and are negative.    Objective:     Physical Exam  Vitals and nursing note reviewed.   Constitutional:       Appearance: She is well-developed.   HENT:      Head: Normocephalic.   Eyes:      Conjunctiva/sclera: Conjunctivae normal.      Pupils: Pupils are equal, round, and reactive to light.   Cardiovascular:      Rate and Rhythm: Normal rate and regular rhythm.      Heart sounds: Normal heart sounds.   Pulmonary:      Effort: Pulmonary effort is normal.      Breath sounds: Normal breath sounds.   Musculoskeletal:         General: Normal range of motion.      Cervical back: Normal range of motion and neck supple.   Skin:     General: Skin is warm and dry.   Neurological:      Mental Status: She is alert and oriented to person, place, and time.      Deep Tendon Reflexes: Reflexes are normal and symmetric.    Psychiatric:         Behavior: Behavior normal.         Thought Content: Thought content normal.         Judgment: Judgment normal.             Assessment:     1. Reactive depression    2. Anxiety    3. Hypothyroidism due to acquired atrophy of thyroid    4. Post-menopausal    5. Major depression, recurrent, chronic      Plan:        Medication List          Accurate as of June 20, 2022  3:41 PM. If you have any questions, ask your nurse or doctor.            CONTINUE taking these medications    albuterol 90 mcg/actuation inhaler  Commonly known as: PROVENTIL/VENTOLIN HFA  Inhale 2 puffs into the lungs every 4 (four) hours as needed for Wheezing (For shortness of breath).     albuterol-ipratropium 2.5 mg-0.5 mg/3 mL nebulizer solution  Commonly known as: DUO-NEB  Take 3 mLs by nebulization every 6 (six) hours as needed for Wheezing. Rescue     ALPRAZolam 0.25 MG tablet  Commonly known as: XANAX  Take 1 tablet (0.25 mg total) by mouth once daily.     ANORO ELLIPTA 62.5-25 mcg/actuation Dsdv  Generic drug: umeclidinium-vilanteroL  Inhale 1 puff into the lungs once daily. Controller     atorvastatin 10 MG tablet  Commonly known as: LIPITOR  Take 1 tablet (10 mg total) by mouth once daily.     candesartan 8 MG tablet  Commonly known as: ATACAND  Take 0.5 tablets (4 mg total) by mouth once daily.     cholecalciferol (vitamin D3) 125 mcg (5,000 unit) Tab  Commonly known as: VITAMIN D3  Take 1 tablet (5,000 Units total) by mouth once daily.     ELIQUIS 5 mg Tab  Generic drug: apixaban  TAKE 1 TABLET BY MOUTH TWICE A DAY     EScitalopram oxalate 10 MG tablet  Commonly known as: LEXAPRO  Take 1 tablet (10 mg total) by mouth once daily.     gabapentin 400 MG capsule  Commonly known as: NEURONTIN  Take 1 capsule (400 mg total) by mouth 2 (two) times daily.     levothyroxine 200 MCG tablet  Commonly known as: SYNTHROID  Take 1 tablet (200 mcg total) by mouth once daily.     LIDOcaine 5 %  Commonly known as: LIDODERM  Place 1  patch onto the skin once daily. Remove & Discard patch within 12 hours or as directed by MD     metoprolol succinate 100 MG 24 hr tablet  Commonly known as: TOPROL-XL  Take 3 tablets (300 mg total) by mouth once daily.     zolpidem 10 mg Tab  Commonly known as: AMBIEN  TAKE 1 TABLET BY MOUTH EVERY DAY IN THE EVENING AS NEEDED           Where to Get Your Medications      These medications were sent to [facility]/pharmacy #[id number] - [address] AT CORNER OF [address] [address]    Phone: [phone]   · ALPRAZolam 0.25 MG tablet  · gabapentin 400 MG capsule       Reactive depression  -     Ambulatory referral/consult to Psychiatry; Future; Expected date: 06/27/2022    Anxiety  -     ALPRAZolam (XANAX) 0.25 MG tablet; Take 1 tablet (0.25 mg total) by mouth once daily.  Dispense: 90 tablet; Refill: 1  -     Ambulatory referral/consult to Psychiatry; Future; Expected date: 06/27/2022    Hypothyroidism due to acquired atrophy of thyroid  -     TSH; Future    Post-menopausal  -     DXA Bone Density Spine And Hip; Future; Expected date: 06/20/2022    Major depression, recurrent, chronic  -     Ambulatory referral/consult to Psychiatry; Future; Expected date: 06/27/2022    Other orders  -     gabapentin (NEURONTIN) 400 MG capsule; Take 1 capsule (400 mg total) by mouth 2 (two) times daily.  Dispense: 180 capsule; Refill: 3      For anxiety and depression, to psychiatry  dcont same meds; needs grief counseling for death of  and daughter

## 2022-06-21 ENCOUNTER — LAB VISIT (OUTPATIENT)
Dept: LAB | Facility: HOSPITAL | Age: 72
End: 2022-06-21
Attending: FAMILY MEDICINE
Payer: MEDICARE

## 2022-06-21 DIAGNOSIS — E03.4 HYPOTHYROIDISM DUE TO ACQUIRED ATROPHY OF THYROID: ICD-10-CM

## 2022-06-21 LAB — TSH SERPL DL<=0.005 MIU/L-ACNC: 1.22 UIU/ML (ref 0.4–4)

## 2022-06-21 PROCEDURE — 84443 ASSAY THYROID STIM HORMONE: CPT | Mod: PO | Performed by: FAMILY MEDICINE

## 2022-06-21 PROCEDURE — 36415 COLL VENOUS BLD VENIPUNCTURE: CPT | Mod: PO | Performed by: FAMILY MEDICINE

## 2022-06-28 ENCOUNTER — HOSPITAL ENCOUNTER (OUTPATIENT)
Dept: RADIOLOGY | Facility: HOSPITAL | Age: 72
Discharge: HOME | End: 2022-06-28
Attending: FAMILY MEDICINE
Payer: MEDICARE

## 2022-06-28 DIAGNOSIS — Z78.0 POST-MENOPAUSAL: ICD-10-CM

## 2022-06-28 PROCEDURE — 77080 DXA BONE DENSITY AXIAL: CPT | Mod: TC,PO

## 2022-07-13 ENCOUNTER — TELEPHONE (OUTPATIENT)
Dept: FAMILY MEDICINE | Facility: CLINIC | Age: 72
End: 2022-07-13
Payer: MEDICARE

## 2022-07-13 NOTE — TELEPHONE ENCOUNTER
Pt is repeating thyroid labs in August for Laci Barker MD who is also following thyroid levels  ----- Message from James Vallecillo MD sent at 6/27/2022  6:05 AM CDT -----  CALL PT TESTS ARE NORMAL  Repeat 6 months, TSH

## 2022-08-22 ENCOUNTER — TELEPHONE (OUTPATIENT)
Dept: FAMILY MEDICINE | Facility: CLINIC | Age: 72
End: 2022-08-22
Payer: MEDICARE

## 2022-08-22 NOTE — TELEPHONE ENCOUNTER
----- Message from Juliann Beckman sent at 8/22/2022  3:36 PM CDT -----  Contact: [phone]/ Self  Type:  Sooner Appointment Request     Caller is requesting a sooner appointment.  Caller declined first available appointment listed below.  Caller will not accept being placed on the waitlist and is requesting a message be sent to doctor.  Name of Caller: pt  When is the first available appointment? 12/14/2022  Symptoms or Reason: Heavy wheezing and trouble breathing.  Would the patient rather a call back or a response via [facility]? Call back  Best Call Back Number: [phone]  Additional Information: [facility] scheduled [facility]

## 2022-08-26 ENCOUNTER — TELEPHONE (OUTPATIENT)
Dept: FAMILY MEDICINE | Facility: CLINIC | Age: 72
End: 2022-08-26
Payer: MEDICARE

## 2022-08-26 NOTE — TELEPHONE ENCOUNTER
----- Message from Kamille Pulido sent at 8/26/2022  2:01 PM CDT -----  Regarding: reschedule/sooner  Contact: [phone]  Type:  Sooner Appointment Request     Caller is requesting a sooner appointment.  Caller declined first available appointment listed below.  Caller will not accept being placed on the waitlist and is requesting a message be sent to doctor.  Name of Caller: pt  When is the first available appointment? 12/14/2022  Symptoms or Reason: Heavy wheezing and trouble breathing.  Would the patient rather a call back or a response via [facility]? Call back  Best Call Back Number: [phone]      I LM for the pt advising to rtn call

## 2022-08-30 ENCOUNTER — TELEPHONE (OUTPATIENT)
Dept: FAMILY MEDICINE | Facility: CLINIC | Age: 72
End: 2022-08-30
Payer: MEDICARE

## 2022-08-30 NOTE — TELEPHONE ENCOUNTER
----- Message from Jocy Greenwood sent at 8/30/2022  1:51 PM CDT -----  Type:  Needs Medical Advice    Who Called: pt    Would the patient rather a call back or a response via [facility]? Call   Best Call Back Number: [phone]  Additional Information: Pt wants to verify someone will be able to se her for appt at 12pm 08/31

## 2022-08-31 ENCOUNTER — OFFICE VISIT (OUTPATIENT)
Dept: FAMILY MEDICINE | Facility: CLINIC | Age: 72
End: 2022-08-31
Payer: MEDICARE

## 2022-08-31 ENCOUNTER — TELEPHONE (OUTPATIENT)
Dept: FAMILY MEDICINE | Facility: CLINIC | Age: 72
End: 2022-08-31

## 2022-08-31 VITALS
SYSTOLIC BLOOD PRESSURE: 94 MMHG | WEIGHT: 228.06 LBS | HEIGHT: 62 IN | TEMPERATURE: 98 F | BODY MASS INDEX: 41.97 KG/M2 | DIASTOLIC BLOOD PRESSURE: 66 MMHG | HEART RATE: 98 BPM | OXYGEN SATURATION: 95 %

## 2022-08-31 DIAGNOSIS — F41.9 ANXIETY: ICD-10-CM

## 2022-08-31 DIAGNOSIS — R05.9 COUGH IN ADULT: Primary | ICD-10-CM

## 2022-08-31 DIAGNOSIS — J41.8 MIXED SIMPLE AND MUCOPURULENT CHRONIC BRONCHITIS: ICD-10-CM

## 2022-08-31 PROCEDURE — 3288F FALL RISK ASSESSMENT DOCD: CPT | Mod: CPTII,S$GLB,, | Performed by: FAMILY MEDICINE

## 2022-08-31 PROCEDURE — 1126F PR PAIN SEVERITY QUANTIFIED, NO PAIN PRESENT: ICD-10-PCS | Mod: CPTII,S$GLB,, | Performed by: FAMILY MEDICINE

## 2022-08-31 PROCEDURE — 3008F BODY MASS INDEX DOCD: CPT | Mod: CPTII,S$GLB,, | Performed by: FAMILY MEDICINE

## 2022-08-31 PROCEDURE — 3078F DIAST BP <80 MM HG: CPT | Mod: CPTII,S$GLB,, | Performed by: FAMILY MEDICINE

## 2022-08-31 PROCEDURE — 3288F PR FALLS RISK ASSESSMENT DOCUMENTED: ICD-10-PCS | Mod: CPTII,S$GLB,, | Performed by: FAMILY MEDICINE

## 2022-08-31 PROCEDURE — 4010F PR ACE/ARB THEARPY RXD/TAKEN: ICD-10-PCS | Mod: CPTII,S$GLB,, | Performed by: FAMILY MEDICINE

## 2022-08-31 PROCEDURE — 99203 PR OFFICE/OUTPT VISIT, NEW, LEVL III, 30-44 MIN: ICD-10-PCS | Mod: S$GLB,,, | Performed by: FAMILY MEDICINE

## 2022-08-31 PROCEDURE — 1101F PR PT FALLS ASSESS DOC 0-1 FALLS W/OUT INJ PAST YR: ICD-10-PCS | Mod: CPTII,S$GLB,, | Performed by: FAMILY MEDICINE

## 2022-08-31 PROCEDURE — 3074F PR MOST RECENT SYSTOLIC BLOOD PRESSURE < 130 MM HG: ICD-10-PCS | Mod: CPTII,S$GLB,, | Performed by: FAMILY MEDICINE

## 2022-08-31 PROCEDURE — 1159F PR MEDICATION LIST DOCUMENTED IN MEDICAL RECORD: ICD-10-PCS | Mod: CPTII,S$GLB,, | Performed by: FAMILY MEDICINE

## 2022-08-31 PROCEDURE — 4010F ACE/ARB THERAPY RXD/TAKEN: CPT | Mod: CPTII,S$GLB,, | Performed by: FAMILY MEDICINE

## 2022-08-31 PROCEDURE — 99203 OFFICE O/P NEW LOW 30 MIN: CPT | Mod: S$GLB,,, | Performed by: FAMILY MEDICINE

## 2022-08-31 PROCEDURE — 1159F MED LIST DOCD IN RCRD: CPT | Mod: CPTII,S$GLB,, | Performed by: FAMILY MEDICINE

## 2022-08-31 PROCEDURE — 3008F PR BODY MASS INDEX (BMI) DOCUMENTED: ICD-10-PCS | Mod: CPTII,S$GLB,, | Performed by: FAMILY MEDICINE

## 2022-08-31 PROCEDURE — 3078F PR MOST RECENT DIASTOLIC BLOOD PRESSURE < 80 MM HG: ICD-10-PCS | Mod: CPTII,S$GLB,, | Performed by: FAMILY MEDICINE

## 2022-08-31 PROCEDURE — 3074F SYST BP LT 130 MM HG: CPT | Mod: CPTII,S$GLB,, | Performed by: FAMILY MEDICINE

## 2022-08-31 PROCEDURE — 1126F AMNT PAIN NOTED NONE PRSNT: CPT | Mod: CPTII,S$GLB,, | Performed by: FAMILY MEDICINE

## 2022-08-31 PROCEDURE — 1101F PT FALLS ASSESS-DOCD LE1/YR: CPT | Mod: CPTII,S$GLB,, | Performed by: FAMILY MEDICINE

## 2022-08-31 RX ORDER — ESCITALOPRAM OXALATE 10 MG/1
10 TABLET ORAL DAILY
Qty: 30 TABLET | Refills: 11 | Status: SHIPPED | OUTPATIENT
Start: 2022-08-31 | End: 2023-01-06

## 2022-08-31 RX ORDER — ALPRAZOLAM 0.25 MG/1
0.25 TABLET ORAL 3 TIMES DAILY PRN
Qty: 90 TABLET | Refills: 0 | Status: SHIPPED | OUTPATIENT
Start: 2022-08-31 | End: 2022-09-01 | Stop reason: SDUPTHER

## 2022-08-31 NOTE — TELEPHONE ENCOUNTER
----- Message from Kadie Guerrero sent at 8/31/2022  3:16 PM CDT -----  Regarding: call back  Contact: [phone]  Type:  Patient Returning Call    Who Called: PT   Who Left Message for Patient: Nurse   Does the patient know what this is regarding?: Yes   Would the patient rather a call back or a response via [facility]? Call back   Best Call Back Number: [phone]  Additional Information: Dr. Katherin Jose [facility] [phone]      Dr ozuna referral updated

## 2022-08-31 NOTE — PROGRESS NOTES
Subjective:       Patient ID: Talia Dillon is a 71 y.o. female.    Chief Complaint: Wheezing and Shortness of Breath    70 y/o female with hx of HTN, COPD, ex-smoker here for follow up  of SOB and wheezing, on even short walking  Requesting Oxygen at home  Feels lack of energy and some light headed ness on using nebulizer and inhaler  Denies f/c      Review of Systems    Objective:      Physical Exam  Vitals and nursing note reviewed.   Constitutional:       General: She is not in acute distress.     Appearance: Normal appearance. She is well-developed. She is not ill-appearing, toxic-appearing or diaphoretic.   HENT:      Head: Normocephalic and atraumatic.      Jaw: No trismus.      Right Ear: Hearing, tympanic membrane, ear canal and external ear normal.      Left Ear: Hearing, tympanic membrane, ear canal and external ear normal.      Nose: Nose normal. No nasal deformity, mucosal edema or rhinorrhea.      Right Sinus: No maxillary sinus tenderness or frontal sinus tenderness.      Left Sinus: No maxillary sinus tenderness or frontal sinus tenderness.      Mouth/Throat:      Mouth: Mucous membranes are moist.      Dentition: Normal dentition.      Pharynx: Oropharynx is clear. Uvula midline. No posterior oropharyngeal erythema or uvula swelling.   Eyes:      General: Lids are normal. No scleral icterus.        Right eye: No discharge.         Left eye: No discharge.      Extraocular Movements: Extraocular movements intact.      Conjunctiva/sclera: Conjunctivae normal.      Pupils: Pupils are equal, round, and reactive to light.      Comments: Sclera clear bilat   Neck:      Trachea: Trachea and phonation normal.   Cardiovascular:      Rate and Rhythm: Normal rate and regular rhythm.      Pulses: Normal pulses.      Heart sounds: Normal heart sounds. No murmur heard.    No friction rub.   Pulmonary:      Effort: Pulmonary effort is normal. No respiratory distress.      Breath sounds: No stridor. Rhonchi present.  No wheezing or rales.   Abdominal:      General: Bowel sounds are normal. There is no distension.      Palpations: Abdomen is soft. There is no mass or pulsatile mass.      Tenderness: There is no abdominal tenderness.   Musculoskeletal:         General: No deformity. Normal range of motion.      Cervical back: Full passive range of motion without pain, normal range of motion and neck supple.   Skin:     General: Skin is warm and dry.      Coloration: Skin is not pale.   Neurological:      Mental Status: She is alert and oriented to person, place, and time.      Motor: No abnormal muscle tone.      Coordination: Coordination normal.   Psychiatric:         Speech: Speech normal.         Behavior: Behavior normal. Behavior is cooperative.         Thought Content: Thought content normal.         Judgment: Judgment normal.       Assessment:       1. Cough in adult    2. Anxiety    3. Mixed simple and mucopurulent chronic bronchitis          Plan:         Talia was seen today for wheezing and shortness of breath.    Diagnoses and all orders for this visit:    Cough in adult    Anxiety  -     EScitalopram oxalate (LEXAPRO) 10 MG tablet; Take 1 tablet (10 mg total) by mouth once daily.  -     ALPRAZolam (XANAX) 0.25 MG tablet; Take 1 tablet (0.25 mg total) by mouth 3 (three) times daily as needed for Anxiety.    Mixed simple and mucopurulent chronic bronchitis

## 2022-08-31 NOTE — TELEPHONE ENCOUNTER
Pt was seen today by Dr. Lund. She was requesting refill on Xanax. I informed pt that Dr. Lund is unable to fill this medication. That it will have to come from pcp. Please advise.

## 2022-09-01 RX ORDER — ALPRAZOLAM 0.25 MG/1
0.25 TABLET ORAL 3 TIMES DAILY PRN
Qty: 90 TABLET | Refills: 0 | Status: SHIPPED | OUTPATIENT
Start: 2022-09-01 | End: 2023-01-06 | Stop reason: SDUPTHER

## 2022-09-08 ENCOUNTER — HOSPITAL ENCOUNTER (OUTPATIENT)
Dept: RADIOLOGY | Facility: HOSPITAL | Age: 72
Discharge: HOME | End: 2022-09-08
Attending: NURSE PRACTITIONER
Payer: MEDICARE

## 2022-09-08 DIAGNOSIS — Z12.31 ENCOUNTER FOR SCREENING MAMMOGRAM FOR MALIGNANT NEOPLASM OF BREAST: ICD-10-CM

## 2022-09-08 PROCEDURE — 77067 SCR MAMMO BI INCL CAD: CPT | Mod: TC,PO

## 2022-09-08 PROCEDURE — 77063 BREAST TOMOSYNTHESIS BI: CPT | Mod: TC,PO

## 2022-09-29 ENCOUNTER — TELEPHONE (OUTPATIENT)
Dept: PULMONOLOGY | Facility: CLINIC | Age: 72
End: 2022-09-29
Payer: MEDICARE

## 2022-09-29 DIAGNOSIS — J40 BRONCHITIS: Primary | ICD-10-CM

## 2022-10-03 ENCOUNTER — OFFICE VISIT (OUTPATIENT)
Dept: PULMONOLOGY | Facility: CLINIC | Age: 72
End: 2022-10-03
Payer: MEDICARE

## 2022-10-03 VITALS
BODY MASS INDEX: 41.94 KG/M2 | SYSTOLIC BLOOD PRESSURE: 102 MMHG | OXYGEN SATURATION: 99 % | WEIGHT: 227.94 LBS | HEIGHT: 62 IN | DIASTOLIC BLOOD PRESSURE: 74 MMHG | HEART RATE: 99 BPM

## 2022-10-03 DIAGNOSIS — J98.4 SMALL AIRWAYS DISEASE: ICD-10-CM

## 2022-10-03 DIAGNOSIS — J41.0 SIMPLE CHRONIC BRONCHITIS: Primary | ICD-10-CM

## 2022-10-03 DIAGNOSIS — E66.01 CLASS 3 SEVERE OBESITY DUE TO EXCESS CALORIES WITH SERIOUS COMORBIDITY AND BODY MASS INDEX (BMI) OF 40.0 TO 44.9 IN ADULT: ICD-10-CM

## 2022-10-03 DIAGNOSIS — Z00.00 HEALTHCARE MAINTENANCE: ICD-10-CM

## 2022-10-03 PROCEDURE — 3078F DIAST BP <80 MM HG: CPT | Mod: CPTII,S$GLB,, | Performed by: INTERNAL MEDICINE

## 2022-10-03 PROCEDURE — 3008F PR BODY MASS INDEX (BMI) DOCUMENTED: ICD-10-PCS | Mod: CPTII,S$GLB,, | Performed by: INTERNAL MEDICINE

## 2022-10-03 PROCEDURE — 3008F BODY MASS INDEX DOCD: CPT | Mod: CPTII,S$GLB,, | Performed by: INTERNAL MEDICINE

## 2022-10-03 PROCEDURE — 3074F SYST BP LT 130 MM HG: CPT | Mod: CPTII,S$GLB,, | Performed by: INTERNAL MEDICINE

## 2022-10-03 PROCEDURE — 3074F PR MOST RECENT SYSTOLIC BLOOD PRESSURE < 130 MM HG: ICD-10-PCS | Mod: CPTII,S$GLB,, | Performed by: INTERNAL MEDICINE

## 2022-10-03 PROCEDURE — 1126F AMNT PAIN NOTED NONE PRSNT: CPT | Mod: CPTII,S$GLB,, | Performed by: INTERNAL MEDICINE

## 2022-10-03 PROCEDURE — 4010F PR ACE/ARB THEARPY RXD/TAKEN: ICD-10-PCS | Mod: CPTII,S$GLB,, | Performed by: INTERNAL MEDICINE

## 2022-10-03 PROCEDURE — 3288F PR FALLS RISK ASSESSMENT DOCUMENTED: ICD-10-PCS | Mod: CPTII,S$GLB,, | Performed by: INTERNAL MEDICINE

## 2022-10-03 PROCEDURE — 1126F PR PAIN SEVERITY QUANTIFIED, NO PAIN PRESENT: ICD-10-PCS | Mod: CPTII,S$GLB,, | Performed by: INTERNAL MEDICINE

## 2022-10-03 PROCEDURE — 99214 PR OFFICE/OUTPT VISIT, EST, LEVL IV, 30-39 MIN: ICD-10-PCS | Mod: S$GLB,,, | Performed by: INTERNAL MEDICINE

## 2022-10-03 PROCEDURE — 3078F PR MOST RECENT DIASTOLIC BLOOD PRESSURE < 80 MM HG: ICD-10-PCS | Mod: CPTII,S$GLB,, | Performed by: INTERNAL MEDICINE

## 2022-10-03 PROCEDURE — 99999 PR PBB SHADOW E&M-EST. PATIENT-LVL IV: CPT | Mod: PBBFAC,,, | Performed by: INTERNAL MEDICINE

## 2022-10-03 PROCEDURE — 3288F FALL RISK ASSESSMENT DOCD: CPT | Mod: CPTII,S$GLB,, | Performed by: INTERNAL MEDICINE

## 2022-10-03 PROCEDURE — 1101F PT FALLS ASSESS-DOCD LE1/YR: CPT | Mod: CPTII,S$GLB,, | Performed by: INTERNAL MEDICINE

## 2022-10-03 PROCEDURE — 1159F MED LIST DOCD IN RCRD: CPT | Mod: CPTII,S$GLB,, | Performed by: INTERNAL MEDICINE

## 2022-10-03 PROCEDURE — 1159F PR MEDICATION LIST DOCUMENTED IN MEDICAL RECORD: ICD-10-PCS | Mod: CPTII,S$GLB,, | Performed by: INTERNAL MEDICINE

## 2022-10-03 PROCEDURE — 99214 OFFICE O/P EST MOD 30 MIN: CPT | Mod: S$GLB,,, | Performed by: INTERNAL MEDICINE

## 2022-10-03 PROCEDURE — 1101F PR PT FALLS ASSESS DOC 0-1 FALLS W/OUT INJ PAST YR: ICD-10-PCS | Mod: CPTII,S$GLB,, | Performed by: INTERNAL MEDICINE

## 2022-10-03 PROCEDURE — 99999 PR PBB SHADOW E&M-EST. PATIENT-LVL IV: ICD-10-PCS | Mod: PBBFAC,,, | Performed by: INTERNAL MEDICINE

## 2022-10-03 PROCEDURE — 4010F ACE/ARB THERAPY RXD/TAKEN: CPT | Mod: CPTII,S$GLB,, | Performed by: INTERNAL MEDICINE

## 2022-10-03 NOTE — PROGRESS NOTES
Subjective:       Patient ID: Talia Dillon is a 71 y.o. female.    Chief Complaint: Bronchitis    Pt is a 70 yo CW pmh scoliosis, COPD, HTN, persistent Afib, CKD IV, hypothyroid, Ascending aortic aneurysm who presents for follow up chronic bronchitis. Pt states that she has visited the ED twice since last being seen due to shortness of breath. She states she gets super anxious at night as she lives by herself and starts getting short of breath, which worsens her anxiety and then she calls for an ambulance. Pt states that the nebulizer more causes anxiety and jitteriness as opposed to helping, but she feels that the Anoro may help some. Quit smoking in 5/22 with some mild improvement in her cough.     Inhalers: Anoro Ellipta  PRN inhalers: albuterol, duonebs 3-6  Hospitalization/urgent care: 2 ED trips  Intubation: never  Steroid use: None    Review of Systems    Objective:      Physical Exam  Personal Diagnostic Review  Chest x-ray: 12/31/21  No acute cardiopulmonary process.     Echocardiogram: 6/11/20  Normal left ventricular systolic function. The estimated ejection fraction is 60%.  Diastolic pattern consistent with atrial fibrillation observed.  Concentric left ventricular hypertrophy.  Normal right ventricular systolic function.  Normal central venous pressure (3 mmHg).  The estimated PA systolic pressure is 35 mmHg.    Pulmonary function tests:   FEV1: 1.36L  (61.2 % predicted),   FVC:  1.90L (67.1 % predicted),   FEV1/FVC:  71,   TLC: 3.75L (82.0 % predicted),   DLCO: 16.14 (81.6 % predicted)  No flowsheet data found.      Assessment:       1. Simple chronic bronchitis    2. Small airways disease    3. Class 3 severe obesity due to excess calories with serious comorbidity and body mass index (BMI) of 40.0 to 44.9 in adult    4. Healthcare maintenance        Outpatient Encounter Medications as of 10/3/2022   Medication Sig Dispense Refill    albuterol (PROVENTIL/VENTOLIN HFA) 90 mcg/actuation inhaler Inhale  2 puffs into the lungs every 4 (four) hours as needed for Wheezing (For shortness of breath). 54 g 11    albuterol-ipratropium (DUO-NEB) 2.5 mg-0.5 mg/3 mL nebulizer solution Take 3 mLs by nebulization every 6 (six) hours as needed for Wheezing. Rescue 400 each 3    ALPRAZolam (XANAX) 0.25 MG tablet Take 1 tablet (0.25 mg total) by mouth 3 (three) times daily as needed for Anxiety. 90 tablet 0    atorvastatin (LIPITOR) 10 MG tablet Take 1 tablet (10 mg total) by mouth once daily. 90 tablet 3    candesartan (ATACAND) 8 MG tablet Take 0.5 tablets (4 mg total) by mouth once daily. 45 tablet 3    cholecalciferol, vitamin D3, (VITAMIN D3) 125 mcg (5,000 unit) Tab Take 1 tablet (5,000 Units total) by mouth once daily. 90 tablet 3    EScitalopram oxalate (LEXAPRO) 10 MG tablet Take 1 tablet (10 mg total) by mouth once daily. 30 tablet 11    gabapentin (NEURONTIN) 400 MG capsule Take 1 capsule (400 mg total) by mouth 2 (two) times daily. 180 capsule 3    levothyroxine (SYNTHROID) 200 MCG tablet Take 1 tablet (200 mcg total) by mouth once daily. 90 tablet 3    metoprolol succinate (TOPROL-XL) 100 MG 24 hr tablet Take 3 tablets (300 mg total) by mouth once daily. 270 tablet 3    umeclidinium-vilanteroL (ANORO ELLIPTA) 62.5-25 mcg/actuation DsDv Inhale 1 puff into the lungs once daily. Controller 60 each 11    zolpidem (AMBIEN) 10 mg Tab TAKE 1 TABLET BY MOUTH EVERY DAY IN THE EVENING AS NEEDED 90 tablet 0     No facility-administered encounter medications on file as of 10/3/2022.     Orders Placed This Encounter   Procedures    Ambulatory referral/consult to Pulmonary Rehab     Standing Status:   Future     Standing Expiration Date:   11/3/2023     Referral Priority:   Routine     Referral Type:   Consultation     Referral Reason:   Specialty Services Required     Requested Specialty:   Pulmonary Disease     Number of Visits Requested:   1     1. Simple chronic bronchitis    2. Small airways disease      3. Class 3 severe  obesity due to excess calories with serious comorbidity and body mass index (BMI) of 40.0 to 44.9 in adult      Plan:      Simple chronic bronchitis  - long smoking history with imprvement in cough since stopping smoking/vaping 5 months ago. 80 py smoking history. No obstruction of FEV1/FVC with FEF 25-75% 44.5 indicating probable small airway disease.     Small airways disease   - No obstruction of FEV1/FVC with FEF 25-75% 44.5 indicating probable small airway disease in setting of 80 py smoking history  - continue Anoro ellipta and PRN albuterol/duonebs as needed  - Pulmonary rehab to improve anxiety control with shortness of breath as well as increase pulmonary muscle strength    Class III Obesity BMI 42 with serious comorbidity  - Likely has aspect of deconditioning and lack of motivation to exercise due to shortness of breath worsening obesity and deconditioning.  - Pulmonary rehab to facilitate exercise and learn coping mechanisms to deal with shortness of breath episodes.     Healthcare maintenance:   Discussed with patient obtaining flu and covid vaccines.     Jayro Boston MD  [facility]

## 2022-12-05 ENCOUNTER — HOSPITAL ENCOUNTER (OUTPATIENT)
Dept: RADIOLOGY | Facility: HOSPITAL | Age: 72
Discharge: HOME | End: 2022-12-05
Attending: INTERNAL MEDICINE
Payer: MEDICARE

## 2022-12-05 ENCOUNTER — TELEPHONE (OUTPATIENT)
Dept: CARDIOLOGY | Facility: CLINIC | Age: 72
End: 2022-12-05
Payer: MEDICARE

## 2022-12-05 DIAGNOSIS — E78.00 PURE HYPERCHOLESTEROLEMIA: ICD-10-CM

## 2022-12-05 DIAGNOSIS — Z79.01 LONG TERM (CURRENT) USE OF ANTICOAGULANTS: ICD-10-CM

## 2022-12-05 DIAGNOSIS — I71.21 ASCENDING AORTIC ANEURYSM: ICD-10-CM

## 2022-12-05 DIAGNOSIS — R06.09 DYSPNEA ON EXERTION: ICD-10-CM

## 2022-12-05 DIAGNOSIS — G89.29 OTHER CHRONIC PAIN: ICD-10-CM

## 2022-12-05 DIAGNOSIS — I48.11 LONGSTANDING PERSISTENT ATRIAL FIBRILLATION: ICD-10-CM

## 2022-12-05 DIAGNOSIS — F32.89 OTHER DEPRESSION: ICD-10-CM

## 2022-12-05 DIAGNOSIS — I10 ESSENTIAL HYPERTENSION: ICD-10-CM

## 2022-12-05 DIAGNOSIS — R60.0 LOCALIZED EDEMA: ICD-10-CM

## 2022-12-05 DIAGNOSIS — J42 CHRONIC BRONCHITIS, UNSPECIFIED CHRONIC BRONCHITIS TYPE: ICD-10-CM

## 2022-12-05 DIAGNOSIS — N18.4 CKD (CHRONIC KIDNEY DISEASE) STAGE 4, GFR 15-29 ML/MIN: ICD-10-CM

## 2022-12-05 DIAGNOSIS — E03.4 HYPOTHYROIDISM DUE TO ACQUIRED ATROPHY OF THYROID: ICD-10-CM

## 2022-12-05 PROCEDURE — 71250 CT THORAX DX C-: CPT | Mod: TC,PO

## 2022-12-05 NOTE — TELEPHONE ENCOUNTER
Message left on voicemail:  CT of chest shows relatively stable finding of ascending aortic aneursym (now 4.8 vs 4.6 cm)-- no need for any intervention.  There is fluid in the sack around the heart due to her underactive thyroid gland.

## 2022-12-06 ENCOUNTER — OFFICE VISIT (OUTPATIENT)
Dept: CARDIOLOGY | Facility: CLINIC | Age: 72
End: 2022-12-06
Payer: MEDICARE

## 2022-12-06 VITALS
BODY MASS INDEX: 41.69 KG/M2 | SYSTOLIC BLOOD PRESSURE: 109 MMHG | HEART RATE: 75 BPM | DIASTOLIC BLOOD PRESSURE: 78 MMHG | OXYGEN SATURATION: 99 % | HEIGHT: 62 IN

## 2022-12-06 DIAGNOSIS — I71.21 ANEURYSM OF ASCENDING AORTA WITHOUT RUPTURE: ICD-10-CM

## 2022-12-06 DIAGNOSIS — E03.4 HYPOTHYROIDISM DUE TO ACQUIRED ATROPHY OF THYROID: ICD-10-CM

## 2022-12-06 DIAGNOSIS — Z79.01 LONG TERM (CURRENT) USE OF ANTICOAGULANTS: ICD-10-CM

## 2022-12-06 DIAGNOSIS — R60.0 LOCALIZED EDEMA: ICD-10-CM

## 2022-12-06 DIAGNOSIS — R06.09 DYSPNEA ON EXERTION: ICD-10-CM

## 2022-12-06 DIAGNOSIS — E78.00 PURE HYPERCHOLESTEROLEMIA: ICD-10-CM

## 2022-12-06 DIAGNOSIS — N18.4 CKD STAGE G4/A2, GFR 15-29 AND ALBUMIN CREATININE RATIO 30-299 MG/G: ICD-10-CM

## 2022-12-06 DIAGNOSIS — Z87.891 EX-SMOKER: ICD-10-CM

## 2022-12-06 DIAGNOSIS — I48.11 LONGSTANDING PERSISTENT ATRIAL FIBRILLATION: Primary | ICD-10-CM

## 2022-12-06 DIAGNOSIS — Z86.79 HISTORY OF PSVT (PAROXYSMAL SUPRAVENTRICULAR TACHYCARDIA): ICD-10-CM

## 2022-12-06 PROCEDURE — 3008F BODY MASS INDEX DOCD: CPT | Mod: CPTII,S$GLB,, | Performed by: INTERNAL MEDICINE

## 2022-12-06 PROCEDURE — 1126F PR PAIN SEVERITY QUANTIFIED, NO PAIN PRESENT: ICD-10-PCS | Mod: CPTII,S$GLB,, | Performed by: INTERNAL MEDICINE

## 2022-12-06 PROCEDURE — 99214 OFFICE O/P EST MOD 30 MIN: CPT | Mod: 25,S$GLB,, | Performed by: INTERNAL MEDICINE

## 2022-12-06 PROCEDURE — 99214 PR OFFICE/OUTPT VISIT, EST, LEVL IV, 30-39 MIN: ICD-10-PCS | Mod: 25,S$GLB,, | Performed by: INTERNAL MEDICINE

## 2022-12-06 PROCEDURE — 3078F DIAST BP <80 MM HG: CPT | Mod: CPTII,S$GLB,, | Performed by: INTERNAL MEDICINE

## 2022-12-06 PROCEDURE — 3288F FALL RISK ASSESSMENT DOCD: CPT | Mod: CPTII,S$GLB,, | Performed by: INTERNAL MEDICINE

## 2022-12-06 PROCEDURE — 3074F PR MOST RECENT SYSTOLIC BLOOD PRESSURE < 130 MM HG: ICD-10-PCS | Mod: CPTII,S$GLB,, | Performed by: INTERNAL MEDICINE

## 2022-12-06 PROCEDURE — 93000 ELECTROCARDIOGRAM COMPLETE: CPT | Mod: S$GLB,,, | Performed by: INTERNAL MEDICINE

## 2022-12-06 PROCEDURE — 93000 EKG 12-LEAD: ICD-10-PCS | Mod: S$GLB,,, | Performed by: INTERNAL MEDICINE

## 2022-12-06 PROCEDURE — 3288F PR FALLS RISK ASSESSMENT DOCUMENTED: ICD-10-PCS | Mod: CPTII,S$GLB,, | Performed by: INTERNAL MEDICINE

## 2022-12-06 PROCEDURE — 1160F RVW MEDS BY RX/DR IN RCRD: CPT | Mod: CPTII,S$GLB,, | Performed by: INTERNAL MEDICINE

## 2022-12-06 PROCEDURE — 4010F PR ACE/ARB THEARPY RXD/TAKEN: ICD-10-PCS | Mod: CPTII,S$GLB,, | Performed by: INTERNAL MEDICINE

## 2022-12-06 PROCEDURE — 3078F PR MOST RECENT DIASTOLIC BLOOD PRESSURE < 80 MM HG: ICD-10-PCS | Mod: CPTII,S$GLB,, | Performed by: INTERNAL MEDICINE

## 2022-12-06 PROCEDURE — 1101F PR PT FALLS ASSESS DOC 0-1 FALLS W/OUT INJ PAST YR: ICD-10-PCS | Mod: CPTII,S$GLB,, | Performed by: INTERNAL MEDICINE

## 2022-12-06 PROCEDURE — 3074F SYST BP LT 130 MM HG: CPT | Mod: CPTII,S$GLB,, | Performed by: INTERNAL MEDICINE

## 2022-12-06 PROCEDURE — 1101F PT FALLS ASSESS-DOCD LE1/YR: CPT | Mod: CPTII,S$GLB,, | Performed by: INTERNAL MEDICINE

## 2022-12-06 PROCEDURE — 4010F ACE/ARB THERAPY RXD/TAKEN: CPT | Mod: CPTII,S$GLB,, | Performed by: INTERNAL MEDICINE

## 2022-12-06 PROCEDURE — 1126F AMNT PAIN NOTED NONE PRSNT: CPT | Mod: CPTII,S$GLB,, | Performed by: INTERNAL MEDICINE

## 2022-12-06 PROCEDURE — 1159F MED LIST DOCD IN RCRD: CPT | Mod: CPTII,S$GLB,, | Performed by: INTERNAL MEDICINE

## 2022-12-06 PROCEDURE — 1160F PR REVIEW ALL MEDS BY PRESCRIBER/CLIN PHARMACIST DOCUMENTED: ICD-10-PCS | Mod: CPTII,S$GLB,, | Performed by: INTERNAL MEDICINE

## 2022-12-06 PROCEDURE — 3008F PR BODY MASS INDEX (BMI) DOCUMENTED: ICD-10-PCS | Mod: CPTII,S$GLB,, | Performed by: INTERNAL MEDICINE

## 2022-12-06 PROCEDURE — 1159F PR MEDICATION LIST DOCUMENTED IN MEDICAL RECORD: ICD-10-PCS | Mod: CPTII,S$GLB,, | Performed by: INTERNAL MEDICINE

## 2022-12-06 PROCEDURE — 99999 PR PBB SHADOW E&M-EST. PATIENT-LVL III: CPT | Mod: PBBFAC,,, | Performed by: INTERNAL MEDICINE

## 2022-12-06 PROCEDURE — 99999 PR PBB SHADOW E&M-EST. PATIENT-LVL III: ICD-10-PCS | Mod: PBBFAC,,, | Performed by: INTERNAL MEDICINE

## 2022-12-06 NOTE — PROGRESS NOTES
"  Subjective:      Patient ID: Talia Dillon is a 72 y.o. female.    Chief Complaint: Follow-up    HPI:  Not very active.    Chronically short of breath with exertion.    Review of Systems   Cardiovascular:  Positive for dyspnea on exertion and leg swelling. Negative for chest pain, claudication, irregular heartbeat, near-syncope, orthopnea, palpitations and syncope.        No recent "PSVT"    No bleeding on Eliquis 5 mg bid      Past Medical History:   Diagnosis Date    Allergy     Anemia     Anxiety     Atrial fibrillation     Cancer     skin    Cataract     Depression     Edema     Hypothyroidism     Kidney disease     PSVT (paroxysmal supraventricular tachycardia)     Thyroid disease         Past Surgical History:   Procedure Laterality Date    ADENOIDECTOMY      APPENDECTOMY      COLONOSCOPY  2009    repeat in 10 years     EYE SURGERY      HYSTERECTOMY      INJECTION OF ANESTHETIC AGENT AROUND NERVE Bilateral 9/21/2018    Procedure: BLOCK, NERVE, MEDIAL BRANCH BLOCK BILATERAL LUMBAR L2-5;  Surgeon: Julieth Christopher MD;  Location: [facility] PAIN T;  Service: Pain Management;  Laterality: Bilateral;  1 of 2    INJECTION OF ANESTHETIC AGENT AROUND NERVE Bilateral 9/28/2018    Procedure: BLOCK, NERVE, MEDIAL BRANCH BLOCK BILATERAL LUMBAR L2-5;  Surgeon: Julieth Christopher MD;  Location: [facility] PAIN T;  Service: Pain Management;  Laterality: Bilateral;  2 of 2  PLEASE GIVE NIRAVAM PER MD    OOPHORECTOMY      RADIOFREQUENCY ABLATION Left 2/14/2020    Procedure: RADIOFREQUENCY ABLATION L2,3,4,5;  Surgeon: Julieth Christopher MD;  Location: [facility] PAIN T;  Service: Pain Management;  Laterality: Left;  LT RFA L2,3,4,5  1 of 2  OK to hold Eliquis    RADIOFREQUENCY ABLATION Right 2/28/2020    Procedure: RADIOFREQUENCY ABLATION L2,3,4,5 RIGHT   2 of 2 ok to hold eliquis;  Surgeon: Julieth Christopher MD;  Location: [facility] PAIN T;  Service: Pain Management;  Laterality: Right;    RADIOFREQUENCY ABLATION Left 11/30/2020    Procedure: " RADIOFREQUENCY ABLATION, L2-L3-L4-L5 MEDIAL BRANCH 1 OF 2 Continue Eliquis;  Surgeon: Bogdan Webster MD;  Location: [facility] [place] PAIN MGT;  Service: Pain Management;  Laterality: Left;    TONSILLECTOMY      TREATMENT OF CARDIAC ARRHYTHMIA N/A 2019    Procedure: CARDIOVERSION;  Surgeon: Devin You MD;  Location: [facility] EP LAB;  Service: Cardiology;  Laterality: N/A;  AF, KAROL, DCCV, MAC, AR, 3 Prep       Family History   Problem Relation Age of Onset    Hypertension Mother     Stroke Mother     Hypertension Father     Stroke Father     Diabetes Father     Diabetes Sister     Diabetes Sister     Heart attack Sister     Coronary artery disease Sister     Sleep apnea Daughter     Mental illness Daughter        Social History     Socioeconomic History    Marital status:    Tobacco Use    Smoking status: Former     Packs/day: 1.00     Years: 50.00     Pack years: 50.00     Types: Cigarettes     Quit date:      Years since quittin.9    Smokeless tobacco: Never    Tobacco comments:     still vaping   Substance and Sexual Activity    Alcohol use: No     Comment: rarely    Drug use: No    Sexual activity: Not Currently     Social Determinants of Health     Financial Resource Strain: Low Risk     Difficulty of Paying Living Expenses: Not hard at all   Food Insecurity: No Food Insecurity    Worried About Running Out of Food in the Last Year: Never true    Ran Out of Food in the Last Year: Never true   Transportation Needs: No Transportation Needs    Lack of Transportation (Medical): No    Lack of Transportation (Non-Medical): No   Physical Activity: Inactive    Days of Exercise per Week: 0 days    Minutes of Exercise per Session: 0 min   Stress: Stress Concern Present    Feeling of Stress : To some extent   Social Connections: Socially Isolated    Frequency of Communication with Friends and Family: More than three times a week    Frequency of Social Gatherings with Friends and Family: More than three times a week     Attends [employer] Services: Never    Active Member of Clubs or Organizations: No    Attends Club or Organization Meetings: Never    Marital Status:    Housing Stability: Low Risk     Unable to Pay for Housing in the Last Year: No    Number of Places Lived in the Last Year: 1    Unstable Housing in the Last Year: No       Current Outpatient Medications on File Prior to Visit   Medication Sig Dispense Refill    albuterol (PROVENTIL/VENTOLIN HFA) 90 mcg/actuation inhaler Inhale 2 puffs into the lungs every 4 (four) hours as needed for Wheezing (For shortness of breath). 54 g 11    albuterol-ipratropium (DUO-NEB) 2.5 mg-0.5 mg/3 mL nebulizer solution Take 3 mLs by nebulization every 6 (six) hours as needed for Wheezing. Rescue 400 each 3    ALPRAZolam (XANAX) 0.25 MG tablet Take 1 tablet (0.25 mg total) by mouth 3 (three) times daily as needed for Anxiety. 90 tablet 0    atorvastatin (LIPITOR) 10 MG tablet Take 1 tablet (10 mg total) by mouth once daily. 90 tablet 3    candesartan (ATACAND) 8 MG tablet Take 0.5 tablets (4 mg total) by mouth once daily. 45 tablet 3    cholecalciferol, vitamin D3, (VITAMIN D3) 125 mcg (5,000 unit) Tab Take 1 tablet (5,000 Units total) by mouth once daily. 90 tablet 3    EScitalopram oxalate (LEXAPRO) 10 MG tablet Take 1 tablet (10 mg total) by mouth once daily. 30 tablet 11    gabapentin (NEURONTIN) 400 MG capsule Take 1 capsule (400 mg total) by mouth 2 (two) times daily. 180 capsule 3    levothyroxine (SYNTHROID) 200 MCG tablet Take 1 tablet (200 mcg total) by mouth once daily. 90 tablet 3    metoprolol succinate (TOPROL-XL) 100 MG 24 hr tablet Take 3 tablets (300 mg total) by mouth once daily. 270 tablet 3    umeclidinium-vilanteroL (ANORO ELLIPTA) 62.5-25 mcg/actuation DsDv Inhale 1 puff into the lungs once daily. Controller 60 each 11    zolpidem (AMBIEN) 10 mg Tab TAKE 1 TABLET BY MOUTH EVERY DAY IN THE EVENING AS NEEDED 90 tablet 0     No current facility-administered  "medications on file prior to visit.       Review of patient's allergies indicates:   Allergen Reactions    Dexamethasone Rash     Objective:     Vitals:    12/06/22 1527   BP: 109/78   BP Location: Left arm   Patient Position: Sitting   BP Method: Large (Automatic)   Pulse: 75   SpO2: 99%   Weight: Comment: wheelchair   Height: 5' 2" (1.575 m)        Physical Exam  Vitals reviewed.   Constitutional:       Appearance: She is well-developed.   Eyes:      General: No scleral icterus.  Neck:      Vascular: No carotid bruit or JVD.   Cardiovascular:      Rate and Rhythm: Normal rate. Rhythm irregularly irregular.      Heart sounds: No murmur heard.    No gallop.   Pulmonary:      Breath sounds: Normal breath sounds.   Musculoskeletal:      Right lower leg: No edema.      Left lower leg: No edema.   Skin:     General: Skin is warm and dry.   Neurological:      Mental Status: She is alert and oriented to person, place, and time.   Psychiatric:         Behavior: Behavior normal.         Thought Content: Thought content normal.         Judgment: Judgment normal.      ECG today reviewed by me: atrial fibrillation with controlled ventricular response, low QRS voltage, unchanged    HM Topic  Details    Reading Physician Reading Date Result Priority   REZA Sanchez Sr., MD  [phone] 12/5/2022 Routine     Narrative & Impression  EXAMINATION:  CT CHEST WITHOUT CONTRAST     CLINICAL HISTORY:  Aortic aneurysm, known or suspected;     TECHNIQUE:  Standard chest CT protocol was performed without IV contrast.     COMPARISON:  A CT examination of the chest performed on 11/22/2021.     FINDINGS:  There is mild cardiomegaly.  There is a small pericardial effusion.  There is an aneurysm of the ascending thoracic aorta.  It has an AP diameter of 4.8 cm on the current examination.  On the prior examination it had an AP diameter of 4.6 cm.  The lungs are clear. There is no pneumothorax or pleural effusion.  There are several partially " visualized stones in the gallbladder.  The stones measure at least 13 mm in size.  There is no acute fracture visualized.     Impression:     1. There is an aneurysm of the ascending thoracic aorta. It has an AP diameter of 4.8 cm on the current examination.  On the prior examination it had an AP diameter of 4.6 cm.  2. There is mild cardiomegaly. There is a small pericardial effusion.  3. The lungs are clear.  4. There are several partially visualized stones in the gallbladder. The stones measure at least 13 mm in size.  All CT scans at this facility use dose modulation, iterative reconstruction, and/or weight base dosing when appropriate to reduce radiation dose when appropriate to reduce radiation dose to as low as reasonably achievable.        Electronically signed by: Tonny Sanchez MD  Date:                                            12/05/2022  Time:                                           14:32           Exam Ended: 12/05/22 13:56 Last Resulted: 12/05/22 14:32               Lab Visit on 06/21/2022   Component Date Value Ref Range Status    TSH 06/21/2022 1.220  0.400 - 4.000 uIU/mL Final   (  Component Ref Range & Units 9 mo ago   (2/24/22) 1 yr ago   (5/28/21) 1 yr ago   (12/30/20) 2 yr ago   (11/3/20) 2 yr ago   (9/29/20) 2 yr ago   (6/11/20) 3 yr ago   (11/19/19)   WBC 3.90 - 12.70 K/uL 8.12  10.38  9.94  8.70  11.93  8.85  8.87    RBC 4.00 - 5.40 M/uL 3.86 Low   4.17  4.48  4.14  4.34  4.26  4.69    Hemoglobin 12.0 - 16.0 g/dL 11.9 Low   13.1  13.7  12.6  13.3  12.5  13.6    Hematocrit 37.0 - 48.5 % 37.6  40.9  43.1  40.1  41.8  39.6  43.6    MCV 82 - 98 fL 97  98  96  97  96  93  93    MCH 27.0 - 31.0 pg 30.8  31.4 High   30.6  30.4  30.6  29.3  29.0    MCHC 32.0 - 36.0 g/dL 31.6 Low   32.0  31.8 Low   31.4 Low   31.8 Low   31.6 Low   31.2 Low     RDW 11.5 - 14.5 % 13.6  12.8  13.1  13.6  13.7  13.5  14.0    Platelets 150 - 450 K/uL 176  178  181 R  168 R  209 R  197 R  193 R    MPV 9.2 - 12.9 fL  11.6  12.4  12.0  11.8  12.1  11.8  11.4    Immature Granulocytes 0.0 - 0.5 % 0.4  0.5  0.6 High   0.3  0.7 High   0.6 High      Gran # (ANC) 1.8 - 7.7 K/uL 5.0  7.3  7.0  5.5  8.3 High   5.3  5.8    Immature Grans (Abs) 0.00 - 0.04 K/uL 0.03  0.05 High  CM  0.06 High  CM  0.03 CM  0.08 High  CM  0.05 High  CM     Comment: Mild elevation in immature granulocytes is non specific and       143 143 142 142 140 142 137 Potassium3.5 - 5.1 mmol/L4.6 4.4 4.6 4.6 5.0 4.5 4.7 Brnklifo81 - 110 mmol/L103 101 103 103 106 102 102  - 29 mmol/L28 28 30 High  30 High  28 31 High  28 Nntylbs89 - 110 mg/dL112 High  123 High  107 107 99 109 99 BUN7 - 17 mg/dL32 High  34 High  38 High  38 High  31 High  38 High  43 High  Creatinine0.50 - 1.40 mg/dL2.05 High  2.04 High  1.85 High  1.85 High  1.78 High  1.89 High  2.06 High  Calcium8.7 - 10.5 mg/dL9.6 9.2 10.0 10.0 9.8 10.0 9.6 Anion Gap8 - 16 mmol/L12 14 9 9 6 Low  9 7 Low  eGFR if African American>60 mL/min/1.73 m^227.3 Abnormal  27.7 Abnormal  31.1 Abnormal  31.1 Abnormal  32.6 Abnormal  30.6 Abnormal  27.5 Abnormal  eGFR if non African American>60 mL/min/1.73 m^223.7 Abnormal  24.0 Abnormal  CM 27.0 Abnormal  CM 27.0 Abnormal  CM 28.3 Abnormal  CM 26.5 Abnormal  CM 23.9 Abnormal  CM Comment: Calculation used to obtain the estimated glomerular filtration   rate (eGFR) is the CKD-EPI equation.     Details    Reading Physician Reading Date Result Priority   REZA Sanchez Sr., MD  [phone] 12/7/2020 Routine     Narrative & Impression  EXAMINATION:  NM MYOCARDIAL PERFUSION SPECT MULTI PHARM     CLINICAL HISTORY:  Other chest painChest pain, acute, nonspecific;     TECHNIQUE:  After the administration of 10 mCi of technetium 99 M labeled Myoview, nuclear medicine cardiac rest images were obtained.  After the administration of 30 mCi of technetium 99 M labeled Myoview, nuclear medicine cardiac stress images were obtained     FINDINGS:  The walls of the left ventricle have normal  perfusion.  There is dyskinesis of the septal wall of the left ventricle.  The left ventricle ejection fraction was calculated to be 54%.     Impression:     1.  Normal perfusion study     2.  There is dyskinesis of the septal wall of the left ventricle.     3.  The left ventricular ejection fraction was calculated to be 54%.  The normal is greater than 54%.        Electronically signed by: Tonny Sanchez MD  Date:                                            12/07/2020  Time:                                           15:03             Exam Ended: 12/07/20          Accession #: [id number]    Transthoracic echo (TTE) complete  Order# [id number]  Reading physician: Jayro Saha MD Ordering physician: Laci Barker MD Study date: 6/11/20       Reason for Exam  Priority: Routine  Dx: Persistent atrial fibrillation [I48.19 (ICD-10-CM)]; Essential hypertension [I10 (ICD-10-CM)]; Dyspnea on exertion [R06.09 (ICD-10-CM)]     Result Image Hyperlink     Show images for Echo Color Flow Doppler? Yes    Summary    Normal left ventricular systolic function. The estimated ejection fraction is 60%.  Diastolic pattern consistent with atrial fibrillation observed.  Concentric left ventricular hypertrophy.  Normal right ventricular systolic function.  Normal central venous pressure (3 mmHg).  The estimated PA systolic pressure is 35 mmHg.       Stress test only, Regadenoson  Order# [id number]  Reading physician: Onel Isidro MD Ordering physician: Laci Barker MD Study date: 12/7/20       Reason for Exam  Priority: Routine  Dx: Persistent atrial fibrillation [I48.19 (ICD-10-CM)]; Other chest pain [R07.89 (ICD-10-CM)]     Conclusion         The EKG portion of this study is abnormal but not diagnostic.    The patient reported no chest pain during the stress test.    ECG Stress Nuclear portion of this study will be reported separately.              Performing Clinician    Meryl Ashraf                  Result Image  Hyperlink     Show images for Holter monitor - 24 hour    Epiphany Scans -- Order Level:    Epiphany Scans: None found at the order level.    Diary    There were rare hookup related artifacts. Overall, the study was of good quality. The tape was adequate (0 days , 24 hours, 0 minutes).     Rhythm    Heart rates varied between 59 and 150 BPM with an average of 88  BPM.     Maximum heart rate recorded at: 10:19 CDT.     Minimum heart rate recorded at 11:16 CDT.     Atrial fibrillation with ventricular rates varying between 59 and 150 bpm with an average of 88 bpm.     PVC    Ventricular Arrhythmias  There were PVCs totalling 0 and averaging 0 per hour. The ventricular arrhythmias percentage was 0.     VT    There were 0 runs.     Assessment:     1. Longstanding persistent atrial fibrillation    2. History of PSVT (paroxysmal supraventricular tachycardia)    3. Pure hypercholesterolemia    4. Dyspnea on exertion    5. Long term (current) use of anticoagulants    6. Hypothyroidism due to acquired atrophy of thyroid    7. Localized edema    8. Ex-smoker    9. CKD stage G4/A2, GFR 15-29 and albumin creatinine ratio  mg/g    10. Aneurysm of ascending aorta without rupture      Plan:   Talia was seen today for follow-up.    Diagnoses and all orders for this visit:    Longstanding persistent atrial fibrillation  -     IN OFFICE EKG 12-LEAD (to Muse)  -     CBC Auto Differential; Future  -     BNP; Future  -     Comprehensive Metabolic Panel; Future    History of PSVT (paroxysmal supraventricular tachycardia)  -     IN OFFICE EKG 12-LEAD (to Muse)  -     CBC Auto Differential; Future  -     BNP; Future  -     Comprehensive Metabolic Panel; Future    Pure hypercholesterolemia  -     IN OFFICE EKG 12-LEAD (to Muse)  -     CBC Auto Differential; Future  -     BNP; Future  -     Comprehensive Metabolic Panel; Future    Dyspnea on exertion  -     IN OFFICE EKG 12-LEAD (to Muse)  -     CBC Auto Differential; Future  -      BNP; Future  -     Comprehensive Metabolic Panel; Future    Long term (current) use of anticoagulants  -     IN OFFICE EKG 12-LEAD (to Muse)  -     CBC Auto Differential; Future  -     BNP; Future  -     Comprehensive Metabolic Panel; Future    Hypothyroidism due to acquired atrophy of thyroid  -     IN OFFICE EKG 12-LEAD (to Muse)  -     CBC Auto Differential; Future  -     BNP; Future  -     Comprehensive Metabolic Panel; Future    Localized edema  -     IN OFFICE EKG 12-LEAD (to Muse)  -     CBC Auto Differential; Future  -     BNP; Future  -     Comprehensive Metabolic Panel; Future    Ex-smoker  -     CBC Auto Differential; Future  -     BNP; Future  -     Comprehensive Metabolic Panel; Future    CKD stage G4/A2, GFR 15-29 and albumin creatinine ratio  mg/g  -     CBC Auto Differential; Future  -     BNP; Future  -     Comprehensive Metabolic Panel; Future    Aneurysm of ascending aorta without rupture  -     CBC Auto Differential; Future  -     BNP; Future  -     Comprehensive Metabolic Panel; Future    Other orders  -     apixaban (ELIQUIS) 5 mg Tab; Take 1 tablet (5 mg total) by mouth 2 (two) times daily.     Will repeat CT of chest in one year to f/u ascending aortic aneurysm    Same meds    F/u with Dr Vallecillo    RTC 6 months with lab    Follow up in about 6 months (around 6/6/2023).

## 2023-01-06 ENCOUNTER — OFFICE VISIT (OUTPATIENT)
Dept: PSYCHIATRY | Facility: CLINIC | Age: 73
End: 2023-01-06
Payer: MEDICARE

## 2023-01-06 ENCOUNTER — LAB VISIT (OUTPATIENT)
Dept: LAB | Facility: HOSPITAL | Age: 73
End: 2023-01-06
Attending: NURSE PRACTITIONER
Payer: MEDICARE

## 2023-01-06 VITALS — DIASTOLIC BLOOD PRESSURE: 68 MMHG | HEART RATE: 87 BPM | SYSTOLIC BLOOD PRESSURE: 114 MMHG

## 2023-01-06 DIAGNOSIS — F41.0 GENERALIZED ANXIETY DISORDER WITH PANIC ATTACKS: ICD-10-CM

## 2023-01-06 DIAGNOSIS — F41.9 ANXIETY: ICD-10-CM

## 2023-01-06 DIAGNOSIS — N18.4 CKD (CHRONIC KIDNEY DISEASE) STAGE 4, GFR 15-29 ML/MIN: ICD-10-CM

## 2023-01-06 DIAGNOSIS — N18.4 CKD (CHRONIC KIDNEY DISEASE) STAGE 4, GFR 15-29 ML/MIN: Primary | ICD-10-CM

## 2023-01-06 DIAGNOSIS — G31.84 MILD NEUROCOGNITIVE DISORDER: ICD-10-CM

## 2023-01-06 DIAGNOSIS — F33.9 MAJOR DEPRESSION, RECURRENT, CHRONIC: Primary | ICD-10-CM

## 2023-01-06 DIAGNOSIS — F41.1 GENERALIZED ANXIETY DISORDER WITH PANIC ATTACKS: ICD-10-CM

## 2023-01-06 LAB
ALBUMIN SERPL BCP-MCNC: 4.5 G/DL (ref 3.5–5.2)
ALBUMIN SERPL BCP-MCNC: 4.5 G/DL (ref 3.5–5.2)
ALP SERPL-CCNC: 70 U/L (ref 38–126)
ALT SERPL W/O P-5'-P-CCNC: 27 U/L (ref 10–44)
ANION GAP SERPL CALC-SCNC: 7 MMOL/L (ref 8–16)
ANION GAP SERPL CALC-SCNC: 7 MMOL/L (ref 8–16)
AST SERPL-CCNC: 35 U/L (ref 15–46)
BILIRUB SERPL-MCNC: 0.6 MG/DL (ref 0.1–1)
CALCIUM SERPL-MCNC: 9.2 MG/DL (ref 8.7–10.5)
CALCIUM SERPL-MCNC: 9.2 MG/DL (ref 8.7–10.5)
CHLORIDE SERPL-SCNC: 101 MMOL/L (ref 95–110)
CHLORIDE SERPL-SCNC: 101 MMOL/L (ref 95–110)
CO2 SERPL-SCNC: 32 MMOL/L (ref 23–29)
CO2 SERPL-SCNC: 32 MMOL/L (ref 23–29)
CREAT SERPL-MCNC: 2.92 MG/DL (ref 0.5–1.4)
CREAT SERPL-MCNC: 2.92 MG/DL (ref 0.5–1.4)
EST. GFR  (NO RACE VARIABLE): 16.4 ML/MIN/1.73 M^2
EST. GFR  (NO RACE VARIABLE): 16.4 ML/MIN/1.73 M^2
GLUCOSE SERPL-MCNC: 115 MG/DL (ref 70–110)
GLUCOSE SERPL-MCNC: 115 MG/DL (ref 70–110)
PHOSPHATE SERPL-MCNC: 3.9 MG/DL (ref 2.7–4.5)
POTASSIUM SERPL-SCNC: 4.5 MMOL/L (ref 3.5–5.1)
POTASSIUM SERPL-SCNC: 4.5 MMOL/L (ref 3.5–5.1)
PROT SERPL-MCNC: 7.3 G/DL (ref 6–8.4)
SODIUM SERPL-SCNC: 140 MMOL/L (ref 136–145)
SODIUM SERPL-SCNC: 140 MMOL/L (ref 136–145)
UUN UR-MCNC: 31 MG/DL (ref 7–17)
UUN UR-MCNC: 31 MG/DL (ref 7–17)

## 2023-01-06 PROCEDURE — 36415 COLL VENOUS BLD VENIPUNCTURE: CPT | Mod: PO | Performed by: NURSE PRACTITIONER

## 2023-01-06 PROCEDURE — 99499 RISK ADDL DX/OHS AUDIT: ICD-10-PCS | Mod: S$GLB,,, | Performed by: STUDENT IN AN ORGANIZED HEALTH CARE EDUCATION/TRAINING PROGRAM

## 2023-01-06 PROCEDURE — 99417 PR PROLONGED SVC, OUTPT, W/WO DIRECT PT CONTACT,  EA ADDTL 15 MIN: ICD-10-PCS | Mod: S$GLB,,, | Performed by: PSYCHIATRY & NEUROLOGY

## 2023-01-06 PROCEDURE — 3078F DIAST BP <80 MM HG: CPT | Mod: CPTII,S$GLB,, | Performed by: PSYCHIATRY & NEUROLOGY

## 2023-01-06 PROCEDURE — 84100 ASSAY OF PHOSPHORUS: CPT | Mod: PO | Performed by: NURSE PRACTITIONER

## 2023-01-06 PROCEDURE — 99417 PROLNG OP E/M EACH 15 MIN: CPT | Mod: S$GLB,,, | Performed by: PSYCHIATRY & NEUROLOGY

## 2023-01-06 PROCEDURE — 99205 OFFICE O/P NEW HI 60 MIN: CPT | Mod: S$GLB,,, | Performed by: PSYCHIATRY & NEUROLOGY

## 2023-01-06 PROCEDURE — 99999 PR PBB SHADOW E&M-EST. PATIENT-LVL II: CPT | Mod: PBBFAC,,, | Performed by: PSYCHIATRY & NEUROLOGY

## 2023-01-06 PROCEDURE — 3074F SYST BP LT 130 MM HG: CPT | Mod: CPTII,S$GLB,, | Performed by: PSYCHIATRY & NEUROLOGY

## 2023-01-06 PROCEDURE — 99499 UNLISTED E&M SERVICE: CPT | Mod: S$GLB,,, | Performed by: STUDENT IN AN ORGANIZED HEALTH CARE EDUCATION/TRAINING PROGRAM

## 2023-01-06 PROCEDURE — 3074F PR MOST RECENT SYSTOLIC BLOOD PRESSURE < 130 MM HG: ICD-10-PCS | Mod: CPTII,S$GLB,, | Performed by: PSYCHIATRY & NEUROLOGY

## 2023-01-06 PROCEDURE — 99205 PR OFFICE/OUTPT VISIT, NEW, LEVL V, 60-74 MIN: ICD-10-PCS | Mod: S$GLB,,, | Performed by: PSYCHIATRY & NEUROLOGY

## 2023-01-06 PROCEDURE — 99999 PR PBB SHADOW E&M-EST. PATIENT-LVL II: ICD-10-PCS | Mod: PBBFAC,,, | Performed by: PSYCHIATRY & NEUROLOGY

## 2023-01-06 PROCEDURE — 3078F PR MOST RECENT DIASTOLIC BLOOD PRESSURE < 80 MM HG: ICD-10-PCS | Mod: CPTII,S$GLB,, | Performed by: PSYCHIATRY & NEUROLOGY

## 2023-01-06 PROCEDURE — 3066F PR DOCUMENTATION OF TREATMENT FOR NEPHROPATHY: ICD-10-PCS | Mod: CPTII,S$GLB,, | Performed by: PSYCHIATRY & NEUROLOGY

## 2023-01-06 PROCEDURE — 3066F NEPHROPATHY DOC TX: CPT | Mod: CPTII,S$GLB,, | Performed by: PSYCHIATRY & NEUROLOGY

## 2023-01-06 PROCEDURE — 80053 COMPREHEN METABOLIC PANEL: CPT | Mod: PO | Performed by: NURSE PRACTITIONER

## 2023-01-06 RX ORDER — ESCITALOPRAM OXALATE 10 MG/1
20 TABLET ORAL DAILY
Qty: 30 TABLET | Refills: 1 | Status: SHIPPED | OUTPATIENT
Start: 2023-01-06 | End: 2023-05-10 | Stop reason: SDUPTHER

## 2023-01-06 RX ORDER — ALPRAZOLAM 0.25 MG/1
0.25 TABLET ORAL 3 TIMES DAILY PRN
Qty: 90 TABLET | Refills: 2 | Status: SHIPPED | OUTPATIENT
Start: 2023-01-06 | End: 2023-05-10 | Stop reason: SDUPTHER

## 2023-01-06 RX ORDER — ZOLPIDEM TARTRATE 10 MG/1
10 TABLET ORAL NIGHTLY PRN
Qty: 90 TABLET | Refills: 5 | Status: SHIPPED | OUTPATIENT
Start: 2023-01-06 | End: 2023-05-10 | Stop reason: SDUPTHER
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
## 2023-01-09 ENCOUNTER — OFFICE VISIT (OUTPATIENT)
Dept: NEPHROLOGY | Facility: CLINIC | Age: 73
End: 2023-01-09
Payer: MEDICARE

## 2023-01-09 VITALS — SYSTOLIC BLOOD PRESSURE: 114 MMHG | OXYGEN SATURATION: 97 % | DIASTOLIC BLOOD PRESSURE: 74 MMHG | HEART RATE: 98 BPM

## 2023-01-09 DIAGNOSIS — N39.0 UTI (URINARY TRACT INFECTION), UNCOMPLICATED: ICD-10-CM

## 2023-01-09 DIAGNOSIS — N17.9 AKI (ACUTE KIDNEY INJURY): ICD-10-CM

## 2023-01-09 DIAGNOSIS — D63.1 ANEMIA OF CHRONIC RENAL FAILURE, STAGE 4 (SEVERE): ICD-10-CM

## 2023-01-09 DIAGNOSIS — R80.9 PROTEINURIA, UNSPECIFIED TYPE: ICD-10-CM

## 2023-01-09 DIAGNOSIS — N18.4 ANEMIA OF CHRONIC RENAL FAILURE, STAGE 4 (SEVERE): ICD-10-CM

## 2023-01-09 DIAGNOSIS — I10 ESSENTIAL HYPERTENSION: Chronic | ICD-10-CM

## 2023-01-09 DIAGNOSIS — N18.4 CKD (CHRONIC KIDNEY DISEASE) STAGE 4, GFR 15-29 ML/MIN: ICD-10-CM

## 2023-01-09 DIAGNOSIS — R19.7 DIARRHEA, UNSPECIFIED TYPE: Primary | ICD-10-CM

## 2023-01-09 PROCEDURE — 3288F PR FALLS RISK ASSESSMENT DOCUMENTED: ICD-10-PCS | Mod: CPTII,S$GLB,, | Performed by: NURSE PRACTITIONER

## 2023-01-09 PROCEDURE — 3074F SYST BP LT 130 MM HG: CPT | Mod: CPTII,S$GLB,, | Performed by: NURSE PRACTITIONER

## 2023-01-09 PROCEDURE — 99999 PR PBB SHADOW E&M-EST. PATIENT-LVL IV: CPT | Mod: PBBFAC,,, | Performed by: NURSE PRACTITIONER

## 2023-01-09 PROCEDURE — 1101F PR PT FALLS ASSESS DOC 0-1 FALLS W/OUT INJ PAST YR: ICD-10-PCS | Mod: CPTII,S$GLB,, | Performed by: NURSE PRACTITIONER

## 2023-01-09 PROCEDURE — 99499 RISK ADDL DX/OHS AUDIT: ICD-10-PCS | Mod: S$GLB,,, | Performed by: NURSE PRACTITIONER

## 2023-01-09 PROCEDURE — 3074F PR MOST RECENT SYSTOLIC BLOOD PRESSURE < 130 MM HG: ICD-10-PCS | Mod: CPTII,S$GLB,, | Performed by: NURSE PRACTITIONER

## 2023-01-09 PROCEDURE — 1159F PR MEDICATION LIST DOCUMENTED IN MEDICAL RECORD: ICD-10-PCS | Mod: CPTII,S$GLB,, | Performed by: NURSE PRACTITIONER

## 2023-01-09 PROCEDURE — 1101F PT FALLS ASSESS-DOCD LE1/YR: CPT | Mod: CPTII,S$GLB,, | Performed by: NURSE PRACTITIONER

## 2023-01-09 PROCEDURE — 3066F PR DOCUMENTATION OF TREATMENT FOR NEPHROPATHY: ICD-10-PCS | Mod: CPTII,S$GLB,, | Performed by: NURSE PRACTITIONER

## 2023-01-09 PROCEDURE — 99214 PR OFFICE/OUTPT VISIT, EST, LEVL IV, 30-39 MIN: ICD-10-PCS | Mod: S$GLB,,, | Performed by: NURSE PRACTITIONER

## 2023-01-09 PROCEDURE — 99999 PR PBB SHADOW E&M-EST. PATIENT-LVL IV: ICD-10-PCS | Mod: PBBFAC,,, | Performed by: NURSE PRACTITIONER

## 2023-01-09 PROCEDURE — 1126F PR PAIN SEVERITY QUANTIFIED, NO PAIN PRESENT: ICD-10-PCS | Mod: CPTII,S$GLB,, | Performed by: NURSE PRACTITIONER

## 2023-01-09 PROCEDURE — 3288F FALL RISK ASSESSMENT DOCD: CPT | Mod: CPTII,S$GLB,, | Performed by: NURSE PRACTITIONER

## 2023-01-09 PROCEDURE — 1126F AMNT PAIN NOTED NONE PRSNT: CPT | Mod: CPTII,S$GLB,, | Performed by: NURSE PRACTITIONER

## 2023-01-09 PROCEDURE — 99214 OFFICE O/P EST MOD 30 MIN: CPT | Mod: S$GLB,,, | Performed by: NURSE PRACTITIONER

## 2023-01-09 PROCEDURE — 1159F MED LIST DOCD IN RCRD: CPT | Mod: CPTII,S$GLB,, | Performed by: NURSE PRACTITIONER

## 2023-01-09 PROCEDURE — 3078F PR MOST RECENT DIASTOLIC BLOOD PRESSURE < 80 MM HG: ICD-10-PCS | Mod: CPTII,S$GLB,, | Performed by: NURSE PRACTITIONER

## 2023-01-09 PROCEDURE — 99499 UNLISTED E&M SERVICE: CPT | Mod: S$GLB,,, | Performed by: NURSE PRACTITIONER

## 2023-01-09 PROCEDURE — 3066F NEPHROPATHY DOC TX: CPT | Mod: CPTII,S$GLB,, | Performed by: NURSE PRACTITIONER

## 2023-01-09 PROCEDURE — 3078F DIAST BP <80 MM HG: CPT | Mod: CPTII,S$GLB,, | Performed by: NURSE PRACTITIONER

## 2023-01-09 RX ORDER — CEFDINIR 300 MG/1
300 CAPSULE ORAL DAILY
Qty: 5 CAPSULE | Refills: 0 | Status: SHIPPED | OUTPATIENT
Start: 2023-01-09 | End: 2023-01-14

## 2023-01-09 NOTE — PROGRESS NOTES
Subjective:       Patient ID: Talia Dillon is a 72 y.o.  female who presents for follow-up evaluation of CKD.      HPI     Patient is new to me. Last seen by Dr. Marin in 6/10/21.  Prior pertinent chart reviewed since this is patient's first appointment with me.    Patient presents for follow-up evaluation of CKD.  Baseline creatinine of ~1.8-2.0. Significant other medical problems include HTN, HLD, A fib, h/o PSVT, COPD, depression, anxiety, insomnia, DDD, cervical neuralgia, OA of lumbar spine. Cr now trending up to 2.9. No labs available since 02/2022. She notes depression and significant family stress with associated GI symptoms. Notes nausea and reflux as well as daily diarrhea for the last month. She does have a history of gallstones. Reports she is up to date on her colonoscopy. She reports decent water intake. She is following with psychiatry and her lexapro was recently increased to 20mg. No new medications or hospitalizations. She denies cp, sob, swelling, difficulty urinating, dysuria, hematuria, melena, vomiting, SI/HI.     Significant family hx includes: No reported renal disease    Last renal US: 5/1/19 , reviewed.    Review of Systems   Respiratory:  Negative for shortness of breath.    Cardiovascular:  Negative for chest pain and leg swelling.   Gastrointestinal:  Positive for diarrhea and nausea. Negative for blood in stool and vomiting.   Genitourinary:  Negative for difficulty urinating, dysuria and hematuria.   All other systems reviewed and are negative.    Objective:       Blood pressure 114/74, pulse 98, SpO2 97 %.  Physical Exam  Vitals reviewed.   Constitutional:       General: She is not in acute distress.  HENT:      Head: Normocephalic and atraumatic.   Eyes:      General: No scleral icterus.  Cardiovascular:      Rate and Rhythm: Normal rate and regular rhythm.   Pulmonary:      Effort: Pulmonary effort is normal. No respiratory distress.      Breath sounds: No wheezing or rales.  "  Musculoskeletal:      Right lower leg: No edema.      Left lower leg: No edema.      Comments: wheelchair   Skin:     General: Skin is warm and dry.   Neurological:      Mental Status: She is alert.      Comments: Speech clear, answers questions appropriately   Psychiatric:      Comments: +tearful, depressed         Lab Results   Component Value Date    CREATININE 2.92 (H) 01/06/2023    CREATININE 2.92 (H) 01/06/2023     Prot/Creat Ratio, Urine   Date Value Ref Range Status   02/24/2022 0.91 (H) 0.00 - 0.20 Final   12/30/2020 0.39 (H) 0.00 - 0.20 Final   09/29/2020 0.47 (H) 0.00 - 0.20 Final     Lab Results   Component Value Date     01/06/2023     01/06/2023    K 4.5 01/06/2023    K 4.5 01/06/2023    CO2 32 (H) 01/06/2023    CO2 32 (H) 01/06/2023     01/06/2023     01/06/2023     Lab Results   Component Value Date    PTH 75.0 12/30/2020    CALCIUM 9.2 01/06/2023    CALCIUM 9.2 01/06/2023    PHOS 3.9 01/06/2023     Lab Results   Component Value Date    HGB 11.9 (L) 02/24/2022    WBC 8.12 02/24/2022    HCT 37.6 02/24/2022      Lab Results   Component Value Date    HGBA1C 5.6 07/15/2015     02/24/2022    BUN 31 (H) 01/06/2023    BUN 31 (H) 01/06/2023     Lab Results   Component Value Date    LDLCALC 61.8 (L) 06/11/2020     US Retroperitoneal 5/1/19  "FINDINGS:  Right kidney: The right kidney measures 9.9 cm.  Slightly increased cortical echogenicity.  No cortical thinning. No loss of corticomedullary distinction.  Normal perfusion.   No mass. No renal stone. No hydronephrosis.     Left kidney: The left kidney measures 9.1 cm. Slightly increased cortical echogenicity no cortical thinning. No loss of corticomedullary distinction.  Normal perfusion. No mass. No renal stone. No hydronephrosis.     The bladder is partially distended at the time of scanning and has an unremarkable appearance.     IMPRESSION:      Slightly increased cortical echogenicity can be seen with medical renal " "disease."    Assessment:       No diagnosis found.    Plan:   TINA on CKD stage 4   - Baseline Cr 1.8-2.0, clinically related to HTN, h/o TINA, NSAID use  - Cr now trending up to 2.92, no labs since 02/2022, concerns for TINA vs progression  - Suspect TINA in setting of GI losses, encouraged proper hydration, referral for GI  - Will check stool for c diff as well  - Hold candesartan in setting of suspected TINA    UPCR 910 mg/g, maintained on ARB - hold as above; Monitor UPCR   Acid-base Bicarb 32   Renal osteodystrophy Ca, phos, pth controlled  Monitor PTH and Vit D   Anemia Hgb at CKD goal   DM No history   Lipid Management On statin   ESRD planning Provided education about the stages of CKD, usual progression, and need to monitor for and treat CKD-related disease in an effort to delay progression of CKD.        HTN   - Controlled, hold candesartan as above  - Encouraged to check BP at home    UTI  - UA significant for 6 RBC, 15 WBC, bacteria, occasional WBC clumps  - Treat with cefdinir, follow-up UA/urine culture    Nausea/ reflux symptoms/ diarrhea  - Recommend famotidine OTC  - Check c diff  - Referral for GI    All questions patient had were answered.  Asked if further questions. None. F/u in clinic 6 weeks  with labs and urine prior to next visit or sooner if needed.  ER for emergency concerns.    Summary of Plan:  - Rx cefdinir x 5 days  - Hold ARB, recheck BMP, UA, Ucx 2 weeks  - Refer to GI, check c diff, recommend famotidine  - Close follow-up 6 weeks with RFP, CBC, PTH, Vit D, UA, UPCR    "

## 2023-01-09 NOTE — PATIENT INSTRUCTIONS
Hold candesartan for now, monitor blood pressure at home as able  Recommend Pepcid (famotidine) over the counter for reflux symptoms

## 2023-01-22 ENCOUNTER — PATIENT MESSAGE (OUTPATIENT)
Dept: FAMILY MEDICINE | Facility: CLINIC | Age: 73
End: 2023-01-22
Payer: MEDICARE

## 2023-01-23 ENCOUNTER — TELEPHONE (OUTPATIENT)
Dept: FAMILY MEDICINE | Facility: CLINIC | Age: 73
End: 2023-01-23

## 2023-01-23 NOTE — TELEPHONE ENCOUNTER
----- Message from Zuly Fairchild sent at 1/23/2023 10:16 AM CST -----  .Type:  Same Day Appointment Request    Caller is requesting a same day appointment.  Caller declined first available appointment listed below.    Name of Caller:pt  When is the first available appointment?books were closed  Symptoms:extreme nausea, diarrhea, unable to keep solid foods down  Best Call Back Number:[phone]  Additional Information:     Pt stated that its been a week since she has been able to eat solid foods.  Pt says that she has been taking Pepcid AC, mylanta, pepto bismal, tums and has gotten no relief

## 2023-01-24 ENCOUNTER — OFFICE VISIT (OUTPATIENT)
Dept: FAMILY MEDICINE | Facility: CLINIC | Age: 73
End: 2023-01-24
Payer: MEDICARE

## 2023-01-24 VITALS
HEART RATE: 108 BPM | WEIGHT: 225.19 LBS | HEIGHT: 62 IN | BODY MASS INDEX: 41.44 KG/M2 | OXYGEN SATURATION: 97 % | DIASTOLIC BLOOD PRESSURE: 82 MMHG | SYSTOLIC BLOOD PRESSURE: 130 MMHG

## 2023-01-24 DIAGNOSIS — R11.0 NAUSEA: Primary | ICD-10-CM

## 2023-01-24 PROCEDURE — 1160F RVW MEDS BY RX/DR IN RCRD: CPT | Mod: CPTII,S$GLB,, | Performed by: STUDENT IN AN ORGANIZED HEALTH CARE EDUCATION/TRAINING PROGRAM

## 2023-01-24 PROCEDURE — 1126F AMNT PAIN NOTED NONE PRSNT: CPT | Mod: CPTII,S$GLB,, | Performed by: STUDENT IN AN ORGANIZED HEALTH CARE EDUCATION/TRAINING PROGRAM

## 2023-01-24 PROCEDURE — 3066F NEPHROPATHY DOC TX: CPT | Mod: CPTII,S$GLB,, | Performed by: STUDENT IN AN ORGANIZED HEALTH CARE EDUCATION/TRAINING PROGRAM

## 2023-01-24 PROCEDURE — 1101F PR PT FALLS ASSESS DOC 0-1 FALLS W/OUT INJ PAST YR: ICD-10-PCS | Mod: CPTII,S$GLB,, | Performed by: STUDENT IN AN ORGANIZED HEALTH CARE EDUCATION/TRAINING PROGRAM

## 2023-01-24 PROCEDURE — 3079F PR MOST RECENT DIASTOLIC BLOOD PRESSURE 80-89 MM HG: ICD-10-PCS | Mod: CPTII,S$GLB,, | Performed by: STUDENT IN AN ORGANIZED HEALTH CARE EDUCATION/TRAINING PROGRAM

## 2023-01-24 PROCEDURE — 3008F PR BODY MASS INDEX (BMI) DOCUMENTED: ICD-10-PCS | Mod: CPTII,S$GLB,, | Performed by: STUDENT IN AN ORGANIZED HEALTH CARE EDUCATION/TRAINING PROGRAM

## 2023-01-24 PROCEDURE — 96372 PR INJECTION,THERAP/PROPH/DIAG2ST, IM OR SUBCUT: ICD-10-PCS | Mod: S$GLB,,, | Performed by: STUDENT IN AN ORGANIZED HEALTH CARE EDUCATION/TRAINING PROGRAM

## 2023-01-24 PROCEDURE — 3288F PR FALLS RISK ASSESSMENT DOCUMENTED: ICD-10-PCS | Mod: CPTII,S$GLB,, | Performed by: STUDENT IN AN ORGANIZED HEALTH CARE EDUCATION/TRAINING PROGRAM

## 2023-01-24 PROCEDURE — 3066F PR DOCUMENTATION OF TREATMENT FOR NEPHROPATHY: ICD-10-PCS | Mod: CPTII,S$GLB,, | Performed by: STUDENT IN AN ORGANIZED HEALTH CARE EDUCATION/TRAINING PROGRAM

## 2023-01-24 PROCEDURE — 99499 RISK ADDL DX/OHS AUDIT: ICD-10-PCS | Mod: S$GLB,,, | Performed by: STUDENT IN AN ORGANIZED HEALTH CARE EDUCATION/TRAINING PROGRAM

## 2023-01-24 PROCEDURE — 1160F PR REVIEW ALL MEDS BY PRESCRIBER/CLIN PHARMACIST DOCUMENTED: ICD-10-PCS | Mod: CPTII,S$GLB,, | Performed by: STUDENT IN AN ORGANIZED HEALTH CARE EDUCATION/TRAINING PROGRAM

## 2023-01-24 PROCEDURE — 96372 THER/PROPH/DIAG INJ SC/IM: CPT | Mod: S$GLB,,, | Performed by: STUDENT IN AN ORGANIZED HEALTH CARE EDUCATION/TRAINING PROGRAM

## 2023-01-24 PROCEDURE — 3008F BODY MASS INDEX DOCD: CPT | Mod: CPTII,S$GLB,, | Performed by: STUDENT IN AN ORGANIZED HEALTH CARE EDUCATION/TRAINING PROGRAM

## 2023-01-24 PROCEDURE — 99499 UNLISTED E&M SERVICE: CPT | Mod: S$GLB,,, | Performed by: STUDENT IN AN ORGANIZED HEALTH CARE EDUCATION/TRAINING PROGRAM

## 2023-01-24 PROCEDURE — 1126F PR PAIN SEVERITY QUANTIFIED, NO PAIN PRESENT: ICD-10-PCS | Mod: CPTII,S$GLB,, | Performed by: STUDENT IN AN ORGANIZED HEALTH CARE EDUCATION/TRAINING PROGRAM

## 2023-01-24 PROCEDURE — 1159F MED LIST DOCD IN RCRD: CPT | Mod: CPTII,S$GLB,, | Performed by: STUDENT IN AN ORGANIZED HEALTH CARE EDUCATION/TRAINING PROGRAM

## 2023-01-24 PROCEDURE — 1101F PT FALLS ASSESS-DOCD LE1/YR: CPT | Mod: CPTII,S$GLB,, | Performed by: STUDENT IN AN ORGANIZED HEALTH CARE EDUCATION/TRAINING PROGRAM

## 2023-01-24 PROCEDURE — 3079F DIAST BP 80-89 MM HG: CPT | Mod: CPTII,S$GLB,, | Performed by: STUDENT IN AN ORGANIZED HEALTH CARE EDUCATION/TRAINING PROGRAM

## 2023-01-24 PROCEDURE — 3288F FALL RISK ASSESSMENT DOCD: CPT | Mod: CPTII,S$GLB,, | Performed by: STUDENT IN AN ORGANIZED HEALTH CARE EDUCATION/TRAINING PROGRAM

## 2023-01-24 PROCEDURE — 1159F PR MEDICATION LIST DOCUMENTED IN MEDICAL RECORD: ICD-10-PCS | Mod: CPTII,S$GLB,, | Performed by: STUDENT IN AN ORGANIZED HEALTH CARE EDUCATION/TRAINING PROGRAM

## 2023-01-24 PROCEDURE — 99214 OFFICE O/P EST MOD 30 MIN: CPT | Mod: 25,S$GLB,, | Performed by: STUDENT IN AN ORGANIZED HEALTH CARE EDUCATION/TRAINING PROGRAM

## 2023-01-24 PROCEDURE — 3075F PR MOST RECENT SYSTOLIC BLOOD PRESS GE 130-139MM HG: ICD-10-PCS | Mod: CPTII,S$GLB,, | Performed by: STUDENT IN AN ORGANIZED HEALTH CARE EDUCATION/TRAINING PROGRAM

## 2023-01-24 PROCEDURE — 3075F SYST BP GE 130 - 139MM HG: CPT | Mod: CPTII,S$GLB,, | Performed by: STUDENT IN AN ORGANIZED HEALTH CARE EDUCATION/TRAINING PROGRAM

## 2023-01-24 PROCEDURE — 99214 PR OFFICE/OUTPT VISIT, EST, LEVL IV, 30-39 MIN: ICD-10-PCS | Mod: 25,S$GLB,, | Performed by: STUDENT IN AN ORGANIZED HEALTH CARE EDUCATION/TRAINING PROGRAM

## 2023-01-24 RX ORDER — ONDANSETRON 4 MG/1
4 TABLET, FILM COATED ORAL EVERY 8 HOURS PRN
Qty: 45 TABLET | Refills: 0 | Status: SHIPPED | OUTPATIENT
Start: 2023-01-24 | End: 2023-02-27 | Stop reason: SDUPTHER

## 2023-01-24 RX ORDER — PROMETHAZINE HYDROCHLORIDE 25 MG/ML
25 INJECTION, SOLUTION INTRAMUSCULAR; INTRAVENOUS ONCE
Status: COMPLETED | OUTPATIENT
Start: 2023-01-24 | End: 2023-01-24

## 2023-01-24 RX ADMIN — PROMETHAZINE HYDROCHLORIDE 25 MG: 25 INJECTION, SOLUTION INTRAMUSCULAR; INTRAVENOUS at 01:01

## 2023-01-26 ENCOUNTER — HOSPITAL ENCOUNTER (OUTPATIENT)
Dept: RADIOLOGY | Facility: HOSPITAL | Age: 73
Discharge: HOME | End: 2023-01-26
Attending: STUDENT IN AN ORGANIZED HEALTH CARE EDUCATION/TRAINING PROGRAM
Payer: MEDICARE

## 2023-01-26 DIAGNOSIS — R11.0 NAUSEA: ICD-10-CM

## 2023-01-26 PROCEDURE — 76705 ECHO EXAM OF ABDOMEN: CPT | Mod: TC,PO

## 2023-01-26 PROCEDURE — 76705 ECHO EXAM OF ABDOMEN: CPT | Mod: 26,,, | Performed by: RADIOLOGY

## 2023-01-26 PROCEDURE — 76705 US ABDOMEN LIMITED: ICD-10-PCS | Mod: 26,,, | Performed by: RADIOLOGY

## 2023-01-27 ENCOUNTER — TELEPHONE (OUTPATIENT)
Dept: FAMILY MEDICINE | Facility: CLINIC | Age: 73
End: 2023-01-27
Payer: MEDICARE

## 2023-01-27 ENCOUNTER — PATIENT MESSAGE (OUTPATIENT)
Dept: FAMILY MEDICINE | Facility: CLINIC | Age: 73
End: 2023-01-27
Payer: MEDICARE

## 2023-01-27 DIAGNOSIS — K80.20 GALLSTONES: Primary | ICD-10-CM

## 2023-01-27 NOTE — TELEPHONE ENCOUNTER
----- Message from Amita Lindsay sent at 1/27/2023  3:40 PM CST -----  Zenaida Jeffrey ,    Ms. Primeugene ask for you to refer her to the Dr. You referred her brother to for surgery.  Thank you,  Amita Vallecillo - do you know her brothers name?

## 2023-01-30 ENCOUNTER — LAB VISIT (OUTPATIENT)
Dept: LAB | Facility: HOSPITAL | Age: 73
End: 2023-01-30
Attending: NURSE PRACTITIONER
Payer: MEDICARE

## 2023-01-30 DIAGNOSIS — N17.9 AKI (ACUTE KIDNEY INJURY): ICD-10-CM

## 2023-01-30 LAB
ALBUMIN SERPL BCP-MCNC: 4.9 G/DL (ref 3.5–5.2)
ANION GAP SERPL CALC-SCNC: 9 MMOL/L (ref 8–16)
CALCIUM SERPL-MCNC: 10.3 MG/DL (ref 8.7–10.5)
CHLORIDE SERPL-SCNC: 100 MMOL/L (ref 95–110)
CO2 SERPL-SCNC: 31 MMOL/L (ref 23–29)
CREAT SERPL-MCNC: 3.4 MG/DL (ref 0.5–1.4)
EST. GFR  (NO RACE VARIABLE): 13.7 ML/MIN/1.73 M^2
GLUCOSE SERPL-MCNC: 107 MG/DL (ref 70–110)
PHOSPHATE SERPL-MCNC: 3.8 MG/DL (ref 2.7–4.5)
POTASSIUM SERPL-SCNC: 4.7 MMOL/L (ref 3.5–5.1)
SODIUM SERPL-SCNC: 140 MMOL/L (ref 136–145)
UUN UR-MCNC: 43 MG/DL (ref 7–17)

## 2023-01-30 PROCEDURE — 36415 COLL VENOUS BLD VENIPUNCTURE: CPT | Mod: PO | Performed by: NURSE PRACTITIONER

## 2023-01-30 PROCEDURE — 80069 RENAL FUNCTION PANEL: CPT | Mod: PO | Performed by: NURSE PRACTITIONER

## 2023-01-30 NOTE — PROGRESS NOTES
Patient ID: Talia Dillon is a 72 y.o. female.     Chief Complaint: Nausea    Nausea  This is a new problem. The current episode started 1 to 4 weeks ago. The problem occurs daily. The problem has been unchanged. Associated symptoms include abdominal pain and nausea. Pertinent negatives include no arthralgias, change in bowel habit, chest pain, congestion, coughing, diaphoresis, fatigue, fever, headaches, myalgias, neck pain, numbness, rash, sore throat, swollen glands, urinary symptoms, vomiting or weakness. The symptoms are aggravated by eating. She has tried nothing for the symptoms. The treatment provided no relief.        Review of Systems  Review of Systems   Constitutional:  Negative for diaphoresis, fatigue and fever.   HENT:  Negative for congestion, ear pain, sinus pain and sore throat.    Eyes:  Negative for discharge.   Respiratory:  Negative for cough and shortness of breath.    Cardiovascular:  Negative for chest pain and leg swelling.   Gastrointestinal:  Positive for abdominal pain and nausea. Negative for change in bowel habit, diarrhea and vomiting.   Genitourinary:  Negative for urgency.   Musculoskeletal:  Negative for arthralgias, myalgias and neck pain.   Skin:  Negative for rash.   Neurological:  Negative for weakness, numbness and headaches.   Psychiatric/Behavioral:  Negative for depression.    All other systems reviewed and are negative.    Currently Medications  Current Outpatient Medications on File Prior to Visit   Medication Sig Dispense Refill    albuterol (PROVENTIL/VENTOLIN HFA) 90 mcg/actuation inhaler Inhale 2 puffs into the lungs every 4 (four) hours as needed for Wheezing (For shortness of breath). 54 g 11    albuterol-ipratropium (DUO-NEB) 2.5 mg-0.5 mg/3 mL nebulizer solution Take 3 mLs by nebulization every 6 (six) hours as needed for Wheezing. Rescue 400 each 3    ALPRAZolam (XANAX) 0.25 MG tablet Take 1 tablet (0.25 mg total) by mouth 3 (three) times daily as needed for  "Anxiety. 90 tablet 2    apixaban (ELIQUIS) 5 mg Tab Take 1 tablet (5 mg total) by mouth 2 (two) times daily. 180 tablet 3    atorvastatin (LIPITOR) 10 MG tablet Take 1 tablet (10 mg total) by mouth once daily. 90 tablet 3    cholecalciferol, vitamin D3, (VITAMIN D3) 125 mcg (5,000 unit) Tab Take 1 tablet (5,000 Units total) by mouth once daily. 90 tablet 3    EScitalopram oxalate (LEXAPRO) 10 MG tablet Take 2 tablets (20 mg total) by mouth once daily. 30 tablet 1    gabapentin (NEURONTIN) 400 MG capsule Take 1 capsule (400 mg total) by mouth 2 (two) times daily. 180 capsule 3    levothyroxine (SYNTHROID) 200 MCG tablet Take 1 tablet (200 mcg total) by mouth once daily. 90 tablet 3    metoprolol succinate (TOPROL-XL) 100 MG 24 hr tablet Take 3 tablets (300 mg total) by mouth once daily. 270 tablet 3    umeclidinium-vilanteroL (ANORO ELLIPTA) 62.5-25 mcg/actuation DsDv Inhale 1 puff into the lungs once daily. Controller 60 each 11    zolpidem (AMBIEN) 10 mg Tab Take 1 tablet (10 mg total) by mouth nightly as needed (insomnia). 90 tablet 5    candesartan (ATACAND) 8 MG tablet Take 0.5 tablets (4 mg total) by mouth once daily. 45 tablet 3     No current facility-administered medications on file prior to visit.       Physical  Exam  Vitals:    01/24/23 1320   BP: 130/82   BP Location: Right arm   Patient Position: Sitting   Pulse: 108   SpO2: 97%   Weight: 102.2 kg (225 lb 3.2 oz)   Height: 5' 2" (1.575 m)      Body mass index is 41.19 kg/m².    Physical Exam  Vitals and nursing note reviewed.   Constitutional:       General: She is not in acute distress.     Appearance: She is not ill-appearing.   HENT:      Head: Normocephalic and atraumatic.      Right Ear: External ear normal.      Left Ear: External ear normal.      Nose: Nose normal.      Mouth/Throat:      Mouth: Mucous membranes are moist.   Eyes:      Extraocular Movements: Extraocular movements intact.      Conjunctiva/sclera: Conjunctivae normal. "   Cardiovascular:      Rate and Rhythm: Normal rate and regular rhythm.      Pulses: Normal pulses.      Heart sounds: No murmur heard.  Pulmonary:      Effort: Pulmonary effort is normal. No respiratory distress.      Breath sounds: No wheezing.   Abdominal:      General: Bowel sounds are normal. There is no distension.      Palpations: Abdomen is soft. There is no mass.      Tenderness: There is abdominal tenderness in the right upper quadrant. Positive signs include Barakat's sign.   Musculoskeletal:         General: No swelling.      Cervical back: Normal range of motion.   Skin:     Coloration: Skin is not jaundiced.      Findings: No rash.   Neurological:      General: No focal deficit present.      Mental Status: She is alert and oriented to person, place, and time.   Psychiatric:         Mood and Affect: Mood normal.         Thought Content: Thought content normal.       Labs:    Complete Blood Count  Lab Results   Component Value Date    RBC 3.86 (L) 02/24/2022    HGB 11.9 (L) 02/24/2022    HCT 37.6 02/24/2022    MCV 97 02/24/2022    MCH 30.8 02/24/2022    MCHC 31.6 (L) 02/24/2022    RDW 13.6 02/24/2022     02/24/2022    MPV 11.6 02/24/2022    GRAN 5.0 02/24/2022    GRAN 62.1 02/24/2022    LYMPH 1.6 02/24/2022    LYMPH 20.1 02/24/2022    MONO 0.5 02/24/2022    MONO 6.5 02/24/2022    EOS 0.8 (H) 02/24/2022    BASO 0.06 02/24/2022    EOSINOPHIL 10.2 (H) 02/24/2022    BASOPHIL 0.7 02/24/2022    DIFFMETHOD Automated 02/24/2022       Comprehensive Metabolic Panel  Lab Results   Component Value Date     (H) 01/06/2023     (H) 01/06/2023    BUN 31 (H) 01/06/2023    BUN 31 (H) 01/06/2023    CREATININE 2.92 (H) 01/06/2023    CREATININE 2.92 (H) 01/06/2023     01/06/2023     01/06/2023    K 4.5 01/06/2023    K 4.5 01/06/2023     01/06/2023     01/06/2023    PROT 7.3 01/06/2023    ALBUMIN 4.5 01/06/2023    ALBUMIN 4.5 01/06/2023    BILITOT 0.6 01/06/2023    AST 35 01/06/2023     ALKPHOS 70 01/06/2023    CO2 32 (H) 01/06/2023    CO2 32 (H) 01/06/2023    ALT 27 01/06/2023    ANIONGAP 7 (L) 01/06/2023    ANIONGAP 7 (L) 01/06/2023       TSH  No results found for: TSH    Imaging:  US Abdomen Limited  Narrative: EXAMINATION:  US ABDOMEN LIMITED    CLINICAL HISTORY:  Nausea    TECHNIQUE:  Multiple static ultrasound images are submitted for interpretation with color flow and spectral Doppler imaging.    COMPARISON:  05/01/2019    FINDINGS:  The liver is normal in appearance. It measures 15.2 cm in length. There is no intrahepatic biliary ductal dilatation. The common bile duct has a diameter of 2 mm.  There are several stones in the gallbladder.  One of the larger ones measures 21 mm.  There is no abnormal gallbladder wall thickening, pericholecystic fluid, or sonographic Barakat sign.  The visualized portion of the pancreas is normal in appearance. The right kidney is normal in appearance.  It measures 10.0 cm in length. The inferior vena cava is normal in appearance. The main portal vein has a normal venous waveform with flow directed towards the liver. It has a velocity of 28 cm/sec.  Impression: There are several stones in the gallbladder. One of the larger ones measures 21 mm. There is no abnormal gallbladder wall thickening, pericholecystic fluid, sonographic Barakat sign, or common bile duct dilatation.    Electronically signed by: Tonny Sanchez MD  Date:    01/26/2023  Time:    11:13      Assessment/Plan:    1. Nausea  -     promethazine injection 25 mg  -     US Abdomen Limited; Future; Expected date: 01/24/2023  -     ondansetron (ZOFRAN) 4 MG tablet; Take 1 tablet (4 mg total) by mouth every 8 (eight) hours as needed for Nausea.  Dispense: 45 tablet; Refill: 0         Discussed how to stay healthy including: diet, exercise, refraining from smoking and discussed screening exams / tests needed for age, sex and family Hx.      Dez Dotson MD

## 2023-01-30 NOTE — PROGRESS NOTES
Patient, Talia Dillon (MRN #[id number]), presented with a recorded BMI of 41.19 kg/m^2 consistent with the definition of morbid obesity (ICD-10 E66.01). The patient's morbid obesity was monitored, evaluated, addressed and/or treated. This addendum to the medical record is made on 01/29/2023.

## 2023-01-31 ENCOUNTER — TELEPHONE (OUTPATIENT)
Dept: NEPHROLOGY | Facility: CLINIC | Age: 73
End: 2023-01-31
Payer: MEDICARE

## 2023-02-02 ENCOUNTER — TELEPHONE (OUTPATIENT)
Dept: CARDIOLOGY | Facility: CLINIC | Age: 73
End: 2023-02-02
Payer: MEDICARE

## 2023-02-02 NOTE — TELEPHONE ENCOUNTER
I asked the patient to get her procedure scheduled and then call us to get the appt for the cardiac clearance with in the 30 day time frame.

## 2023-02-03 ENCOUNTER — LAB VISIT (OUTPATIENT)
Dept: LAB | Facility: HOSPITAL | Age: 73
End: 2023-02-03
Attending: NURSE PRACTITIONER
Payer: MEDICARE

## 2023-02-03 ENCOUNTER — TELEPHONE (OUTPATIENT)
Dept: NEPHROLOGY | Facility: CLINIC | Age: 73
End: 2023-02-03
Payer: MEDICARE

## 2023-02-03 DIAGNOSIS — N17.9 AKI (ACUTE KIDNEY INJURY): ICD-10-CM

## 2023-02-03 DIAGNOSIS — N17.9 AKI (ACUTE KIDNEY INJURY): Primary | ICD-10-CM

## 2023-02-03 LAB
ALBUMIN SERPL BCP-MCNC: 4.8 G/DL (ref 3.5–5.2)
ALP SERPL-CCNC: 64 U/L (ref 38–126)
ALT SERPL W/O P-5'-P-CCNC: 29 U/L (ref 10–44)
ANION GAP SERPL CALC-SCNC: 9 MMOL/L (ref 8–16)
AST SERPL-CCNC: 40 U/L (ref 15–46)
BILIRUB SERPL-MCNC: 0.8 MG/DL (ref 0.1–1)
CALCIUM SERPL-MCNC: 9.6 MG/DL (ref 8.7–10.5)
CHLORIDE SERPL-SCNC: 99 MMOL/L (ref 95–110)
CO2 SERPL-SCNC: 32 MMOL/L (ref 23–29)
CREAT SERPL-MCNC: 3 MG/DL (ref 0.5–1.4)
EST. GFR  (NO RACE VARIABLE): 15.9 ML/MIN/1.73 M^2
GLUCOSE SERPL-MCNC: 108 MG/DL (ref 70–110)
POTASSIUM SERPL-SCNC: 4.1 MMOL/L (ref 3.5–5.1)
PROT SERPL-MCNC: 7.7 G/DL (ref 6–8.4)
SODIUM SERPL-SCNC: 140 MMOL/L (ref 136–145)
UUN UR-MCNC: 39 MG/DL (ref 7–17)

## 2023-02-03 PROCEDURE — 80053 COMPREHEN METABOLIC PANEL: CPT | Mod: PO | Performed by: NURSE PRACTITIONER

## 2023-02-03 PROCEDURE — 36415 COLL VENOUS BLD VENIPUNCTURE: CPT | Mod: PO | Performed by: NURSE PRACTITIONER

## 2023-02-03 NOTE — PROGRESS NOTES
Patient continues with nausea and diarrhea. Renal function worsened on repeat labs, GFR 13. She is being evaluated by surgery for cholelithiasis. She is to repeat labs this morning. Discussed that if renal function remains low/ worsens will recommend that she go to the ER for evaluation. Will call with repeat lab results.

## 2023-02-07 ENCOUNTER — TELEPHONE (OUTPATIENT)
Dept: PULMONOLOGY | Facility: CLINIC | Age: 73
End: 2023-02-07
Payer: MEDICARE

## 2023-02-07 NOTE — TELEPHONE ENCOUNTER
Left message on patient voicemail, informing her that I'm contacting her in regards to scheduling her appointment with Dr Boston. I also advised patient that if he wants to schedule appointment or if he she has any questions or concerns, she may contact the office. Office number has been provided.

## 2023-02-07 NOTE — TELEPHONE ENCOUNTER
----- Message from Jayro Boston MD sent at 2/6/2023  8:06 AM CST -----  Regarding: Scheduling  Celeste,        Can we get this patient in for a pre surgery pulmonary risk stratification appointment with me?     Thanks,   Jayro

## 2023-02-09 ENCOUNTER — PES CALL (OUTPATIENT)
Dept: ADMINISTRATIVE | Facility: CLINIC | Age: 73
End: 2023-02-09
Payer: MEDICARE

## 2023-02-13 ENCOUNTER — TELEPHONE (OUTPATIENT)
Dept: FAMILY MEDICINE | Facility: CLINIC | Age: 73
End: 2023-02-13
Payer: MEDICARE

## 2023-02-13 ENCOUNTER — HOSPITAL ENCOUNTER (EMERGENCY)
Facility: HOSPITAL | Age: 73
Discharge: HOME | End: 2023-02-13
Attending: EMERGENCY MEDICINE
Payer: MEDICARE

## 2023-02-13 VITALS
HEART RATE: 98 BPM | OXYGEN SATURATION: 96 % | SYSTOLIC BLOOD PRESSURE: 137 MMHG | TEMPERATURE: 98 F | DIASTOLIC BLOOD PRESSURE: 78 MMHG | RESPIRATION RATE: 20 BRPM

## 2023-02-13 DIAGNOSIS — K80.20 GALLSTONES: Primary | ICD-10-CM

## 2023-02-13 DIAGNOSIS — R11.2 NAUSEA AND VOMITING, UNSPECIFIED VOMITING TYPE: ICD-10-CM

## 2023-02-13 DIAGNOSIS — R10.10 PAIN OF UPPER ABDOMEN: Primary | ICD-10-CM

## 2023-02-13 LAB
ALBUMIN SERPL BCP-MCNC: 4.2 G/DL (ref 3.5–5.2)
ALP SERPL-CCNC: 52 U/L (ref 55–135)
ALT SERPL W/O P-5'-P-CCNC: 19 U/L (ref 10–44)
ANION GAP SERPL CALC-SCNC: 16 MMOL/L (ref 8–16)
AST SERPL-CCNC: 25 U/L (ref 10–40)
BACTERIA #/AREA URNS HPF: NORMAL /HPF
BASOPHILS # BLD AUTO: 0.04 K/UL (ref 0–0.2)
BASOPHILS NFR BLD: 0.5 % (ref 0–1.9)
BILIRUB SERPL-MCNC: 0.9 MG/DL (ref 0.1–1)
BILIRUB UR QL STRIP: NEGATIVE
BUN SERPL-MCNC: 40 MG/DL (ref 8–23)
CALCIUM SERPL-MCNC: 9.4 MG/DL (ref 8.7–10.5)
CHLORIDE SERPL-SCNC: 104 MMOL/L (ref 95–110)
CLARITY UR: CLEAR
CO2 SERPL-SCNC: 21 MMOL/L (ref 23–29)
COLOR UR: YELLOW
CREAT SERPL-MCNC: 2.5 MG/DL (ref 0.5–1.4)
DIFFERENTIAL METHOD: ABNORMAL
EOSINOPHIL # BLD AUTO: 0.2 K/UL (ref 0–0.5)
EOSINOPHIL NFR BLD: 1.8 % (ref 0–8)
ERYTHROCYTE [DISTWIDTH] IN BLOOD BY AUTOMATED COUNT: 13.6 % (ref 11.5–14.5)
EST. GFR  (NO RACE VARIABLE): 20 ML/MIN/1.73 M^2
GLUCOSE SERPL-MCNC: 86 MG/DL (ref 70–110)
GLUCOSE UR QL STRIP: NEGATIVE
HCT VFR BLD AUTO: 39.3 % (ref 37–48.5)
HGB BLD-MCNC: 12.8 G/DL (ref 12–16)
HGB UR QL STRIP: ABNORMAL
HYALINE CASTS #/AREA URNS LPF: 0 /LPF
IMM GRANULOCYTES # BLD AUTO: 0.04 K/UL (ref 0–0.04)
IMM GRANULOCYTES NFR BLD AUTO: 0.5 % (ref 0–0.5)
KETONES UR QL STRIP: NEGATIVE
LEUKOCYTE ESTERASE UR QL STRIP: NEGATIVE
LIPASE SERPL-CCNC: 52 U/L (ref 4–60)
LYMPHOCYTES # BLD AUTO: 1.5 K/UL (ref 1–4.8)
LYMPHOCYTES NFR BLD: 16.9 % (ref 18–48)
MAGNESIUM SERPL-MCNC: 1.6 MG/DL (ref 1.6–2.6)
MCH RBC QN AUTO: 32.1 PG (ref 27–31)
MCHC RBC AUTO-ENTMCNC: 32.6 G/DL (ref 32–36)
MCV RBC AUTO: 99 FL (ref 82–98)
MICROSCOPIC COMMENT: NORMAL
MONOCYTES # BLD AUTO: 0.6 K/UL (ref 0.3–1)
MONOCYTES NFR BLD: 6.6 % (ref 4–15)
NEUTROPHILS # BLD AUTO: 6.5 K/UL (ref 1.8–7.7)
NEUTROPHILS NFR BLD: 73.7 % (ref 38–73)
NITRITE UR QL STRIP: NEGATIVE
NRBC BLD-RTO: 0 /100 WBC
PH UR STRIP: 6 [PH] (ref 5–8)
PLATELET # BLD AUTO: 181 K/UL (ref 150–450)
PMV BLD AUTO: 12 FL (ref 9.2–12.9)
POTASSIUM SERPL-SCNC: 4.4 MMOL/L (ref 3.5–5.1)
PROT SERPL-MCNC: 7.7 G/DL (ref 6–8.4)
PROT UR QL STRIP: ABNORMAL
RBC # BLD AUTO: 3.99 M/UL (ref 4–5.4)
RBC #/AREA URNS HPF: 2 /HPF (ref 0–4)
SODIUM SERPL-SCNC: 141 MMOL/L (ref 136–145)
SP GR UR STRIP: 1.02 (ref 1–1.03)
SQUAMOUS #/AREA URNS HPF: NORMAL /HPF
URN SPEC COLLECT METH UR: ABNORMAL
UROBILINOGEN UR STRIP-ACNC: NEGATIVE EU/DL
WBC # BLD AUTO: 8.78 K/UL (ref 3.9–12.7)
WBC #/AREA URNS HPF: 0 /HPF (ref 0–5)

## 2023-02-13 PROCEDURE — 99285 EMERGENCY DEPT VISIT HI MDM: CPT | Mod: 25

## 2023-02-13 PROCEDURE — 96374 THER/PROPH/DIAG INJ IV PUSH: CPT

## 2023-02-13 PROCEDURE — 63600175 PHARM REV CODE 636 W HCPCS: Performed by: EMERGENCY MEDICINE

## 2023-02-13 PROCEDURE — 80053 COMPREHEN METABOLIC PANEL: CPT | Performed by: EMERGENCY MEDICINE

## 2023-02-13 PROCEDURE — 25000003 PHARM REV CODE 250: Performed by: EMERGENCY MEDICINE

## 2023-02-13 PROCEDURE — 96361 HYDRATE IV INFUSION ADD-ON: CPT

## 2023-02-13 PROCEDURE — 83690 ASSAY OF LIPASE: CPT | Performed by: EMERGENCY MEDICINE

## 2023-02-13 PROCEDURE — 81000 URINALYSIS NONAUTO W/SCOPE: CPT | Performed by: EMERGENCY MEDICINE

## 2023-02-13 PROCEDURE — 83735 ASSAY OF MAGNESIUM: CPT | Performed by: EMERGENCY MEDICINE

## 2023-02-13 PROCEDURE — 85025 COMPLETE CBC W/AUTO DIFF WBC: CPT | Performed by: EMERGENCY MEDICINE

## 2023-02-13 RX ORDER — ONDANSETRON 4 MG/1
4 TABLET, ORALLY DISINTEGRATING ORAL EVERY 6 HOURS PRN
Qty: 10 TABLET | Refills: 0 | Status: SHIPPED | OUTPATIENT
Start: 2023-02-13 | End: 2023-03-05 | Stop reason: SDUPTHER

## 2023-02-13 RX ORDER — MAG HYDROX/ALUMINUM HYD/SIMETH 200-200-20
30 SUSPENSION, ORAL (FINAL DOSE FORM) ORAL ONCE
Status: COMPLETED | OUTPATIENT
Start: 2023-02-13 | End: 2023-02-13

## 2023-02-13 RX ORDER — METOCLOPRAMIDE 10 MG/1
10 TABLET ORAL EVERY 6 HOURS PRN
Qty: 14 TABLET | Refills: 0 | Status: SHIPPED | OUTPATIENT
Start: 2023-02-13 | End: 2023-04-03

## 2023-02-13 RX ORDER — PROCHLORPERAZINE EDISYLATE 5 MG/ML
10 INJECTION INTRAMUSCULAR; INTRAVENOUS
Status: COMPLETED | OUTPATIENT
Start: 2023-02-13 | End: 2023-02-13

## 2023-02-13 RX ORDER — LIDOCAINE HYDROCHLORIDE 20 MG/ML
15 SOLUTION OROPHARYNGEAL ONCE
Status: COMPLETED | OUTPATIENT
Start: 2023-02-13 | End: 2023-02-13

## 2023-02-13 RX ORDER — DICYCLOMINE HYDROCHLORIDE 20 MG/1
20 TABLET ORAL 3 TIMES DAILY PRN
Qty: 14 TABLET | Refills: 0 | Status: SHIPPED | OUTPATIENT
Start: 2023-02-13 | End: 2023-03-15

## 2023-02-13 RX ADMIN — PROCHLORPERAZINE EDISYLATE 10 MG: 5 INJECTION INTRAMUSCULAR; INTRAVENOUS at 06:02

## 2023-02-13 RX ADMIN — SODIUM CHLORIDE 1000 ML: 0.9 INJECTION, SOLUTION INTRAVENOUS at 06:02

## 2023-02-13 RX ADMIN — ALUMINUM HYDROXIDE, MAGNESIUM HYDROXIDE, AND SIMETHICONE 30 ML: 200; 200; 20 SUSPENSION ORAL at 07:02

## 2023-02-13 RX ADMIN — LIDOCAINE HYDROCHLORIDE 15 ML: 20 SOLUTION ORAL at 07:02

## 2023-02-13 NOTE — TELEPHONE ENCOUNTER
Pt called my cell with multiple c/o, has gallstone, miserable, nausea    She'll go to [facility]  to be evalulated  Had seen Dr Hernández and note says she was referring her to Dr Talamantes

## 2023-02-13 NOTE — ED NOTES
Pt arrived from home via EMS complains of Nausea x2 weeks. Pt reports hx of gall bladder. Pt further reports that she contacted her doctor concerning the nausea and he referred her to the ER. Pt said she can only eat mashed potatoes and eggs. Pt denies vomiting. Pt appears to be comfortable but is in no distress.

## 2023-02-14 ENCOUNTER — TELEPHONE (OUTPATIENT)
Dept: FAMILY MEDICINE | Facility: CLINIC | Age: 73
End: 2023-02-14
Payer: MEDICARE

## 2023-02-14 NOTE — TELEPHONE ENCOUNTER
Attempted pt. LVM to call the office back.     Spoke to pt's sister, Renetta. I explained that I was able to get the pt rescheduled to see the General Surgeon.

## 2023-02-14 NOTE — TELEPHONE ENCOUNTER
----- Message from Marquis Salgado sent at 2/14/2023  2:20 PM CST -----  Contact: Pt  .Type:  Needs Medical Advice    Who Called: Pt  Would the patient rather a call back or a response via [facility]? call  Best Call Back Number: [phone]  Additional Information:   Pt would like to discuss who she needs to schedule an appt with to have gallbladder surgery.

## 2023-02-14 NOTE — ED PROVIDER NOTES
"Encounter Date: 2/13/2023       History     Chief Complaint   Patient presents with    Abdominal Pain     Hx of gallstones, went to PCP for increased abd pain and referred to ed, pt arrives vis EMS from home      72-year-old female brought to the emergency department for evaluation of abdominal pain, nausea.  States she is only able to eat chicken and mashed potatoes without feeling very nauseated.  Notes generalized abdominal pain worst at the upper abdomen.  Is concerned she may be having a "gallbladder attack".  Denies any fever, notes some loose bowel movements but not persistent diarrhea.  Denies any dysuria, frequency, urgency.  No other symptoms reported at this time.      Review of patient's allergies indicates:   Allergen Reactions    Dexamethasone Rash     Past Medical History:   Diagnosis Date    Allergy     Anemia     Anxiety     Atrial fibrillation     Cancer     skin    Cataract     Depression     Edema     Hypothyroidism     Kidney disease     PSVT (paroxysmal supraventricular tachycardia)     Thyroid disease      Past Surgical History:   Procedure Laterality Date    ADENOIDECTOMY      APPENDECTOMY      COLONOSCOPY  2009    repeat in 10 years     EYE SURGERY      HYSTERECTOMY      INJECTION OF ANESTHETIC AGENT AROUND NERVE Bilateral 9/21/2018    Procedure: BLOCK, NERVE, MEDIAL BRANCH BLOCK BILATERAL LUMBAR L2-5;  Surgeon: Julieth Christopher MD;  Location: [facility];  Service: Pain Management;  Laterality: Bilateral;  1 of 2    INJECTION OF ANESTHETIC AGENT AROUND NERVE Bilateral 9/28/2018    Procedure: BLOCK, NERVE, MEDIAL BRANCH BLOCK BILATERAL LUMBAR L2-5;  Surgeon: Julieth Christopher MD;  [facility];  Service: Pain Management;  Laterality: Bilateral;  2 of 2  PLEASE GIVE NIRAVAM PER MD    OOPHORECTOMY      RADIOFREQUENCY ABLATION Left 2/14/2020    Procedure: RADIOFREQUENCY ABLATION L2,3,4,5;  Surgeon: Julieth Christopher MD;  Location: [facility];  Service: Pain Management;  " Laterality: Left;  LT RFA L2,3,4,5  1 of 2  OK to hold Eliquis    RADIOFREQUENCY ABLATION Right 2020    Procedure: RADIOFREQUENCY ABLATION L2,3,4,5 RIGHT   2 of 2 ok to hold eliquis;  Surgeon: Julieth Christopher MD;  Location: [facility];  Service: Pain Management;  Laterality: Right;    RADIOFREQUENCY ABLATION Left 2020    Procedure: RADIOFREQUENCY ABLATION, L2-L3-L4-L5 MEDIAL BRANCH 1 OF 2 Continue Eliquis;  Surgeon: Bogdan Webster MD;  Location: [facility];  Service: Pain Management;  Laterality: Left;    TONSILLECTOMY      TREATMENT OF CARDIAC ARRHYTHMIA N/A 2019    Procedure: CARDIOVERSION;  Surgeon: Devin You MD;  Location: [facility];  Service: Cardiology;  Laterality: N/A;  AF, KAROL, DCCV, MAC, UT, 3 Prep     Family History   Problem Relation Age of Onset    Hypertension Mother     Stroke Mother     Hypertension Father     Stroke Father     Diabetes Father     Diabetes Sister     Diabetes Sister     Heart attack Sister     Coronary artery disease Sister     Sleep apnea Daughter     Mental illness Daughter      Social History     Tobacco Use    Smoking status: Former     Packs/day: 1.00     Years: 50.00     Pack years: 50.00     Types: Cigarettes     Quit date:      Years since quittin.1    Smokeless tobacco: Never    Tobacco comments:     still vaping   Substance Use Topics    Alcohol use: No     Comment: rarely    Drug use: No     Review of Systems   Constitutional:  Negative for chills and fever.   HENT:  Negative for congestion.    Respiratory:  Negative for cough and shortness of breath.    Cardiovascular:  Negative for chest pain.   Gastrointestinal:  Positive for abdominal pain.   Musculoskeletal:  Negative for back pain.   Neurological:  Negative for light-headedness and headaches.     Physical Exam     Initial Vitals [23 1714]   BP Pulse Resp Temp SpO2   (!) 144/85 97 16 97.5 °F (36.4 °C) 97 %      MAP       --         Physical Exam    Nursing note and vitals  reviewed.  Constitutional: She appears well-developed and well-nourished. No distress.   HENT:   Head: Normocephalic and atraumatic.   Eyes: Conjunctivae and EOM are normal. Pupils are equal, round, and reactive to light.   Neck: Neck supple. No tracheal deviation present.   Normal range of motion.  Cardiovascular:  Normal rate and intact distal pulses.           Pulmonary/Chest: No respiratory distress.   Abdominal: Abdomen is soft. She exhibits no distension. There is abdominal tenderness (Worst at upper abdomen). There is no rebound and no guarding.   Musculoskeletal:         General: No tenderness or edema. Normal range of motion.      Cervical back: Normal range of motion and neck supple.     Neurological: She is alert and oriented to person, place, and time. She has normal strength. No cranial nerve deficit. GCS score is 15. GCS eye subscore is 4. GCS verbal subscore is 5. GCS motor subscore is 6.   Skin: Skin is warm and dry.       ED Course   Procedures  Labs Reviewed   CBC W/ AUTO DIFFERENTIAL - Abnormal; Notable for the following components:       Result Value    RBC 3.99 (*)     MCV 99 (*)     MCH 32.1 (*)     Gran % 73.7 (*)     Lymph % 16.9 (*)     All other components within normal limits   COMPREHENSIVE METABOLIC PANEL - Abnormal; Notable for the following components:    CO2 21 (*)     BUN 40 (*)     Creatinine 2.5 (*)     Alkaline Phosphatase 52 (*)     eGFR 20 (*)     All other components within normal limits   URINALYSIS - Abnormal; Notable for the following components:    Protein, UA 2+ (*)     Occult Blood UA Trace (*)     All other components within normal limits   LIPASE   MAGNESIUM   URINALYSIS MICROSCOPIC              X-Rays:   Independently Interpreted Readings:   Other Readings:  CT abdomen and pelvis independently interpreted by me pending radiology review:  No acute obstruction, perforation, or diverticulitis appreciated  Imaging Results              CT Abdomen Pelvis  Without Contrast  (Final result)  Result time 02/13/23 19:14:45      Final result by Abhijit Pulido DO (02/13/23 19:14:45)                   Impression:      Urinary bladder wall thickening which may be due to contraction.  Correlate with urinalysis to exclude cystitis.    Cholelithiasis without acute cholecystitis.      Electronically signed by: Abhijit Pulido  Date:    02/13/2023  Time:    19:14               Narrative:    EXAMINATION:  CT ABDOMEN PELVIS WITHOUT CONTRAST    CLINICAL HISTORY:  Nausea/vomiting;Abdominal pain, acute, nonlocalized;    TECHNIQUE:  Multiplanar images were obtained of the abdomen and pelvis from the hemidiaphragms through the symphysis pubis without intravenous contrast.    COMPARISON:  None    FINDINGS:  Lung Bases: Clear.    Heart: Heart size is normal.  No pericardial effusion.    Liver: Normal in size and attenuation without focal hepatic lesion.    Biliary tract: No intrahepatic or extrahepatic biliary ductal dilatation.    Gallbladder: There are several radiodense gallstones.  There is no gallbladder wall thickening or pericholecystic fluid.    Pancreas: Normal. No pancreatic ductal dilatation.    Spleen: Normal size without focal lesion.    Adrenals: Normal.    Kidneys and urinary collecting systems: Normal.  No hydronephrosis or urolithiasis.  Nonspecific symmetric bilateral perinephric stranding noted.    Lymph nodes: None enlarged.    Stomach and bowel: The stomach is normal.  Loops of small and large bowel are normal in caliber without evidence for inflammation or obstruction.  The appendix is not definitively seen however there is no evidence of right lower quadrant inflammation.    Peritoneum and mesentery: No ascites or free intraperitoneal air. No abdominal fluid collection.    Vasculature: Normal.    Urinary bladder: The bladder is contracted.  There is wall thickening.    Reproductive organs: Surgically absent.    Body wall: No abnormality.    Musculoskeletal: No aggressive osseous  lesion.  Multilevel degenerative changes the lumbar spine with levoconvex scoliosis centered at L3.                                      Medications   sodium chloride 0.9% bolus 1,000 mL 1,000 mL (0 mLs Intravenous Stopped 2/13/23 2035)   prochlorperazine injection Soln 10 mg (10 mg Intravenous Given 2/13/23 1815)   aluminum-magnesium hydroxide-simethicone 200-200-20 mg/5 mL suspension 30 mL (30 mLs Oral Given 2/13/23 1944)     And   LIDOcaine HCl 2% oral solution 15 mL (15 mLs Oral Given 2/13/23 1943)     Medical Decision Making:   History:   Old Medical Records: I decided to obtain old medical records.  Old Records Summarized: records from clinic visits.       <> Summary of Records: Review General surgery visit from almost 2 weeks ago documented chronic nausea and evaluation for possible lap cholecystectomy  Initial Assessment:   72-year-old female presents emergency department complaining of abdominal pain, nausea  Differential Diagnosis:   Differential: Diverticulitis, cholecystitis, pancreatitis, appendicitis, obstruction, constipation UTI, pyelonephritis, kidney stone, gastroenteritis; several these diagnoses may require emergent surgical intervention and/or admission, both of which are being considered in this patient at this time       Initial MDM: Plan to check blood work, urine sample, CT, give IV fluids and antiemetics, symptomatic management, re-evaluate  Independently Interpreted Test(s):   I have ordered and independently interpreted X-rays - see prior notes.  Clinical Tests:   Lab Tests: Reviewed       <> Summary of Lab: CBC without leukocytosis, normal H&H, normal platelet count; CMP with chronic kidney disease similar to baseline, normal liver function tests, normal electrolytes; urinalysis without overt signs of infection  ED Management:  Patient given IV fluid, antiemetics, GI cocktail.  She is tolerating p.o. and feeling somewhat better.  Vital signs remained stable.  Discussed her results and  symptoms at length with both the patient and her neighbor who is helping care for the patient.  Explained that I am not convinced that this is coming from her gallbladder although that is certainly a possibility.  Explained I am not finding any emergent causes of her symptoms here and plan to discharge with prescriptions for antiemetics and Bentyl.  Instructed on home management, follow up with primary care physician for further evaluation and management, strict return precautions given.                         Clinical Impression:   Final diagnoses:  [R10.10] Pain of upper abdomen (Primary)  [R11.2] Nausea and vomiting, unspecified vomiting type        ED Disposition Condition    Discharge Stable          ED Prescriptions       Medication Sig Dispense Start Date End Date Auth. Provider    ondansetron (ZOFRAN-ODT) 4 MG TbDL Take 1 tablet (4 mg total) by mouth every 6 (six) hours as needed (Nausea). 10 tablet 2/13/2023 -- Helder Valencia MD    metoclopramide HCl (REGLAN) 10 MG tablet Take 1 tablet (10 mg total) by mouth every 6 (six) hours as needed (Nausea). 14 tablet 2/13/2023 -- Helder Valencia MD    dicyclomine (BENTYL) 20 mg tablet Take 1 tablet (20 mg total) by mouth 3 (three) times daily as needed (for abdominal cramping). 14 tablet 2/13/2023 3/15/2023 Helder Valencia MD          Follow-up Information       Follow up With Specialties Details Why Contact Info    James Vallecillo MD Family Medicine Schedule an appointment as soon as possible for a visit   [address] [place] [address] [id number] [phone]               Helder Valencia MD  02/13/23 2056

## 2023-02-14 NOTE — TELEPHONE ENCOUNTER
----- Message from Danielito Allen sent at 2/14/2023  4:00 PM CST -----  Type:  Patient Returning Call    Who Called:pt  Who Left Message for Patient:Layla Estrada  Does the patient know what this is regarding?:surgery   Would the patient rather a call back or a response via [facility]? call  Best Call Back Number:[phone]  Additional Information:

## 2023-02-15 ENCOUNTER — OFFICE VISIT (OUTPATIENT)
Dept: SURGERY | Facility: CLINIC | Age: 73
End: 2023-02-15
Payer: MEDICARE

## 2023-02-15 VITALS
HEART RATE: 83 BPM | WEIGHT: 227.5 LBS | SYSTOLIC BLOOD PRESSURE: 100 MMHG | DIASTOLIC BLOOD PRESSURE: 69 MMHG | HEIGHT: 62 IN | BODY MASS INDEX: 41.86 KG/M2

## 2023-02-15 DIAGNOSIS — K80.20 GALLSTONES: Primary | ICD-10-CM

## 2023-02-15 PROCEDURE — 1101F PR PT FALLS ASSESS DOC 0-1 FALLS W/OUT INJ PAST YR: ICD-10-PCS | Mod: CPTII,S$GLB,, | Performed by: STUDENT IN AN ORGANIZED HEALTH CARE EDUCATION/TRAINING PROGRAM

## 2023-02-15 PROCEDURE — 99214 OFFICE O/P EST MOD 30 MIN: CPT | Mod: S$GLB,,, | Performed by: STUDENT IN AN ORGANIZED HEALTH CARE EDUCATION/TRAINING PROGRAM

## 2023-02-15 PROCEDURE — 3288F PR FALLS RISK ASSESSMENT DOCUMENTED: ICD-10-PCS | Mod: CPTII,S$GLB,, | Performed by: STUDENT IN AN ORGANIZED HEALTH CARE EDUCATION/TRAINING PROGRAM

## 2023-02-15 PROCEDURE — 1159F PR MEDICATION LIST DOCUMENTED IN MEDICAL RECORD: ICD-10-PCS | Mod: CPTII,S$GLB,, | Performed by: STUDENT IN AN ORGANIZED HEALTH CARE EDUCATION/TRAINING PROGRAM

## 2023-02-15 PROCEDURE — 1126F PR PAIN SEVERITY QUANTIFIED, NO PAIN PRESENT: ICD-10-PCS | Mod: CPTII,S$GLB,, | Performed by: STUDENT IN AN ORGANIZED HEALTH CARE EDUCATION/TRAINING PROGRAM

## 2023-02-15 PROCEDURE — 3008F PR BODY MASS INDEX (BMI) DOCUMENTED: ICD-10-PCS | Mod: CPTII,S$GLB,, | Performed by: STUDENT IN AN ORGANIZED HEALTH CARE EDUCATION/TRAINING PROGRAM

## 2023-02-15 PROCEDURE — 99999 PR PBB SHADOW E&M-EST. PATIENT-LVL IV: CPT | Mod: PBBFAC,,, | Performed by: STUDENT IN AN ORGANIZED HEALTH CARE EDUCATION/TRAINING PROGRAM

## 2023-02-15 PROCEDURE — 1101F PT FALLS ASSESS-DOCD LE1/YR: CPT | Mod: CPTII,S$GLB,, | Performed by: STUDENT IN AN ORGANIZED HEALTH CARE EDUCATION/TRAINING PROGRAM

## 2023-02-15 PROCEDURE — 1126F AMNT PAIN NOTED NONE PRSNT: CPT | Mod: CPTII,S$GLB,, | Performed by: STUDENT IN AN ORGANIZED HEALTH CARE EDUCATION/TRAINING PROGRAM

## 2023-02-15 PROCEDURE — 3008F BODY MASS INDEX DOCD: CPT | Mod: CPTII,S$GLB,, | Performed by: STUDENT IN AN ORGANIZED HEALTH CARE EDUCATION/TRAINING PROGRAM

## 2023-02-15 PROCEDURE — 3074F PR MOST RECENT SYSTOLIC BLOOD PRESSURE < 130 MM HG: ICD-10-PCS | Mod: CPTII,S$GLB,, | Performed by: STUDENT IN AN ORGANIZED HEALTH CARE EDUCATION/TRAINING PROGRAM

## 2023-02-15 PROCEDURE — 3066F NEPHROPATHY DOC TX: CPT | Mod: CPTII,S$GLB,, | Performed by: STUDENT IN AN ORGANIZED HEALTH CARE EDUCATION/TRAINING PROGRAM

## 2023-02-15 PROCEDURE — 3078F PR MOST RECENT DIASTOLIC BLOOD PRESSURE < 80 MM HG: ICD-10-PCS | Mod: CPTII,S$GLB,, | Performed by: STUDENT IN AN ORGANIZED HEALTH CARE EDUCATION/TRAINING PROGRAM

## 2023-02-15 PROCEDURE — 99214 PR OFFICE/OUTPT VISIT, EST, LEVL IV, 30-39 MIN: ICD-10-PCS | Mod: S$GLB,,, | Performed by: STUDENT IN AN ORGANIZED HEALTH CARE EDUCATION/TRAINING PROGRAM

## 2023-02-15 PROCEDURE — 1160F PR REVIEW ALL MEDS BY PRESCRIBER/CLIN PHARMACIST DOCUMENTED: ICD-10-PCS | Mod: CPTII,S$GLB,, | Performed by: STUDENT IN AN ORGANIZED HEALTH CARE EDUCATION/TRAINING PROGRAM

## 2023-02-15 PROCEDURE — 3074F SYST BP LT 130 MM HG: CPT | Mod: CPTII,S$GLB,, | Performed by: STUDENT IN AN ORGANIZED HEALTH CARE EDUCATION/TRAINING PROGRAM

## 2023-02-15 PROCEDURE — 3078F DIAST BP <80 MM HG: CPT | Mod: CPTII,S$GLB,, | Performed by: STUDENT IN AN ORGANIZED HEALTH CARE EDUCATION/TRAINING PROGRAM

## 2023-02-15 PROCEDURE — 1160F RVW MEDS BY RX/DR IN RCRD: CPT | Mod: CPTII,S$GLB,, | Performed by: STUDENT IN AN ORGANIZED HEALTH CARE EDUCATION/TRAINING PROGRAM

## 2023-02-15 PROCEDURE — 3066F PR DOCUMENTATION OF TREATMENT FOR NEPHROPATHY: ICD-10-PCS | Mod: CPTII,S$GLB,, | Performed by: STUDENT IN AN ORGANIZED HEALTH CARE EDUCATION/TRAINING PROGRAM

## 2023-02-15 PROCEDURE — 3288F FALL RISK ASSESSMENT DOCD: CPT | Mod: CPTII,S$GLB,, | Performed by: STUDENT IN AN ORGANIZED HEALTH CARE EDUCATION/TRAINING PROGRAM

## 2023-02-15 PROCEDURE — 99999 PR PBB SHADOW E&M-EST. PATIENT-LVL IV: ICD-10-PCS | Mod: PBBFAC,,, | Performed by: STUDENT IN AN ORGANIZED HEALTH CARE EDUCATION/TRAINING PROGRAM

## 2023-02-15 PROCEDURE — 1159F MED LIST DOCD IN RCRD: CPT | Mod: CPTII,S$GLB,, | Performed by: STUDENT IN AN ORGANIZED HEALTH CARE EDUCATION/TRAINING PROGRAM

## 2023-02-15 NOTE — PROGRESS NOTES
"Patient ID: Talia Dillon is a 72 y.o. female.    Chief Complaint: No chief complaint on file.      HPI:  HPI  72F with epigastric fullness, nausea almost all day every day. Been going on at least a year, worsening overall. Went to ED the other day for epigastric pain but signficant pain seems rare. Mylanta and pepcid dont seem to help, GI cocktail in ED helped.   Belches a lot. "Feels full all the time"  Has not lost weight, gained a pound or two since last year.   NM myocardial study in 2020 showed septal wall motion abnormalitiy but normal perfusion and EF. Saw dr davenport with cards in December.  Long time smoker, quit about a year ago, cough and SOB have improved. Sees pulm for chronic bronchitis, no home oxygen.   Only abdominal surgery is hystectomy many years ago  Here today in wheelchair with neighbor    Review of Systems   Constitutional:  Negative for fever.   HENT:  Negative for trouble swallowing.    Respiratory:  Negative for shortness of breath.    Cardiovascular:  Negative for chest pain.   Gastrointestinal:  Positive for nausea. Negative for abdominal pain, blood in stool and vomiting.   Genitourinary:  Negative for dysuria.   Musculoskeletal:  Negative for gait problem.   Skin:  Negative for rash and wound.   Allergic/Immunologic: Negative for immunocompromised state.   Neurological:  Negative for weakness.   Hematological:  Does not bruise/bleed easily.   Psychiatric/Behavioral:  Negative for agitation.      Current Outpatient Medications   Medication Sig Dispense Refill    albuterol (PROVENTIL/VENTOLIN HFA) 90 mcg/actuation inhaler Inhale 2 puffs into the lungs every 4 (four) hours as needed for Wheezing (For shortness of breath). 54 g 11    albuterol-ipratropium (DUO-NEB) 2.5 mg-0.5 mg/3 mL nebulizer solution Take 3 mLs by nebulization every 6 (six) hours as needed for Wheezing. Rescue 400 each 3    ALPRAZolam (XANAX) 0.25 MG tablet Take 1 tablet (0.25 mg total) by mouth 3 (three) times daily as " needed for Anxiety. 90 tablet 2    apixaban (ELIQUIS) 5 mg Tab Take 1 tablet (5 mg total) by mouth 2 (two) times daily. 180 tablet 3    atorvastatin (LIPITOR) 10 MG tablet Take 1 tablet (10 mg total) by mouth once daily. 90 tablet 3    cholecalciferol, vitamin D3, (VITAMIN D3) 125 mcg (5,000 unit) Tab Take 1 tablet (5,000 Units total) by mouth once daily. 90 tablet 3    dicyclomine (BENTYL) 20 mg tablet Take 1 tablet (20 mg total) by mouth 3 (three) times daily as needed (for abdominal cramping). 14 tablet 0    gabapentin (NEURONTIN) 400 MG capsule Take 1 capsule (400 mg total) by mouth 2 (two) times daily. 180 capsule 3    levothyroxine (SYNTHROID) 200 MCG tablet Take 1 tablet (200 mcg total) by mouth once daily. 90 tablet 3    metoclopramide HCl (REGLAN) 10 MG tablet Take 1 tablet (10 mg total) by mouth every 6 (six) hours as needed (Nausea). 14 tablet 0    metoprolol succinate (TOPROL-XL) 100 MG 24 hr tablet Take 3 tablets (300 mg total) by mouth once daily. 270 tablet 3    ondansetron (ZOFRAN) 4 MG tablet Take 1 tablet (4 mg total) by mouth every 8 (eight) hours as needed for Nausea. 45 tablet 0    ondansetron (ZOFRAN-ODT) 4 MG TbDL Take 1 tablet (4 mg total) by mouth every 6 (six) hours as needed (Nausea). 10 tablet 0    umeclidinium-vilanteroL (ANORO ELLIPTA) 62.5-25 mcg/actuation DsDv Inhale 1 puff into the lungs once daily. Controller 60 each 11    zolpidem (AMBIEN) 10 mg Tab Take 1 tablet (10 mg total) by mouth nightly as needed (insomnia). 90 tablet 5    candesartan (ATACAND) 8 MG tablet Take 0.5 tablets (4 mg total) by mouth once daily. 45 tablet 3    EScitalopram oxalate (LEXAPRO) 10 MG tablet Take 2 tablets (20 mg total) by mouth once daily. 30 tablet 1     No current facility-administered medications for this visit.       Review of patient's allergies indicates:   Allergen Reactions    Dexamethasone Rash       Past Medical History:   Diagnosis Date    Allergy     Anemia     Anxiety     Atrial  fibrillation     Cancer     skin    Cataract     Depression     Edema     Hypothyroidism     Kidney disease     PSVT (paroxysmal supraventricular tachycardia)     Thyroid disease        Past Surgical History:   Procedure Laterality Date    ADENOIDECTOMY      APPENDECTOMY      COLONOSCOPY  2009    repeat in 10 years     EYE SURGERY      HYSTERECTOMY      INJECTION OF ANESTHETIC AGENT AROUND NERVE Bilateral 9/21/2018    Procedure: BLOCK, NERVE, MEDIAL BRANCH BLOCK BILATERAL LUMBAR L2-5;  Surgeon: Julieth Christopher MD;  Location: [facility] PAIN MGT;  Service: Pain Management;  Laterality: Bilateral;  1 of 2    INJECTION OF ANESTHETIC AGENT AROUND NERVE Bilateral 9/28/2018    Procedure: BLOCK, NERVE, MEDIAL BRANCH BLOCK BILATERAL LUMBAR L2-5;  Surgeon: Julieth Christopher MD;  Location: [facility] PAIN [facility]GT;  Service: Pain Management;  Laterality: Bilateral;  2 of 2  PLEASE GIVE NIRAVAM PER MD    OOPHORECTOMY      RADIOFREQUENCY ABLATION Left 2/14/2020    Procedure: RADIOFREQUENCY ABLATION L2,3,4,5;  Surgeon: Julieth Christopher MD;  Location: [facility] PAIN [facility]GT;  Service: Pain Management;  Laterality: Left;  LT RFA L2,3,4,5  1 of 2  OK to hold Eliquis    RADIOFREQUENCY ABLATION Right 2/28/2020    Procedure: RADIOFREQUENCY ABLATION L2,3,4,5 RIGHT   2 of 2 ok to hold eliquis;  Surgeon: Julieth Christopher MD;  Location: [facility] PAIN MGT;  Service: Pain Management;  Laterality: Right;    RADIOFREQUENCY ABLATION Left 11/30/2020    Procedure: RADIOFREQUENCY ABLATION, L2-L3-L4-L5 MEDIAL BRANCH 1 OF 2 Continue Eliquis;  Surgeon: Bogdan Webster MD;  Location: [facility] PAIN MGT;  Service: Pain Management;  Laterality: Left;    TONSILLECTOMY      TREATMENT OF CARDIAC ARRHYTHMIA N/A 2/8/2019    Procedure: CARDIOVERSION;  Surgeon: Devin You MD;  Location: [facility] EP LAB;  Service: Cardiology;  Laterality: N/A;  AF, KAROL, DCCV, MAC, OR, 3 Prep       Family History   Problem Relation Age of Onset    Hypertension Mother     Stroke Mother     Hypertension Father      Stroke Father     Diabetes Father     Diabetes Sister     Diabetes Sister     Heart attack Sister     Coronary artery disease Sister     Sleep apnea Daughter     Mental illness Daughter        Social History     Socioeconomic History    Marital status:    Tobacco Use    Smoking status: Former     Packs/day: 1.00     Years: 50.00     Pack years: 50.00     Types: Cigarettes     Quit date:      Years since quittin.1    Smokeless tobacco: Never    Tobacco comments:     still vaping   Substance and Sexual Activity    Alcohol use: No     Comment: rarely    Drug use: No    Sexual activity: Not Currently     Social Determinants of Health     Financial Resource Strain: Low Risk     Difficulty of Paying Living Expenses: Not hard at all   Food Insecurity: No Food Insecurity    Worried About Running Out of Food in the Last Year: Never true    Ran Out of Food in the Last Year: Never true   Transportation Needs: No Transportation Needs    Lack of Transportation (Medical): No    Lack of Transportation (Non-Medical): No   Physical Activity: Inactive    Days of Exercise per Week: 0 days    Minutes of Exercise per Session: 0 min   Stress: Stress Concern Present    Feeling of Stress : To some extent   Social Connections: Socially Isolated    Frequency of Communication with Friends and Family: More than three times a week    Frequency of Social Gatherings with Friends and Family: More than three times a week    Attends Orthodoxy Services: Never    Active Member of Clubs or Organizations: No    Attends Club or Organization Meetings: Never    Marital Status:    Housing Stability: Low Risk     Unable to Pay for Housing in the Last Year: No    Number of Places Lived in the Last Year: 1    Unstable Housing in the Last Year: No       Vitals:    02/15/23 1549   BP: 100/69   Pulse: 83       Physical Exam  Constitutional:       General: She is not in acute distress.     Appearance: She is well-developed.   HENT:      Head:  Normocephalic and atraumatic.   Eyes:      General: No scleral icterus.  Cardiovascular:      Rate and Rhythm: Normal rate.   Pulmonary:      Effort: Pulmonary effort is normal.      Breath sounds: No stridor.   Abdominal:      General: There is no distension.      Palpations: Abdomen is soft.      Tenderness: There is no abdominal tenderness.   Lymphadenopathy:      Cervical: No cervical adenopathy.   Skin:     General: Skin is warm.      Findings: No erythema.   Neurological:      Mental Status: She is alert and oriented to person, place, and time.   Psychiatric:         Behavior: Behavior normal.   Body mass index is 41.61 kg/m².  CT show gallstones. No hiatal hernia. Does have stable 4.8cm ascending aortic arch aneursym.   LFTs normal  GFR teens, 20.   Last hga1c was 5.6 in 2015  US shows multiple gallstones, normal CBD    Assessment & Plan:   72F with gallstones, nausea  On eliquis for years for afib  Will check gastric emypting study, presentation not typical for biliary colic. If normal and deemed acceptable risk by cards will plan for lap carlos

## 2023-02-20 ENCOUNTER — OFFICE VISIT (OUTPATIENT)
Dept: NEPHROLOGY | Facility: CLINIC | Age: 73
End: 2023-02-20
Payer: MEDICARE

## 2023-02-20 VITALS
WEIGHT: 220 LBS | SYSTOLIC BLOOD PRESSURE: 98 MMHG | DIASTOLIC BLOOD PRESSURE: 63 MMHG | OXYGEN SATURATION: 98 % | BODY MASS INDEX: 40.24 KG/M2 | HEART RATE: 85 BPM

## 2023-02-20 DIAGNOSIS — N25.81 SECONDARY HYPERPARATHYROIDISM OF RENAL ORIGIN: ICD-10-CM

## 2023-02-20 DIAGNOSIS — N17.9 AKI (ACUTE KIDNEY INJURY): Primary | ICD-10-CM

## 2023-02-20 DIAGNOSIS — I12.9 BENIGN HYPERTENSION WITH CKD (CHRONIC KIDNEY DISEASE) STAGE IV: ICD-10-CM

## 2023-02-20 DIAGNOSIS — R80.9 PROTEINURIA, UNSPECIFIED TYPE: ICD-10-CM

## 2023-02-20 DIAGNOSIS — N18.4 CKD (CHRONIC KIDNEY DISEASE) STAGE 4, GFR 15-29 ML/MIN: ICD-10-CM

## 2023-02-20 DIAGNOSIS — N18.4 BENIGN HYPERTENSION WITH CKD (CHRONIC KIDNEY DISEASE) STAGE IV: ICD-10-CM

## 2023-02-20 PROCEDURE — 3078F DIAST BP <80 MM HG: CPT | Mod: CPTII,S$GLB,, | Performed by: NURSE PRACTITIONER

## 2023-02-20 PROCEDURE — 1126F PR PAIN SEVERITY QUANTIFIED, NO PAIN PRESENT: ICD-10-PCS | Mod: CPTII,S$GLB,, | Performed by: NURSE PRACTITIONER

## 2023-02-20 PROCEDURE — 1101F PT FALLS ASSESS-DOCD LE1/YR: CPT | Mod: CPTII,S$GLB,, | Performed by: NURSE PRACTITIONER

## 2023-02-20 PROCEDURE — 3078F PR MOST RECENT DIASTOLIC BLOOD PRESSURE < 80 MM HG: ICD-10-PCS | Mod: CPTII,S$GLB,, | Performed by: NURSE PRACTITIONER

## 2023-02-20 PROCEDURE — 99999 PR PBB SHADOW E&M-EST. PATIENT-LVL III: CPT | Mod: PBBFAC,,, | Performed by: NURSE PRACTITIONER

## 2023-02-20 PROCEDURE — 3288F PR FALLS RISK ASSESSMENT DOCUMENTED: ICD-10-PCS | Mod: CPTII,S$GLB,, | Performed by: NURSE PRACTITIONER

## 2023-02-20 PROCEDURE — 3066F NEPHROPATHY DOC TX: CPT | Mod: CPTII,S$GLB,, | Performed by: NURSE PRACTITIONER

## 2023-02-20 PROCEDURE — 3066F PR DOCUMENTATION OF TREATMENT FOR NEPHROPATHY: ICD-10-PCS | Mod: CPTII,S$GLB,, | Performed by: NURSE PRACTITIONER

## 2023-02-20 PROCEDURE — 3074F SYST BP LT 130 MM HG: CPT | Mod: CPTII,S$GLB,, | Performed by: NURSE PRACTITIONER

## 2023-02-20 PROCEDURE — 1126F AMNT PAIN NOTED NONE PRSNT: CPT | Mod: CPTII,S$GLB,, | Performed by: NURSE PRACTITIONER

## 2023-02-20 PROCEDURE — 3008F PR BODY MASS INDEX (BMI) DOCUMENTED: ICD-10-PCS | Mod: CPTII,S$GLB,, | Performed by: NURSE PRACTITIONER

## 2023-02-20 PROCEDURE — 3288F FALL RISK ASSESSMENT DOCD: CPT | Mod: CPTII,S$GLB,, | Performed by: NURSE PRACTITIONER

## 2023-02-20 PROCEDURE — 99214 PR OFFICE/OUTPT VISIT, EST, LEVL IV, 30-39 MIN: ICD-10-PCS | Mod: S$GLB,,, | Performed by: NURSE PRACTITIONER

## 2023-02-20 PROCEDURE — 99999 PR PBB SHADOW E&M-EST. PATIENT-LVL III: ICD-10-PCS | Mod: PBBFAC,,, | Performed by: NURSE PRACTITIONER

## 2023-02-20 PROCEDURE — 1159F MED LIST DOCD IN RCRD: CPT | Mod: CPTII,S$GLB,, | Performed by: NURSE PRACTITIONER

## 2023-02-20 PROCEDURE — 3074F PR MOST RECENT SYSTOLIC BLOOD PRESSURE < 130 MM HG: ICD-10-PCS | Mod: CPTII,S$GLB,, | Performed by: NURSE PRACTITIONER

## 2023-02-20 PROCEDURE — 3008F BODY MASS INDEX DOCD: CPT | Mod: CPTII,S$GLB,, | Performed by: NURSE PRACTITIONER

## 2023-02-20 PROCEDURE — 1101F PR PT FALLS ASSESS DOC 0-1 FALLS W/OUT INJ PAST YR: ICD-10-PCS | Mod: CPTII,S$GLB,, | Performed by: NURSE PRACTITIONER

## 2023-02-20 PROCEDURE — 99214 OFFICE O/P EST MOD 30 MIN: CPT | Mod: S$GLB,,, | Performed by: NURSE PRACTITIONER

## 2023-02-20 PROCEDURE — 1159F PR MEDICATION LIST DOCUMENTED IN MEDICAL RECORD: ICD-10-PCS | Mod: CPTII,S$GLB,, | Performed by: NURSE PRACTITIONER

## 2023-02-20 NOTE — PROGRESS NOTES
Subjective:       Patient ID: Talia Dillon is a 72 y.o.  female who presents for follow-up evaluation of CKD.      HPI     Followed by Dr. Marin in 6/10/21.  Prior pertinent chart reviewed.    Patient presents for follow-up evaluation of CKD.  Baseline creatinine of ~1.8-2.0. Significant other medical problems include HTN, HLD, A fib, h/o PSVT, COPD, depression, anxiety, insomnia, DDD, cervical neuralgia, OA of lumbar spine. Cr now trending up to 2.9. No labs available since 02/2022. She notes depression and significant family stress with associated GI symptoms. Notes nausea and reflux as well as daily diarrhea for the last month. She does have a history of gallstones. Reports she is up to date on her colonoscopy. She reports decent water intake. She is following with psychiatry and her lexapro was recently increased to 20mg. No new medications or hospitalizations. She denies cp, sob, swelling, difficulty urinating, dysuria, hematuria, melena, vomiting, SI/HI.     Update 2/20/23:  - Presents for follow-up of TINA on CKD  - Following with general surgery for cholelithiasis, plans for gastric emptying study  - Remains with persistent nausea, eating mashed potatoes, chicken, grits  - Diarrhea has resolved. No vomiting.   - Reports improvement in anxiety level as well      Significant family hx includes: No reported renal disease    Last renal US: 5/1/19 , reviewed.    Review of Systems   Constitutional:  Negative for fever.   Respiratory:  Negative for shortness of breath.    Cardiovascular:  Negative for chest pain and leg swelling.   Gastrointestinal:  Positive for nausea. Negative for blood in stool, diarrhea and vomiting.   Genitourinary:  Negative for difficulty urinating, dysuria and hematuria.   Neurological:  Negative for syncope.   All other systems reviewed and are negative.    Objective:       Blood pressure 98/63, pulse 85, weight 99.8 kg (220 lb), SpO2 98 %.  Physical Exam  Vitals reviewed.  "  Constitutional:       General: She is not in acute distress.  HENT:      Head: Normocephalic and atraumatic.   Eyes:      General: No scleral icterus.  Cardiovascular:      Rate and Rhythm: Normal rate and regular rhythm.   Pulmonary:      Effort: Pulmonary effort is normal. No respiratory distress.      Breath sounds: No wheezing or rales.   Musculoskeletal:      Right lower leg: No edema.      Left lower leg: No edema.      Comments: +wheelchair   Skin:     General: Skin is warm and dry.   Neurological:      Mental Status: She is alert.      Comments: Speech clear, answers questions appropriately   Psychiatric:         Mood and Affect: Mood normal.         Behavior: Behavior normal.         Lab Results   Component Value Date    CREATININE 2.5 (H) 02/13/2023     Prot/Creat Ratio, Urine   Date Value Ref Range Status   02/03/2023 0.35 (H) 0.00 - 0.20 Final   02/24/2022 0.91 (H) 0.00 - 0.20 Final   12/30/2020 0.39 (H) 0.00 - 0.20 Final     Lab Results   Component Value Date     02/13/2023    K 4.4 02/13/2023    CO2 21 (L) 02/13/2023     02/13/2023     Lab Results   Component Value Date    .4 (H) 02/03/2023    CALCIUM 9.4 02/13/2023    PHOS 3.7 02/03/2023     Lab Results   Component Value Date    HGB 12.8 02/13/2023    WBC 8.78 02/13/2023    HCT 39.3 02/13/2023      Lab Results   Component Value Date    HGBA1C 5.6 07/15/2015     02/13/2023    BUN 40 (H) 02/13/2023     Lab Results   Component Value Date    LDLCALC 61.8 (L) 06/11/2020     US Retroperitoneal 5/1/19  "FINDINGS:  Right kidney: The right kidney measures 9.9 cm.  Slightly increased cortical echogenicity.  No cortical thinning. No loss of corticomedullary distinction.  Normal perfusion.   No mass. No renal stone. No hydronephrosis.     Left kidney: The left kidney measures 9.1 cm. Slightly increased cortical echogenicity no cortical thinning. No loss of corticomedullary distinction.  Normal perfusion. No mass. No renal stone. No " "hydronephrosis.     The bladder is partially distended at the time of scanning and has an unremarkable appearance.     IMPRESSION:      Slightly increased cortical echogenicity can be seen with medical renal disease."    Assessment:       1. TINA (acute kidney injury)    2. CKD (chronic kidney disease) stage 4, GFR 15-29 ml/min    3. Proteinuria, unspecified type    4. Secondary hyperparathyroidism of renal origin    5. Benign hypertension with CKD (chronic kidney disease) stage IV        Plan:   TINA on CKD stage 4   - Baseline Cr 1.8-2.0, clinically related to HTN, h/o TINA, NSAID use  - Cr now trending 2.92--> 2.5, no labs prior to recent since 02/2022, concerns for TINA vs progression  - Suspect TINA in setting of GI losses, encouraged proper hydration, now following with general surgery for cholelithiasis   - Hold candesartan in setting of suspected TINA  - SPEP, DEANA (-), BUFFY (-) 2018, Hep C (-) 2017    UPCR 910 mg/g--> 350mg/g, maintained on ARB - hold as above; Monitor UPCR   Acid-base Bicarb 21   Renal osteodystrophy/  Secondary hyperparathyroidism Ca, phos controlled    Last Vit D 53, monitor   Anemia WNL   DM No history   Lipid Management On statin   ESRD planning Provided education about the stages of CKD, usual progression, and need to monitor for and treat CKD-related disease in an effort to delay progression of CKD.        HTN   - Controlled, hold candesartan as above  - Encouraged to check BP at home    All questions patient had were answered.  Asked if further questions. None. F/u in clinic 6 weeks  with labs and urine prior to next visit or sooner if needed.  ER for emergency concerns.    Summary of Plan:  - Continue to hold ARB for now, monitor BP at home  - Stressed importance of hydration as able  - Close f/u 6 weeks RFP, CBC, UA, UPCR, Vit D      "

## 2023-02-27 DIAGNOSIS — R11.0 NAUSEA: ICD-10-CM

## 2023-02-27 RX ORDER — ONDANSETRON 4 MG/1
4 TABLET, FILM COATED ORAL EVERY 8 HOURS PRN
Qty: 45 TABLET | Refills: 2 | Status: SHIPPED | OUTPATIENT
Start: 2023-02-27 | End: 2023-04-03

## 2023-03-01 ENCOUNTER — HOSPITAL ENCOUNTER (OUTPATIENT)
Dept: RADIOLOGY | Facility: HOSPITAL | Age: 73
Discharge: HOME | End: 2023-03-01
Attending: STUDENT IN AN ORGANIZED HEALTH CARE EDUCATION/TRAINING PROGRAM
Payer: MEDICARE

## 2023-03-01 DIAGNOSIS — K80.20 GALLSTONES: ICD-10-CM

## 2023-03-01 PROCEDURE — 78264 GASTRIC EMPTYING IMG STUDY: CPT | Mod: 26,,, | Performed by: RADIOLOGY

## 2023-03-01 PROCEDURE — 78264 NM GASTRIC EMPTYING: ICD-10-PCS | Mod: 26,,, | Performed by: RADIOLOGY

## 2023-03-01 PROCEDURE — 78264 GASTRIC EMPTYING IMG STUDY: CPT | Mod: TC

## 2023-03-02 ENCOUNTER — TELEPHONE (OUTPATIENT)
Dept: SURGERY | Facility: CLINIC | Age: 73
End: 2023-03-02
Payer: MEDICARE

## 2023-03-02 NOTE — TELEPHONE ENCOUNTER
----- Message from Erin Shepard sent at 3/2/2023  8:36 AM CST -----  Type:  Test Results    Who Called: pt  Name of Test (Lab/Mammo/Etc):  NUCLEAR MEDICINE  Date of Test: 3/1  Ordering Provider:   Where the test was performed:  [facility]  Would the patient rather a call back or a response via [facility]? CALL  Best Call Back Number: [phone]  Additional Information:

## 2023-03-03 ENCOUNTER — TELEPHONE (OUTPATIENT)
Dept: FAMILY MEDICINE | Facility: CLINIC | Age: 73
End: 2023-03-03
Payer: MEDICARE

## 2023-03-03 ENCOUNTER — TELEPHONE (OUTPATIENT)
Dept: SURGERY | Facility: CLINIC | Age: 73
End: 2023-03-03
Payer: MEDICARE

## 2023-03-03 NOTE — TELEPHONE ENCOUNTER
There is a delay of the stomach emptying at 4 hours; 80 percent empty, 20 percent still in stomach.

## 2023-03-03 NOTE — TELEPHONE ENCOUNTER
Spoke to pt. Pt verbalized understanding. Pt asked what her next steps are. Pt also asked for sublingual zofran to be sent to the [facility] in [place].

## 2023-03-03 NOTE — TELEPHONE ENCOUNTER
----- Message from Kamille Pulido sent at 3/3/2023 10:02 AM CST -----  Regarding: test results  Contact: [phone]  Type:  Test Results    Who Called:  self   Name of Test (Lab/Mammo/Etc):  Gastric Emptying Study   Date of Test:  03/01  Ordering Provider:  Dr. Talamantes  Where the test was performed:  [facility]   Would the patient rather a call back or a response via [facility]?  Call   Best Call Back Number:  [phone]  Additional Information:   Dr. Talamantes is unavailable to discuss the results.

## 2023-03-03 NOTE — TELEPHONE ENCOUNTER
----- Message from Marquis Salgado sent at 3/3/2023  9:49 AM CST -----  Contact: Pt  .Type:  Test Results    Who Called: Pt  Name of Test (Lab/Mammo/Etc): Nuclear Test  Date of Test: 3/1  Ordering Provider: [place]  Where the test was performed: [facility]  Would the patient rather a call back or a response via [facility]? call  Best Call Back Number: [phone]  Additional Information:

## 2023-03-05 RX ORDER — ONDANSETRON 4 MG/1
4 TABLET, ORALLY DISINTEGRATING ORAL EVERY 6 HOURS PRN
Qty: 10 TABLET | Refills: 0 | Status: SHIPPED | OUTPATIENT
Start: 2023-03-05 | End: 2023-04-03 | Stop reason: SDUPTHER

## 2023-03-06 ENCOUNTER — TELEPHONE (OUTPATIENT)
Dept: SURGERY | Facility: CLINIC | Age: 73
End: 2023-03-06
Payer: MEDICARE

## 2023-03-06 NOTE — TELEPHONE ENCOUNTER
----- Message from Lizette Starr sent at 3/6/2023 11:23 AM CST -----  Type:  Test Results    Who Called: pt  Name of Test (Lab/Mammo/Etc): NM Gastric Emptying  Date of Test: 03/01/23  Ordering Provider: Dr Talamantes  Where the test was performed: [facility]  Would the patient rather a call back or a response via [facility]? call  Best Call Back Number: [phone]  Additional Information:

## 2023-03-07 ENCOUNTER — TELEPHONE (OUTPATIENT)
Dept: FAMILY MEDICINE | Facility: CLINIC | Age: 73
End: 2023-03-07
Payer: MEDICARE

## 2023-03-08 NOTE — PROGRESS NOTES
Called to discuss gastric empyting study. NO answer, left voicemail.   Not a good candidate for surgery and her history is not entirely consistent with biliary colic   will refer to GI for eval

## 2023-03-29 ENCOUNTER — PES CALL (OUTPATIENT)
Dept: ADMINISTRATIVE | Facility: CLINIC | Age: 73
End: 2023-03-29
Payer: MEDICARE

## 2023-04-03 ENCOUNTER — OFFICE VISIT (OUTPATIENT)
Dept: GASTROENTEROLOGY | Facility: CLINIC | Age: 73
End: 2023-04-03
Payer: MEDICARE

## 2023-04-03 ENCOUNTER — TELEPHONE (OUTPATIENT)
Dept: GASTROENTEROLOGY | Facility: CLINIC | Age: 73
End: 2023-04-03

## 2023-04-03 DIAGNOSIS — R11.0 NAUSEA: Primary | ICD-10-CM

## 2023-04-03 DIAGNOSIS — R14.0 ABDOMINAL BLOATING: ICD-10-CM

## 2023-04-03 PROCEDURE — 1159F PR MEDICATION LIST DOCUMENTED IN MEDICAL RECORD: ICD-10-PCS | Mod: CPTII,S$GLB,,

## 2023-04-03 PROCEDURE — 1101F PR PT FALLS ASSESS DOC 0-1 FALLS W/OUT INJ PAST YR: ICD-10-PCS | Mod: CPTII,S$GLB,,

## 2023-04-03 PROCEDURE — 99205 PR OFFICE/OUTPT VISIT, NEW, LEVL V, 60-74 MIN: ICD-10-PCS | Mod: S$GLB,,,

## 2023-04-03 PROCEDURE — 3066F NEPHROPATHY DOC TX: CPT | Mod: CPTII,S$GLB,,

## 2023-04-03 PROCEDURE — 3288F PR FALLS RISK ASSESSMENT DOCUMENTED: ICD-10-PCS | Mod: CPTII,S$GLB,,

## 2023-04-03 PROCEDURE — 99999 PR PBB SHADOW E&M-EST. PATIENT-LVL III: CPT | Mod: PBBFAC,,,

## 2023-04-03 PROCEDURE — 99205 OFFICE O/P NEW HI 60 MIN: CPT | Mod: S$GLB,,,

## 2023-04-03 PROCEDURE — 3066F PR DOCUMENTATION OF TREATMENT FOR NEPHROPATHY: ICD-10-PCS | Mod: CPTII,S$GLB,,

## 2023-04-03 PROCEDURE — 3288F FALL RISK ASSESSMENT DOCD: CPT | Mod: CPTII,S$GLB,,

## 2023-04-03 PROCEDURE — 1101F PT FALLS ASSESS-DOCD LE1/YR: CPT | Mod: CPTII,S$GLB,,

## 2023-04-03 PROCEDURE — 99999 PR PBB SHADOW E&M-EST. PATIENT-LVL III: ICD-10-PCS | Mod: PBBFAC,,,

## 2023-04-03 PROCEDURE — 1159F MED LIST DOCD IN RCRD: CPT | Mod: CPTII,S$GLB,,

## 2023-04-03 RX ORDER — ONDANSETRON 4 MG/1
4 TABLET, ORALLY DISINTEGRATING ORAL EVERY 6 HOURS PRN
Qty: 30 TABLET | Refills: 1 | Status: SHIPPED | OUTPATIENT
Start: 2023-04-03 | End: 2023-05-10 | Stop reason: SDUPTHER

## 2023-04-03 RX ORDER — ONDANSETRON 4 MG/1
4 TABLET, ORALLY DISINTEGRATING ORAL EVERY 6 HOURS PRN
Qty: 30 TABLET | Refills: 1 | Status: SHIPPED | OUTPATIENT
Start: 2023-04-03 | End: 2023-04-03

## 2023-04-03 RX ORDER — PANTOPRAZOLE SODIUM 40 MG/1
40 TABLET, DELAYED RELEASE ORAL DAILY
Qty: 30 TABLET | Refills: 3 | Status: SHIPPED | OUTPATIENT
Start: 2023-04-03 | End: 2023-06-28

## 2023-04-03 NOTE — TELEPHONE ENCOUNTER
Called pt to let her know that we receive an clearance on the blood thinners and that she will be able to hold them for 2 days .   110

## 2023-04-03 NOTE — PROGRESS NOTES
GASTROENTEROLOGY CLINIC NOTE    Reason for visit: The primary encounter diagnosis was Nausea. A diagnosis of Abdominal bloating was also pertinent to this visit.  Referring provider/PCP: James Vallecillo MD    HPI:  Talia Dillon is a 72 y.o. female here today for nausea. Began about 2-3 months ago, no changes in medications at that time. Nausea occurs daily, worse with eating, no vomiting. She has tried reglan, zofran, and pepcid for relief- zofran helps. She denies any abd pain, but endorses bloating. She had an ultrasound that showed gallstones without inflammation, per gensurg no surgery needed at this time. GES showed mild delay. Pt is not eating much, but denies early satiety or feeling like food isnt digesting. She denies NSAID use. No changes in BM's. Takes eliquis for afib.    Prior Endoscopy:  EGD:  Colon: 2018 with Dr. Link   - The entire colon is normal. Biopsied.   - One 4mm polyp in the rectum removed with a cold biopsy forceps. Resected and retrieved.    - Repeat colonoscopy in 10 years for screening purposes.     (Portions of this note were dictated using voice recognition software and may contain dictation related errors in spelling/grammar/syntax not found on text review)    Review of Systems   Constitutional:  Negative for weight loss.   Gastrointestinal:  Positive for nausea. Negative for abdominal pain, melena and vomiting.     Past Medical History: has a past medical history of Allergy, Anemia, Anxiety, Atrial fibrillation, Cancer, Cataract, Depression, Edema, Hypothyroidism, Kidney disease, PSVT (paroxysmal supraventricular tachycardia), and Thyroid disease.    Past Surgical History: has a past surgical history that includes Tonsillectomy; Colonoscopy (2009); Eye surgery; Hysterectomy; Injection of anesthetic agent around nerve (Bilateral, 9/21/2018); Injection of anesthetic agent around nerve (Bilateral, 9/28/2018); Treatment of cardiac arrhythmia (N/A, 2/8/2019); Adenoidectomy;  Appendectomy; Oophorectomy; Radiofrequency ablation (Left, 2/14/2020); Radiofrequency ablation (Right, 2/28/2020); and Radiofrequency ablation (Left, 11/30/2020).    Home medications:   Current Outpatient Medications on File Prior to Visit   Medication Sig Dispense Refill    ALPRAZolam (XANAX) 0.25 MG tablet Take 1 tablet (0.25 mg total) by mouth 3 (three) times daily as needed for Anxiety. 90 tablet 2    apixaban (ELIQUIS) 5 mg Tab Take 1 tablet (5 mg total) by mouth 2 (two) times daily. 180 tablet 3    atorvastatin (LIPITOR) 10 MG tablet Take 1 tablet (10 mg total) by mouth once daily. 90 tablet 3    cholecalciferol, vitamin D3, (VITAMIN D3) 125 mcg (5,000 unit) Tab Take 1 tablet (5,000 Units total) by mouth once daily. 90 tablet 3    gabapentin (NEURONTIN) 400 MG capsule Take 1 capsule (400 mg total) by mouth 2 (two) times daily. 180 capsule 3    levothyroxine (SYNTHROID) 200 MCG tablet Take 1 tablet (200 mcg total) by mouth once daily. 90 tablet 3    metoprolol succinate (TOPROL-XL) 100 MG 24 hr tablet Take 3 tablets (300 mg total) by mouth once daily. 270 tablet 3    umeclidinium-vilanteroL (ANORO ELLIPTA) 62.5-25 mcg/actuation DsDv Inhale 1 puff into the lungs once daily. Controller 60 each 11    zolpidem (AMBIEN) 10 mg Tab Take 1 tablet (10 mg total) by mouth nightly as needed (insomnia). 90 tablet 5    [DISCONTINUED] metoclopramide HCl (REGLAN) 10 MG tablet Take 1 tablet (10 mg total) by mouth every 6 (six) hours as needed (Nausea). 14 tablet 0    [DISCONTINUED] ondansetron (ZOFRAN) 4 MG tablet Take 1 tablet (4 mg total) by mouth every 8 (eight) hours as needed for Nausea. 45 tablet 2    [DISCONTINUED] ondansetron (ZOFRAN-ODT) 4 MG TbDL Take 1 tablet (4 mg total) by mouth every 6 (six) hours as needed (Nausea). 10 tablet 0    albuterol (PROVENTIL/VENTOLIN HFA) 90 mcg/actuation inhaler Inhale 2 puffs into the lungs every 4 (four) hours as needed for Wheezing (For shortness of breath). 54 g 11     albuterol-ipratropium (DUO-NEB) 2.5 mg-0.5 mg/3 mL nebulizer solution Take 3 mLs by nebulization every 6 (six) hours as needed for Wheezing. Rescue 400 each 3    candesartan (ATACAND) 8 MG tablet Take 0.5 tablets (4 mg total) by mouth once daily. 45 tablet 3    EScitalopram oxalate (LEXAPRO) 10 MG tablet Take 2 tablets (20 mg total) by mouth once daily. 30 tablet 1     No current facility-administered medications on file prior to visit.       Vital signs:  There were no vitals taken for this visit.    Physical Exam  Constitutional:       Appearance: Normal appearance.   Abdominal:      General: There is no distension.      Palpations: Abdomen is soft.      Tenderness: There is no abdominal tenderness.   Neurological:      Mental Status: She is alert.     NM Gastric Emptying  Narrative: EXAMINATION:  NM GASTRIC EMPTYING    CLINICAL HISTORY:  Calculus of gallbladder without cholecystitis without obstruction    FINDINGS:  Patient was administered 1.0 mCi of technetium 9 9 M labeled sulfur colloid with a solid phase meal.  The% gastric emptying at 04:00 hours is 80%, 20% retention.  There is no reflux or aspiration.  Impression: Mild delay in gastric emptying.    Electronically signed by: Mike Antonio MD  Date:    03/01/2023  Time:    12:33       I have reviewed associated labs, imaging and notes.     Assessment:  1. Nausea    2. Abdominal bloating    Nausea, onset about 2-3 months ago   Occurs daily, no vomiting   US showed gallstones- likely not contributing to nausea, no surgery per Dr. Talamantes   GES showed mild delay in emptying   Zofran as needed   Will trial PPI to see if reflux related to acid  Some associated abd bloating, no fevers, no abd pain, no change in BM's     Plan:  Orders Placed This Encounter    pantoprazole (PROTONIX) 40 MG tablet    ondansetron (ZOFRAN-ODT) 4 MG TbDL    Case Request Endoscopy: EGD (ESOPHAGOGASTRODUODENOSCOPY)     Start protonix daily  Continue zofran as needed    Schedule EGD for  further evaluation, will need to get cardiology clearance to hold eliquis    Higher than average risk given patient is on anticoagulant and will need clearance to hold     Lindsey Ray, NP [facility] [place]

## 2023-04-03 NOTE — PATIENT INSTRUCTIONS
EGD Instructions    [facility] [address]  Clinic Office [phone]  Endoscopy Lab [phone]    You are scheduled for an EGD with Dr. Reyes   on  5/4/23 at [facility] in [place].    Check in at the Hospital -1st floor, Information desk.   Call [phone] to reschedule.    You cannot have anything to eat or drink after Midnight. You can brush your teeth with a sip of water.     An adult friend/family member must come with you to drive you home.  You cannot drive, take a taxi, Uber/Lyft or bus to leave the Endoscopy Center alone.  If you do not have someone to drive you home, your test will be cancelled.     Please follow the directions of your doctor if you take any pills that thin your blood. If you take these meds: Aggrenox, Brilinta, Effient, Eliquis, Lovenox, Plavix, Pletal, Pradaxa, Ticilid, Xarelto or Coumadin, let the doctor's office know.    DON'T: On the morning of the test do not take insulin or pills for diabetes.     DO: On the morning of the test, do take any pills for blood pressure, heart, anti-rejection and or seizures with a small sip of water. Bring any inhalers with you.    Leave all valuables and jewelry at home. You will be at the hospital for 2-4 hours.    Call the Endoscopy department at [phone] with any questions about your procedure.    Thank you for choosing [facility].

## 2023-04-04 ENCOUNTER — TELEPHONE (OUTPATIENT)
Dept: GASTROENTEROLOGY | Facility: CLINIC | Age: 73
End: 2023-04-04
Payer: MEDICARE

## 2023-04-28 ENCOUNTER — TELEPHONE (OUTPATIENT)
Dept: GASTROENTEROLOGY | Facility: CLINIC | Age: 73
End: 2023-04-28
Payer: MEDICARE

## 2023-04-28 NOTE — TELEPHONE ENCOUNTER
----- Message from Enma Castillo sent at 4/27/2023  5:16 PM CDT -----  .Type:  Needs Medical Advice    Who Called: pt    Would the patient rather a call back or a response via [facility]? Call back  Best Call Back Number: [phone]  Additional Information:      Pt would like a call back with instructions for procedure on 5/4

## 2023-04-28 NOTE — TELEPHONE ENCOUNTER
Called pt and went over egd instructions. Informed to not take eliquis 2 days prior procedure. Will also send mail instructions. Verbal understanding

## 2023-04-28 NOTE — TELEPHONE ENCOUNTER
----- Message from Erin Shepard sent at 4/28/2023 10:12 AM CDT -----  Type:  Needs Medical Advice    Who Called:  pt  Symptoms (please be specific):    How long has patient had these symptoms:    Pharmacy name and phone #:    Would the patient rather a call back or a response via [facility]?   Best Call Back Number:   Additional Information: pt wants to speak to the office about her procedure .

## 2023-05-02 ENCOUNTER — TELEPHONE (OUTPATIENT)
Dept: ENDOSCOPY | Facility: HOSPITAL | Age: 73
End: 2023-05-02
Payer: MEDICARE

## 2023-05-02 NOTE — TELEPHONE ENCOUNTER
Spoke with patient about arrival time @ 0930.   EGD    NPO status reviewed: Patient must have nothing to eat after midnight.      Medications: Do not take Insulin or oral diabetic medications the day of the procedure.  Take as prescribed: heart, seizure and blood pressure medication in the morning with a sip of water (less than an ounce).  Take any breathing medications and bring inhalers to hospital with you Leave all valuables and jewelry at home.     Wear comfortable clothes to procedure to change into hospital gown You cannot drive for 24 hours after your procedure because you will receive sedation for your procedure to make you comfortable.  A ride must be provided at discharge.

## 2023-05-03 NOTE — TELEPHONE ENCOUNTER
Care Due:                  Date            Visit Type   Department     Provider  --------------------------------------------------------------------------------                                EP -                              PRIMARY      [facility]  Last Visit: 01-      CARE ([facility])   MEDICINE       Dez Dotson                              EP -                              PRIMARY      [facility]  Next Visit: 05-      CARE ([facility])   MEDICINE       James Vallecillo                                                            Last  Test          Frequency    Reason                     Performed    Due Date  --------------------------------------------------------------------------------    Lipid Panel.  12 months..  atorvastatin.............  Not Found    Overdue    TSH.........  12 months..  levothyroxine............  06- 06-    Health [facility] Embedded Care Due Messages. Reference number: [id number].   5/03/2023 12:19:25 AM CDT

## 2023-05-04 ENCOUNTER — HOSPITAL ENCOUNTER (OUTPATIENT)
Facility: HOSPITAL | Age: 73
Discharge: HOME | End: 2023-05-04
Attending: INTERNAL MEDICINE | Admitting: INTERNAL MEDICINE
Payer: MEDICARE

## 2023-05-04 ENCOUNTER — ANESTHESIA (OUTPATIENT)
Dept: ENDOSCOPY | Facility: HOSPITAL | Age: 73
End: 2023-05-04
Payer: MEDICARE

## 2023-05-04 ENCOUNTER — TELEPHONE (OUTPATIENT)
Dept: GASTROENTEROLOGY | Facility: HOSPITAL | Age: 73
End: 2023-05-04
Payer: MEDICARE

## 2023-05-04 ENCOUNTER — ANESTHESIA EVENT (OUTPATIENT)
Dept: ENDOSCOPY | Facility: HOSPITAL | Age: 73
End: 2023-05-04
Payer: MEDICARE

## 2023-05-04 VITALS
HEART RATE: 90 BPM | HEIGHT: 65 IN | RESPIRATION RATE: 18 BRPM | OXYGEN SATURATION: 98 % | SYSTOLIC BLOOD PRESSURE: 122 MMHG | WEIGHT: 220 LBS | DIASTOLIC BLOOD PRESSURE: 80 MMHG | TEMPERATURE: 98 F | BODY MASS INDEX: 36.65 KG/M2

## 2023-05-04 DIAGNOSIS — R10.13 EPIGASTRIC PAIN: ICD-10-CM

## 2023-05-04 DIAGNOSIS — K80.20 CALCULUS OF GALLBLADDER WITHOUT CHOLECYSTITIS WITHOUT OBSTRUCTION: Primary | ICD-10-CM

## 2023-05-04 PROCEDURE — D9220A PRA ANESTHESIA: Mod: ANES,,, | Performed by: ANESTHESIOLOGY

## 2023-05-04 PROCEDURE — 43239 EGD BIOPSY SINGLE/MULTIPLE: CPT | Performed by: INTERNAL MEDICINE

## 2023-05-04 PROCEDURE — 37000008 HC ANESTHESIA 1ST 15 MINUTES: Performed by: INTERNAL MEDICINE

## 2023-05-04 PROCEDURE — 88305 TISSUE EXAM BY PATHOLOGIST: ICD-10-PCS | Mod: 26,,, | Performed by: STUDENT IN AN ORGANIZED HEALTH CARE EDUCATION/TRAINING PROGRAM

## 2023-05-04 PROCEDURE — 27201012 HC FORCEPS, HOT/COLD, DISP: Performed by: INTERNAL MEDICINE

## 2023-05-04 PROCEDURE — 43239 PR EGD, FLEX, W/BIOPSY, SGL/MULTI: ICD-10-PCS | Mod: ,,, | Performed by: INTERNAL MEDICINE

## 2023-05-04 PROCEDURE — 25000003 PHARM REV CODE 250: Performed by: NURSE ANESTHETIST, CERTIFIED REGISTERED

## 2023-05-04 PROCEDURE — D9220A PRA ANESTHESIA: ICD-10-PCS | Mod: CRNA,,, | Performed by: NURSE ANESTHETIST, CERTIFIED REGISTERED

## 2023-05-04 PROCEDURE — D9220A PRA ANESTHESIA: ICD-10-PCS | Mod: ANES,,, | Performed by: ANESTHESIOLOGY

## 2023-05-04 PROCEDURE — 88305 TISSUE EXAM BY PATHOLOGIST: CPT | Mod: 59 | Performed by: STUDENT IN AN ORGANIZED HEALTH CARE EDUCATION/TRAINING PROGRAM

## 2023-05-04 PROCEDURE — 63600175 PHARM REV CODE 636 W HCPCS: Performed by: NURSE ANESTHETIST, CERTIFIED REGISTERED

## 2023-05-04 PROCEDURE — 88305 TISSUE EXAM BY PATHOLOGIST: CPT | Mod: 26,,, | Performed by: STUDENT IN AN ORGANIZED HEALTH CARE EDUCATION/TRAINING PROGRAM

## 2023-05-04 PROCEDURE — 43239 EGD BIOPSY SINGLE/MULTIPLE: CPT | Mod: ,,, | Performed by: INTERNAL MEDICINE

## 2023-05-04 PROCEDURE — D9220A PRA ANESTHESIA: Mod: CRNA,,, | Performed by: NURSE ANESTHETIST, CERTIFIED REGISTERED

## 2023-05-04 PROCEDURE — 25000003 PHARM REV CODE 250: Performed by: INTERNAL MEDICINE

## 2023-05-04 PROCEDURE — 37000009 HC ANESTHESIA EA ADD 15 MINS: Performed by: INTERNAL MEDICINE

## 2023-05-04 RX ORDER — SODIUM CHLORIDE 0.9 % (FLUSH) 0.9 %
10 SYRINGE (ML) INJECTION
Status: DISCONTINUED | OUTPATIENT
Start: 2023-05-04 | End: 2023-05-04 | Stop reason: HOSPADM

## 2023-05-04 RX ORDER — LEVOTHYROXINE SODIUM 200 UG/1
TABLET ORAL
Qty: 90 TABLET | Refills: 3 | Status: SHIPPED | OUTPATIENT
Start: 2023-05-04

## 2023-05-04 RX ORDER — SODIUM CHLORIDE 9 MG/ML
INJECTION, SOLUTION INTRAVENOUS CONTINUOUS
Status: DISCONTINUED | OUTPATIENT
Start: 2023-05-04 | End: 2023-05-04 | Stop reason: HOSPADM

## 2023-05-04 RX ORDER — PROPOFOL 10 MG/ML
VIAL (ML) INTRAVENOUS CONTINUOUS PRN
Status: DISCONTINUED | OUTPATIENT
Start: 2023-05-04 | End: 2023-05-04

## 2023-05-04 RX ORDER — PROPOFOL 10 MG/ML
VIAL (ML) INTRAVENOUS
Status: DISCONTINUED | OUTPATIENT
Start: 2023-05-04 | End: 2023-05-04

## 2023-05-04 RX ORDER — LIDOCAINE HYDROCHLORIDE 20 MG/ML
INJECTION, SOLUTION EPIDURAL; INFILTRATION; INTRACAUDAL; PERINEURAL
Status: DISCONTINUED | OUTPATIENT
Start: 2023-05-04 | End: 2023-05-04

## 2023-05-04 RX ADMIN — SODIUM CHLORIDE: 0.9 INJECTION, SOLUTION INTRAVENOUS at 10:05

## 2023-05-04 RX ADMIN — PROPOFOL 115 MCG/KG/MIN: 10 INJECTION, EMULSION INTRAVENOUS at 11:05

## 2023-05-04 RX ADMIN — SODIUM CHLORIDE: 0.9 INJECTION, SOLUTION INTRAVENOUS at 11:05

## 2023-05-04 RX ADMIN — LIDOCAINE HYDROCHLORIDE 100 MG: 20 INJECTION, SOLUTION EPIDURAL; INFILTRATION; INTRACAUDAL; PERINEURAL at 11:05

## 2023-05-04 RX ADMIN — PROPOFOL 50 MG: 10 INJECTION, EMULSION INTRAVENOUS at 11:05

## 2023-05-04 NOTE — TELEPHONE ENCOUNTER
No care due was identified.  Health [facility] Embedded Care Due Messages. Reference number: [id number].   5/04/2023 2:49:53 AM CDT

## 2023-05-04 NOTE — H&P
Short Stay Endoscopy History and Physical    PCP - James Vallecillo MD     Procedure - EGD  ASA - per anesthesia  Mallampati - per anesthesia  History of Anesthesia problems - no  Family history Anesthesia problems -  no   Plan of anesthesia - General    HPI:  This is a 72 y.o. female here for evaluation of :     nausea  Dyspepsia        ROS:  Constitutional: No fevers, chills, No weight loss  CV: No chest pain  Pulm: No cough, No shortness of breath  Ophtho: No vision changes  GI: see HPI  Derm: No rash    Medical History:  has a past medical history of Allergy, Anemia, Anxiety, Atrial fibrillation, Cancer, Cataract, Depression, Edema, Hypothyroidism, Kidney disease, PSVT (paroxysmal supraventricular tachycardia), and Thyroid disease.    Surgical History:  has a past surgical history that includes Tonsillectomy; Colonoscopy (2009); Eye surgery; Hysterectomy; Injection of anesthetic agent around nerve (Bilateral, 9/21/2018); Injection of anesthetic agent around nerve (Bilateral, 9/28/2018); Treatment of cardiac arrhythmia (N/A, 2/8/2019); Adenoidectomy; Appendectomy; Oophorectomy; Radiofrequency ablation (Left, 2/14/2020); Radiofrequency ablation (Right, 2/28/2020); and Radiofrequency ablation (Left, 11/30/2020).    Family History: family history includes Coronary artery disease in her sister; Diabetes in her father, sister, and sister; Heart attack in her sister; Hypertension in her father and mother; Mental illness in her daughter; Sleep apnea in her daughter; Stroke in her father and mother.. Otherwise no colon cancer, inflammatory bowel disease, or GI malignancies.    Social History:  reports that she quit smoking about 2 years ago. Her smoking use included cigarettes. She has a 50.00 pack-year smoking history. She has never used smokeless tobacco. She reports that she does not drink alcohol and does not use drugs.    Review of patient's allergies indicates:   Allergen Reactions    Dexamethasone Rash        Medications:   Medications Prior to Admission   Medication Sig Dispense Refill Last Dose    albuterol (PROVENTIL/VENTOLIN HFA) 90 mcg/actuation inhaler Inhale 2 puffs into the lungs every 4 (four) hours as needed for Wheezing (For shortness of breath). 54 g 11 5/3/2023    albuterol-ipratropium (DUO-NEB) 2.5 mg-0.5 mg/3 mL nebulizer solution Take 3 mLs by nebulization every 6 (six) hours as needed for Wheezing. Rescue 400 each 3 Past Month    ALPRAZolam (XANAX) 0.25 MG tablet Take 1 tablet (0.25 mg total) by mouth 3 (three) times daily as needed for Anxiety. 90 tablet 2 5/3/2023    atorvastatin (LIPITOR) 10 MG tablet Take 1 tablet (10 mg total) by mouth once daily. 90 tablet 3 Past Week    cholecalciferol, vitamin D3, (VITAMIN D3) 125 mcg (5,000 unit) Tab Take 1 tablet (5,000 Units total) by mouth once daily. 90 tablet 3 Past Week    EScitalopram oxalate (LEXAPRO) 10 MG tablet Take 2 tablets (20 mg total) by mouth once daily. 30 tablet 1 5/3/2023    gabapentin (NEURONTIN) 400 MG capsule Take 1 capsule (400 mg total) by mouth 2 (two) times daily. 180 capsule 3 5/3/2023    metoprolol succinate (TOPROL-XL) 100 MG 24 hr tablet Take 3 tablets (300 mg total) by mouth once daily. 270 tablet 3 Past Week    ondansetron (ZOFRAN-ODT) 4 MG TbDL Take 1 tablet (4 mg total) by mouth every 6 (six) hours as needed (Nausea). 30 tablet 1 5/3/2023    pantoprazole (PROTONIX) 40 MG tablet Take 1 tablet (40 mg total) by mouth once daily. 30 tablet 3 Past Week    zolpidem (AMBIEN) 10 mg Tab Take 1 tablet (10 mg total) by mouth nightly as needed (insomnia). 90 tablet 5 5/3/2023    apixaban (ELIQUIS) 5 mg Tab Take 1 tablet (5 mg total) by mouth 2 (two) times daily. 180 tablet 3 5/2/2023    candesartan (ATACAND) 8 MG tablet Take 0.5 tablets (4 mg total) by mouth once daily. 45 tablet 3     umeclidinium-vilanteroL (ANORO ELLIPTA) 62.5-25 mcg/actuation DsDv Inhale 1 puff into the lungs once daily. Controller 60 each 11 More than a month     [DISCONTINUED] levothyroxine (SYNTHROID) 200 MCG tablet Take 1 tablet (200 mcg total) by mouth once daily. 90 tablet 3        Physical Exam:    Vital Signs: There were no vitals filed for this visit.    General Appearance: Well appearing in no acute distress  Eyes:    No scleral icterus  ENT: Neck supple, Lips, mucosa, and tongue normal; teeth and gums normal  Abdomen: Soft, non tender, non distended with normal bowel sounds. No hepatosplenomegaly, ascites, or mass.  Extremities: No edema  Skin: No rash    Labs:  Lab Results   Component Value Date    WBC 8.78 02/13/2023    HGB 12.8 02/13/2023    HCT 39.3 02/13/2023     02/13/2023    CHOL 129 06/11/2020    TRIG 131 06/11/2020    HDL 41 06/11/2020    ALT 19 02/13/2023    AST 25 02/13/2023     02/13/2023    K 4.4 02/13/2023     02/13/2023    CREATININE 2.5 (H) 02/13/2023    BUN 40 (H) 02/13/2023    CO2 21 (L) 02/13/2023    TSH 1.220 06/21/2022    INR 1.5 (H) 01/08/2020    HGBA1C 5.6 07/15/2015       I have explained the risks and benefits of endoscopy procedures to the patient including but not limited to bleeding, perforation, infection, and death.  The patient was asked if they understand and allowed to ask any further questions to their satisfaction.      Ej Reyes MD

## 2023-05-04 NOTE — ANESTHESIA POSTPROCEDURE EVALUATION
Anesthesia Post Evaluation    Patient: Talia Dillon    Procedure(s) Performed: Procedure(s) (LRB):  EGD (ESOPHAGOGASTRODUODENOSCOPY) (N/A)    Final Anesthesia Type: general      Patient location during evaluation: PACU  Patient participation: Yes- Able to Participate  Level of consciousness: awake and alert  Post-procedure vital signs: reviewed and stable  Pain management: adequate  Airway patency: patent    PONV status at discharge: No PONV  Anesthetic complications: no      Cardiovascular status: stable  Respiratory status: spontaneous ventilation  Hydration status: euvolemic  Follow-up not needed.          Vitals Value Taken Time   /80 05/04/23 1214   Temp 36 05/04/23 1221   Pulse 90 05/04/23 1214   Resp 18 05/04/23 1214   SpO2 98 % 05/04/23 1214         Event Time   Out of Recovery 12:17:40         Pain/Fortunato Score: Fortunato Score: 10 (5/4/2023 12:06 PM)

## 2023-05-04 NOTE — [employer] PATIENT INSTRUCTIONS
Discharge Summary/Instructions after an Endoscopic Procedure  Patient Name: Talia Dillon  Patient MRN: [id number]  Patient YOB: 1950  Thursday, May 4, 2023  Ej Reyes MD  Dear patient,  As a result of recent federal legislation (The Federal Cures Act), you may   receive lab or pathology results from your procedure in your [facility]   account before your physician is able to contact you. Your physician or   their representative will relay the results to you with their   recommendations at their soonest availability.  Thank you,  Your health is very important to us during the Covid Crisis. Following your   procedure today, you will receive a daily text for 2 weeks asking about   signs or symptoms of Covid 19.  Please respond to this text when you   receive it so we can follow up and keep you as safe as possible.   RESTRICTIONS:  During your procedure today, you received medications for sedation.  These   medications may affect your judgment, balance and coordination.  Therefore,   for 24 hours, you have the following restrictions:   - DO NOT drive a car, operate machinery, make legal/financial decisions,   sign important papers or drink alcohol.    ACTIVITY:  Today: no heavy lifting, straining or running due to procedural   sedation/anesthesia.  The following day: return to full activity including work.  DIET:  Eat and drink normally unless instructed otherwise.     TREATMENT FOR COMMON SIDE EFFECTS:  - Mild abdominal pain, nausea, belching, bloating or excessive gas:  rest,   eat lightly and use a heating pad.  - Sore Throat: treat with throat lozenges and/or gargle with warm salt   water.  - Because air was used during the procedure, expelling large amounts of air   from your rectum or belching is normal.  - If a bowel prep was taken, you may not have a bowel movement for 1-3 days.    This is normal.  SYMPTOMS TO WATCH FOR AND REPORT TO YOUR PHYSICIAN:  1. Abdominal pain or bloating, other than gas  cramps.  2. Chest pain.  3. Back pain.  4. Signs of infection such as: chills or fever occurring within 24 hours   after the procedure.  5. Rectal bleeding, which would show as bright red, maroon, or black stools.   (A tablespoon of blood from the rectum is not serious, especially if   hemorrhoids are present.)  6. Vomiting.  7. Weakness or dizziness.  GO DIRECTLY TO THE NEAREST EMERGENCY ROOM IF YOU HAVE ANY OF THE FOLLOWING:      Difficulty breathing              Chills and/or fever over 101 F   Persistent vomiting and/or vomiting blood   Severe abdominal pain   Severe chest pain   Black, tarry stools   Bleeding- more than one tablespoon   Any other symptom or condition that you feel may need urgent attention  Your doctor recommends these additional instructions:  If any biopsies were taken, your doctors clinic will contact you in 1 to 2   weeks with any results.  - Discharge patient to home.   - Patient has a contact number available for emergencies.  The signs and   symptoms of potential delayed complications were discussed with the   patient.  Return to normal activities tomorrow.  Written discharge   instructions were provided to the patient.   - Resume previous diet.   - Continue present medications.   - Await pathology results.   - Consider surgical eval for gallstones, vs. treatment of mild gastroparesis   with reglan.  For questions, problems or results please call your physician - Ej Reyes MD.  EMERGENCY PHONE NUMBER: [phone],  LAB RESULTS: [phone]  IF A COMPLICATION OR EMERGENCY SITUATION ARISES AND YOU ARE UNABLE TO REACH   YOUR PHYSICIAN - GO DIRECTLY TO THE EMERGENCY ROOM.  Ej Reyes MD  5/4/2023 11:37:37 AM  This report has been verified and signed electronically.  Dear patient,  As a result of recent federal legislation (The Federal Cures Act), you may   receive lab or pathology results from your procedure in your [facility]   account before your physician is able to  contact you. Your physician or   their representative will relay the results to you with their   recommendations at their soonest availability.  Thank you,  [employer]

## 2023-05-04 NOTE — ANESTHESIA PREPROCEDURE EVALUATION
05/04/2023  Talia Dillon is a 72 y.o., female.      Pre-op Assessment    I have reviewed the Patient Summary Reports.     I have reviewed the Nursing Notes. I have reviewed the NPO Status.   I have reviewed the Medications.     Review of Systems  Anesthesia Hx:  Denies Family Hx of Anesthesia complications.   Denies Personal Hx of Anesthesia complications.   Cardiovascular:   Hypertension    Renal/:   Chronic Renal Disease, CKD        Physical Exam    Airway:  Mallampati: II   Mouth Opening: Normal  Neck ROM: Normal ROM        Anesthesia Plan  Type of Anesthesia, risks & benefits discussed:    Anesthesia Type: Gen Natural Airway  Intra-op Monitoring Plan: Standard ASA Monitors  Post Op Pain Control Plan: multimodal analgesia  Induction:  IV  Informed Consent: Informed consent signed with the Patient and all parties understand the risks and agree with anesthesia plan.  All questions answered.   ASA Score: 3  Day of Surgery Review of History & Physical: H&P Update referred to the surgeon/provider.    Ready For Surgery From Anesthesia Perspective.     .

## 2023-05-04 NOTE — TRANSFER OF CARE
"Anesthesia Transfer of Care Note    Patient: Talia Dillon    Procedure(s) Performed: Procedure(s) (LRB):  EGD (ESOPHAGOGASTRODUODENOSCOPY) (N/A)    Patient location: GI    Anesthesia Type: general    Transport from OR: Transported from OR on room air with adequate spontaneous ventilation    Post pain: adequate analgesia    Post assessment: no apparent anesthetic complications    Post vital signs: stable    Level of consciousness: awake    Nausea/Vomiting: no nausea/vomiting    Complications: none    Transfer of care protocol was followed      Last vitals:   Visit Vitals  /73 (BP Location: Left arm, Patient Position: Lying)   Pulse 95   Temp 36.7 °C (98.1 °F) (Temporal)   Resp 18   Ht 5' 5" (1.651 m)   Wt 99.8 kg (220 lb)   SpO2 95%   Breastfeeding No   BMI 36.61 kg/m²     "

## 2023-05-05 ENCOUNTER — TELEPHONE (OUTPATIENT)
Dept: GASTROENTEROLOGY | Facility: CLINIC | Age: 73
End: 2023-05-05
Payer: MEDICARE

## 2023-05-05 RX ORDER — ALBUTEROL SULFATE 90 UG/1
2 AEROSOL, METERED RESPIRATORY (INHALATION) EVERY 4 HOURS PRN
Qty: 18 G | Refills: 11 | Status: SHIPPED | OUTPATIENT
Start: 2023-05-05 | End: 2023-11-03 | Stop reason: SDUPTHER

## 2023-05-05 NOTE — TELEPHONE ENCOUNTER
Spoke with pt sister and got pt scheduled for HIDA 5/12/23 12:30pm. Will also mail instructions and details for the appointment. Verbalized understanding

## 2023-05-10 ENCOUNTER — OFFICE VISIT (OUTPATIENT)
Dept: FAMILY MEDICINE | Facility: CLINIC | Age: 73
End: 2023-05-10
Payer: MEDICARE

## 2023-05-10 VITALS
SYSTOLIC BLOOD PRESSURE: 124 MMHG | DIASTOLIC BLOOD PRESSURE: 72 MMHG | HEIGHT: 65 IN | HEART RATE: 110 BPM | WEIGHT: 212.19 LBS | BODY MASS INDEX: 35.35 KG/M2 | TEMPERATURE: 98 F | OXYGEN SATURATION: 98 %

## 2023-05-10 DIAGNOSIS — I71.21 ANEURYSM OF ASCENDING AORTA WITHOUT RUPTURE: ICD-10-CM

## 2023-05-10 DIAGNOSIS — K80.50 BILIARY COLIC: Primary | ICD-10-CM

## 2023-05-10 DIAGNOSIS — R11.0 NAUSEA: ICD-10-CM

## 2023-05-10 DIAGNOSIS — F41.9 ANXIETY: ICD-10-CM

## 2023-05-10 PROCEDURE — 99214 PR OFFICE/OUTPT VISIT, EST, LEVL IV, 30-39 MIN: ICD-10-PCS | Mod: S$GLB,,, | Performed by: FAMILY MEDICINE

## 2023-05-10 PROCEDURE — 1160F PR REVIEW ALL MEDS BY PRESCRIBER/CLIN PHARMACIST DOCUMENTED: ICD-10-PCS | Mod: CPTII,S$GLB,, | Performed by: FAMILY MEDICINE

## 2023-05-10 PROCEDURE — 1101F PR PT FALLS ASSESS DOC 0-1 FALLS W/OUT INJ PAST YR: ICD-10-PCS | Mod: CPTII,S$GLB,, | Performed by: FAMILY MEDICINE

## 2023-05-10 PROCEDURE — 99214 OFFICE O/P EST MOD 30 MIN: CPT | Mod: S$GLB,,, | Performed by: FAMILY MEDICINE

## 2023-05-10 PROCEDURE — 1101F PT FALLS ASSESS-DOCD LE1/YR: CPT | Mod: CPTII,S$GLB,, | Performed by: FAMILY MEDICINE

## 2023-05-10 PROCEDURE — 1159F MED LIST DOCD IN RCRD: CPT | Mod: CPTII,S$GLB,, | Performed by: FAMILY MEDICINE

## 2023-05-10 PROCEDURE — 1126F AMNT PAIN NOTED NONE PRSNT: CPT | Mod: CPTII,S$GLB,, | Performed by: FAMILY MEDICINE

## 2023-05-10 PROCEDURE — 3008F PR BODY MASS INDEX (BMI) DOCUMENTED: ICD-10-PCS | Mod: CPTII,S$GLB,, | Performed by: FAMILY MEDICINE

## 2023-05-10 PROCEDURE — 3074F PR MOST RECENT SYSTOLIC BLOOD PRESSURE < 130 MM HG: ICD-10-PCS | Mod: CPTII,S$GLB,, | Performed by: FAMILY MEDICINE

## 2023-05-10 PROCEDURE — 1160F RVW MEDS BY RX/DR IN RCRD: CPT | Mod: CPTII,S$GLB,, | Performed by: FAMILY MEDICINE

## 2023-05-10 PROCEDURE — 3074F SYST BP LT 130 MM HG: CPT | Mod: CPTII,S$GLB,, | Performed by: FAMILY MEDICINE

## 2023-05-10 PROCEDURE — 3008F BODY MASS INDEX DOCD: CPT | Mod: CPTII,S$GLB,, | Performed by: FAMILY MEDICINE

## 2023-05-10 PROCEDURE — 3078F PR MOST RECENT DIASTOLIC BLOOD PRESSURE < 80 MM HG: ICD-10-PCS | Mod: CPTII,S$GLB,, | Performed by: FAMILY MEDICINE

## 2023-05-10 PROCEDURE — 3288F FALL RISK ASSESSMENT DOCD: CPT | Mod: CPTII,S$GLB,, | Performed by: FAMILY MEDICINE

## 2023-05-10 PROCEDURE — 3078F DIAST BP <80 MM HG: CPT | Mod: CPTII,S$GLB,, | Performed by: FAMILY MEDICINE

## 2023-05-10 PROCEDURE — 3066F PR DOCUMENTATION OF TREATMENT FOR NEPHROPATHY: ICD-10-PCS | Mod: CPTII,S$GLB,, | Performed by: FAMILY MEDICINE

## 2023-05-10 PROCEDURE — 1126F PR PAIN SEVERITY QUANTIFIED, NO PAIN PRESENT: ICD-10-PCS | Mod: CPTII,S$GLB,, | Performed by: FAMILY MEDICINE

## 2023-05-10 PROCEDURE — 3288F PR FALLS RISK ASSESSMENT DOCUMENTED: ICD-10-PCS | Mod: CPTII,S$GLB,, | Performed by: FAMILY MEDICINE

## 2023-05-10 PROCEDURE — 3066F NEPHROPATHY DOC TX: CPT | Mod: CPTII,S$GLB,, | Performed by: FAMILY MEDICINE

## 2023-05-10 PROCEDURE — 1159F PR MEDICATION LIST DOCUMENTED IN MEDICAL RECORD: ICD-10-PCS | Mod: CPTII,S$GLB,, | Performed by: FAMILY MEDICINE

## 2023-05-10 RX ORDER — ONDANSETRON 4 MG/1
4 TABLET, ORALLY DISINTEGRATING ORAL EVERY 6 HOURS PRN
Qty: 30 TABLET | Refills: 1 | Status: SHIPPED | OUTPATIENT
Start: 2023-05-10 | End: 2023-07-08

## 2023-05-10 RX ORDER — ALPRAZOLAM 0.25 MG/1
0.25 TABLET ORAL 3 TIMES DAILY PRN
Qty: 90 TABLET | Refills: 2 | Status: SHIPPED | OUTPATIENT
Start: 2023-05-10 | End: 2023-09-02

## 2023-05-10 RX ORDER — PEG 400/HYPROMELLOSE/GLYCERIN 1 %-0.36 %
DROPS OPHTHALMIC (EYE)
COMMUNITY
Start: 2023-01-03

## 2023-05-10 RX ORDER — GABAPENTIN 400 MG/1
400 CAPSULE ORAL 2 TIMES DAILY
Qty: 180 CAPSULE | Refills: 3 | Status: SHIPPED | OUTPATIENT
Start: 2023-05-10 | End: 2024-05-09

## 2023-05-10 RX ORDER — ZOLPIDEM TARTRATE 10 MG/1
10 TABLET ORAL NIGHTLY PRN
Qty: 90 TABLET | Refills: 1 | Status: SHIPPED | OUTPATIENT
Start: 2023-05-10 | End: 2024-01-09 | Stop reason: ALTCHOICE

## 2023-05-10 RX ORDER — ESCITALOPRAM OXALATE 10 MG/1
20 TABLET ORAL DAILY
Qty: 180 TABLET | Refills: 3 | Status: SHIPPED | OUTPATIENT
Start: 2023-05-10 | End: 2023-11-03

## 2023-05-10 NOTE — PROGRESS NOTES
"Subjective:      Patient ID: Talia Dillon is a 72 y.o. female.    Chief Complaint: Medication Refill      Vitals:    05/10/23 1513   BP: 124/72   Pulse: 110   Temp: 97.5 °F (36.4 °C)   TempSrc: Oral   SpO2: 98%   Weight: 96.2 kg (212 lb 3.1 oz)   Height: 5' 5" (1.651 m)        HPI   Nausea for one year and wants her GB out; told to see one by the GI doctor; saw Dr Talamantes who declined, but sent her to get tests done    Has a test comion g up        Problem List  Patient Active Problem List   Diagnosis    Scoliosis    Anxiety reaction    Hypothyroidism due to acquired atrophy of thyroid    Insomnia    Localized edema    Congenital deformities of feet    Leg pain, anterior    Cervical neuralgia    History of PSVT (paroxysmal supraventricular tachycardia)    CKD stage G4/A2, GFR 15-29 and albumin creatinine ratio  mg/g    Cervical spinal stenosis    Foraminal stenosis of lumbar region    Decreased functional mobility    DDD (degenerative disc disease), lumbar    Vitamin D deficiency    Elevated sed rate    Hyperlipidemia    Lumbar spondylosis    Chronic kidney disease-mineral and bone disorder    Longstanding persistent atrial fibrillation    Essential hypertension    Complete tear of left rotator cuff    Long term (current) use of anticoagulants    Osteoarthritis of lumbar spine    Chronic pain    Ascending aortic aneurysm    Lung nodule    Dyspnea on exertion    Ex-smoker    Chronic obstructive pulmonary disease    Seasonal allergies    Major depression, recurrent, chronic    Body mass index (BMI) 40.0-44.9, adult        ALLERGIES:   Review of patient's allergies indicates:   Allergen Reactions    Dexamethasone Rash       MEDS:   Current Outpatient Medications:     albuterol (PROVENTIL/VENTOLIN HFA) 90 mcg/actuation inhaler, INHALE 2 PUFFS INTO THE LUNGS EVERY 4 (FOUR) HOURS AS NEEDED FOR WHEEZING (FOR SHORTNESS OF BREATH)., Disp: 18 g, Rfl: 11    albuterol-ipratropium (DUO-NEB) 2.5 mg-0.5 mg/3 mL nebulizer " solution, Take 3 mLs by nebulization every 6 (six) hours as needed for Wheezing. Rescue, Disp: 400 each, Rfl: 3    apixaban (ELIQUIS) 5 mg Tab, Take 1 tablet (5 mg total) by mouth 2 (two) times daily., Disp: 180 tablet, Rfl: 3    atorvastatin (LIPITOR) 10 MG tablet, Take 1 tablet (10 mg total) by mouth once daily., Disp: 90 tablet, Rfl: 3    cholecalciferol, vitamin D3, (VITAMIN D3) 125 mcg (5,000 unit) Tab, Take 1 tablet (5,000 Units total) by mouth once daily., Disp: 90 tablet, Rfl: 3    levothyroxine (SYNTHROID) 200 MCG tablet, TAKE 1 TABLET BY MOUTH ONCE DAILY., Disp: 90 tablet, Rfl: 3    metoprolol succinate (TOPROL-XL) 100 MG 24 hr tablet, Take 3 tablets (300 mg total) by mouth once daily., Disp: 270 tablet, Rfl: 3    pantoprazole (PROTONIX) 40 MG tablet, Take 1 tablet (40 mg total) by mouth once daily., Disp: 30 tablet, Rfl: 3    umeclidinium-vilanteroL (ANORO ELLIPTA) 62.5-25 mcg/actuation DsDv, Inhale 1 puff into the lungs once daily. Controller, Disp: 60 each, Rfl: 11    VISINE 0.05 % Drop, SMARTSI Drop(s) In Eye(s) PRN, Disp: , Rfl:     ALPRAZolam (XANAX) 0.25 MG tablet, Take 1 tablet (0.25 mg total) by mouth 3 (three) times daily as needed for Anxiety., Disp: 90 tablet, Rfl: 2    EScitalopram oxalate (LEXAPRO) 10 MG tablet, Take 2 tablets (20 mg total) by mouth once daily. For depression, Disp: 180 tablet, Rfl: 3    gabapentin (NEURONTIN) 400 MG capsule, Take 1 capsule (400 mg total) by mouth 2 (two) times daily., Disp: 180 capsule, Rfl: 3    ondansetron (ZOFRAN-ODT) 4 MG TbDL, Take 1 tablet (4 mg total) by mouth every 6 (six) hours as needed (Nausea)., Disp: 30 tablet, Rfl: 1    zolpidem (AMBIEN) 10 mg Tab, Take 1 tablet (10 mg total) by mouth nightly as needed (insomnia)., Disp: 90 tablet, Rfl: 1      History:  Current Providers as of 5/10/2023  PCP: James Vallecillo MD  Care Team Provider: Robert Ochoa MD  Care Team Provider: Laci Barker MD  Care Team Provider: Julieth Christopher,  MD  Care Team Provider: Fransico Gallagher MD  Care Team Provider: Devin You MD  Care Team Provider: Héctor Davila Jr., MD  Care Team Provider: Bebeto Marin MD  Care Team Provider: Farhana Reyes NP  Care Team Provider: Ej Reyes MD  Encounter Provider: James Vallecillo MD, starting on Wed May 10, 2023 12:00 AM  Referring Provider: not found, starting on Wed May 10, 2023 12:00 AM  Consulting Physician: James Vallecillo MD, starting on Wed May 10, 2023  3:09 PM (Active)   Past Medical History:   Diagnosis Date    Allergy     Anemia     Anxiety     Atrial fibrillation     Cancer     skin    Cataract     Depression     Edema     Hypothyroidism     Kidney disease     PSVT (paroxysmal supraventricular tachycardia)     Thyroid disease      Past Surgical History:   Procedure Laterality Date    ADENOIDECTOMY      APPENDECTOMY      COLONOSCOPY  2009    repeat in 10 years     ESOPHAGOGASTRODUODENOSCOPY N/A 5/4/2023    Procedure: EGD (ESOPHAGOGASTRODUODENOSCOPY);  Surgeon: Ej Reyes MD;  Location: [facility];  Service: Endoscopy;  Laterality: N/A;    EYE SURGERY      HYSTERECTOMY      INJECTION OF ANESTHETIC AGENT AROUND NERVE Bilateral 9/21/2018    Procedure: BLOCK, NERVE, MEDIAL BRANCH BLOCK BILATERAL LUMBAR L2-5;  Surgeon: Julieth Christopher MD;  Location: [facility];  Service: Pain Management;  Laterality: Bilateral;  1 of 2    INJECTION OF ANESTHETIC AGENT AROUND NERVE Bilateral 9/28/2018    Procedure: BLOCK, NERVE, MEDIAL BRANCH BLOCK BILATERAL LUMBAR L2-5;  Surgeon: Julieth Christopher MD;  Location: [facility];  Service: Pain Management;  Laterality: Bilateral;  2 of 2  PLEASE GIVE NIRAVAM PER MD    OOPHORECTOMY      RADIOFREQUENCY ABLATION Left 2/14/2020    Procedure: RADIOFREQUENCY ABLATION L2,3,4,5;  Surgeon: Julieth Christopher MD;  Location[facility];  Service: Pain Management;  Laterality: Left;  LT RFA L2,3,4,5  1 of 2  OK to hold Eliquis    RADIOFREQUENCY ABLATION Right 2/28/2020     Procedure: RADIOFREQUENCY ABLATION L2,3,4,5 RIGHT   2 of 2 ok to hold eliquis;  Surgeon: Julieth Christopher MD;  Location: [facility];  Service: Pain Management;  Laterality: Right;    RADIOFREQUENCY ABLATION Left 2020    Procedure: RADIOFREQUENCY ABLATION, L2-L3-L4-L5 MEDIAL BRANCH 1 OF 2 Continue Eliquis;  Surgeon: Bogdan Webster MD;  Location: [facility];  Service: Pain Management;  Laterality: Left;    TONSILLECTOMY      TREATMENT OF CARDIAC ARRHYTHMIA N/A 2019    Procedure: CARDIOVERSION;  Surgeon: Devin You MD;  Location: [facility];  Service: Cardiology;  Laterality: N/A;  AF, KAROL, DCCV, [place], [place], 3 Prep     Social History     Tobacco Use    Smoking status: Former     Packs/day: 1.00     Years: 50.00     Pack years: 50.00     Types: Cigarettes     Quit date:      Years since quittin.3    Smokeless tobacco: Never    Tobacco comments:     still vaping   Substance Use Topics    Alcohol use: No    Drug use: No         Review of Systems  Objective:     Physical Exam        Assessment:     1. Biliary colic    2. Anxiety    3. Nausea    4. Aneurysm of ascending aorta without rupture      Plan:        Medication List            Accurate as of May 10, 2023 11:59 PM. If you have any questions, ask your nurse or doctor.                CHANGE how you take these medications      EScitalopram oxalate 10 MG tablet  Commonly known as: LEXAPRO  Take 2 tablets (20 mg total) by mouth once daily. For depression  What changed: additional instructions  Changed by: James Vallecillo MD            CONTINUE taking these medications      albuterol 90 mcg/actuation inhaler  Commonly known as: PROVENTIL/VENTOLIN HFA  INHALE 2 PUFFS INTO THE LUNGS EVERY 4 (FOUR) HOURS AS NEEDED FOR WHEEZING (FOR SHORTNESS OF BREATH).     albuterol-ipratropium 2.5 mg-0.5 mg/3 mL nebulizer solution  Commonly known as: DUO-NEB  Take 3 mLs by nebulization every 6 (six) hours as needed for Wheezing. Rescue     ALPRAZolam 0.25 MG  tablet  Commonly known as: XANAX  Take 1 tablet (0.25 mg total) by mouth 3 (three) times daily as needed for Anxiety.     ANORO ELLIPTA 62.5-25 mcg/actuation Dsdv  Generic drug: umeclidinium-vilanteroL  Inhale 1 puff into the lungs once daily. Controller     apixaban 5 mg Tab  Commonly known as: ELIQUIS  Take 1 tablet (5 mg total) by mouth 2 (two) times daily.     atorvastatin 10 MG tablet  Commonly known as: LIPITOR  Take 1 tablet (10 mg total) by mouth once daily.     cholecalciferol (vitamin D3) 125 mcg (5,000 unit) Tab  Commonly known as: VITAMIN D3  Take 1 tablet (5,000 Units total) by mouth once daily.     gabapentin 400 MG capsule  Commonly known as: NEURONTIN  Take 1 capsule (400 mg total) by mouth 2 (two) times daily.     levothyroxine 200 MCG tablet  Commonly known as: SYNTHROID  TAKE 1 TABLET BY MOUTH ONCE DAILY.     metoprolol succinate 100 MG 24 hr tablet  Commonly known as: TOPROL-XL  Take 3 tablets (300 mg total) by mouth once daily.     ondansetron 4 MG Tbdl  Commonly known as: ZOFRAN-ODT  Take 1 tablet (4 mg total) by mouth every 6 (six) hours as needed (Nausea).     pantoprazole 40 MG tablet  Commonly known as: PROTONIX  Take 1 tablet (40 mg total) by mouth once daily.     VISINE 0.05 % Drop  Generic drug: tetrahydrozoline 0.05%     zolpidem 10 mg Tab  Commonly known as: AMBIEN  Take 1 tablet (10 mg total) by mouth nightly as needed (insomnia).               Where to Get Your Medications        These medications were sent to [facility]/pharmacy #[id number] - [address] AT [address] [address]      Phone: [phone]   ALPRAZolam 0.25 MG tablet  EScitalopram oxalate 10 MG tablet  gabapentin 400 MG capsule  ondansetron 4 MG Tbdl  zolpidem 10 mg Tab       Biliary colic  -     Ambulatory referral/consult to General Surgery; Future; Expected date: 05/17/2023    Anxiety  -     ALPRAZolam (XANAX) 0.25 MG tablet; Take 1 tablet (0.25 mg total)  by mouth 3 (three) times daily as needed for Anxiety.  Dispense: 90 tablet; Refill: 2  -     EScitalopram oxalate (LEXAPRO) 10 MG tablet; Take 2 tablets (20 mg total) by mouth once daily. For depression  Dispense: 180 tablet; Refill: 3  -     zolpidem (AMBIEN) 10 mg Tab; Take 1 tablet (10 mg total) by mouth nightly as needed (insomnia).  Dispense: 90 tablet; Refill: 1    Nausea  -     ondansetron (ZOFRAN-ODT) 4 MG TbDL; Take 1 tablet (4 mg total) by mouth every 6 (six) hours as needed (Nausea).  Dispense: 30 tablet; Refill: 1    Aneurysm of ascending aorta without rupture    Other orders  -     gabapentin (NEURONTIN) 400 MG capsule; Take 1 capsule (400 mg total) by mouth 2 (two) times daily.  Dispense: 180 capsule; Refill: 3

## 2023-05-12 ENCOUNTER — TELEPHONE (OUTPATIENT)
Dept: FAMILY MEDICINE | Facility: CLINIC | Age: 73
End: 2023-05-12
Payer: MEDICARE

## 2023-05-12 LAB
FINAL PATHOLOGIC DIAGNOSIS: NORMAL
Lab: NORMAL

## 2023-05-12 NOTE — TELEPHONE ENCOUNTER
----- Message from Marquis Salgado sent at 5/12/2023  9:21 AM CDT -----  Contact: General Surgery  .Type:  Needs Medical Advice    Who Called: General Surgery (Dr. Fernando López Office)  Would the patient rather a call back or a response via [facility]? call  Best Call Back Number: [phone] Fax: [phone]   Additional Information:   Caller stated they have received the referral but they also need office notes and imaging.

## 2023-05-12 NOTE — TELEPHONE ENCOUNTER
----- Message from aTshia Vegas sent at 5/12/2023 12:30 PM CDT -----  Type:  Patient Requesting Referral    Who Called:pt  Referral to What Specialty:gen surg.  Reason for Referral: gallstones  Does the patient want the referral with a specific physician?:Dr Mini Deleon MD  [facility] the [facility]  Patient Requesting a Response?:yes  Would the patient rather a call back or a response via [facility]? Call   Best Call Back Number: [phone] (dr deleon)  Additional Information:

## 2023-05-14 RX ORDER — ATORVASTATIN CALCIUM 10 MG/1
TABLET, FILM COATED ORAL
Qty: 90 TABLET | Refills: 3 | Status: SHIPPED | OUTPATIENT
Start: 2023-05-14 | End: 2024-02-27 | Stop reason: SDUPTHER

## 2023-05-14 NOTE — TELEPHONE ENCOUNTER
No care due was identified.  [facility] Embedded Care Due Messages. Reference number: [id number].   5/13/2023 9:20:20 PM CDT

## 2023-05-14 NOTE — TELEPHONE ENCOUNTER
CHIEF COMPLAINT:  Follow up for newly diagnosed squamous cell carcinoma of the rectum and initiation of  treatment with concurrent chemoradiation with Mitomycin/5FU in the first line setting.    ONCOLOGY PROBLEM LIST:  Patient Active Problem List    Diagnosis Date Noted   • Cancer of anal skin 06/15/2020     Priority: Low     Patient was initially seen by her PCP for her Medicare annual wellness visit on 3/8/2019 and had complaints of some firmness of the skin in the vaginal area although no pain or discomfort. She was then referred to OB/Gyn who then performed a punch biopsy of the vulva on 4/4/2019 that demonstrated condyloma acuminatum, inflamed. Patient was then seen by her PCP 3/9/2020 for her Medicare annual wellness visit and had complaints of a \"wart\" near the anus. Due to being anti-coagulated, she was referred to General Surgery for possible excision. Unfortunately due to COVID-19 pandemic, she was unable to be seen by Dr. Carlos Rondon until 5/6/2020 who performed a rectal exam that showed a firm nodule on anterior perianal skin near posterior vagina, plaque-like and non-tender. Following this, patient underwent rectal exam under anesthesia with excisional biopsy of anterior perianal mass on 5/28/2020. Final pathology of the perinanal skin demonstrated squamous cell carcinoma, well differentiated, extending to inked radial edge. Subsequently, she underwent sigmoidoscopy with endoscopic ultrasound on 6/10/2020 that was unremarkable with no perirectal lymphadenopathy or other adenopathy noted. Staging PET CT was performed on 6/12/2020 that showed oblong focal area of prominent FDG activity associated the left aspect of the inferior perineum. There was no evidence of metastatic disease. Patient was seen by Dr. Gutierrez (Radiation Oncology) earlier today.     • Chemotherapy induced neutropenia (CMS/HCC) 06/15/2020     Priority: Low   • Rectal mass 05/28/2020     Priority: Low   • Nonrheumatic tricuspid valve  Refill Routing Note   Medication(s) are not appropriate for processing by [facility] for the following reason(s):      Required labs outdated    [facility] action(s):  Defer None identified            Appointments  past 12m or future 3m with PCP    Date Provider   Last Visit   5/10/2023 James Vallecillo MD   Next Visit   Visit date not found James Vallecillo MD   ED visits in past 90 days: 1        Note composed:1:55 PM 05/14/2023          regurgitation 12/17/2019     Priority: Low   • Vitamin D deficiency 05/13/2019     Priority: Low   • Age-related osteoporosis without current pathological fracture - 4/19 T= -3 05/03/2019     Priority: Low   • Condyloma acuminata - perineum 04/14/2019     Priority: Low   • Atrial fibrillation (CMS/HCC) - 4/5/17 08/29/2018     Priority: Low   • Chronic renal impairment, stage 2 (mild) 08/29/2018     Priority: Low   • History of CVA (cerebrovascular accident) - 4/17 with only vision persist change 08/09/2017     Priority: Low   • 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     Patient presents to the clinic per self. She had her first radiation treatment this am. She currently has not concerns. She is wondering if ok to take meclizine is she is dizzy and acetaminophen for headache or aches and pains on treatment. A comprehensive nursing review of systems was completed which reveals the following:    REVIEW OF SYSTEMS  GENERAL:  Patient denies headache, fevers, chills, night sweats, excessive fatigue, change in appetite, weight loss, dizziness  ALLERGIC/IMMUNOLOGIC: Verified allergies: Yes  EYES:  Patient denies significant visual difficulties, double vision, blurred vision  ENT/MOUTH: Patient denies problems with hearing, sore throat, sinus drainage, mouth sores  ENDOCRINE:  Patient denies diabetes, thyroid disease, hormone replacement, hot flashes  HEMATOLOGIC/LYMPHATIC: Patient  denies easy bruising, bleeding, tender lymph nodes, swollen lymph nodes  BREASTS: Patient denies abnormal masses of breast, nipple discharge, pain  RESPIRATORY:  Patient denies lung pain with breathing, cough, coughing up blood, shortness of breath  CARDIOVASCULAR:  Patient denies anginal chest pain, palpitations, shortness of breath when lying flat, peripheral edema  GASTROINTESTINAL: Patient denies abdominal pain , nausea, vomiting, diarrhea, GI bleeding, constipation, change in bowel habits, heartburn, sensation of feeling full, difficulty swallowing  : Patient denies abnormal genital masses, blood in the urine, frequency, urgency, burning with urination, hesitancy, incontinence, vaginal bleeding, discharge  MUSCULOSKELETAL:  Patient denies joint pain, bone pain, joint swelling, redness, decreased range of motion  SKIN:  Patient denies chronic rashes, inflammation, ulcerations, skin changes, itching  NEUROLOGIC:  Patient denies loss of balance, areas of focal weakness, abnormal gait, sensory problems, numbness, tingling  PSYCHIATRIC: Patient denies insomnia, depression, anxiety    ALLERGIES:  Sotalol    Current Outpatient Medications   Medication Sig Dispense Refill   • lidocaine-prilocaine (EMLA) cream Place a dime size amount of cream over the port 45-60 minutes prior to access. Cover with plastic wrap. 30 g 1   • nystatin (MYCOSTATIN) 981330 UNIT/GM powder Apply topically 3 times daily. 30 g 0   • travoprost, benzalkonium free, (TRAVATAN Z) 0.004 % ophthalmic solution Place 1 drop into both eyes nightly. 5 mL 2   • brinzolamide-brimonidine (SIMBRINZA) 1-0.2 % ophthalmic suspension Place 1 drop into both eyes 2 times daily. 10 mL 2   • timolol (TIMOPTIC) 0.5 % ophthalmic solution Place 1 drop into both eyes every morning. 10 mL 2   • betamethasone valerate (VALISONE) 0.1 % cream Apply topically 2 times daily. Apply thin film to affected area twice daily 30 g 3   • Fexofenadine HCl (ALLEGRA PO) Take by mouth  daily.     • Oxymetazoline HCl (NASAL SPRAY NA)      • dofetilide (TIKOSYN) 250 MCG capsule Take 1 capsule by mouth every 12 hours. 180 capsule 2   • atorvastatin (LIPITOR) 10 MG tablet Take 1 tablet by mouth at bedtime. 90 tablet 3   • cholecalciferol (CHOLECALCIFEROL) 25 mcg (1,000 units) tablet Take by mouth daily.     • rivaroxaban (XARELTO) 20 MG Tab Take 1 tablet by mouth daily (with breakfast). 90 tablet 3   • NON FORMULARY Calcium Carbonate 1200 mg plus Vitamin D3 1000 IU - Take one softgel daily     • B Complex-C-Folic Acid Tab Take 1 tablet by mouth daily.     • Multiple Vitamins-Minerals (PRESERVISION AREDS 2) Cap Take 1 capsule by mouth 2 times daily.      • Flaxseed, Linseed, (FLAX SEEDS) Powder Add 2 tablespoonful to oatmeal every morning     • metoCLOPramide (REGLAN) 10 MG tablet Take 1 tablet by mouth every 6 hours as needed for Nausea or Vomiting. 30 tablet 0   • alendronate (FOSAMAX) 70 MG tablet Take 1 tablet by mouth every 7 days. 12 tablet 1   • desonide (DESOWEN) 0.05 % cream Apply topically 2 times daily. 30 g 0   • acetaminophen (TYLENOL) 500 MG tablet Take 500 mg by mouth every 6 hours as needed for Pain.     • meclizine HCl (ANTIVERT) 25 MG tablet Take 25 mg by mouth 1 time as needed (vertigo).        Current Facility-Administered Medications   Medication Dose Route Frequency Provider Last Rate Last Dose   • heparin 100 UNIT/ML lock flush 500 Units  5 mL Intracatheter Once PRN Nikita Elkins MD       • fluorouracil (ADRUCIL) 6,400 mg continuous chemo infusion pump  1,000 mg/m2/day (Treatment Plan Recorded) Intravenous (Continuous Infusion) ONCE (over 96 hours / 4 days) Nikita Elkins MD           I have personally reviewed the ALLERGIES, MEDICATIONS, PMH, PSH, FAMILY HISTORY, SOCIAL HISTORY, LAB RESULTS, RADIOLOGY RESULTS, OLD RECORDS AND OUTSIDE RECORDS when available in the EMR and any updates required were made in the EMR.    ROS:  I personally reviewed all 14 organ systems with the patient  and the pertinent positive and negative items are documented in the HPI.    PHYSICAL EXAM:  Vitals:    06/25/20 0855   BP: (!) 141/80   Pulse: 60   Resp: 12   Temp: 98.1 °F (36.7 °C)   TempSrc: Oral   SpO2: 99%   Weight: 57.2 kg   Height: 5' 2.8\" (1.595 m)   PainSc:  0     ECOG PS  0  GENERAL:  Alert, cooperative female appearing in no acute physical distress  HEENT:  Normocephalic, atraumatic. Sclera antiicteric. Conjunctivae without pallor or erythema.   MUSCULOSKELETAL: no cyanosis, clubbing and no edema to the BLE  DERMATOLOGIC:skin is warm and dry     PSYCHIATRIC:   A & O x 3 , appropriate mood and affect. Asks appropriate questions.  NEUROLOGIC: cranial nerves 2-12 grossly intact, moves all extremities well and gait is steady     A comprehensive physical examination was deferred due to social distancing efforts due to COVID-19.     LABS:  Recent Labs   Lab 06/25/20  0806 06/22/20  0713 03/05/20  0714   WBC 4.4 4.4 5.4   RBC 3.66* 3.97* 4.12   HGB 11.0* 11.7* 12.4   HCT 33.6* 36.0 38.2   MCV 91.8 90.7 92.7    168 157   Absolute Neutrophils 2.3 2.2  --      Recent Labs   Lab 06/25/20  0805 06/22/20  0713 05/13/20  1435 03/05/20  0714   Sodium 141 143  --  145   Potassium 4.0 3.7  --  4.3   Chloride 111* 111*  --  108*   BUN 16 21*  --  14   Creatinine 0.73 0.77 0.77 0.71   Glucose 114* 83  --  88   Calcium 8.7 8.5  --  8.6   Albumin 3.2*  --   --  3.8   Globulin 3.6  --   --  3.4   Bilirubin, Total 0.7  --   --  0.7   GOT/AST 14  --   --  19   Alkaline Phosphatase 75  --   --  77   GPT 20  --   --  29     Recent Labs   Lab 06/25/20  0805   LD, Total 204       RADIOGRAPHIC IMAGING:  none      ASSESSMENT AND PLAN:  In summary, this is an 76 year old with history of 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     1.  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  Plan to initiate  concurrent chemoradiation with mitomycin and 5FU given on day 1 and day 29. Patient had her first radiation this morning. Plan is for total dose 50.4Gy in 28 fractions. Patient has had chemotherapy teaching. Chemotherapy consent is signed. Labs reviewed which are adequate to initiate cycle 1 day 1 Mitomycin at 10mg/m2 and 5FU 1000mg/m2/day x 96 hours today.  RTC Monday for pump disconnect and in 1  week for cbc, cmp, ML visit and possible IVF.  Patient is instructed to notify the office for any fever greater than or equal to 100.5, symptoms of infection, bleeding events, any new / uncontrolled / poorly controlled side effects of therapy, or any other concerns.    2. History atrial fibrillation: patient is on Tikosyn, and Xarelto. Discussed will change Ondansetron pretreat to Polansetron as there is less problems with QT prolongation and has been approved by EP in past as long as EKG is done afterwards. Discussed will double check with EP but patient should have prn Reglan at home instead of compazine or ondansetron for nausea control.      Follow up orders:    Patient agrees with this plan of care and wishes to proceed.  RTC on Monday for pump disconnect. 1 week for cbc, cmp, MD/ML possible IVF.    The patient was educated in the symptoms for which the patient should seek earlier follow up.    Patient reminded to call or RTC sooner should the need arise  Call the [facility] [phone] at any time for any of the followin.  Fevers > 100.5  2.  Symptoms of infection  3.  Uncontrolled nausea or vomiting  4.  Bleeding events or unexplained bruising.  5.  New or uncontrolled pain  6.  Other unusual symptoms or concerns.    All the patient's  questions were answered, I believe, to her satisfaction.  Dr. Bell is the supervising physician.  Nathalia BARNEY

## 2023-05-16 ENCOUNTER — TELEPHONE (OUTPATIENT)
Dept: FAMILY MEDICINE | Facility: CLINIC | Age: 73
End: 2023-05-16
Payer: MEDICARE

## 2023-05-16 NOTE — TELEPHONE ENCOUNTER
----- Message from Josette Segura sent at 5/16/2023  9:16 AM CDT -----  Type:  Needs Medical Advice    Who Called: pt   Would the patient rather a call back or a response via [facility]? call  Best Call Back Number: [phone]  Additional Information:    Pt requesting a call from [facility] regarding care

## 2023-05-16 NOTE — TELEPHONE ENCOUNTER
Attempted pt. Printed and re-faxed over her referral to Dr. López's office.    Left detailed message stating that I re-faxed it and to call back with any questions.

## 2023-05-16 NOTE — TELEPHONE ENCOUNTER
Spoke to pt. I explained that I did re-fax the referral to Dr. López's office and made sure that she had the office number.

## 2023-05-16 NOTE — TELEPHONE ENCOUNTER
----- Message from Lexy Esqueda sent at 5/16/2023  9:37 AM CDT -----  Contact: pt  Type: Call back     Who Called:pt    Does the patient know what this is regarding?:referral to Gastro  Would the patient rather a call back or a response via [facility]? Call   Best Call Back Number:[phone]  Additional Information:      Pt stated she wants to see a certain Gastro  doctor but that doctor is not with [facility]     Pt refused the doctors that I offered to schedule her with and would like a referral sent to her doctor of choice

## 2023-05-16 NOTE — TELEPHONE ENCOUNTER
Spoke to pt. I provided the number for the Gen surgeon Dr. López. Pt will attempt to call his office to schedule an appt.

## 2023-05-16 NOTE — TELEPHONE ENCOUNTER
----- Message from Kamille Pulido sent at 5/16/2023  9:59 AM CDT -----  Regarding: referral  Contact: [phone]  Patient is requesting a call back regarding Dr. López's office has not received the referral. Please contact their office for the fax number.   Dr. López  Office [phone]  Would the patient rather a call back or a response via [facility]?  Call   Best Call Back Number:  [phone]  Additional Information:

## 2023-05-16 NOTE — TELEPHONE ENCOUNTER
Erin Ramirez Staff  Caller: Unspecified (Today, 10:26 AM)  Type:  Needs Medical Advice     Who Called: pt   Symptoms (please be specific):     How long has patient had these symptoms:     Pharmacy name and phone #:     Would the patient rather a call back or a response via [facility]?   Best Call Back Number: [phone]   Additional Information: pt wants to speak to the office about the gastro referral needing to be going to an outside provider Dr Kent [phone]

## 2023-05-17 ENCOUNTER — TELEPHONE (OUTPATIENT)
Dept: FAMILY MEDICINE | Facility: CLINIC | Age: 73
End: 2023-05-17
Payer: MEDICARE

## 2023-05-17 NOTE — TELEPHONE ENCOUNTER
----- Message from Basil Ludwig sent at 5/17/2023  2:21 PM CDT -----  Contact: Kary Estrada  .Type:  Needs Medical Advice    Who Called: [facility]  Would the patient rather a call back or a response via [facility]?  Call back  Best Call Back Number: [phone]  Additional Information: [facility] is calling for a copy of  the ultrasound abdomen report.  Fax # [phone]

## 2023-05-22 ENCOUNTER — PES CALL (OUTPATIENT)
Dept: ADMINISTRATIVE | Facility: CLINIC | Age: 73
End: 2023-05-22
Payer: MEDICARE

## 2023-05-31 ENCOUNTER — TELEPHONE (OUTPATIENT)
Dept: CARDIOLOGY | Facility: CLINIC | Age: 73
End: 2023-05-31
Payer: MEDICARE

## 2023-05-31 NOTE — TELEPHONE ENCOUNTER
Spoke with patient.  Having gallbladder surgery on July 7th and needs cardiac clearance.  Rescheduled for a sooner appointment to get her cleared.  Confirmed appointment date, time and location with patient.  Patient verbalized understanding.       ----- Message from Renetta Vasquez MA sent at 5/30/2023  5:29 PM CDT -----    ----- Message -----  From: Carmine Lo  Sent: 5/30/2023  11:43 AM CDT  To: Lucero HAMILTON Staff    Type:  Needs Medical Advice    Who Called: patient  Would the patient rather a call back or a response via [facility]? call  Best Call Back Number: [phone]  Additional Information: The patient would like to schedule a cardiac clearance for gall bladder surgery on June 7.

## 2023-06-02 ENCOUNTER — LAB VISIT (OUTPATIENT)
Dept: LAB | Facility: HOSPITAL | Age: 73
End: 2023-06-02
Attending: INTERNAL MEDICINE
Payer: MEDICARE

## 2023-06-02 DIAGNOSIS — R60.0 LOCALIZED EDEMA: ICD-10-CM

## 2023-06-02 DIAGNOSIS — E03.4 HYPOTHYROIDISM DUE TO ACQUIRED ATROPHY OF THYROID: ICD-10-CM

## 2023-06-02 DIAGNOSIS — Z79.01 LONG TERM (CURRENT) USE OF ANTICOAGULANTS: ICD-10-CM

## 2023-06-02 DIAGNOSIS — R19.7 DIARRHEA, UNSPECIFIED TYPE: ICD-10-CM

## 2023-06-02 DIAGNOSIS — R06.09 DYSPNEA ON EXERTION: ICD-10-CM

## 2023-06-02 DIAGNOSIS — N17.9 AKI (ACUTE KIDNEY INJURY): ICD-10-CM

## 2023-06-02 DIAGNOSIS — E78.00 PURE HYPERCHOLESTEROLEMIA: ICD-10-CM

## 2023-06-02 DIAGNOSIS — N18.4 CKD STAGE G4/A2, GFR 15-29 AND ALBUMIN CREATININE RATIO 30-299 MG/G: ICD-10-CM

## 2023-06-02 DIAGNOSIS — N25.81 SECONDARY HYPERPARATHYROIDISM OF RENAL ORIGIN: ICD-10-CM

## 2023-06-02 DIAGNOSIS — Z87.891 EX-SMOKER: ICD-10-CM

## 2023-06-02 DIAGNOSIS — I71.21 ANEURYSM OF ASCENDING AORTA WITHOUT RUPTURE: ICD-10-CM

## 2023-06-02 DIAGNOSIS — I48.11 LONGSTANDING PERSISTENT ATRIAL FIBRILLATION: ICD-10-CM

## 2023-06-02 DIAGNOSIS — Z86.79 HISTORY OF PSVT (PAROXYSMAL SUPRAVENTRICULAR TACHYCARDIA): ICD-10-CM

## 2023-06-02 LAB
ALBUMIN SERPL BCP-MCNC: 4 G/DL (ref 3.5–5.2)
ALBUMIN SERPL BCP-MCNC: 4 G/DL (ref 3.5–5.2)
ALP SERPL-CCNC: 71 U/L (ref 38–126)
ALT SERPL W/O P-5'-P-CCNC: 18 U/L (ref 10–44)
ANION GAP SERPL CALC-SCNC: 9 MMOL/L (ref 8–16)
ANION GAP SERPL CALC-SCNC: 9 MMOL/L (ref 8–16)
AST SERPL-CCNC: 25 U/L (ref 15–46)
BACTERIA #/AREA URNS AUTO: ABNORMAL /HPF
BASOPHILS # BLD AUTO: 0.05 K/UL (ref 0–0.2)
BASOPHILS NFR BLD: 0.7 % (ref 0–1.9)
BILIRUB SERPL-MCNC: 0.5 MG/DL (ref 0.1–1)
BILIRUB UR QL STRIP: NEGATIVE
CALCIUM SERPL-MCNC: 9.6 MG/DL (ref 8.7–10.5)
CALCIUM SERPL-MCNC: 9.6 MG/DL (ref 8.7–10.5)
CHLORIDE SERPL-SCNC: 106 MMOL/L (ref 95–110)
CHLORIDE SERPL-SCNC: 106 MMOL/L (ref 95–110)
CLARITY UR REFRACT.AUTO: CLEAR
CO2 SERPL-SCNC: 27 MMOL/L (ref 23–29)
CO2 SERPL-SCNC: 27 MMOL/L (ref 23–29)
COLOR UR AUTO: YELLOW
CREAT SERPL-MCNC: 2.54 MG/DL (ref 0.5–1.4)
CREAT SERPL-MCNC: 2.54 MG/DL (ref 0.5–1.4)
CREAT UR-MCNC: 213.5 MG/DL (ref 15–325)
DIFFERENTIAL METHOD: ABNORMAL
EOSINOPHIL # BLD AUTO: 0.3 K/UL (ref 0–0.5)
EOSINOPHIL NFR BLD: 3.7 % (ref 0–8)
ERYTHROCYTE [DISTWIDTH] IN BLOOD BY AUTOMATED COUNT: 12.6 % (ref 11.5–14.5)
ERYTHROCYTE [DISTWIDTH] IN BLOOD BY AUTOMATED COUNT: 12.6 % (ref 11.5–14.5)
EST. GFR  (NO RACE VARIABLE): 19.4 ML/MIN/1.73 M^2
EST. GFR  (NO RACE VARIABLE): 19.4 ML/MIN/1.73 M^2
GLUCOSE SERPL-MCNC: 100 MG/DL (ref 70–110)
GLUCOSE SERPL-MCNC: 100 MG/DL (ref 70–110)
GLUCOSE UR QL STRIP: NEGATIVE
HCT VFR BLD AUTO: 38.1 % (ref 37–48.5)
HCT VFR BLD AUTO: 38.1 % (ref 37–48.5)
HGB BLD-MCNC: 12.1 G/DL (ref 12–16)
HGB BLD-MCNC: 12.1 G/DL (ref 12–16)
HGB UR QL STRIP: NEGATIVE
HYALINE CASTS UR QL AUTO: 0 /LPF
IMM GRANULOCYTES # BLD AUTO: 0.04 K/UL (ref 0–0.04)
IMM GRANULOCYTES NFR BLD AUTO: 0.6 % (ref 0–0.5)
KETONES UR QL STRIP: NEGATIVE
LEUKOCYTE ESTERASE UR QL STRIP: ABNORMAL
LYMPHOCYTES # BLD AUTO: 1.8 K/UL (ref 1–4.8)
LYMPHOCYTES NFR BLD: 24.2 % (ref 18–48)
MCH RBC QN AUTO: 30.1 PG (ref 27–31)
MCH RBC QN AUTO: 30.1 PG (ref 27–31)
MCHC RBC AUTO-ENTMCNC: 31.8 G/DL (ref 32–36)
MCHC RBC AUTO-ENTMCNC: 31.8 G/DL (ref 32–36)
MCV RBC AUTO: 95 FL (ref 82–98)
MCV RBC AUTO: 95 FL (ref 82–98)
MICROSCOPIC COMMENT: ABNORMAL
MONOCYTES # BLD AUTO: 0.5 K/UL (ref 0.3–1)
MONOCYTES NFR BLD: 7.1 % (ref 4–15)
NEUTROPHILS # BLD AUTO: 4.6 K/UL (ref 1.8–7.7)
NEUTROPHILS NFR BLD: 63.7 % (ref 38–73)
NITRITE UR QL STRIP: NEGATIVE
NRBC BLD-RTO: 0 /100 WBC
NT-PROBNP SERPL-MCNC: 2540 PG/ML (ref 5–900)
PH UR STRIP: 6 [PH] (ref 5–8)
PHOSPHATE SERPL-MCNC: 3.7 MG/DL (ref 2.7–4.5)
PLATELET # BLD AUTO: 215 K/UL (ref 150–450)
PLATELET # BLD AUTO: 215 K/UL (ref 150–450)
PMV BLD AUTO: 12.1 FL (ref 9.2–12.9)
PMV BLD AUTO: 12.1 FL (ref 9.2–12.9)
POTASSIUM SERPL-SCNC: 4.3 MMOL/L (ref 3.5–5.1)
POTASSIUM SERPL-SCNC: 4.3 MMOL/L (ref 3.5–5.1)
PROT SERPL-MCNC: 6.9 G/DL (ref 6–8.4)
PROT UR QL STRIP: ABNORMAL
PROT UR-MCNC: 76 MG/DL (ref 0–15)
PROT/CREAT UR: 0.36 MG/G{CREAT} (ref 0–0.2)
RBC # BLD AUTO: 4.02 M/UL (ref 4–5.4)
RBC # BLD AUTO: 4.02 M/UL (ref 4–5.4)
RBC #/AREA URNS AUTO: 2 /HPF (ref 0–4)
SODIUM SERPL-SCNC: 142 MMOL/L (ref 136–145)
SODIUM SERPL-SCNC: 142 MMOL/L (ref 136–145)
SP GR UR STRIP: 1.02 (ref 1–1.03)
URN SPEC COLLECT METH UR: ABNORMAL
UROBILINOGEN UR STRIP-ACNC: NEGATIVE EU/DL
UUN UR-MCNC: 33 MG/DL (ref 7–17)
UUN UR-MCNC: 33 MG/DL (ref 7–17)
WBC # BLD AUTO: 7.23 K/UL (ref 3.9–12.7)
WBC # BLD AUTO: 7.23 K/UL (ref 3.9–12.7)
WBC #/AREA URNS AUTO: 15 /HPF (ref 0–5)

## 2023-06-02 PROCEDURE — 83880 ASSAY OF NATRIURETIC PEPTIDE: CPT | Mod: PO | Performed by: INTERNAL MEDICINE

## 2023-06-02 PROCEDURE — 82570 ASSAY OF URINE CREATININE: CPT | Performed by: NURSE PRACTITIONER

## 2023-06-02 PROCEDURE — 80053 COMPREHEN METABOLIC PANEL: CPT | Mod: PO | Performed by: INTERNAL MEDICINE

## 2023-06-02 PROCEDURE — 85025 COMPLETE CBC W/AUTO DIFF WBC: CPT | Mod: PO | Performed by: INTERNAL MEDICINE

## 2023-06-02 PROCEDURE — 84100 ASSAY OF PHOSPHORUS: CPT | Performed by: NURSE PRACTITIONER

## 2023-06-02 PROCEDURE — 36415 COLL VENOUS BLD VENIPUNCTURE: CPT | Mod: PO | Performed by: INTERNAL MEDICINE

## 2023-06-02 PROCEDURE — 81000 URINALYSIS NONAUTO W/SCOPE: CPT | Mod: PO | Performed by: NURSE PRACTITIONER

## 2023-06-05 ENCOUNTER — LAB VISIT (OUTPATIENT)
Dept: LAB | Facility: HOSPITAL | Age: 73
End: 2023-06-05
Attending: NURSE PRACTITIONER
Payer: MEDICARE

## 2023-06-05 DIAGNOSIS — R19.7 DIARRHEA, UNSPECIFIED TYPE: Primary | ICD-10-CM

## 2023-06-05 PROCEDURE — 87449 NOS EACH ORGANISM AG IA: CPT | Mod: PO | Performed by: NURSE PRACTITIONER

## 2023-06-06 ENCOUNTER — OFFICE VISIT (OUTPATIENT)
Dept: CARDIOLOGY | Facility: CLINIC | Age: 73
End: 2023-06-06
Payer: MEDICARE

## 2023-06-06 VITALS
DIASTOLIC BLOOD PRESSURE: 70 MMHG | OXYGEN SATURATION: 97 % | HEIGHT: 65 IN | BODY MASS INDEX: 35.31 KG/M2 | HEART RATE: 83 BPM | SYSTOLIC BLOOD PRESSURE: 103 MMHG

## 2023-06-06 DIAGNOSIS — R60.0 LOCALIZED EDEMA: ICD-10-CM

## 2023-06-06 DIAGNOSIS — I48.11 LONGSTANDING PERSISTENT ATRIAL FIBRILLATION: ICD-10-CM

## 2023-06-06 DIAGNOSIS — Z91.199 NONCOMPLIANCE: ICD-10-CM

## 2023-06-06 DIAGNOSIS — Z87.891 EX-SMOKER: ICD-10-CM

## 2023-06-06 DIAGNOSIS — I71.21 ANEURYSM OF ASCENDING AORTA WITHOUT RUPTURE: ICD-10-CM

## 2023-06-06 DIAGNOSIS — I10 ESSENTIAL HYPERTENSION: Primary | Chronic | ICD-10-CM

## 2023-06-06 DIAGNOSIS — J42 CHRONIC BRONCHITIS, UNSPECIFIED CHRONIC BRONCHITIS TYPE: ICD-10-CM

## 2023-06-06 DIAGNOSIS — R06.09 DYSPNEA ON EXERTION: ICD-10-CM

## 2023-06-06 DIAGNOSIS — F33.9 MAJOR DEPRESSION, RECURRENT, CHRONIC: ICD-10-CM

## 2023-06-06 DIAGNOSIS — N18.4 CKD (CHRONIC KIDNEY DISEASE) STAGE 4, GFR 15-29 ML/MIN: ICD-10-CM

## 2023-06-06 DIAGNOSIS — Z79.01 LONG TERM (CURRENT) USE OF ANTICOAGULANTS: ICD-10-CM

## 2023-06-06 DIAGNOSIS — Z86.79 HISTORY OF PSVT (PAROXYSMAL SUPRAVENTRICULAR TACHYCARDIA): ICD-10-CM

## 2023-06-06 DIAGNOSIS — E78.00 PURE HYPERCHOLESTEROLEMIA: ICD-10-CM

## 2023-06-06 LAB
C DIFF GDH STL QL: NEGATIVE
C DIFF TOX A+B STL QL IA: NEGATIVE

## 2023-06-06 PROCEDURE — 1160F RVW MEDS BY RX/DR IN RCRD: CPT | Mod: CPTII,S$GLB,, | Performed by: INTERNAL MEDICINE

## 2023-06-06 PROCEDURE — 3008F PR BODY MASS INDEX (BMI) DOCUMENTED: ICD-10-PCS | Mod: CPTII,S$GLB,, | Performed by: INTERNAL MEDICINE

## 2023-06-06 PROCEDURE — 3008F BODY MASS INDEX DOCD: CPT | Mod: CPTII,S$GLB,, | Performed by: INTERNAL MEDICINE

## 2023-06-06 PROCEDURE — 1126F AMNT PAIN NOTED NONE PRSNT: CPT | Mod: CPTII,S$GLB,, | Performed by: INTERNAL MEDICINE

## 2023-06-06 PROCEDURE — 3288F FALL RISK ASSESSMENT DOCD: CPT | Mod: CPTII,S$GLB,, | Performed by: INTERNAL MEDICINE

## 2023-06-06 PROCEDURE — 99999 PR PBB SHADOW E&M-EST. PATIENT-LVL III: CPT | Mod: PBBFAC,,, | Performed by: INTERNAL MEDICINE

## 2023-06-06 PROCEDURE — 93000 EKG 12-LEAD: ICD-10-PCS | Mod: S$GLB,,, | Performed by: INTERNAL MEDICINE

## 2023-06-06 PROCEDURE — 3078F PR MOST RECENT DIASTOLIC BLOOD PRESSURE < 80 MM HG: ICD-10-PCS | Mod: CPTII,S$GLB,, | Performed by: INTERNAL MEDICINE

## 2023-06-06 PROCEDURE — 3066F NEPHROPATHY DOC TX: CPT | Mod: CPTII,S$GLB,, | Performed by: INTERNAL MEDICINE

## 2023-06-06 PROCEDURE — 99214 PR OFFICE/OUTPT VISIT, EST, LEVL IV, 30-39 MIN: ICD-10-PCS | Mod: 25,S$GLB,, | Performed by: INTERNAL MEDICINE

## 2023-06-06 PROCEDURE — 3288F PR FALLS RISK ASSESSMENT DOCUMENTED: ICD-10-PCS | Mod: CPTII,S$GLB,, | Performed by: INTERNAL MEDICINE

## 2023-06-06 PROCEDURE — 3066F PR DOCUMENTATION OF TREATMENT FOR NEPHROPATHY: ICD-10-PCS | Mod: CPTII,S$GLB,, | Performed by: INTERNAL MEDICINE

## 2023-06-06 PROCEDURE — 1101F PT FALLS ASSESS-DOCD LE1/YR: CPT | Mod: CPTII,S$GLB,, | Performed by: INTERNAL MEDICINE

## 2023-06-06 PROCEDURE — 93000 ELECTROCARDIOGRAM COMPLETE: CPT | Mod: S$GLB,,, | Performed by: INTERNAL MEDICINE

## 2023-06-06 PROCEDURE — 99999 PR PBB SHADOW E&M-EST. PATIENT-LVL III: ICD-10-PCS | Mod: PBBFAC,,, | Performed by: INTERNAL MEDICINE

## 2023-06-06 PROCEDURE — 1159F PR MEDICATION LIST DOCUMENTED IN MEDICAL RECORD: ICD-10-PCS | Mod: CPTII,S$GLB,, | Performed by: INTERNAL MEDICINE

## 2023-06-06 PROCEDURE — 1159F MED LIST DOCD IN RCRD: CPT | Mod: CPTII,S$GLB,, | Performed by: INTERNAL MEDICINE

## 2023-06-06 PROCEDURE — 1126F PR PAIN SEVERITY QUANTIFIED, NO PAIN PRESENT: ICD-10-PCS | Mod: CPTII,S$GLB,, | Performed by: INTERNAL MEDICINE

## 2023-06-06 PROCEDURE — 3074F PR MOST RECENT SYSTOLIC BLOOD PRESSURE < 130 MM HG: ICD-10-PCS | Mod: CPTII,S$GLB,, | Performed by: INTERNAL MEDICINE

## 2023-06-06 PROCEDURE — 1160F PR REVIEW ALL MEDS BY PRESCRIBER/CLIN PHARMACIST DOCUMENTED: ICD-10-PCS | Mod: CPTII,S$GLB,, | Performed by: INTERNAL MEDICINE

## 2023-06-06 PROCEDURE — 99214 OFFICE O/P EST MOD 30 MIN: CPT | Mod: 25,S$GLB,, | Performed by: INTERNAL MEDICINE

## 2023-06-06 PROCEDURE — 1101F PR PT FALLS ASSESS DOC 0-1 FALLS W/OUT INJ PAST YR: ICD-10-PCS | Mod: CPTII,S$GLB,, | Performed by: INTERNAL MEDICINE

## 2023-06-06 PROCEDURE — 3078F DIAST BP <80 MM HG: CPT | Mod: CPTII,S$GLB,, | Performed by: INTERNAL MEDICINE

## 2023-06-06 PROCEDURE — 3074F SYST BP LT 130 MM HG: CPT | Mod: CPTII,S$GLB,, | Performed by: INTERNAL MEDICINE

## 2023-06-06 RX ORDER — METOPROLOL SUCCINATE 100 MG/1
200 TABLET, EXTENDED RELEASE ORAL DAILY
Qty: 180 TABLET | Refills: 3 | Status: SHIPPED | OUTPATIENT
Start: 2023-06-06 | End: 2023-08-30

## 2023-06-06 NOTE — PROGRESS NOTES
"  Subjective:      Patient ID: Talia Dillon is a 72 y.o. female.    Chief Complaint: Pre-op Exam (Cholecystectomy - current labs in chart)    HPI:  Pt is scheduled for robotic cholecystectomy 7/7/23 at [facility] in [place] by Dr Kiet López.    Pt has been feeling pretty good except for "constant nausea."    Pt is able to walk short distances in her house .    Pt c/o shortness of breath walking a few yards which is the limiting symptom preventing pt from being more active..    There is no hx of chest pain, fainting or falling, or palpitations.    Review of Systems   Cardiovascular:  Positive for dyspnea on exertion and leg swelling (Chronic bilateral swelling of feet). Negative for chest pain, claudication, irregular heartbeat, near-syncope, orthopnea, palpitations and syncope.      "I don't take my medicines as I should.  There is no reason.  A while back I was extremely depressed and I could not bring myself to do anything"    The depression is "somewhat better now"    Nephrologist Dr Reyes stopped the candesartan        Past Medical History:   Diagnosis Date    Allergy     Anemia     Anxiety     Atrial fibrillation     Cancer     skin    Cataract     Depression     Edema     Hypothyroidism     Kidney disease     PSVT (paroxysmal supraventricular tachycardia)     Thyroid disease         Past Surgical History:   Procedure Laterality Date    ADENOIDECTOMY      APPENDECTOMY      COLONOSCOPY  2009    repeat in 10 years     ESOPHAGOGASTRODUODENOSCOPY N/A 5/4/2023    Procedure: EGD (ESOPHAGOGASTRODUODENOSCOPY);  Surgeon: Ej Reyes MD;  Location: [facility] ENDO;  Service: Endoscopy;  Laterality: N/A;    EYE SURGERY      HYSTERECTOMY      INJECTION OF ANESTHETIC AGENT AROUND NERVE Bilateral 9/21/2018    Procedure: BLOCK, NERVE, MEDIAL BRANCH BLOCK BILATERAL LUMBAR L2-5;  Surgeon: Julieth Christopher MD;  Location: [facility] PAIN MGT;  Service: Pain Management;  Laterality: Bilateral;  1 of 2    INJECTION OF " ANESTHETIC AGENT AROUND NERVE Bilateral 2018    Procedure: BLOCK, NERVE, MEDIAL BRANCH BLOCK BILATERAL LUMBAR L2-5;  Surgeon: Julieth Christopher MD;  Location: [facility], [place], operated by [facility] PAIN MGT;  Service: Pain Management;  Laterality: Bilateral;  2 of 2  PLEASE GIVE NIRAVAM PER MD    OOPHORECTOMY      RADIOFREQUENCY ABLATION Left 2020    Procedure: RADIOFREQUENCY ABLATION L2,3,4,5;  Surgeon: Julieth Christopher MD;  Location: [facility], [place], operated by [facility] PAIN MGT;  Service: Pain Management;  Laterality: Left;  LT RFA L2,3,4,5  1 of 2  OK to hold Eliquis    RADIOFREQUENCY ABLATION Right 2020    Procedure: RADIOFREQUENCY ABLATION L2,3,4,5 RIGHT   2 of 2 ok to hold eliquis;  Surgeon: Julieth Christopher MD;  Location: [facility], [place], operated [facility] PAIN MGT;  Service: Pain Management;  Laterality: Right;    RADIOFREQUENCY ABLATION Left 2020    Procedure: RADIOFREQUENCY ABLATION, L2-L3-L4-L5 MEDIAL BRANCH 1 OF 2 Continue Eliquis;  Surgeon: Bogdan Webster MD;  Location: [facility], [place], operated by [facility] PAIN MGT;  Service: Pain Management;  Laterality: Left;    TONSILLECTOMY      TREATMENT OF CARDIAC ARRHYTHMIA N/A 2019    Procedure: CARDIOVERSION;  Surgeon: Devin You MD;  Location: [facility] EP LAB;  Service: Cardiology;  Laterality: N/A;  AF, KAROL, DCCV, MAC, MI, 3 Prep       Family History   Problem Relation Age of Onset    Hypertension Mother     Stroke Mother     Hypertension Father     Stroke Father     Diabetes Father     Diabetes Sister     Diabetes Sister     Heart attack Sister     Coronary artery disease Sister     Sleep apnea Daughter     Mental illness Daughter        Social History     Socioeconomic History    Marital status:    Tobacco Use    Smoking status: Former     Packs/day: 1.00     Years: 50.00     Pack years: 50.00     Types: Cigarettes     Quit date:      Years since quittin.4    Smokeless tobacco: Never    Tobacco comments:     still vaping   Substance and Sexual Activity    Alcohol use: No    Drug use: No    Sexual activity: Not Currently     Social Determinants  of Health     Financial Resource Strain: Low Risk     Difficulty of Paying Living Expenses: Not hard at all   Food Insecurity: No Food Insecurity    Worried About Running Out of Food in the Last Year: Never true    Ran Out of Food in the Last Year: Never true   Transportation Needs: No Transportation Needs    Lack of Transportation (Medical): No    Lack of Transportation (Non-Medical): No   Physical Activity: Inactive    Days of Exercise per Week: 0 days    Minutes of Exercise per Session: 0 min   Stress: Stress Concern Present    Feeling of Stress : To some extent   Social Connections: Socially Isolated    Frequency of Communication with Friends and Family: More than three times a week    Frequency of Social Gatherings with Friends and Family: More than three times a week    Attends [employer] Services: Never    Active Member of Clubs or Organizations: No    Attends Club or Organization Meetings: Never    Marital Status:    Housing Stability: Low Risk     Unable to Pay for Housing in the Last Year: No    Number of Places Lived in the Last Year: 1    Unstable Housing in the Last Year: No       Current Outpatient Medications on File Prior to Visit   Medication Sig Dispense Refill    albuterol (PROVENTIL/VENTOLIN HFA) 90 mcg/actuation inhaler INHALE 2 PUFFS INTO THE LUNGS EVERY 4 (FOUR) HOURS AS NEEDED FOR WHEEZING (FOR SHORTNESS OF BREATH). 18 g 11    albuterol-ipratropium (DUO-NEB) 2.5 mg-0.5 mg/3 mL nebulizer solution Take 3 mLs by nebulization every 6 (six) hours as needed for Wheezing. Rescue 400 each 3    ALPRAZolam (XANAX) 0.25 MG tablet Take 1 tablet (0.25 mg total) by mouth 3 (three) times daily as needed for Anxiety. 90 tablet 2    atorvastatin (LIPITOR) 10 MG tablet TAKE 1 TABLET BY MOUTH ONCCE DAILY 90 tablet 3    cholecalciferol, vitamin D3, (VITAMIN D3) 125 mcg (5,000 unit) Tab Take 1 tablet (5,000 Units total) by mouth once daily. 90 tablet 3    EScitalopram oxalate (LEXAPRO) 10 MG tablet Take 2  "tablets (20 mg total) by mouth once daily. For depression 180 tablet 3    gabapentin (NEURONTIN) 400 MG capsule Take 1 capsule (400 mg total) by mouth 2 (two) times daily. 180 capsule 3    levothyroxine (SYNTHROID) 200 MCG tablet TAKE 1 TABLET BY MOUTH ONCE DAILY. 90 tablet 3    ondansetron (ZOFRAN-ODT) 4 MG TbDL Take 1 tablet (4 mg total) by mouth every 6 (six) hours as needed (Nausea). 30 tablet 1    pantoprazole (PROTONIX) 40 MG tablet Take 1 tablet (40 mg total) by mouth once daily. 30 tablet 3    umeclidinium-vilanteroL (ANORO ELLIPTA) 62.5-25 mcg/actuation DsDv Inhale 1 puff into the lungs once daily. Controller 60 each 11    VISINE 0.05 % Drop SMARTSI Drop(s) In Eye(s) PRN      zolpidem (AMBIEN) 10 mg Tab Take 1 tablet (10 mg total) by mouth nightly as needed (insomnia). 90 tablet 1    [DISCONTINUED] apixaban (ELIQUIS) 5 mg Tab Take 1 tablet (5 mg total) by mouth 2 (two) times daily. 180 tablet 3    [DISCONTINUED] metoprolol succinate (TOPROL-XL) 100 MG 24 hr tablet Take 3 tablets (300 mg total) by mouth once daily. 270 tablet 3     No current facility-administered medications on file prior to visit.       Review of patient's allergies indicates:   Allergen Reactions    Dexamethasone Rash     Objective:     Vitals:    23 1425   BP: 103/70   BP Location: Left arm   Patient Position: Sitting   BP Method: Large (Automatic)   Pulse: 83   SpO2: 97%   Weight: Comment: wheelchair   Height: 5' 5" (1.651 m)        Physical Exam  Vitals reviewed.   Constitutional:       Appearance: She is well-developed.   Eyes:      General: No scleral icterus.  Neck:      Vascular: No carotid bruit or JVD.   Cardiovascular:      Rate and Rhythm: Normal rate. Rhythm irregularly irregular.      Heart sounds: No murmur heard.    No gallop.   Pulmonary:      Breath sounds: Normal breath sounds.   Musculoskeletal:      Right lower leg: Edema present.      Left lower leg: Edema (trace pitting edema bilaterally) present.   Skin:    "  General: Skin is warm and dry.   Neurological:      Mental Status: She is alert and oriented to person, place, and time.   Psychiatric:         Behavior: Behavior normal.         Thought Content: Thought content normal.         Judgment: Judgment normal.            ECG today reviewed by me:  atrial fibrillation with controlled ventricular response, low QRS voltage, QS pattern V1, V2, unchanged compared with last tracing    Lab Visit on 06/05/2023   Component Date Value Ref Range Status    C. diff Antigen 06/05/2023 Negative  Negative Final    C difficile Toxins A+B, EIA 06/05/2023 Negative  Negative Final   Lab Visit on 06/02/2023   Component Date Value Ref Range Status    WBC 06/02/2023 7.23  3.90 - 12.70 K/uL Final    RBC 06/02/2023 4.02  4.00 - 5.40 M/uL Final    Hemoglobin 06/02/2023 12.1  12.0 - 16.0 g/dL Final    Hematocrit 06/02/2023 38.1  37.0 - 48.5 % Final    MCV 06/02/2023 95  82 - 98 fL Final    MCH 06/02/2023 30.1  27.0 - 31.0 pg Final    MCHC 06/02/2023 31.8 (L)  32.0 - 36.0 g/dL Final    RDW 06/02/2023 12.6  11.5 - 14.5 % Final    Platelets 06/02/2023 215  150 - 450 K/uL Final    MPV 06/02/2023 12.1  9.2 - 12.9 fL Final    Immature Granulocytes 06/02/2023 0.6 (H)  0.0 - 0.5 % Final    Gran # (ANC) 06/02/2023 4.6  1.8 - 7.7 K/uL Final    Immature Grans (Abs) 06/02/2023 0.04  0.00 - 0.04 K/uL Final    Lymph # 06/02/2023 1.8  1.0 - 4.8 K/uL Final    Mono # 06/02/2023 0.5  0.3 - 1.0 K/uL Final    Eos # 06/02/2023 0.3  0.0 - 0.5 K/uL Final    Baso # 06/02/2023 0.05  0.00 - 0.20 K/uL Final    nRBC 06/02/2023 0  0 /100 WBC Final    Gran % 06/02/2023 63.7  38.0 - 73.0 % Final    Lymph % 06/02/2023 24.2  18.0 - 48.0 % Final    Mono % 06/02/2023 7.1  4.0 - 15.0 % Final    Eosinophil % 06/02/2023 3.7  0.0 - 8.0 % Final    Basophil % 06/02/2023 0.7  0.0 - 1.9 % Final    Differential Method 06/02/2023 Automated   Final    Sodium 06/02/2023 142  136 - 145 mmol/L Final    Potassium 06/02/2023 4.3  3.5 - 5.1 mmol/L  Final    Chloride 06/02/2023 106  95 - 110 mmol/L Final    CO2 06/02/2023 27  23 - 29 mmol/L Final    Glucose 06/02/2023 100  70 - 110 mg/dL Final    BUN 06/02/2023 33 (H)  7 - 17 mg/dL Final    Creatinine 06/02/2023 2.54 (H)  0.50 - 1.40 mg/dL Final    Calcium 06/02/2023 9.6  8.7 - 10.5 mg/dL Final    Total Protein 06/02/2023 6.9  6.0 - 8.4 g/dL Final    Albumin 06/02/2023 4.0  3.5 - 5.2 g/dL Final    Total Bilirubin 06/02/2023 0.5  0.1 - 1.0 mg/dL Final    Alkaline Phosphatase 06/02/2023 71  38 - 126 U/L Final    AST 06/02/2023 25  15 - 46 U/L Final    ALT 06/02/2023 18  10 - 44 U/L Final    Anion Gap 06/02/2023 9  8 - 16 mmol/L Final    eGFR 06/02/2023 19.4 (A)  >60 mL/min/1.73 m^2 Final    Glucose 06/02/2023 100  70 - 110 mg/dL Final    Sodium 06/02/2023 142  136 - 145 mmol/L Final    Potassium 06/02/2023 4.3  3.5 - 5.1 mmol/L Final    Chloride 06/02/2023 106  95 - 110 mmol/L Final    CO2 06/02/2023 27  23 - 29 mmol/L Final    BUN 06/02/2023 33 (H)  7 - 17 mg/dL Final    Calcium 06/02/2023 9.6  8.7 - 10.5 mg/dL Final    Creatinine 06/02/2023 2.54 (H)  0.50 - 1.40 mg/dL Final    Albumin 06/02/2023 4.0  3.5 - 5.2 g/dL Final    Phosphorus 06/02/2023 3.7  2.7 - 4.5 mg/dL Final    eGFR 06/02/2023 19.4 (A)  >60 mL/min/1.73 m^2 Final    Anion Gap 06/02/2023 9  8 - 16 mmol/L Final    WBC 06/02/2023 7.23  3.90 - 12.70 K/uL Final    RBC 06/02/2023 4.02  4.00 - 5.40 M/uL Final    Hemoglobin 06/02/2023 12.1  12.0 - 16.0 g/dL Final    Hematocrit 06/02/2023 38.1  37.0 - 48.5 % Final    MCV 06/02/2023 95  82 - 98 fL Final    MCH 06/02/2023 30.1  27.0 - 31.0 pg Final    MCHC 06/02/2023 31.8 (L)  32.0 - 36.0 g/dL Final    RDW 06/02/2023 12.6  11.5 - 14.5 % Final    Platelets 06/02/2023 215  150 - 450 K/uL Final    MPV 06/02/2023 12.1  9.2 - 12.9 fL Final    Specimen UA 06/02/2023 Urine, Clean Catch   Final    Color, UA 06/02/2023 Yellow  Yellow, Straw, Arianne Final    Appearance, UA 06/02/2023 Clear  Clear Final    pH, UA  06/02/2023 6.0  5.0 - 8.0 Final    Specific Gravity, UA 06/02/2023 1.025  1.005 - 1.030 Final    Protein, UA 06/02/2023 2+ (A)  Negative Final    Glucose, UA 06/02/2023 Negative  Negative Final    Ketones, UA 06/02/2023 Negative  Negative Final    Bilirubin (UA) 06/02/2023 Negative  Negative Final    Occult Blood UA 06/02/2023 Negative  Negative Final    Nitrite, UA 06/02/2023 Negative  Negative Final    Urobilinogen, UA 06/02/2023 Negative  <2.0 EU/dL Final    Leukocytes, UA 06/02/2023 Trace (A)  Negative Final    Protein, Urine Random 06/02/2023 76 (H)  0 - 15 mg/dL Final    Creatinine, Urine 06/02/2023 213.5  15.0 - 325.0 mg/dL Final    Prot/Creat Ratio, Urine 06/02/2023 0.36 (H)  0.00 - 0.20 Final    NT-proBNP 06/02/2023 2540 (H)  5 - 900 pg/mL Final    RBC, UA 06/02/2023 2  0 - 4 /hpf Final    WBC, UA 06/02/2023 15 (H)  0 - 5 /hpf Final    Bacteria 06/02/2023 Few (A)  None-Occ /hpf Final    Hyaline Casts, UA 06/02/2023 0  0-1/lpf /lpf Final    Microscopic Comment 06/02/2023 SEE COMMENT   Final   Admission on 05/04/2023, Discharged on 05/04/2023   Component Date Value Ref Range Status    Final Pathologic Diagnosis 05/04/2023    Final                    Value:RELIAPATH DIAGNOSIS:  A.   DUODENAL BULB INFLAMMATION, BIOPSY:         - Benign duodenal mucosa with no pathologic abnormalities.         - No evidence of villous architectural distortion, increased intraepithelial lymphocytes, active inflammation, granulomas or malignancy.    B.   POLYPOID DUODENAL BULB, BIOPSY:         - Superficial gastric antral-type mucosa with mild chronic inactive gastritis.         - No Helicobacter-type organisms identified on the *Helicobacter stain.    C.   RANDOM STOMACH, BIOPSY:         - Antral-type mucosa with no pathologic abnormalities.    D.   GASTRIC INCISURA POLYP, BIOPSY:         - Polypoid gastric mucosa with xanthoma, see comment.    COMMENT:  The *CD68 immunostain performed on the gastric incisura polyp (specimen D)  supports the above diagnosis.      Savannah Jeffery M.D.      Report attached.    Performing site:  [address] [address]    &quot;Disclaimer:  This case diagnosis was rendered completely by the outside co                          nsultation pathologist and the case is electronically signed by an [facility] pathologist listed below solely to release the report into the medical record.&quot;      Disclaimer 05/04/2023 Unless the case is a 'gross only' or additional testing only, the final diagnosis for each specimen is based on a microscopic examination of appropriate tissue sections.   Final   Admission on 02/13/2023, Discharged on 02/13/2023   Component Date Value Ref Range Status    WBC 02/13/2023 8.78  3.90 - 12.70 K/uL Final    RBC 02/13/2023 3.99 (L)  4.00 - 5.40 M/uL Final    Hemoglobin 02/13/2023 12.8  12.0 - 16.0 g/dL Final    Hematocrit 02/13/2023 39.3  37.0 - 48.5 % Final    MCV 02/13/2023 99 (H)  82 - 98 fL Final    MCH 02/13/2023 32.1 (H)  27.0 - 31.0 pg Final    MCHC 02/13/2023 32.6  32.0 - 36.0 g/dL Final    RDW 02/13/2023 13.6  11.5 - 14.5 % Final    Platelets 02/13/2023 181  150 - 450 K/uL Final    MPV 02/13/2023 12.0  9.2 - 12.9 fL Final    Immature Granulocytes 02/13/2023 0.5  0.0 - 0.5 % Final    Gran # (ANC) 02/13/2023 6.5  1.8 - 7.7 K/uL Final    Immature Grans (Abs) 02/13/2023 0.04  0.00 - 0.04 K/uL Final    Lymph # 02/13/2023 1.5  1.0 - 4.8 K/uL Final    Mono # 02/13/2023 0.6  0.3 - 1.0 K/uL Final    Eos # 02/13/2023 0.2  0.0 - 0.5 K/uL Final    Baso # 02/13/2023 0.04  0.00 - 0.20 K/uL Final    nRBC 02/13/2023 0  0 /100 WBC Final    Gran % 02/13/2023 73.7 (H)  38.0 - 73.0 % Final    Lymph % 02/13/2023 16.9 (L)  18.0 - 48.0 % Final    Mono % 02/13/2023 6.6  4.0 - 15.0 % Final    Eosinophil % 02/13/2023 1.8  0.0 - 8.0 % Final    Basophil % 02/13/2023 0.5  0.0 - 1.9 % Final    Differential Method 02/13/2023 Automated   Final    Sodium 02/13/2023 141  136 - 145 mmol/L  Final    Potassium 02/13/2023 4.4  3.5 - 5.1 mmol/L Final    Chloride 02/13/2023 104  95 - 110 mmol/L Final    CO2 02/13/2023 21 (L)  23 - 29 mmol/L Final    Glucose 02/13/2023 86  70 - 110 mg/dL Final    BUN 02/13/2023 40 (H)  8 - 23 mg/dL Final    Creatinine 02/13/2023 2.5 (H)  0.5 - 1.4 mg/dL Final    Calcium 02/13/2023 9.4  8.7 - 10.5 mg/dL Final    Total Protein 02/13/2023 7.7  6.0 - 8.4 g/dL Final    Albumin 02/13/2023 4.2  3.5 - 5.2 g/dL Final    Total Bilirubin 02/13/2023 0.9  0.1 - 1.0 mg/dL Final    Alkaline Phosphatase 02/13/2023 52 (L)  55 - 135 U/L Final    AST 02/13/2023 25  10 - 40 U/L Final    ALT 02/13/2023 19  10 - 44 U/L Final    Anion Gap 02/13/2023 16  8 - 16 mmol/L Final    eGFR 02/13/2023 20 (A)  >60 mL/min/1.73 m^2 Final    Specimen UA 02/13/2023 Urine, Clean Catch   Final    Color, UA 02/13/2023 Yellow  Yellow, Straw, Arianne Final    Appearance, UA 02/13/2023 Clear  Clear Final    pH, UA 02/13/2023 6.0  5.0 - 8.0 Final    Specific Gravity, UA 02/13/2023 1.020  1.005 - 1.030 Final    Protein, UA 02/13/2023 2+ (A)  Negative Final    Glucose, UA 02/13/2023 Negative  Negative Final    Ketones, UA 02/13/2023 Negative  Negative Final    Bilirubin (UA) 02/13/2023 Negative  Negative Final    Occult Blood UA 02/13/2023 Trace (A)  Negative Final    Nitrite, UA 02/13/2023 Negative  Negative Final    Urobilinogen, UA 02/13/2023 Negative  <2.0 EU/dL Final    Leukocytes, UA 02/13/2023 Negative  Negative Final    Lipase 02/13/2023 52  4 - 60 U/L Final    Magnesium 02/13/2023 1.6  1.6 - 2.6 mg/dL Final    RBC, UA 02/13/2023 2  0 - 4 /hpf Final    WBC, UA 02/13/2023 0  0 - 5 /hpf Final    Bacteria 02/13/2023 None  None-Occ /hpf Final    Squam Epithel, UA 02/13/2023 Few  /hpf Final    Hyaline Casts, UA 02/13/2023 0  0-1/lpf /lpf Final    Microscopic Comment 02/13/2023 SEE COMMENT   Final   Lab Visit on 02/03/2023   Component Date Value Ref Range Status    Glucose 02/03/2023 110  70 - 110 mg/dL Final    Sodium  02/03/2023 139  136 - 145 mmol/L Final    Potassium 02/03/2023 4.2  3.5 - 5.1 mmol/L Final    Chloride 02/03/2023 99  95 - 110 mmol/L Final    CO2 02/03/2023 32 (H)  23 - 29 mmol/L Final    BUN 02/03/2023 39 (H)  7 - 17 mg/dL Final    Calcium 02/03/2023 9.6  8.7 - 10.5 mg/dL Final    Creatinine 02/03/2023 3.00 (H)  0.50 - 1.40 mg/dL Final    Albumin 02/03/2023 4.8  3.5 - 5.2 g/dL Final    Phosphorus 02/03/2023 3.7  2.7 - 4.5 mg/dL Final    eGFR 02/03/2023 15.9 (A)  >60 mL/min/1.73 m^2 Final    Anion Gap 02/03/2023 8  8 - 16 mmol/L Final    WBC 02/03/2023 6.99  3.90 - 12.70 K/uL Final    RBC 02/03/2023 3.97 (L)  4.00 - 5.40 M/uL Final    Hemoglobin 02/03/2023 12.7  12.0 - 16.0 g/dL Final    Hematocrit 02/03/2023 39.1  37.0 - 48.5 % Final    MCV 02/03/2023 99 (H)  82 - 98 fL Final    MCH 02/03/2023 32.0 (H)  27.0 - 31.0 pg Final    MCHC 02/03/2023 32.5  32.0 - 36.0 g/dL Final    RDW 02/03/2023 13.7  11.5 - 14.5 % Final    Platelets 02/03/2023 177  150 - 450 K/uL Final    MPV 02/03/2023 12.2  9.2 - 12.9 fL Final    PTH, Intact 02/03/2023 184.4 (H)  9.0 - 77.0 pg/mL Final   Lab Visit on 02/03/2023   Component Date Value Ref Range Status    Specimen UA 02/03/2023 Urine, Clean Catch   Final    Color, UA 02/03/2023 Yellow  Yellow, Straw, Arianne Final    Appearance, UA 02/03/2023 Clear  Clear Final    pH, UA 02/03/2023 6.0  5.0 - 8.0 Final    Specific Gravity, UA 02/03/2023 1.025  1.005 - 1.030 Final    Protein, UA 02/03/2023 2+ (A)  Negative Final    Glucose, UA 02/03/2023 Negative  Negative Final    Ketones, UA 02/03/2023 Trace (A)  Negative Final    Bilirubin (UA) 02/03/2023 1+ (A)  Negative Final    Occult Blood UA 02/03/2023 Negative  Negative Final    Nitrite, UA 02/03/2023 Negative  Negative Final    Urobilinogen, UA 02/03/2023 Negative  <2.0 EU/dL Final    Leukocytes, UA 02/03/2023 Negative  Negative Final    Protein, Urine Random 02/03/2023 90 (H)  0 - 15 mg/dL Final    Creatinine, Urine 02/03/2023 258.6  15.0 -  325.0 mg/dL Final    Prot/Creat Ratio, Urine 02/03/2023 0.35 (H)  0.00 - 0.20 Final    RBC, UA 02/03/2023 0  0 - 4 /hpf Final    WBC, UA 02/03/2023 1  0 - 5 /hpf Final    Bacteria 02/03/2023 Rare  None-Occ /hpf Final    Hyaline Casts, UA 02/03/2023 0  0-1/lpf /lpf Final    Microscopic Comment 02/03/2023 SEE COMMENT   Final   Lab Visit on 02/03/2023   Component Date Value Ref Range Status    Sodium 02/03/2023 140  136 - 145 mmol/L Final    Potassium 02/03/2023 4.1  3.5 - 5.1 mmol/L Final    Chloride 02/03/2023 99  95 - 110 mmol/L Final    CO2 02/03/2023 32 (H)  23 - 29 mmol/L Final    Glucose 02/03/2023 108  70 - 110 mg/dL Final    BUN 02/03/2023 39 (H)  7 - 17 mg/dL Final    Creatinine 02/03/2023 3.00 (H)  0.50 - 1.40 mg/dL Final    Calcium 02/03/2023 9.6  8.7 - 10.5 mg/dL Final    Total Protein 02/03/2023 7.7  6.0 - 8.4 g/dL Final    Albumin 02/03/2023 4.8  3.5 - 5.2 g/dL Final    Total Bilirubin 02/03/2023 0.8  0.1 - 1.0 mg/dL Final    Alkaline Phosphatase 02/03/2023 64  38 - 126 U/L Final    AST 02/03/2023 40  15 - 46 U/L Final    ALT 02/03/2023 29  10 - 44 U/L Final    Anion Gap 02/03/2023 9  8 - 16 mmol/L Final    eGFR 02/03/2023 15.9 (A)  >60 mL/min/1.73 m^2 Final   Lab Visit on 01/30/2023   Component Date Value Ref Range Status    Specimen UA 01/30/2023 Urine, Clean Catch   Final    Color, UA 01/30/2023 Yellow  Yellow, Straw, Arianne Final    Appearance, UA 01/30/2023 Clear  Clear Final    pH, UA 01/30/2023 6.0  5.0 - 8.0 Final    Specific Gravity, UA 01/30/2023 1.025  1.005 - 1.030 Final    Protein, UA 01/30/2023 2+ (A)  Negative Final    Glucose, UA 01/30/2023 Negative  Negative Final    Ketones, UA 01/30/2023 Negative  Negative Final    Bilirubin (UA) 01/30/2023 Negative  Negative Final    Occult Blood UA 01/30/2023 Trace (A)  Negative Final    Nitrite, UA 01/30/2023 Negative  Negative Final    Urobilinogen, UA 01/30/2023 Negative  <2.0 EU/dL Final    Leukocytes, UA 01/30/2023 Negative  Negative Final     Urine Culture, Routine 01/30/2023 No significant growth   Final    RBC, UA 01/30/2023 3  0 - 4 /hpf Final    WBC, UA 01/30/2023 1  0 - 5 /hpf Final    Bacteria 01/30/2023 Occasional  None-Occ /hpf Final    Hyaline Casts, UA 01/30/2023 1  0-1/lpf /lpf Final    Microscopic Comment 01/30/2023 SEE COMMENT   Final   Lab Visit on 01/30/2023   Component Date Value Ref Range Status    Glucose 01/30/2023 107  70 - 110 mg/dL Final    Sodium 01/30/2023 140  136 - 145 mmol/L Final    Potassium 01/30/2023 4.7  3.5 - 5.1 mmol/L Final    Chloride 01/30/2023 100  95 - 110 mmol/L Final    CO2 01/30/2023 31 (H)  23 - 29 mmol/L Final    BUN 01/30/2023 43 (H)  7 - 17 mg/dL Final    Calcium 01/30/2023 10.3  8.7 - 10.5 mg/dL Final    Creatinine 01/30/2023 3.40 (H)  0.50 - 1.40 mg/dL Final    Albumin 01/30/2023 4.9  3.5 - 5.2 g/dL Final    Phosphorus 01/30/2023 3.8  2.7 - 4.5 mg/dL Final    eGFR 01/30/2023 13.7 (A)  >60 mL/min/1.73 m^2 Final    Anion Gap 01/30/2023 9  8 - 16 mmol/L Final   Lab Visit on 01/06/2023   Component Date Value Ref Range Status    Specimen UA 01/06/2023 Urine, Clean Catch   Final    Color, UA 01/06/2023 Yellow  Yellow, Straw, Arianne Final    Appearance, UA 01/06/2023 Clear  Clear Final    pH, UA 01/06/2023 7.0  5.0 - 8.0 Final    Specific Gravity, UA 01/06/2023 1.020  1.005 - 1.030 Final    Protein, UA 01/06/2023 2+ (A)  Negative Final    Glucose, UA 01/06/2023 Negative  Negative Final    Ketones, UA 01/06/2023 Negative  Negative Final    Bilirubin (UA) 01/06/2023 Negative  Negative Final    Occult Blood UA 01/06/2023 Trace (A)  Negative Final    Nitrite, UA 01/06/2023 Negative  Negative Final    Urobilinogen, UA 01/06/2023 Negative  <2.0 EU/dL Final    Leukocytes, UA 01/06/2023 Negative  Negative Final    RBC, UA 01/06/2023 6 (H)  0 - 4 /hpf Final    WBC, UA 01/06/2023 15 (H)  0 - 5 /hpf Final    WBC Clumps, UA 01/06/2023 Occasional (A)  None-Rare Final    Bacteria 01/06/2023 Few (A)  None-Occ /hpf Final     Hyaline Casts, UA 01/06/2023 0  0-1/lpf /lpf Final    Microscopic Comment 01/06/2023 SEE COMMENT   Final   There may be more visits with results that are not included.   (Encounter Form Printed    Encounter form printed on 12/05/22 01:29 PM         CT Chest Without Contrast  Status: Final result     MyChart Results Release    MyChart Status: Active  Results Release     PACS Images for ViTAL [employer] Viewer     Show images for CT Chest Without Contrast  All Reviewers List    Laci Barker MD on 12/5/2022 16:52     CT Chest Without Contrast  Order: [id number]  Status: Final result      Visible to patient: Yes (seen)       Next appt: 06/28/2023 at 04:00 PM in Nephrology (Farhana Reyes NP)       Dx: Pure hypercholesterolemia; Long term ...       0 Result Notes      1  Topic      Details    Reading Physician Reading Date Result Priority   REZA Sanchez Sr., MD  [phone] 12/5/2022 Routine     Narrative & Impression  EXAMINATION:  CT CHEST WITHOUT CONTRAST     CLINICAL HISTORY:  Aortic aneurysm, known or suspected;     TECHNIQUE:  Standard chest CT protocol was performed without IV contrast.     COMPARISON:  A CT examination of the chest performed on 11/22/2021.     FINDINGS:  There is mild cardiomegaly.  There is a small pericardial effusion.  There is an aneurysm of the ascending thoracic aorta.  It has an AP diameter of 4.8 cm on the current examination.  On the prior examination it had an AP diameter of 4.6 cm.  The lungs are clear. There is no pneumothorax or pleural effusion.  There are several partially visualized stones in the gallbladder.  The stones measure at least 13 mm in size.  There is no acute fracture visualized.     Impression:     1. There is an aneurysm of the ascending thoracic aorta. It has an AP diameter of 4.8 cm on the current examination.  On the prior examination it had an AP diameter of 4.6 cm.  2. There is mild cardiomegaly. There is a small pericardial effusion.  3. The  lungs are clear.  4. There are several partially visualized stones in the gallbladder. The stones measure at least 13 mm in size.  All CT scans at this facility use dose modulation, iterative reconstruction, and/or weight base dosing when appropriate to reduce radiation dose when appropriate to reduce radiation dose to as low as reasonably achievable.        Electronically signed by: Tonny Sanchez MD  Date:                                            12/05/2022  Time:                                                  NM Gastric Emptying  Status: Final result     MyChart Results Release    Britely Status: Active  Results Release     PACS Images for [employer] IO Viewer     Show images for NM Gastric Emptying  All Reviewers List    Dejan Talamantes MD on 3/9/2023 16:35     NM Gastric Emptying  Order: [id number]  Status: Final result      Visible to patient: Yes (seen)       Next appt: 06/28/2023 at 04:00 PM in Nephrology (Farhana Reyes NP)       Dx: Gallstones       0 Result Notes      Details    Reading Physician Reading Date Result Priority   Mike Antonio III, MD  [phone]  [phone] 3/1/2023 Routine     Narrative & Impression  EXAMINATION:  NM GASTRIC EMPTYING     CLINICAL HISTORY:  Calculus of gallbladder without cholecystitis without obstruction     FINDINGS:  Patient was administered 1.0 mCi of technetium 9 9 M labeled sulfur colloid with a solid phase meal.  The% gastric emptying at 04:00 hours is 80%, 20% retention.  There is no reflux or aspiration.     Impression:     Mild delay in gastric emptying.        Electronically signed by: Mike Antonio MD  Date:                                            03/01/2023  Time:                               Reviewers List    Dez Dotson MD on 1/29/2023 22:06   Esperanza Foote LPN on 1/29/2023 18:40     US Abdomen Limited  Order: [id number]  Status: Final result    Visible to patient: Yes (seen)     Next appt: 06/28/2023 at 04:00 PM in Nephrology  (Farhana Weyer, NP)     Dx: Nausea     1 Result Note    1 Patient Communication  Details    Reading Physician Reading Date Result Priority   REZA Sanchez Sr., MD  [phone] 1/26/2023 Routine     Narrative & Impression  EXAMINATION:  US ABDOMEN LIMITED     CLINICAL HISTORY:  Nausea     TECHNIQUE:  Multiple static ultrasound images are submitted for interpretation with color flow and spectral Doppler imaging.     COMPARISON:  05/01/2019     FINDINGS:  The liver is normal in appearance. It measures 15.2 cm in length. There is no intrahepatic biliary ductal dilatation. The common bile duct has a diameter of 2 mm.  There are several stones in the gallbladder.  One of the larger ones measures 21 mm.  There is no abnormal gallbladder wall thickening, pericholecystic fluid, or sonographic Barakat sign.  The visualized portion of the pancreas is normal in appearance. The right kidney is normal in appearance.  It measures 10.0 cm in length. The inferior vena cava is normal in appearance. The main portal vein has a normal venous waveform with flow directed towards the liver. It has a velocity of 28 cm/sec.     Impression:     There are several stones in the gallbladder. One of the larger ones measures 21 mm. There is no abnormal gallbladder wall thickening, pericholecystic fluid, sonographic Barakat sign, or common bile duct dilatation.        Electronically signed by: Tonny Sanchez MD  Date:                                            01/26/2023  Time:                                           11:13           Exam Ended: 01/26/23 11:08         Encounter Form Printed    Encounter form printed on 12/07/20 12:08 PM         NM Myocardial Perfusion Spect Multi Pharmacologic  Status: Final result     MyChart Results Release    tenXer Status: Active  Results Release     PACS Images for ViTAL [place] Viewer     Show images for NM Myocardial Perfusion Spect Multi Pharmacologic  All Reviewers List    Laci Barker MD  on 12/7/2020 18:48     NM Myocardial Perfusion Spect Multi Pharmacologic  Order: [id number]  Status: Final result      Visible to patient: Yes (seen)       Next appt: 06/28/2023 at 04:00 PM in Nephrology (Farhana Reyes NP)       Dx: Other chest pain       0 Result Notes      Details    Reading Physician Reading Date Result Priority   REZA Sacnhez Sr., MD  [phone] 12/7/2020 Routine     Narrative & Impression  EXAMINATION:  NM MYOCARDIAL PERFUSION SPECT MULTI PHARM     CLINICAL HISTORY:  Other chest painChest pain, acute, nonspecific;     TECHNIQUE:  After the administration of 10 mCi of technetium 99 M labeled Myoview, nuclear medicine cardiac rest images were obtained.  After the administration of 30 mCi of technetium 99 M labeled Myoview, nuclear medicine cardiac stress images were obtained     FINDINGS:  The walls of the left ventricle have normal perfusion.  There is dyskinesis of the septal wall of the left ventricle.  The left ventricle ejection fraction was calculated to be 54%.     Impression:     1.  Normal perfusion study     2.  There is dyskinesis of the septal wall of the left ventricle.     3.  The left ventricular ejection fraction was calculated to be 54%.  The normal is greater than 54%.        Electronically signed by: Tonny Sanchez MD  Date:                                            12/07/2020  Time:                                           15:03               Exam Ended: 12/07/20 14:56         Accession #: [id number]  Talia J Prima  Stress test only, [facility]  Order# [id number]  Reading physician: Onel Isidro MD Ordering physician: Laci Barker MD Study date: 12/7/20     Reason for Exam  Priority: Routine  Dx: Persistent atrial fibrillation [I48.19 (ICD-10-CM)]; Other chest pain [R07.89 (ICD-10-CM)]     Conclusion         The EKG portion of this study is abnormal but not diagnostic.    The patient reported no chest pain during the stress test.    ECG Stress Nuclear  portion of this study will be reported separately.          Performing Clinician     Accession #: [id number]  Talia Dillon  Holter monitor - 24 hour  Order# [id number]  Reading physician: Laci Barker MD Ordering physician: Laci Barker MD Study date: 8/18/21     Reason for Exam  Priority: Routine  Dx: Longstanding persistent atrial fibrillation [I48.11 (ICD-10-CM)]     Conclusion    Atrial fibrillation with a ventricular response variable between 59 and 150 bpm, averaging 88 bpm     Performing Clinician    Josefina Devine, RT Reason for Exam  Priority: Routine  Dx: Longstanding persistent atrial fibrillation [I48.11 (ICD-10-CM)]        Accession #: [id number]  Transthoracic echo (TTE) complete  Order# [id number]  Reading physician: Jayro Saha MD Ordering physician: Laci Barker MD Study date: 6/11/20     Reason for Exam  Priority: Routine  Dx: Persistent atrial fibrillation [I48.19 (ICD-10-CM)]; Essential hypertension [I10 (ICD-10-CM)]; Dyspnea on exertion [R06.09 (ICD-10-CM)]     Result Image Hyperlink     Show images for Echo Color Flow Doppler? Yes  Summary    Normal left ventricular systolic function. The estimated ejection fraction is 60%.  Diastolic pattern consistent with atrial fibrillation observed.  Concentric left ventricular hypertrophy.  Normal right ventricular systolic function.  Normal central venous pressure (3 mmHg).  The estimated PA systolic pressure is 35 mmHg.     Assessment:     1. Essential hypertension    2. History of PSVT (paroxysmal supraventricular tachycardia)    3. Pure hypercholesterolemia    4. Longstanding persistent atrial fibrillation    5. Chronic bronchitis, unspecified chronic bronchitis type    6. Long term (current) use of anticoagulants    7. CKD (chronic kidney disease) stage 4, GFR 15-29 ml/min    8. Localized edema    9. Ex-smoker    10. Major depression, recurrent, chronic    11. Aneurysm of ascending aorta without rupture    12. Dyspnea on  exertion    13. Noncompliance      Plan:   Talia was seen today for pre-op exam.    Diagnoses and all orders for this visit:    Essential hypertension  -     IN OFFICE EKG 12-LEAD (to Muse)    History of PSVT (paroxysmal supraventricular tachycardia)  -     IN OFFICE EKG 12-LEAD (to Muse)    Pure hypercholesterolemia  -     IN OFFICE EKG 12-LEAD (to Muse)    Longstanding persistent atrial fibrillation  -     IN OFFICE EKG 12-LEAD (to Muse)    Chronic bronchitis, unspecified chronic bronchitis type  -     IN OFFICE EKG 12-LEAD (to Muse)    Long term (current) use of anticoagulants  -     IN OFFICE EKG 12-LEAD (to Muse)    CKD (chronic kidney disease) stage 4, GFR 15-29 ml/min  -     IN OFFICE EKG 12-LEAD (to [place])    Localized edema  -     IN OFFICE EKG 12-LEAD (to Muse)    Ex-smoker  -     IN OFFICE EKG 12-LEAD (to Muse)    Major depression, recurrent, chronic    Aneurysm of ascending aorta without rupture    Dyspnea on exertion    Noncompliance    Other orders  -     metoprolol succinate (TOPROL-XL) 100 MG 24 hr tablet; Take 2 tablets (200 mg total) by mouth once daily.  -     apixaban (ELIQUIS) 5 mg Tab; Take 1 tablet (5 mg total) by mouth 2 (two) times daily.     Small risk of stroke from holding Eliquis for 3 days prior to surgery discussed with pt with resumption of Eliquis post-op when there is no risk of surgical bleeding    Pt cannot receive any NSAID's.   NO TORADOL.    Pt is clinically stable for cholecystectomy.    Follow up in about 6 months (around 12/6/2023).  Will likely repeat a CT scan of the chest after next visit (without contrast) to f/u ascending aortic aneurysm    Pt states she takes metoprolol 100 mg 3 tablets 3 days a week only with last dose around 2 days ago.    Pt advised to resume metoprolol 100 mg 2 tablets daily

## 2023-06-12 ENCOUNTER — PATIENT MESSAGE (OUTPATIENT)
Dept: NEPHROLOGY | Facility: CLINIC | Age: 73
End: 2023-06-12
Payer: MEDICARE

## 2023-06-12 ENCOUNTER — PATIENT MESSAGE (OUTPATIENT)
Dept: FAMILY MEDICINE | Facility: CLINIC | Age: 73
End: 2023-06-12
Payer: MEDICARE

## 2023-06-20 NOTE — PROGRESS NOTES
69 y/o female transitioning to warfarin from [facility] for stroke prevention with atrial fibrillation (CHADSvasc=2) 2/2 cost of DOAC. Other PMH is significant for PSVT, CKD, back pain, anxiety, depression, hypothyroidism, skin cancer.     Initial Rx for warfarin not yet placed. Will message MD to complete so we can contact patient and set her up for education and labs.     
I spoke to the patient and she reports she took her last dose of Eliquis on 7/9/19.  She will  Coumadin prescription and call to verify tab, start taking Coumadin today per calendar daily after 5 pm, lab INR 7/15/19, and clinic education appointment on 7/17/19.      7/11/19  Patient verified her Coumadin tablet as 5 mg strength.  
20-Jun-2023 03:20

## 2023-06-21 ENCOUNTER — OFFICE VISIT (OUTPATIENT)
Dept: FAMILY MEDICINE | Facility: CLINIC | Age: 73
End: 2023-06-21
Payer: MEDICARE

## 2023-06-21 VITALS
HEART RATE: 110 BPM | OXYGEN SATURATION: 98 % | SYSTOLIC BLOOD PRESSURE: 98 MMHG | HEIGHT: 65 IN | DIASTOLIC BLOOD PRESSURE: 62 MMHG | WEIGHT: 222.25 LBS | TEMPERATURE: 98 F | BODY MASS INDEX: 37.03 KG/M2

## 2023-06-21 DIAGNOSIS — Z01.818 PRE-OP EVALUATION: Primary | ICD-10-CM

## 2023-06-21 PROCEDURE — 3066F PR DOCUMENTATION OF TREATMENT FOR NEPHROPATHY: ICD-10-PCS | Mod: CPTII,S$GLB,, | Performed by: FAMILY MEDICINE

## 2023-06-21 PROCEDURE — 99214 OFFICE O/P EST MOD 30 MIN: CPT | Mod: S$GLB,,, | Performed by: FAMILY MEDICINE

## 2023-06-21 PROCEDURE — 3008F BODY MASS INDEX DOCD: CPT | Mod: CPTII,S$GLB,, | Performed by: FAMILY MEDICINE

## 2023-06-21 PROCEDURE — 3008F PR BODY MASS INDEX (BMI) DOCUMENTED: ICD-10-PCS | Mod: CPTII,S$GLB,, | Performed by: FAMILY MEDICINE

## 2023-06-21 PROCEDURE — 3066F NEPHROPATHY DOC TX: CPT | Mod: CPTII,S$GLB,, | Performed by: FAMILY MEDICINE

## 2023-06-21 PROCEDURE — 1101F PR PT FALLS ASSESS DOC 0-1 FALLS W/OUT INJ PAST YR: ICD-10-PCS | Mod: CPTII,S$GLB,, | Performed by: FAMILY MEDICINE

## 2023-06-21 PROCEDURE — 3074F SYST BP LT 130 MM HG: CPT | Mod: CPTII,S$GLB,, | Performed by: FAMILY MEDICINE

## 2023-06-21 PROCEDURE — 1159F MED LIST DOCD IN RCRD: CPT | Mod: CPTII,S$GLB,, | Performed by: FAMILY MEDICINE

## 2023-06-21 PROCEDURE — 1160F PR REVIEW ALL MEDS BY PRESCRIBER/CLIN PHARMACIST DOCUMENTED: ICD-10-PCS | Mod: CPTII,S$GLB,, | Performed by: FAMILY MEDICINE

## 2023-06-21 PROCEDURE — 3074F PR MOST RECENT SYSTOLIC BLOOD PRESSURE < 130 MM HG: ICD-10-PCS | Mod: CPTII,S$GLB,, | Performed by: FAMILY MEDICINE

## 2023-06-21 PROCEDURE — 3288F PR FALLS RISK ASSESSMENT DOCUMENTED: ICD-10-PCS | Mod: CPTII,S$GLB,, | Performed by: FAMILY MEDICINE

## 2023-06-21 PROCEDURE — 3288F FALL RISK ASSESSMENT DOCD: CPT | Mod: CPTII,S$GLB,, | Performed by: FAMILY MEDICINE

## 2023-06-21 PROCEDURE — 1160F RVW MEDS BY RX/DR IN RCRD: CPT | Mod: CPTII,S$GLB,, | Performed by: FAMILY MEDICINE

## 2023-06-21 PROCEDURE — 1125F PR PAIN SEVERITY QUANTIFIED, PAIN PRESENT: ICD-10-PCS | Mod: CPTII,S$GLB,, | Performed by: FAMILY MEDICINE

## 2023-06-21 PROCEDURE — 3078F PR MOST RECENT DIASTOLIC BLOOD PRESSURE < 80 MM HG: ICD-10-PCS | Mod: CPTII,S$GLB,, | Performed by: FAMILY MEDICINE

## 2023-06-21 PROCEDURE — 3078F DIAST BP <80 MM HG: CPT | Mod: CPTII,S$GLB,, | Performed by: FAMILY MEDICINE

## 2023-06-21 PROCEDURE — 1125F AMNT PAIN NOTED PAIN PRSNT: CPT | Mod: CPTII,S$GLB,, | Performed by: FAMILY MEDICINE

## 2023-06-21 PROCEDURE — 1101F PT FALLS ASSESS-DOCD LE1/YR: CPT | Mod: CPTII,S$GLB,, | Performed by: FAMILY MEDICINE

## 2023-06-21 PROCEDURE — 99214 PR OFFICE/OUTPT VISIT, EST, LEVL IV, 30-39 MIN: ICD-10-PCS | Mod: S$GLB,,, | Performed by: FAMILY MEDICINE

## 2023-06-21 PROCEDURE — 1159F PR MEDICATION LIST DOCUMENTED IN MEDICAL RECORD: ICD-10-PCS | Mod: CPTII,S$GLB,, | Performed by: FAMILY MEDICINE

## 2023-06-21 RX ORDER — DULOXETIN HYDROCHLORIDE 60 MG/1
60 CAPSULE, DELAYED RELEASE ORAL
COMMUNITY
End: 2023-11-03

## 2023-06-21 NOTE — PROGRESS NOTES
"Subjective:      Patient ID: Talia Dillon is a 72 y.o. female.    Chief Complaint: Sx clearance      Vitals:    06/21/23 1144   BP: 98/62   Pulse: 110   Temp: 98.1 °F (36.7 °C)   TempSrc: Oral   SpO2: 98%   Weight: 100.8 kg (222 lb 3.6 oz)   Height: 5' 5" (1.651 m)        HPI   Preop eval for gallbladder surgery with Dr López at [facility] July 7; has nausea  Will stop her eliquis  No hx of surgical  complications or anesthesia complications  Has creatinine of 2.54  H and H good  Saw Dr Barker recently, note read      Problem List  Patient Active Problem List   Diagnosis    Scoliosis    Anxiety reaction    Hypothyroidism due to acquired atrophy of thyroid    Insomnia    Localized edema    Congenital deformities of feet    Leg pain, anterior    Cervical neuralgia    History of PSVT (paroxysmal supraventricular tachycardia)    CKD stage G4/A2, GFR 15-29 and albumin creatinine ratio  mg/g    Cervical spinal stenosis    Foraminal stenosis of lumbar region    Decreased functional mobility    DDD (degenerative disc disease), lumbar    Vitamin D deficiency    Elevated sed rate    Hyperlipidemia    Lumbar spondylosis    Chronic kidney disease-mineral and bone disorder    Longstanding persistent atrial fibrillation    Essential hypertension    Complete tear of left rotator cuff    Long term (current) use of anticoagulants    Osteoarthritis of lumbar spine    Chronic pain    Ascending aortic aneurysm    Lung nodule    Dyspnea on exertion    Ex-smoker    Chronic obstructive pulmonary disease    Seasonal allergies    Major depression, recurrent, chronic    Body mass index (BMI) 40.0-44.9, adult    Noncompliance    Pre-op evaluation        ALLERGIES:   Review of patient's allergies indicates:   Allergen Reactions    Dexamethasone Rash       MEDS:   Current Outpatient Medications:     albuterol (PROVENTIL/VENTOLIN HFA) 90 mcg/actuation inhaler, INHALE 2 PUFFS INTO THE LUNGS EVERY 4 (FOUR) HOURS AS NEEDED FOR WHEEZING " (FOR SHORTNESS OF BREATH)., Disp: 18 g, Rfl: 11    albuterol-ipratropium (DUO-NEB) 2.5 mg-0.5 mg/3 mL nebulizer solution, Take 3 mLs by nebulization every 6 (six) hours as needed for Wheezing. Rescue, Disp: 400 each, Rfl: 3    ALPRAZolam (XANAX) 0.25 MG tablet, Take 1 tablet (0.25 mg total) by mouth 3 (three) times daily as needed for Anxiety., Disp: 90 tablet, Rfl: 2    apixaban (ELIQUIS) 5 mg Tab, Take 1 tablet (5 mg total) by mouth 2 (two) times daily., Disp: 180 tablet, Rfl: 3    atorvastatin (LIPITOR) 10 MG tablet, TAKE 1 TABLET BY MOUTH ONCCE DAILY, Disp: 90 tablet, Rfl: 3    cholecalciferol, vitamin D3, (VITAMIN D3) 125 mcg (5,000 unit) Tab, Take 1 tablet (5,000 Units total) by mouth once daily., Disp: 90 tablet, Rfl: 3    DULoxetine (CYMBALTA) 60 MG capsule, Take 60 mg by mouth., Disp: , Rfl:     EScitalopram oxalate (LEXAPRO) 10 MG tablet, Take 2 tablets (20 mg total) by mouth once daily. For depression, Disp: 180 tablet, Rfl: 3    gabapentin (NEURONTIN) 400 MG capsule, Take 1 capsule (400 mg total) by mouth 2 (two) times daily., Disp: 180 capsule, Rfl: 3    levothyroxine (SYNTHROID) 200 MCG tablet, TAKE 1 TABLET BY MOUTH ONCE DAILY., Disp: 90 tablet, Rfl: 3    metoprolol succinate (TOPROL-XL) 100 MG 24 hr tablet, Take 2 tablets (200 mg total) by mouth once daily., Disp: 180 tablet, Rfl: 3    ondansetron (ZOFRAN-ODT) 4 MG TbDL, Take 1 tablet (4 mg total) by mouth every 6 (six) hours as needed (Nausea)., Disp: 30 tablet, Rfl: 1    pantoprazole (PROTONIX) 40 MG tablet, Take 1 tablet (40 mg total) by mouth once daily., Disp: 30 tablet, Rfl: 3    umeclidinium-vilanteroL (ANORO ELLIPTA) 62.5-25 mcg/actuation DsDv, Inhale 1 puff into the lungs once daily. Controller, Disp: 60 each, Rfl: 11    VISINE 0.05 % Drop, SMARTSI Drop(s) In Eye(s) PRN, Disp: , Rfl:     zolpidem (AMBIEN) 10 mg Tab, Take 1 tablet (10 mg total) by mouth nightly as needed (insomnia)., Disp: 90 tablet, Rfl: 1      History:  Current Providers  as of 6/21/2023  PCP: James Vallecillo MD  Care Team Provider: Robert Ochoa MD  Care Team Provider: Laci Barker MD  Care Team Provider: Julieth Christopher MD  Care Team Provider: Fransico Gallagher MD  Care Team Provider: Devin You MD  Care Team Provider: Héctor Davila Jr., MD  Care Team Provider: Bebeto Marin MD  Care Team Provider: Farhana Reyes NP  Care Team Provider: Ej Reyes MD  Care Team Provider: Kiet López MD  Encounter Provider: James Vallecillo MD, starting on Wed Jun 21, 2023 12:00 AM  Referring Provider: not found, starting on Wed Jun 21, 2023 12:00 AM  Consulting Physician: James Vallecillo MD, starting on Wed Jun 21, 2023 11:42 AM, ending on Wed Jun 21, 2023 12:38 PM (Inactive)   Past Medical History:   Diagnosis Date    Allergy     Anemia     Anxiety     Atrial fibrillation     Cancer     skin    Cataract     Depression     Edema     Hypothyroidism     Kidney disease     PSVT (paroxysmal supraventricular tachycardia)     Thyroid disease      Past Surgical History:   Procedure Laterality Date    ADENOIDECTOMY      APPENDECTOMY      COLONOSCOPY  2009    repeat in 10 years     ESOPHAGOGASTRODUODENOSCOPY N/A 5/4/2023    Procedure: EGD (ESOPHAGOGASTRODUODENOSCOPY);  Surgeon: Ej Reyes MD;  Location: [facility];  Service: Endoscopy;  Laterality: N/A;    EYE SURGERY      HYSTERECTOMY      INJECTION OF ANESTHETIC AGENT AROUND NERVE Bilateral 9/21/2018    Procedure: BLOCK, NERVE, MEDIAL BRANCH BLOCK BILATERAL LUMBAR L2-5;  Surgeon: Julieth Christopher MD;  Location: [facility];  Service: Pain Management;  Laterality: Bilateral;  1 of 2    INJECTION OF ANESTHETIC AGENT AROUND NERVE Bilateral 9/28/2018    Procedure: BLOCK, NERVE, MEDIAL BRANCH BLOCK BILATERAL LUMBAR L2-5;  Surgeon: Julieth Christopher MD;  Location: [facility];  Service: Pain Management;  Laterality: Bilateral;  2 of 2  PLEASE GIVE NIRAVAM PER MD    OOPHORECTOMY      RADIOFREQUENCY ABLATION Left  2020    Procedure: RADIOFREQUENCY ABLATION L2,3,4,5;  Surgeon: Julieth Christopher MD;  Location: [facility];  Service: Pain Management;  Laterality: Left;  LT RFA L2,3,4,5  1 of 2  OK to hold Eliquis    RADIOFREQUENCY ABLATION Right 2020    Procedure: RADIOFREQUENCY ABLATION L2,3,4,5 RIGHT   2 of 2 ok to hold eliquis;  Surgeon: Julieth Christopher MD;  Location: [facility];  Service: Pain Management;  Laterality: Right;    RADIOFREQUENCY ABLATION Left 2020    Procedure: RADIOFREQUENCY ABLATION, L2-L3-L4-L5 MEDIAL BRANCH 1 OF 2 Continue Eliquis;  Surgeon: Bogdan Webster MD;  Location: [facility];  Service: Pain Management;  Laterality: Left;    TONSILLECTOMY      TREATMENT OF CARDIAC ARRHYTHMIA N/A 2019    Procedure: CARDIOVERSION;  Surgeon: Devin You MD;  Location: [facility];  Service: Cardiology;  Laterality: N/A;  AF, KAROL, DCCV, MAC, NV, 3 Prep     Social History     Tobacco Use    Smoking status: Former     Packs/day: 1.00     Years: 50.00     Pack years: 50.00     Types: Cigarettes     Quit date:      Years since quittin.4    Smokeless tobacco: Never    Tobacco comments:     No longer vaping   Substance Use Topics    Alcohol use: No    Drug use: No         Review of Systems   Constitutional: Negative.    HENT: Negative.     Respiratory: Negative.  Negative for chest tightness and shortness of breath.    Cardiovascular:  Positive for leg swelling.   Gastrointestinal: Negative.    Endocrine: Negative.    Genitourinary: Negative.    Musculoskeletal: Negative.    Psychiatric/Behavioral: Negative.     All other systems reviewed and are negative.  Objective:     Physical Exam  Vitals and nursing note reviewed.   Constitutional:       Appearance: She is well-developed. She is obese.   HENT:      Head: Normocephalic.      Nose: Rhinorrhea present. No congestion.      Mouth/Throat:      Mouth: Mucous membranes are moist.      Pharynx: Oropharynx is clear. No oropharyngeal exudate or  posterior oropharyngeal erythema.   Eyes:      Conjunctiva/sclera: Conjunctivae normal.      Pupils: Pupils are equal, round, and reactive to light.   Cardiovascular:      Rate and Rhythm: Normal rate and regular rhythm.      Heart sounds: Normal heart sounds. No murmur heard.  Pulmonary:      Effort: Pulmonary effort is normal.      Breath sounds: Normal breath sounds.   Musculoskeletal:         General: Normal range of motion.      Cervical back: Normal range of motion and neck supple.   Skin:     General: Skin is warm and dry.   Neurological:      General: No focal deficit present.      Mental Status: She is alert and oriented to person, place, and time. Mental status is at baseline.      Cranial Nerves: No cranial nerve deficit.      Sensory: No sensory deficit.      Coordination: Coordination normal.      Gait: Gait abnormal.      Deep Tendon Reflexes: Reflexes are normal and symmetric. Reflexes normal.   Psychiatric:         Mood and Affect: Mood normal.         Behavior: Behavior normal.         Thought Content: Thought content normal.         Judgment: Judgment normal.           Assessment:     1. Pre-op evaluation      Plan:        Medication List            Accurate as of June 21, 2023 12:41 PM. If you have any questions, ask your nurse or doctor.                CONTINUE taking these medications      albuterol 90 mcg/actuation inhaler  Commonly known as: PROVENTIL/VENTOLIN HFA  INHALE 2 PUFFS INTO THE LUNGS EVERY 4 (FOUR) HOURS AS NEEDED FOR WHEEZING (FOR SHORTNESS OF BREATH).     albuterol-ipratropium 2.5 mg-0.5 mg/3 mL nebulizer solution  Commonly known as: DUO-NEB  Take 3 mLs by nebulization every 6 (six) hours as needed for Wheezing. Rescue     ALPRAZolam 0.25 MG tablet  Commonly known as: XANAX  Take 1 tablet (0.25 mg total) by mouth 3 (three) times daily as needed for Anxiety.     ANORO ELLIPTA 62.5-25 mcg/actuation Dsdv  Generic drug: umeclidinium-vilanteroL  Inhale 1 puff into the lungs once daily.  Controller     apixaban 5 mg Tab  Commonly known as: ELIQUIS  Take 1 tablet (5 mg total) by mouth 2 (two) times daily.     atorvastatin 10 MG tablet  Commonly known as: LIPITOR  TAKE 1 TABLET BY MOUTH ONCCE DAILY     cholecalciferol (vitamin D3) 125 mcg (5,000 unit) Tab  Commonly known as: VITAMIN D3  Take 1 tablet (5,000 Units total) by mouth once daily.     DULoxetine 60 MG capsule  Commonly known as: CYMBALTA     EScitalopram oxalate 10 MG tablet  Commonly known as: LEXAPRO  Take 2 tablets (20 mg total) by mouth once daily. For depression     gabapentin 400 MG capsule  Commonly known as: NEURONTIN  Take 1 capsule (400 mg total) by mouth 2 (two) times daily.     levothyroxine 200 MCG tablet  Commonly known as: SYNTHROID  TAKE 1 TABLET BY MOUTH ONCE DAILY.     metoprolol succinate 100 MG 24 hr tablet  Commonly known as: TOPROL-XL  Take 2 tablets (200 mg total) by mouth once daily.     ondansetron 4 MG Tbdl  Commonly known as: ZOFRAN-ODT  Take 1 tablet (4 mg total) by mouth every 6 (six) hours as needed (Nausea).     pantoprazole 40 MG tablet  Commonly known as: PROTONIX  Take 1 tablet (40 mg total) by mouth once daily.     VISINE 0.05 % Drop  Generic drug: tetrahydrozoline 0.05%     zolpidem 10 mg Tab  Commonly known as: AMBIEN  Take 1 tablet (10 mg total) by mouth nightly as needed (insomnia).            Pre-op evaluation        Pt is cleared for general anesthesia and gall bladder surgery

## 2023-06-27 ENCOUNTER — PES CALL (OUTPATIENT)
Dept: ADMINISTRATIVE | Facility: CLINIC | Age: 73
End: 2023-06-27
Payer: MEDICARE

## 2023-06-28 ENCOUNTER — OFFICE VISIT (OUTPATIENT)
Dept: NEPHROLOGY | Facility: CLINIC | Age: 73
End: 2023-06-28
Payer: MEDICARE

## 2023-06-28 VITALS — HEART RATE: 98 BPM | OXYGEN SATURATION: 99 % | SYSTOLIC BLOOD PRESSURE: 140 MMHG | DIASTOLIC BLOOD PRESSURE: 73 MMHG

## 2023-06-28 DIAGNOSIS — N18.4 CKD (CHRONIC KIDNEY DISEASE) STAGE 4, GFR 15-29 ML/MIN: Primary | ICD-10-CM

## 2023-06-28 DIAGNOSIS — R80.9 PROTEINURIA, UNSPECIFIED TYPE: ICD-10-CM

## 2023-06-28 DIAGNOSIS — R11.0 NAUSEA: ICD-10-CM

## 2023-06-28 DIAGNOSIS — N18.4 BENIGN HYPERTENSION WITH CKD (CHRONIC KIDNEY DISEASE) STAGE IV: ICD-10-CM

## 2023-06-28 DIAGNOSIS — N25.81 SECONDARY HYPERPARATHYROIDISM OF RENAL ORIGIN: ICD-10-CM

## 2023-06-28 DIAGNOSIS — I12.9 BENIGN HYPERTENSION WITH CKD (CHRONIC KIDNEY DISEASE) STAGE IV: ICD-10-CM

## 2023-06-28 PROCEDURE — 3288F PR FALLS RISK ASSESSMENT DOCUMENTED: ICD-10-PCS | Mod: CPTII,S$GLB,, | Performed by: NURSE PRACTITIONER

## 2023-06-28 PROCEDURE — 3066F NEPHROPATHY DOC TX: CPT | Mod: CPTII,S$GLB,, | Performed by: NURSE PRACTITIONER

## 2023-06-28 PROCEDURE — 1159F MED LIST DOCD IN RCRD: CPT | Mod: CPTII,S$GLB,, | Performed by: NURSE PRACTITIONER

## 2023-06-28 PROCEDURE — 1101F PT FALLS ASSESS-DOCD LE1/YR: CPT | Mod: CPTII,S$GLB,, | Performed by: NURSE PRACTITIONER

## 2023-06-28 PROCEDURE — 3078F DIAST BP <80 MM HG: CPT | Mod: CPTII,S$GLB,, | Performed by: NURSE PRACTITIONER

## 2023-06-28 PROCEDURE — 1159F PR MEDICATION LIST DOCUMENTED IN MEDICAL RECORD: ICD-10-PCS | Mod: CPTII,S$GLB,, | Performed by: NURSE PRACTITIONER

## 2023-06-28 PROCEDURE — 3066F PR DOCUMENTATION OF TREATMENT FOR NEPHROPATHY: ICD-10-PCS | Mod: CPTII,S$GLB,, | Performed by: NURSE PRACTITIONER

## 2023-06-28 PROCEDURE — 3288F FALL RISK ASSESSMENT DOCD: CPT | Mod: CPTII,S$GLB,, | Performed by: NURSE PRACTITIONER

## 2023-06-28 PROCEDURE — 99214 OFFICE O/P EST MOD 30 MIN: CPT | Mod: S$GLB,,, | Performed by: NURSE PRACTITIONER

## 2023-06-28 PROCEDURE — 99214 PR OFFICE/OUTPT VISIT, EST, LEVL IV, 30-39 MIN: ICD-10-PCS | Mod: S$GLB,,, | Performed by: NURSE PRACTITIONER

## 2023-06-28 PROCEDURE — 1126F PR PAIN SEVERITY QUANTIFIED, NO PAIN PRESENT: ICD-10-PCS | Mod: CPTII,S$GLB,, | Performed by: NURSE PRACTITIONER

## 2023-06-28 PROCEDURE — 99999 PR PBB SHADOW E&M-EST. PATIENT-LVL III: CPT | Mod: PBBFAC,,, | Performed by: NURSE PRACTITIONER

## 2023-06-28 PROCEDURE — 3077F SYST BP >= 140 MM HG: CPT | Mod: CPTII,S$GLB,, | Performed by: NURSE PRACTITIONER

## 2023-06-28 PROCEDURE — 1101F PR PT FALLS ASSESS DOC 0-1 FALLS W/OUT INJ PAST YR: ICD-10-PCS | Mod: CPTII,S$GLB,, | Performed by: NURSE PRACTITIONER

## 2023-06-28 PROCEDURE — 3078F PR MOST RECENT DIASTOLIC BLOOD PRESSURE < 80 MM HG: ICD-10-PCS | Mod: CPTII,S$GLB,, | Performed by: NURSE PRACTITIONER

## 2023-06-28 PROCEDURE — 3077F PR MOST RECENT SYSTOLIC BLOOD PRESSURE >= 140 MM HG: ICD-10-PCS | Mod: CPTII,S$GLB,, | Performed by: NURSE PRACTITIONER

## 2023-06-28 PROCEDURE — 99999 PR PBB SHADOW E&M-EST. PATIENT-LVL III: ICD-10-PCS | Mod: PBBFAC,,, | Performed by: NURSE PRACTITIONER

## 2023-06-28 PROCEDURE — 1126F AMNT PAIN NOTED NONE PRSNT: CPT | Mod: CPTII,S$GLB,, | Performed by: NURSE PRACTITIONER

## 2023-06-28 RX ORDER — PANTOPRAZOLE SODIUM 40 MG/1
TABLET, DELAYED RELEASE ORAL
Qty: 90 TABLET | Refills: 1 | Status: SHIPPED | OUTPATIENT
Start: 2023-06-28 | End: 2024-03-07

## 2023-06-28 NOTE — PROGRESS NOTES
Subjective:       Patient ID: Talia Dillon is a 72 y.o.  female who presents for follow-up evaluation of CKD.      HPI     Followed by Dr. Marin in 6/10/21.  Prior pertinent chart reviewed.    Patient presents for follow-up evaluation of CKD.  Baseline creatinine of ~1.8-2.0. Significant other medical problems include HTN, HLD, A fib, h/o PSVT, COPD, depression, anxiety, insomnia, DDD, cervical neuralgia, OA of lumbar spine. Cr now trending up to 2.9. No labs available since 02/2022. She notes depression and significant family stress with associated GI symptoms. Notes nausea and reflux as well as daily diarrhea for the last month. She does have a history of gallstones. Reports she is up to date on her colonoscopy. She reports decent water intake. She is following with psychiatry and her lexapro was recently increased to 20mg. No new medications or hospitalizations. She denies cp, sob, swelling, difficulty urinating, dysuria, hematuria, melena, vomiting, SI/HI.     Update 2/20/23:  - Presents for follow-up of TINA on CKD  - Following with general surgery for cholelithiasis, plans for gastric emptying study  - Remains with persistent nausea, eating mashed potatoes, chicken, grits  - Diarrhea has resolved. No vomiting.   - Reports improvement in anxiety level as well    Update 6/28/23:  - Presents for follow-up of CKD. H/o TINA 2/2 GI losses with decreased intake in setting of nausea/vomiting and cholelithiasis.   - Plans for cholecystectomy on 7/7/23 - recommendations as below   - Reports Bps generally controlled, BP repeat 114/82 in office today  - Stills persists with nausea, denies vomiting or diarrhea    Significant family hx includes: No reported renal disease    Last renal US: 5/1/19 , reviewed.    Review of Systems   Constitutional:  Negative for fever.   Respiratory:  Negative for shortness of breath.    Cardiovascular:  Negative for chest pain and leg swelling.   Gastrointestinal:  Positive for nausea.  Negative for blood in stool, diarrhea and vomiting.   Genitourinary:  Negative for difficulty urinating, dysuria and hematuria.   Neurological:  Negative for syncope.   All other systems reviewed and are negative.    Objective:       Blood pressure (!) 140/73, pulse 98, SpO2 99 %.  Physical Exam  Vitals reviewed.   Constitutional:       General: She is not in acute distress.  HENT:      Head: Normocephalic and atraumatic.   Eyes:      General: No scleral icterus.  Cardiovascular:      Rate and Rhythm: Normal rate and regular rhythm.   Pulmonary:      Effort: Pulmonary effort is normal. No respiratory distress.      Breath sounds: No wheezing or rales.   Musculoskeletal:      Right lower leg: No edema.      Left lower leg: No edema.      Comments: +wheelchair   Skin:     General: Skin is warm and dry.   Neurological:      Mental Status: She is alert.      Comments: Speech clear, answers questions appropriately   Psychiatric:         Mood and Affect: Mood normal.         Behavior: Behavior normal.         Lab Results   Component Value Date    CREATININE 2.54 (H) 06/02/2023    CREATININE 2.54 (H) 06/02/2023     Prot/Creat Ratio, Urine   Date Value Ref Range Status   06/02/2023 0.36 (H) 0.00 - 0.20 Final   02/03/2023 0.35 (H) 0.00 - 0.20 Final   02/24/2022 0.91 (H) 0.00 - 0.20 Final     Lab Results   Component Value Date     06/02/2023     06/02/2023    K 4.3 06/02/2023    K 4.3 06/02/2023    CO2 27 06/02/2023    CO2 27 06/02/2023     06/02/2023     06/02/2023     Lab Results   Component Value Date    .4 (H) 02/03/2023    CALCIUM 9.6 06/02/2023    CALCIUM 9.6 06/02/2023    PHOS 3.7 06/02/2023     Lab Results   Component Value Date    HGB 12.1 06/02/2023    HGB 12.1 06/02/2023    WBC 7.23 06/02/2023    WBC 7.23 06/02/2023    HCT 38.1 06/02/2023    HCT 38.1 06/02/2023      Lab Results   Component Value Date    HGBA1C 5.6 07/15/2015     06/02/2023     06/02/2023    BUN 33 (H)  "06/02/2023    BUN 33 (H) 06/02/2023     Lab Results   Component Value Date    LDLCALC 61.8 (L) 06/11/2020     US Retroperitoneal 5/1/19  "FINDINGS:  Right kidney: The right kidney measures 9.9 cm.  Slightly increased cortical echogenicity.  No cortical thinning. No loss of corticomedullary distinction.  Normal perfusion.   No mass. No renal stone. No hydronephrosis.     Left kidney: The left kidney measures 9.1 cm. Slightly increased cortical echogenicity no cortical thinning. No loss of corticomedullary distinction.  Normal perfusion. No mass. No renal stone. No hydronephrosis.     The bladder is partially distended at the time of scanning and has an unremarkable appearance.     IMPRESSION:      Slightly increased cortical echogenicity can be seen with medical renal disease."    Assessment:       1. CKD (chronic kidney disease) stage 4, GFR 15-29 ml/min    2. Proteinuria, unspecified type    3. Secondary hyperparathyroidism of renal origin    4. Benign hypertension with CKD (chronic kidney disease) stage IV          Plan:   CKD stage 4 c eGFR 19.4 mL/min  - Baseline Cr 1.8-2.0, clinically related to HTN, h/o TINA, NSAID use  - Cr now trending 2.92--> 2.5, no labs prior to recent since 02/2022, suspect progression in setting of TINA 2/2 reduced intake and GI losses  - Hold candesartan in setting of worsening renal function, BP is controlled   - SPEP, DEANA (-), BUFFY (-) 2018, Hep C (-) 2017  - From a nephrology standpoint, renal function has stabilized for surgery within suspected new baseline. Would recommend maintaining adequate hydration, avoid major fluctuations in blood pressure and keep MAP> 65, avoid using NSAIDs for pain control     UPCR 910 mg/g--> 350--> 360mg/g, maintained on ARB - hold as above; Monitor UPCR   Acid-base Bicarb WNL   Renal osteodystrophy/  Secondary hyperparathyroidism Ca, phos controlled  PTH elevated, stable  Last Vit D 53, monitor   Anemia WNL   DM No history   Lipid Management On statin "   ESRD planning Provided education about the stages of CKD, usual progression, and need to monitor for and treat CKD-related disease in an effort to delay progression of CKD.        HTN   - Controlled, hold candesartan as above  - Encouraged to monitor BP at home    All questions patient had were answered.  Asked if further questions. None. F/u in clinic 2-3 months  with labs and urine prior to next visit or sooner if needed.  ER for emergency concerns.    Summary of Plan:  - Continue to hold ARB for now, monitor BP at home  - Stressed importance of hydration as able  - From nephrology standpoint, sCr has stabilized for surgery within new suspected baseline.Further recommendations as above.   - Continue with follow-up in our clinic in 2 months. Can follow-up sooner if necessary.

## 2023-06-30 ENCOUNTER — TELEPHONE (OUTPATIENT)
Dept: NEPHROLOGY | Facility: CLINIC | Age: 73
End: 2023-06-30
Payer: MEDICARE

## 2023-06-30 NOTE — TELEPHONE ENCOUNTER
Note faxed   ----- Message from Kalyn Sidhu LPN sent at 6/30/2023 12:04 PM CDT -----  Regarding: FW: pt advice  Contact: [phone]    ----- Message -----  From: Melly Tong  Sent: 6/30/2023   9:08 AM CDT  To: [facility] Staff  Subject: pt advice                                        Sera calling from Dr. Kiet López, in regards to needing notes 6/28/23. Pls call        FAX [phone]

## 2023-07-02 NOTE — PROGRESS NOTES
STAFF NOTE:  Patient's case was formally presented to me by Dr. Landaverde and patient was seen by me in supervision.  I agree with the assessment and plan as specified to increase Lexapro and continue Ambien PRN insomnia.    Pt could also benefit from individual psychotherapy.            Rubén Garrett MD  Psychiatry

## 2023-07-05 ENCOUNTER — TELEPHONE (OUTPATIENT)
Dept: NEPHROLOGY | Facility: CLINIC | Age: 73
End: 2023-07-05
Payer: MEDICARE

## 2023-07-05 NOTE — TELEPHONE ENCOUNTER
Returned pt's call to schedule for ESRD tx choices class. Pt states that she would like to wait to schedule until after her surgery on 7/7. Pt requests a call back in 2 weeks. Will follow up per patient request.

## 2023-07-05 NOTE — TELEPHONE ENCOUNTER
----- Message from Kalyn Sidhu LPN sent at 7/5/2023  3:55 PM CDT -----  Regarding: FW: Fax not recieved  Contact: Sanchez @ [phone]    ----- Message -----  From: Jakob Eng  Sent: 7/5/2023   3:54 PM CDT  To: [facility] Staff  Subject: Fax not recieved                                 Sanchez is calling in stating that the fax clearance for the pt is missing some pages. Sanchez is asking the Meg Perry resend the fax completely so they can document for the pts surgery. Another fax number to send the clearance to is [phone]. Please call Sanchez to discuss further.

## 2023-07-05 NOTE — TELEPHONE ENCOUNTER
----- Message from Jesse Perez sent at 7/5/2023  1:13 PM CDT -----  Contact: @[phone]  Pt is calling in to see if her clearance for surgery has been cleared, please call to discuss further.

## 2023-07-07 DIAGNOSIS — R11.0 NAUSEA: ICD-10-CM

## 2023-07-07 NOTE — TELEPHONE ENCOUNTER
Care Due:                  Date            Visit Type   Department     Provider  --------------------------------------------------------------------------------                                EP -                              PRIMARY      [facility]  Last Visit: 06-      CARE ([facility])   ELLEN Vallecillo                              EP -                              PRIMARY      [facility]  Next Visit: 11-      CARE ([facility])   ELLEN Vallecillo                                                            Last  Test          Frequency    Reason                     Performed    Due Date  --------------------------------------------------------------------------------    Lipid Panel.  12 months..  atorvastatin.............  Not Found    Overdue    [employer] Embedded Care Due Messages. Reference number: [id number].   7/07/2023 4:26:46 PM CDT

## 2023-07-08 RX ORDER — ONDANSETRON 4 MG/1
TABLET, ORALLY DISINTEGRATING ORAL
Qty: 30 TABLET | Refills: 1 | Status: SHIPPED | OUTPATIENT
Start: 2023-07-08 | End: 2023-11-03 | Stop reason: SDUPTHER

## 2023-07-14 ENCOUNTER — TELEPHONE (OUTPATIENT)
Dept: ADMINISTRATIVE | Facility: OTHER | Age: 73
End: 2023-07-14
Payer: MEDICARE

## 2023-07-14 ENCOUNTER — TELEPHONE (OUTPATIENT)
Dept: FAMILY MEDICINE | Facility: CLINIC | Age: 73
End: 2023-07-14
Payer: MEDICARE

## 2023-07-14 NOTE — TELEPHONE ENCOUNTER
----- Message from Emily Gilbert sent at 7/14/2023  4:15 PM CDT -----  Contact: [phone]  Patient is requesting a call to discuss atorvastatin (LIPITOR) 10 MG tablet would like to be added to her list  Call back number: [phone]  Additional Information:

## 2023-07-14 NOTE — TELEPHONE ENCOUNTER
Discussed medication list with patient.   ----------------------------------------------------------------------------  ----- Message from Erin Shepard sent at 7/14/2023 11:42 AM CDT -----  Type:  Needs Medical Advice    Who Called:  pt  Symptoms (please be specific):    How long has patient had these symptoms:    Pharmacy name and phone #:    Would the patient rather a call back or a response via [facility]?   Best Call Back Number: [phone] ()   Additional Information: pt wants to speak to eugene BOB ABOUT HER MEDICINE

## 2023-07-24 ENCOUNTER — TELEPHONE (OUTPATIENT)
Dept: NEPHROLOGY | Facility: CLINIC | Age: 73
End: 2023-07-24
Payer: MEDICARE

## 2023-07-31 ENCOUNTER — TELEPHONE (OUTPATIENT)
Dept: NEPHROLOGY | Facility: CLINIC | Age: 73
End: 2023-07-31
Payer: MEDICARE

## 2023-07-31 NOTE — TELEPHONE ENCOUNTER
"Pt returned call to schedule for ESRD tx choices class. Declined scheduling at this time, stating that she "does not feel up to the class" now after her surgery on 7/7. Reports she is taking a long time to "bounce back" to her normal functioning. Encouraged the pt to try to slowly increase movement and cautioned against complications of immobility following surgery. Pt verbalized understanding. Reports that she will call back when she is ready to schedule. Callback number provided.  "

## 2023-08-30 DIAGNOSIS — E55.9 VITAMIN D DEFICIENCY: ICD-10-CM

## 2023-08-30 DIAGNOSIS — F41.9 ANXIETY: ICD-10-CM

## 2023-08-30 RX ORDER — METOPROLOL SUCCINATE 100 MG/1
300 TABLET, EXTENDED RELEASE ORAL
Qty: 270 TABLET | Refills: 3 | Status: SHIPPED | OUTPATIENT
Start: 2023-08-30 | End: 2024-02-27 | Stop reason: SDUPTHER

## 2023-08-30 NOTE — TELEPHONE ENCOUNTER
No care due was identified.  [facility] Embedded Care Due Messages. Reference number: [id number].   8/30/2023 3:03:42 PM CDT

## 2023-08-31 ENCOUNTER — TELEPHONE (OUTPATIENT)
Dept: ADMINISTRATIVE | Facility: CLINIC | Age: 73
End: 2023-08-31
Payer: MEDICARE

## 2023-08-31 NOTE — TELEPHONE ENCOUNTER
Called pt, no answer; left message informing pt I was calling to remind pt of her in office EAWV on 9/1/23 and to return my call if she had any questions; left my name and number.

## 2023-09-02 RX ORDER — ALPRAZOLAM 0.25 MG/1
0.25 TABLET ORAL 3 TIMES DAILY
Qty: 90 TABLET | Refills: 1 | Status: SHIPPED | OUTPATIENT
Start: 2023-09-02 | End: 2023-11-03 | Stop reason: SDUPTHER

## 2023-09-02 RX ORDER — CHOLECALCIFEROL (VITAMIN D3) 125 MCG
5000 CAPSULE ORAL
Qty: 90 CAPSULE | Refills: 3 | Status: SHIPPED | OUTPATIENT
Start: 2023-09-02 | End: 2024-03-21

## 2023-09-21 DIAGNOSIS — N18.4 CKD (CHRONIC KIDNEY DISEASE) STAGE 4, GFR 15-29 ML/MIN: Primary | ICD-10-CM

## 2023-09-22 ENCOUNTER — LAB VISIT (OUTPATIENT)
Dept: LAB | Facility: HOSPITAL | Age: 73
End: 2023-09-22
Attending: NURSE PRACTITIONER
Payer: MEDICARE

## 2023-09-22 DIAGNOSIS — N18.4 CKD (CHRONIC KIDNEY DISEASE) STAGE 4, GFR 15-29 ML/MIN: ICD-10-CM

## 2023-09-22 LAB
BACTERIA #/AREA URNS AUTO: ABNORMAL /HPF
BILIRUB UR QL STRIP: NEGATIVE
CLARITY UR REFRACT.AUTO: CLEAR
COLOR UR AUTO: YELLOW
CREAT UR-MCNC: 260.2 MG/DL (ref 15–325)
GLUCOSE UR QL STRIP: NEGATIVE
HGB UR QL STRIP: NEGATIVE
HYALINE CASTS UR QL AUTO: 0 /LPF
KETONES UR QL STRIP: NEGATIVE
LEUKOCYTE ESTERASE UR QL STRIP: NEGATIVE
MICROSCOPIC COMMENT: ABNORMAL
NITRITE UR QL STRIP: NEGATIVE
PH UR STRIP: 6 [PH] (ref 5–8)
PROT UR QL STRIP: ABNORMAL
PROT UR-MCNC: 62 MG/DL (ref 0–15)
PROT/CREAT UR: 0.24 MG/G{CREAT} (ref 0–0.2)
RBC #/AREA URNS AUTO: 1 /HPF (ref 0–4)
SP GR UR STRIP: >=1.03 (ref 1–1.03)
URN SPEC COLLECT METH UR: ABNORMAL
UROBILINOGEN UR STRIP-ACNC: 1 EU/DL
WBC #/AREA URNS AUTO: 2 /HPF (ref 0–5)

## 2023-09-22 PROCEDURE — 82570 ASSAY OF URINE CREATININE: CPT | Performed by: NURSE PRACTITIONER

## 2023-09-22 PROCEDURE — 81000 URINALYSIS NONAUTO W/SCOPE: CPT | Mod: PO | Performed by: NURSE PRACTITIONER

## 2023-09-25 ENCOUNTER — OFFICE VISIT (OUTPATIENT)
Dept: NEPHROLOGY | Facility: CLINIC | Age: 73
End: 2023-09-25
Payer: MEDICARE

## 2023-09-25 VITALS
SYSTOLIC BLOOD PRESSURE: 103 MMHG | HEART RATE: 95 BPM | WEIGHT: 210 LBS | OXYGEN SATURATION: 99 % | BODY MASS INDEX: 34.95 KG/M2 | DIASTOLIC BLOOD PRESSURE: 72 MMHG

## 2023-09-25 DIAGNOSIS — N18.4 BENIGN HYPERTENSION WITH CKD (CHRONIC KIDNEY DISEASE) STAGE IV: ICD-10-CM

## 2023-09-25 DIAGNOSIS — I12.9 BENIGN HYPERTENSION WITH CKD (CHRONIC KIDNEY DISEASE) STAGE IV: ICD-10-CM

## 2023-09-25 DIAGNOSIS — D63.1 ANEMIA OF CHRONIC RENAL FAILURE, STAGE 4 (SEVERE): ICD-10-CM

## 2023-09-25 DIAGNOSIS — N18.4 ANEMIA OF CHRONIC RENAL FAILURE, STAGE 4 (SEVERE): ICD-10-CM

## 2023-09-25 DIAGNOSIS — N25.81 SECONDARY HYPERPARATHYROIDISM OF RENAL ORIGIN: ICD-10-CM

## 2023-09-25 DIAGNOSIS — N18.4 CKD (CHRONIC KIDNEY DISEASE) STAGE 4, GFR 15-29 ML/MIN: Primary | ICD-10-CM

## 2023-09-25 DIAGNOSIS — R80.9 PROTEINURIA, UNSPECIFIED TYPE: ICD-10-CM

## 2023-09-25 PROCEDURE — 3008F BODY MASS INDEX DOCD: CPT | Mod: CPTII,S$GLB,, | Performed by: NURSE PRACTITIONER

## 2023-09-25 PROCEDURE — 3078F PR MOST RECENT DIASTOLIC BLOOD PRESSURE < 80 MM HG: ICD-10-PCS | Mod: CPTII,S$GLB,, | Performed by: NURSE PRACTITIONER

## 2023-09-25 PROCEDURE — 1159F MED LIST DOCD IN RCRD: CPT | Mod: CPTII,S$GLB,, | Performed by: NURSE PRACTITIONER

## 2023-09-25 PROCEDURE — 3288F FALL RISK ASSESSMENT DOCD: CPT | Mod: CPTII,S$GLB,, | Performed by: NURSE PRACTITIONER

## 2023-09-25 PROCEDURE — 3074F SYST BP LT 130 MM HG: CPT | Mod: CPTII,S$GLB,, | Performed by: NURSE PRACTITIONER

## 2023-09-25 PROCEDURE — 99999 PR PBB SHADOW E&M-EST. PATIENT-LVL III: ICD-10-PCS | Mod: PBBFAC,,, | Performed by: NURSE PRACTITIONER

## 2023-09-25 PROCEDURE — 1159F PR MEDICATION LIST DOCUMENTED IN MEDICAL RECORD: ICD-10-PCS | Mod: CPTII,S$GLB,, | Performed by: NURSE PRACTITIONER

## 2023-09-25 PROCEDURE — 3008F PR BODY MASS INDEX (BMI) DOCUMENTED: ICD-10-PCS | Mod: CPTII,S$GLB,, | Performed by: NURSE PRACTITIONER

## 2023-09-25 PROCEDURE — 3074F PR MOST RECENT SYSTOLIC BLOOD PRESSURE < 130 MM HG: ICD-10-PCS | Mod: CPTII,S$GLB,, | Performed by: NURSE PRACTITIONER

## 2023-09-25 PROCEDURE — 3066F PR DOCUMENTATION OF TREATMENT FOR NEPHROPATHY: ICD-10-PCS | Mod: CPTII,S$GLB,, | Performed by: NURSE PRACTITIONER

## 2023-09-25 PROCEDURE — 1101F PR PT FALLS ASSESS DOC 0-1 FALLS W/OUT INJ PAST YR: ICD-10-PCS | Mod: CPTII,S$GLB,, | Performed by: NURSE PRACTITIONER

## 2023-09-25 PROCEDURE — 1125F PR PAIN SEVERITY QUANTIFIED, PAIN PRESENT: ICD-10-PCS | Mod: CPTII,S$GLB,, | Performed by: NURSE PRACTITIONER

## 2023-09-25 PROCEDURE — 99999 PR PBB SHADOW E&M-EST. PATIENT-LVL III: CPT | Mod: PBBFAC,,, | Performed by: NURSE PRACTITIONER

## 2023-09-25 PROCEDURE — 99214 OFFICE O/P EST MOD 30 MIN: CPT | Mod: S$GLB,,, | Performed by: NURSE PRACTITIONER

## 2023-09-25 PROCEDURE — 99214 PR OFFICE/OUTPT VISIT, EST, LEVL IV, 30-39 MIN: ICD-10-PCS | Mod: S$GLB,,, | Performed by: NURSE PRACTITIONER

## 2023-09-25 PROCEDURE — 3078F DIAST BP <80 MM HG: CPT | Mod: CPTII,S$GLB,, | Performed by: NURSE PRACTITIONER

## 2023-09-25 PROCEDURE — 1101F PT FALLS ASSESS-DOCD LE1/YR: CPT | Mod: CPTII,S$GLB,, | Performed by: NURSE PRACTITIONER

## 2023-09-25 PROCEDURE — 3066F NEPHROPATHY DOC TX: CPT | Mod: CPTII,S$GLB,, | Performed by: NURSE PRACTITIONER

## 2023-09-25 PROCEDURE — 3288F PR FALLS RISK ASSESSMENT DOCUMENTED: ICD-10-PCS | Mod: CPTII,S$GLB,, | Performed by: NURSE PRACTITIONER

## 2023-09-25 PROCEDURE — 1125F AMNT PAIN NOTED PAIN PRSNT: CPT | Mod: CPTII,S$GLB,, | Performed by: NURSE PRACTITIONER

## 2023-09-25 NOTE — PROGRESS NOTES
Subjective:       Patient ID: Talia Dillon is a 72 y.o.  female who presents for follow-up evaluation of CKD.      HPI     Followed by Dr. Marin in 6/10/21.  Prior pertinent chart reviewed.    Patient presents for follow-up evaluation of CKD.  Baseline creatinine of ~1.8-2.0. Significant other medical problems include HTN, HLD, A fib, h/o PSVT, COPD, depression, anxiety, insomnia, DDD, cervical neuralgia, OA of lumbar spine. Cr now trending up to 2.9. No labs available since 02/2022. She notes depression and significant family stress with associated GI symptoms. Notes nausea and reflux as well as daily diarrhea for the last month. She does have a history of gallstones. Reports she is up to date on her colonoscopy. She reports decent water intake. She is following with psychiatry and her lexapro was recently increased to 20mg. No new medications or hospitalizations. She denies cp, sob, swelling, difficulty urinating, dysuria, hematuria, melena, vomiting, SI/HI.     Update 2/20/23:  - Presents for follow-up of TINA on CKD  - Following with general surgery for cholelithiasis, plans for gastric emptying study  - Remains with persistent nausea, eating mashed potatoes, chicken, grits  - Diarrhea has resolved. No vomiting.   - Reports improvement in anxiety level as well    Update 6/28/23:  - Presents for follow-up of CKD. H/o TINA 2/2 GI losses with decreased intake in setting of nausea/vomiting and cholelithiasis.   - Plans for cholecystectomy on 7/7/23 - recommendations as below   - Reports Bps generally controlled, BP repeat 114/82 in office today  - Stills persists with nausea, denies vomiting or diarrhea    Update 9/25/23:  - Presents for follow-up of CKD. Feeling well overall. Notes mild energy decrease.   - sCr stable at 2.2    Significant family hx includes: No reported renal disease    Last renal US: 5/1/19 , reviewed.    Review of Systems   Constitutional:         Decreased energy    Respiratory:  Positive for  "shortness of breath.    Cardiovascular:  Negative for chest pain and leg swelling.   Gastrointestinal:  Negative for diarrhea, nausea and vomiting.   Genitourinary:  Negative for difficulty urinating, dysuria and hematuria.   All other systems reviewed and are negative.      Objective:       Blood pressure 103/72, pulse 95, weight 95.3 kg (210 lb), SpO2 99 %.  Physical Exam  Vitals reviewed.   Constitutional:       General: She is not in acute distress.  HENT:      Head: Normocephalic and atraumatic.   Eyes:      General: No scleral icterus.  Cardiovascular:      Rate and Rhythm: Normal rate. Rhythm irregular.   Pulmonary:      Effort: Pulmonary effort is normal. No respiratory distress.      Breath sounds: No wheezing or rales.   Musculoskeletal:      Right lower leg: No edema.      Left lower leg: No edema.      Comments: +wheelchair   Skin:     General: Skin is warm and dry.   Neurological:      Mental Status: She is alert.      Comments: Speech clear, answers questions appropriately   Psychiatric:         Mood and Affect: Mood normal.         Behavior: Behavior normal.           Lab Results   Component Value Date    CREATININE 2.27 (H) 09/22/2023     Prot/Creat Ratio, Urine   Date Value Ref Range Status   09/22/2023 0.24 (H) 0.00 - 0.20 Final   06/02/2023 0.36 (H) 0.00 - 0.20 Final   02/03/2023 0.35 (H) 0.00 - 0.20 Final     Lab Results   Component Value Date     09/22/2023    K 4.3 09/22/2023    CO2 20 (L) 09/22/2023     09/22/2023     Lab Results   Component Value Date    .4 (H) 02/03/2023    CALCIUM 9.6 09/22/2023    PHOS 4.7 (H) 09/22/2023     Lab Results   Component Value Date    HGB 11.6 (L) 09/22/2023    WBC 8.31 09/22/2023    HCT 37.0 09/22/2023      Lab Results   Component Value Date    HGBA1C 5.6 07/15/2015     09/22/2023    BUN 39 (H) 09/22/2023     Lab Results   Component Value Date    LDLCALC 61.8 (L) 06/11/2020     US Retroperitoneal 5/1/19  "FINDINGS:  Right kidney: The " "right kidney measures 9.9 cm.  Slightly increased cortical echogenicity.  No cortical thinning. No loss of corticomedullary distinction.  Normal perfusion.   No mass. No renal stone. No hydronephrosis.     Left kidney: The left kidney measures 9.1 cm. Slightly increased cortical echogenicity no cortical thinning. No loss of corticomedullary distinction.  Normal perfusion. No mass. No renal stone. No hydronephrosis.     The bladder is partially distended at the time of scanning and has an unremarkable appearance.     IMPRESSION:      Slightly increased cortical echogenicity can be seen with medical renal disease."    Assessment:       1. CKD (chronic kidney disease) stage 4, GFR 15-29 ml/min    2. Proteinuria, unspecified type    3. Secondary hyperparathyroidism of renal origin    4. Benign hypertension with CKD (chronic kidney disease) stage IV    5. Anemia of chronic renal failure, stage 4 (severe)            Plan:   CKD stage 4 c eGFR 22.2 mL/min  - Baseline Cr 1.8-2.0, clinically related to HTN, h/o TINA, NSAID use  - Cr now trending 2.92--> 2.5--> 2.2, no labs prior to recent since 02/2022, suspect degree of progression in setting of TINA 2/2 reduced intake and GI losses  - Candesartan previously held in setting of worsening renal function, BP is controlled   - SPEP, DEANA (-), BUFFY (-) 2018, Hep C (-) 2017    UPCR 910 mg/g--> 350--> 360--> 240mg/g, ARB held; Monitor UPCR, plan to restart ARB at next appt if sCr remains stable   Acid-base Bicarb 20, montior   Renal osteodystrophy/  Secondary hyperparathyroidism Phos mildly elevated, monitor  PTH elevated, stable  Last Vit D 53, monitor   Anemia At CKD goal   DM No history   Lipid Management On statin   ESRD planning Provided education about the stages of CKD, usual progression, and need to monitor for and treat CKD-related disease in an effort to delay progression of CKD.        HTN   - Controlled to mildly low today, candesartan on hold , maintained on " metoprolol    All questions patient had were answered.  Asked if further questions. None. F/u in clinic 3-4 months  with labs and urine prior to next visit or sooner if needed.  ER for emergency concerns.    Summary of Plan:  - Continue to hold ARB for now, monitor BP at home  - Stressed importance of hydration as able  - RTC to 3-4 months with standard labs and urine

## 2023-09-27 ENCOUNTER — TELEPHONE (OUTPATIENT)
Dept: ADMINISTRATIVE | Facility: CLINIC | Age: 73
End: 2023-09-27
Payer: MEDICARE

## 2023-09-29 ENCOUNTER — OFFICE VISIT (OUTPATIENT)
Dept: INTERNAL MEDICINE | Facility: CLINIC | Age: 73
End: 2023-09-29
Payer: MEDICARE

## 2023-09-29 VITALS
BODY MASS INDEX: 35.26 KG/M2 | HEART RATE: 87 BPM | DIASTOLIC BLOOD PRESSURE: 78 MMHG | HEIGHT: 65 IN | OXYGEN SATURATION: 98 % | WEIGHT: 211.63 LBS | SYSTOLIC BLOOD PRESSURE: 122 MMHG

## 2023-09-29 DIAGNOSIS — Z12.31 ENCOUNTER FOR SCREENING MAMMOGRAM FOR BREAST CANCER: ICD-10-CM

## 2023-09-29 DIAGNOSIS — E66.01 SEVERE OBESITY (BMI 35.0-39.9) WITH COMORBIDITY: ICD-10-CM

## 2023-09-29 DIAGNOSIS — M47.896 OTHER OSTEOARTHRITIS OF SPINE, LUMBAR REGION: ICD-10-CM

## 2023-09-29 DIAGNOSIS — E83.9 CHRONIC KIDNEY DISEASE-MINERAL AND BONE DISORDER: Chronic | ICD-10-CM

## 2023-09-29 DIAGNOSIS — Z87.891 EX-SMOKER: ICD-10-CM

## 2023-09-29 DIAGNOSIS — M41.26 OTHER IDIOPATHIC SCOLIOSIS, LUMBAR REGION: ICD-10-CM

## 2023-09-29 DIAGNOSIS — Z86.79 HISTORY OF PSVT (PAROXYSMAL SUPRAVENTRICULAR TACHYCARDIA): ICD-10-CM

## 2023-09-29 DIAGNOSIS — R26.89 DECREASED FUNCTIONAL MOBILITY: ICD-10-CM

## 2023-09-29 DIAGNOSIS — F41.1 ANXIETY REACTION: ICD-10-CM

## 2023-09-29 DIAGNOSIS — M48.02 CERVICAL SPINAL STENOSIS: ICD-10-CM

## 2023-09-29 DIAGNOSIS — M54.12 CERVICAL NEURALGIA: ICD-10-CM

## 2023-09-29 DIAGNOSIS — I10 ESSENTIAL HYPERTENSION: Chronic | ICD-10-CM

## 2023-09-29 DIAGNOSIS — N18.4 CKD STAGE G4/A2, GFR 15-29 AND ALBUMIN CREATININE RATIO 30-299 MG/G: ICD-10-CM

## 2023-09-29 DIAGNOSIS — Z79.01 LONG TERM (CURRENT) USE OF ANTICOAGULANTS: ICD-10-CM

## 2023-09-29 DIAGNOSIS — J42 CHRONIC BRONCHITIS, UNSPECIFIED CHRONIC BRONCHITIS TYPE: ICD-10-CM

## 2023-09-29 DIAGNOSIS — M89.9 CHRONIC KIDNEY DISEASE-MINERAL AND BONE DISORDER: Chronic | ICD-10-CM

## 2023-09-29 DIAGNOSIS — E78.00 PURE HYPERCHOLESTEROLEMIA: ICD-10-CM

## 2023-09-29 DIAGNOSIS — J30.2 SEASONAL ALLERGIES: ICD-10-CM

## 2023-09-29 DIAGNOSIS — F33.9 MAJOR DEPRESSION, RECURRENT, CHRONIC: ICD-10-CM

## 2023-09-29 DIAGNOSIS — I48.11 LONGSTANDING PERSISTENT ATRIAL FIBRILLATION: ICD-10-CM

## 2023-09-29 DIAGNOSIS — Q66.90: ICD-10-CM

## 2023-09-29 DIAGNOSIS — R91.1 LUNG NODULE: ICD-10-CM

## 2023-09-29 DIAGNOSIS — R06.09 DYSPNEA ON EXERTION: ICD-10-CM

## 2023-09-29 DIAGNOSIS — M48.061 FORAMINAL STENOSIS OF LUMBAR REGION: ICD-10-CM

## 2023-09-29 DIAGNOSIS — M51.36 DDD (DEGENERATIVE DISC DISEASE), LUMBAR: ICD-10-CM

## 2023-09-29 DIAGNOSIS — Z00.00 ENCOUNTER FOR PREVENTIVE HEALTH EXAMINATION: Primary | ICD-10-CM

## 2023-09-29 DIAGNOSIS — N18.9 CHRONIC KIDNEY DISEASE-MINERAL AND BONE DISORDER: Chronic | ICD-10-CM

## 2023-09-29 DIAGNOSIS — M47.816 LUMBAR SPONDYLOSIS: ICD-10-CM

## 2023-09-29 DIAGNOSIS — G89.29 OTHER CHRONIC PAIN: ICD-10-CM

## 2023-09-29 DIAGNOSIS — G47.00 INSOMNIA, UNSPECIFIED TYPE: ICD-10-CM

## 2023-09-29 DIAGNOSIS — I71.21 ANEURYSM OF ASCENDING AORTA WITHOUT RUPTURE: ICD-10-CM

## 2023-09-29 PROCEDURE — 99999 PR PBB SHADOW E&M-EST. PATIENT-LVL V: CPT | Mod: PBBFAC,,, | Performed by: NURSE PRACTITIONER

## 2023-09-29 PROCEDURE — 1101F PT FALLS ASSESS-DOCD LE1/YR: CPT | Mod: CPTII,S$GLB,, | Performed by: NURSE PRACTITIONER

## 2023-09-29 PROCEDURE — 3078F DIAST BP <80 MM HG: CPT | Mod: CPTII,S$GLB,, | Performed by: NURSE PRACTITIONER

## 2023-09-29 PROCEDURE — 1125F AMNT PAIN NOTED PAIN PRSNT: CPT | Mod: CPTII,S$GLB,, | Performed by: NURSE PRACTITIONER

## 2023-09-29 PROCEDURE — 1160F RVW MEDS BY RX/DR IN RCRD: CPT | Mod: CPTII,S$GLB,, | Performed by: NURSE PRACTITIONER

## 2023-09-29 PROCEDURE — G0439 PPPS, SUBSEQ VISIT: HCPCS | Mod: S$GLB,,, | Performed by: NURSE PRACTITIONER

## 2023-09-29 PROCEDURE — 3066F NEPHROPATHY DOC TX: CPT | Mod: CPTII,S$GLB,, | Performed by: NURSE PRACTITIONER

## 2023-09-29 PROCEDURE — G0439 PR MEDICARE ANNUAL WELLNESS SUBSEQUENT VISIT: ICD-10-PCS | Mod: S$GLB,,, | Performed by: NURSE PRACTITIONER

## 2023-09-29 PROCEDURE — 3066F PR DOCUMENTATION OF TREATMENT FOR NEPHROPATHY: ICD-10-PCS | Mod: CPTII,S$GLB,, | Performed by: NURSE PRACTITIONER

## 2023-09-29 PROCEDURE — 1159F PR MEDICATION LIST DOCUMENTED IN MEDICAL RECORD: ICD-10-PCS | Mod: CPTII,S$GLB,, | Performed by: NURSE PRACTITIONER

## 2023-09-29 PROCEDURE — 1125F PR PAIN SEVERITY QUANTIFIED, PAIN PRESENT: ICD-10-PCS | Mod: CPTII,S$GLB,, | Performed by: NURSE PRACTITIONER

## 2023-09-29 PROCEDURE — 3078F PR MOST RECENT DIASTOLIC BLOOD PRESSURE < 80 MM HG: ICD-10-PCS | Mod: CPTII,S$GLB,, | Performed by: NURSE PRACTITIONER

## 2023-09-29 PROCEDURE — 1159F MED LIST DOCD IN RCRD: CPT | Mod: CPTII,S$GLB,, | Performed by: NURSE PRACTITIONER

## 2023-09-29 PROCEDURE — 3288F PR FALLS RISK ASSESSMENT DOCUMENTED: ICD-10-PCS | Mod: CPTII,S$GLB,, | Performed by: NURSE PRACTITIONER

## 2023-09-29 PROCEDURE — 1160F PR REVIEW ALL MEDS BY PRESCRIBER/CLIN PHARMACIST DOCUMENTED: ICD-10-PCS | Mod: CPTII,S$GLB,, | Performed by: NURSE PRACTITIONER

## 2023-09-29 PROCEDURE — 3288F FALL RISK ASSESSMENT DOCD: CPT | Mod: CPTII,S$GLB,, | Performed by: NURSE PRACTITIONER

## 2023-09-29 PROCEDURE — 3008F BODY MASS INDEX DOCD: CPT | Mod: CPTII,S$GLB,, | Performed by: NURSE PRACTITIONER

## 2023-09-29 PROCEDURE — 3008F PR BODY MASS INDEX (BMI) DOCUMENTED: ICD-10-PCS | Mod: CPTII,S$GLB,, | Performed by: NURSE PRACTITIONER

## 2023-09-29 PROCEDURE — 3074F PR MOST RECENT SYSTOLIC BLOOD PRESSURE < 130 MM HG: ICD-10-PCS | Mod: CPTII,S$GLB,, | Performed by: NURSE PRACTITIONER

## 2023-09-29 PROCEDURE — 99999 PR PBB SHADOW E&M-EST. PATIENT-LVL V: ICD-10-PCS | Mod: PBBFAC,,, | Performed by: NURSE PRACTITIONER

## 2023-09-29 PROCEDURE — 3074F SYST BP LT 130 MM HG: CPT | Mod: CPTII,S$GLB,, | Performed by: NURSE PRACTITIONER

## 2023-09-29 PROCEDURE — 1101F PR PT FALLS ASSESS DOC 0-1 FALLS W/OUT INJ PAST YR: ICD-10-PCS | Mod: CPTII,S$GLB,, | Performed by: NURSE PRACTITIONER

## 2023-09-29 NOTE — PROGRESS NOTES
"Talia Dillon presented for a  Medicare AWV and comprehensive Health Risk Assessment today. The following components were reviewed and updated:    Medical history  Family History  Social history  Allergies and Current Medications  Health Risk Assessment  Health Maintenance  Care Team         ** See Completed Assessments for Annual Wellness Visit within the encounter summary.**         The following assessments were completed:  Living Situation  CAGE  Depression Screening  Timed Get Up and Go  Whisper Test  Cognitive Function Screening  Nutrition Screening  ADL Screening  PAQ Screening        Vitals:    09/29/23 0923   BP: 122/78   Pulse: 87   SpO2: 98%   Weight: 96 kg (211 lb 10.3 oz)   Height: 5' 5" (1.651 m)     Body mass index is 35.22 kg/m².  Physical Exam  Vitals and nursing note reviewed.   Constitutional:       General: She is not in acute distress.     Appearance: She is well-developed. She is obese. She is not ill-appearing.   HENT:      Head: Normocephalic and atraumatic.   Eyes:      Pupils: Pupils are equal, round, and reactive to light.   Cardiovascular:      Rate and Rhythm: Normal rate.   Pulmonary:      Effort: Pulmonary effort is normal. No respiratory distress.   Musculoskeletal:      Cervical back: Normal range of motion.      Comments: Ambulates with wheeled walker.   Skin:     General: Skin is warm and dry.   Neurological:      Mental Status: She is alert and oriented to person, place, and time.      Coordination: Coordination normal.   Psychiatric:         Mood and Affect: Mood normal.         Behavior: Behavior normal.         Thought Content: Thought content normal.         Judgment: Judgment normal.               Diagnoses and health risks identified today and associated recommendations/orders:    1. Encounter for preventive health examination    2. Severe obesity (BMI 35.0-39.9) with comorbidity  Current BMI 35.22. Patient followed by PCP.    3. Major depression, recurrent, " chronic  Chronic; stable. Continue current treatment plan as previously prescribed by PCP.     4. CKD stage G4/A2, GFR 15-29 and albumin creatinine ratio  mg/g  Chronic; stable. Continue current treatment plan as previously prescribed by PCP.    5. Aneurysm of ascending aorta without rupture  Chronic; stable. Continue current treatment plan as previously prescribed by PCP.    6. Longstanding persistent atrial fibrillation  Chronic; stable. Continue current treatment plan as previously prescribed by PCP.    7. History of PSVT (paroxysmal supraventricular tachycardia)  Chronic; stable. Continue current treatment plan as previously prescribed by PCP.    8. Dyspnea on exertion  Chronic; stable. Continue current treatment plan as previously prescribed by PCP.    9. Essential hypertension  Chronic; stable. Continue current treatment plan as previously prescribed by PCP.    10. Pure hypercholesterolemia  Chronic; stable. Continue current treatment plan as previously prescribed by PCP.    11. Chronic kidney disease-mineral and bone disorder  Chronic; stable. Continue current treatment plan as previously prescribed by PCP.    12. Chronic bronchitis, unspecified chronic bronchitis type  Chronic; stable. Continue current treatment plan as previously prescribed by PCP.    13. Lung nodule  Chronic; stable. Continue current treatment plan as previously prescribed by PCP.    14. Seasonal allergies  Chronic; stable. Continue current treatment plan as previously prescribed by PCP.    15. Anxiety reaction  Chronic; stable. Continue current treatment plan as previously prescribed by PCP.    16. Other chronic pain  Chronic; stable. Continue current treatment plan as previously prescribed by PCP.    17. Cervical neuralgia  Chronic; stable. Continue current treatment plan as previously prescribed by PCP.    18. Cervical spinal stenosis  Chronic; stable. Continue current treatment plan as previously prescribed by PCP.    19.  Foraminal stenosis of lumbar region  Chronic; stable. Continue current treatment plan as previously prescribed by PCP.    20. DDD (degenerative disc disease), lumbar  Chronic; stable. Continue current treatment plan as previously prescribed by PCP.    21. Lumbar spondylosis  Chronic; stable. Continue current treatment plan as previously prescribed by PCP.    22. Other osteoarthritis of spine, lumbar region  Chronic; stable. Continue current treatment plan as previously prescribed by PCP.    23. Other idiopathic scoliosis, lumbar region  Chronic; stable. Continue current treatment plan as previously prescribed by PCP.    24. Decreased functional mobility  Chronic; stable. Continue current treatment plan as previously prescribed by PCP.    25. Congenital deformities of feet  Chronic; stable. Continue current treatment plan as previously prescribed by PCP.    26. Long term (current) use of anticoagulants  Chronic; stable. Continue current treatment plan as previously prescribed by PCP.    27. Insomnia, unspecified type  Chronic; stable. Continue current treatment plan as previously prescribed by PCP.    28. Ex-smoker  Chronic; stable. Continue current treatment plan as previously prescribed by PCP.    29. Encounter for screening mammogram for breast cancer  - Mammo Digital Screening Bilat; Future      Provided Talia with a 5-10 year written screening schedule and personal prevention plan. Recommendations were developed using the USPSTF age appropriate recommendations. Education, counseling, and referrals were provided as needed. After Visit Summary printed and given to patient which includes a list of additional screenings\tests needed.    Review for Opioid Screening: Patient does not have rx for Opioids.  Review for Substance Use Disorders: Patient does not use substance.    Follow up for AWV in 1 year.    Debbie Ventura NP    I offered to discuss advanced care planning, including how to pick a person who would  make decisions for you if you were unable to make them for yourself, called a health care power of , and what kind of decisions you might make such as use of life sustaining treatments such as ventilators and tube feeding when faced with a life limiting illness recorded on a living will that they will need to know. (How you want to be cared for as you near the end of your natural life)     X Patient is interested in learning more about how to make advanced directives.  I provided them paperwork and offered to discuss this with them.

## 2023-09-29 NOTE — PATIENT INSTRUCTIONS
Counseling and Referral of Other Preventative  (Italic type indicates deductible and co-insurance are waived)    Patient Name: Talia Dillon  Today's Date: 9/29/2023    Health Maintenance         Date Due Completion Date    Shingles Vaccine (2 of 2) 07/22/2021 5/27/2021    COVID-19 Vaccine (4 - Moderna series) 03/07/2023 11/7/2022    Influenza Vaccine (1) 09/01/2023 11/7/2022    Mammogram 09/08/2023 9/8/2022    Override on 4/5/2017: Not Clinically Appropriate (dec)    LDCT Lung Screen 12/05/2023 12/5/2022    Lipid Panel 06/11/2025 6/11/2020    DEXA Scan 06/28/2025 6/28/2022    Override on 4/5/2017: Done    TETANUS VACCINE 04/05/2027 4/5/2017 (ClinicallyNA)    Override on 4/5/2017: Not Clinically Appropriate    Colorectal Cancer Screening 05/24/2028 5/24/2018          Orders Placed This Encounter   Procedures    Mammo Digital Screening Bilat     The following information is provided to all patients.  This information is to help you find resources for any of the problems found today that may be affecting your health:                Living healthy guide: www.[facility].[place].gov      Understanding Diabetes: www.diabetes.org      Eating healthy: www.cdc.gov/healthyweight      CDC home safety checklist: www.cdc.gov/steadi/patient.html      Agency on Aging: www.goea.[place].[facility]      Alcoholics anonymous (AA): www.aa.org      Physical Activity: www.amos.nih.gov/um5rhju      Tobacco use: www.quitwithusla.org

## 2023-10-01 PROBLEM — E66.01 SEVERE OBESITY (BMI 35.0-39.9) WITH COMORBIDITY: Status: ACTIVE | Noted: 2023-10-01

## 2023-10-17 ENCOUNTER — TELEPHONE (OUTPATIENT)
Dept: UROLOGY | Facility: CLINIC | Age: 73
End: 2023-10-17
Payer: MEDICARE

## 2023-10-17 ENCOUNTER — HOSPITAL ENCOUNTER (OUTPATIENT)
Dept: RADIOLOGY | Facility: HOSPITAL | Age: 73
Discharge: HOME | End: 2023-10-17
Attending: NURSE PRACTITIONER
Payer: MEDICARE

## 2023-10-17 DIAGNOSIS — Z12.31 ENCOUNTER FOR SCREENING MAMMOGRAM FOR BREAST CANCER: ICD-10-CM

## 2023-10-17 PROCEDURE — 77067 SCR MAMMO BI INCL CAD: CPT | Mod: 26,,, | Performed by: RADIOLOGY

## 2023-10-17 PROCEDURE — 77067 MAMMO DIGITAL SCREENING BILAT WITH TOMO: ICD-10-PCS | Mod: 26,,, | Performed by: RADIOLOGY

## 2023-10-17 PROCEDURE — 77063 BREAST TOMOSYNTHESIS BI: CPT | Mod: 26,,, | Performed by: RADIOLOGY

## 2023-10-17 PROCEDURE — 77067 SCR MAMMO BI INCL CAD: CPT | Mod: TC,PN

## 2023-10-17 PROCEDURE — 77063 MAMMO DIGITAL SCREENING BILAT WITH TOMO: ICD-10-PCS | Mod: 26,,, | Performed by: RADIOLOGY

## 2023-10-17 NOTE — TELEPHONE ENCOUNTER
Pt informed via phone call.    ----- Message from Debbie Ventura NP sent at 10/17/2023  4:13 PM CDT -----  Please inform patient via telephone that her mammogram was normal. There wass no mammographic evidence of malignancy.

## 2023-10-26 ENCOUNTER — HOSPITAL ENCOUNTER (EMERGENCY)
Facility: HOSPITAL | Age: 73
Discharge: HOME | End: 2023-10-26
Attending: EMERGENCY MEDICINE
Payer: MEDICARE

## 2023-10-26 VITALS
HEIGHT: 65 IN | WEIGHT: 220 LBS | OXYGEN SATURATION: 94 % | HEART RATE: 105 BPM | TEMPERATURE: 98 F | DIASTOLIC BLOOD PRESSURE: 94 MMHG | SYSTOLIC BLOOD PRESSURE: 149 MMHG | RESPIRATION RATE: 20 BRPM | BODY MASS INDEX: 36.65 KG/M2

## 2023-10-26 DIAGNOSIS — J20.9 COPD (CHRONIC OBSTRUCTIVE PULMONARY DISEASE) WITH ACUTE BRONCHITIS: ICD-10-CM

## 2023-10-26 DIAGNOSIS — J44.0 COPD (CHRONIC OBSTRUCTIVE PULMONARY DISEASE) WITH ACUTE BRONCHITIS: ICD-10-CM

## 2023-10-26 DIAGNOSIS — J44.1 COPD WITH ACUTE EXACERBATION: Primary | ICD-10-CM

## 2023-10-26 LAB
INFLUENZA A, MOLECULAR: NEGATIVE
INFLUENZA B, MOLECULAR: NEGATIVE
SARS-COV-2 RDRP RESP QL NAA+PROBE: NEGATIVE
SPECIMEN SOURCE: NORMAL

## 2023-10-26 PROCEDURE — 99284 EMERGENCY DEPT VISIT MOD MDM: CPT | Mod: 25,ER

## 2023-10-26 PROCEDURE — 63700000 PHARM REV CODE 250 ALT 637 W/O HCPCS: Mod: ER | Performed by: EMERGENCY MEDICINE

## 2023-10-26 PROCEDURE — 25000242 PHARM REV CODE 250 ALT 637 W/ HCPCS: Mod: ER | Performed by: EMERGENCY MEDICINE

## 2023-10-26 PROCEDURE — 63600175 PHARM REV CODE 636 W HCPCS: Mod: ER | Performed by: EMERGENCY MEDICINE

## 2023-10-26 PROCEDURE — U0002 COVID-19 LAB TEST NON-CDC: HCPCS | Mod: ER | Performed by: EMERGENCY MEDICINE

## 2023-10-26 PROCEDURE — 94640 AIRWAY INHALATION TREATMENT: CPT | Mod: ER

## 2023-10-26 PROCEDURE — 87502 INFLUENZA DNA AMP PROBE: CPT | Mod: ER | Performed by: EMERGENCY MEDICINE

## 2023-10-26 RX ORDER — ONDANSETRON 4 MG/1
4 TABLET, ORALLY DISINTEGRATING ORAL EVERY 6 HOURS PRN
Qty: 10 TABLET | Refills: 0 | Status: SHIPPED | OUTPATIENT
Start: 2023-10-26 | End: 2023-11-03

## 2023-10-26 RX ORDER — BENZONATATE 100 MG/1
100 CAPSULE ORAL EVERY 8 HOURS PRN
Qty: 14 CAPSULE | Refills: 0 | Status: SHIPPED | OUTPATIENT
Start: 2023-10-26 | End: 2023-11-03

## 2023-10-26 RX ORDER — PREDNISONE 50 MG/1
50 TABLET ORAL DAILY
Qty: 3 TABLET | Refills: 0 | Status: SHIPPED | OUTPATIENT
Start: 2023-10-27 | End: 2023-11-03

## 2023-10-26 RX ORDER — PREDNISONE 20 MG/1
40 TABLET ORAL
Status: COMPLETED | OUTPATIENT
Start: 2023-10-26 | End: 2023-10-26

## 2023-10-26 RX ORDER — IPRATROPIUM BROMIDE AND ALBUTEROL SULFATE 2.5; .5 MG/3ML; MG/3ML
3 SOLUTION RESPIRATORY (INHALATION)
Status: COMPLETED | OUTPATIENT
Start: 2023-10-26 | End: 2023-10-26

## 2023-10-26 RX ORDER — AZITHROMYCIN 250 MG/1
500 TABLET, FILM COATED ORAL
Status: COMPLETED | OUTPATIENT
Start: 2023-10-26 | End: 2023-10-26

## 2023-10-26 RX ORDER — AZITHROMYCIN 250 MG/1
250 TABLET, FILM COATED ORAL DAILY
Qty: 4 TABLET | Refills: 0 | Status: SHIPPED | OUTPATIENT
Start: 2023-10-27 | End: 2023-11-03

## 2023-10-26 RX ADMIN — PREDNISONE 40 MG: 20 TABLET ORAL at 08:10

## 2023-10-26 RX ADMIN — AZITHROMYCIN DIHYDRATE 500 MG: 250 TABLET ORAL at 09:10

## 2023-10-26 RX ADMIN — IPRATROPIUM BROMIDE AND ALBUTEROL SULFATE 3 ML: .5; 3 SOLUTION RESPIRATORY (INHALATION) at 08:10

## 2023-10-26 NOTE — DISCHARGE INSTRUCTIONS
Please make sure you are drinking plenty of fluids.  I recommend taking ibuprofen 600 mg every 6 hours with food and/or Tylenol 650 mg every 6 hours in addition to the prescribed medication to help manage your symptoms.  Please call your primary care physician for a follow-up appointment for further evaluation and management.  Please return with any new or worsening symptoms.

## 2023-10-26 NOTE — ED PROVIDER NOTES
Encounter Date: 10/26/2023       History     Chief Complaint   Patient presents with    Cough     Pt reports cough, wheezing, and sore throat for 2 days. Hx of COPD. Pt appears SOB with exertion.      73-year-old female presents emergency department complaining of 2 or 3 days of cough, congestion, sore throat, wheezing, fatigue.  Patient has history of COPD.  Has been experiencing diminishing returns with her home neb treatments.  Cough is productive of whitish/yellow sputum.  Notes body aches but denies any specific chest pain.  Denies fever.  Does report feeling generally fatigued.  No other symptoms reported at this time.      Review of patient's allergies indicates:   Allergen Reactions    Dexamethasone Rash     Past Medical History:   Diagnosis Date    Allergy     Anemia     Anxiety     Atrial fibrillation     Cancer     skin    Cataract     Depression     Edema     Hypothyroidism     Kidney disease     PSVT (paroxysmal supraventricular tachycardia)     Thyroid disease      Past Surgical History:   Procedure Laterality Date    ADENOIDECTOMY      APPENDECTOMY      CHOLECYSTECTOMY      COLONOSCOPY  2009    repeat in 10 years     ESOPHAGOGASTRODUODENOSCOPY N/A 05/04/2023    Procedure: EGD (ESOPHAGOGASTRODUODENOSCOPY);  Surgeon: Ej Reyes MD;  Location: [facility];  Service: Endoscopy;  Laterality: N/A;    EYE SURGERY Bilateral     cataracts extraction and bilateral upper eyelid lifts    HYSTERECTOMY      INJECTION OF ANESTHETIC AGENT AROUND NERVE Bilateral 09/21/2018    Procedure: BLOCK, NERVE, MEDIAL BRANCH BLOCK BILATERAL LUMBAR L2-5;  Surgeon: Julieth Christopher MD;  Location: [facility];  Service: Pain Management;  Laterality: Bilateral;  1 of 2    INJECTION OF ANESTHETIC AGENT AROUND NERVE Bilateral 09/28/2018    Procedure: BLOCK, NERVE, MEDIAL BRANCH BLOCK BILATERAL LUMBAR L2-5;  Surgeon: Julieth Christopher MD;  Location: [facility];  Service: Pain Management;  Laterality: Bilateral;  2 of 2  PLEASE  GIVE JANICE PER MD    OOPHORECTOMY      RADIOFREQUENCY ABLATION Left 2020    Procedure: RADIOFREQUENCY ABLATION L2,3,4,5;  Surgeon: Julieth Christopher MD;  Location: [facility];  Service: Pain Management;  Laterality: Left;  LT RFA L2,3,4,5  1 of 2  OK to hold Eliquis    RADIOFREQUENCY ABLATION Right 2020    Procedure: RADIOFREQUENCY ABLATION L2,3,4,5 RIGHT   2 of 2 ok to hold eliquis;  Surgeon: Julieth Christopher MD;  Location: [facility];  Service: Pain Management;  Laterality: Right;    RADIOFREQUENCY ABLATION Left 2020    Procedure: RADIOFREQUENCY ABLATION, L2-L3-L4-L5 MEDIAL BRANCH 1 OF 2 Continue Eliquis;  Surgeon: Bogdan Webster MD;  Location: [facility];  Service: Pain Management;  Laterality: Left;    TONSILLECTOMY      TREATMENT OF CARDIAC ARRHYTHMIA N/A 2019    Procedure: CARDIOVERSION;  Surgeon: Devin You MD;  Location: [facility];  Service: Cardiology;  Laterality: N/A;  AF, KAROL, DCCV, MAC, NE, 3 Prep     Family History   Problem Relation Age of Onset    Hypertension Mother     Stroke Mother     Hypertension Father     Stroke Father     Diabetes Father     Arthritis Sister     Diabetes Sister     Heart attack Sister     Coronary artery disease Sister     Diabetes Sister     Sleep apnea Daughter     Mental illness Daughter     Bipolar disorder Daughter      Social History     Tobacco Use    Smoking status: Former     Current packs/day: 0.00     Average packs/day: 1 pack/day for 50.0 years (50.0 ttl pk-yrs)     Types: Cigarettes, Vaping with nicotine     Start date:      Quit date:      Years since quittin.8    Smokeless tobacco: Never    Tobacco comments:     No longer vaping   Substance Use Topics    Alcohol use: No    Drug use: No     Review of Systems   Constitutional:  Positive for fatigue. Negative for chills and fever.   HENT:  Positive for congestion.    Respiratory:  Positive for cough and shortness of breath.    Cardiovascular:  Negative for chest  pain.   Gastrointestinal:  Negative for abdominal pain.   Musculoskeletal:  Negative for back pain.   Neurological:  Negative for light-headedness and headaches.       Physical Exam     Initial Vitals [10/26/23 0824]   BP Pulse Resp Temp SpO2   (!) 171/104 104 20 97.9 °F (36.6 °C) 95 %      MAP       --         Physical Exam    Nursing note and vitals reviewed.  Constitutional: She appears well-developed and well-nourished. No distress.   HENT:   Head: Normocephalic and atraumatic.   Berkley pharyngeal erythema without swelling or exudate   Eyes: Conjunctivae and EOM are normal. Pupils are equal, round, and reactive to light.   Neck: Neck supple. No tracheal deviation present.   Normal range of motion.  Cardiovascular:  Normal rate and intact distal pulses.           Pulmonary/Chest: No respiratory distress.   Musculoskeletal:         General: No tenderness. Normal range of motion.      Cervical back: Normal range of motion and neck supple.     Neurological: She is alert and oriented to person, place, and time. She has normal strength. No cranial nerve deficit. GCS score is 15. GCS eye subscore is 4. GCS verbal subscore is 5. GCS motor subscore is 6.   Skin: Skin is warm and dry.         ED Course   Procedures  Labs Reviewed   INFLUENZA A & B BY MOLECULAR   SARS-COV-2 RNA AMPLIFICATION, QUAL    Narrative:     Is the patient symptomatic?->Yes          Imaging Results    None          Medications   azithromycin tablet 500 mg (has no administration in time range)   predniSONE tablet 40 mg (40 mg Oral Given 10/26/23 0830)   albuterol-ipratropium 2.5 mg-0.5 mg/3 mL nebulizer solution 3 mL (3 mLs Nebulization Given 10/26/23 0850)     Medical Decision Making  73-year-old female presents emergency department complaining cough, congestion, sore throat, wheezing, fatigue      Differential: URI, COVID, influenza, viral syndrome, COPD exacerbation, COPD bronchitis      Patient given prednisone, neb treatment, azithromycin.   Re-evaluation, patient little more tachycardic after the albuterol administration but otherwise reports feeling somewhat better.  Discussed her results as well as plan to discharge with prescriptions for azithromycin, prednisone, Tessalon, and Zofran, instructions home management, over-the-counter medications, follow up with primary care physician, strict return precautions.  Patient expressed good understanding and is comfortable with discharge at this time.    Amount and/or Complexity of Data Reviewed  External Data Reviewed: notes.     Details: Reviewed most recent PCP note documenting baseline medications and past medical history  Labs: ordered.     Details: COVID, influenza negative    Risk  OTC drugs.  Prescription drug management.                               Clinical Impression:   Final diagnoses:  [J44.1] COPD with acute exacerbation (Primary)  [J44.0, J20.9] COPD (chronic obstructive pulmonary disease) with acute bronchitis        ED Disposition Condition    Discharge Stable          ED Prescriptions       Medication Sig Dispense Start Date End Date Auth. Provider    azithromycin (Z-SONIA) 250 MG tablet Take 1 tablet (250 mg total) by mouth once daily. 4 tablet 10/27/2023 -- Helder Valencia MD    predniSONE (DELTASONE) 50 MG Tab Take 1 tablet (50 mg total) by mouth once daily. for 3 days 3 tablet 10/27/2023 10/30/2023 Helder Valencia MD    benzonatate (TESSALON) 100 MG capsule Take 1 capsule (100 mg total) by mouth every 8 (eight) hours as needed for Cough. 14 capsule 10/26/2023 11/5/2023 Helder Valencia MD    ondansetron (ZOFRAN-ODT) 4 MG TbDL Take 1 tablet (4 mg total) by mouth every 6 (six) hours as needed (Nausea). 10 tablet 10/26/2023 -- Helder Valencia MD          Follow-up Information       Follow up With Specialties Details Why Contact Info    James Vallecillo MD Family Medicine Schedule an appointment as soon as possible for a visit   [address] [place] [address]  [id number]  [phone]               Helder Valencia MD  10/26/23 0957

## 2023-10-27 ENCOUNTER — TELEPHONE (OUTPATIENT)
Dept: FAMILY MEDICINE | Facility: CLINIC | Age: 73
End: 2023-10-27
Payer: MEDICARE

## 2023-10-27 NOTE — TELEPHONE ENCOUNTER
Spoke to pt and scheduled appt for 11/3/23 with Dr. Vallecillo. Pt confirmed appt    ----- Message from Marilyn Michel sent at 10/27/2023 10:30 AM CDT -----  Regarding: Post ED visit follow up appt within 7 days of d/c date 10/26/23    Good morning: Pt was seen in the ED on 10/26/23 for COPD with acute exacerbation; COPD (chronic obstructive pulmonary disease) with acute bronchitis.  Pt requires a Post ED visit follow up appt within 7 days of d/c date. Please contact pt to schedule a follow up appt with any available provider by 11/2/23.     Thank you,  Marilyn Michel

## 2023-11-03 ENCOUNTER — OFFICE VISIT (OUTPATIENT)
Dept: FAMILY MEDICINE | Facility: CLINIC | Age: 73
End: 2023-11-03
Payer: MEDICARE

## 2023-11-03 ENCOUNTER — TELEPHONE (OUTPATIENT)
Dept: FAMILY MEDICINE | Facility: CLINIC | Age: 73
End: 2023-11-03

## 2023-11-03 VITALS
HEIGHT: 65 IN | BODY MASS INDEX: 37.34 KG/M2 | TEMPERATURE: 98 F | DIASTOLIC BLOOD PRESSURE: 70 MMHG | WEIGHT: 224.13 LBS | SYSTOLIC BLOOD PRESSURE: 108 MMHG | OXYGEN SATURATION: 86 % | HEART RATE: 83 BPM

## 2023-11-03 DIAGNOSIS — N18.4 CKD STAGE G4/A2, GFR 15-29 AND ALBUMIN CREATININE RATIO 30-299 MG/G: ICD-10-CM

## 2023-11-03 DIAGNOSIS — J44.9 CHRONIC OBSTRUCTIVE PULMONARY DISEASE, UNSPECIFIED COPD TYPE: ICD-10-CM

## 2023-11-03 DIAGNOSIS — J42 CHRONIC BRONCHITIS, UNSPECIFIED CHRONIC BRONCHITIS TYPE: ICD-10-CM

## 2023-11-03 DIAGNOSIS — R11.0 NAUSEA: ICD-10-CM

## 2023-11-03 DIAGNOSIS — R09.02 HYPOXIA: ICD-10-CM

## 2023-11-03 DIAGNOSIS — I48.11 LONGSTANDING PERSISTENT ATRIAL FIBRILLATION: ICD-10-CM

## 2023-11-03 DIAGNOSIS — F41.9 ANXIETY: Primary | ICD-10-CM

## 2023-11-03 DIAGNOSIS — R06.09 DYSPNEA ON EXERTION: ICD-10-CM

## 2023-11-03 DIAGNOSIS — G47.00 INSOMNIA, UNSPECIFIED TYPE: ICD-10-CM

## 2023-11-03 DIAGNOSIS — F33.9 MAJOR DEPRESSION, RECURRENT, CHRONIC: ICD-10-CM

## 2023-11-03 DIAGNOSIS — Z87.891 EX-SMOKER: ICD-10-CM

## 2023-11-03 PROCEDURE — 1126F PR PAIN SEVERITY QUANTIFIED, NO PAIN PRESENT: ICD-10-PCS | Mod: CPTII,S$GLB,, | Performed by: FAMILY MEDICINE

## 2023-11-03 PROCEDURE — 1159F MED LIST DOCD IN RCRD: CPT | Mod: CPTII,S$GLB,, | Performed by: FAMILY MEDICINE

## 2023-11-03 PROCEDURE — 90694 VACC AIIV4 NO PRSRV 0.5ML IM: CPT | Mod: S$GLB,,, | Performed by: FAMILY MEDICINE

## 2023-11-03 PROCEDURE — 3008F PR BODY MASS INDEX (BMI) DOCUMENTED: ICD-10-PCS | Mod: CPTII,S$GLB,, | Performed by: FAMILY MEDICINE

## 2023-11-03 PROCEDURE — 3066F PR DOCUMENTATION OF TREATMENT FOR NEPHROPATHY: ICD-10-PCS | Mod: CPTII,S$GLB,, | Performed by: FAMILY MEDICINE

## 2023-11-03 PROCEDURE — 1126F AMNT PAIN NOTED NONE PRSNT: CPT | Mod: CPTII,S$GLB,, | Performed by: FAMILY MEDICINE

## 2023-11-03 PROCEDURE — 99214 OFFICE O/P EST MOD 30 MIN: CPT | Mod: 25,S$GLB,, | Performed by: FAMILY MEDICINE

## 2023-11-03 PROCEDURE — 3074F SYST BP LT 130 MM HG: CPT | Mod: CPTII,S$GLB,, | Performed by: FAMILY MEDICINE

## 2023-11-03 PROCEDURE — G0008 ADMIN INFLUENZA VIRUS VAC: HCPCS | Mod: S$GLB,,, | Performed by: FAMILY MEDICINE

## 2023-11-03 PROCEDURE — G0008 FLU VACCINE - QUADRIVALENT - ADJUVANTED: ICD-10-PCS | Mod: S$GLB,,, | Performed by: FAMILY MEDICINE

## 2023-11-03 PROCEDURE — 3288F FALL RISK ASSESSMENT DOCD: CPT | Mod: CPTII,S$GLB,, | Performed by: FAMILY MEDICINE

## 2023-11-03 PROCEDURE — 3066F NEPHROPATHY DOC TX: CPT | Mod: CPTII,S$GLB,, | Performed by: FAMILY MEDICINE

## 2023-11-03 PROCEDURE — 1101F PT FALLS ASSESS-DOCD LE1/YR: CPT | Mod: CPTII,S$GLB,, | Performed by: FAMILY MEDICINE

## 2023-11-03 PROCEDURE — 90694 FLU VACCINE - QUADRIVALENT - ADJUVANTED: ICD-10-PCS | Mod: S$GLB,,, | Performed by: FAMILY MEDICINE

## 2023-11-03 PROCEDURE — 3288F PR FALLS RISK ASSESSMENT DOCUMENTED: ICD-10-PCS | Mod: CPTII,S$GLB,, | Performed by: FAMILY MEDICINE

## 2023-11-03 PROCEDURE — 99214 PR OFFICE/OUTPT VISIT, EST, LEVL IV, 30-39 MIN: ICD-10-PCS | Mod: 25,S$GLB,, | Performed by: FAMILY MEDICINE

## 2023-11-03 PROCEDURE — 1159F PR MEDICATION LIST DOCUMENTED IN MEDICAL RECORD: ICD-10-PCS | Mod: CPTII,S$GLB,, | Performed by: FAMILY MEDICINE

## 2023-11-03 PROCEDURE — 1101F PR PT FALLS ASSESS DOC 0-1 FALLS W/OUT INJ PAST YR: ICD-10-PCS | Mod: CPTII,S$GLB,, | Performed by: FAMILY MEDICINE

## 2023-11-03 PROCEDURE — 3078F DIAST BP <80 MM HG: CPT | Mod: CPTII,S$GLB,, | Performed by: FAMILY MEDICINE

## 2023-11-03 PROCEDURE — 3074F PR MOST RECENT SYSTOLIC BLOOD PRESSURE < 130 MM HG: ICD-10-PCS | Mod: CPTII,S$GLB,, | Performed by: FAMILY MEDICINE

## 2023-11-03 PROCEDURE — 1160F PR REVIEW ALL MEDS BY PRESCRIBER/CLIN PHARMACIST DOCUMENTED: ICD-10-PCS | Mod: CPTII,S$GLB,, | Performed by: FAMILY MEDICINE

## 2023-11-03 PROCEDURE — 3008F BODY MASS INDEX DOCD: CPT | Mod: CPTII,S$GLB,, | Performed by: FAMILY MEDICINE

## 2023-11-03 PROCEDURE — 3078F PR MOST RECENT DIASTOLIC BLOOD PRESSURE < 80 MM HG: ICD-10-PCS | Mod: CPTII,S$GLB,, | Performed by: FAMILY MEDICINE

## 2023-11-03 PROCEDURE — 1160F RVW MEDS BY RX/DR IN RCRD: CPT | Mod: CPTII,S$GLB,, | Performed by: FAMILY MEDICINE

## 2023-11-03 RX ORDER — ONDANSETRON 4 MG/1
TABLET, ORALLY DISINTEGRATING ORAL
Qty: 90 TABLET | Refills: 3 | Status: SHIPPED | OUTPATIENT
Start: 2023-11-03

## 2023-11-03 RX ORDER — ALBUTEROL SULFATE 90 UG/1
2 AEROSOL, METERED RESPIRATORY (INHALATION) EVERY 4 HOURS PRN
Qty: 36 G | Refills: 11 | Status: SHIPPED | OUTPATIENT
Start: 2023-11-03 | End: 2024-11-02

## 2023-11-03 RX ORDER — ESCITALOPRAM OXALATE 20 MG/1
20 TABLET ORAL DAILY
Qty: 90 TABLET | Refills: 3 | Status: SHIPPED | OUTPATIENT
Start: 2023-11-03 | End: 2024-02-12 | Stop reason: SDUPTHER

## 2023-11-03 RX ORDER — UMECLIDINIUM BROMIDE AND VILANTEROL TRIFENATATE 62.5; 25 UG/1; UG/1
1 POWDER RESPIRATORY (INHALATION) DAILY
Qty: 60 EACH | Refills: 11 | Status: SHIPPED | OUTPATIENT
Start: 2023-11-03

## 2023-11-03 RX ORDER — ALPRAZOLAM 0.25 MG/1
0.25 TABLET ORAL 3 TIMES DAILY
Qty: 90 TABLET | Refills: 5 | Status: SHIPPED | OUTPATIENT
Start: 2023-11-03

## 2023-11-03 RX ORDER — TRAZODONE HYDROCHLORIDE 50 MG/1
50 TABLET ORAL NIGHTLY
Qty: 90 TABLET | Refills: 11 | Status: SHIPPED | OUTPATIENT
Start: 2023-11-03 | End: 2024-02-12 | Stop reason: SDUPTHER

## 2023-11-03 NOTE — TELEPHONE ENCOUNTER
Spoke to pt and stated her chest x-ray was done at [facility]. Pt will have to call there medical records to get that information.     Pt questioning the medications that were prescribed today. I informed pt trazodone is to be taken at night, lexapro is daily during the day. Xanax is prn. Pt verbalized understanding.     ----- Message from Josette Segura sent at 11/3/2023  1:15 PM CDT -----  Contact: pt  Type:  Need chest xray report    Who Called: pt  Would the patient rather a call back or a response via [facility]? call  Best Call Back Number: [phone]  Additional Information:   Pt requesting a call regarding how can she received the report

## 2023-11-03 NOTE — PROGRESS NOTES
Pt is looking to move to the [facility]. Took 2nd reading for O2. Pt sitting at 86% before leaving the clinic.

## 2023-11-03 NOTE — PROGRESS NOTES
"Subjective:      Patient ID: Talia Dillon is a 73 y.o. female.    Chief Complaint: No chief complaint on file.      Vitals:    11/03/23 0950 11/03/23 1043   BP: 108/70    Pulse: 83    Temp: 98.3 °F (36.8 °C)    SpO2: (!) 88% (!) 86%   Weight: 101.6 kg (224 lb 1.6 oz)    Height: 5' 5" (1.651 m)         HPI   Anxiety is not controlled; on xanax tid, out for 2 weeks  Irritable, agitated, goes off, family notices her irritability    Trouble sleeping, taking 1.5 ambien nightly and benadryl  Needs refills  Moving to a facility in [place]  Oxygen sat 88, no home oxygen  Ex smoker, Rx oxygen 2 l    Problem List  Patient Active Problem List   Diagnosis    Scoliosis    Anxiety reaction    Hypothyroidism due to acquired atrophy of thyroid    Insomnia    Localized edema    Congenital deformities of feet    Leg pain, anterior    Cervical neuralgia    History of PSVT (paroxysmal supraventricular tachycardia)    CKD stage G4/A2, GFR 15-29 and albumin creatinine ratio  mg/g    Cervical spinal stenosis    Foraminal stenosis of lumbar region    Decreased functional mobility    DDD (degenerative disc disease), lumbar    Vitamin D deficiency    Elevated sed rate    Hyperlipidemia    Lumbar spondylosis    Chronic kidney disease-mineral and bone disorder    Longstanding persistent atrial fibrillation    Essential hypertension    Complete tear of left rotator cuff    Long term (current) use of anticoagulants    Osteoarthritis of lumbar spine    Chronic pain    Ascending aortic aneurysm    Lung nodule    Dyspnea on exertion    Ex-smoker    Chronic obstructive pulmonary disease    Seasonal allergies    Major depression, recurrent, chronic    Noncompliance    Pre-op evaluation    Severe obesity (BMI 35.0-39.9) with comorbidity        ALLERGIES:   Review of patient's allergies indicates:   Allergen Reactions    Dexamethasone Rash       MEDS:   Current Outpatient Medications:     albuterol-ipratropium (DUO-NEB) 2.5 mg-0.5 mg/3 mL " nebulizer solution, Take 3 mLs by nebulization every 6 (six) hours as needed for Wheezing. Rescue, Disp: 400 each, Rfl: 3    apixaban (ELIQUIS) 5 mg Tab, Take 1 tablet (5 mg total) by mouth 2 (two) times daily., Disp: 180 tablet, Rfl: 3    atorvastatin (LIPITOR) 10 MG tablet, TAKE 1 TABLET BY MOUTH ONCCE DAILY, Disp: 90 tablet, Rfl: 3    cholecalciferol, vitamin D3, 125 mcg (5,000 unit) capsule, TAKE 1 TABLET (5,000 UNITS TOTAL) BY MOUTH ONCE DAILY., Disp: 90 capsule, Rfl: 3    gabapentin (NEURONTIN) 400 MG capsule, Take 1 capsule (400 mg total) by mouth 2 (two) times daily., Disp: 180 capsule, Rfl: 3    levothyroxine (SYNTHROID) 200 MCG tablet, TAKE 1 TABLET BY MOUTH ONCE DAILY., Disp: 90 tablet, Rfl: 3    metoprolol succinate (TOPROL-XL) 100 MG 24 hr tablet, TAKE 3 TABLETS (300 MG TOTAL) BY MOUTH ONCE DAILY., Disp: 270 tablet, Rfl: 3    pantoprazole (PROTONIX) 40 MG tablet, TAKE 1 TABLET BY MOUTH EVERY DAY, Disp: 90 tablet, Rfl: 1    VISINE 0.05 % Drop, SMARTSI Drop(s) In Eye(s) PRN, Disp: , Rfl:     zolpidem (AMBIEN) 10 mg Tab, Take 1 tablet (10 mg total) by mouth nightly as needed (insomnia)., Disp: 90 tablet, Rfl: 1    albuterol (PROVENTIL/VENTOLIN HFA) 90 mcg/actuation inhaler, Inhale 2 puffs into the lungs every 4 (four) hours as needed for Wheezing (For shortness of breath)., Disp: 36 g, Rfl: 11    ALPRAZolam (XANAX) 0.25 MG tablet, Take 1 tablet (0.25 mg total) by mouth 3 (three) times daily., Disp: 90 tablet, Rfl: 5    EScitalopram oxalate (LEXAPRO) 20 MG tablet, Take 1 tablet (20 mg total) by mouth once daily. For depression, Disp: 90 tablet, Rfl: 3    ondansetron (ZOFRAN-ODT) 4 MG TbDL, DISSOLVE 1 TABLET BY MOUTH EVERY 6 HOURS AS NEEDED (NAUSEA)., Disp: 90 tablet, Rfl: 3    traZODone (DESYREL) 50 MG tablet, Take 1 tablet (50 mg total) by mouth every evening. For sleep, depression and anxiety, Disp: 90 tablet, Rfl: 11    umeclidinium-vilanteroL (ANORO ELLIPTA) 62.5-25 mcg/actuation DsDv, Inhale 1 puff  into the lungs once daily. Controller, Disp: 60 each, Rfl: 11      History:  Current Providers as of 11/3/2023  PCP: James Vallecillo MD  Care Team Provider: Robert Ochoa MD  Care Team Provider: Laci Barker MD  Care Team Provider: Devin You MD  Care Team Provider: Farhana Reyes NP  Care Team Provider: Marilyn Michel  Encounter Provider: James Vallecillo MD, starting on Fri Nov 3, 2023 12:00 AM  Referring Provider: not found, starting on Fri Nov 3, 2023 12:00 AM  Consulting Physician: James Vallecillo MD, starting on Fri Oct 27, 2023 11:53 AM (Active)   Past Medical History:   Diagnosis Date    Allergy     Anemia     Anxiety     Atrial fibrillation     Cancer     skin    Cataract     Depression     Edema     Hypothyroidism     Kidney disease     PSVT (paroxysmal supraventricular tachycardia)     Thyroid disease      Past Surgical History:   Procedure Laterality Date    ADENOIDECTOMY      APPENDECTOMY      CHOLECYSTECTOMY      COLONOSCOPY  2009    repeat in 10 years     ESOPHAGOGASTRODUODENOSCOPY N/A 05/04/2023    Procedure: EGD (ESOPHAGOGASTRODUODENOSCOPY);  Surgeon: Ej Reyes MD;  Location: [facility];  Service: Endoscopy;  Laterality: N/A;    EYE SURGERY Bilateral     cataracts extraction and bilateral upper eyelid lifts    HYSTERECTOMY      INJECTION OF ANESTHETIC AGENT AROUND NERVE Bilateral 09/21/2018    Procedure: BLOCK, NERVE, MEDIAL BRANCH BLOCK BILATERAL LUMBAR L2-5;  Surgeon: Julieth Christopher MD;  Location: [facility];  Service: Pain Management;  Laterality: Bilateral;  1 of 2    INJECTION OF ANESTHETIC AGENT AROUND NERVE Bilateral 09/28/2018    Procedure: BLOCK, NERVE, MEDIAL BRANCH BLOCK BILATERAL LUMBAR L2-5;  Surgeon: Julieth Christopher MD;  Location: [facility];  Service: Pain Management;  Laterality: Bilateral;  2 of 2  PLEASE GIVE NIRAVAM PER MD    OOPHORECTOMY      RADIOFREQUENCY ABLATION Left 02/14/2020    Procedure: RADIOFREQUENCY ABLATION L2,3,4,5;  Surgeon:  Julieth Christopher MD;  [facility];  Service: Pain Management;  Laterality: Left;  LT RFA L2,3,4,5  1 of 2  OK to hold Eliquis    RADIOFREQUENCY ABLATION Right 2020    Procedure: RADIOFREQUENCY ABLATION L2,3,4,5 RIGHT   2 of 2 ok to hold eliquis;  Surgeon: Julieth Christopher MD;  [facility];  Service: Pain Management;  Laterality: Right;    RADIOFREQUENCY ABLATION Left 2020    Procedure: RADIOFREQUENCY ABLATION, L2-L3-L4-L5 MEDIAL BRANCH 1 OF 2 Continue Eliquis;  Surgeon: Bogdan Webster MD;  Location: [facility];  Service: Pain Management;  Laterality: Left;    TONSILLECTOMY      TREATMENT OF CARDIAC ARRHYTHMIA N/A 2019    Procedure: CARDIOVERSION;  Surgeon: Devin You MD;  Location: [facility];  Service: Cardiology;  Laterality: N/A;  AF, KAROL, DCCV, MAC, MO, 3 Prep     Social History     Tobacco Use    Smoking status: Former     Current packs/day: 0.00     Average packs/day: 1 pack/day for 50.0 years (50.0 ttl pk-yrs)     Types: Cigarettes, Vaping with nicotine     Start date:      Quit date:      Years since quittin.8    Smokeless tobacco: Never    Tobacco comments:     No longer vaping   Substance Use Topics    Alcohol use: No    Drug use: No         Review of Systems   Constitutional: Negative.    HENT: Negative.     Respiratory:  Positive for shortness of breath.    Cardiovascular: Negative.    Gastrointestinal: Negative.    Endocrine: Negative.    Genitourinary: Negative.    Musculoskeletal: Negative.    Psychiatric/Behavioral:  Positive for dysphoric mood and sleep disturbance. The patient is nervous/anxious.    All other systems reviewed and are negative.    Objective:     Physical Exam  Vitals and nursing note reviewed.   Constitutional:       Appearance: She is well-developed.   HENT:      Head: Normocephalic.   Eyes:      Conjunctiva/sclera: Conjunctivae normal.      Pupils: Pupils are equal, round, and reactive to light.   Cardiovascular:       Rate and Rhythm: Normal rate and regular rhythm.      Heart sounds: Normal heart sounds.   Pulmonary:      Effort: Pulmonary effort is normal.      Breath sounds: Normal breath sounds.   Musculoskeletal:         General: Normal range of motion.      Cervical back: Normal range of motion and neck supple.   Skin:     General: Skin is warm and dry.   Neurological:      General: No focal deficit present.      Mental Status: She is alert and oriented to person, place, and time. Mental status is at baseline.      Deep Tendon Reflexes: Reflexes are normal and symmetric.   Psychiatric:         Mood and Affect: Mood normal.         Behavior: Behavior normal.         Thought Content: Thought content normal.         Judgment: Judgment normal.             Assessment:     1. Anxiety    2. Nausea    3. Major depression, recurrent, chronic    4. Chronic bronchitis, unspecified chronic bronchitis type    5. Dyspnea on exertion    6. Longstanding persistent atrial fibrillation    7. Hypoxia    8. Chronic obstructive pulmonary disease, unspecified COPD type    9. CKD stage G4/A2, GFR 15-29 and albumin creatinine ratio  mg/g    10. Ex-smoker    11. Insomnia, unspecified type      Plan:        Medication List            Accurate as of November 3, 2023 11:59 PM. If you have any questions, ask your nurse or doctor.                START taking these medications      traZODone 50 MG tablet  Commonly known as: DESYREL  Take 1 tablet (50 mg total) by mouth every evening. For sleep, depression and anxiety  Started by: James Vallecillo MD            CHANGE how you take these medications      EScitalopram oxalate 20 MG tablet  Commonly known as: LEXAPRO  Take 1 tablet (20 mg total) by mouth once daily. For depression  What changed: medication strength  Changed by: James Vallecillo MD            CONTINUE taking these medications      albuterol 90 mcg/actuation inhaler  Commonly known as: PROVENTIL/VENTOLIN HFA  Inhale 2 puffs into the lungs  every 4 (four) hours as needed for Wheezing (For shortness of breath).     albuterol-ipratropium 2.5 mg-0.5 mg/3 mL nebulizer solution  Commonly known as: DUO-NEB  Take 3 mLs by nebulization every 6 (six) hours as needed for Wheezing. Rescue     ALPRAZolam 0.25 MG tablet  Commonly known as: XANAX  Take 1 tablet (0.25 mg total) by mouth 3 (three) times daily.     ANORO ELLIPTA 62.5-25 mcg/actuation Dsdv  Generic drug: umeclidinium-vilanteroL  Inhale 1 puff into the lungs once daily. Controller     apixaban 5 mg Tab  Commonly known as: ELIQUIS  Take 1 tablet (5 mg total) by mouth 2 (two) times daily.     atorvastatin 10 MG tablet  Commonly known as: LIPITOR  TAKE 1 TABLET BY MOUTH ONCCE DAILY     cholecalciferol (vitamin D3) 125 mcg (5,000 unit) capsule  TAKE 1 TABLET (5,000 UNITS TOTAL) BY MOUTH ONCE DAILY.     gabapentin 400 MG capsule  Commonly known as: NEURONTIN  Take 1 capsule (400 mg total) by mouth 2 (two) times daily.     levothyroxine 200 MCG tablet  Commonly known as: SYNTHROID  TAKE 1 TABLET BY MOUTH ONCE DAILY.     metoprolol succinate 100 MG 24 hr tablet  Commonly known as: TOPROL-XL  TAKE 3 TABLETS (300 MG TOTAL) BY MOUTH ONCE DAILY.     ondansetron 4 MG Tbdl  Commonly known as: ZOFRAN-ODT  DISSOLVE 1 TABLET BY MOUTH EVERY 6 HOURS AS NEEDED (NAUSEA).     pantoprazole 40 MG tablet  Commonly known as: PROTONIX  TAKE 1 TABLET BY MOUTH EVERY DAY     VISINE 0.05 % Drop  Generic drug: tetrahydrozoline 0.05%     zolpidem 10 mg Tab  Commonly known as: AMBIEN  Take 1 tablet (10 mg total) by mouth nightly as needed (insomnia).            STOP taking these medications      predniSONE 50 MG Tab  Commonly known as: DELTASONE  Stopped by: James Vallecillo MD               Where to Get Your Medications        These medications were sent to [facility]/pharmacy #[id number] - [address] AT CORNER OF [address] [address]      Phone: [phone]   albuterol 90  mcg/actuation inhaler  ALPRAZolam 0.25 MG tablet  ANORO ELLIPTA 62.5-25 mcg/actuation Dsdv  EScitalopram oxalate 20 MG tablet  ondansetron 4 MG Tbdl  traZODone 50 MG tablet       Anxiety  -     ALPRAZolam (XANAX) 0.25 MG tablet; Take 1 tablet (0.25 mg total) by mouth 3 (three) times daily.  Dispense: 90 tablet; Refill: 5  -     EScitalopram oxalate (LEXAPRO) 20 MG tablet; Take 1 tablet (20 mg total) by mouth once daily. For depression  Dispense: 90 tablet; Refill: 3    Nausea  -     ondansetron (ZOFRAN-ODT) 4 MG TbDL; DISSOLVE 1 TABLET BY MOUTH EVERY 6 HOURS AS NEEDED (NAUSEA).  Dispense: 90 tablet; Refill: 3    Major depression, recurrent, chronic    Chronic bronchitis, unspecified chronic bronchitis type    Dyspnea on exertion  -     OXYGEN FOR HOME USE    Longstanding persistent atrial fibrillation    Hypoxia  -     OXYGEN FOR HOME USE    Chronic obstructive pulmonary disease, unspecified COPD type  -     OXYGEN FOR HOME USE    CKD stage G4/A2, GFR 15-29 and albumin creatinine ratio  mg/g    Ex-smoker    Insomnia, unspecified type    Other orders  -     Influenza (FLUAD) - Quadrivalent (Adjuvanted) *Preferred* (65+) (PF)  -     albuterol (PROVENTIL/VENTOLIN HFA) 90 mcg/actuation inhaler; Inhale 2 puffs into the lungs every 4 (four) hours as needed for Wheezing (For shortness of breath).  Dispense: 36 g; Refill: 11  -     umeclidinium-vilanteroL (ANORO ELLIPTA) 62.5-25 mcg/actuation DsDv; Inhale 1 puff into the lungs once daily. Controller  Dispense: 60 each; Refill: 11  -     traZODone (DESYREL) 50 MG tablet; Take 1 tablet (50 mg total) by mouth every evening. For sleep, depression and anxiety  Dispense: 90 tablet; Refill: 11

## 2023-11-06 ENCOUNTER — TELEPHONE (OUTPATIENT)
Dept: FAMILY MEDICINE | Facility: CLINIC | Age: 73
End: 2023-11-06
Payer: MEDICARE

## 2023-11-06 NOTE — TELEPHONE ENCOUNTER
Pt sister states pt is not able to sleep when taking the Trazodone.     Requesting  new Rx; sister is requesting to know the side effects of new medication prescribed.     Requesting Rx for strep throat, pt is out of meds that were prescribed before for this.

## 2023-11-06 NOTE — TELEPHONE ENCOUNTER
----- Message from Danielito Allen sent at 11/6/2023 11:07 AM CST -----  Type:  Needs Medical Advice    Who Called: pt sister Myesha    Symptoms (please be specific): strep throat relaps not sleeping   How long has patient had these symptoms:  last night   Pharmacy name and phone #:  [id number] - [facility] [address] [facility]   Phone: [phone]  Fax: [phone]  Would the patient rather a call back or a response via [facility]? call  Best Call Back Number: [phone]  Additional Information: pt sister would like a call regarding medication she is taking that might  be affecting     Family will also like to inform office nurses milka givens that pt will be moving into a living cent to assit with her care

## 2023-11-07 NOTE — TELEPHONE ENCOUNTER
Kamille Pulido Staff  Caller: [phone]/sister Belem (Today,  3:43 PM)  Type:  Needs Medical Advice       Who Called: pt sister Belem   Symptoms (please be specific): strep throat relaps not sleeping   How long has patient had these symptoms:  l2 nights ago   Pharmacy name and phone #:  [id number] - [place], [place] - [address] [facility] [address] [facility] [address] [facility]         Phone: [phone]   Fax:     [phone]   Would the patient rather a call back or a response via [facility]? call   Best Call Back Number: [phone]   Additional Information: pt sister would like a call regarding medication she is taking that might  be affecting     Family will also like to inform office nurses milka givens that pt will be moving into an assisted living facility with her care Pt sister states pt is not able to sleep when taking the Trazodone.      Requesting  new Rx; sister is requesting to know the side effects of new medication prescribed.      Requesting Rx for strep throat, pt is out of meds that were prescribed before for this

## 2023-11-14 ENCOUNTER — TELEPHONE (OUTPATIENT)
Dept: FAMILY MEDICINE | Facility: CLINIC | Age: 73
End: 2023-11-14

## 2023-11-14 ENCOUNTER — OFFICE VISIT (OUTPATIENT)
Dept: FAMILY MEDICINE | Facility: CLINIC | Age: 73
End: 2023-11-14
Payer: MEDICARE

## 2023-11-14 DIAGNOSIS — R53.83 FATIGUE, UNSPECIFIED TYPE: Primary | ICD-10-CM

## 2023-11-14 DIAGNOSIS — H60.333 ACUTE SWIMMER'S EAR OF BOTH SIDES: ICD-10-CM

## 2023-11-14 DIAGNOSIS — N18.4 CKD STAGE G4/A2, GFR 15-29 AND ALBUMIN CREATININE RATIO 30-299 MG/G: ICD-10-CM

## 2023-11-14 PROCEDURE — 1159F MED LIST DOCD IN RCRD: CPT | Mod: CPTII,95,, | Performed by: STUDENT IN AN ORGANIZED HEALTH CARE EDUCATION/TRAINING PROGRAM

## 2023-11-14 PROCEDURE — 99214 PR OFFICE/OUTPT VISIT, EST, LEVL IV, 30-39 MIN: ICD-10-PCS | Mod: 95,,, | Performed by: STUDENT IN AN ORGANIZED HEALTH CARE EDUCATION/TRAINING PROGRAM

## 2023-11-14 PROCEDURE — 1159F PR MEDICATION LIST DOCUMENTED IN MEDICAL RECORD: ICD-10-PCS | Mod: CPTII,95,, | Performed by: STUDENT IN AN ORGANIZED HEALTH CARE EDUCATION/TRAINING PROGRAM

## 2023-11-14 PROCEDURE — 1160F PR REVIEW ALL MEDS BY PRESCRIBER/CLIN PHARMACIST DOCUMENTED: ICD-10-PCS | Mod: CPTII,95,, | Performed by: STUDENT IN AN ORGANIZED HEALTH CARE EDUCATION/TRAINING PROGRAM

## 2023-11-14 PROCEDURE — 3066F PR DOCUMENTATION OF TREATMENT FOR NEPHROPATHY: ICD-10-PCS | Mod: CPTII,95,, | Performed by: STUDENT IN AN ORGANIZED HEALTH CARE EDUCATION/TRAINING PROGRAM

## 2023-11-14 PROCEDURE — 1160F RVW MEDS BY RX/DR IN RCRD: CPT | Mod: CPTII,95,, | Performed by: STUDENT IN AN ORGANIZED HEALTH CARE EDUCATION/TRAINING PROGRAM

## 2023-11-14 PROCEDURE — 3066F NEPHROPATHY DOC TX: CPT | Mod: CPTII,95,, | Performed by: STUDENT IN AN ORGANIZED HEALTH CARE EDUCATION/TRAINING PROGRAM

## 2023-11-14 PROCEDURE — 99214 OFFICE O/P EST MOD 30 MIN: CPT | Mod: 95,,, | Performed by: STUDENT IN AN ORGANIZED HEALTH CARE EDUCATION/TRAINING PROGRAM

## 2023-11-14 RX ORDER — HYDROCORTISONE AND ACETIC ACID 20.75; 10.375 MG/ML; MG/ML
3 SOLUTION AURICULAR (OTIC) 2 TIMES DAILY
Qty: 10 ML | Refills: 0 | Status: SHIPPED | OUTPATIENT
Start: 2023-11-14 | End: 2023-11-24

## 2023-11-14 RX ORDER — PREDNISONE 20 MG/1
40 TABLET ORAL DAILY
Qty: 10 TABLET | Refills: 0 | Status: SHIPPED | OUTPATIENT
Start: 2023-11-14 | End: 2023-11-19

## 2023-11-14 NOTE — TELEPHONE ENCOUNTER
----- Message from Paresh García sent at 11/14/2023  3:58 PM CST -----  Pt Requesting Call Back    Who called: pt  Who called for pt:  Best call back #: [phone]  Add notes: pt said she is checking to status of oxygen that she said Dr. Vallecillo told her he would give to her (pt said she doesn't know if it's a rx or anything else)

## 2023-11-14 NOTE — TELEPHONE ENCOUNTER
Spoke to pt. I advised that I received an approval from [facility] in regards to the oxygen but was not sure to where they were sending the order to fill the order.    Provided the member services number for [facility] and recommended that she call them to check on the status.

## 2023-11-14 NOTE — PROGRESS NOTES
Primary Care Virtual Visit    The patient location is: [place], [place]  The chief complaint leading to consultation is:  ears leaking, sins congestion  Visit type: Virtual visit with synchronous audio and video  Total time spent with patient:  18 minutes  Each patient to whom he or she provides medical services by telemedicine is:  (1) informed of the relationship between the physician and patient and the respective role of any other health care provider with respect to management of the patient; and (2) notified that he or she may decline to receive medical services by telemedicine and may withdraw from such care at any time.     Patient ID: Talia Dillon is a 73 y.o. female.       Otalgia   There is pain in both ears. This is a new problem. The current episode started yesterday. The problem occurs constantly. The problem has been gradually worsening. There has been no fever. The pain is at a severity of 7/10. The pain is moderate. Associated symptoms include drainage, ear discharge, headaches, rhinorrhea and a sore throat. Pertinent negatives include no abdominal pain, coughing, diarrhea, hearing loss, neck pain, rash or vomiting. She has tried nothing for the symptoms. There is no history of a chronic ear infection, hearing loss or a tympanostomy tube.      Patient accompanied by her niece.  She reports that she had a COPD exacerbation with strep throat in middle of October.  She was seen in the emergency room and was given prednisone and azithromycin.  She states that she completed the medicine.  It does not appear in the chart but she was officially diagnosed with strep throat.  She reports she has not been feeling well since that time.  She continues to have sinus congestion and fatigue.  She also reports that she is having draining from her ears.  She denies the presence of a wet spot on her pillow.  She states that when she puts her finger in her ear, the finger feels moist.  She denies any kind of colored wax  or liquid on the finger after touching her ear.  She denies changes to her hearing.    Review of Systems  Review of Systems   HENT:  Positive for ear discharge, ear pain, rhinorrhea and sore throat. Negative for hearing loss.    Respiratory:  Negative for cough.    Gastrointestinal:  Negative for abdominal pain, diarrhea and vomiting.   Musculoskeletal:  Negative for neck pain.   Skin:  Negative for rash.   Neurological:  Positive for headaches.       Currently Medications  Current Outpatient Medications on File Prior to Visit   Medication Sig Dispense Refill    albuterol (PROVENTIL/VENTOLIN HFA) 90 mcg/actuation inhaler Inhale 2 puffs into the lungs every 4 (four) hours as needed for Wheezing (For shortness of breath). 36 g 11    albuterol-ipratropium (DUO-NEB) 2.5 mg-0.5 mg/3 mL nebulizer solution Take 3 mLs by nebulization every 6 (six) hours as needed for Wheezing. Rescue 400 each 3    ALPRAZolam (XANAX) 0.25 MG tablet Take 1 tablet (0.25 mg total) by mouth 3 (three) times daily. 90 tablet 5    apixaban (ELIQUIS) 5 mg Tab Take 1 tablet (5 mg total) by mouth 2 (two) times daily. 180 tablet 3    atorvastatin (LIPITOR) 10 MG tablet TAKE 1 TABLET BY MOUTH ONCCE DAILY 90 tablet 3    cholecalciferol, vitamin D3, 125 mcg (5,000 unit) capsule TAKE 1 TABLET (5,000 UNITS TOTAL) BY MOUTH ONCE DAILY. 90 capsule 3    EScitalopram oxalate (LEXAPRO) 20 MG tablet Take 1 tablet (20 mg total) by mouth once daily. For depression 90 tablet 3    gabapentin (NEURONTIN) 400 MG capsule Take 1 capsule (400 mg total) by mouth 2 (two) times daily. 180 capsule 3    levothyroxine (SYNTHROID) 200 MCG tablet TAKE 1 TABLET BY MOUTH ONCE DAILY. 90 tablet 3    metoprolol succinate (TOPROL-XL) 100 MG 24 hr tablet TAKE 3 TABLETS (300 MG TOTAL) BY MOUTH ONCE DAILY. 270 tablet 3    ondansetron (ZOFRAN-ODT) 4 MG TbDL DISSOLVE 1 TABLET BY MOUTH EVERY 6 HOURS AS NEEDED (NAUSEA). 90 tablet 3    pantoprazole (PROTONIX) 40 MG tablet TAKE 1 TABLET BY MOUTH  EVERY DAY 90 tablet 1    traZODone (DESYREL) 50 MG tablet Take 1 tablet (50 mg total) by mouth every evening. For sleep, depression and anxiety 90 tablet 11    umeclidinium-vilanteroL (ANORO ELLIPTA) 62.5-25 mcg/actuation DsDv Inhale 1 puff into the lungs once daily. Controller 60 each 11    VISINE 0.05 % Drop SMARTSI Drop(s) In Eye(s) PRN      zolpidem (AMBIEN) 10 mg Tab Take 1 tablet (10 mg total) by mouth nightly as needed (insomnia). 90 tablet 1     No current facility-administered medications on file prior to visit.       Physical  Exam  There were no vitals filed for this visit.   Physical Exam  Constitutional:       Appearance: Normal appearance.   HENT:      Head: Normocephalic and atraumatic.   Eyes:      Extraocular Movements: Extraocular movements intact.   Skin:     Coloration: Skin is not pale.   Neurological:      Mental Status: She is alert and oriented to person, place, and time.   Psychiatric:         Mood and Affect: Mood normal.         Labs:    Complete Blood Count  Lab Results   Component Value Date    RBC 4.04 2023    HGB 11.6 (L) 2023    HCT 37.0 2023    MCV 92 2023    MCH 28.7 2023    MCHC 31.4 (L) 2023    RDW 12.7 2023     2023    MPV 11.8 2023    GRAN 5.4 2023    GRAN 64.8 2023    LYMPH 1.7 2023    LYMPH 20.6 2023    MONO 0.8 2023    MONO 9.1 2023    EOS 0.4 2023    BASO 0.04 2023    EOSINOPHIL 4.5 2023    BASOPHIL 0.5 2023    DIFFMETHOD Automated 2023       Comprehensive Metabolic Panel  Lab Results   Component Value Date     2023    BUN 39 (H) 2023    CREATININE 2.27 (H) 2023     2023    K 4.3 2023     2023    PROT 6.9 2023    ALBUMIN 4.0 2023    BILITOT 0.5 2023    AST 25 2023    ALKPHOS 71 2023    CO2 20 (L) 2023    ALT 18 2023    ANIONGAP 14 2023     "BRENNAGFRARIAN 21 06/30/2023       TSH  No results found for: "TSH"    Imaging:  Mammo Digital Screening Bilat w/ Erlin  Narrative: Result:   Mammo Digital Screening Bilat w/ Erlin     History:  Patient is 72 y.o. and is seen for a screening mammogram.    Films Compared:  Prior images (if available) were compared.     Findings:  This procedure was performed using tomosynthesis. Computer-aided detection   was utilized in the interpretation of this examination.  The breasts have scattered areas of fibroglandular density. There is no   evidence of suspicious masses, calcifications, or other abnormal findings.  Impression: Bilateral  There is no mammographic evidence of malignancy.    BI-RADS Category:   Overall: 2 - Benign       Recommendation:  Routine screening mammogram in 1 year is recommended.    Your estimated lifetime risk of breast cancer (to age 85) based on   Tyrer-Cuzick risk assessment model is Tyrer-Cuzick: 2.28 %. According to   the American Cancer Society, patients with a lifetime breast cancer risk   of 20% or higher might benefit from supplemental screening tests.      Assessment/Plan:    1. Fatigue, unspecified type  -     CBC Auto Differential; Future  -     Comprehensive metabolic panel; Future; Expected date: 11/14/2023  -     TSH; Future; Expected date: 11/14/2023    2. CKD stage G4/A2, GFR 15-29 and albumin creatinine ratio  mg/g    3. Acute swimmer's ear of both sides    Other orders  -     acetic acid-hydrocortisone (VOSOL-HC) otic solution; Place 3 drops into both ears 2 (two) times daily. for 10 days  Dispense: 10 mL; Refill: 0  -     predniSONE (DELTASONE) 20 MG tablet; Take 2 tablets (40 mg total) by mouth once daily. Take in the morning. for 5 days  Dispense: 10 tablet; Refill: 0          Discussed how to stay healthy including: diet, exercise, refraining from smoking and discussed screening exams / tests needed for age, sex and family Hx.        Dez Dotson MD      "

## 2023-11-16 ENCOUNTER — PATIENT MESSAGE (OUTPATIENT)
Dept: FAMILY MEDICINE | Facility: CLINIC | Age: 73
End: 2023-11-16
Payer: MEDICARE

## 2023-11-16 ENCOUNTER — LAB VISIT (OUTPATIENT)
Dept: LAB | Facility: HOSPITAL | Age: 73
End: 2023-11-16
Attending: STUDENT IN AN ORGANIZED HEALTH CARE EDUCATION/TRAINING PROGRAM
Payer: MEDICARE

## 2023-11-16 DIAGNOSIS — R53.83 FATIGUE, UNSPECIFIED TYPE: ICD-10-CM

## 2023-11-16 LAB
ALBUMIN SERPL BCP-MCNC: 4.3 G/DL (ref 3.5–5.2)
ALP SERPL-CCNC: 98 U/L (ref 38–126)
ALT SERPL W/O P-5'-P-CCNC: 21 U/L (ref 10–44)
ANION GAP SERPL CALC-SCNC: 13 MMOL/L (ref 8–16)
AST SERPL-CCNC: 27 U/L (ref 15–46)
BASOPHILS # BLD AUTO: 0.02 K/UL (ref 0–0.2)
BASOPHILS NFR BLD: 0.2 % (ref 0–1.9)
BILIRUB SERPL-MCNC: 0.8 MG/DL (ref 0.1–1)
CALCIUM SERPL-MCNC: 10.1 MG/DL (ref 8.7–10.5)
CHLORIDE SERPL-SCNC: 102 MMOL/L (ref 95–110)
CO2 SERPL-SCNC: 28 MMOL/L (ref 23–29)
CREAT SERPL-MCNC: 2.01 MG/DL (ref 0.5–1.4)
DIFFERENTIAL METHOD: ABNORMAL
EOSINOPHIL # BLD AUTO: 0 K/UL (ref 0–0.5)
EOSINOPHIL NFR BLD: 0.2 % (ref 0–8)
ERYTHROCYTE [DISTWIDTH] IN BLOOD BY AUTOMATED COUNT: 13.7 % (ref 11.5–14.5)
EST. GFR  (NO RACE VARIABLE): 25.6 ML/MIN/1.73 M^2
GLUCOSE SERPL-MCNC: 162 MG/DL (ref 70–110)
HCT VFR BLD AUTO: 39.5 % (ref 37–48.5)
HGB BLD-MCNC: 12.3 G/DL (ref 12–16)
IMM GRANULOCYTES # BLD AUTO: 0.05 K/UL (ref 0–0.04)
IMM GRANULOCYTES NFR BLD AUTO: 0.6 % (ref 0–0.5)
LYMPHOCYTES # BLD AUTO: 0.8 K/UL (ref 1–4.8)
LYMPHOCYTES NFR BLD: 9 % (ref 18–48)
MCH RBC QN AUTO: 29.1 PG (ref 27–31)
MCHC RBC AUTO-ENTMCNC: 31.1 G/DL (ref 32–36)
MCV RBC AUTO: 94 FL (ref 82–98)
MONOCYTES # BLD AUTO: 0.1 K/UL (ref 0.3–1)
MONOCYTES NFR BLD: 1.3 % (ref 4–15)
NEUTROPHILS # BLD AUTO: 7.6 K/UL (ref 1.8–7.7)
NEUTROPHILS NFR BLD: 88.7 % (ref 38–73)
NRBC BLD-RTO: 0 /100 WBC
PLATELET # BLD AUTO: 196 K/UL (ref 150–450)
PMV BLD AUTO: 11.9 FL (ref 9.2–12.9)
POTASSIUM SERPL-SCNC: 4.9 MMOL/L (ref 3.5–5.1)
PROT SERPL-MCNC: 7.7 G/DL (ref 6–8.4)
RBC # BLD AUTO: 4.22 M/UL (ref 4–5.4)
SODIUM SERPL-SCNC: 143 MMOL/L (ref 136–145)
TSH SERPL DL<=0.005 MIU/L-ACNC: 3.03 UIU/ML (ref 0.4–4)
UUN UR-MCNC: 40 MG/DL (ref 7–17)
WBC # BLD AUTO: 8.54 K/UL (ref 3.9–12.7)

## 2023-11-16 PROCEDURE — 80053 COMPREHEN METABOLIC PANEL: CPT | Mod: PN | Performed by: STUDENT IN AN ORGANIZED HEALTH CARE EDUCATION/TRAINING PROGRAM

## 2023-11-16 PROCEDURE — 36415 COLL VENOUS BLD VENIPUNCTURE: CPT | Mod: PN | Performed by: STUDENT IN AN ORGANIZED HEALTH CARE EDUCATION/TRAINING PROGRAM

## 2023-11-16 PROCEDURE — 84443 ASSAY THYROID STIM HORMONE: CPT | Mod: PN | Performed by: STUDENT IN AN ORGANIZED HEALTH CARE EDUCATION/TRAINING PROGRAM

## 2023-11-16 PROCEDURE — 85025 COMPLETE CBC W/AUTO DIFF WBC: CPT | Mod: PN | Performed by: STUDENT IN AN ORGANIZED HEALTH CARE EDUCATION/TRAINING PROGRAM

## 2023-11-17 ENCOUNTER — HOSPITAL ENCOUNTER (OUTPATIENT)
Dept: RADIOLOGY | Facility: HOSPITAL | Age: 73
Discharge: HOME | End: 2023-11-17
Attending: FAMILY MEDICINE
Payer: MEDICARE

## 2023-11-17 ENCOUNTER — TELEPHONE (OUTPATIENT)
Dept: FAMILY MEDICINE | Facility: CLINIC | Age: 73
End: 2023-11-17
Payer: MEDICARE

## 2023-11-17 DIAGNOSIS — Z02.2 ENCOUNTER FOR EXAMINATION FOR ADMISSION TO ASSISTED LIVING FACILITY: ICD-10-CM

## 2023-11-17 DIAGNOSIS — Z02.2 ENCOUNTER FOR EXAMINATION FOR ADMISSION TO ASSISTED LIVING FACILITY: Primary | ICD-10-CM

## 2023-11-17 PROCEDURE — 71046 X-RAY EXAM CHEST 2 VIEWS: CPT | Mod: 26,,, | Performed by: RADIOLOGY

## 2023-11-17 PROCEDURE — 71046 X-RAY EXAM CHEST 2 VIEWS: CPT | Mod: TC,FY,PN

## 2023-11-17 PROCEDURE — 71046 XR CHEST PA AND LATERAL: ICD-10-PCS | Mod: 26,,, | Performed by: RADIOLOGY

## 2023-11-17 NOTE — TELEPHONE ENCOUNTER
Spoke to pt. I was advised that the CXR from [facility] was outdated and we needed an updated one to send to the [facility]. Pt verbally understood and will head over to complete the cxr.

## 2023-11-17 NOTE — TELEPHONE ENCOUNTER
----- Message from Lizette Starr sent at 11/17/2023  3:44 PM CST -----  Type:  Test Results    Who Called: pt  Name of Test (Lab/Mammo/Etc): blood test  Date of Test:  11/16/23  Ordering Provider: Dr Dez Dotson   Where the test was performed : [facility]  Would the patient rather a call back or a response via [facility]?  call  Best Call Back Number: [phone]  Additional Information:

## 2023-11-17 NOTE — TELEPHONE ENCOUNTER
----- Message from Amita Lindsay sent at 11/17/2023 11:42 AM CST -----  Regarding: X-Ray Chest PA And Lateral  Hello Team,    Pt ask why she needs to have the below. Please let me know if order needs to be schedule or canceled.      X-Ray Chest PA And Lateral    Thank you,  Amita

## 2023-11-17 NOTE — TELEPHONE ENCOUNTER
Faxed to [facility]) ph# [phone]  Physician [facility] assessment, last ov, chest Xray from [facility] along with facesheet  Fax# [phone]

## 2023-11-18 NOTE — TELEPHONE ENCOUNTER
Blood counts, liver, and thyroid are normal. Kidney function is still decreased. I recommend you follow up with nephrology regarding this

## 2023-11-21 ENCOUNTER — TELEPHONE (OUTPATIENT)
Dept: FAMILY MEDICINE | Facility: CLINIC | Age: 73
End: 2023-11-21
Payer: MEDICARE

## 2023-11-21 NOTE — TELEPHONE ENCOUNTER
"Reached out to pt's Daughter Marisa. She stated pt can self administer her own medications. I have updated pg.2 of the sophie form and have faxed back the form with attached current medication list.     Kamille Pulido Staff  Caller: Silverio/[phone] (Today,  3:19 PM)  Silverio requesting is requesting a call back regarding the page regarding medications. Please select if pt can administer her own medications or help and return pg. 2 of Assessment "Medications"  Would the patient rather a call back or a response via [facility]?  Call  Best Call Back Number:  Silverio/[phone]  Fax: [phone]  Additional Information:    "

## 2023-11-21 NOTE — TELEPHONE ENCOUNTER
----- Message from Jia Ortega sent at 11/21/2023  2:02 PM CST -----  Type:  Paperwork    Who Called:Marisa  Does the patient know what this is regarding?:Paperwork  Would the patient rather a call back or a response via [facility]? call  Best Call Back Number:[phone]  Additional Information: On November 16th the pt submitted some paperwork on [employer] for her to move into an Assisted Living residence.  Is the paperwork ready; do they have to pick it up or how will the pt get the paperwork?

## 2024-01-09 ENCOUNTER — OFFICE VISIT (OUTPATIENT)
Dept: CARDIOLOGY | Facility: CLINIC | Age: 74
End: 2024-01-09
Payer: MEDICARE

## 2024-01-09 VITALS
OXYGEN SATURATION: 94 % | HEIGHT: 65 IN | BODY MASS INDEX: 37.29 KG/M2 | HEART RATE: 89 BPM | SYSTOLIC BLOOD PRESSURE: 100 MMHG | DIASTOLIC BLOOD PRESSURE: 71 MMHG

## 2024-01-09 DIAGNOSIS — M51.36 DDD (DEGENERATIVE DISC DISEASE), LUMBAR: ICD-10-CM

## 2024-01-09 DIAGNOSIS — Z86.79 HISTORY OF PSVT (PAROXYSMAL SUPRAVENTRICULAR TACHYCARDIA): ICD-10-CM

## 2024-01-09 DIAGNOSIS — Z79.01 LONG TERM (CURRENT) USE OF ANTICOAGULANTS: ICD-10-CM

## 2024-01-09 DIAGNOSIS — I71.21 ANEURYSM OF ASCENDING AORTA WITHOUT RUPTURE: ICD-10-CM

## 2024-01-09 DIAGNOSIS — I48.11 LONGSTANDING PERSISTENT ATRIAL FIBRILLATION: Primary | ICD-10-CM

## 2024-01-09 DIAGNOSIS — E03.4 HYPOTHYROIDISM DUE TO ACQUIRED ATROPHY OF THYROID: ICD-10-CM

## 2024-01-09 DIAGNOSIS — I10 ESSENTIAL HYPERTENSION: Chronic | ICD-10-CM

## 2024-01-09 DIAGNOSIS — R06.09 DYSPNEA ON EXERTION: ICD-10-CM

## 2024-01-09 DIAGNOSIS — J42 CHRONIC BRONCHITIS, UNSPECIFIED CHRONIC BRONCHITIS TYPE: ICD-10-CM

## 2024-01-09 DIAGNOSIS — E78.5 HYPERLIPIDEMIA, UNSPECIFIED HYPERLIPIDEMIA TYPE: ICD-10-CM

## 2024-01-09 DIAGNOSIS — R60.0 LOCALIZED EDEMA: ICD-10-CM

## 2024-01-09 DIAGNOSIS — N18.4 CKD STAGE G4/A2, GFR 15-29 AND ALBUMIN CREATININE RATIO 30-299 MG/G: ICD-10-CM

## 2024-01-09 PROCEDURE — 99213 OFFICE O/P EST LOW 20 MIN: CPT | Mod: 25,S$GLB,, | Performed by: INTERNAL MEDICINE

## 2024-01-09 PROCEDURE — 99999 PR PBB SHADOW E&M-EST. PATIENT-LVL III: CPT | Mod: PBBFAC,,, | Performed by: INTERNAL MEDICINE

## 2024-01-09 PROCEDURE — 93000 ELECTROCARDIOGRAM COMPLETE: CPT | Mod: S$GLB,,, | Performed by: INTERNAL MEDICINE

## 2024-01-09 NOTE — PROGRESS NOTES
"  Subjective:      Patient ID: Talia Dillon is a 73 y.o. female.    Chief Complaint: Follow-up    HPI:  Not as nauseated since cholecystectomy although still a little nauseated.    Pt can only walk very short distances since "legs give out" and pt gets short of breath after walking a block or less    Review of Systems   Cardiovascular:  Positive for chest pain (Pt had one episode ow squeezing left anterior chest pain while in bed which lasted off and on for about 20 minutes.  The pain was sharp. There is no hx of chest pain with exertion.), dyspnea on exertion (chronic, severe but stable), leg swelling (mild, intermittent , improved overall) and palpitations (rare, mild). Negative for claudication, irregular heartbeat, near-syncope, orthopnea and syncope.      Walks more now that pt is living in assisted living in [place]  Past Medical History:   Diagnosis Date    Allergy     Anemia     Anxiety     Atrial fibrillation     Cancer     skin    Cataract     Depression     Edema     Hypothyroidism     Kidney disease     PSVT (paroxysmal supraventricular tachycardia)     Thyroid disease         Past Surgical History:   Procedure Laterality Date    ADENOIDECTOMY      APPENDECTOMY      CHOLECYSTECTOMY      COLONOSCOPY  2009    repeat in 10 years     ESOPHAGOGASTRODUODENOSCOPY N/A 05/04/2023    Procedure: EGD (ESOPHAGOGASTRODUODENOSCOPY);  Surgeon: Ej Reyes MD;  Location: [facility];  Service: Endoscopy;  Laterality: N/A;    EYE SURGERY Bilateral     cataracts extraction and bilateral upper eyelid lifts    HYSTERECTOMY      INJECTION OF ANESTHETIC AGENT AROUND NERVE Bilateral 09/21/2018    Procedure: BLOCK, NERVE, MEDIAL BRANCH BLOCK BILATERAL LUMBAR L2-5;  Surgeon: Julieth Christopher MD;  Location: [facility];  Service: Pain Management;  Laterality: Bilateral;  1 of 2    INJECTION OF ANESTHETIC AGENT AROUND NERVE Bilateral 09/28/2018    Procedure: BLOCK, NERVE, MEDIAL BRANCH BLOCK BILATERAL LUMBAR L2-5;  Surgeon: " Julieth Christopher MD;  Location: [facility];  Service: Pain Management;  Laterality: Bilateral;  2 of 2  PLEASE GIVE NIRAVAM PER MD    OOPHORECTOMY      RADIOFREQUENCY ABLATION Left 02/14/2020    Procedure: RADIOFREQUENCY ABLATION L2,3,4,5;  Surgeon: Julieth Christopher MD;  Location: [facility];  Service: Pain Management;  Laterality: Left;  LT RFA L2,3,4,5  1 of 2  OK to hold Eliquis    RADIOFREQUENCY ABLATION Right 02/28/2020    Procedure: RADIOFREQUENCY ABLATION L2,3,4,5 RIGHT   2 of 2 ok to hold eliquis;  Surgeon: Julieth Christopher MD;  Location: [facility];  Service: Pain Management;  Laterality: Right;    RADIOFREQUENCY ABLATION Left 11/30/2020    Procedure: RADIOFREQUENCY ABLATION, L2-L3-L4-L5 MEDIAL BRANCH 1 OF 2 Continue Eliquis;  Surgeon: Bogdan Webster MD;  Location: [facility];  Service: Pain Management;  Laterality: Left;    TONSILLECTOMY      TREATMENT OF CARDIAC ARRHYTHMIA N/A 02/08/2019    Procedure: CARDIOVERSION;  Surgeon: Devin You MD;  Location: [facility];  Service: Cardiology;  Laterality: N/A;  AF, KAROL, DCCV, MAC, MA, 3 Prep       Family History   Problem Relation Age of Onset    Hypertension Mother     Stroke Mother     Hypertension Father     Stroke Father     Diabetes Father     Arthritis Sister     Diabetes Sister     Heart attack Sister     Coronary artery disease Sister     Diabetes Sister     Sleep apnea Daughter     Mental illness Daughter     Bipolar disorder Daughter        Social History     Socioeconomic History    Marital status:    Tobacco Use    Smoking status: Former     Current packs/day: 0.00     Average packs/day: 1 pack/day for 50.0 years (50.0 ttl pk-yrs)     Types: Cigarettes, Vaping with nicotine     Start date: 1971     Quit date: 2021     Years since quitting: 3.0    Smokeless tobacco: Never    Tobacco comments:     No longer vaping   Substance and Sexual Activity    Alcohol use: No    Drug use: No    Sexual activity: Yes     Partners: Male      Social Determinants of Health     Financial Resource Strain: Low Risk  (11/14/2023)    Overall Financial Resource Strain (CARDIA)     Difficulty of Paying Living Expenses: Not very hard   Food Insecurity: No Food Insecurity (11/14/2023)    Hunger Vital Sign     Worried About Running Out of Food in the Last Year: Never true     Ran Out of Food in the Last Year: Never true   Transportation Needs: No Transportation Needs (11/14/2023)    PRAPARE - Transportation     Lack of Transportation (Medical): No     Lack of Transportation (Non-Medical): No   Physical Activity: Insufficiently Active (11/14/2023)    Exercise Vital Sign     Days of Exercise per Week: 1 day     Minutes of Exercise per Session: 10 min   Stress: Stress Concern Present (11/14/2023)    [employer] - Occupational Stress Questionnaire     Feeling of Stress : Very much   Social Connections: Unknown (11/14/2023)    Social Connection and Isolation Panel [NHANES]     Frequency of Communication with Friends and Family: More than three times a week     Frequency of Social Gatherings with Friends and Family: Twice a week     Active Member of Clubs or Organizations: No     Attends Club or Organization Meetings: Never     Marital Status:    Housing Stability: Low Risk  (11/14/2023)    Housing Stability Vital Sign     Unable to Pay for Housing in the Last Year: No     Number of Places Lived in the Last Year: 1     Unstable Housing in the Last Year: No       Current Outpatient Medications on File Prior to Visit   Medication Sig Dispense Refill    albuterol (PROVENTIL/VENTOLIN HFA) 90 mcg/actuation inhaler Inhale 2 puffs into the lungs every 4 (four) hours as needed for Wheezing (For shortness of breath). 36 g 11    albuterol-ipratropium (DUO-NEB) 2.5 mg-0.5 mg/3 mL nebulizer solution Take 3 mLs by nebulization every 6 (six) hours as needed for Wheezing. Rescue 400 each 3    ALPRAZolam (XANAX) 0.25 MG tablet Take 1 tablet (0.25  "mg total) by mouth 3 (three) times daily. 90 tablet 5    apixaban (ELIQUIS) 5 mg Tab Take 1 tablet (5 mg total) by mouth 2 (two) times daily. 180 tablet 3    atorvastatin (LIPITOR) 10 MG tablet TAKE 1 TABLET BY MOUTH ONCCE DAILY 90 tablet 3    cholecalciferol, vitamin D3, 125 mcg (5,000 unit) capsule TAKE 1 TABLET (5,000 UNITS TOTAL) BY MOUTH ONCE DAILY. 90 capsule 3    EScitalopram oxalate (LEXAPRO) 20 MG tablet Take 1 tablet (20 mg total) by mouth once daily. For depression 90 tablet 3    gabapentin (NEURONTIN) 400 MG capsule Take 1 capsule (400 mg total) by mouth 2 (two) times daily. 180 capsule 3    levothyroxine (SYNTHROID) 200 MCG tablet TAKE 1 TABLET BY MOUTH ONCE DAILY. 90 tablet 3    metoprolol succinate (TOPROL-XL) 100 MG 24 hr tablet TAKE 3 TABLETS (300 MG TOTAL) BY MOUTH ONCE DAILY. 270 tablet 3    ondansetron (ZOFRAN-ODT) 4 MG TbDL DISSOLVE 1 TABLET BY MOUTH EVERY 6 HOURS AS NEEDED (NAUSEA). 90 tablet 3    pantoprazole (PROTONIX) 40 MG tablet TAKE 1 TABLET BY MOUTH EVERY DAY 90 tablet 1    traZODone (DESYREL) 50 MG tablet Take 1 tablet (50 mg total) by mouth every evening. For sleep, depression and anxiety 90 tablet 11    umeclidinium-vilanteroL (ANORO ELLIPTA) 62.5-25 mcg/actuation DsDv Inhale 1 puff into the lungs once daily. Controller 60 each 11    VISINE 0.05 % Drop SMARTSI Drop(s) In Eye(s) PRN      [DISCONTINUED] zolpidem (AMBIEN) 10 mg Tab Take 1 tablet (10 mg total) by mouth nightly as needed (insomnia). (Patient not taking: Reported on 2024) 90 tablet 1     No current facility-administered medications on file prior to visit.       Review of patient's allergies indicates:   Allergen Reactions    Dexamethasone Rash     Objective:     Vitals:    24 1319   BP: 100/71   BP Location: Left arm   Patient Position: Sitting   BP Method: Large (Automatic)   Pulse: 89   SpO2: (!) 94%   Weight: Comment: Wheelchair   Height: 5' 5" (1.651 m)        Physical Exam  Vitals reviewed. "   Constitutional:       Appearance: She is well-developed.   Eyes:      General: No scleral icterus.  Neck:      Vascular: No carotid bruit or JVD.   Cardiovascular:      Rate and Rhythm: Normal rate. Rhythm irregularly irregular.      Heart sounds: No murmur heard.     No gallop.   Pulmonary:      Breath sounds: Normal breath sounds.   Musculoskeletal:      Right lower leg: Edema present.      Left lower leg: Edema (trace pitting pedal edema bilaterally) present.   Skin:     General: Skin is warm and dry.   Neurological:      Mental Status: She is alert and oriented to person, place, and time.   Psychiatric:         Behavior: Behavior normal.         Thought Content: Thought content normal.         Judgment: Judgment normal.      ECG today reviewed by me: atrial fibrillation with controlled ventricular response, low QRS, no significant change    Lab Visit on 11/16/2023   Component Date Value Ref Range Status    WBC 11/16/2023 8.54  3.90 - 12.70 K/uL Final    RBC 11/16/2023 4.22  4.00 - 5.40 M/uL Final    Hemoglobin 11/16/2023 12.3  12.0 - 16.0 g/dL Final    Hematocrit 11/16/2023 39.5  37.0 - 48.5 % Final    MCV 11/16/2023 94  82 - 98 fL Final    MCH 11/16/2023 29.1  27.0 - 31.0 pg Final    MCHC 11/16/2023 31.1 (L)  32.0 - 36.0 g/dL Final    RDW 11/16/2023 13.7  11.5 - 14.5 % Final    Platelets 11/16/2023 196  150 - 450 K/uL Final    MPV 11/16/2023 11.9  9.2 - 12.9 fL Final    Immature Granulocytes 11/16/2023 0.6 (H)  0.0 - 0.5 % Final    Gran # (ANC) 11/16/2023 7.6  1.8 - 7.7 K/uL Final    Immature Grans (Abs) 11/16/2023 0.05 (H)  0.00 - 0.04 K/uL Final    Lymph # 11/16/2023 0.8 (L)  1.0 - 4.8 K/uL Final    Mono # 11/16/2023 0.1 (L)  0.3 - 1.0 K/uL Final    Eos # 11/16/2023 0.0  0.0 - 0.5 K/uL Final    Baso # 11/16/2023 0.02  0.00 - 0.20 K/uL Final    nRBC 11/16/2023 0  0 /100 WBC Final    Gran % 11/16/2023 88.7 (H)  38.0 - 73.0 % Final    Lymph % 11/16/2023 9.0 (L)  18.0 - 48.0 % Final    Mono % 11/16/2023 1.3 (L)   4.0 - 15.0 % Final    Eosinophil % 11/16/2023 0.2  0.0 - 8.0 % Final    Basophil % 11/16/2023 0.2  0.0 - 1.9 % Final    Differential Method 11/16/2023 Automated   Final    Sodium 11/16/2023 143  136 - 145 mmol/L Final    Potassium 11/16/2023 4.9  3.5 - 5.1 mmol/L Final    Chloride 11/16/2023 102  95 - 110 mmol/L Final    CO2 11/16/2023 28  23 - 29 mmol/L Final    Glucose 11/16/2023 162 (H)  70 - 110 mg/dL Final    BUN 11/16/2023 40 (H)  7 - 17 mg/dL Final    Creatinine 11/16/2023 2.01 (H)  0.50 - 1.40 mg/dL Final    Calcium 11/16/2023 10.1  8.7 - 10.5 mg/dL Final    Total Protein 11/16/2023 7.7  6.0 - 8.4 g/dL Final    Albumin 11/16/2023 4.3  3.5 - 5.2 g/dL Final    Total Bilirubin 11/16/2023 0.8  0.1 - 1.0 mg/dL Final    Alkaline Phosphatase 11/16/2023 98  38 - 126 U/L Final    AST 11/16/2023 27  15 - 46 U/L Final    ALT 11/16/2023 21  10 - 44 U/L Final    Anion Gap 11/16/2023 13  8 - 16 mmol/L Final    eGFR 11/16/2023 25.6 (A)  >60 mL/min/1.73 m^2 Final    TSH 11/16/2023 3.030  0.400 - 4.000 uIU/mL Final   Admission on 10/26/2023, Discharged on 10/26/2023   Component Date Value Ref Range Status    SARS-CoV-2 RNA, Amplification, Qual 10/26/2023 Negative  Negative Final    Influenza A, Molecular 10/26/2023 Negative  Negative Final    Influenza B, Molecular 10/26/2023 Negative  Negative Final    Flu A & B Source 10/26/2023 Nasal swab   Final   Lab Visit on 09/22/2023   Component Date Value Ref Range Status    WBC 09/22/2023 8.31  3.90 - 12.70 K/uL Final    RBC 09/22/2023 4.04  4.00 - 5.40 M/uL Final    Hemoglobin 09/22/2023 11.6 (L)  12.0 - 16.0 g/dL Final    Hematocrit 09/22/2023 37.0  37.0 - 48.5 % Final    MCV 09/22/2023 92  82 - 98 fL Final    MCH 09/22/2023 28.7  27.0 - 31.0 pg Final    MCHC 09/22/2023 31.4 (L)  32.0 - 36.0 g/dL Final    RDW 09/22/2023 12.7  11.5 - 14.5 % Final    Platelets 09/22/2023 240  150 - 450 K/uL Final    MPV 09/22/2023 11.8  9.2 - 12.9 fL Final    Immature Granulocytes 09/22/2023 0.5   0.0 - 0.5 % Final    Gran # (ANC) 09/22/2023 5.4  1.8 - 7.7 K/uL Final    Immature Grans (Abs) 09/22/2023 0.04  0.00 - 0.04 K/uL Final    Lymph # 09/22/2023 1.7  1.0 - 4.8 K/uL Final    Mono # 09/22/2023 0.8  0.3 - 1.0 K/uL Final    Eos # 09/22/2023 0.4  0.0 - 0.5 K/uL Final    Baso # 09/22/2023 0.04  0.00 - 0.20 K/uL Final    nRBC 09/22/2023 0  0 /100 WBC Final    Gran % 09/22/2023 64.8  38.0 - 73.0 % Final    Lymph % 09/22/2023 20.6  18.0 - 48.0 % Final    Mono % 09/22/2023 9.1  4.0 - 15.0 % Final    Eosinophil % 09/22/2023 4.5  0.0 - 8.0 % Final    Basophil % 09/22/2023 0.5  0.0 - 1.9 % Final    Differential Method 09/22/2023 Automated   Final    Glucose 09/22/2023 101  70 - 110 mg/dL Final    Sodium 09/22/2023 140  136 - 145 mmol/L Final    Potassium 09/22/2023 4.3  3.5 - 5.1 mmol/L Final    Chloride 09/22/2023 106  95 - 110 mmol/L Final    CO2 09/22/2023 20 (L)  23 - 29 mmol/L Final    BUN 09/22/2023 39 (H)  7 - 17 mg/dL Final    Calcium 09/22/2023 9.6  8.7 - 10.5 mg/dL Final    Creatinine 09/22/2023 2.27 (H)  0.50 - 1.40 mg/dL Final    Albumin 09/22/2023 4.0  3.5 - 5.2 g/dL Final    Phosphorus 09/22/2023 4.7 (H)  2.7 - 4.5 mg/dL Final    eGFR 09/22/2023 22.2 (A)  >60 mL/min/1.73 m^2 Final    Anion Gap 09/22/2023 14  8 - 16 mmol/L Final   Lab Visit on 09/22/2023   Component Date Value Ref Range Status    Protein, Urine Random 09/22/2023 62 (H)  0 - 15 mg/dL Final    Creatinine, Urine 09/22/2023 260.2  15.0 - 325.0 mg/dL Final    Prot/Creat Ratio, Urine 09/22/2023 0.24 (H)  0.00 - 0.20 Final    Specimen UA 09/22/2023 Urine, Clean Catch   Final    Color, UA 09/22/2023 Yellow  Yellow, Straw, Arianne Final    Appearance, UA 09/22/2023 Clear  Clear Final    pH, UA 09/22/2023 6.0  5.0 - 8.0 Final    Specific Gravity, UA 09/22/2023 >=1.030 (A)  1.005 - 1.030 Final    Protein, UA 09/22/2023 1+ (A)  Negative Final    Glucose, UA 09/22/2023 Negative  Negative Final    Ketones, UA 09/22/2023 Negative  Negative Final     Bilirubin (UA) 09/22/2023 Negative  Negative Final    Occult Blood UA 09/22/2023 Negative  Negative Final    Nitrite, UA 09/22/2023 Negative  Negative Final    Urobilinogen, UA 09/22/2023 1.0  <2.0 EU/dL Final    Leukocytes, UA 09/22/2023 Negative  Negative Final    RBC, UA 09/22/2023 1  0 - 4 /hpf Final    WBC, UA 09/22/2023 2  0 - 5 /hpf Final    Bacteria 09/22/2023 Few (A)  None-Occ /hpf Final    Hyaline Casts, UA 09/22/2023 0  0-1/lpf /lpf Final    Microscopic Comment 09/22/2023 SEE COMMENT   Final   (    Accession #: [id number]  Transthoracic echo (TTE) complete  Order# [id number]  Reading physician: Jayro Saha MD Ordering physician: Laci Barker MD Study date: 6/11/20     Reason for Exam  Priority: Routine  Dx: Persistent atrial fibrillation [I48.19 (ICD-10-CM)]; Essential hypertension [I10 (ICD-10-CM)]; Dyspnea on exertion [R06.09 (ICD-10-CM)]     Result Image Hyperlink     Show images for Echo Color Flow Doppler? Yes  Summary    Normal left ventricular systolic function. The estimated ejection fraction is 60%.  Diastolic pattern consistent with atrial fibrillation observed.  Concentric left ventricular hypertrophy.  Normal right ventricular systolic function.  Normal central venous pressure (3 mmHg).  The estimated PA systolic pressure is 35 mmHg.   Encounter Form Printed    Encounter form printed on 12/05/22 01:29 PM         CT Chest Without Contrast  Status: Final result     [employer] Results Release    [employer] Status: Active  Results Release     PACS Images for [employer] Viewer     Show images for CT Chest Without Contrast  All Reviewers List    Laci Barker MD on 12/5/2022 16:52     CT Chest Without Contrast  Order: [id number]  Status: Final result      Visible to patient: Yes (seen)       Next appt: 06/28/2023 at 04:00 PM in Nephrology (Farhana Reyes NP)       Dx: Pure hypercholesterolemia; Long term ...       0 Result Notes      1  Topic      Details    Reading Physician  Reading Date Result Priority   REZA Sanchez Sr., MD  [phone] 12/5/2022 Routine     Narrative & Impression  EXAMINATION:  CT CHEST WITHOUT CONTRAST     CLINICAL HISTORY:  Aortic aneurysm, known or suspected;     TECHNIQUE:  Standard chest CT protocol was performed without IV contrast.     COMPARISON:  A CT examination of the chest performed on 11/22/2021.     FINDINGS:  There is mild cardiomegaly.  There is a small pericardial effusion.  There is an aneurysm of the ascending thoracic aorta.  It has an AP diameter of 4.8 cm on the current examination.  On the prior examination it had an AP diameter of 4.6 cm.  The lungs are clear. There is no pneumothorax or pleural effusion.  There are several partially visualized stones in the gallbladder.  The stones measure at least 13 mm in size.  There is no acute fracture visualized.     Impression:     1. There is an aneurysm of the ascending thoracic aorta. It has an AP diameter of 4.8 cm on the current examination.  On the prior examination it had an AP diameter of 4.6 cm.  2. There is mild cardiomegaly. There is a small pericardial effusion.  3. The lungs are clear.  4. There are several partially visualized stones in the gallbladder. The stones measure at least 13 mm in size.  All CT scans at this facility use dose modulation, iterative reconstruction, and/or weight base dosing when appropriate to reduce radiation dose when appropriate to reduce radiation dose to as low as reasonably achievable.        Electronically signed by: Tonny Sanchez MD  Date:                                            12/05/2022  Time:                                            Encounter Form Printed    Encounter form printed on 12/07/20 12:08 PM         NM Myocardial Perfusion Spect Multi Pharmacologic  Status: Final result     [employer] Results Release    [employer] Status: Active  Results Release     PACS Images for [employer] Viewer     Show images for NM Myocardial Perfusion Spect  Multi Pharmacologic  All Reviewers List    Laci Barker MD on 12/7/2020 18:48     NM Myocardial Perfusion Spect Multi Pharmacologic  Order: [id number]  Status: Final result      Visible to patient: Yes (seen)       Next appt: 06/28/2023 at 04:00 PM in Nephrology (Farhana Reyes NP)       Dx: Other chest pain       0 Result Notes      Details    Reading Physician Reading Date Result Priority   REZA Sanchez Sr., MD  [phone] 12/7/2020 Routine     Narrative & Impression  EXAMINATION:  NM MYOCARDIAL PERFUSION SPECT MULTI PHARM     CLINICAL HISTORY:  Other chest painChest pain, acute, nonspecific;     TECHNIQUE:  After the administration of 10 mCi of technetium 99 M labeled Myoview, nuclear medicine cardiac rest images were obtained.  After the administration of 30 mCi of technetium 99 M labeled Myoview, nuclear medicine cardiac stress images were obtained     FINDINGS:  The walls of the left ventricle have normal perfusion.  There is dyskinesis of the septal wall of the left ventricle.  The left ventricle ejection fraction was calculated to be 54%.     Impression:     1.  Normal perfusion study     2.  There is dyskinesis of the septal wall of the left ventricle.     3.  The left ventricular ejection fraction was calculated to be 54%.  The normal is greater than 54%.        Electronically signed by: Tonny Sanchez MD  Date:                                            12/07/2020  Time:                                           15:03               Exam Ended: 12/07/20 14:56          Topic  Details    Reading Physician Reading Date Result Priority   REZA Sanchez Sr., MD  [phone] 12/5/2022 Routine     Narrative & Impression  EXAMINATION:  CT CHEST WITHOUT CONTRAST     CLINICAL HISTORY:  Aortic aneurysm, known or suspected;     TECHNIQUE:  Standard chest CT protocol was performed without IV contrast.     COMPARISON:  A CT examination of the chest performed on 11/22/2021.     FINDINGS:  There is  mild cardiomegaly.  There is a small pericardial effusion.  There is an aneurysm of the ascending thoracic aorta.  It has an AP diameter of 4.8 cm on the current examination.  On the prior examination it had an AP diameter of 4.6 cm.  The lungs are clear. There is no pneumothorax or pleural effusion.  There are several partially visualized stones in the gallbladder.  The stones measure at least 13 mm in size.  There is no acute fracture visualized.     Impression:     1. There is an aneurysm of the ascending thoracic aorta. It has an AP diameter of 4.8 cm on the current examination.  On the prior examination it had an AP diameter of 4.6 cm.  2. There is mild cardiomegaly. There is a small pericardial effusion.  3. The lungs are clear.  4. There are several partially visualized stones in the gallbladder. The stones measure at least 13 mm in size.  All CT scans at this facility use dose modulation, iterative reconstruction, and/or weight base dosing when appropriate to reduce radiation dose when appropriate to reduce radiation dose to as low as reasonably achievable.        Electronically signed by: Tonny Sanchez MD  Date:                                            12/05/2022  Time:                                           14:32           Exam Ended: 12/05/22 13:56 Last Resulted: 12/05/22 14:32             Accession #: [id number]  Talia J Prima  Stress test only, [facility]  Order# [id number]  Reading physician: Onel Isidro MD Ordering physician: Laci Barker MD Study date: 12/7/20     Reason for Exam  Priority: Routine  Dx: Persistent atrial fibrillation [I48.19 (ICD-10-CM)]; Other chest pain [R07.89 (ICD-10-CM)]     Conclusion         The EKG portion of this study is abnormal but not diagnostic.    The patient reported no chest pain during the stress test.    ECG Stress Nuclear portion of this study will be reported separately.          Performing Clinician       Assessment:     1. Longstanding  persistent atrial fibrillation    2. Hyperlipidemia, unspecified hyperlipidemia type    3. History of PSVT (paroxysmal supraventricular tachycardia)    4. Essential hypertension    5. Dyspnea on exertion    6. Aneurysm of ascending aorta without rupture    7. CKD stage G4/A2, GFR 15-29 and albumin creatinine ratio  mg/g    8. Long term (current) use of anticoagulants    9. Hypothyroidism due to acquired atrophy of thyroid    10. Localized edema    11. Chronic bronchitis, unspecified chronic bronchitis type    12. DDD (degenerative disc disease), lumbar      Plan:   Talia was seen today for follow-up.    Diagnoses and all orders for this visit:    Longstanding persistent atrial fibrillation  -     IN OFFICE EKG 12-LEAD (to Muse)  -     BNP; Future  -     CBC Auto Differential; Future  -     Comprehensive Metabolic Panel; Future  -     TSH; Future  -     Lipid Panel; Future    Hyperlipidemia, unspecified hyperlipidemia type  -     IN OFFICE EKG 12-LEAD (to Muse)  -     BNP; Future  -     CBC Auto Differential; Future  -     Comprehensive Metabolic Panel; Future  -     TSH; Future  -     Lipid Panel; Future    History of PSVT (paroxysmal supraventricular tachycardia)  -     IN OFFICE EKG 12-LEAD (to [facility])  -     BNP; Future  -     CBC Auto Differential; Future  -     Comprehensive Metabolic Panel; Future  -     TSH; Future  -     Lipid Panel; Future    Essential hypertension  -     IN OFFICE EKG 12-LEAD (to Muse)  -     BNP; Future  -     CBC Auto Differential; Future  -     Comprehensive Metabolic Panel; Future  -     TSH; Future  -     Lipid Panel; Future    Dyspnea on exertion  -     IN OFFICE EKG 12-LEAD (to Muse)  -     BNP; Future  -     CBC Auto Differential; Future  -     Comprehensive Metabolic Panel; Future  -     TSH; Future  -     Lipid Panel; Future    Aneurysm of ascending aorta without rupture  -     IN OFFICE EKG 12-LEAD (to Muse)  -     BNP; Future  -     CBC Auto Differential; Future  -      Comprehensive Metabolic Panel; Future  -     TSH; Future  -     Lipid Panel; Future  -     CT Chest Without Contrast; Future    CKD stage G4/A2, GFR 15-29 and albumin creatinine ratio  mg/g  -     IN OFFICE EKG 12-LEAD (to [facility])  -     BNP; Future  -     CBC Auto Differential; Future  -     Comprehensive Metabolic Panel; Future  -     TSH; Future  -     Lipid Panel; Future    Long term (current) use of anticoagulants  -     IN OFFICE EKG 12-LEAD (to [facility])  -     BNP; Future  -     CBC Auto Differential; Future  -     Comprehensive Metabolic Panel; Future  -     TSH; Future  -     Lipid Panel; Future    Hypothyroidism due to acquired atrophy of thyroid  -     IN OFFICE EKG 12-LEAD (to Muse)  -     BNP; Future  -     CBC Auto Differential; Future  -     Comprehensive Metabolic Panel; Future  -     TSH; Future  -     Lipid Panel; Future    Localized edema  -     IN OFFICE EKG 12-LEAD (to Muse)  -     BNP; Future  -     CBC Auto Differential; Future  -     Comprehensive Metabolic Panel; Future  -     TSH; Future  -     Lipid Panel; Future    Chronic bronchitis, unspecified chronic bronchitis type  -     IN OFFICE EKG 12-LEAD (to Muse)  -     BNP; Future  -     CBC Auto Differential; Future  -     Comprehensive Metabolic Panel; Future  -     TSH; Future  -     Lipid Panel; Future    DDD (degenerative disc disease), lumbar  -     IN OFFICE EKG 12-LEAD (to Muse)  -     BNP; Future  -     CBC Auto Differential; Future  -     Comprehensive Metabolic Panel; Future  -     TSH; Future  -     Lipid Panel; Future     Pt is clinically stable    The edema is likely venous insufficiency edema    The dyspnea on exertion is due to COPD      HBP and HLD are controlled    Same meds    Repeat CT of chest without contrast for f/u thoracic aortic aneurysm    RTC 6 months with lab    Follow up in about 6 months (around 7/9/2024).

## 2024-02-12 ENCOUNTER — OFFICE VISIT (OUTPATIENT)
Dept: INTERNAL MEDICINE | Facility: CLINIC | Age: 74
End: 2024-02-12
Payer: MEDICARE

## 2024-02-12 ENCOUNTER — TELEPHONE (OUTPATIENT)
Dept: FAMILY MEDICINE | Facility: CLINIC | Age: 74
End: 2024-02-12
Payer: MEDICARE

## 2024-02-12 VITALS
BODY MASS INDEX: 37.28 KG/M2 | OXYGEN SATURATION: 97 % | HEIGHT: 65 IN | HEART RATE: 86 BPM | SYSTOLIC BLOOD PRESSURE: 108 MMHG | DIASTOLIC BLOOD PRESSURE: 70 MMHG | WEIGHT: 223.75 LBS | TEMPERATURE: 97 F

## 2024-02-12 DIAGNOSIS — I10 ESSENTIAL HYPERTENSION: Primary | Chronic | ICD-10-CM

## 2024-02-12 DIAGNOSIS — E03.4 HYPOTHYROIDISM DUE TO ACQUIRED ATROPHY OF THYROID: Chronic | ICD-10-CM

## 2024-02-12 DIAGNOSIS — F41.9 ANXIETY: Chronic | ICD-10-CM

## 2024-02-12 DIAGNOSIS — I50.32 CHRONIC DIASTOLIC CONGESTIVE HEART FAILURE: Chronic | ICD-10-CM

## 2024-02-12 DIAGNOSIS — G47.00 INSOMNIA, UNSPECIFIED TYPE: Chronic | ICD-10-CM

## 2024-02-12 DIAGNOSIS — N18.4 CKD STAGE G4/A2, GFR 15-29 AND ALBUMIN CREATININE RATIO 30-299 MG/G: Chronic | ICD-10-CM

## 2024-02-12 PROCEDURE — 99214 OFFICE O/P EST MOD 30 MIN: CPT | Mod: S$GLB,,, | Performed by: FAMILY MEDICINE

## 2024-02-12 PROCEDURE — 99999 PR PBB SHADOW E&M-EST. PATIENT-LVL IV: CPT | Mod: PBBFAC,,, | Performed by: FAMILY MEDICINE

## 2024-02-12 RX ORDER — TRAZODONE HYDROCHLORIDE 50 MG/1
50 TABLET ORAL NIGHTLY
Qty: 90 TABLET | Refills: 3 | Status: SHIPPED | OUTPATIENT
Start: 2024-02-12

## 2024-02-12 RX ORDER — ESCITALOPRAM OXALATE 20 MG/1
20 TABLET ORAL DAILY
Qty: 90 TABLET | Refills: 3 | Status: SHIPPED | OUTPATIENT
Start: 2024-02-12 | End: 2025-02-06

## 2024-02-12 NOTE — TELEPHONE ENCOUNTER
----- Message from Zuly Aguayo sent at 2/12/2024  2:43 PM CST -----  Type:  Patient  Call    Who Called:Pt   Would the patient rather a call back or a response via [facility]?    Best Call Back Number:[phone]  Additional Information: requesting paperwork for her handicapped tags... would like it mailed to her address     [address] [place] [address] [address]  [place] [address]

## 2024-02-12 NOTE — PROGRESS NOTES
Subjective     Patient ID: Talia Dillon is a 73 y.o. female.    Chief Complaint: Establish Care    Patient presents to establish care. [place]. Recently moved into a [facility] home to feel more secure. Her sister and brother live in [place]. Patient is on lexapro for anxiety and trazodone for sleep. Patient does have ckd will check with niece regarding recommendation for referral.      Review of Systems   Constitutional: Negative.    HENT: Negative.     Eyes:  Negative for discharge and redness.   Respiratory: Negative.     Cardiovascular: Negative.  Negative for chest pain, palpitations and leg swelling.   Gastrointestinal: Negative.  Negative for constipation and diarrhea.   Musculoskeletal: Negative.  Negative for arthralgias and gait problem.   Neurological: Negative.  Negative for coordination difficulties.   Psychiatric/Behavioral: Negative.            Objective     Physical Exam  Vitals and nursing note reviewed.   Constitutional:       Appearance: Normal appearance. She is normal weight.   HENT:      Head: Normocephalic and atraumatic.   Eyes:      Extraocular Movements: Extraocular movements intact.   Cardiovascular:      Rate and Rhythm: Normal rate and regular rhythm.      Pulses: Normal pulses.      Heart sounds: Normal heart sounds.   Pulmonary:      Effort: Pulmonary effort is normal.      Breath sounds: Normal breath sounds.   Musculoskeletal:      Right lower leg: No edema.      Left lower leg: No edema.   Skin:     General: Skin is warm and dry.   Neurological:      General: No focal deficit present.      Mental Status: She is alert and oriented to person, place, and time.   Psychiatric:         Mood and Affect: Mood normal.         Behavior: Behavior normal.            Assessment and Plan     1. Essential hypertension  Comments:  bp stable on metoprolol    2. Anxiety  Comments:  stable and controlled on lexapro  Orders:  -     EScitalopram oxalate (LEXAPRO) 20 MG  tablet; Take 1 tablet (20 mg total) by mouth once daily. For depression  Dispense: 90 tablet; Refill: 3    3. Chronic diastolic congestive heart failure  Comments:  stable and controlled on lasix    4. CKD stage G4/A2, GFR 15-29 and albumin creatinine ratio  mg/g  Comments:  will refer to renal once patient gives preference for referral    5. Hypothyroidism due to acquired atrophy of thyroid  Comments:  stable on levothyroxine    6. Insomnia, unspecified type  Comments:  stable and controlled on trazodone  Orders:  -     traZODone (DESYREL) 50 MG tablet; Take 1 tablet (50 mg total) by mouth every evening. For sleep, depression and anxiety  Dispense: 90 tablet; Refill: 3                 Follow up in about 2 months (around 4/12/2024).

## 2024-02-14 DIAGNOSIS — N18.4 CKD STAGE G4/A2, GFR 15-29 AND ALBUMIN CREATININE RATIO 30-299 MG/G: Primary | ICD-10-CM

## 2024-02-16 NOTE — TELEPHONE ENCOUNTER
I have left a detailed message for pt stating that handicap form has been placed into the mail today. Form has is scanned into media.     Esperanza Lo Staff  Caller: pt (Today, 11:13 AM)  Type:Letter    Who Called: pt  Would the patient rather a call back or a response via [facility]? call  Best Call Back Number: [phone]  Additional Information:  Pt requesting a call about doctor mailing paperwork for dmv for her handicapped plate

## 2024-02-27 ENCOUNTER — OFFICE VISIT (OUTPATIENT)
Dept: CARDIOLOGY | Facility: CLINIC | Age: 74
End: 2024-02-27
Payer: MEDICARE

## 2024-02-27 ENCOUNTER — LAB VISIT (OUTPATIENT)
Dept: LAB | Facility: HOSPITAL | Age: 74
End: 2024-02-27
Attending: INTERNAL MEDICINE
Payer: MEDICARE

## 2024-02-27 VITALS
OXYGEN SATURATION: 100 % | DIASTOLIC BLOOD PRESSURE: 88 MMHG | WEIGHT: 221.56 LBS | HEART RATE: 105 BPM | HEIGHT: 65 IN | BODY MASS INDEX: 36.91 KG/M2 | SYSTOLIC BLOOD PRESSURE: 118 MMHG

## 2024-02-27 DIAGNOSIS — I48.11 LONGSTANDING PERSISTENT ATRIAL FIBRILLATION: ICD-10-CM

## 2024-02-27 DIAGNOSIS — E78.00 PURE HYPERCHOLESTEROLEMIA: ICD-10-CM

## 2024-02-27 DIAGNOSIS — N18.9 CHRONIC KIDNEY DISEASE-MINERAL AND BONE DISORDER: Chronic | ICD-10-CM

## 2024-02-27 DIAGNOSIS — Z86.79 HISTORY OF PSVT (PAROXYSMAL SUPRAVENTRICULAR TACHYCARDIA): ICD-10-CM

## 2024-02-27 DIAGNOSIS — E83.9 CHRONIC KIDNEY DISEASE-MINERAL AND BONE DISORDER: Chronic | ICD-10-CM

## 2024-02-27 DIAGNOSIS — I50.32 CHRONIC DIASTOLIC CONGESTIVE HEART FAILURE: ICD-10-CM

## 2024-02-27 DIAGNOSIS — R94.31 EKG ABNORMALITIES: ICD-10-CM

## 2024-02-27 DIAGNOSIS — R07.89 OTHER CHEST PAIN: ICD-10-CM

## 2024-02-27 DIAGNOSIS — I71.21 ANEURYSM OF ASCENDING AORTA WITHOUT RUPTURE: ICD-10-CM

## 2024-02-27 DIAGNOSIS — I10 ESSENTIAL HYPERTENSION: Primary | Chronic | ICD-10-CM

## 2024-02-27 DIAGNOSIS — M89.9 CHRONIC KIDNEY DISEASE-MINERAL AND BONE DISORDER: Chronic | ICD-10-CM

## 2024-02-27 DIAGNOSIS — R06.09 DYSPNEA ON EXERTION: ICD-10-CM

## 2024-02-27 DIAGNOSIS — E66.01 SEVERE OBESITY (BMI 35.0-39.9) WITH COMORBIDITY: ICD-10-CM

## 2024-02-27 PROCEDURE — 99215 OFFICE O/P EST HI 40 MIN: CPT | Mod: S$GLB,,, | Performed by: INTERNAL MEDICINE

## 2024-02-27 PROCEDURE — 36415 COLL VENOUS BLD VENIPUNCTURE: CPT | Performed by: INTERNAL MEDICINE

## 2024-02-27 PROCEDURE — 83880 ASSAY OF NATRIURETIC PEPTIDE: CPT | Performed by: INTERNAL MEDICINE

## 2024-02-27 PROCEDURE — 99999 PR PBB SHADOW E&M-EST. PATIENT-LVL IV: CPT | Mod: PBBFAC,,, | Performed by: INTERNAL MEDICINE

## 2024-02-27 RX ORDER — METOPROLOL SUCCINATE 100 MG/1
300 TABLET, EXTENDED RELEASE ORAL DAILY
Qty: 270 TABLET | Refills: 3 | Status: SHIPPED | OUTPATIENT
Start: 2024-02-27 | End: 2024-03-21

## 2024-02-27 RX ORDER — ATORVASTATIN CALCIUM 10 MG/1
10 TABLET, FILM COATED ORAL NIGHTLY
Qty: 90 TABLET | Refills: 3 | Status: SHIPPED | OUTPATIENT
Start: 2024-02-27

## 2024-02-27 NOTE — PROGRESS NOTES
Subjective:   Patient ID:  Talia Dillon is a 73 y.o. female who presents for follow up of Annual Exam, Shortness of Breath (Pt uses oxygen at home. ), and Atrial Fibrillation      74 yo female, came in for care reestablish    F/u Dr. Barker cardiologist at [facility]. Moved to [facility]  PMH chronic afib, TAA 4.8 cm, PSVT COPD former smoker, no h/o MI DM CVA  Some wheezing and SOB. Dependent walker, lower back pain.   On home o2  Sleeps on 1 pillow  Some chest tightness. No active bleeding  05/22 low dose chest ct showed TAA 4.8 cm  2020 Phar MPi showed no ischemia and echo showed EF nml  NT BNP 2200 in 2023 01/24 ekg afib        Past Medical History:   Diagnosis Date    Allergy     Anemia     Anxiety     Atrial fibrillation     Cancer     skin    Cataract     Depression     Edema     Hypothyroidism     Kidney disease     PSVT (paroxysmal supraventricular tachycardia)     Thyroid disease        Past Surgical History:   Procedure Laterality Date    ADENOIDECTOMY      APPENDECTOMY      CHOLECYSTECTOMY      COLONOSCOPY  2009    repeat in 10 years     ESOPHAGOGASTRODUODENOSCOPY N/A 05/04/2023    Procedure: EGD (ESOPHAGOGASTRODUODENOSCOPY);  Surgeon: Ej Reyes MD;  Location: [facility];  Service: Endoscopy;  Laterality: N/A;    EYE SURGERY Bilateral     cataracts extraction and bilateral upper eyelid lifts    HYSTERECTOMY      INJECTION OF ANESTHETIC AGENT AROUND NERVE Bilateral 09/21/2018    Procedure: BLOCK, NERVE, MEDIAL BRANCH BLOCK BILATERAL LUMBAR L2-5;  Surgeon: Julieth Christopher MD;  Location: [facility];  Service: Pain Management;  Laterality: Bilateral;  1 of 2    INJECTION OF ANESTHETIC AGENT AROUND NERVE Bilateral 09/28/2018    Procedure: BLOCK, NERVE, MEDIAL BRANCH BLOCK BILATERAL LUMBAR L2-5;  Surgeon: Julieth Christopher MD;  Location: [facility];  Service: Pain Management;  Laterality: Bilateral;  2 of 2  PLEASE GIVE NIRAVAM PER MD    OOPHORECTOMY       RADIOFREQUENCY ABLATION Left 02/14/2020    Procedure: RADIOFREQUENCY ABLATION L2,3,4,5;  Surgeon: Julieth Christopher MD;  Location: [facility] PAIN T;  Service: Pain Management;  Laterality: Left;  LT RFA L2,3,4,5  1 of 2  OK to hold Eliquis    RADIOFREQUENCY ABLATION Right 02/28/2020    Procedure: RADIOFREQUENCY ABLATION L2,3,4,5 RIGHT   2 of 2 ok to hold eliquis;  Surgeon: Julieth Christopher MD;  Location: [facility] PAIN MGT;  Service: Pain Management;  Laterality: Right;    RADIOFREQUENCY ABLATION Left 11/30/2020    Procedure: RADIOFREQUENCY ABLATION, L2-L3-L4-L5 MEDIAL BRANCH 1 OF 2 Continue Eliquis;  Surgeon: Bogdan Webster MD;  Location: [facility] PAIN T;  Service: Pain Management;  Laterality: Left;    TONSILLECTOMY      TREATMENT OF CARDIAC ARRHYTHMIA N/A 02/08/2019    Procedure: CARDIOVERSION;  Surgeon: Devin You MD;  Location: [facility] EP LAB;  Service: Cardiology;  Laterality: N/A;  AF, KAROL, DCCV, MAC, WI, 3 Prep       Social History     Tobacco Use    Smoking status: Former     Current packs/day: 0.00     Average packs/day: 1 pack/day for 50.0 years (50.0 ttl pk-yrs)     Types: Cigarettes, Vaping with nicotine     Start date: 1971     Quit date: 2021     Years since quitting: 3.1    Smokeless tobacco: Never    Tobacco comments:     No longer vaping   Substance Use Topics    Alcohol use: No    Drug use: No       Family History   Problem Relation Age of Onset    Hypertension Mother     Stroke Mother     Hypertension Father     Stroke Father     Diabetes Father     Arthritis Sister     Diabetes Sister     Heart attack Sister     Coronary artery disease Sister     Diabetes Sister     Sleep apnea Daughter     Mental illness Daughter     Bipolar disorder Daughter          Review of Systems   Constitutional: Negative for decreased appetite, diaphoresis, fever, malaise/fatigue and night sweats.   HENT:  Negative for nosebleeds.    Eyes:  Negative for blurred vision and double vision.   Cardiovascular:   Positive for dyspnea on exertion. Negative for chest pain, claudication, irregular heartbeat, leg swelling, near-syncope, orthopnea, palpitations, paroxysmal nocturnal dyspnea and syncope.   Respiratory:  Positive for shortness of breath and wheezing. Negative for cough, sleep disturbances due to breathing, snoring and sputum production.    Endocrine: Negative for cold intolerance and polyuria.   Hematologic/Lymphatic: Does not bruise/bleed easily.   Skin:  Negative for rash.   Musculoskeletal:  Positive for arthritis, back pain and joint pain. Negative for falls, joint swelling and neck pain.   Gastrointestinal:  Negative for abdominal pain, heartburn, nausea and vomiting.   Genitourinary:  Negative for dysuria, frequency and hematuria.   Neurological:  Negative for difficulty with concentration, dizziness, focal weakness, headaches, light-headedness, numbness, seizures and weakness.   Psychiatric/Behavioral:  Negative for depression, memory loss and substance abuse. The patient does not have insomnia.    Allergic/Immunologic: Negative for HIV exposure and hives.       Objective:   Physical Exam  HENT:      Head: Normocephalic.   Eyes:      Pupils: Pupils are equal, round, and reactive to light.   Neck:      Thyroid: No thyromegaly.      Vascular: Normal carotid pulses. No carotid bruit or JVD.   Cardiovascular:      Rate and Rhythm: Normal rate and regular rhythm. No extrasystoles are present.     Chest Wall: PMI is not displaced.      Pulses: Normal pulses.      Heart sounds: Normal heart sounds. No murmur heard.     No gallop. No S3 sounds.   Pulmonary:      Effort: No respiratory distress.      Breath sounds: Normal breath sounds. No stridor.   Abdominal:      General: Bowel sounds are normal.      Palpations: Abdomen is soft.      Tenderness: There is no abdominal tenderness. There is no rebound.   Skin:     Findings: No rash.   Neurological:      Mental Status: She is alert and oriented to person, place, and  time.   Psychiatric:         Behavior: Behavior normal.       Lab Results   Component Value Date    CHOL 129 06/11/2020    CHOL 107 (L) 01/14/2019    CHOL 239 (H) 09/12/2018     Lab Results   Component Value Date    HDL 41 06/11/2020    HDL 37 (L) 01/14/2019    HDL 51 09/12/2018     Lab Results   Component Value Date    LDLCALC 61.8 (L) 06/11/2020    LDLCALC 52.8 (L) 01/14/2019    LDLCALC 151.6 09/12/2018     Lab Results   Component Value Date    TRIG 131 06/11/2020    TRIG 86 01/14/2019    TRIG 182 (H) 09/12/2018     Lab Results   Component Value Date    CHOLHDL 31.8 06/11/2020    CHOLHDL 34.6 01/14/2019    CHOLHDL 21.3 09/12/2018       Chemistry        Component Value Date/Time     11/16/2023 1535    K 4.9 11/16/2023 1535     11/16/2023 1535    CO2 28 11/16/2023 1535    BUN 40 (H) 11/16/2023 1535    CREATININE 2.01 (H) 11/16/2023 1535     (H) 11/16/2023 1535        Component Value Date/Time    CALCIUM 10.1 11/16/2023 1535    ALKPHOS 98 11/16/2023 1535    AST 27 11/16/2023 1535    ALT 21 11/16/2023 1535    BILITOT 0.8 11/16/2023 1535    ESTGFRAFRICA 21 06/30/2023 1114    ESTGFRAFRICA 27.3 (A) 02/24/2022 1211    EGFRNONAA 23.7 (A) 02/24/2022 1211          Lab Results   Component Value Date    HGBA1C 5.6 07/15/2015     Lab Results   Component Value Date    TSH 3.030 11/16/2023     Lab Results   Component Value Date    INR 1.5 (H) 01/08/2020    INR 3.3 (H) 01/02/2020    INR 5.3 (HH) 12/30/2019     Lab Results   Component Value Date    WBC 8.54 11/16/2023    HGB 12.3 11/16/2023    HCT 39.5 11/16/2023    MCV 94 11/16/2023     11/16/2023     BMP  Sodium   Date Value Ref Range Status   11/16/2023 143 136 - 145 mmol/L Final     Potassium   Date Value Ref Range Status   11/16/2023 4.9 3.5 - 5.1 mmol/L Final     Chloride   Date Value Ref Range Status   11/16/2023 102 95 - 110 mmol/L Final     CO2   Date Value Ref Range Status   11/16/2023 28 23 - 29 mmol/L Final     BUN   Date Value Ref Range Status    11/16/2023 40 (H) 7 - 17 mg/dL Final     Creatinine   Date Value Ref Range Status   11/16/2023 2.01 (H) 0.50 - 1.40 mg/dL Final     Calcium   Date Value Ref Range Status   11/16/2023 10.1 8.7 - 10.5 mg/dL Final     Anion Gap   Date Value Ref Range Status   11/16/2023 13 8 - 16 mmol/L Final     eGFR if    Date Value Ref Range Status   02/24/2022 27.3 (A) >60 mL/min/1.73 m^2 Final     eGFR    Date Value Ref Range Status   06/30/2023 21 mL/min/1.73mSq Final     Comment:     In accordance with NKF-ASN Task Force recommendation, calculation based on the Chronic Kidney Disease Epidemiology Collaboration (CKD-EPI) equation without adjustment for race. eGFR adjusted for gender and age and calculated in ml/min/1.73mSquared. eGFR cannot be calculated if patient is under 18 years of age.     Reference Range:   >= 60 ml/min/1.73mSquared.     eGFR if non    Date Value Ref Range Status   02/24/2022 23.7 (A) >60 mL/min/1.73 m^2 Final     Comment:     Calculation used to obtain the estimated glomerular filtration  rate (eGFR) is the CKD-EPI equation.        BNP  @LABRCNTIP(BNP,BNPTRIAGEBLO)@  @LABRCNTIP(troponini)@  CrCl cannot be calculated (Patient's most recent lab result is older than the maximum 7 days allowed.).  No results found in the last 24 hours.  No results found in the last 24 hours.  No results found in the last 24 hours.    Assessment:      1. Essential hypertension    2. Severe obesity (BMI 35.0-39.9) with comorbidity    3. History of PSVT (paroxysmal supraventricular tachycardia)    4. Pure hypercholesterolemia    5. Longstanding persistent atrial fibrillation    6. Aneurysm of ascending aorta without rupture    7. Dyspnea on exertion    8. Chronic kidney disease-mineral and bone disorder    9. Chronic diastolic congestive heart failure    10. Other chest pain    11. EKG abnormalities        Plan:   Echo and Phar MPI fro SOB/chest pain  abnl EKG to r/o ischemia and  fro EF eval.  NT BNP ordered  Continue eliquis 5 mg bid lipitor torpolXL     Counseled DASH  Check Lipid profile with PCP in 6 months  Recommend heart-healthy diet, weight control   Dante. Risk modification.   I have reviewed all pertinent labs and cardiac studies independently. Plans and recommendations have been formulated under my direct supervision. All questions answered and patient voiced understanding.   If symptoms persist go to the ED  RTC in 3 months

## 2024-02-28 LAB — NT-PROBNP SERPL IA-MCNC: 1269 PG/ML

## 2024-02-29 ENCOUNTER — TELEPHONE (OUTPATIENT)
Dept: CARDIOLOGY | Facility: CLINIC | Age: 74
End: 2024-02-29
Payer: MEDICARE

## 2024-02-29 DIAGNOSIS — I50.32 CHRONIC DIASTOLIC CONGESTIVE HEART FAILURE: Primary | ICD-10-CM

## 2024-02-29 RX ORDER — FUROSEMIDE 20 MG/1
20 TABLET ORAL DAILY
Qty: 30 TABLET | Refills: 5 | Status: SHIPPED | OUTPATIENT
Start: 2024-02-29

## 2024-03-01 ENCOUNTER — NURSE TRIAGE (OUTPATIENT)
Dept: ADMINISTRATIVE | Facility: CLINIC | Age: 74
End: 2024-03-01
Payer: MEDICARE

## 2024-03-01 ENCOUNTER — TELEPHONE (OUTPATIENT)
Dept: INTERNAL MEDICINE | Facility: CLINIC | Age: 74
End: 2024-03-01
Payer: MEDICARE

## 2024-03-01 ENCOUNTER — TELEPHONE (OUTPATIENT)
Dept: CARDIOLOGY | Facility: CLINIC | Age: 74
End: 2024-03-01
Payer: MEDICARE

## 2024-03-01 NOTE — TELEPHONE ENCOUNTER
----- Message from Palomo Hooker sent at 3/1/2024 10:20 AM CST -----  Contact: Talia Melgar is needing a call back in regards to rescheduling her appt. Please give her a call back at [phone]

## 2024-03-01 NOTE — TELEPHONE ENCOUNTER
Pt is transferred to this NT via scheduling. Pt recently saw her Cardiologist and spoke with him on the phone today. Pt was advised that Dr. Butler was starting her on Lasix. She has stage IV kidney disease and is concerned on whether or not it is safe for her to take Lasix. OCP, Dr. Hyman, is called for further guidance.    Dr. Hyman advises that pt take lasix as prescribed and f/u with cardiology office on Monday to discuss how she's feeling with it and to schedule labwork. Pt is informed. She verbalizes understanding and is instructed to call back with any new/worsening sxs, questions, or concerns.   Reason for Disposition   [1] Caller has URGENT medicine question about med that PCP or specialist prescribed AND [2] triager unable to answer question    Additional Information   Negative: [1] Intentional drug overdose AND [2] suicidal thoughts or ideas   Negative: MORE THAN A DOUBLE DOSE of a prescription or over-the-counter (OTC) drug   Negative: [1] DOUBLE DOSE (an extra dose or lesser amount) of prescription drug AND [2] any symptoms (e.g., dizziness, nausea, pain, sleepiness)   Negative: [1] DOUBLE DOSE (an extra dose or lesser amount) of over-the-counter (OTC) drug AND [2] any symptoms (e.g., dizziness, nausea, pain, sleepiness)   Negative: Took another person's prescription drug   Negative: [1] DOUBLE DOSE (an extra dose or lesser amount) of prescription drug AND [2] NO symptoms  (Exception: A double dose of antibiotics.)   Negative: Diabetes drug error or overdose (e.g., took wrong type of insulin or took extra dose)   Negative: [1] Prescription not at pharmacy AND [2] was prescribed by PCP recently (Exception: Triager has access to EMR and prescription is recorded there. Go to Home Care and confirm for pharmacy.)   Negative: [1] Pharmacy calling with prescription question AND [2] triager unable to answer question    Protocols used: Medication Question Call-A-

## 2024-03-01 NOTE — TELEPHONE ENCOUNTER
Spoke with pt in regards to lab results. Pt verbalized understanding information and will call at later time to schedule labs.               ----- Message from Brian Butler MD sent at 2/29/2024  9:15 PM CST -----  The lab showed CHF  Add Lasix 20 mg daily  Repeat NT BNP and BMP in 1 week

## 2024-03-14 ENCOUNTER — LAB VISIT (OUTPATIENT)
Dept: LAB | Facility: HOSPITAL | Age: 74
End: 2024-03-14
Attending: INTERNAL MEDICINE
Payer: MEDICARE

## 2024-03-14 DIAGNOSIS — N18.4 CKD STAGE G4/A2, GFR 15-29 AND ALBUMIN CREATININE RATIO 30-299 MG/G: ICD-10-CM

## 2024-03-14 LAB
ANION GAP SERPL CALC-SCNC: 11 MMOL/L (ref 8–16)
BASOPHILS # BLD AUTO: 0.05 K/UL (ref 0–0.2)
BASOPHILS NFR BLD: 0.6 % (ref 0–1.9)
BUN SERPL-MCNC: 33 MG/DL (ref 8–23)
CALCIUM SERPL-MCNC: 10.3 MG/DL (ref 8.7–10.5)
CHLORIDE SERPL-SCNC: 105 MMOL/L (ref 95–110)
CO2 SERPL-SCNC: 25 MMOL/L (ref 23–29)
CREAT SERPL-MCNC: 2.1 MG/DL (ref 0.5–1.4)
DIFFERENTIAL METHOD BLD: ABNORMAL
EOSINOPHIL # BLD AUTO: 0.2 K/UL (ref 0–0.5)
EOSINOPHIL NFR BLD: 2.8 % (ref 0–8)
ERYTHROCYTE [DISTWIDTH] IN BLOOD BY AUTOMATED COUNT: 13.7 % (ref 11.5–14.5)
EST. GFR  (NO RACE VARIABLE): 24.3 ML/MIN/1.73 M^2
GLUCOSE SERPL-MCNC: 75 MG/DL (ref 70–110)
HCT VFR BLD AUTO: 40.8 % (ref 37–48.5)
HGB BLD-MCNC: 12.5 G/DL (ref 12–16)
IMM GRANULOCYTES # BLD AUTO: 0.02 K/UL (ref 0–0.04)
IMM GRANULOCYTES NFR BLD AUTO: 0.2 % (ref 0–0.5)
LYMPHOCYTES # BLD AUTO: 1.5 K/UL (ref 1–4.8)
LYMPHOCYTES NFR BLD: 19 % (ref 18–48)
MCH RBC QN AUTO: 29.8 PG (ref 27–31)
MCHC RBC AUTO-ENTMCNC: 30.6 G/DL (ref 32–36)
MCV RBC AUTO: 97 FL (ref 82–98)
MONOCYTES # BLD AUTO: 0.7 K/UL (ref 0.3–1)
MONOCYTES NFR BLD: 9.1 % (ref 4–15)
NEUTROPHILS # BLD AUTO: 5.5 K/UL (ref 1.8–7.7)
NEUTROPHILS NFR BLD: 68.3 % (ref 38–73)
NRBC BLD-RTO: 0 /100 WBC
PLATELET # BLD AUTO: 160 K/UL (ref 150–450)
PMV BLD AUTO: 12.7 FL (ref 9.2–12.9)
POTASSIUM SERPL-SCNC: 4.6 MMOL/L (ref 3.5–5.1)
RBC # BLD AUTO: 4.2 M/UL (ref 4–5.4)
SODIUM SERPL-SCNC: 141 MMOL/L (ref 136–145)
WBC # BLD AUTO: 8.11 K/UL (ref 3.9–12.7)

## 2024-03-14 PROCEDURE — 85025 COMPLETE CBC W/AUTO DIFF WBC: CPT | Performed by: INTERNAL MEDICINE

## 2024-03-14 PROCEDURE — 36415 COLL VENOUS BLD VENIPUNCTURE: CPT | Mod: PN | Performed by: INTERNAL MEDICINE

## 2024-03-14 PROCEDURE — 80048 BASIC METABOLIC PNL TOTAL CA: CPT | Performed by: INTERNAL MEDICINE

## 2024-03-21 ENCOUNTER — OFFICE VISIT (OUTPATIENT)
Dept: NEPHROLOGY | Facility: CLINIC | Age: 74
End: 2024-03-21
Payer: MEDICARE

## 2024-03-21 VITALS
HEIGHT: 65 IN | RESPIRATION RATE: 24 BRPM | WEIGHT: 220.44 LBS | SYSTOLIC BLOOD PRESSURE: 110 MMHG | HEART RATE: 86 BPM | BODY MASS INDEX: 36.73 KG/M2 | DIASTOLIC BLOOD PRESSURE: 80 MMHG

## 2024-03-21 DIAGNOSIS — N14.0 ANALGESIC NEPHROPATHY: ICD-10-CM

## 2024-03-21 DIAGNOSIS — N18.4 STAGE 4 CHRONIC KIDNEY DISEASE DUE TO HYPERTENSION: Primary | ICD-10-CM

## 2024-03-21 DIAGNOSIS — E55.9 VITAMIN D DEFICIENCY: ICD-10-CM

## 2024-03-21 DIAGNOSIS — I12.9 STAGE 4 CHRONIC KIDNEY DISEASE DUE TO HYPERTENSION: Primary | ICD-10-CM

## 2024-03-21 PROCEDURE — 99999 PR PBB SHADOW E&M-EST. PATIENT-LVL III: CPT | Mod: PBBFAC,,, | Performed by: INTERNAL MEDICINE

## 2024-03-21 PROCEDURE — 99215 OFFICE O/P EST HI 40 MIN: CPT | Mod: S$GLB,,, | Performed by: INTERNAL MEDICINE

## 2024-03-21 RX ORDER — CHOLECALCIFEROL (VITAMIN D3) 125 MCG
5000 CAPSULE ORAL EVERY OTHER DAY
Qty: 45 CAPSULE | Refills: 3 | Status: SHIPPED | OUTPATIENT
Start: 2024-03-21

## 2024-03-21 RX ORDER — METOPROLOL SUCCINATE 50 MG/1
50 TABLET, EXTENDED RELEASE ORAL DAILY
Qty: 90 TABLET | Refills: 3 | Status: SHIPPED | OUTPATIENT
Start: 2024-03-21

## 2024-03-21 RX ORDER — MULTIVITAMIN
1 TABLET ORAL DAILY
COMMUNITY

## 2024-03-21 NOTE — PROGRESS NOTES
Talia Dillon is a 73 y.o. female for whom nephrology consult has been requested to evaluate and give opinion.   Renal clinic consult note:  Date of clinic visit: 3/21/24  Reason for consult: CKD, pt moved to [place]  Self-referred    HPI: Pt was seen and examined. Labs and meds reviewed. Chart was reviewed. Pt is a 74 y/o female with h/o of CKD stage 4 who presented to establish renal f/u care after she moved to assisted living in . Pt was previously being seen at [facility]- renal service. CKD is likely due to very extensive use of aleve in the past (3 per day x 20-25 years, nearly everyday). Pt feels well today, no new c/o's, no pain, no discomfort. No SOB, no leg swelling. Pt says she stopped all NSAIds about 2 years ago.    PAST MEDICAL HISTORY:  CKD stage 4, analgesic nephropathy, Anxiety, Atrial fibrillation, Cataract, Depression, Hypothyroidism, PSVT (paroxysmal supraventricular tachycardia), and Thyroid disease.    PAST SURGICAL HISTORY:  She  has a past surgical history that includes Tonsillectomy; Colonoscopy (2009); Eye surgery (Bilateral); Hysterectomy; Injection of anesthetic agent around nerve (Bilateral, 09/21/2018); Injection of anesthetic agent around nerve (Bilateral, 09/28/2018); Treatment of cardiac arrhythmia (N/A, 02/08/2019); Adenoidectomy; Appendectomy; Oophorectomy; Radiofrequency ablation (Left, 02/14/2020); Radiofrequency ablation (Right, 02/28/2020); Radiofrequency ablation (Left, 11/30/2020); Esophagogastroduodenoscopy (N/A, 05/04/2023); and Cholecystectomy.    SOCIAL HISTORY:  She  reports that she quit smoking about 3 years ago. Her smoking use included cigarettes and vaping with nicotine. She started smoking about 53 years ago. She has a 50.0 pack-year smoking history. She has never used smokeless tobacco. She reports that she does not drink alcohol and does not use drugs.    FAMILY MEDICAL HISTORY:  Her family history includes Arthritis in her sister; Bipolar disorder in her  daughter; Coronary artery disease in her sister; Diabetes in her father, sister, and sister; Heart attack in her sister; Hypertension in her father and mother; Mental illness in her daughter; Sleep apnea in her daughter; Stroke in her father and mother.    Review of patient's allergies indicates:   Allergen Reactions    Dexamethasone Rash           Prior to Admission medications    Medication Sig Start Date End Date Taking? Authorizing Provider   albuterol (PROVENTIL/VENTOLIN HFA) 90 mcg/actuation inhaler Inhale 2 puffs into the lungs every 4 (four) hours as needed for Wheezing (For shortness of breath). 11/3/23 11/2/24 Yes James Vallecillo MD   albuterol-ipratropium (DUO-NEB) 2.5 mg-0.5 mg/3 mL nebulizer solution Take 3 mLs by nebulization every 6 (six) hours as needed for Wheezing. Rescue 3/18/22  Yes James Vallecillo MD   ALPRAZolam (XANAX) 0.25 MG tablet Take 1 tablet (0.25 mg total) by mouth 3 (three) times daily. 11/3/23  Yes James Vallecillo MD   apixaban (ELIQUIS) 5 mg Tab Take 1 tablet (5 mg total) by mouth 2 (two) times daily. 2/27/24  Yes Brian Butler MD   atorvastatin (LIPITOR) 10 MG tablet Take 1 tablet (10 mg total) by mouth every evening. 2/27/24  Yes Brian Butler MD   EScitalopram oxalate (LEXAPRO) 20 MG tablet Take 1 tablet (20 mg total) by mouth once daily. For depression 2/12/24 2/6/25 Yes Yohana Garcia MD   gabapentin (NEURONTIN) 400 MG capsule Take 1 capsule (400 mg total) by mouth 2 (two) times daily. 5/10/23 5/9/24 Yes James Vallecillo MD   levothyroxine (SYNTHROID) 200 MCG tablet TAKE 1 TABLET BY MOUTH ONCE DAILY. 5/4/23  Yes James Vallecillo MD   multivitamin (THERAGRAN) per tablet Take 1 tablet by mouth once daily.   Yes Provider, Historical   ondansetron (ZOFRAN-ODT) 4 MG TbDL DISSOLVE 1 TABLET BY MOUTH EVERY 6 HOURS AS NEEDED (NAUSEA). 11/3/23  Yes James Vallecillo MD   traZODone (DESYREL) 50 MG tablet Take 1 tablet (50 mg total) by mouth every evening. For sleep, depression  "and anxiety 24  Yes Yohana Garcia MD   umeclidinium-vilanteroL (ANORO ELLIPTA) 62.5-25 mcg/actuation DsDv Inhale 1 puff into the lungs once daily. Controller 11/3/23  Yes James Vallecillo MD   VISINE 0.05 % Drop SMARTSI Drop(s) In Eye(s) PRN 1/3/23  Yes Provider, Historical   cholecalciferol, vitamin D3, 125 mcg (5,000 unit) capsule TAKE 1 TABLET (5,000 UNITS TOTAL) BY MOUTH ONCE DAILY. 9/2/23 3/21/24 Yes James Vallecillo MD   metoprolol succinate (TOPROL-XL) 100 MG 24 hr tablet Take 3 tablets (300 mg total) by mouth once daily. 2/27/24 3/21/24 Yes Brian Butler MD   cholecalciferol, vitamin D3, 125 mcg (5,000 unit) capsule Take 1 capsule (5,000 Units total) by mouth every other day. 3/21/24   Kyle Guillen MD   furosemide (LASIX) 20 MG tablet Take 1 tablet (20 mg total) by mouth once daily.  Patient not taking: Reported on 3/21/2024 2/29/24   Brian Butler MD   metoprolol succinate (TOPROL-XL) 50 MG 24 hr tablet Take 1 tablet (50 mg total) by mouth once daily. 3/21/24   Kyle Guillen MD        REVIEW OF SYSTEMS:  Patient has no fever, fatigue, visual changes, chest pain, edema, cough, dyspnea, nausea, vomiting, constipation, diarrhea, arthralgias, pruritis, dizziness, weakness, depression, confusion.    PHYSICAL EXAM:   height is 5' 5" (1.651 m) and weight is 100 kg (220 lb 7.4 oz). Her blood pressure is 110/80 and her pulse is 86. Her respiration is 24 (abnormal).   Gen: WDWN female in no apparent distress  Psych: Normal mood and affect  Skin: No rashes or ulcers  Neck: No JVD  Chest: Clear with no rales, rhonchi, wheezing with normal effort  CV: Regular with no murmurs, gallops or rubs  Abd: Soft, nontender, no distension  Ext: No edema    Labs reviewed  BMP  Lab Results   Component Value Date     2024    K 4.6 2024     2024    CO2 25 2024    BUN 33 (H) 2024    CREATININE 2.1 (H) 2024    CALCIUM 10.3 2024    ANIONGAP 11 2024    " EGFRNORACEVR 24.3 (A) 03/14/2024     Lab Results   Component Value Date    WBC 8.11 03/14/2024    HGB 12.5 03/14/2024    HCT 40.8 03/14/2024    MCV 97 03/14/2024     03/14/2024     Lab Results   Component Value Date    .4 (H) 02/03/2023    CALCIUM 10.3 03/14/2024    PHOS 4.7 (H) 09/22/2023       Urine protein/Cr 240 mg  U/a: 1+ protein, trace blood, 1 RBC, no cast      IMPRESSION AND RECOMMENDATIONS: 72 y/o female with extensive past h/o of NSAIds use and CKD presented to re-establish renal care after moving [place]:    Renal: s Cr stable, within baseline range.  Stable renal function, CKD stage 4  CKD due to massive use of aleve in the past (multiple times daily x 20-25 years)  Review of prior labs show, s Cr started going up > 8 years ago to max about 2.5, but began to improve slightly about 1 year ago after pt stopped taking aleve    Complications of CKD:  K normal  Na normal  Acid base no issues  Ca normal  PO4 unremarkable  Secondary hyperparathyroidism, mild, will monitor  Not anemic    2. HTN: BP controlled to slightly low  Meds reviewed  Will lower toprol XL from 100 to 50 mg po qd    Plans and recommendations:  As discussed above  Total time spent 60 minutes including time needed to review the records, the   patient evaluation, documentation, face-to-face discussion with the patient,   more than 50% of the time was spent on coordination of care and counseling.    Level V visit.  RTC 6 months    Kyle Guillen MD

## 2024-03-26 ENCOUNTER — HOSPITAL ENCOUNTER (OUTPATIENT)
Dept: RADIOLOGY | Facility: HOSPITAL | Age: 74
Discharge: HOME | End: 2024-03-26
Attending: INTERNAL MEDICINE
Payer: MEDICARE

## 2024-03-26 ENCOUNTER — HOSPITAL ENCOUNTER (OUTPATIENT)
Dept: CARDIOLOGY | Facility: HOSPITAL | Age: 74
Discharge: HOME | End: 2024-03-26
Attending: INTERNAL MEDICINE
Payer: MEDICARE

## 2024-03-26 VITALS
HEIGHT: 65 IN | WEIGHT: 220 LBS | BODY MASS INDEX: 36.65 KG/M2 | SYSTOLIC BLOOD PRESSURE: 110 MMHG | DIASTOLIC BLOOD PRESSURE: 80 MMHG

## 2024-03-26 DIAGNOSIS — I50.32 CHRONIC DIASTOLIC CONGESTIVE HEART FAILURE: ICD-10-CM

## 2024-03-26 DIAGNOSIS — R94.31 EKG ABNORMALITIES: ICD-10-CM

## 2024-03-26 DIAGNOSIS — R07.89 OTHER CHEST PAIN: ICD-10-CM

## 2024-03-26 LAB
AORTIC ROOT ANNULUS: 3.14 CM
ASCENDING AORTA: 4.09 CM
AV INDEX (PROSTH): 0.86
AV MEAN GRADIENT: 3 MMHG
AV PEAK GRADIENT: 4 MMHG
AV VALVE AREA BY VELOCITY RATIO: 2.56 CM²
AV VALVE AREA: 2.76 CM²
AV VELOCITY RATIO: 0.8
BSA FOR ECHO PROCEDURE: 2.14 M2
CV ECHO LV RWT: 0.57 CM
CV STRESS BASE HR: 105 BPM
DIASTOLIC BLOOD PRESSURE: 70 MMHG
DOP CALC AO PEAK VEL: 1 M/S
DOP CALC AO VTI: 18.2 CM
DOP CALC LVOT AREA: 3.2 CM2
DOP CALC LVOT DIAMETER: 2.02 CM
DOP CALC LVOT PEAK VEL: 0.8 M/S
DOP CALC LVOT STROKE VOLUME: 50.29 CM3
DOP CALCLVOT PEAK VEL VTI: 15.7 CM
ECHO LV POSTERIOR WALL: 1.06 CM (ref 0.6–1.1)
FRACTIONAL SHORTENING: 31 % (ref 28–44)
INTERVENTRICULAR SEPTUM: 1.05 CM (ref 0.6–1.1)
IVRT: 79.92 MSEC
LA MAJOR: 6.66 CM
LA MINOR: 5.85 CM
LA WIDTH: 4.6 CM
LEFT ATRIUM SIZE: 3.48 CM
LEFT ATRIUM VOLUME INDEX MOD: 48 ML/M2
LEFT ATRIUM VOLUME INDEX: 41.1 ML/M2
LEFT ATRIUM VOLUME MOD: 98.78 CM3
LEFT ATRIUM VOLUME: 84.75 CM3
LEFT INTERNAL DIMENSION IN SYSTOLE: 2.57 CM (ref 2.1–4)
LEFT VENTRICLE DIASTOLIC VOLUME INDEX: 29.18 ML/M2
LEFT VENTRICLE DIASTOLIC VOLUME: 60.12 ML
LEFT VENTRICLE MASS INDEX: 60 G/M2
LEFT VENTRICLE SYSTOLIC VOLUME INDEX: 11.6 ML/M2
LEFT VENTRICLE SYSTOLIC VOLUME: 23.91 ML
LEFT VENTRICULAR INTERNAL DIMENSION IN DIASTOLE: 3.75 CM (ref 3.5–6)
LEFT VENTRICULAR MASS: 124.15 G
LVOT MG: 1.51 MMHG
LVOT MV: 0.57 CM/S
NUC REST EJECTION FRACTION: 81
NUC STRESS EJECTION FRACTION: 65 %
OHS CV CPX 85 PERCENT MAX PREDICTED HEART RATE MALE: 124
OHS CV CPX MAX PREDICTED HEART RATE: 146
OHS CV CPX PATIENT IS FEMALE: 1
OHS CV CPX PATIENT IS MALE: 0
OHS CV CPX PEAK DIASTOLIC BLOOD PRESSURE: 70 MMHG
OHS CV CPX PEAK HEAR RATE: 144 BPM
OHS CV CPX PEAK RATE PRESSURE PRODUCT: NORMAL
OHS CV CPX PEAK SYSTOLIC BLOOD PRESSURE: 118 MMHG
OHS CV CPX PERCENT MAX PREDICTED HEART RATE ACHIEVED: 102
OHS CV CPX RATE PRESSURE PRODUCT PRESENTING: NORMAL
PISA TR MAX VEL: 2.83 M/S
PV PEAK GRADIENT: 2 MMHG
PV PEAK VELOCITY: 0.71 M/S
RA MAJOR: 6.53 CM
RA WIDTH: 3.64 CM
RIGHT VENTRICULAR END-DIASTOLIC DIMENSION: 2.5 CM
SINUS: 2.99 CM
STJ: 2.86 CM
SYSTOLIC BLOOD PRESSURE: 110 MMHG
TR MAX PG: 32 MMHG
TRICUSPID ANNULAR PLANE SYSTOLIC EXCURSION: 1.61 CM
Z-SCORE OF LEFT VENTRICULAR DIMENSION IN END DIASTOLE: -5.06
Z-SCORE OF LEFT VENTRICULAR DIMENSION IN END SYSTOLE: -3.11

## 2024-03-26 PROCEDURE — 93018 CV STRESS TEST I&R ONLY: CPT | Mod: ,,, | Performed by: INTERNAL MEDICINE

## 2024-03-26 PROCEDURE — 93016 CV STRESS TEST SUPVJ ONLY: CPT | Mod: ,,, | Performed by: INTERNAL MEDICINE

## 2024-03-26 PROCEDURE — 78452 HT MUSCLE IMAGE SPECT MULT: CPT | Mod: 26,,, | Performed by: INTERNAL MEDICINE

## 2024-03-26 PROCEDURE — 63600175 PHARM REV CODE 636 W HCPCS: Performed by: INTERNAL MEDICINE

## 2024-03-26 PROCEDURE — 78452 HT MUSCLE IMAGE SPECT MULT: CPT

## 2024-03-26 PROCEDURE — 93306 TTE W/DOPPLER COMPLETE: CPT

## 2024-03-26 PROCEDURE — A9502 TC99M TETROFOSMIN: HCPCS | Performed by: INTERNAL MEDICINE

## 2024-03-26 PROCEDURE — 93017 CV STRESS TEST TRACING ONLY: CPT

## 2024-03-26 PROCEDURE — 93306 TTE W/DOPPLER COMPLETE: CPT | Mod: 26,,, | Performed by: INTERNAL MEDICINE

## 2024-03-26 RX ORDER — REGADENOSON 0.08 MG/ML
0.4 INJECTION, SOLUTION INTRAVENOUS ONCE
Status: COMPLETED | OUTPATIENT
Start: 2024-03-26 | End: 2024-03-26

## 2024-03-26 RX ADMIN — TETROFOSMIN 9.8 MILLICURIE: 1.38 INJECTION, POWDER, LYOPHILIZED, FOR SOLUTION INTRAVENOUS at 08:03

## 2024-03-26 RX ADMIN — TETROFOSMIN 28.6 MILLICURIE: 1.38 INJECTION, POWDER, LYOPHILIZED, FOR SOLUTION INTRAVENOUS at 11:03

## 2024-03-26 RX ADMIN — REGADENOSON 0.4 MG: 0.08 INJECTION, SOLUTION INTRAVENOUS at 11:03

## 2024-03-27 ENCOUNTER — TELEPHONE (OUTPATIENT)
Dept: CARDIOLOGY | Facility: CLINIC | Age: 74
End: 2024-03-27
Payer: MEDICARE

## 2024-03-27 NOTE — TELEPHONE ENCOUNTER
LVM for pt to call back in regards to test results.             ----- Message from Brian Butler MD sent at 3/26/2024 10:13 PM CDT -----  Echo showed normal function and mild valvular leaking.  Nuke stress test showed no ischemia  Continue current Rx.   F/U as scheduled

## 2024-04-16 ENCOUNTER — TELEPHONE (OUTPATIENT)
Dept: CARDIOLOGY | Facility: CLINIC | Age: 74
End: 2024-04-16
Payer: MEDICARE

## 2024-04-16 NOTE — TELEPHONE ENCOUNTER
Spoke with pt in regards to test results. Pt verbalized understanding information.                         ----- Message from Megan Cage sent at 4/16/2024 12:56 PM CDT -----  Regarding: [facility]  Contact: Talia  .Type:  Test Results    Who Called: Talia  Name of Test (Lab/Mammo/Etc):  [facility] CAM1 CARDIAC, TREADMILL, NUCLEAR MEDICINE, ECHOCARDIOGRAM, HGVC,  [facility] CAM1 CARDIAC    Date of Test: 03/26/2024   Ordering Provider: Brian Butler  Where the test was performed:  [facility]   Would the patient rather a call back or a response via [facility]? call  Best Call Back Number:   [phone] (home)    Additional Information:  Please call to discuss all test with the patient.

## 2024-05-03 ENCOUNTER — TELEPHONE (OUTPATIENT)
Dept: INTERNAL MEDICINE | Facility: CLINIC | Age: 74
End: 2024-05-03
Payer: MEDICARE

## 2024-05-03 NOTE — TELEPHONE ENCOUNTER
----- Message from Barbra Guzman sent at 5/3/2024 12:25 PM CDT -----  Type:  Patient Returning Call    Who Called: Marisol, director at [facility]  Who Left Message for Patient:  Does the patient know what this is regarding?:  Would the patient rather a call back or a response via [facility]? call  Best Call Back Number:[phone]  Additional Information: would like a call back in regards to getting a sooner appointment
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
## 2024-05-07 ENCOUNTER — OFFICE VISIT (OUTPATIENT)
Dept: INTERNAL MEDICINE | Facility: CLINIC | Age: 74
End: 2024-05-07
Payer: MEDICARE

## 2024-05-07 VITALS
SYSTOLIC BLOOD PRESSURE: 130 MMHG | DIASTOLIC BLOOD PRESSURE: 80 MMHG | BODY MASS INDEX: 37.93 KG/M2 | WEIGHT: 227.94 LBS | OXYGEN SATURATION: 98 % | TEMPERATURE: 97 F

## 2024-05-07 DIAGNOSIS — M54.2 NECK PAIN: ICD-10-CM

## 2024-05-07 DIAGNOSIS — M54.50 ACUTE BILATERAL LOW BACK PAIN WITHOUT SCIATICA: Primary | ICD-10-CM
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
## 2024-05-07 DIAGNOSIS — L98.9 SKIN LESION: ICD-10-CM

## 2024-05-07 PROCEDURE — 1159F MED LIST DOCD IN RCRD: CPT | Mod: CPTII,S$GLB,, | Performed by: NURSE PRACTITIONER

## 2024-05-07 PROCEDURE — 3288F FALL RISK ASSESSMENT DOCD: CPT | Mod: CPTII,S$GLB,, | Performed by: NURSE PRACTITIONER

## 2024-05-07 PROCEDURE — 1101F PT FALLS ASSESS-DOCD LE1/YR: CPT | Mod: CPTII,S$GLB,, | Performed by: NURSE PRACTITIONER

## 2024-05-07 PROCEDURE — 3066F NEPHROPATHY DOC TX: CPT | Mod: CPTII,S$GLB,, | Performed by: NURSE PRACTITIONER

## 2024-05-07 PROCEDURE — 3075F SYST BP GE 130 - 139MM HG: CPT | Mod: CPTII,S$GLB,, | Performed by: NURSE PRACTITIONER

## 2024-05-07 PROCEDURE — 1160F RVW MEDS BY RX/DR IN RCRD: CPT | Mod: CPTII,S$GLB,, | Performed by: NURSE PRACTITIONER

## 2024-05-07 PROCEDURE — 99999 PR PBB SHADOW E&M-EST. PATIENT-LVL V: CPT | Mod: PBBFAC,,, | Performed by: NURSE PRACTITIONER

## 2024-05-07 PROCEDURE — 99213 OFFICE O/P EST LOW 20 MIN: CPT | Mod: S$GLB,,, | Performed by: NURSE PRACTITIONER

## 2024-05-07 PROCEDURE — 3079F DIAST BP 80-89 MM HG: CPT | Mod: CPTII,S$GLB,, | Performed by: NURSE PRACTITIONER

## 2024-05-07 PROCEDURE — 1125F AMNT PAIN NOTED PAIN PRSNT: CPT | Mod: CPTII,S$GLB,, | Performed by: NURSE PRACTITIONER

## 2024-05-07 RX ORDER — MUPIROCIN 20 MG/G
OINTMENT TOPICAL 3 TIMES DAILY
Qty: 30 G | Refills: 0 | Status: SHIPPED | OUTPATIENT
Start: 2024-05-07 | End: 2024-05-17

## 2024-05-07 RX ORDER — METHOCARBAMOL 750 MG/1
750 TABLET, FILM COATED ORAL 3 TIMES DAILY PRN
Qty: 30 TABLET | Refills: 0 | Status: SHIPPED | OUTPATIENT
Start: 2024-05-07 | End: 2024-05-17

## 2024-05-07 RX ORDER — METHOCARBAMOL 500 MG/1
TABLET, FILM COATED ORAL
COMMUNITY
Start: 2024-04-29 | End: 2024-05-07
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
## 2024-05-15 DIAGNOSIS — M54.50 ACUTE BILATERAL LOW BACK PAIN WITHOUT SCIATICA: ICD-10-CM

## 2024-05-15 DIAGNOSIS — F41.9 ANXIETY: ICD-10-CM
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
## 2024-05-15 DIAGNOSIS — M54.2 NECK PAIN: ICD-10-CM

## 2024-05-16 NOTE — TELEPHONE ENCOUNTER
----- Message from Christina Brody sent at 5/16/2024  9:39 AM CDT -----  .Type:  RX Refill Request    Who Called: .Talia Dillon   Refill or New Rx:refill  RX Name and Strength:ALPRAZolam (XANAX) 0.25 MG tablet and bio freeze patches  How is the patient currently taking it? (ex. 1XDay):daily  Is this a 30 day or 90 day RX:    Preferred Pharmacy with phone number:.  [id number] - [place] - [address] AT [facility]  [address] [place] [address]  Phone: [phone] Fax: [phone]     Local or Mail Order:local  Ordering Provider:  Would the patient rather a call back or a response via [facility]? Call back  Best Call Back Number:.[phone]   Additional Information: if the prescription can't be fill please notify the pt.

## 2024-05-17 RX ORDER — ALPRAZOLAM 0.25 MG/1
0.25 TABLET ORAL DAILY PRN
Qty: 30 TABLET | Refills: 0 | Status: SHIPPED | OUTPATIENT
Start: 2024-05-17

## 2024-07-08 ENCOUNTER — TELEPHONE (OUTPATIENT)
Dept: FAMILY MEDICINE | Facility: CLINIC | Age: 74
End: 2024-07-08
Payer: MEDICARE

## 2024-07-08 ENCOUNTER — TELEPHONE (OUTPATIENT)
Dept: INTERNAL MEDICINE | Facility: CLINIC | Age: 74
End: 2024-07-08
Payer: MEDICARE

## 2024-07-08 NOTE — TELEPHONE ENCOUNTER
----- Message from Sandy Allen sent at 7/8/2024  3:57 PM CDT -----  Contact: Talia  Type:  RX Refill Request    Who Called:  Talia  Refill or New Rx: refill   RX Name and Strength: Methocarbamol 750mg  How is the patient currently taking it? (ex. 1XDay):  Is this a 30 day or 90 day RX:   Preferred Pharmacy with phone number:   [facility]  [address] [place] [address]  Ph: [phone]  Local or Mail Order: Local  Ordering Provider: Dr. Lo  Would the patient rather a call back or a response via [facility]? Call back   Best Call Back Number: Please call her at  [phone]  Additional Information:

## 2024-07-08 NOTE — TELEPHONE ENCOUNTER
Pt is requesting refill of Methocarbamol 750mg.  Looks like Savi last prescribed it for her in May.  Can we refill this or should I get pt in for a f/u visit to discuss need for muscle relaxant?

## 2024-07-08 NOTE — TELEPHONE ENCOUNTER
----- Message from Julio Cesar Gonzalez sent at 7/8/2024  4:15 PM CDT -----  Contact: [phone]  Type:  Patient Returning Call    Who Called:Talia   Who Left Message for Patient:Nurse   Does the patient know what this is regarding?:yes   Would the patient rather a call back or a response via [facility]? Call Back   Best Call Back Number:[phone]   Additional Information: pt stated th at [facility], previously prescribe the methycobal 750 mg medication.       Thanks KB

## 2024-07-09 RX ORDER — METHOCARBAMOL 750 MG/1
750 TABLET, FILM COATED ORAL
Qty: 30 TABLET | Refills: 0 | Status: SHIPPED | OUTPATIENT
Start: 2024-07-09 | End: 2024-07-12 | Stop reason: SDUPTHER

## 2024-07-10 ENCOUNTER — TELEPHONE (OUTPATIENT)
Dept: INTERNAL MEDICINE | Facility: CLINIC | Age: 74
End: 2024-07-10
Payer: MEDICARE

## 2024-07-10 NOTE — TELEPHONE ENCOUNTER
Faxed signed PT orders to [facility] . Also mailed out the original and kept a copy for our records

## 2024-07-12 RX ORDER — METHOCARBAMOL 750 MG/1
750 TABLET, FILM COATED ORAL
Qty: 30 TABLET | Refills: 0 | Status: SHIPPED | OUTPATIENT
Start: 2024-07-12

## 2024-07-12 NOTE — TELEPHONE ENCOUNTER
No care due was identified.  [facility] Embedded Care Due Messages. Reference number: [id number].   7/12/2024 3:15:15 PM CDT

## 2024-07-12 NOTE — TELEPHONE ENCOUNTER
Dr Lo sent rx refill for methocarbamol on 07/09/2024 to [facility] [place]. Pt did not pick this prescription up ( I called  [facility] to verify that rx hasn't been picked up) and now would like it resent to [facility]. I called [facility] to cancel rx order and will pend new order to [facility].

## 2024-07-12 NOTE — TELEPHONE ENCOUNTER
----- Message from Maggie Jason sent at 7/12/2024  2:09 PM CDT -----  Contact: [phone]  Requesting an RX refill or new RX.    Is this a refill or new RX:     RX name and strength methocarbamoL (ROBAXIN) 750 MG Tab  Is this a 30 day or 90 day RX:     Pharmacy name and phone #   [facility] - [place] - [address]  [address]  [place] [address]  Phone: [phone] Fax: [phone]        Comment: Patient has a new pharmacy and would like it sent to there   Discarded in the usual manner

## 2024-07-13 NOTE — TELEPHONE ENCOUNTER

## 2024-07-30 DIAGNOSIS — I10 ESSENTIAL HYPERTENSION: Chronic | ICD-10-CM

## 2024-07-30 DIAGNOSIS — J42 CHRONIC BRONCHITIS, UNSPECIFIED CHRONIC BRONCHITIS TYPE: Primary | ICD-10-CM

## 2024-08-05 RX ORDER — FUROSEMIDE 20 MG/1
20 TABLET ORAL
Qty: 90 TABLET | Refills: 3 | Status: SHIPPED | OUTPATIENT
Start: 2024-08-05

## 2024-08-06 ENCOUNTER — HOSPITAL ENCOUNTER (OUTPATIENT)
Dept: CARDIOLOGY | Facility: HOSPITAL | Age: 74
Discharge: HOME | End: 2024-08-06
Attending: INTERNAL MEDICINE
Payer: MEDICARE

## 2024-08-06 ENCOUNTER — OFFICE VISIT (OUTPATIENT)
Dept: CARDIOLOGY | Facility: CLINIC | Age: 74
End: 2024-08-06
Payer: MEDICARE

## 2024-08-06 VITALS
OXYGEN SATURATION: 97 % | WEIGHT: 224.19 LBS | HEIGHT: 65 IN | DIASTOLIC BLOOD PRESSURE: 82 MMHG | SYSTOLIC BLOOD PRESSURE: 114 MMHG | BODY MASS INDEX: 37.35 KG/M2 | HEART RATE: 95 BPM

## 2024-08-06 DIAGNOSIS — I50.32 CHRONIC DIASTOLIC CONGESTIVE HEART FAILURE: Primary | ICD-10-CM

## 2024-08-06 DIAGNOSIS — I48.11 LONGSTANDING PERSISTENT ATRIAL FIBRILLATION: ICD-10-CM

## 2024-08-06 DIAGNOSIS — I71.21 ANEURYSM OF ASCENDING AORTA WITHOUT RUPTURE: ICD-10-CM

## 2024-08-06 DIAGNOSIS — J42 CHRONIC BRONCHITIS, UNSPECIFIED CHRONIC BRONCHITIS TYPE: ICD-10-CM

## 2024-08-06 DIAGNOSIS — N18.4 CKD STAGE G4/A2, GFR 15-29 AND ALBUMIN CREATININE RATIO 30-299 MG/G: ICD-10-CM

## 2024-08-06 DIAGNOSIS — I10 ESSENTIAL HYPERTENSION: Chronic | ICD-10-CM

## 2024-08-06 DIAGNOSIS — E66.01 SEVERE OBESITY (BMI 35.0-39.9) WITH COMORBIDITY: ICD-10-CM

## 2024-08-06 DIAGNOSIS — Z86.79 HISTORY OF PSVT (PAROXYSMAL SUPRAVENTRICULAR TACHYCARDIA): ICD-10-CM

## 2024-08-06 LAB
OHS QRS DURATION: 58 MS
OHS QTC CALCULATION: 417 MS

## 2024-08-06 PROCEDURE — 99214 OFFICE O/P EST MOD 30 MIN: CPT | Mod: S$GLB,,, | Performed by: INTERNAL MEDICINE

## 2024-08-06 PROCEDURE — 1160F RVW MEDS BY RX/DR IN RCRD: CPT | Mod: CPTII,S$GLB,, | Performed by: INTERNAL MEDICINE

## 2024-08-06 PROCEDURE — 3008F BODY MASS INDEX DOCD: CPT | Mod: CPTII,S$GLB,, | Performed by: INTERNAL MEDICINE

## 2024-08-06 PROCEDURE — 93010 ELECTROCARDIOGRAM REPORT: CPT | Mod: ,,, | Performed by: INTERNAL MEDICINE

## 2024-08-06 PROCEDURE — 3079F DIAST BP 80-89 MM HG: CPT | Mod: CPTII,S$GLB,, | Performed by: INTERNAL MEDICINE

## 2024-08-06 PROCEDURE — 3074F SYST BP LT 130 MM HG: CPT | Mod: CPTII,S$GLB,, | Performed by: INTERNAL MEDICINE

## 2024-08-06 PROCEDURE — 3288F FALL RISK ASSESSMENT DOCD: CPT | Mod: CPTII,S$GLB,, | Performed by: INTERNAL MEDICINE

## 2024-08-06 PROCEDURE — 3066F NEPHROPATHY DOC TX: CPT | Mod: CPTII,S$GLB,, | Performed by: INTERNAL MEDICINE

## 2024-08-06 PROCEDURE — 1101F PT FALLS ASSESS-DOCD LE1/YR: CPT | Mod: CPTII,S$GLB,, | Performed by: INTERNAL MEDICINE

## 2024-08-06 PROCEDURE — 99999 PR PBB SHADOW E&M-EST. PATIENT-LVL V: CPT | Mod: PBBFAC,,, | Performed by: INTERNAL MEDICINE

## 2024-08-06 PROCEDURE — 1126F AMNT PAIN NOTED NONE PRSNT: CPT | Mod: CPTII,S$GLB,, | Performed by: INTERNAL MEDICINE

## 2024-08-06 PROCEDURE — 1159F MED LIST DOCD IN RCRD: CPT | Mod: CPTII,S$GLB,, | Performed by: INTERNAL MEDICINE

## 2024-08-06 PROCEDURE — 93005 ELECTROCARDIOGRAM TRACING: CPT

## 2024-08-06 RX ORDER — METOPROLOL SUCCINATE 50 MG/1
50 TABLET, EXTENDED RELEASE ORAL DAILY
Qty: 90 TABLET | Refills: 3 | Status: SHIPPED | OUTPATIENT
Start: 2024-08-06 | End: 2024-08-06 | Stop reason: SDUPTHER

## 2024-08-06 RX ORDER — METOPROLOL SUCCINATE 100 MG/1
100 TABLET, EXTENDED RELEASE ORAL DAILY
Qty: 90 TABLET | Refills: 3 | Status: SHIPPED | OUTPATIENT
Start: 2024-08-06

## 2024-08-06 RX ORDER — ATORVASTATIN CALCIUM 10 MG/1
10 TABLET, FILM COATED ORAL NIGHTLY
Qty: 90 TABLET | Refills: 3 | Status: SHIPPED | OUTPATIENT
Start: 2024-08-06

## 2024-08-06 NOTE — PROGRESS NOTES
Subjective:   Patient ID:  Talia Dillon is a 73 y.o. female who presents for follow up of Shortness of Breath (Pt stated she is always SOB)      74 yo female, came in for 6 m f/u   PMH chronic afib, TAA 4.8 cm, PSVT COPD former smoker, no h/o MI DM CVA  F/u Dr. Barker cardiologist at [facility] NO. Moved to [facility] center  Some wheezing and SOB. Dependent walker, lower back pain.   On home o2  Sleeps on 1 pillow  Some chest tightness. No active bleeding  05/22 low dose chest ct showed TAA 4.8 cm  2020 Phar MPi showed no ischemia and echo showed EF nml  NT BNP 2200 in 2023 01/24 ekg afib    Interval history  On home O2. And on inhaler Rx.  EKG reviewed by myself today reveals AFIB VR C  No orthopnea PND   Cr 2.1 and NT BNP 1255   Weight stable          Past Medical History:   Diagnosis Date    Allergy     Anemia     Anxiety     Atrial fibrillation     Cancer     skin    Cataract     Depression     Edema     Hypothyroidism     Kidney disease     PSVT (paroxysmal supraventricular tachycardia)     Thyroid disease        Past Surgical History:   Procedure Laterality Date    ADENOIDECTOMY      APPENDECTOMY      CHOLECYSTECTOMY      COLONOSCOPY  2009    repeat in 10 years     ESOPHAGOGASTRODUODENOSCOPY N/A 05/04/2023    Procedure: EGD (ESOPHAGOGASTRODUODENOSCOPY);  Surgeon: Ej Reyes MD;  Location: [facility];  Service: Endoscopy;  Laterality: N/A;    EYE SURGERY Bilateral     cataracts extraction and bilateral upper eyelid lifts    HYSTERECTOMY      INJECTION OF ANESTHETIC AGENT AROUND NERVE Bilateral 09/21/2018    Procedure: BLOCK, NERVE, MEDIAL BRANCH BLOCK BILATERAL LUMBAR L2-5;  Surgeon: Julieth Christopher MD;  [facility];  Service: Pain Management;  Laterality: Bilateral;  1 of 2    INJECTION OF ANESTHETIC AGENT AROUND NERVE Bilateral 09/28/2018    Procedure: BLOCK, NERVE, MEDIAL BRANCH BLOCK BILATERAL LUMBAR L2-5;  Surgeon: Julieth Christopher MD;  [facility];  Service: Pain  Management;  Laterality: Bilateral;  2 of 2  PLEASE GIVE NIRAVAM PER MD    OOPHORECTOMY      RADIOFREQUENCY ABLATION Left 02/14/2020    Procedure: RADIOFREQUENCY ABLATION L2,3,4,5;  Surgeon: Julieth Christopher MD;  Location: [facility];  Service: Pain Management;  Laterality: Left;  LT RFA L2,3,4,5  1 of 2  OK to hold Eliquis    RADIOFREQUENCY ABLATION Right 02/28/2020    Procedure: RADIOFREQUENCY ABLATION L2,3,4,5 RIGHT   2 of 2 ok to hold eliquis;  Surgeon: Julieth Christopher MD;  Location: [facility];  Service: Pain Management;  Laterality: Right;    RADIOFREQUENCY ABLATION Left 11/30/2020    Procedure: RADIOFREQUENCY ABLATION, L2-L3-L4-L5 MEDIAL BRANCH 1 OF 2 Continue Eliquis;  Surgeon: Bogdan Webster MD;  Location: [facility];  Service: Pain Management;  Laterality: Left;    TONSILLECTOMY      TREATMENT OF CARDIAC ARRHYTHMIA N/A 02/08/2019    Procedure: CARDIOVERSION;  Surgeon: Devin You MD;  Location: [facility];  Service: Cardiology;  Laterality: N/A;  AF, KAROL, DCCV, MAC, VA, 3 Prep       Social History     Tobacco Use    Smoking status: Former     Current packs/day: 0.00     Average packs/day: 1 pack/day for 50.0 years (50.0 ttl pk-yrs)     Types: Cigarettes, Vaping with nicotine     Start date: 1971     Quit date: 2021     Years since quitting: 3.5    Smokeless tobacco: Never    Tobacco comments:     No longer vaping   Substance Use Topics    Alcohol use: No    Drug use: No       Family History   Problem Relation Name Age of Onset    Hypertension Mother      Stroke Mother      Hypertension Father      Stroke Father      Diabetes Father      Arthritis Sister Jermaine     Diabetes Sister Theresa     Heart attack Sister Theresa     Coronary artery disease Sister Theresa     Diabetes Sister Renetta     Sleep apnea Daughter Nakul     Mental illness Daughter Nakul     Bipolar disorder Daughter Nakul          ROS    Objective:   Physical Exam  HENT:      Head: Normocephalic.   Eyes:      Pupils:  Pupils are equal, round, and reactive to light.   Neck:      Thyroid: No thyromegaly.      Vascular: Normal carotid pulses. No carotid bruit or JVD.   Cardiovascular:      Rate and Rhythm: Normal rate. Rhythm irregularly irregular. No extrasystoles are present.     Chest Wall: PMI is not displaced.      Pulses: Normal pulses.      Heart sounds: Normal heart sounds. No murmur heard.     No gallop. No S3 sounds.   Pulmonary:      Effort: No respiratory distress.      Breath sounds: Normal breath sounds. No stridor.   Abdominal:      General: Bowel sounds are normal.      Palpations: Abdomen is soft.      Tenderness: There is no abdominal tenderness. There is no rebound.   Skin:     Findings: No rash.   Neurological:      Mental Status: She is alert and oriented to person, place, and time.   Psychiatric:         Behavior: Behavior normal.         Lab Results   Component Value Date    CHOL 129 06/11/2020    CHOL 107 (L) 01/14/2019    CHOL 239 (H) 09/12/2018     Lab Results   Component Value Date    HDL 41 06/11/2020    HDL 37 (L) 01/14/2019    HDL 51 09/12/2018     Lab Results   Component Value Date    LDLCALC 61.8 (L) 06/11/2020    LDLCALC 52.8 (L) 01/14/2019    LDLCALC 151.6 09/12/2018     Lab Results   Component Value Date    TRIG 131 06/11/2020    TRIG 86 01/14/2019    TRIG 182 (H) 09/12/2018     Lab Results   Component Value Date    CHOLHDL 31.8 06/11/2020    CHOLHDL 34.6 01/14/2019    CHOLHDL 21.3 09/12/2018       Chemistry        Component Value Date/Time     03/14/2024 1232    K 4.6 03/14/2024 1232     03/14/2024 1232    CO2 25 03/14/2024 1232    BUN 33 (H) 03/14/2024 1232    CREATININE 2.1 (H) 03/14/2024 1232    GLU 75 03/14/2024 1232        Component Value Date/Time    CALCIUM 10.3 03/14/2024 1232    ALKPHOS 98 11/16/2023 1535    AST 27 11/16/2023 1535    ALT 21 11/16/2023 1535    BILITOT 0.8 11/16/2023 1535    ESTGFRAFRICA 21 06/30/2023 1114    ESTGFRAFRICA 27.3 (A) 02/24/2022 1211    EGFRNONAA  23.7 (A) 02/24/2022 1211          Lab Results   Component Value Date    HGBA1C 5.6 07/15/2015     Lab Results   Component Value Date    TSH 3.030 11/16/2023     Lab Results   Component Value Date    INR 1.5 (H) 01/08/2020    INR 3.3 (H) 01/02/2020    INR 5.3 (HH) 12/30/2019     Lab Results   Component Value Date    WBC 8.11 03/14/2024    HGB 12.5 03/14/2024    HCT 40.8 03/14/2024    MCV 97 03/14/2024     03/14/2024     BMP  Sodium   Date Value Ref Range Status   03/14/2024 141 136 - 145 mmol/L Final     Potassium   Date Value Ref Range Status   03/14/2024 4.6 3.5 - 5.1 mmol/L Final     Chloride   Date Value Ref Range Status   03/14/2024 105 95 - 110 mmol/L Final     CO2   Date Value Ref Range Status   03/14/2024 25 23 - 29 mmol/L Final     BUN   Date Value Ref Range Status   03/14/2024 33 (H) 8 - 23 mg/dL Final     Creatinine   Date Value Ref Range Status   03/14/2024 2.1 (H) 0.5 - 1.4 mg/dL Final     Calcium   Date Value Ref Range Status   03/14/2024 10.3 8.7 - 10.5 mg/dL Final     Anion Gap   Date Value Ref Range Status   03/14/2024 11 8 - 16 mmol/L Final     eGFR if    Date Value Ref Range Status   02/24/2022 27.3 (A) >60 mL/min/1.73 m^2 Final     eGFR    Date Value Ref Range Status   06/30/2023 21 mL/min/1.73mSq Final     Comment:     In accordance with NKF-ASN Task Force recommendation, calculation based on the Chronic Kidney Disease Epidemiology Collaboration (CKD-EPI) equation without adjustment for race. eGFR adjusted for gender and age and calculated in ml/min/1.73mSquared. eGFR cannot be calculated if patient is under 18 years of age.     Reference Range:   >= 60 ml/min/1.73mSquared.     eGFR if non    Date Value Ref Range Status   02/24/2022 23.7 (A) >60 mL/min/1.73 m^2 Final     Comment:     Calculation used to obtain the estimated glomerular filtration  rate (eGFR) is the CKD-EPI equation.         BNP  @LABRCNTIP(BNP,BNPTRIAGEBLO)@  @LABRCNTIP(troponini)@  CrCl cannot be calculated (Patient's most recent lab result is older than the maximum 7 days allowed.).  No results found in the last 24 hours.  No results found in the last 24 hours.  No results found in the last 24 hours.    Assessment:      1. Chronic diastolic congestive heart failure    2. Aneurysm of ascending aorta without rupture    3. Essential hypertension    4. History of PSVT (paroxysmal supraventricular tachycardia)    5. Longstanding persistent atrial fibrillation    6. Chronic bronchitis, unspecified chronic bronchitis type    7. CKD stage G4/A2, GFR 15-29 and albumin creatinine ratio  mg/g    8. Severe obesity (BMI 35.0-39.9) with comorbidity        Plan:   Continue lasix 20 mg daily metoprolol 100 mg daily eliquis 5 mg bid and statin  Check NT BNP and BMP today    Fluid restriction 1.5 liters a day  Na< 2 gm  RTC in 6 m

## 2024-08-08 NOTE — TELEPHONE ENCOUNTER
----- Message from Marisol Juarez sent at 8/8/2024  2:45 PM CDT -----  Regarding: refill  Type:  RX Refill Request    Who Called: Talia  Refill or New Rx:refill  RX Name and Strength:apixaban (ELIQUIS) 5 mg Tab  How is the patient currently taking it? (ex. 1XDay):  Is this a 30 day or 90 day RX:  Preferred Pharmacy with phone number:[facility] - [place] - [address]   Phone: [phone]  Fax: [phone]        Local or Mail Order:local  Ordering Provider:  Would the patient rather a call back or a response via [facility]? Call back  Best Call Back Number: [phone]  Additional Information: completely out  
----- Message from Marisol Juarez sent at 8/8/2024  2:45 PM CDT -----  Regarding: refill  Type:  RX Refill Request    Who Called: Talia  Refill or New Rx:refill  RX Name and Strength:apixaban (ELIQUIS) 5 mg Tab  How is the patient currently taking it? (ex. 1XDay):  Is this a 30 day or 90 day RX:  Preferred Pharmacy with phone number:[facility] - [place] - [address]   Phone: [phone]  Fax: [phone]        Local or Mail Order:local  Ordering Provider:  Would the patient rather a call back or a response via [facility]? Call back  Best Call Back Number: [phone]  Additional Information: completely out  
no

## 2024-08-11 ENCOUNTER — TELEPHONE (OUTPATIENT)
Dept: CARDIOLOGY | Facility: CLINIC | Age: 74
End: 2024-08-11
Payer: MEDICARE

## 2024-08-11 DIAGNOSIS — I50.32 CHRONIC DIASTOLIC CONGESTIVE HEART FAILURE: Primary | ICD-10-CM

## 2024-08-11 RX ORDER — FUROSEMIDE 40 MG/1
40 TABLET ORAL DAILY
Qty: 90 TABLET | Refills: 2 | Status: SHIPPED | OUTPATIENT
Start: 2024-08-11

## 2024-08-12 ENCOUNTER — OFFICE VISIT (OUTPATIENT)
Dept: INTERNAL MEDICINE | Facility: CLINIC | Age: 74
End: 2024-08-12
Payer: MEDICARE

## 2024-08-12 ENCOUNTER — TELEPHONE (OUTPATIENT)
Dept: CARDIOLOGY | Facility: CLINIC | Age: 74
End: 2024-08-12
Payer: MEDICARE

## 2024-08-12 VITALS
HEART RATE: 97 BPM | WEIGHT: 224.88 LBS | TEMPERATURE: 98 F | DIASTOLIC BLOOD PRESSURE: 78 MMHG | OXYGEN SATURATION: 98 % | HEIGHT: 65 IN | RESPIRATION RATE: 17 BRPM | BODY MASS INDEX: 37.47 KG/M2 | SYSTOLIC BLOOD PRESSURE: 116 MMHG

## 2024-08-12 DIAGNOSIS — Z78.0 POSTMENOPAUSE: Chronic | ICD-10-CM

## 2024-08-12 DIAGNOSIS — I10 ESSENTIAL HYPERTENSION: Primary | Chronic | ICD-10-CM

## 2024-08-12 DIAGNOSIS — N18.4 CKD STAGE G4/A2, GFR 15-29 AND ALBUMIN CREATININE RATIO 30-299 MG/G: Chronic | ICD-10-CM

## 2024-08-12 DIAGNOSIS — Z12.31 ENCOUNTER FOR MAMMOGRAM TO ESTABLISH BASELINE MAMMOGRAM: ICD-10-CM

## 2024-08-12 DIAGNOSIS — I48.11 LONGSTANDING PERSISTENT ATRIAL FIBRILLATION: Chronic | ICD-10-CM

## 2024-08-12 DIAGNOSIS — M51.36 DDD (DEGENERATIVE DISC DISEASE), LUMBAR: Chronic | ICD-10-CM

## 2024-08-12 DIAGNOSIS — F33.9 MAJOR DEPRESSION, RECURRENT, CHRONIC: Chronic | ICD-10-CM

## 2024-08-12 DIAGNOSIS — E03.4 HYPOTHYROIDISM DUE TO ACQUIRED ATROPHY OF THYROID: Chronic | ICD-10-CM

## 2024-08-12 DIAGNOSIS — R11.0 NAUSEA: Chronic | ICD-10-CM

## 2024-08-12 DIAGNOSIS — J42 CHRONIC BRONCHITIS, UNSPECIFIED CHRONIC BRONCHITIS TYPE: Chronic | ICD-10-CM

## 2024-08-12 DIAGNOSIS — I50.32 CHRONIC DIASTOLIC CONGESTIVE HEART FAILURE: Chronic | ICD-10-CM

## 2024-08-12 DIAGNOSIS — F41.1 ANXIETY REACTION: Chronic | ICD-10-CM

## 2024-08-12 PROCEDURE — 99999 PR PBB SHADOW E&M-EST. PATIENT-LVL V: CPT | Mod: PBBFAC,,, | Performed by: FAMILY MEDICINE

## 2024-08-12 PROCEDURE — 99214 OFFICE O/P EST MOD 30 MIN: CPT | Mod: S$GLB,,, | Performed by: FAMILY MEDICINE

## 2024-08-12 PROCEDURE — 1101F PT FALLS ASSESS-DOCD LE1/YR: CPT | Mod: CPTII,S$GLB,, | Performed by: FAMILY MEDICINE

## 2024-08-12 PROCEDURE — 3288F FALL RISK ASSESSMENT DOCD: CPT | Mod: CPTII,S$GLB,, | Performed by: FAMILY MEDICINE

## 2024-08-12 PROCEDURE — 3074F SYST BP LT 130 MM HG: CPT | Mod: CPTII,S$GLB,, | Performed by: FAMILY MEDICINE

## 2024-08-12 PROCEDURE — G2211 COMPLEX E/M VISIT ADD ON: HCPCS | Mod: S$GLB,,, | Performed by: FAMILY MEDICINE

## 2024-08-12 PROCEDURE — 3078F DIAST BP <80 MM HG: CPT | Mod: CPTII,S$GLB,, | Performed by: FAMILY MEDICINE

## 2024-08-12 PROCEDURE — 3066F NEPHROPATHY DOC TX: CPT | Mod: CPTII,S$GLB,, | Performed by: FAMILY MEDICINE

## 2024-08-12 PROCEDURE — 1126F AMNT PAIN NOTED NONE PRSNT: CPT | Mod: CPTII,S$GLB,, | Performed by: FAMILY MEDICINE

## 2024-08-12 PROCEDURE — 3008F BODY MASS INDEX DOCD: CPT | Mod: CPTII,S$GLB,, | Performed by: FAMILY MEDICINE

## 2024-08-12 RX ORDER — GABAPENTIN 400 MG/1
400 CAPSULE ORAL 2 TIMES DAILY
Qty: 180 CAPSULE | Refills: 3 | Status: SHIPPED | OUTPATIENT
Start: 2024-08-12 | End: 2025-08-12

## 2024-08-12 RX ORDER — ALBUTEROL SULFATE 90 UG/1
2 INHALANT RESPIRATORY (INHALATION) EVERY 4 HOURS PRN
Qty: 36 G | Refills: 11 | Status: SHIPPED | OUTPATIENT
Start: 2024-08-12 | End: 2025-08-12

## 2024-08-12 RX ORDER — ONDANSETRON 4 MG/1
TABLET, ORALLY DISINTEGRATING ORAL
Qty: 90 TABLET | Refills: 3 | Status: SHIPPED | OUTPATIENT
Start: 2024-08-12

## 2024-08-12 RX ORDER — LEVOTHYROXINE SODIUM 200 UG/1
200 TABLET ORAL DAILY
Qty: 90 TABLET | Refills: 3 | Status: SHIPPED | OUTPATIENT
Start: 2024-08-12

## 2024-08-12 RX ORDER — METHOCARBAMOL 750 MG/1
750 TABLET, FILM COATED ORAL
Qty: 30 TABLET | Refills: 0 | Status: SHIPPED | OUTPATIENT
Start: 2024-08-12

## 2024-08-12 RX ORDER — UMECLIDINIUM BROMIDE AND VILANTEROL TRIFENATATE 62.5; 25 UG/1; UG/1
1 POWDER RESPIRATORY (INHALATION) DAILY
Qty: 60 EACH | Refills: 11 | Status: SHIPPED | OUTPATIENT
Start: 2024-08-12

## 2024-08-12 NOTE — TELEPHONE ENCOUNTER
Attempted to contact pt in regards of lab results there was no answer I did LVM for call back.      ----- Message from Brian Butler MD sent at 8/11/2024  9:48 AM CDT -----  The lab showed CHF is worse  Increase Lasix to 40 mg daily  Check NT BNP and BMP in 1 week
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
## 2024-08-14 NOTE — TELEPHONE ENCOUNTER
----- Message from Sandy Allen sent at 8/14/2024  2:47 PM CDT -----  Contact: Talia  Type:  RX Refill Request    Who Called:  Talia  Refill or New Rx: refill   RX Name and Strength: Apixaban (ELIQUIS) 5 mg Tab  How is the patient currently taking it? (ex. 1XDay): 2xday  Is this a 30 day or 90 day RX: 90  Preferred Pharmacy with phone number:   [facility] - [place] - [address]  [address]  [place] [address]  Phone: [phone] Fax: [phone]  Local or Mail Order: Local  Ordering Provider: Dr. Butler  Would the patient rather a call back or a response via [facility]? Call back   Best Call Back Number: Please call her at [phone]  Additional Information:

## 2024-08-15 PROBLEM — Z78.0 POSTMENOPAUSE: Chronic | Status: ACTIVE | Noted: 2024-08-15

## 2024-09-25 ENCOUNTER — LAB VISIT (OUTPATIENT)
Dept: LAB | Facility: HOSPITAL | Age: 74
End: 2024-09-25
Attending: INTERNAL MEDICINE
Payer: MEDICARE

## 2024-09-25 DIAGNOSIS — N18.4 STAGE 4 CHRONIC KIDNEY DISEASE DUE TO HYPERTENSION: ICD-10-CM

## 2024-09-25 DIAGNOSIS — N14.0 ANALGESIC NEPHROPATHY: ICD-10-CM

## 2024-09-25 DIAGNOSIS — I12.9 STAGE 4 CHRONIC KIDNEY DISEASE DUE TO HYPERTENSION: ICD-10-CM

## 2024-09-25 LAB
ALBUMIN SERPL BCP-MCNC: 3.7 G/DL (ref 3.5–5.2)
ANION GAP SERPL CALC-SCNC: 8 MMOL/L (ref 8–16)
BUN SERPL-MCNC: 30 MG/DL (ref 8–23)
CALCIUM SERPL-MCNC: 9.7 MG/DL (ref 8.7–10.5)
CHLORIDE SERPL-SCNC: 102 MMOL/L (ref 95–110)
CO2 SERPL-SCNC: 28 MMOL/L (ref 23–29)
CREAT SERPL-MCNC: 2.6 MG/DL (ref 0.5–1.4)
EST. GFR  (NO RACE VARIABLE): 18.8 ML/MIN/1.73 M^2
GLUCOSE SERPL-MCNC: 90 MG/DL (ref 70–110)
PHOSPHATE SERPL-MCNC: 3.1 MG/DL (ref 2.7–4.5)
POTASSIUM SERPL-SCNC: 4.9 MMOL/L (ref 3.5–5.1)
SODIUM SERPL-SCNC: 138 MMOL/L (ref 136–145)

## 2024-09-25 PROCEDURE — 85025 COMPLETE CBC W/AUTO DIFF WBC: CPT | Performed by: INTERNAL MEDICINE

## 2024-09-25 PROCEDURE — 80069 RENAL FUNCTION PANEL: CPT | Performed by: INTERNAL MEDICINE

## 2024-09-25 PROCEDURE — 83970 ASSAY OF PARATHORMONE: CPT | Performed by: INTERNAL MEDICINE

## 2024-09-25 PROCEDURE — 36415 COLL VENOUS BLD VENIPUNCTURE: CPT | Mod: PN | Performed by: INTERNAL MEDICINE

## 2024-09-26 LAB
BASOPHILS # BLD AUTO: 0.03 K/UL (ref 0–0.2)
BASOPHILS NFR BLD: 0.3 % (ref 0–1.9)
DIFFERENTIAL METHOD BLD: ABNORMAL
EOSINOPHIL # BLD AUTO: 0.3 K/UL (ref 0–0.5)
EOSINOPHIL NFR BLD: 2.7 % (ref 0–8)
ERYTHROCYTE [DISTWIDTH] IN BLOOD BY AUTOMATED COUNT: 13 % (ref 11.5–14.5)
HCT VFR BLD AUTO: 40.1 % (ref 37–48.5)
HGB BLD-MCNC: 12.5 G/DL (ref 12–16)
IMM GRANULOCYTES # BLD AUTO: 0.03 K/UL (ref 0–0.04)
IMM GRANULOCYTES NFR BLD AUTO: 0.3 % (ref 0–0.5)
LYMPHOCYTES # BLD AUTO: 1.7 K/UL (ref 1–4.8)
LYMPHOCYTES NFR BLD: 18.1 % (ref 18–48)
MCH RBC QN AUTO: 30.2 PG (ref 27–31)
MCHC RBC AUTO-ENTMCNC: 31.2 G/DL (ref 32–36)
MCV RBC AUTO: 97 FL (ref 82–98)
MONOCYTES # BLD AUTO: 0.7 K/UL (ref 0.3–1)
MONOCYTES NFR BLD: 7.3 % (ref 4–15)
NEUTROPHILS # BLD AUTO: 6.6 K/UL (ref 1.8–7.7)
NEUTROPHILS NFR BLD: 71.3 % (ref 38–73)
NRBC BLD-RTO: 0 /100 WBC
PLATELET # BLD AUTO: 200 K/UL (ref 150–450)
PMV BLD AUTO: 12.9 FL (ref 9.2–12.9)
PTH-INTACT SERPL-MCNC: 212.6 PG/ML (ref 9–77)
RBC # BLD AUTO: 4.14 M/UL (ref 4–5.4)
WBC # BLD AUTO: 9.19 K/UL (ref 3.9–12.7)

## 2024-10-01 ENCOUNTER — OFFICE VISIT (OUTPATIENT)
Dept: NEPHROLOGY | Facility: CLINIC | Age: 74
End: 2024-10-01
Payer: MEDICARE

## 2024-10-01 VITALS
SYSTOLIC BLOOD PRESSURE: 130 MMHG | DIASTOLIC BLOOD PRESSURE: 92 MMHG | RESPIRATION RATE: 18 BRPM | HEIGHT: 65 IN | WEIGHT: 228.81 LBS | BODY MASS INDEX: 38.12 KG/M2 | HEART RATE: 84 BPM

## 2024-10-01 DIAGNOSIS — N18.4 STAGE 4 CHRONIC KIDNEY DISEASE: Primary | ICD-10-CM

## 2024-10-01 PROCEDURE — 1101F PT FALLS ASSESS-DOCD LE1/YR: CPT | Mod: CPTII,S$GLB,, | Performed by: INTERNAL MEDICINE

## 2024-10-01 PROCEDURE — 3075F SYST BP GE 130 - 139MM HG: CPT | Mod: CPTII,S$GLB,, | Performed by: INTERNAL MEDICINE

## 2024-10-01 PROCEDURE — 3066F NEPHROPATHY DOC TX: CPT | Mod: CPTII,S$GLB,, | Performed by: INTERNAL MEDICINE

## 2024-10-01 PROCEDURE — 99215 OFFICE O/P EST HI 40 MIN: CPT | Mod: S$GLB,,, | Performed by: INTERNAL MEDICINE

## 2024-10-01 PROCEDURE — 1159F MED LIST DOCD IN RCRD: CPT | Mod: CPTII,S$GLB,, | Performed by: INTERNAL MEDICINE

## 2024-10-01 PROCEDURE — 3288F FALL RISK ASSESSMENT DOCD: CPT | Mod: CPTII,S$GLB,, | Performed by: INTERNAL MEDICINE

## 2024-10-01 PROCEDURE — 3008F BODY MASS INDEX DOCD: CPT | Mod: CPTII,S$GLB,, | Performed by: INTERNAL MEDICINE

## 2024-10-01 PROCEDURE — 1126F AMNT PAIN NOTED NONE PRSNT: CPT | Mod: CPTII,S$GLB,, | Performed by: INTERNAL MEDICINE

## 2024-10-01 PROCEDURE — 99999 PR PBB SHADOW E&M-EST. PATIENT-LVL IV: CPT | Mod: PBBFAC,,, | Performed by: INTERNAL MEDICINE

## 2024-10-01 PROCEDURE — 3080F DIAST BP >= 90 MM HG: CPT | Mod: CPTII,S$GLB,, | Performed by: INTERNAL MEDICINE

## 2024-10-01 RX ORDER — LISINOPRIL 5 MG/1
5 TABLET ORAL DAILY
Qty: 90 TABLET | Refills: 3 | Status: SHIPPED | OUTPATIENT
Start: 2024-10-01 | End: 2025-10-01

## 2024-10-01 NOTE — PROGRESS NOTES
Renal clinic consult note:  Date of clinic visit: 10/1/24  Reason for consult f/u and chief c/o: CKD  PCP: Dr. 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 Pt was last seen in renal clinic about 6 months ago. Pt's main issue is chronic SOB and anxiety resulting from it. To this end, she decided to live in an assisted living to reduce feeling of anxiety. Chart was reviewed. No new events.    On f/u today, pt has no new c/o's, no discomfort. No worsened SOB, no leg swelling. Pt says she stopped all NSAIds about 2-3 years ago, no longer takes aleve. PMH reviewed with pt. Fluid moderation reviewed.       PAST MEDICAL HISTORY:  CKD stage 4, analgesic nephropathy, Anxiety, Atrial fibrillation, Cataract, Depression, Hypothyroidism, PSVT (paroxysmal supraventricular tachycardia), and Thyroid disease.     PAST SURGICAL HISTORY:  She  has a past surgical history that includes Tonsillectomy; Colonoscopy (2009); Eye surgery (Bilateral); Hysterectomy; Injection of anesthetic agent around nerve (Bilateral, 09/21/2018); Injection of anesthetic agent around nerve (Bilateral, 09/28/2018); Treatment of cardiac arrhythmia (N/A, 02/08/2019); Adenoidectomy; Appendectomy; Oophorectomy; Radiofrequency ablation (Left, 02/14/2020); Radiofrequency ablation (Right, 02/28/2020); Radiofrequency ablation (Left, 11/30/2020); Esophagogastroduodenoscopy (N/A, 05/04/2023); and Cholecystectomy.     SOCIAL HISTORY:  She  reports that she quit smoking about 3 years ago. Her smoking use included cigarettes and vaping with nicotine. She started smoking about 53 years ago. She has a 50.0 pack-year smoking history. She has never used smokeless tobacco. She reports that she does not drink alcohol and does not use drugs.      FAMILY MEDICAL HISTORY:  Her family history includes Arthritis in her sister; Bipolar disorder in her daughter; Coronary artery disease in her sister; Diabetes in her father, sister, and sister; Heart attack in her sister; Hypertension in her father and mother; Mental illness in her daughter; Sleep apnea in her daughter; Stroke in her father and mother.          Review of patient's allergies indicates:   Allergen Reactions    Dexamethasone Rash      Meds reviewed    Current Outpatient Medications:     albuterol (PROVENTIL/VENTOLIN HFA) 90 mcg/actuation inhaler, Inhale 2 puffs into the lungs every 4 (four) hours as needed for Wheezing (For shortness of breath)., Disp: 36 g, Rfl: 11    albuterol-ipratropium (DUO-NEB) 2.5 mg-0.5 mg/3 mL nebulizer solution, Take 3 mLs by nebulization every 6 (six) hours as needed for Wheezing. Rescue, Disp: 400 each, Rfl: 3    ALPRAZolam (XANAX) 0.25 MG tablet, Take 1 tablet (0.25 mg total) by mouth daily as needed for Anxiety., Disp: 30 tablet, Rfl: 0    apixaban (ELIQUIS) 5 mg Tab, Take 1 tablet (5 mg total) by mouth 2 (two) times daily., Disp: 180 tablet, Rfl: 3    atorvastatin (LIPITOR) 10 MG tablet, Take 1 tablet (10 mg total) by mouth every evening., Disp: 90 tablet, Rfl: 3    cholecalciferol, vitamin D3, 125 mcg (5,000 unit) capsule, Take 1 capsule (5,000 Units total) by mouth every other day., Disp: 45 capsule, Rfl: 3    EScitalopram oxalate (LEXAPRO) 20 MG tablet, Take 1 tablet (20 mg total) by mouth once daily. For depression (Patient taking differently: Take 40 mg by mouth once daily. For depression), Disp: 90 tablet, Rfl: 3    furosemide (LASIX) 40 MG tablet, Take 1 tablet (40 mg total) by mouth once daily., Disp: 90 tablet, Rfl: 2    gabapentin (NEURONTIN) 400 MG capsule, Take 1 capsule (400 mg total) by mouth 2 (two) times daily., Disp: 180 capsule, Rfl: 3    levothyroxine (SYNTHROID) 200 MCG tablet, Take 1 tablet (200 mcg total) by mouth once daily., Disp: 90 tablet,  "Rfl: 3    methocarbamoL (ROBAXIN) 750 MG Tab, Take 1 tablet (750 mg total) by mouth after meals as needed (muscle spasms)., Disp: 30 tablet, Rfl: 0    metoprolol succinate (TOPROL-XL) 100 MG 24 hr tablet, Take 1 tablet (100 mg total) by mouth once daily., Disp: 90 tablet, Rfl: 3    ondansetron (ZOFRAN-ODT) 4 MG TbDL, DISSOLVE 1 TABLET BY MOUTH EVERY 6 HOURS AS NEEDED (NAUSEA)., Disp: 90 tablet, Rfl: 3    traZODone (DESYREL) 50 MG tablet, Take 1 tablet (50 mg total) by mouth every evening. For sleep, depression and anxiety, Disp: 90 tablet, Rfl: 3    umeclidinium-vilanteroL (ANORO ELLIPTA) 62.5-25 mcg/actuation DsDv, Inhale 1 puff into the lungs once daily. Controller, Disp: 60 each, Rfl: 11    VISINE 0.05 % Drop, SMARTSI Drop(s) In Eye(s) PRN, Disp: , Rfl:             REVIEW OF SYSTEMS:  Patient has no fever, fatigue, visual changes, chest pain, edema, cough, dyspnea, nausea, vomiting, constipation, diarrhea, arthralgias, pruritis, dizziness, weakness, depression, confusion.     PHYSICAL EXAM:  Blood pressure (!) 130/92, pulse 84, resp. rate 18, height 5' 5" (1.651 m), weight 103.8 kg, wt 6 months ago was 100.0 Kg  Gen:WDWN female in no apparent distress  Psych:Normal mood and affect  Skin:No rashes or ulcers  Neck:No JVD  Chest:Clear with no rales, rhonchi, wheezing with normal effort  CV:Regular with no murmurs, gallops or rubs  Abd:Soft, nontender, no distension  Ext: trace edema     Labs reviewed  BMP  Lab Results   Component Value Date     2024    K 4.9 2024     2024    CO2 28 2024    BUN 30 (H) 2024    CREATININE 2.6 (H) 2024    CALCIUM 9.7 2024    ANIONGAP 8 2024    EGFRNORACEVR 18.8 (A) 2024     Lab Results   Component Value Date    WBC 9.19 2024    HGB 12.5 2024    HCT 40.1 2024    MCV 97 2024     2024     Lab Results   Component Value Date    .6 (H) 2024    CALCIUM 9.7 2024    PHOS " 3.1 09/25/2024     Prior labs reviewed:   Urine protein/Cr 240 mg  U/a: 1+ protein, trace blood, 1 RBC, no cast        IMPRESSION AND RECOMMENDATIONS: 72 y/o female with CKD stage 4 likely due to prior extensive past h/o of NSAIds use presents for f/u:     Renal: s Cr stable, within baseline range (has fluctuated between 2.1 and 3.0)  Stable renal function, CKD stage 4  CKD due to massive use of aleve in the past (multiple times daily x 20-25 years). Analgesic nephropathy  Quit all NSAIDs in 2022, since then s Cr more stable    Complications of CKD:  K normal  Na normal  Acid base no issues  Ca normal  PO4 unremarkable  Secondary hyperparathyroidism, mild, on pre-vit D, continue  Not anemic     2. HTN: BP controlled  Meds reviewed    3. Cardiac: h/o of diastolic CHF and chronic AF  Minimal fluid gain  Salt and fluid restriction advised discussed with pt  On lasix 40 mg qd, continue  On large dose beta blockers for AF  Will add lisinopril 5 mg po qd to assist with CHF and with diastolic BP    4. SOB: multiple causes: ex-smoker, COPD, CHF, AF, potential for fluid gain due to advanced CKD and CHF  Discussed with pt  Quit smoking in 2020. Smoked since age of 14.       Plans and recommendations:  As discussed above  Total time spent 40 minutes including time needed to review the records, the   patient evaluation, documentation, face-to-face discussion with the patient,   more than 50% of the time was spent on coordination of care and counseling.    Level V visit.  RTC 6 months     Kyle Guillen MD

## 2024-10-28 ENCOUNTER — HOSPITAL ENCOUNTER (OUTPATIENT)
Dept: RADIOLOGY | Facility: HOSPITAL | Age: 74
Discharge: HOME | End: 2024-10-28
Attending: FAMILY MEDICINE
Payer: MEDICARE

## 2024-10-28 DIAGNOSIS — Z12.31 ENCOUNTER FOR MAMMOGRAM TO ESTABLISH BASELINE MAMMOGRAM: ICD-10-CM

## 2024-10-28 PROCEDURE — 77063 BREAST TOMOSYNTHESIS BI: CPT | Mod: 26,,, | Performed by: RADIOLOGY

## 2024-10-28 PROCEDURE — 77067 SCR MAMMO BI INCL CAD: CPT | Mod: TC,PN

## 2024-10-28 PROCEDURE — 77067 SCR MAMMO BI INCL CAD: CPT | Mod: 26,,, | Performed by: RADIOLOGY

## 2024-10-28 PROCEDURE — 77063 BREAST TOMOSYNTHESIS BI: CPT | Mod: TC,PN

## 2024-10-29 ENCOUNTER — APPOINTMENT (OUTPATIENT)
Dept: RADIOLOGY | Facility: HOSPITAL | Age: 74
End: 2024-10-29
Attending: FAMILY MEDICINE
Payer: MEDICARE

## 2024-10-29 DIAGNOSIS — Z78.0 POSTMENOPAUSE: Chronic | ICD-10-CM

## 2024-10-29 PROCEDURE — 77080 DXA BONE DENSITY AXIAL: CPT | Mod: 26,,, | Performed by: RADIOLOGY

## 2024-10-29 PROCEDURE — 77080 DXA BONE DENSITY AXIAL: CPT | Mod: TC

## 2024-11-06 NOTE — TELEPHONE ENCOUNTER
Spoke to MsCourtney Talia    CONFIRMED procedure arrival time of 0900  Reiterated instructions including:  -Directions to check in desk  -NPO after midnight night prior to procedure  -Confirmed compliance of Eliquis      Pt verbalizes understanding of above and appreciates call.     im nail

## 2024-12-04 DIAGNOSIS — N18.4 CKD STAGE G4/A2, GFR 15-29 AND ALBUMIN CREATININE RATIO 30-299 MG/G: ICD-10-CM

## 2024-12-24 ENCOUNTER — PATIENT OUTREACH (OUTPATIENT)
Dept: ADMINISTRATIVE | Facility: HOSPITAL | Age: 74
End: 2024-12-24
Payer: MEDICARE

## 2025-01-02 ENCOUNTER — TELEPHONE (OUTPATIENT)
Dept: NEPHROLOGY | Facility: CLINIC | Age: 75
End: 2025-01-02
Payer: MEDICARE

## 2025-01-02 NOTE — TELEPHONE ENCOUNTER
"Rescheduled due to  transportation. She take the bus and can only do Tuesdays". In the am. Scheduled lab also. 1/2/25/sf   "

## 2025-01-02 NOTE — TELEPHONE ENCOUNTER
----- Message from Zenaida sent at 1/2/2025  1:35 PM CST -----  Contact: Talia  .Type:  Needs Reschedule     Who Called:  Talia     Would the patient rather a call back or a response via [facility]?  Call back    Best Call Back Number:  [phone]     Additional Information:  Pt is calling in regards to not being able to make the appt on 02/03 due to transportation issues and would like to discuss rescheduling the appt to 02/04 before 1pm at the [place] location if possible     Thanks

## 2025-01-07 ENCOUNTER — TELEPHONE (OUTPATIENT)
Dept: INTERNAL MEDICINE | Facility: CLINIC | Age: 75
End: 2025-01-07
Payer: MEDICARE

## 2025-01-07 NOTE — TELEPHONE ENCOUNTER
----- Message from Daina sent at 1/7/2025  4:08 PM CST -----  Contact: [phone] .[email or web address]     Patient is calling for Medical Advice regarding:evaluate her medications     How long has patient had these symptoms:    Pharmacy name and phone#:    Patient wants a call back or thru [facility]:call back     Comments:  Pt states she is needing to evaluate her medications and she states she has been feeling cold lately and she states she can only come on Mondays that is the day she has transportation please advise   Also she states she had a message in regards to the bone density test as well and that she needed top do something for this as well   Please advise patient replies from provider may take up to 48 hours.

## 2025-01-09 ENCOUNTER — TELEPHONE (OUTPATIENT)
Dept: INTERNAL MEDICINE | Facility: CLINIC | Age: 75
End: 2025-01-09
Payer: MEDICARE

## 2025-01-09 NOTE — TELEPHONE ENCOUNTER
Contacted the patient regarding an appointment. Talia requested a sooner appointment than 1/27 with Dr. Lo, but was advised that date was the soonest. I offered her an appointment with Mini, but she declined stating she needed to visit with Dr. Lo. She verified the date and time of her appointment with me, then stated she'd keep the appointment & will be there.

## 2025-01-09 NOTE — TELEPHONE ENCOUNTER
Patient is requesting medical advice &/or a prescription request for nausea, chills & body aches. I spoke with her about these symptoms & she declined to be scheduled with Mini, but will visit with Dr. Lo on 1/27.     L/v: 8/12/24  F/u: 1/27/25  Rx: Alirio

## 2025-01-09 NOTE — TELEPHONE ENCOUNTER
----- Message from Arleth sent at 1/9/2025  3:25 PM CST -----  Type:  Same Day Appointment Request    Caller is requesting a same day appointment.  Caller declined first available appointment listed below.    Name of Caller:Talia  When is the first available appointment?01/10/25  Symptoms:Bodyaches, chills, nausea  Best Call Back Number:[phone]  Additional Information: Patient uses medical transportation and can not come tomorrow, but need a callback from the office today. Please callback

## 2025-01-09 NOTE — TELEPHONE ENCOUNTER
Attempted to contact the patient regarding provider's advice. Dr. Lo stated she's suspects it may be a virus, but Talia has a prescription for Odansetron for nausea, can take Tylenol as needed for pain & monitor for a fever. She also suggested that Talia can take an at home test for Covid or be scheduled as a nurse visit to be tested. I left a voicemail for the patient to return my call concerning this.

## 2025-01-09 NOTE — TELEPHONE ENCOUNTER
----- Message from [facility] [employer] sent at 1/9/2025  3:14 PM CST -----  Contact: self  ..Type:  Needs Medical Advice    Who Called: .Talia Dillon  Symptoms (please be specific): severe nausea , cold bodily tremors  How long has patient had these symptoms:  1 weekk   Pharmacy name and phone #:  .  [facility] - [place] - [address]  [address]  [place] [address]  Phone: [phone] Fax: [phone]  Would the patient rather a call back or a response via [facility]? Call back   Best Call Back Number: [phone] (home)   Additional Information: pt states she is miserable due to the severe nausea , and is needing to know what she could take until she see Dr. Lo on 01/27.

## 2025-01-24 ENCOUNTER — OFFICE VISIT (OUTPATIENT)
Dept: INTERNAL MEDICINE | Facility: CLINIC | Age: 75
End: 2025-01-24
Payer: MEDICARE

## 2025-01-24 VITALS
HEART RATE: 69 BPM | TEMPERATURE: 98 F | WEIGHT: 220.69 LBS | OXYGEN SATURATION: 98 % | RESPIRATION RATE: 16 BRPM | HEIGHT: 65 IN | BODY MASS INDEX: 36.77 KG/M2 | DIASTOLIC BLOOD PRESSURE: 80 MMHG | SYSTOLIC BLOOD PRESSURE: 132 MMHG

## 2025-01-24 DIAGNOSIS — R19.7 DIARRHEA, UNSPECIFIED TYPE: ICD-10-CM

## 2025-01-24 DIAGNOSIS — R63.0 DECREASED APPETITE: ICD-10-CM

## 2025-01-24 DIAGNOSIS — R11.0 NAUSEA: Chronic | ICD-10-CM

## 2025-01-24 DIAGNOSIS — R68.83 CHILLS (WITHOUT FEVER): ICD-10-CM

## 2025-01-24 DIAGNOSIS — K52.9 GASTROENTERITIS: Primary | ICD-10-CM

## 2025-01-24 PROCEDURE — 3288F FALL RISK ASSESSMENT DOCD: CPT | Mod: CPTII,S$GLB,,

## 2025-01-24 PROCEDURE — 1126F AMNT PAIN NOTED NONE PRSNT: CPT | Mod: CPTII,S$GLB,,

## 2025-01-24 PROCEDURE — 99999 PR PBB SHADOW E&M-EST. PATIENT-LVL V: CPT | Mod: PBBFAC,,,

## 2025-01-24 PROCEDURE — 1160F RVW MEDS BY RX/DR IN RCRD: CPT | Mod: CPTII,S$GLB,,

## 2025-01-24 PROCEDURE — 99214 OFFICE O/P EST MOD 30 MIN: CPT | Mod: S$GLB,,,

## 2025-01-24 PROCEDURE — 3079F DIAST BP 80-89 MM HG: CPT | Mod: CPTII,S$GLB,,

## 2025-01-24 PROCEDURE — 1159F MED LIST DOCD IN RCRD: CPT | Mod: CPTII,S$GLB,,

## 2025-01-24 PROCEDURE — 1101F PT FALLS ASSESS-DOCD LE1/YR: CPT | Mod: CPTII,S$GLB,,

## 2025-01-24 PROCEDURE — 3075F SYST BP GE 130 - 139MM HG: CPT | Mod: CPTII,S$GLB,,

## 2025-01-24 RX ORDER — ONDANSETRON 4 MG/1
TABLET, ORALLY DISINTEGRATING ORAL
Qty: 90 TABLET | Refills: 3 | Status: SHIPPED | OUTPATIENT
Start: 2025-01-24

## 2025-01-24 RX ORDER — PANTOPRAZOLE SODIUM 40 MG/1
40 TABLET, DELAYED RELEASE ORAL DAILY
Qty: 90 TABLET | Refills: 3 | Status: SHIPPED | OUTPATIENT
Start: 2025-01-24 | End: 2026-01-24

## 2025-01-24 NOTE — PROGRESS NOTES
"Subjective:       Patient ID: Talia Dillon is a 74 y.o. female.    Chief Complaint: Abdominal Pain (+ Nausea, Diarrhea, Decreased Appetite, Weakness & Chills x 1 Week)    History of Present Illness    CHIEF COMPLAINT:  Patient presents today for severe nausea.    GASTROINTESTINAL:  She reports constant nausea for the past two weeks without improvement despite using Mylanta, Pepto-Bismol, and Tums. She denies vomiting. She reports mild diarrhea without formed bowel movements and denies blood in stool.  She can not remember her last bout of diarrhea.  She occasionally uses Tums for acid reflux but does not take daily acid reflux medication.  She denies abdominal pain.    DIET AND HYDRATION:  She reports poor appetite with minimal food intake from meals at her place of residence.  She can not remember the last thing she ate.  Her fluid intake is limited, primarily consisting of tea. She has attempted to manage symptoms with ginger ale and Sprite without relief.    ASSOCIATED SYMPTOMS:  She reports experiencing body chills but denies fever.          /80 (BP Location: Right forearm, Patient Position: Sitting)   Pulse 69   Temp 97.8 °F (36.6 °C) (Tympanic)   Resp 16   Ht 5' 5" (1.651 m)   Wt 100.1 kg (220 lb 10.9 oz)   SpO2 98%   BMI 36.72 kg/m²     Review of Systems   Constitutional:  Positive for appetite change and chills. Negative for fever.   Eyes:  Negative for visual disturbance.   Respiratory:  Negative for chest tightness and shortness of breath.    Cardiovascular:  Negative for chest pain, palpitations and leg swelling.   Gastrointestinal:  Positive for diarrhea and nausea. Negative for constipation and vomiting.   Genitourinary:  Negative for difficulty urinating.   Neurological:  Negative for headaches.   All other systems reviewed and are negative.      Objective:      Physical Exam  Constitutional:       General: She is not in acute distress.     Appearance: Normal appearance. She is obese. She " is not ill-appearing or toxic-appearing.   HENT:      Head: Normocephalic and atraumatic.      Mouth/Throat:      Mouth: Mucous membranes are dry.   Eyes:      Conjunctiva/sclera: Conjunctivae normal.      Pupils: Pupils are equal, round, and reactive to light.   Cardiovascular:      Rate and Rhythm: Normal rate and regular rhythm.   Pulmonary:      Effort: Pulmonary effort is normal.      Breath sounds: Normal breath sounds.   Abdominal:      General: Bowel sounds are normal. There is no distension.      Palpations: Abdomen is soft.      Tenderness: There is no abdominal tenderness.   Musculoskeletal:         General: Normal range of motion.      Cervical back: Normal range of motion and neck supple.   Skin:     General: Skin is warm and dry.   Neurological:      General: No focal deficit present.      Mental Status: She is alert and oriented to person, place, and time.   Psychiatric:         Mood and Affect: Mood normal.         Behavior: Behavior normal.         Thought Content: Thought content normal.         Judgment: Judgment normal.       Assessment:       1. Gastroenteritis    2. Nausea    3. Diarrhea, unspecified type    4. Decreased appetite    5. Chills (without fever)        Plan:       Gastroenteritis    Nausea  Comments:  laura cont zofran  Orders:  -     ondansetron (ZOFRAN-ODT) 4 MG TbDL; DISSOLVE 1 TABLET BY MOUTH EVERY 6 HOURS AS NEEDED (NAUSEA).  -     pantoprazole (PROTONIX) 40 MG tablet; Take 1 tablet (40 mg total) by mouth once daily.    Diarrhea, unspecified type    Decreased appetite    Chills (without fever)    Assessment & Plan    IMPRESSION:  - Assessed patient presenting with 2-week history of severe nausea and mild diarrhea  - Ruled out severe dehydration based on normal blood pressure and pulse  - Considered acid reflux as potential contributor to nausea  - Determined no need for immediate testing due to absence of fever  - Recommend symptomatic management with anti-nausea medication,  hydration, and dietary modifications    NAUSEA:  - Refilled ondansetron (Zofran) to be taken sublingually every 6 hours while awake for nausea management.  - Patient reports severe nausea for approximately 2 weeks, with no improvement and no associated fever.  - Experiences constant nausea without physical pain and has limited food tolerance, mostly consuming bland foods.  - Vital signs are within normal limits, indicating no major dehydration.    ACID REFLUX:  - Initiated pantoprazole (Protonix) daily for potential acid reflux, which could be contributing to nausea and stomach issues.  - Patient denies taking daily medication for acid reflux but has been using Tums for stomach issues.    DIARRHEA:  - Patient reports a small amount of diarrhea with no formed bowel movements, denying blood in stool.  - Assessed that the diarrhea might need to run its course.  - Recommend increased fluid intake to improve symptoms.    GASTROINTESTINAL UPSET:  - Explained importance of hydration, recommending water and electrolyte drinks (e.g., Powerade, Gatorade, Pedialyte) over tea.  - Discussed benefits of bland diet for GI upset.  - Advised patient to consume bland foods such as toast, crackers, oatmeal, and other easily digestible carbohydrates in small amounts.  - Instructed the patient to gradually increase food intake as tolerated and rest as needed.    FOLLOW UP:  - Follow up on Monday as scheduled with Dr. Lo for comprehensive medication review.  - Contact the office if symptoms worsen or do not improve by Monday.         Patient Instructions   Start Protonix daily    Take zofran under tongue every 6 hours as needed for nausea.    Stay hydrated with water and electrolyte drinks    Eat bland small snacks such as crackers, toast.

## 2025-01-24 NOTE — PATIENT INSTRUCTIONS
Start Protonix daily    Take zofran under tongue every 6 hours as needed for nausea.    Stay hydrated with water and electrolyte drinks    Eat bland small snacks such as crackers, toast.

## 2025-01-27 ENCOUNTER — LAB VISIT (OUTPATIENT)
Dept: LAB | Facility: HOSPITAL | Age: 75
End: 2025-01-27
Attending: FAMILY MEDICINE
Payer: MEDICARE

## 2025-01-27 ENCOUNTER — OFFICE VISIT (OUTPATIENT)
Dept: INTERNAL MEDICINE | Facility: CLINIC | Age: 75
End: 2025-01-27
Payer: MEDICARE

## 2025-01-27 VITALS
TEMPERATURE: 99 F | OXYGEN SATURATION: 97 % | HEART RATE: 99 BPM | BODY MASS INDEX: 38.12 KG/M2 | WEIGHT: 228.81 LBS | DIASTOLIC BLOOD PRESSURE: 78 MMHG | HEIGHT: 65 IN | SYSTOLIC BLOOD PRESSURE: 130 MMHG

## 2025-01-27 DIAGNOSIS — E03.4 HYPOTHYROIDISM DUE TO ACQUIRED ATROPHY OF THYROID: ICD-10-CM

## 2025-01-27 DIAGNOSIS — J44.9 CHRONIC OBSTRUCTIVE PULMONARY DISEASE, UNSPECIFIED COPD TYPE: Chronic | ICD-10-CM

## 2025-01-27 DIAGNOSIS — E55.9 VITAMIN D DEFICIENCY: ICD-10-CM

## 2025-01-27 DIAGNOSIS — I48.11 LONGSTANDING PERSISTENT ATRIAL FIBRILLATION: Chronic | ICD-10-CM

## 2025-01-27 DIAGNOSIS — Z02.89 ENCOUNTER FOR COMPLETION OF FORM WITH PATIENT: ICD-10-CM

## 2025-01-27 DIAGNOSIS — F33.0 MILD EPISODE OF RECURRENT MAJOR DEPRESSIVE DISORDER: Chronic | ICD-10-CM

## 2025-01-27 DIAGNOSIS — N18.4 CKD STAGE G4/A2, GFR 15-29 AND ALBUMIN CREATININE RATIO 30-299 MG/G: ICD-10-CM

## 2025-01-27 DIAGNOSIS — I50.32 CHRONIC DIASTOLIC CONGESTIVE HEART FAILURE: ICD-10-CM

## 2025-01-27 DIAGNOSIS — J96.11 CHRONIC HYPOXIC RESPIRATORY FAILURE, ON HOME OXYGEN THERAPY: Chronic | ICD-10-CM

## 2025-01-27 DIAGNOSIS — R73.09 ABNORMAL GLUCOSE: ICD-10-CM

## 2025-01-27 DIAGNOSIS — N18.4 CKD STAGE G4/A2, GFR 15-29 AND ALBUMIN CREATININE RATIO 30-299 MG/G: Chronic | ICD-10-CM

## 2025-01-27 DIAGNOSIS — K30 INDIGESTION: Primary | ICD-10-CM

## 2025-01-27 DIAGNOSIS — E03.4 HYPOTHYROIDISM DUE TO ACQUIRED ATROPHY OF THYROID: Chronic | ICD-10-CM

## 2025-01-27 DIAGNOSIS — I50.32 CHRONIC DIASTOLIC CONGESTIVE HEART FAILURE: Chronic | ICD-10-CM

## 2025-01-27 DIAGNOSIS — Z99.81 CHRONIC HYPOXIC RESPIRATORY FAILURE, ON HOME OXYGEN THERAPY: Chronic | ICD-10-CM

## 2025-01-27 DIAGNOSIS — I10 ESSENTIAL HYPERTENSION: Chronic | ICD-10-CM

## 2025-01-27 DIAGNOSIS — L30.4 INTERTRIGO: ICD-10-CM

## 2025-01-27 PROCEDURE — 93010 ELECTROCARDIOGRAM REPORT: CPT | Mod: S$GLB,,, | Performed by: INTERNAL MEDICINE

## 2025-01-27 PROCEDURE — 3075F SYST BP GE 130 - 139MM HG: CPT | Mod: CPTII,S$GLB,, | Performed by: FAMILY MEDICINE

## 2025-01-27 PROCEDURE — 93005 ELECTROCARDIOGRAM TRACING: CPT | Mod: S$GLB,,, | Performed by: FAMILY MEDICINE

## 2025-01-27 PROCEDURE — 83970 ASSAY OF PARATHORMONE: CPT | Performed by: FAMILY MEDICINE

## 2025-01-27 PROCEDURE — 82247 BILIRUBIN TOTAL: CPT | Performed by: FAMILY MEDICINE

## 2025-01-27 PROCEDURE — 99214 OFFICE O/P EST MOD 30 MIN: CPT | Mod: 25,S$GLB,, | Performed by: FAMILY MEDICINE

## 2025-01-27 PROCEDURE — 85025 COMPLETE CBC W/AUTO DIFF WBC: CPT | Performed by: FAMILY MEDICINE

## 2025-01-27 PROCEDURE — 1159F MED LIST DOCD IN RCRD: CPT | Mod: CPTII,S$GLB,, | Performed by: FAMILY MEDICINE

## 2025-01-27 PROCEDURE — 1126F AMNT PAIN NOTED NONE PRSNT: CPT | Mod: CPTII,S$GLB,, | Performed by: FAMILY MEDICINE

## 2025-01-27 PROCEDURE — 80069 RENAL FUNCTION PANEL: CPT | Performed by: FAMILY MEDICINE

## 2025-01-27 PROCEDURE — 80061 LIPID PANEL: CPT | Performed by: FAMILY MEDICINE

## 2025-01-27 PROCEDURE — 3044F HG A1C LEVEL LT 7.0%: CPT | Mod: CPTII,S$GLB,, | Performed by: FAMILY MEDICINE

## 2025-01-27 PROCEDURE — 36415 COLL VENOUS BLD VENIPUNCTURE: CPT | Mod: PO | Performed by: FAMILY MEDICINE

## 2025-01-27 PROCEDURE — 1160F RVW MEDS BY RX/DR IN RCRD: CPT | Mod: CPTII,S$GLB,, | Performed by: FAMILY MEDICINE

## 2025-01-27 PROCEDURE — 3288F FALL RISK ASSESSMENT DOCD: CPT | Mod: CPTII,S$GLB,, | Performed by: FAMILY MEDICINE

## 2025-01-27 PROCEDURE — 83036 HEMOGLOBIN GLYCOSYLATED A1C: CPT | Performed by: FAMILY MEDICINE

## 2025-01-27 PROCEDURE — 84443 ASSAY THYROID STIM HORMONE: CPT | Performed by: FAMILY MEDICINE

## 2025-01-27 PROCEDURE — 99999 PR PBB SHADOW E&M-EST. PATIENT-LVL IV: CPT | Mod: PBBFAC,,, | Performed by: FAMILY MEDICINE

## 2025-01-27 PROCEDURE — 1101F PT FALLS ASSESS-DOCD LE1/YR: CPT | Mod: CPTII,S$GLB,, | Performed by: FAMILY MEDICINE

## 2025-01-27 PROCEDURE — 84439 ASSAY OF FREE THYROXINE: CPT | Performed by: FAMILY MEDICINE

## 2025-01-27 PROCEDURE — 3078F DIAST BP <80 MM HG: CPT | Mod: CPTII,S$GLB,, | Performed by: FAMILY MEDICINE

## 2025-01-27 PROCEDURE — 82652 VIT D 1 25-DIHYDROXY: CPT | Performed by: FAMILY MEDICINE

## 2025-01-27 RX ORDER — NYSTATIN 100000 U/G
CREAM TOPICAL 2 TIMES DAILY
Qty: 30 G | Refills: 1 | Status: SHIPPED | OUTPATIENT
Start: 2025-01-27

## 2025-01-27 NOTE — PROGRESS NOTES
Subjective     Patient ID: Talia Dillon is a 74 y.o. female.    Chief Complaint: Follow-up (F/u visit from still some nausea after seeing Mini SIBLEY 1/24/25 . Also would like to discuss medications)    History of Present Illness    Talia presents with persistent nausea, heartburn, and concerns about her medication regimen.      Talia reports constant nausea for approximately 1.5 weeks. She has been taking ondansetron every 6 hours as prescribed, with diminishing efficacy and incomplete relief. She has also been using Mylanta and Pepto-Bismol for symptom management. She describes severe heartburn and mentions a change in her complexion. Talia has been having chills, particularly when in bed or sitting at the table.    Talia recently started pantoprazole a few days ago without significant improvement. She has delayed gastric emptying, as shown by a gastric emptying study last year. She had a cholecystectomy last year.    Talia reports difficulty eating due to symptoms and the quality of food at her living facility. She has been attempting to consume lighter, easier-to-digest meals from a health food store. This morning, she was able to eat grits and scrambled eggs for breakfast with temporary relief.    Regarding her breathing, the patient reports constant dyspnea and uses home oxygen. She uses her albuterol inhaler daily for shortness of breath and has persistent wheezing. Talia has not used her nebulizer in an extended period.    Talia also mentions having chronic back pain, for which she takes gabapentin daily. She reports generally adequate sleep, though the last couple of nights have been challenging due to indigestion and GI issues. She sleeps with her bed elevated.    Talia sees a cardiologist, Dr. Butler, and states that she has persistent atrial fibrillation. Her last cardiology appointment was in August of last year, where everything was reported as stable. She has an upcoming appointment with Dr. Andujar on  February 4th at 1 o'clock.    Talia denies chest pain, increased swelling, weakness, and diarrhea. She denies having tested positive for COVID-19 despite recent exposures at her living facility.    MEDICATIONS:  Talia is on Escitalopram daily for depression/anxiety and Noro inhaler daily for breathing. She uses Albuterol inhaler, 1 puff daily, for shortness of breath. She takes Gabapentin daily for back pain and a thyroid medication daily. Talia is on Trazodone nightly for sleep, Apixaban daily as a anticoagulant, and cholesterol medicine daily. She takes a diuretic daily and Lisinopril daily for blood pressure, prescribed by nephrologist Dr. Guillen. Other blood pressure medicines are taken daily, prescribed by cardiologist Dr. Andujar. Talia recently started Pantoprazole for acid reflux/heartburn. She takes Ondansetron every 6 hours as needed for nausea. Talia uses oxygen at home for shortness of breath.    MEDICAL HISTORY:  Talia has a history of chronic kidney disease, atrial fibrillation, delayed gastric emptying, depression/anxiety, thyroid condition, hypercholesterolemia, hypertension, and chronic obstructive pulmonary disease.    SURGICAL HISTORY:  Talia underwent a cholecystectomy last year.    TEST RESULTS:  Talia's potassium level, blood   count, and electrolytes were last checked in September. An EKG was performed in August, which showed atrial fibrillation. Talia underwent a gastric emptying study last year, which revealed delayed gastric emptying.    IMAGING:  Talia had gallbladder imaging prior to last year, and the results led to a cholecystectomy.    ROS:  General: -fever, +chills, -fatigue, -weight gain, -weight loss, -change in weight  Eyes: -vision changes, -redness, -discharge  ENT: -ear pain, -nasal congestion, -sore throat  Cardiovascular: -chest pain, -palpitations, -lower extremity edema  Respiratory: -cough, -shortness of breath, +wheezing  Gastrointestinal: -abdominal pain, +nausea, -vomiting,  -diarrhea, -constipation, -blood in stool, +heartburn  Genitourinary: -dysuria, -hematuria, -frequency  Musculoskeletal: -joint pain, -muscle pain, +back pain  Skin: -rash, -lesion  Neurological: -headache, -dizziness, -numbness, -tingling, -weakness  Psychiatric: +anxiety, +depression, +sleep difficulty            Objective     Physical Exam  Vitals and nursing note reviewed.   Constitutional:       Appearance: Normal appearance. She is obese.   HENT:      Head: Normocephalic and atraumatic.   Eyes:      Extraocular Movements: Extraocular movements intact.      Conjunctiva/sclera: Conjunctivae normal.   Cardiovascular:      Rate and Rhythm: Normal rate. Rhythm irregular.      Pulses: Normal pulses.      Heart sounds: Normal heart sounds.   Pulmonary:      Effort: Pulmonary effort is normal.      Breath sounds: Normal breath sounds.   Abdominal:      General: Abdomen is flat. Bowel sounds are normal.      Palpations: Abdomen is soft.   Musculoskeletal:         General: Normal range of motion.      Cervical back: Normal range of motion and neck supple.   Skin:     General: Skin is warm and dry.      Comments: Red moist rash under both breast   Neurological:      General: No focal deficit present.      Mental Status: She is alert and oriented to person, place, and time.   Psychiatric:         Mood and Affect: Mood normal.         Behavior: Behavior normal.                Assessment and Plan     1. Indigestion  Comments:  cont pantoprazole, uncontrolled  Orders:  -     IN OFFICE EKG 12-LEAD (to Muse)    2. Essential hypertension  Comments:  stable, cont lisinopril and metoprolol  Orders:  -     IN OFFICE EKG 12-LEAD (to Muse)    3. Chronic diastolic congestive heart failure  Comments:  stable on furosemide  Orders:  -     Lipid Panel; Future; Expected date: 02/03/2025    4. CKD stage G4/A2, GFR 15-29 and albumin creatinine ratio  mg/g  -     CBC Auto Differential; Future; Expected date: 02/03/2025  -     PTH,  intact; Future; Expected date: 02/03/2025  -     RENAL FUNCTION PANEL; Future; Expected date: 02/03/2025  -     HEPATIC FUNCTION PANEL; Future; Expected date: 02/03/2025    5. Vitamin D deficiency  -     Calcitriol; Future; Expected date: 02/03/2025    6. Hypothyroidism due to acquired atrophy of thyroid  Comments:  will check levels cont levothyroxine  Orders:  -     T4, Free; Future; Expected date: 02/03/2025  -     TSH; Future; Expected date: 02/03/2025    7. Abnormal glucose  -     Hemoglobin A1C; Future; Expected date: 02/03/2025    8. Longstanding persistent atrial fibrillation  Comments:  stable on xarelto    9. Intertrigo  -     nystatin (MYCOSTATIN) cream; Apply topically 2 (two) times daily.  Dispense: 30 g; Refill: 1    10. Chronic hypoxic respiratory failure, on home oxygen therapy  Comments:  stable on home oxygen therapy at night    11. Chronic obstructive pulmonary disease, unspecified COPD type  Comments:  stable cont anoro and albuterol prn    12. Mild episode of recurrent major depressive disorder  Comments:  stable cont lexapro    13. Encounter for completion of form with patient        Assessment & Plan    Assessed patient's persistent nausea, heartburn, and chills, considering recent COVID exposures and history of delayed gastric emptying  Evaluated current medication regimen, noting recent addition of pantoprazole for acid reflux management  Considered potential viral gastroenteritis as cause of symptoms, given community prevalence  Noted chronic A-fib and slightly elevated pulse of 99 bpm  Will perform in-office EKG due to atypical presentation of indigestion symptoms in women  Planned to review recent lab work to assess electrolyte balance and blood count, particularly in context of chronic kidney disease and diuretic use    PATIENT EDUCATION:   Explained pantoprazole is a proton pump inhibitor that reduces stomach acid production, not a probiotic   Discussed it may take several days to  notice improvement in acid reflux symptoms with pantoprazole   Explained importance of avoiding foods that exacerbate heartburn   Cautioned about caffeine in black tea potentially causing palpitations    ACTION ITEMS/LIFESTYLE:   Talia to switch from black tea to green tea or non-caffeinated tea options   Talia to avoid foods that exacerbate heartburn, such as broccoli and cauliflower   Talia to continue using supplemental oxygen as needed for chronic dyspnea    MEDICATIONS:   Continued pantoprazole for acid reflux management   Continued Zofran as needed every 6 hours for nausea   Continued albuterol inhaler daily for dyspnea   Continued Lexapro (escitalopram) daily for depression/anxiety   Continued gabapentin daily for chronic back pain   Continued thyroid medication daily   Continued trazodone at bedtime for sleep   Continued Eliquis as prescribed by Dr. Andujar for anticoagulation   Continued cholesterol medication daily   Continued lisinopril daily as prescribed by Dr. Guillen for hypertension   Continued other antihypertensive medications as prescribed by Dr. Butler    ORDERS:   Performed in-office EKG which showed afib   Ordered comprehensive metabolic panel and complete blood count to assess electrolyte balance and blood count    FOLLOW UP:   Follow up with Dr. Andujar (cardiologist) on February 4th at 1:00 PM at the [place] location       Spent 10 minutes completing paperwork for [email or web address] so patient can obtain help with medication administration       Follow up in about 3 weeks (around 2/17/2025).    This note was generated with the assistance of ambient listening technology. Verbal consent was obtained by the patient and accompanying visitor(s) for the recording of patient appointment to facilitate this note. I attest to having reviewed and edited the generated note for accuracy, though some syntax or spelling errors may persist. Please contact the author of this note for any clarification.

## 2025-01-28 LAB
ALBUMIN SERPL BCP-MCNC: 3.8 G/DL (ref 3.5–5.2)
ALBUMIN SERPL BCP-MCNC: 3.8 G/DL (ref 3.5–5.2)
ALP SERPL-CCNC: 61 U/L (ref 40–150)
ALT SERPL W/O P-5'-P-CCNC: 22 U/L (ref 10–44)
ANION GAP SERPL CALC-SCNC: 12 MMOL/L (ref 8–16)
AST SERPL-CCNC: 23 U/L (ref 10–40)
BASOPHILS # BLD AUTO: 0.04 K/UL (ref 0–0.2)
BASOPHILS NFR BLD: 0.5 % (ref 0–1.9)
BILIRUB DIRECT SERPL-MCNC: 0.2 MG/DL (ref 0.1–0.3)
BILIRUB SERPL-MCNC: 0.6 MG/DL (ref 0.1–1)
BUN SERPL-MCNC: 28 MG/DL (ref 8–23)
CALCIUM SERPL-MCNC: 9.4 MG/DL (ref 8.7–10.5)
CHLORIDE SERPL-SCNC: 100 MMOL/L (ref 95–110)
CHOLEST SERPL-MCNC: 192 MG/DL (ref 120–199)
CHOLEST/HDLC SERPL: 3.4 {RATIO} (ref 2–5)
CO2 SERPL-SCNC: 24 MMOL/L (ref 23–29)
CREAT SERPL-MCNC: 3.2 MG/DL (ref 0.5–1.4)
DIFFERENTIAL METHOD BLD: ABNORMAL
EOSINOPHIL # BLD AUTO: 0.2 K/UL (ref 0–0.5)
EOSINOPHIL NFR BLD: 2 % (ref 0–8)
ERYTHROCYTE [DISTWIDTH] IN BLOOD BY AUTOMATED COUNT: 14.2 % (ref 11.5–14.5)
EST. GFR  (NO RACE VARIABLE): 14.5 ML/MIN/1.73 M^2
ESTIMATED AVG GLUCOSE: 108 MG/DL (ref 68–131)
GLUCOSE SERPL-MCNC: 94 MG/DL (ref 70–110)
HBA1C MFR BLD: 5.4 % (ref 4–5.6)
HCT VFR BLD AUTO: 41.1 % (ref 37–48.5)
HDLC SERPL-MCNC: 57 MG/DL (ref 40–75)
HDLC SERPL: 29.7 % (ref 20–50)
HGB BLD-MCNC: 12.9 G/DL (ref 12–16)
IMM GRANULOCYTES # BLD AUTO: 0.03 K/UL (ref 0–0.04)
IMM GRANULOCYTES NFR BLD AUTO: 0.4 % (ref 0–0.5)
LDLC SERPL CALC-MCNC: 102.4 MG/DL (ref 63–159)
LYMPHOCYTES # BLD AUTO: 1.2 K/UL (ref 1–4.8)
LYMPHOCYTES NFR BLD: 15.2 % (ref 18–48)
MCH RBC QN AUTO: 31 PG (ref 27–31)
MCHC RBC AUTO-ENTMCNC: 31.4 G/DL (ref 32–36)
MCV RBC AUTO: 99 FL (ref 82–98)
MONOCYTES # BLD AUTO: 0.7 K/UL (ref 0.3–1)
MONOCYTES NFR BLD: 8.2 % (ref 4–15)
NEUTROPHILS # BLD AUTO: 6 K/UL (ref 1.8–7.7)
NEUTROPHILS NFR BLD: 73.7 % (ref 38–73)
NONHDLC SERPL-MCNC: 135 MG/DL
NRBC BLD-RTO: 0 /100 WBC
PHOSPHATE SERPL-MCNC: 2.9 MG/DL (ref 2.7–4.5)
PLATELET # BLD AUTO: 175 K/UL (ref 150–450)
PMV BLD AUTO: 13.3 FL (ref 9.2–12.9)
POTASSIUM SERPL-SCNC: 4.7 MMOL/L (ref 3.5–5.1)
PROT SERPL-MCNC: 7.1 G/DL (ref 6–8.4)
PTH-INTACT SERPL-MCNC: 227.7 PG/ML (ref 9–77)
RBC # BLD AUTO: 4.16 M/UL (ref 4–5.4)
SODIUM SERPL-SCNC: 136 MMOL/L (ref 136–145)
T4 FREE SERPL-MCNC: 0.42 NG/DL (ref 0.71–1.51)
TRIGL SERPL-MCNC: 163 MG/DL (ref 30–150)
TSH SERPL DL<=0.005 MIU/L-ACNC: 87.97 UIU/ML (ref 0.4–4)
WBC # BLD AUTO: 8.15 K/UL (ref 3.9–12.7)

## 2025-01-29 LAB
OHS QRS DURATION: 66 MS
OHS QTC CALCULATION: 437 MS

## 2025-01-30 ENCOUNTER — PATIENT MESSAGE (OUTPATIENT)
Dept: INTERNAL MEDICINE | Facility: CLINIC | Age: 75
End: 2025-01-30
Payer: MEDICARE

## 2025-01-30 PROBLEM — J44.9 CHRONIC OBSTRUCTIVE PULMONARY DISEASE: Status: RESOLVED | Noted: 2022-03-18 | Resolved: 2025-01-30

## 2025-01-30 PROBLEM — J96.11 CHRONIC HYPOXIC RESPIRATORY FAILURE, ON HOME OXYGEN THERAPY: Status: ACTIVE | Noted: 2025-01-30

## 2025-01-30 PROBLEM — Z99.81 CHRONIC HYPOXIC RESPIRATORY FAILURE, ON HOME OXYGEN THERAPY: Status: ACTIVE | Noted: 2025-01-30

## 2025-01-30 NOTE — TELEPHONE ENCOUNTER
Spoke with Ms. Talia Douglass's niece. I reviewed labs. Concern is that patient is not taking medication as instructed. They have hired med pass to help patient with medication administration. I recommended they check into PCA services to assist patient with meals and hydration.

## 2025-01-31 LAB — 1,25(OH)2D3 SERPL-MCNC: 21 PG/ML (ref 20–79)

## 2025-02-03 ENCOUNTER — TELEPHONE (OUTPATIENT)
Dept: NEPHROLOGY | Facility: CLINIC | Age: 75
End: 2025-02-03
Payer: MEDICARE

## 2025-02-03 DIAGNOSIS — N18.4 CKD STAGE G4/A2, GFR 15-29 AND ALBUMIN CREATININE RATIO 30-299 MG/G: Primary | ICD-10-CM

## 2025-02-04 ENCOUNTER — HOSPITAL ENCOUNTER (EMERGENCY)
Facility: HOSPITAL | Age: 75
Discharge: HOME | End: 2025-02-04
Attending: EMERGENCY MEDICINE
Payer: MEDICARE

## 2025-02-04 ENCOUNTER — OFFICE VISIT (OUTPATIENT)
Dept: CARDIOLOGY | Facility: CLINIC | Age: 75
End: 2025-02-04
Payer: MEDICARE

## 2025-02-04 VITALS
BODY MASS INDEX: 38.12 KG/M2 | OXYGEN SATURATION: 97 % | HEART RATE: 97 BPM | HEIGHT: 65 IN | DIASTOLIC BLOOD PRESSURE: 57 MMHG | SYSTOLIC BLOOD PRESSURE: 82 MMHG | WEIGHT: 228.81 LBS

## 2025-02-04 VITALS
BODY MASS INDEX: 38.12 KG/M2 | OXYGEN SATURATION: 98 % | SYSTOLIC BLOOD PRESSURE: 130 MMHG | HEIGHT: 65 IN | RESPIRATION RATE: 24 BRPM | DIASTOLIC BLOOD PRESSURE: 80 MMHG | HEART RATE: 84 BPM | WEIGHT: 228.81 LBS | TEMPERATURE: 98 F

## 2025-02-04 DIAGNOSIS — I10 ESSENTIAL HYPERTENSION: Chronic | ICD-10-CM

## 2025-02-04 DIAGNOSIS — E86.0 DEHYDRATION: Primary | ICD-10-CM

## 2025-02-04 DIAGNOSIS — I95.89 OTHER SPECIFIED HYPOTENSION: Primary | ICD-10-CM

## 2025-02-04 DIAGNOSIS — R42 DIZZINESS: ICD-10-CM

## 2025-02-04 DIAGNOSIS — E78.00 PURE HYPERCHOLESTEROLEMIA: ICD-10-CM

## 2025-02-04 DIAGNOSIS — E66.01 SEVERE OBESITY (BMI 35.0-39.9) WITH COMORBIDITY: ICD-10-CM

## 2025-02-04 DIAGNOSIS — Z86.79 HISTORY OF PSVT (PAROXYSMAL SUPRAVENTRICULAR TACHYCARDIA): ICD-10-CM

## 2025-02-04 DIAGNOSIS — R06.09 DYSPNEA ON EXERTION: ICD-10-CM

## 2025-02-04 DIAGNOSIS — E83.42 HYPOMAGNESEMIA: ICD-10-CM

## 2025-02-04 DIAGNOSIS — I71.21 ANEURYSM OF ASCENDING AORTA WITHOUT RUPTURE: ICD-10-CM

## 2025-02-04 DIAGNOSIS — I50.32 CHRONIC DIASTOLIC CONGESTIVE HEART FAILURE: ICD-10-CM

## 2025-02-04 DIAGNOSIS — I48.11 LONGSTANDING PERSISTENT ATRIAL FIBRILLATION: ICD-10-CM

## 2025-02-04 PROBLEM — I95.9 HYPOTENSION: Status: ACTIVE | Noted: 2025-02-04

## 2025-02-04 LAB
ALBUMIN SERPL BCP-MCNC: 3.6 G/DL (ref 3.5–5.2)
ALP SERPL-CCNC: 74 U/L (ref 40–150)
ALT SERPL W/O P-5'-P-CCNC: 32 U/L (ref 10–44)
ANION GAP SERPL CALC-SCNC: 9 MMOL/L (ref 8–16)
APTT PPP: 29.3 SEC (ref 21–32)
AST SERPL-CCNC: 29 U/L (ref 10–40)
BASOPHILS # BLD AUTO: 0.04 K/UL (ref 0–0.2)
BASOPHILS NFR BLD: 0.5 % (ref 0–1.9)
BILIRUB SERPL-MCNC: 0.5 MG/DL (ref 0.1–1)
BILIRUB UR QL STRIP: NEGATIVE
BUN SERPL-MCNC: 51 MG/DL (ref 8–23)
CALCIUM SERPL-MCNC: 9.5 MG/DL (ref 8.7–10.5)
CHLORIDE SERPL-SCNC: 104 MMOL/L (ref 95–110)
CLARITY UR: CLEAR
CO2 SERPL-SCNC: 28 MMOL/L (ref 23–29)
COLOR UR: COLORLESS
CREAT SERPL-MCNC: 3.7 MG/DL (ref 0.5–1.4)
DIFFERENTIAL METHOD BLD: ABNORMAL
EOSINOPHIL # BLD AUTO: 0.3 K/UL (ref 0–0.5)
EOSINOPHIL NFR BLD: 4.1 % (ref 0–8)
ERYTHROCYTE [DISTWIDTH] IN BLOOD BY AUTOMATED COUNT: 14 % (ref 11.5–14.5)
EST. GFR  (NO RACE VARIABLE): 12 ML/MIN/1.73 M^2
GLUCOSE SERPL-MCNC: 92 MG/DL (ref 70–110)
GLUCOSE UR QL STRIP: NEGATIVE
HCT VFR BLD AUTO: 37 % (ref 37–48.5)
HCV AB SERPL QL IA: NEGATIVE
HEP C VIRUS HOLD SPECIMEN: NORMAL
HGB BLD-MCNC: 11.5 G/DL (ref 12–16)
HGB UR QL STRIP: NEGATIVE
HIV 1+2 AB+HIV1 P24 AG SERPL QL IA: NEGATIVE
IMM GRANULOCYTES # BLD AUTO: 0.02 K/UL (ref 0–0.04)
IMM GRANULOCYTES NFR BLD AUTO: 0.3 % (ref 0–0.5)
INR PPP: 1.1 (ref 0.8–1.2)
KETONES UR QL STRIP: NEGATIVE
LEUKOCYTE ESTERASE UR QL STRIP: NEGATIVE
LYMPHOCYTES # BLD AUTO: 1 K/UL (ref 1–4.8)
LYMPHOCYTES NFR BLD: 12.8 % (ref 18–48)
MAGNESIUM SERPL-MCNC: 1.5 MG/DL (ref 1.6–2.6)
MCH RBC QN AUTO: 31.3 PG (ref 27–31)
MCHC RBC AUTO-ENTMCNC: 31.1 G/DL (ref 32–36)
MCV RBC AUTO: 101 FL (ref 82–98)
MONOCYTES # BLD AUTO: 0.6 K/UL (ref 0.3–1)
MONOCYTES NFR BLD: 7.1 % (ref 4–15)
NEUTROPHILS # BLD AUTO: 5.9 K/UL (ref 1.8–7.7)
NEUTROPHILS NFR BLD: 75.2 % (ref 38–73)
NITRITE UR QL STRIP: NEGATIVE
NRBC BLD-RTO: 0 /100 WBC
PH UR STRIP: 5 [PH] (ref 5–8)
PLATELET # BLD AUTO: 142 K/UL (ref 150–450)
PMV BLD AUTO: 12.3 FL (ref 9.2–12.9)
POTASSIUM SERPL-SCNC: 4.5 MMOL/L (ref 3.5–5.1)
PROT SERPL-MCNC: 7 G/DL (ref 6–8.4)
PROT UR QL STRIP: ABNORMAL
PROTHROMBIN TIME: 12 SEC (ref 9–12.5)
RBC # BLD AUTO: 3.67 M/UL (ref 4–5.4)
SODIUM SERPL-SCNC: 141 MMOL/L (ref 136–145)
SP GR UR STRIP: 1.01 (ref 1–1.03)
TROPONIN I SERPL DL<=0.01 NG/ML-MCNC: 0.01 NG/ML (ref 0–0.03)
URN SPEC COLLECT METH UR: ABNORMAL
UROBILINOGEN UR STRIP-ACNC: NEGATIVE EU/DL
WBC # BLD AUTO: 7.88 K/UL (ref 3.9–12.7)

## 2025-02-04 PROCEDURE — 87389 HIV-1 AG W/HIV-1&-2 AB AG IA: CPT | Performed by: EMERGENCY MEDICINE

## 2025-02-04 PROCEDURE — 3044F HG A1C LEVEL LT 7.0%: CPT | Mod: CPTII,S$GLB,, | Performed by: INTERNAL MEDICINE

## 2025-02-04 PROCEDURE — 96365 THER/PROPH/DIAG IV INF INIT: CPT

## 2025-02-04 PROCEDURE — 86803 HEPATITIS C AB TEST: CPT | Performed by: EMERGENCY MEDICINE

## 2025-02-04 PROCEDURE — 3074F SYST BP LT 130 MM HG: CPT | Mod: CPTII,S$GLB,, | Performed by: INTERNAL MEDICINE

## 2025-02-04 PROCEDURE — 81003 URINALYSIS AUTO W/O SCOPE: CPT | Performed by: NURSE PRACTITIONER

## 2025-02-04 PROCEDURE — 1100F PTFALLS ASSESS-DOCD GE2>/YR: CPT | Mod: CPTII,S$GLB,, | Performed by: INTERNAL MEDICINE

## 2025-02-04 PROCEDURE — 83735 ASSAY OF MAGNESIUM: CPT | Performed by: NURSE PRACTITIONER

## 2025-02-04 PROCEDURE — 96361 HYDRATE IV INFUSION ADD-ON: CPT

## 2025-02-04 PROCEDURE — 93005 ELECTROCARDIOGRAM TRACING: CPT

## 2025-02-04 PROCEDURE — 3288F FALL RISK ASSESSMENT DOCD: CPT | Mod: CPTII,S$GLB,, | Performed by: INTERNAL MEDICINE

## 2025-02-04 PROCEDURE — 1126F AMNT PAIN NOTED NONE PRSNT: CPT | Mod: CPTII,S$GLB,, | Performed by: INTERNAL MEDICINE

## 2025-02-04 PROCEDURE — 1160F RVW MEDS BY RX/DR IN RCRD: CPT | Mod: CPTII,S$GLB,, | Performed by: INTERNAL MEDICINE

## 2025-02-04 PROCEDURE — 80053 COMPREHEN METABOLIC PANEL: CPT | Performed by: NURSE PRACTITIONER

## 2025-02-04 PROCEDURE — 85025 COMPLETE CBC W/AUTO DIFF WBC: CPT | Performed by: NURSE PRACTITIONER

## 2025-02-04 PROCEDURE — 85610 PROTHROMBIN TIME: CPT | Performed by: NURSE PRACTITIONER

## 2025-02-04 PROCEDURE — 93010 ELECTROCARDIOGRAM REPORT: CPT | Mod: ,,, | Performed by: INTERNAL MEDICINE

## 2025-02-04 PROCEDURE — 63600175 PHARM REV CODE 636 W HCPCS: Performed by: EMERGENCY MEDICINE

## 2025-02-04 PROCEDURE — 3078F DIAST BP <80 MM HG: CPT | Mod: CPTII,S$GLB,, | Performed by: INTERNAL MEDICINE

## 2025-02-04 PROCEDURE — 99215 OFFICE O/P EST HI 40 MIN: CPT | Mod: S$GLB,,, | Performed by: INTERNAL MEDICINE

## 2025-02-04 PROCEDURE — 1159F MED LIST DOCD IN RCRD: CPT | Mod: CPTII,S$GLB,, | Performed by: INTERNAL MEDICINE

## 2025-02-04 PROCEDURE — 85730 THROMBOPLASTIN TIME PARTIAL: CPT | Performed by: NURSE PRACTITIONER

## 2025-02-04 PROCEDURE — 99284 EMERGENCY DEPT VISIT MOD MDM: CPT | Mod: 25

## 2025-02-04 PROCEDURE — 84484 ASSAY OF TROPONIN QUANT: CPT | Performed by: NURSE PRACTITIONER

## 2025-02-04 PROCEDURE — 99999 PR PBB SHADOW E&M-EST. PATIENT-LVL IV: CPT | Mod: PBBFAC,,, | Performed by: INTERNAL MEDICINE

## 2025-02-04 PROCEDURE — 25000003 PHARM REV CODE 250: Performed by: EMERGENCY MEDICINE

## 2025-02-04 RX ORDER — MAGNESIUM SULFATE 1 G/100ML
1 INJECTION INTRAVENOUS ONCE
Status: COMPLETED | OUTPATIENT
Start: 2025-02-04 | End: 2025-02-04

## 2025-02-04 RX ADMIN — MAGNESIUM SULFATE IN DEXTROSE 1 G: 10 INJECTION, SOLUTION INTRAVENOUS at 04:02

## 2025-02-04 RX ADMIN — SODIUM CHLORIDE 1000 ML: 9 INJECTION, SOLUTION INTRAVENOUS at 04:02

## 2025-02-04 RX ADMIN — SODIUM CHLORIDE 1000 ML: 0.9 INJECTION, SOLUTION INTRAVENOUS at 06:02

## 2025-02-04 NOTE — FIRST PROVIDER EVALUATION
Medical screening examination initiated.  I have conducted a focused provider triage encounter, findings are as follows:    Brief history of present illness:  Patient presents the ER for dizziness and fall yesterday.  Patient is sent by Cardiology    Vitals:    02/04/25 1443   BP: 106/74   BP Location: Right arm   Patient Position: Sitting   Pulse: 92   Resp: 18   Temp: 97.9 °F (36.6 °C)   TempSrc: Oral   SpO2: Comment: unable to assess in triage; pulse ox of 97% at the [place] [facility]   Weight: 103.8 kg (228 lb 13.4 oz)       Pertinent physical exam:  No acute distress    Brief workup plan:  Cardiac workup    Preliminary workup initiated; this workup will be continued and followed by the physician or advanced practice provider that is assigned to the patient when roomed.

## 2025-02-04 NOTE — PROGRESS NOTES
Subjective:   Patient ID:  Talia Dillon is a 74 y.o. female who presents for follow up of No chief complaint on file.      72 yo female, came in for 6 m f/u and ER visit f/u  PMH chronic afib, TAA 4.8 cm, PSVT COPD former smoker, no h/o MI DM CVA  F/u Dr. Barker cardiologist at [facility]. Moved to [facility] center  Some wheezing and SOB. Dependent walker, lower back pain.   On home o2  Sleeps on 1 pillow  Some chest tightness. No active bleeding  05/22 low dose chest ct showed TAA 4.8 cm  2020 Phar MPi showed no ischemia and echo showed EF nml  NT BNP 2200 in 2023 01/24 ekg afib    08/24 visit  On home O2. And on inhaler Rx.  EKG reviewed by myself today reveals AFIB VR C  No orthopnea PND   Cr 2.1 and NT BNP 1255   Weight stable    Interval history  Yesterday went to ER at [facility] for fall weakness and dizziness. + UTI and Cr 4.2 ARF.  BP low today BP 82 mmHg.  D/c home and took her meds today          Past Medical History:   Diagnosis Date    Allergy     Anemia     Anxiety     Atrial fibrillation     Cancer     skin    Cataract     Depression     Edema     Hypothyroidism     Kidney disease     PSVT (paroxysmal supraventricular tachycardia)     Thyroid disease        Past Surgical History:   Procedure Laterality Date    ADENOIDECTOMY      APPENDECTOMY      CHOLECYSTECTOMY      COLONOSCOPY  2009    repeat in 10 years     ESOPHAGOGASTRODUODENOSCOPY N/A 05/04/2023    Procedure: EGD (ESOPHAGOGASTRODUODENOSCOPY);  Surgeon: Ej Reyes MD;  Location: [facility];  Service: Endoscopy;  Laterality: N/A;    EYE SURGERY Bilateral     cataracts extraction and bilateral upper eyelid lifts    HYSTERECTOMY  1973    INJECTION OF ANESTHETIC AGENT AROUND NERVE Bilateral 09/21/2018    Procedure: BLOCK, NERVE, MEDIAL BRANCH BLOCK BILATERAL LUMBAR L2-5;  Surgeon: Julieth Christopher MD;  [facility];  Service: Pain Management;  Laterality: Bilateral;  1 of 2    INJECTION OF ANESTHETIC AGENT AROUND NERVE Bilateral  2018    Procedure: BLOCK, NERVE, MEDIAL BRANCH BLOCK BILATERAL LUMBAR L2-5;  Surgeon: Julieth Christopher MD;  Location: [facility];  Service: Pain Management;  Laterality: Bilateral;  2 of 2  PLEASE GIVE NIRAVAM PER MD    OOPHORECTOMY  1973    RADIOFREQUENCY ABLATION Left 2020    Procedure: RADIOFREQUENCY ABLATION L2,3,4,5;  Surgeon: Julieth Christopher MD;  Location: [facility];  Service: Pain Management;  Laterality: Left;  LT RFA L2,3,4,5  1 of 2  OK to hold Eliquis    RADIOFREQUENCY ABLATION Right 2020    Procedure: RADIOFREQUENCY ABLATION L2,3,4,5 RIGHT   2 of 2 ok to hold eliquis;  Surgeon: Julieth Christopher MD;  Location: [facility];  Service: Pain Management;  Laterality: Right;    RADIOFREQUENCY ABLATION Left 2020    Procedure: RADIOFREQUENCY ABLATION, L2-L3-L4-L5 MEDIAL BRANCH 1 OF 2 Continue Eliquis;  Surgeon: Bogdan Webster MD;  Location: [facility];  Service: Pain Management;  Laterality: Left;    TONSILLECTOMY      TREATMENT OF CARDIAC ARRHYTHMIA N/A 2019    Procedure: CARDIOVERSION;  Surgeon: Devin You MD;  Location: [facility];  Service: Cardiology;  Laterality: N/A;  AF, KAROL, DCCV, MAC, HI, 3 Prep       Social History     Tobacco Use    Smoking status: Former     Current packs/day: 0.00     Average packs/day: 1 pack/day for 50.0 years (50.0 ttl pk-yrs)     Types: Cigarettes, Vaping with nicotine     Start date:      Quit date:      Years since quittin.0     Passive exposure: Past    Smokeless tobacco: Never    Tobacco comments:     No longer vaping   Substance Use Topics    Alcohol use: Not Currently    Drug use: Never       Family History   Problem Relation Name Age of Onset    Hypertension Mother      Stroke Mother      Hypertension Father      Stroke Father      Diabetes Father      Arthritis Sister Jermaine     Diabetes Sister Theresa     Heart attack Sister Theresa     Coronary artery disease Sister Theresa     Diabetes Sister Renetta     Sleep apnea  Daughter Nakul     Mental illness Daughter Nakul     Bipolar disorder Daughter Nakul          ROS    Objective:   Physical Exam  HENT:      Head: Normocephalic.   Eyes:      Pupils: Pupils are equal, round, and reactive to light.   Neck:      Thyroid: No thyromegaly.      Vascular: Normal carotid pulses. No carotid bruit or JVD.   Cardiovascular:      Rate and Rhythm: Normal rate. Rhythm irregularly irregular. No extrasystoles are present.     Chest Wall: PMI is not displaced.      Pulses: Normal pulses.      Heart sounds: Normal heart sounds. No murmur heard.     No gallop. No S3 sounds.   Pulmonary:      Effort: No respiratory distress.      Breath sounds: Normal breath sounds. No stridor.   Abdominal:      General: Bowel sounds are normal.      Palpations: Abdomen is soft.      Tenderness: There is no abdominal tenderness. There is no rebound.   Skin:     Findings: No rash.   Neurological:      Mental Status: She is alert and oriented to person, place, and time.   Psychiatric:         Behavior: Behavior normal.         Lab Results   Component Value Date    CHOL 192 01/27/2025    CHOL 129 06/11/2020    CHOL 107 (L) 01/14/2019     Lab Results   Component Value Date    HDL 57 01/27/2025    HDL 41 06/11/2020    HDL 37 (L) 01/14/2019     Lab Results   Component Value Date    LDLCALC 102.4 01/27/2025    LDLCALC 61.8 (L) 06/11/2020    LDLCALC 52.8 (L) 01/14/2019     Lab Results   Component Value Date    TRIG 163 (H) 01/27/2025    TRIG 131 06/11/2020    TRIG 86 01/14/2019     Lab Results   Component Value Date    CHOLHDL 29.7 01/27/2025    CHOLHDL 31.8 06/11/2020    CHOLHDL 34.6 01/14/2019       Chemistry        Component Value Date/Time     01/27/2025 1721    K 4.7 01/27/2025 1721     01/27/2025 1721    CO2 24 01/27/2025 1721    BUN 28 (H) 01/27/2025 1721    CREATININE 3.2 (H) 01/27/2025 1721    GLU 94 01/27/2025 1721        Component Value Date/Time    CALCIUM 9.4 01/27/2025 1721    ALKPHOS  61 01/27/2025 1721    AST 23 01/27/2025 1721    ALT 22 01/27/2025 1721    BILITOT 0.6 01/27/2025 1721    ESTGFRAFRICA 21 06/30/2023 1114    ESTGFRAFRICA 27.3 (A) 02/24/2022 1211    EGFRNONAA 23.7 (A) 02/24/2022 1211          Lab Results   Component Value Date    HGBA1C 5.4 01/27/2025     Lab Results   Component Value Date    TSH 87.965 (H) 01/27/2025     Lab Results   Component Value Date    INR 1.5 (H) 01/08/2020    INR 3.3 (H) 01/02/2020    INR 5.3 (HH) 12/30/2019     Lab Results   Component Value Date    WBC 8.15 01/27/2025    HGB 12.9 01/27/2025    HCT 41.1 01/27/2025    MCV 99 (H) 01/27/2025     01/27/2025     BMP  Sodium   Date Value Ref Range Status   01/27/2025 136 136 - 145 mmol/L Final     Potassium   Date Value Ref Range Status   01/27/2025 4.7 3.5 - 5.1 mmol/L Final     Chloride   Date Value Ref Range Status   01/27/2025 100 95 - 110 mmol/L Final     CO2   Date Value Ref Range Status   01/27/2025 24 23 - 29 mmol/L Final     BUN   Date Value Ref Range Status   01/27/2025 28 (H) 8 - 23 mg/dL Final     Creatinine   Date Value Ref Range Status   01/27/2025 3.2 (H) 0.5 - 1.4 mg/dL Final     Calcium   Date Value Ref Range Status   01/27/2025 9.4 8.7 - 10.5 mg/dL Final     Anion Gap   Date Value Ref Range Status   01/27/2025 12 8 - 16 mmol/L Final     eGFR if    Date Value Ref Range Status   02/24/2022 27.3 (A) >60 mL/min/1.73 m^2 Final     eGFR    Date Value Ref Range Status   06/30/2023 21 mL/min/1.73mSq Final     Comment:     In accordance with NKF-ASN Task Force recommendation, calculation based on the Chronic Kidney Disease Epidemiology Collaboration (CKD-EPI) equation without adjustment for race. eGFR adjusted for gender and age and calculated in ml/min/1.73mSquared. eGFR cannot be calculated if patient is under 18 years of age.     Reference Range:   >= 60 ml/min/1.73mSquared.     eGFR if non    Date Value Ref Range Status   02/24/2022 23.7 (A) >60  mL/min/1.73 m^2 Final     Comment:     Calculation used to obtain the estimated glomerular filtration  rate (eGFR) is the CKD-EPI equation.        BNP  @LABRCNTIP(BNP,BNPTRIAGEBLO)@  @LABRCNTIP(troponini)@  CrCl cannot be calculated (Patient's most recent lab result is older than the maximum 7 days allowed.).  No results found in the last 24 hours.  No results found in the last 24 hours.  No results found in the last 24 hours.    Assessment:      1. Other specified hypotension    2. Severe obesity (BMI 35.0-39.9) with comorbidity    3. Aneurysm of ascending aorta without rupture    4. Chronic diastolic congestive heart failure    5. Dyspnea on exertion    6. Essential hypertension    7. History of PSVT (paroxysmal supraventricular tachycardia)    8. Pure hypercholesterolemia    9. Longstanding persistent atrial fibrillation      UTI   ARF  Hypotension    Plan:   Sent to ER  The staff note

## 2025-02-04 NOTE — ED PROVIDER NOTES
SCRIBE #1 NOTE: I, Tobin Ramirez, am scribing for, and in the presence of, Ricki Jimenez MD. I have scribed the entire note.       History     Chief Complaint   Patient presents with    Dizziness     Pt c/o feeling dizzy and weak x 2 weeks.  Pt fell yesterday and was seen at [facility].  She was seen by Dr. Butler today and was told to go to the ED.     Review of patient's allergies indicates:   Allergen Reactions    One-a-day women's 50 plus [mv,ca,min-folic acid-vit k1]     Dexamethasone Rash         History of Present Illness     HPI    2/4/2025, 4:16 PM  History obtained from the patient and niece      History of Present Illness: Talia Dillon is a 74 y.o. female patient with a PMHx of anxiety, cancer, and a-fib who presents to the Emergency Department for evaluation of lightheadedness which onset about a week ago. Pt's niece is at bedside providing additional hx. Pt lives in [facility] living [facility] and fell out of her bed yesterday. Pt denies any significant injuries caused by the fall, however, pt does not remember the details of the fall. Pt went to be evaluated by her PCP (Dr. Lo) per niece and was dx with a UTI and started on cefdinir abx (pt has started them prior to exam). Today, pt went to her f/u appointment with her Cardiologist (Dr. Butler) who got a blood pressure reading of 82/52 and sent the pt here for further evaluation. Associated sxs include nausea, fatigue, urinary incontinence, difficulty eating secondary to nausea, and urinary issues. Pt reports, however, that she started eating again a few days ago. Symptoms are constant and moderate in severity. No mitigating or exacerbating factors reported. Patient denies any cough, congestion, N/V, back/joint pain, loss of balance, and all other sxs at this time.  No further complaints or concerns at this time.       Arrival mode: Personal Transportation    PCP: Yohana Garcia MD        Past Medical  History:  Past Medical History:   Diagnosis Date    Allergy     Anemia     Anxiety     Atrial fibrillation     Cancer     skin    Cataract     Depression     Edema     Hypothyroidism     Kidney disease     PSVT (paroxysmal supraventricular tachycardia)     Thyroid disease        Past Surgical History:  Past Surgical History:   Procedure Laterality Date    ADENOIDECTOMY      APPENDECTOMY      CHOLECYSTECTOMY      COLONOSCOPY  2009    repeat in 10 years     ESOPHAGOGASTRODUODENOSCOPY N/A 05/04/2023    Procedure: EGD (ESOPHAGOGASTRODUODENOSCOPY);  Surgeon: Ej Reyes MD;  Location: [facility];  Service: Endoscopy;  Laterality: N/A;    EYE SURGERY Bilateral     cataracts extraction and bilateral upper eyelid lifts    HYSTERECTOMY  1973    INJECTION OF ANESTHETIC AGENT AROUND NERVE Bilateral 09/21/2018    Procedure: BLOCK, NERVE, MEDIAL BRANCH BLOCK BILATERAL LUMBAR L2-5;  Surgeon: Julieth Christopher MD;  Location: [facility];  Service: Pain Management;  Laterality: Bilateral;  1 of 2    INJECTION OF ANESTHETIC AGENT AROUND NERVE Bilateral 09/28/2018    Procedure: BLOCK, NERVE, MEDIAL BRANCH BLOCK BILATERAL LUMBAR L2-5;  Surgeon: Julieth Christopher MD;  Location: [facility];  Service: Pain Management;  Laterality: Bilateral;  2 of 2  PLEASE GIVE NIRAVAM PER MD    OOPHORECTOMY  1973    RADIOFREQUENCY ABLATION Left 02/14/2020    Procedure: RADIOFREQUENCY ABLATION L2,3,4,5;  Surgeon: Julieth Christopher MD;  Location: [facility];  Service: Pain Management;  Laterality: Left;  LT RFA L2,3,4,5  1 of 2  OK to hold Eliquis    RADIOFREQUENCY ABLATION Right 02/28/2020    Procedure: RADIOFREQUENCY ABLATION L2,3,4,5 RIGHT   2 of 2 ok to hold eliquis;  Surgeon: Julieth Christopher MD;  Location: [facility];  Service: Pain Management;  Laterality: Right;    RADIOFREQUENCY ABLATION Left 11/30/2020    Procedure: RADIOFREQUENCY ABLATION, L2-L3-L4-L5 MEDIAL BRANCH 1 OF 2 Continue Eliquis;  Surgeon: Bogdan Webster MD;  Location: [facility];  Service: Pain Management;  Laterality: Left;    TONSILLECTOMY      TREATMENT OF CARDIAC ARRHYTHMIA N/A 2019    Procedure: CARDIOVERSION;  Surgeon: Devin You MD;  Location: [facility];  Service: Cardiology;  Laterality: N/A;  AF, KAROL, DCCV, MAC, LA, 3 Prep         Family History:  Family History   Problem Relation Name Age of Onset    Hypertension Mother      Stroke Mother      Hypertension Father      Stroke Father      Diabetes Father      Arthritis Sister Jermaine     Diabetes Sister Theresa     Heart attack Sister Theresa     Coronary artery disease Sister Theresa     Diabetes Sister Renetta     Sleep apnea Daughter Nakul     Mental illness Daughter Nakul     Bipolar disorder Daughter Nakul        Social History:  Social History     Tobacco Use    Smoking status: Former     Current packs/day: 0.00     Average packs/day: 1 pack/day for 50.0 years (50.0 ttl pk-yrs)     Types: Cigarettes, Vaping with nicotine     Start date:      Quit date:      Years since quittin.0     Passive exposure: Past    Smokeless tobacco: Never    Tobacco comments:     No longer vaping   Substance and Sexual Activity    Alcohol use: Not Currently    Drug use: Never    Sexual activity: Yes     Partners: Male     Birth control/protection: Condom        Review of Systems     Review of Systems   Constitutional:  Positive for appetite change (decrease) and fatigue. Negative for chills, diaphoresis and fever.   HENT:  Negative for congestion.    Respiratory:  Negative for cough and shortness of breath.    Cardiovascular:  Negative for chest pain.   Gastrointestinal:  Positive for nausea. Negative for abdominal pain, diarrhea and vomiting.   Genitourinary:  Negative for dysuria.   Musculoskeletal:  Negative for arthralgias and back pain.   Skin:  Negative for rash.   Neurological:  Positive for light-headedness. Negative for dizziness, weakness and headaches.   All other systems reviewed and are  negative.       Physical Exam     Initial Vitals   BP Pulse Resp Temp SpO2   02/04/25 1443 02/04/25 1443 02/04/25 1443 02/04/25 1443 02/04/25 1545   106/74 92 18 97.9 °F (36.6 °C) 98 %      MAP       --                 Physical Exam  Nursing Notes and Vital Signs Reviewed.  Constitutional: Patient is in no acute distress. Chronically ill appearing, appears older than stated age, and weak.  Head: Atraumatic. Normocephalic.  Eyes: EOM intact. Conjunctivae are not pale. No scleral icterus.  ENT: Mucous membranes are dry.   Neck: Supple. Full ROM.   Cardiovascular: Regular rate. Regular rhythm. No murmurs, rubs, or gallops.   Pulmonary/Chest: No respiratory distress. Clear to auscultation bilaterally. No wheezing or rales.  Abdominal: Soft and non-distended.  There is no tenderness.  No rebound, guarding, or rigidity.   Musculoskeletal: Moves all extremities. No obvious deformities. No edema.   Skin: Warm and dry.  Neurological:  Alert, awake, and appropriate.  Normal speech.  No acute focal neurological deficits are appreciated.  Psychiatric: Normal affect. Good eye contact. Appropriate in content.        ED Course   Critical Care    Date/Time: 2/4/2025 4:51 PM    Performed by: Tobin Smith  Authorized by: Ricki Jimenez MD  Direct patient critical care time: 15 minutes  Additional history critical care time: 10 minutes  Ordering / reviewing critical care time: 5 minutes  Documentation critical care time: 5 minutes  Consulting other physicians critical care time: 5 minutes  Total critical care time (exclusive of procedural time) : 40 minutes  Critical care time was exclusive of separately billable procedures and treating other patients and teaching time.  Critical care was necessary to treat or prevent imminent or life-threatening deterioration of the following conditions: renal failure (low magnesium).  Critical care was time spent personally by me on the following activities: blood draw for specimens,  "development of treatment plan with patient or surrogate, discussions with consultants, interpretation of cardiac output measurements, evaluation of patient's response to treatment, examination of patient, obtaining history from patient or surrogate, ordering and performing treatments and interventions, ordering and review of laboratory studies, ordering and review of radiographic studies, pulse oximetry, re-evaluation of patient's condition and review of old charts.        ED Vital Signs:  Vitals:    02/04/25 1443 02/04/25 1540 02/04/25 1545 02/04/25 1600   BP: 106/74  111/75 131/84   Pulse: 92 95 95 91   Resp: 18  (!) 21 18   Temp: 97.9 °F (36.6 °C)  97.9 °F (36.6 °C)    TempSrc: Oral  Oral    SpO2:   98% 96%   Weight: 103.8 kg (228 lb 13.4 oz)  103.8 kg (228 lb 13.4 oz)    Height: 5' 5" (1.651 m)  5' 5" (1.651 m)     02/04/25 1609 02/04/25 1611 02/04/25 1613 02/04/25 1700   BP: 126/78  114/77 (!) 123/91   Pulse: 94 91 89 90   Resp:    19   Temp:       TempSrc:       SpO2: 98%   95%   Weight:       Height:        02/04/25 1730 02/04/25 1800 02/04/25 1830   BP: 125/84 136/84 139/75   Pulse: 95 80 87   Resp: 18 17 20   Temp:      TempSrc:      SpO2: 96% 98% 95%   Weight:      Height:          Abnormal Lab Results:  Labs Reviewed   CBC W/ AUTO DIFFERENTIAL - Abnormal       Result Value    WBC 7.88      RBC 3.67 (*)     Hemoglobin 11.5 (*)     Hematocrit 37.0       (*)     MCH 31.3 (*)     MCHC 31.1 (*)     RDW 14.0      Platelets 142 (*)     MPV 12.3      Immature Granulocytes 0.3      Gran # (ANC) 5.9      Immature Grans (Abs) 0.02      Lymph # 1.0      Mono # 0.6      Eos # 0.3      Baso # 0.04      nRBC 0      Gran % 75.2 (*)     Lymph % 12.8 (*)     Mono % 7.1      Eosinophil % 4.1      Basophil % 0.5      Differential Method Automated      Narrative:     Release to patient->Immediate   COMPREHENSIVE METABOLIC PANEL - Abnormal    Sodium 141      Potassium 4.5      Chloride 104      CO2 28      Glucose 92   "    BUN 51 (*)     Creatinine 3.7 (*)     Calcium 9.5      Total Protein 7.0      Albumin 3.6      Total Bilirubin 0.5      Alkaline Phosphatase 74      AST 29      ALT 32      eGFR 12 (*)     Anion Gap 9      Narrative:     Release to patient->Immediate   URINALYSIS, REFLEX TO URINE CULTURE - Abnormal    Specimen UA Urine, Clean Catch      Color, UA Colorless (*)     Appearance, UA Clear      pH, UA 5.0      Specific Gravity, UA 1.010      Protein, UA Trace (*)     Glucose, UA Negative      Ketones, UA Negative      Bilirubin (UA) Negative      Occult Blood UA Negative      Nitrite, UA Negative      Urobilinogen, UA Negative      Leukocytes, UA Negative      Narrative:     Specimen Source->Urine   MAGNESIUM - Abnormal    Magnesium 1.5 (*)     Narrative:     Release to patient->Immediate   HEPATITIS C ANTIBODY    Hepatitis C Ab Negative      Narrative:     Release to patient->Immediate   HEP C VIRUS HOLD SPECIMEN    HEP C Virus Hold Specimen Hold for HCV sendout      Narrative:     Release to patient->Immediate   HIV 1 / 2 ANTIBODY    HIV 1/2 Ag/Ab Negative      Narrative:     Release to patient->Immediate   TROPONIN I    Troponin I 0.006      Narrative:     Release to patient->Immediate   PROTIME-INR    Prothrombin Time 12.0      INR 1.1     APTT    aPTT 29.3     MAGNESIUM        All Lab Results:  Results for orders placed or performed during the hospital encounter of 02/04/25   Hepatitis C Antibody    Collection Time: 02/04/25  3:47 PM   Result Value Ref Range    Hepatitis C Ab Negative Negative   HCV Virus Hold Specimen    Collection Time: 02/04/25  3:47 PM   Result Value Ref Range    HEP C Virus Hold Specimen Hold for HCV sendout    HIV 1/2 Ag/Ab (4th Gen)    Collection Time: 02/04/25  3:47 PM   Result Value Ref Range    HIV 1/2 Ag/Ab Negative Negative   CBC auto differential    Collection Time: 02/04/25  3:47 PM   Result Value Ref Range    WBC 7.88 3.90 - 12.70 K/uL    RBC 3.67 (L) 4.00 - 5.40 M/uL    Hemoglobin  11.5 (L) 12.0 - 16.0 g/dL    Hematocrit 37.0 37.0 - 48.5 %     (H) 82 - 98 fL    MCH 31.3 (H) 27.0 - 31.0 pg    MCHC 31.1 (L) 32.0 - 36.0 g/dL    RDW 14.0 11.5 - 14.5 %    Platelets 142 (L) 150 - 450 K/uL    MPV 12.3 9.2 - 12.9 fL    Immature Granulocytes 0.3 0.0 - 0.5 %    Gran # (ANC) 5.9 1.8 - 7.7 K/uL    Immature Grans (Abs) 0.02 0.00 - 0.04 K/uL    Lymph # 1.0 1.0 - 4.8 K/uL    Mono # 0.6 0.3 - 1.0 K/uL    Eos # 0.3 0.0 - 0.5 K/uL    Baso # 0.04 0.00 - 0.20 K/uL    nRBC 0 0 /100 WBC    Gran % 75.2 (H) 38.0 - 73.0 %    Lymph % 12.8 (L) 18.0 - 48.0 %    Mono % 7.1 4.0 - 15.0 %    Eosinophil % 4.1 0.0 - 8.0 %    Basophil % 0.5 0.0 - 1.9 %    Differential Method Automated    Comprehensive metabolic panel    Collection Time: 02/04/25  3:47 PM   Result Value Ref Range    Sodium 141 136 - 145 mmol/L    Potassium 4.5 3.5 - 5.1 mmol/L    Chloride 104 95 - 110 mmol/L    CO2 28 23 - 29 mmol/L    Glucose 92 70 - 110 mg/dL    BUN 51 (H) 8 - 23 mg/dL    Creatinine 3.7 (H) 0.5 - 1.4 mg/dL    Calcium 9.5 8.7 - 10.5 mg/dL    Total Protein 7.0 6.0 - 8.4 g/dL    Albumin 3.6 3.5 - 5.2 g/dL    Total Bilirubin 0.5 0.1 - 1.0 mg/dL    Alkaline Phosphatase 74 40 - 150 U/L    AST 29 10 - 40 U/L    ALT 32 10 - 44 U/L    eGFR 12 (A) >60 mL/min/1.73 m^2    Anion Gap 9 8 - 16 mmol/L   Troponin I    Collection Time: 02/04/25  3:47 PM   Result Value Ref Range    Troponin I 0.006 0.000 - 0.026 ng/mL   Protime-INR    Collection Time: 02/04/25  3:47 PM   Result Value Ref Range    Prothrombin Time 12.0 9.0 - 12.5 sec    INR 1.1 0.8 - 1.2   APTT    Collection Time: 02/04/25  3:47 PM   Result Value Ref Range    aPTT 29.3 21.0 - 32.0 sec   Magnesium    Collection Time: 02/04/25  3:47 PM   Result Value Ref Range    Magnesium 1.5 (L) 1.6 - 2.6 mg/dL   Urinalysis, Reflex to Urine Culture Urine, Clean Catch    Collection Time: 02/04/25  3:48 PM    Specimen: Urine   Result Value Ref Range    Specimen UA Urine, Clean Catch     Color, UA Colorless  (A) Yellow, Straw, Arianne    Appearance, UA Clear Clear    pH, UA 5.0 5.0 - 8.0    Specific Gravity, UA 1.010 1.005 - 1.030    Protein, UA Trace (A) Negative    Glucose, UA Negative Negative    Ketones, UA Negative Negative    Bilirubin (UA) Negative Negative    Occult Blood UA Negative Negative    Nitrite, UA Negative Negative    Urobilinogen, UA Negative <2.0 EU/dL    Leukocytes, UA Negative Negative   EKG 12-lead    Collection Time: 02/04/25  4:15 PM   Result Value Ref Range    QRS Duration 66 ms    [facility] QTC Calculation 412 ms         Imaging Results:  Imaging Results              CT Head Without Contrast (Final result)  Result time 02/04/25 16:58:53      Final result by Beatrice Still MD (02/04/25 16:58:53)                   Impression:      No acute finding      Electronically signed by: Beatrice Still  Date:    02/04/2025  Time:    16:58               Narrative:    EXAMINATION:  CT HEAD WITHOUT CONTRAST    CLINICAL HISTORY:  Dizziness, persistent/recurrent, cardiac or vascular cause suspected;Mental status change, unknown cause;    TECHNIQUE:  Low dose axial images were obtained through the head.  Coronal and sagittal reformations were also performed. Contrast was not administered.    COMPARISON:  None available    FINDINGS:  No acute intracranial hemorrhage or mass effect.  No displaced calvarial finding.  Sinuses and mastoids well aerated                                       X-Ray Chest AP Portable (Final result)  Result time 02/04/25 16:04:16      Final result by Gokul Rodriguez MD (02/04/25 16:04:16)                   Impression:      No acute finding in the chest.      Electronically signed by: Gokul Rodriguez  Date:    02/04/2025  Time:    16:04               Narrative:    EXAMINATION:  XR CHEST AP PORTABLE    CLINICAL HISTORY:  Dizziness and giddiness    FINDINGS:  Comparison is made to November 17, 2023.    Cardiomegaly.  Lungs are clear.  No pneumothorax.  No pleural effusion.                                        The EKG was ordered, reviewed, and independently interpreted by the ED provider.  Interpretation time: 1615  Rate: 115 BPM  Rhythm: A-fib with rapid ventricular response  Interpretation: Low voltage QRS. Septal infarct (cited on or before 1/27/2025). No STEMI.           The Emergency Provider reviewed the vital signs and test results, which are outlined above.     ED Discussion     7:24 PM: Reassessed pt at this time.  Patient is feeling so much better.  She perked up a lot after the 1 L fluids and was able to sit up on her own.  She is actually eating something and drank some juice.  Her niece in her feel very comfortable going home.  They have follow up with Dr. Guillen and her primary care doctors.  They have repeat labs on February the 26.  Discussed with pt all pertinent ED information and results. Discussed pt dx and plan of tx. Gave pt all f/u and return to the ED instructions. All questions and concerns were addressed at this time. Pt expresses understanding of information and instructions, and is comfortable with plan to discharge. Pt is stable for discharge.    I discussed with patient and/or family/caretaker that evaluation in the ED does not suggest any emergent or life threatening medical conditions requiring immediate intervention beyond what was provided in the ED, and I believe patient is safe for discharge.  Regardless, an unremarkable evaluation in the ED does not preclude the development or presence of a serious of life threatening condition. As such, patient was instructed to return immediately for any worsening or change in current symptoms.         Medical Decision Making  Differential diagnosis: Dehydration, electrolyte abnormality, arrhythmia, infection, STEMI, NSTEMI, UTI      Amount and/or Complexity of Data Reviewed  Independent Historian: caregiver     Details: Additional hx per niece  Labs: ordered. Decision-making details documented in ED Course.  Radiology: ordered.  Decision-making details documented in ED Course.  ECG/medicine tests: ordered and independent interpretation performed. Decision-making details documented in ED Course.    Risk  Prescription drug management.    Critical Care  Total time providing critical care: 40 minutes                ED Medication(s):  Medications   sodium chloride 0.9% bolus 1,000 mL 1,000 mL (0 mLs Intravenous Stopped 2/4/25 1706)   magnesium sulfate in dextrose IVPB (premix) 1 g (0 g Intravenous Stopped 2/4/25 1755)   sodium chloride 0.9% bolus 1,000 mL 1,000 mL (1,000 mLs Intravenous New Bag 2/4/25 1815)       New Prescriptions    No medications on file        Follow-up Information       Yohana Garcia MD. Schedule an appointment as soon as possible for a visit in 2 days.    Specialty: Family Medicine  Contact information:  [id number] [place] [address]  [phone]                                 Scribe Attestation:   Scribe #1: I performed the above scribed service and the documentation accurately describes the services I performed. I attest to the accuracy of the note.     Attending:   Physician Attestation Statement for Scribe #1: I, Ricki Jimenez MD, personally performed the services described in this documentation, as scribed by Tobin Ramirez, in my presence, and it is both accurate and complete.           Clinical Impression       ICD-10-CM ICD-9-CM   1. Dehydration  E86.0 276.51   2. Dizziness  R42 780.4   3. Hypomagnesemia  E83.42 275.2       Disposition:   Disposition: Discharged  Condition: Stable       Ricki Jimenez MD  02/04/25 1933

## 2025-02-05 ENCOUNTER — PATIENT OUTREACH (OUTPATIENT)
Facility: OTHER | Age: 75
End: 2025-02-05
Payer: MEDICARE

## 2025-02-05 LAB
OHS QRS DURATION: 66 MS
OHS QTC CALCULATION: 412 MS

## 2025-02-05 NOTE — DISCHARGE INSTRUCTIONS
Keep your appointment with nephrology  Tried to drink plenty of fluids    Return to emergency department if you develop:  - Sustained Fever >100.4 or low temperature <96.8   - Tachycardia (very high heart rate) or Pulse >90  - Hypotension (low blood pressure) a top number (systolic pressure) of <90 or a bottom number (diastolic pressure) of <40 or lower  - Hypertension (high blood pressure) a top number (systolic pressure) of >180 or a bottom number (diastolic pressure) of >120 or higher  - Severe pain not relieved with recommended pain control regimen  - Severe shortness of breath above baseline  - Severe nausea, vomiting, inability to tolerate oral feeding/hydration  - Altered mental status, abnormal interaction or behaviors   - If symptoms acutely worsen or do not improve  - Development of other worrisome symptom

## 2025-02-06 NOTE — PROGRESS NOTES
Patient was seen in the ED on 2/4/25. Phoned patient on 2 separate occasions to assist with Post ED Discharge Navigation. Patient was unavailable. Message left on voice mail. Encounter closed.  Coni Allen

## 2025-03-04 ENCOUNTER — TELEPHONE (OUTPATIENT)
Dept: NEPHROLOGY | Facility: CLINIC | Age: 75
End: 2025-03-04
Payer: MEDICARE

## 2025-03-04 DIAGNOSIS — N18.4 CKD STAGE G4/A2, GFR 15-29 AND ALBUMIN CREATININE RATIO 30-299 MG/G: Primary | ICD-10-CM

## 2025-03-10 DIAGNOSIS — G47.00 INSOMNIA, UNSPECIFIED TYPE: Chronic | ICD-10-CM

## 2025-03-10 NOTE — TELEPHONE ENCOUNTER
No care due was identified.  [facility] Embedded Care Due Messages. Reference number: [id number].   3/10/2025 9:50:40 AM CDT

## 2025-03-11 RX ORDER — TRAZODONE HYDROCHLORIDE 50 MG/1
TABLET ORAL
Qty: 90 TABLET | Refills: 1 | Status: SHIPPED | OUTPATIENT
Start: 2025-03-11

## 2025-03-11 NOTE — TELEPHONE ENCOUNTER
Refill Routing Note   Medication(s) are not appropriate for processing by [facility] for the following reason(s):        ED/Hospital Visit since last OV with provider    [facility] action(s):  Defer           Extended chart review required: Yes     Appointments  past 12m or future 3m with PCP    Date Provider   Last Visit   1/27/2025 Yohana Garcia MD   Next Visit   Visit date not found Yohana Garcia MD   ED visits in past 90 days: 1        Note composed:2:05 AM 03/11/2025

## 2025-03-19 ENCOUNTER — PATIENT OUTREACH (OUTPATIENT)
Dept: ADMINISTRATIVE | Facility: HOSPITAL | Age: 75
End: 2025-03-19
Payer: MEDICARE

## 2025-03-19 NOTE — PROGRESS NOTES
Working CKD Lab Report.  Pt had lab appt scheduled, 3/25/25. Pt cancelled and has not rescheduled.

## 2025-03-24 DIAGNOSIS — Z00.00 ENCOUNTER FOR MEDICARE ANNUAL WELLNESS EXAM: ICD-10-CM

## 2025-03-25 ENCOUNTER — LAB VISIT (OUTPATIENT)
Dept: LAB | Facility: HOSPITAL | Age: 75
End: 2025-03-25
Payer: MEDICARE

## 2025-03-25 ENCOUNTER — OFFICE VISIT (OUTPATIENT)
Dept: INTERNAL MEDICINE | Facility: CLINIC | Age: 75
End: 2025-03-25
Payer: MEDICARE

## 2025-03-25 VITALS
DIASTOLIC BLOOD PRESSURE: 76 MMHG | TEMPERATURE: 98 F | WEIGHT: 228.81 LBS | SYSTOLIC BLOOD PRESSURE: 130 MMHG | BODY MASS INDEX: 38.08 KG/M2 | OXYGEN SATURATION: 98 % | HEART RATE: 80 BPM

## 2025-03-25 DIAGNOSIS — L03.115 CELLULITIS OF RIGHT LOWER EXTREMITY: Primary | ICD-10-CM

## 2025-03-25 DIAGNOSIS — N18.4 CKD STAGE G4/A2, GFR 15-29 AND ALBUMIN CREATININE RATIO 30-299 MG/G: ICD-10-CM

## 2025-03-25 DIAGNOSIS — M79.89 SWELLING OF RIGHT FOOT: ICD-10-CM

## 2025-03-25 DIAGNOSIS — E83.42 HYPOMAGNESEMIA: ICD-10-CM

## 2025-03-25 DIAGNOSIS — L53.9 ERYTHEMA OF FOOT: ICD-10-CM

## 2025-03-25 LAB
ABSOLUTE EOSINOPHIL (OHS): 0.19 K/UL
ABSOLUTE MONOCYTE (OHS): 0.67 K/UL (ref 0.3–1)
ABSOLUTE NEUTROPHIL COUNT (OHS): 6.39 K/UL (ref 1.8–7.7)
ALBUMIN SERPL BCP-MCNC: 3.1 G/DL (ref 3.5–5.2)
ANION GAP (OHS): 10 MMOL/L (ref 8–16)
BASOPHILS # BLD AUTO: 0.02 K/UL
BASOPHILS NFR BLD AUTO: 0.2 %
BUN SERPL-MCNC: 101 MG/DL (ref 8–23)
CALCIUM SERPL-MCNC: 9.3 MG/DL (ref 8.7–10.5)
CHLORIDE SERPL-SCNC: 104 MMOL/L (ref 95–110)
CO2 SERPL-SCNC: 23 MMOL/L (ref 23–29)
CREAT SERPL-MCNC: 3.4 MG/DL (ref 0.5–1.4)
CREAT UR-MCNC: 65.6 MG/DL (ref 15–325)
ERYTHROCYTE [DISTWIDTH] IN BLOOD BY AUTOMATED COUNT: 12.2 % (ref 11.5–14.5)
GFR SERPLBLD CREATININE-BSD FMLA CKD-EPI: 14 ML/MIN/1.73/M2
GLUCOSE SERPL-MCNC: 116 MG/DL (ref 70–110)
HCT VFR BLD AUTO: 31 % (ref 37–48.5)
HGB BLD-MCNC: 9.6 GM/DL (ref 12–16)
IMM GRANULOCYTES # BLD AUTO: 0.05 K/UL (ref 0–0.04)
IMM GRANULOCYTES NFR BLD AUTO: 0.6 % (ref 0–0.5)
LYMPHOCYTES # BLD AUTO: 1.17 K/UL (ref 1–4.8)
MCH RBC QN AUTO: 30.6 PG (ref 27–50)
MCHC RBC AUTO-ENTMCNC: 31 G/DL (ref 32–36)
MCV RBC AUTO: 99 FL (ref 82–98)
NUCLEATED RBC (/100WBC) (OHS): 0 /100 WBC
PHOSPHATE SERPL-MCNC: 5 MG/DL (ref 2.7–4.5)
PLATELET # BLD AUTO: 212 K/UL (ref 150–450)
PMV BLD AUTO: 12.5 FL (ref 9.2–12.9)
POTASSIUM SERPL-SCNC: 5.2 MMOL/L (ref 3.5–5.1)
PROT UR-MCNC: <7 MG/DL
PROT/CREAT UR: NORMAL MG/G{CREAT}
RBC # BLD AUTO: 3.14 M/UL (ref 4–5.4)
RELATIVE EOSINOPHIL (OHS): 2.2 %
RELATIVE LYMPHOCYTE (OHS): 13.8 % (ref 18–48)
RELATIVE MONOCYTE (OHS): 7.9 % (ref 4–15)
RELATIVE NEUTROPHIL (OHS): 75.3 % (ref 38–73)
SODIUM SERPL-SCNC: 137 MMOL/L (ref 136–145)
WBC # BLD AUTO: 8.49 K/UL (ref 3.9–12.7)

## 2025-03-25 PROCEDURE — 3075F SYST BP GE 130 - 139MM HG: CPT | Mod: CPTII,S$GLB,,

## 2025-03-25 PROCEDURE — 36415 COLL VENOUS BLD VENIPUNCTURE: CPT | Mod: PO

## 2025-03-25 PROCEDURE — 99999 PR PBB SHADOW E&M-EST. PATIENT-LVL V: CPT | Mod: PBBFAC,,,

## 2025-03-25 PROCEDURE — 3078F DIAST BP <80 MM HG: CPT | Mod: CPTII,S$GLB,,

## 2025-03-25 PROCEDURE — 83735 ASSAY OF MAGNESIUM: CPT

## 2025-03-25 PROCEDURE — 1160F RVW MEDS BY RX/DR IN RCRD: CPT | Mod: CPTII,S$GLB,,

## 2025-03-25 PROCEDURE — 80069 RENAL FUNCTION PANEL: CPT

## 2025-03-25 PROCEDURE — 1125F AMNT PAIN NOTED PAIN PRSNT: CPT | Mod: CPTII,S$GLB,,

## 2025-03-25 PROCEDURE — 1159F MED LIST DOCD IN RCRD: CPT | Mod: CPTII,S$GLB,,

## 2025-03-25 PROCEDURE — 84156 ASSAY OF PROTEIN URINE: CPT

## 2025-03-25 PROCEDURE — 3044F HG A1C LEVEL LT 7.0%: CPT | Mod: CPTII,S$GLB,,

## 2025-03-25 PROCEDURE — 99214 OFFICE O/P EST MOD 30 MIN: CPT | Mod: S$GLB,,,

## 2025-03-25 PROCEDURE — 85025 COMPLETE CBC W/AUTO DIFF WBC: CPT

## 2025-03-25 RX ORDER — CEPHALEXIN 250 MG/1
250 CAPSULE ORAL EVERY 8 HOURS
Qty: 30 CAPSULE | Refills: 0 | Status: SHIPPED | OUTPATIENT
Start: 2025-03-25 | End: 2025-04-04

## 2025-03-25 RX ORDER — DOXYCYCLINE 100 MG/1
100 CAPSULE ORAL 2 TIMES DAILY
COMMUNITY
Start: 2025-03-13 | End: 2025-03-25

## 2025-03-25 NOTE — PATIENT INSTRUCTIONS
Call the office if improving by Friday.    Elevate the left foot above the heart as much as possible.    May use warm compresses for swelling/pain as tolerated.    May take Tylenol for pain.

## 2025-03-25 NOTE — PROGRESS NOTES
Subjective:       Patient ID: Talia Dillon is a 74 y.o. female.    Chief Complaint: Recurrent Skin Infections and Follow-up    History of Present Illness    CHIEF COMPLAINT:  Patient presents today for follow up of right foot cellulitis.  Patient is accompanied by son.    HISTORY OF PRESENT ILLNESS:  She was seen at [facility] ER on 03/10 for right foot redness and swelling that began on 3/8.   DVT was ruled out in ER.  She was diagnosed with cellulitis and was prescribed 7 days of doxycycline 100 mg BID which she has completed.    Today, she is presenting with itching, pain, and swelling of the right foot.  She reports no improvement since ER visit.  She reports difficulty with ambulation due to symptoms. Small scrapes are present from scratching due to pruritus. She denies fever, chills, numbness, or tingling. She has no prior history of cellulitis.    MEDICAL HISTORY:  She has chronic kidney disease stage 4.    MEDICATIONS:  She has a nurse who assists with medication management.           /76 (BP Location: Right arm, Patient Position: Sitting)   Pulse 80   Temp 97.7 °F (36.5 °C)   Wt 103.8 kg (228 lb 13.4 oz)   SpO2 98%   BMI 38.08 kg/m²     Review of Systems   Constitutional:  Negative for chills and fever.   Eyes:  Negative for visual disturbance.   Respiratory:  Negative for chest tightness and shortness of breath.    Cardiovascular:  Positive for leg swelling. Negative for chest pain and palpitations.   Gastrointestinal:  Negative for constipation, diarrhea, nausea and vomiting.   Genitourinary:  Negative for difficulty urinating.   Skin:  Positive for color change.   Neurological:  Negative for headaches.   All other systems reviewed and are negative.      Objective:      Physical Exam  Vitals reviewed.   Constitutional:       General: She is not in acute distress.     Appearance: Normal appearance. She is not ill-appearing or toxic-appearing.   HENT:      Head: Normocephalic and atraumatic.    Eyes:      Pupils: Pupils are equal, round, and reactive to light.   Cardiovascular:      Rate and Rhythm: Normal rate and regular rhythm.      Pulses:           Dorsalis pedis pulses are 1+ on the left side.   Pulmonary:      Effort: Pulmonary effort is normal.      Breath sounds: Normal breath sounds.   Abdominal:      General: Bowel sounds are normal.      Palpations: Abdomen is soft.   Musculoskeletal:         General: Normal range of motion.      Cervical back: Normal range of motion and neck supple.        Feet:    Feet:      Left foot:      Skin integrity: Erythema and warmth present.      Toenail Condition: Left toenails are normal.   Skin:     General: Skin is warm and dry.   Neurological:      General: No focal deficit present.      Mental Status: She is alert and oriented to person, place, and time.   Psychiatric:         Mood and Affect: Mood normal.         Behavior: Behavior normal.         Thought Content: Thought content normal.         Judgment: Judgment normal.         Assessment:       1. Cellulitis of right lower extremity    2. Erythema of foot    3. Swelling of right foot        Plan:   IMPRESSION:  - Assessed cellulitis, noting persistent swelling and pain despite initial antibiotic treatment with Doxycycline.  - Switched to Keflex (cephalexin) 250 mg 3 times daily for 10 days, adjusting dosage for CKD.  - Determined need for potential IV antibiotics if oral Keflex does not improve condition by Friday.    Cellulitis of right lower extremity  -     cephALEXin (KEFLEX) 250 MG capsule; Take 1 capsule (250 mg total) by mouth every 8 (eight) hours. for 10 days    Erythema of foot    Swelling of right foot    CELLULITIS OF RIGHT LOWER LIMB:  - Assessed the patient's right leg and foot, noting hot, swollen, and itchy condition with no improvement.  - Evaluated both feet, confirming more severe swelling in the right leg.  - Diagnosed possible staph infection and considered changing antibiotics.  -  Prescribed Keflex (cephalexin) 250 mg 3 times daily for 10 days, adjusted for chronic kidney disease.  - Recommend elevating the affected foot, especially when lying down.  - Advised applying warm compresses to help with swelling.  - Instructed the patient to keep the affected area clean and dry.  - Recommend Tylenol (acetaminophen) as needed for pain relief.  - Discussed the possibility of IV antibiotics if the condition does not improve.  - Instructed the patient to monitor for fever development and contact the office if cellulitis is not improving by Friday or if fever develops.    Follow up if symptoms worsen or fail to improve.

## 2025-03-26 LAB — MAGNESIUM SERPL-MCNC: 1.6 MG/DL (ref 1.6–2.6)

## 2025-05-01 NOTE — PATIENT INSTRUCTIONS
Flexors, Sitting / Standing        Stand or sit, head in comfortable, centered position. Draw chin in, pulling head straight back, keeping jaw and eyes level. Hold 2 seconds.  Repeat 10 times per session. Do 2 sessions per day.    Copyright © [employer]. All rights reserved.   Side Bend, Sitting        Sit, hand over top of head. Gently pull head to one side. Hold 5 seconds.  Repeat 5 times per session. Do 2 sessions per day.    Copyright © [employer]. All rights reserved.   Levator Scapula Stretch, Sitting        Sit, one hand tucked under hip on side to be stretched, other hand over top of head. Turn head toward other side and look down. Use hand on head to gently stretch neck in that position. Hold 5 seconds.  Repeat 5 times per session. Do 2 sessions per day.    Copyright © [employer]. All rights reserved.   Supine: Leg Stretch With Strap (Intermediate)        Lie on back with one knee bent, foot flat on floor. Hook strap around other foot. Straighten knee. Raise leg further toward its maximal range. Hold 10 seconds. Relax leg completely down to floor.   Repeat 10 times per session. Do 2 sessions per day.    Copyright © [employer]. All rights reserved.   Piriformis Stretch, Supine        Lie supine, one ankle crossed onto opposite knee. Holding bottom leg behind knee, gently pull legs toward chest until stretch is felt in buttock of top leg. Hold 10 seconds. For deeper stretch gently push top knee away from body.   Repeat 10 times per session. Do 2 sessions per day.    Copyright © [employer]. All rights reserved.   Isometric Stabilization        Tighten abdominal muscles as if tightening a belt. Hold 5 seconds.  Do 10 times, 2 times per day.     [email or web address]     Copyright © [employer]. All rights reserved.   Supine With Rotation        Lie, back flat, legs bent, feet together. Rotate knees to one side. Repeat to other side.  Repeat 10 times per session. Do 2 sessions per day.    Copyright © [employer]. All rights reserved.   Bent Leg Lift  (Hook-Lying)        Tighten stomach and slowly raise right leg 5 inches from floor. Keep trunk rigid. Repeat with left leg.  Repeat 10 times per set. Do 3 sets per session. Do 2 sessions per day.     [email or web address]     Copyright © [employer]. All rights reserved.   Scapular Retraction: Bilateral        Facing anchor, pull arms back, bringing shoulder blades together.  Repeat 10 times per set. Do 3 sets per session. Do 2 sessions per day.     [email or web address]     Copyright © [employer]. All rights reserved.      Initiate Treatment: spironolactone  Continue Regimen: Clindamycin\\nTretinoin Detail Level: Detailed Plan: Will plan to treat with destruction for the next few visits or until lesions have resolved. Initiate Treatment: metro gel and azelaic acid  Detail Level: Simple

## 2025-05-21 NOTE — TELEPHONE ENCOUNTER
----- Message from LETY Dyson sent at 3/18/2019  3:39 PM CDT -----  Regarding: MRI  Can you please call her and schedule the left shoulder MRI I ordered?  She wants it at [facility] location.  
Staff could not reach patient, a detail message was left for patient to give office a call back to schedule her MRI of the left shoulder in [facility].  
OA (osteoarthritis)

## 2025-05-27 ENCOUNTER — PATIENT OUTREACH (OUTPATIENT)
Dept: ADMINISTRATIVE | Facility: CLINIC | Age: 75
End: 2025-05-27
Payer: MEDICARE

## 2025-05-27 NOTE — PROGRESS NOTES
C3 nurse attempted to contact Talia Dillon for a TCC post hospital discharge follow up call. LVM. The patient has a scheduled HOSFU appointment with Mini Guillermo on 06/03/25 @ 1876.

## 2025-06-03 DIAGNOSIS — N18.6 END-STAGE RENAL DISEASE (ESRD): ICD-10-CM

## 2025-06-03 DIAGNOSIS — Z51.5 ADMISSION FOR HOSPICE CARE: Primary | ICD-10-CM

## 2025-06-26 DIAGNOSIS — N18.4 CHRONIC KIDNEY DISEASE, STAGE 4 (SEVERE): Primary | ICD-10-CM

## 2025-06-27 ENCOUNTER — LAB VISIT (OUTPATIENT)
Dept: LAB | Facility: HOSPITAL | Age: 75
End: 2025-06-27
Attending: INTERNAL MEDICINE
Payer: MEDICARE

## 2025-06-27 DIAGNOSIS — N18.4 CHRONIC KIDNEY DISEASE, STAGE 4 (SEVERE): ICD-10-CM

## 2025-06-27 LAB
ABSOLUTE EOSINOPHIL (OHS): 0.3 K/UL
ABSOLUTE MONOCYTE (OHS): 0.51 K/UL (ref 0.3–1)
ABSOLUTE NEUTROPHIL COUNT (OHS): 5.54 K/UL (ref 1.8–7.7)
ALBUMIN SERPL BCP-MCNC: 3.4 G/DL (ref 3.5–5.2)
ANION GAP (OHS): 9 MMOL/L (ref 8–16)
BASOPHILS # BLD AUTO: 0.02 K/UL
BASOPHILS NFR BLD AUTO: 0.3 %
BILIRUB UR QL STRIP.AUTO: NEGATIVE
BUN SERPL-MCNC: 60 MG/DL (ref 8–23)
CALCIUM SERPL-MCNC: 8.9 MG/DL (ref 8.7–10.5)
CHLORIDE SERPL-SCNC: 100 MMOL/L (ref 95–110)
CLARITY UR: CLEAR
CO2 SERPL-SCNC: 29 MMOL/L (ref 23–29)
COLOR UR AUTO: YELLOW
CREAT SERPL-MCNC: 3.4 MG/DL (ref 0.5–1.4)
CREAT UR-MCNC: 64 MG/DL (ref 15–325)
ERYTHROCYTE [DISTWIDTH] IN BLOOD BY AUTOMATED COUNT: 13.5 % (ref 11.5–14.5)
GFR SERPLBLD CREATININE-BSD FMLA CKD-EPI: 14 ML/MIN/1.73/M2
GLUCOSE SERPL-MCNC: 125 MG/DL (ref 70–110)
GLUCOSE UR QL STRIP: NEGATIVE
HCT VFR BLD AUTO: 30.4 % (ref 37–48.5)
HGB BLD-MCNC: 9.4 GM/DL (ref 12–16)
HGB UR QL STRIP: NEGATIVE
IMM GRANULOCYTES # BLD AUTO: 0.04 K/UL (ref 0–0.04)
IMM GRANULOCYTES NFR BLD AUTO: 0.5 % (ref 0–0.5)
KETONES UR QL STRIP: NEGATIVE
LEUKOCYTE ESTERASE UR QL STRIP: NEGATIVE
LYMPHOCYTES # BLD AUTO: 0.99 K/UL (ref 1–4.8)
MCH RBC QN AUTO: 29 PG (ref 27–31)
MCHC RBC AUTO-ENTMCNC: 30.9 G/DL (ref 32–36)
MCV RBC AUTO: 94 FL (ref 82–98)
NITRITE UR QL STRIP: NEGATIVE
NUCLEATED RBC (/100WBC) (OHS): 0 /100 WBC
PH UR STRIP: 6 [PH]
PHOSPHATE SERPL-MCNC: 3.3 MG/DL (ref 2.7–4.5)
PLATELET # BLD AUTO: 143 K/UL (ref 150–450)
PMV BLD AUTO: 12.8 FL (ref 9.2–12.9)
POTASSIUM SERPL-SCNC: 4.7 MMOL/L (ref 3.5–5.1)
PROT UR QL STRIP: NEGATIVE
PROT UR-MCNC: 11 MG/DL
PROT/CREAT UR: 0.17 MG/G{CREAT}
RBC # BLD AUTO: 3.24 M/UL (ref 4–5.4)
RELATIVE EOSINOPHIL (OHS): 4.1 %
RELATIVE LYMPHOCYTE (OHS): 13.4 % (ref 18–48)
RELATIVE MONOCYTE (OHS): 6.9 % (ref 4–15)
RELATIVE NEUTROPHIL (OHS): 74.8 % (ref 38–73)
SODIUM SERPL-SCNC: 138 MMOL/L (ref 136–145)
SP GR UR STRIP: 1.01
UROBILINOGEN UR STRIP-ACNC: NEGATIVE EU/DL
WBC # BLD AUTO: 7.4 K/UL (ref 3.9–12.7)

## 2025-06-27 PROCEDURE — 81003 URINALYSIS AUTO W/O SCOPE: CPT

## 2025-06-27 PROCEDURE — 80069 RENAL FUNCTION PANEL: CPT

## 2025-06-27 PROCEDURE — 36415 COLL VENOUS BLD VENIPUNCTURE: CPT | Mod: PN

## 2025-06-27 PROCEDURE — 84156 ASSAY OF PROTEIN URINE: CPT

## 2025-06-27 PROCEDURE — 85025 COMPLETE CBC W/AUTO DIFF WBC: CPT

## 2025-07-07 DIAGNOSIS — N18.4 CHRONIC KIDNEY DISEASE, STAGE 4 (SEVERE): Primary | ICD-10-CM

## 2025-08-29 ENCOUNTER — OFFICE VISIT (OUTPATIENT)
Dept: INTERNAL MEDICINE | Facility: CLINIC | Age: 75
End: 2025-08-29
Payer: MEDICARE

## 2025-08-29 VITALS
DIASTOLIC BLOOD PRESSURE: 74 MMHG | TEMPERATURE: 98 F | RESPIRATION RATE: 16 BRPM | HEIGHT: 65 IN | BODY MASS INDEX: 38.12 KG/M2 | SYSTOLIC BLOOD PRESSURE: 128 MMHG | HEART RATE: 83 BPM | OXYGEN SATURATION: 95 % | WEIGHT: 228.81 LBS

## 2025-08-29 DIAGNOSIS — J44.9 CHRONIC OBSTRUCTIVE PULMONARY DISEASE, UNSPECIFIED COPD TYPE: ICD-10-CM

## 2025-08-29 DIAGNOSIS — Z78.9 LIVES IN ASSISTED LIVING FACILITY: ICD-10-CM

## 2025-08-29 DIAGNOSIS — I48.11 LONGSTANDING PERSISTENT ATRIAL FIBRILLATION: ICD-10-CM

## 2025-08-29 DIAGNOSIS — I50.32 CHRONIC DIASTOLIC CONGESTIVE HEART FAILURE: ICD-10-CM

## 2025-08-29 DIAGNOSIS — I10 ESSENTIAL HYPERTENSION: ICD-10-CM

## 2025-08-29 DIAGNOSIS — F41.9 ANXIETY: ICD-10-CM

## 2025-08-29 DIAGNOSIS — E03.4 HYPOTHYROIDISM DUE TO ACQUIRED ATROPHY OF THYROID: ICD-10-CM

## 2025-08-29 DIAGNOSIS — N18.6 END-STAGE RENAL DISEASE (ESRD): ICD-10-CM

## 2025-08-29 DIAGNOSIS — F33.0 MILD EPISODE OF RECURRENT MAJOR DEPRESSIVE DISORDER: ICD-10-CM

## 2025-08-29 DIAGNOSIS — R06.02 SOB (SHORTNESS OF BREATH) ON EXERTION: ICD-10-CM

## 2025-08-29 DIAGNOSIS — G47.00 INSOMNIA, UNSPECIFIED TYPE: ICD-10-CM

## 2025-08-29 DIAGNOSIS — Z00.00 ROUTINE GENERAL MEDICAL EXAMINATION AT A HEALTH CARE FACILITY: Primary | ICD-10-CM

## 2025-08-29 DIAGNOSIS — E55.9 VITAMIN D DEFICIENCY: ICD-10-CM

## 2025-08-29 PROCEDURE — 99999 PR PBB SHADOW E&M-EST. PATIENT-LVL V: CPT | Mod: PBBFAC,,,

## 2025-08-29 RX ORDER — MORPHINE SULFATE 20 MG/ML
SOLUTION ORAL
COMMUNITY
Start: 2025-06-03

## 2025-08-29 RX ORDER — FERROUS SULFATE 325(65) MG
TABLET ORAL
COMMUNITY
Start: 2025-06-18

## 2025-08-29 RX ORDER — ONDANSETRON 4 MG/1
4 TABLET, FILM COATED ORAL EVERY 4 HOURS PRN
COMMUNITY
Start: 2025-06-03

## 2025-08-29 RX ORDER — HYOSCYAMINE SULFATE 0.12 MG/1
0.12 TABLET, ORALLY DISINTEGRATING ORAL EVERY 4 HOURS PRN
COMMUNITY
Start: 2025-06-03

## 2025-08-29 RX ORDER — METOPROLOL TARTRATE 100 MG/1
25 TABLET ORAL
COMMUNITY

## 2025-08-29 RX ORDER — BISACODYL 10 MG/1
SUPPOSITORY RECTAL
COMMUNITY
Start: 2025-06-03

## 2025-08-29 RX ORDER — LORAZEPAM 0.5 MG/1
0.5 TABLET ORAL EVERY 4 HOURS PRN
COMMUNITY
Start: 2025-06-03

## 2025-08-29 RX ORDER — LOPERAMIDE HYDROCHLORIDE 2 MG/1
2 CAPSULE ORAL EVERY 6 HOURS
COMMUNITY
Start: 2025-06-05

## (undated) DEVICE — DRESSING LEUKOPLAST FLEX 1X3IN

## (undated) DEVICE — PAD DEFIB CADENCE ADULT R2

## (undated) DEVICE — BANDAGE ADHESIVE